# Patient Record
Sex: MALE | Race: BLACK OR AFRICAN AMERICAN | NOT HISPANIC OR LATINO | Employment: OTHER | URBAN - METROPOLITAN AREA
[De-identification: names, ages, dates, MRNs, and addresses within clinical notes are randomized per-mention and may not be internally consistent; named-entity substitution may affect disease eponyms.]

---

## 2018-06-15 PROBLEM — C92.00 AML (ACUTE MYELOBLASTIC LEUKEMIA): Status: ACTIVE | Noted: 2018-06-15

## 2018-06-16 ENCOUNTER — HOSPITAL ENCOUNTER (INPATIENT)
Facility: HOSPITAL | Age: 33
LOS: 37 days | Discharge: HOME | DRG: 840 | End: 2018-07-23
Attending: INTERNAL MEDICINE | Admitting: INTERNAL MEDICINE
Payer: OTHER GOVERNMENT

## 2018-06-16 DIAGNOSIS — C92.00 AML (ACUTE MYELOBLASTIC LEUKEMIA): ICD-10-CM

## 2018-06-16 DIAGNOSIS — R50.81 NEUTROPENIC FEVER: ICD-10-CM

## 2018-06-16 DIAGNOSIS — D61.810 PANCYTOPENIA DUE TO ANTINEOPLASTIC CHEMOTHERAPY: Primary | ICD-10-CM

## 2018-06-16 DIAGNOSIS — C92.10 CHRONIC MYELOID LEUKEMIA, BCR/ABL-POSITIVE, NOT HAVING ACHIEVED REMISSION: ICD-10-CM

## 2018-06-16 DIAGNOSIS — T45.1X5A PANCYTOPENIA DUE TO ANTINEOPLASTIC CHEMOTHERAPY: Primary | ICD-10-CM

## 2018-06-16 DIAGNOSIS — C92.10: ICD-10-CM

## 2018-06-16 DIAGNOSIS — D70.9 NEUTROPENIC FEVER: ICD-10-CM

## 2018-06-16 DIAGNOSIS — Z91.89 AT RISK FOR LONG QT SYNDROME: ICD-10-CM

## 2018-06-16 DIAGNOSIS — C92.10 CML (CHRONIC MYELOCYTIC LEUKEMIA): ICD-10-CM

## 2018-06-16 DIAGNOSIS — R00.0 TACHYCARDIA: ICD-10-CM

## 2018-06-16 PROBLEM — A04.72 C. DIFFICILE COLITIS: Status: ACTIVE | Noted: 2018-06-16

## 2018-06-16 PROBLEM — E87.6 HYPOKALEMIA: Status: ACTIVE | Noted: 2018-06-16

## 2018-06-16 LAB
ABO + RH BLD: NORMAL
ALBUMIN SERPL BCP-MCNC: 2.8 G/DL
ALP SERPL-CCNC: 51 U/L
ALT SERPL W/O P-5'-P-CCNC: 30 U/L
ANION GAP SERPL CALC-SCNC: 7 MMOL/L
ANISOCYTOSIS BLD QL SMEAR: SLIGHT
AST SERPL-CCNC: 14 U/L
BASOPHILS # BLD AUTO: ABNORMAL K/UL
BASOPHILS NFR BLD: 0 %
BILIRUB SERPL-MCNC: 0.5 MG/DL
BLD GP AB SCN CELLS X3 SERPL QL: NORMAL
BUN SERPL-MCNC: 13 MG/DL
CALCIUM SERPL-MCNC: 8.4 MG/DL
CHLORIDE SERPL-SCNC: 109 MMOL/L
CO2 SERPL-SCNC: 27 MMOL/L
CREAT SERPL-MCNC: 0.7 MG/DL
DIASTOLIC DYSFUNCTION: NO
DIFFERENTIAL METHOD: ABNORMAL
EOSINOPHIL # BLD AUTO: ABNORMAL K/UL
EOSINOPHIL NFR BLD: 0 %
ERYTHROCYTE [DISTWIDTH] IN BLOOD BY AUTOMATED COUNT: 14.5 %
EST. GFR  (AFRICAN AMERICAN): >60 ML/MIN/1.73 M^2
EST. GFR  (NON AFRICAN AMERICAN): >60 ML/MIN/1.73 M^2
ESTIMATED PA SYSTOLIC PRESSURE: 24.16
FIBRINOGEN PPP-MCNC: 470 MG/DL
GLUCOSE SERPL-MCNC: 98 MG/DL
HCT VFR BLD AUTO: 22.3 %
HGB BLD-MCNC: 7.8 G/DL
HYPOCHROMIA BLD QL SMEAR: ABNORMAL
IMM GRANULOCYTES # BLD AUTO: ABNORMAL K/UL
IMM GRANULOCYTES NFR BLD AUTO: ABNORMAL %
INR PPP: 1.1
LDH SERPL L TO P-CCNC: 132 U/L
LYMPHOCYTES # BLD AUTO: ABNORMAL K/UL
LYMPHOCYTES NFR BLD: 100 %
MAGNESIUM SERPL-MCNC: 2 MG/DL
MCH RBC QN AUTO: 29.1 PG
MCHC RBC AUTO-ENTMCNC: 35 G/DL
MCV RBC AUTO: 83 FL
MONOCYTES # BLD AUTO: ABNORMAL K/UL
MONOCYTES NFR BLD: 0 %
NEUTROPHILS NFR BLD: 0 %
NRBC BLD-RTO: 0 /100 WBC
PHOSPHATE SERPL-MCNC: 2.8 MG/DL
PLATELET # BLD AUTO: 20 K/UL
PLATELET BLD QL SMEAR: ABNORMAL
PMV BLD AUTO: 10 FL
POTASSIUM SERPL-SCNC: 3 MMOL/L
PROT SERPL-MCNC: 5.3 G/DL
PROTHROMBIN TIME: 11.6 SEC
RBC # BLD AUTO: 2.68 M/UL
RETIRED EF AND QEF - SEE NOTES: 63 (ref 55–65)
SODIUM SERPL-SCNC: 143 MMOL/L
URATE SERPL-MCNC: 1.8 MG/DL
WBC # BLD AUTO: 0.26 K/UL

## 2018-06-16 PROCEDURE — 25000003 PHARM REV CODE 250: Performed by: INTERNAL MEDICINE

## 2018-06-16 PROCEDURE — 99223 1ST HOSP IP/OBS HIGH 75: CPT | Mod: ,,, | Performed by: INTERNAL MEDICINE

## 2018-06-16 PROCEDURE — 83735 ASSAY OF MAGNESIUM: CPT

## 2018-06-16 PROCEDURE — 84550 ASSAY OF BLOOD/URIC ACID: CPT

## 2018-06-16 PROCEDURE — 86850 RBC ANTIBODY SCREEN: CPT

## 2018-06-16 PROCEDURE — 85610 PROTHROMBIN TIME: CPT

## 2018-06-16 PROCEDURE — 80053 COMPREHEN METABOLIC PANEL: CPT

## 2018-06-16 PROCEDURE — 20600001 HC STEP DOWN PRIVATE ROOM

## 2018-06-16 PROCEDURE — 83615 LACTATE (LD) (LDH) ENZYME: CPT

## 2018-06-16 PROCEDURE — 84100 ASSAY OF PHOSPHORUS: CPT

## 2018-06-16 PROCEDURE — 93005 ELECTROCARDIOGRAM TRACING: CPT

## 2018-06-16 PROCEDURE — 85384 FIBRINOGEN ACTIVITY: CPT

## 2018-06-16 PROCEDURE — 93010 ELECTROCARDIOGRAM REPORT: CPT | Mod: ,,, | Performed by: INTERNAL MEDICINE

## 2018-06-16 PROCEDURE — 93306 TTE W/DOPPLER COMPLETE: CPT

## 2018-06-16 PROCEDURE — 93306 TTE W/DOPPLER COMPLETE: CPT | Mod: 26,,, | Performed by: INTERNAL MEDICINE

## 2018-06-16 PROCEDURE — 85027 COMPLETE CBC AUTOMATED: CPT

## 2018-06-16 PROCEDURE — 85007 BL SMEAR W/DIFF WBC COUNT: CPT

## 2018-06-16 RX ORDER — POTASSIUM CHLORIDE 750 MG/1
20 CAPSULE, EXTENDED RELEASE ORAL
Status: DISCONTINUED | OUTPATIENT
Start: 2018-06-16 | End: 2018-07-23 | Stop reason: HOSPADM

## 2018-06-16 RX ORDER — CEFEPIME HYDROCHLORIDE 2 G/1
2 INJECTION, POWDER, FOR SOLUTION INTRAVENOUS
Status: DISCONTINUED | OUTPATIENT
Start: 2018-06-16 | End: 2018-06-16

## 2018-06-16 RX ORDER — SODIUM CHLORIDE 0.9 % (FLUSH) 0.9 %
5 SYRINGE (ML) INJECTION
Status: DISCONTINUED | OUTPATIENT
Start: 2018-06-16 | End: 2018-06-20

## 2018-06-16 RX ORDER — SODIUM,POTASSIUM PHOSPHATES 280-250MG
1 POWDER IN PACKET (EA) ORAL EVERY 4 HOURS PRN
Status: DISCONTINUED | OUTPATIENT
Start: 2018-06-16 | End: 2018-07-23 | Stop reason: HOSPADM

## 2018-06-16 RX ORDER — GLUCAGON 1 MG
1 KIT INJECTION
Status: DISCONTINUED | OUTPATIENT
Start: 2018-06-16 | End: 2018-07-23 | Stop reason: HOSPADM

## 2018-06-16 RX ORDER — IBUPROFEN 200 MG
24 TABLET ORAL
Status: DISCONTINUED | OUTPATIENT
Start: 2018-06-16 | End: 2018-07-23 | Stop reason: HOSPADM

## 2018-06-16 RX ORDER — MAGNESIUM SULFATE HEPTAHYDRATE 40 MG/ML
2 INJECTION, SOLUTION INTRAVENOUS
Status: DISCONTINUED | OUTPATIENT
Start: 2018-06-16 | End: 2018-07-20

## 2018-06-16 RX ORDER — IBUPROFEN 200 MG
16 TABLET ORAL
Status: DISCONTINUED | OUTPATIENT
Start: 2018-06-16 | End: 2018-07-23 | Stop reason: HOSPADM

## 2018-06-16 RX ORDER — CIPROFLOXACIN 250 MG/1
500 TABLET, FILM COATED ORAL EVERY 12 HOURS
Status: DISCONTINUED | OUTPATIENT
Start: 2018-06-16 | End: 2018-06-22

## 2018-06-16 RX ORDER — ACETAMINOPHEN 325 MG/1
650 TABLET ORAL EVERY 4 HOURS PRN
Status: DISCONTINUED | OUTPATIENT
Start: 2018-06-16 | End: 2018-07-13

## 2018-06-16 RX ORDER — ONDANSETRON 8 MG/1
8 TABLET, ORALLY DISINTEGRATING ORAL EVERY 8 HOURS PRN
Status: DISCONTINUED | OUTPATIENT
Start: 2018-06-16 | End: 2018-07-23 | Stop reason: HOSPADM

## 2018-06-16 RX ORDER — PROCHLORPERAZINE EDISYLATE 5 MG/ML
10 INJECTION INTRAMUSCULAR; INTRAVENOUS EVERY 6 HOURS PRN
Status: DISCONTINUED | OUTPATIENT
Start: 2018-06-16 | End: 2018-07-23 | Stop reason: HOSPADM

## 2018-06-16 RX ORDER — POLYETHYLENE GLYCOL 3350 17 G/17G
17 POWDER, FOR SOLUTION ORAL 2 TIMES DAILY PRN
Status: DISCONTINUED | OUTPATIENT
Start: 2018-06-16 | End: 2018-07-23 | Stop reason: HOSPADM

## 2018-06-16 RX ORDER — RAMELTEON 8 MG/1
8 TABLET ORAL NIGHTLY PRN
Status: DISCONTINUED | OUTPATIENT
Start: 2018-06-16 | End: 2018-06-18

## 2018-06-16 RX ORDER — VORICONAZOLE 200 MG/1
200 TABLET, FILM COATED ORAL 2 TIMES DAILY
Status: DISCONTINUED | OUTPATIENT
Start: 2018-06-16 | End: 2018-07-23 | Stop reason: HOSPADM

## 2018-06-16 RX ORDER — SODIUM,POTASSIUM PHOSPHATES 280-250MG
2 POWDER IN PACKET (EA) ORAL EVERY 4 HOURS PRN
Status: DISCONTINUED | OUTPATIENT
Start: 2018-06-16 | End: 2018-07-23 | Stop reason: HOSPADM

## 2018-06-16 RX ORDER — ACYCLOVIR 200 MG/1
400 CAPSULE ORAL 2 TIMES DAILY
Status: DISCONTINUED | OUTPATIENT
Start: 2018-06-16 | End: 2018-07-23 | Stop reason: HOSPADM

## 2018-06-16 RX ORDER — ALLOPURINOL 300 MG/1
300 TABLET ORAL DAILY
Status: DISCONTINUED | OUTPATIENT
Start: 2018-06-16 | End: 2018-06-18

## 2018-06-16 RX ADMIN — Medication 125 MG: at 06:06

## 2018-06-16 RX ADMIN — DASATINIB 100 MG: 100 TABLET ORAL at 09:06

## 2018-06-16 RX ADMIN — Medication 125 MG: at 11:06

## 2018-06-16 RX ADMIN — Medication 125 MG: at 12:06

## 2018-06-16 RX ADMIN — POTASSIUM CHLORIDE 20 MEQ: 750 CAPSULE, EXTENDED RELEASE ORAL at 08:06

## 2018-06-16 RX ADMIN — VORICONAZOLE 200 MG: 200 TABLET ORAL at 08:06

## 2018-06-16 RX ADMIN — ACYCLOVIR 400 MG: 200 CAPSULE ORAL at 09:06

## 2018-06-16 RX ADMIN — CIPROFLOXACIN HYDROCHLORIDE 500 MG: 250 TABLET, FILM COATED ORAL at 09:06

## 2018-06-16 RX ADMIN — POTASSIUM CHLORIDE 20 MEQ: 750 CAPSULE, EXTENDED RELEASE ORAL at 07:06

## 2018-06-16 RX ADMIN — POTASSIUM CHLORIDE 20 MEQ: 750 CAPSULE, EXTENDED RELEASE ORAL at 09:06

## 2018-06-16 RX ADMIN — ALLOPURINOL 300 MG: 300 TABLET ORAL at 09:06

## 2018-06-16 RX ADMIN — VORICONAZOLE 200 MG: 200 TABLET ORAL at 09:06

## 2018-06-16 RX ADMIN — ACYCLOVIR 400 MG: 200 CAPSULE ORAL at 08:06

## 2018-06-16 RX ADMIN — RAMELTEON 8 MG: 8 TABLET, FILM COATED ORAL at 08:06

## 2018-06-16 RX ADMIN — CIPROFLOXACIN HYDROCHLORIDE 500 MG: 250 TABLET, FILM COATED ORAL at 08:06

## 2018-06-16 NOTE — ASSESSMENT & PLAN NOTE
- initial WBC reportedly 150,000  - bcr-abl positive on FISH at [facility] (records attached in media tab)  - continue dasatinib  - voriconazole, acyclovir and cipro for prophylaxis  - likely needs to be re-marrowed status post 7+3 induction- day 16  - pending 2D echo

## 2018-06-16 NOTE — HPI
Mr. Guzmán is a 31 yo gentleman with no significant past medical history who was transferred from [facility] in [place], [place] for CML with blast crisis. The patient reports that he started having some worsening left sided abdominal pain in mid May. Eventually, this pain worsened which prompted him [place]. Routine labs were done which revealed a white count reportedly around 150. He was transferred to the above mentioned hospital where a bone marrow biopsy was done. The report (attached in the media tab) revealed 20% myelomonoblasts concerning for a CML in transformation to AML. The patient was subsequently induced with 7+3 on June 1. The patient's course there was complicated by neutropenic fevers resulting in the patient being started on vanc and cefepime. IV vanc was discontinued early on in the course but he remained on cefepime until his arrival here despite NGTD on all cultures. He was found to be C. Diff positive and was started on PO vancomycin on 6/13 and now reports having significant improvement in his diarrhea.     On day 8 of 7+3, Mr. Guzmán's bcr-abl came back positive and he was subsequently started on dasatinib. He developed a follicular rash on his arms and head likely secondary to the dasatinib which has largely resolved to date. The patient and his family are originally from [place] which is why they requested transfer to [facility] given their previous positive experiences here.

## 2018-06-16 NOTE — H&P
[facility]  Hematology  Bone Marrow Transplant  H&P    Subjective:     Principal Problem: Chronic myelogenous leukemia (CML), BCR-ABL1-positive    HPI: Mr. Guzmán is a 33 yo gentleman with no significant past medical history who was [facility] in [place], [place] for CML with blast crisis. The patient reports that he started having some worsening left sided abdominal pain in mid May. Eventually, this pain worsened which prompted him to [place]. Routine labs were done which revealed a white count reportedly around 150. He was transferred to the above mentioned hospital where a bone marrow biopsy was done. The report (attached in the media tab) revealed 20% myelomonoblasts concerning for a CML in transformation to AML. The patient was subsequently induced with 7+3 on June 1. The patient's course there was complicated by neutropenic fevers resulting in the patient being started on vanc and cefepime. IV vanc was discontinued early on in the course but he remained on cefepime until his arrival here despite NGTD on all cultures. He was found to be C. Diff positive and was started on PO vancomycin on 6/13 and now reports having significant improvement in his diarrhea.     On day 8 of 7+3, Mr. Guzmán's bcr-abl came back positive and he was subsequently started on dasatinib. He developed a follicular rash on his arms and head likely secondary to the dasatinib which has largely resolved to date. The patient and his family are originally from [place] which is why they requested transfer to [facility] given their previous positive experiences here.           No prescriptions prior to admission.       Patient has no known allergies.     Past Medical History:   Diagnosis Date    Chronic myelogenous leukemia (CML), BCR-ABL1-positive      History reviewed. No pertinent surgical history.  Family History     None        Social  History Main Topics    Smoking status: Never Smoker    Smokeless tobacco: Not on file    Alcohol use No    Drug use: No    Sexual activity: Yes     Partners: Female       Review of Systems   Constitutional: Negative for chills and fever.   HENT: Negative for congestion.    Eyes: Negative for visual disturbance.   Respiratory: Negative for shortness of breath.    Cardiovascular: Negative for chest pain.   Gastrointestinal: Positive for diarrhea. Negative for abdominal pain.   Genitourinary: Negative for dysuria.   Musculoskeletal: Negative for back pain.   Neurological: Negative for headaches.   Psychiatric/Behavioral: Negative for confusion.     Objective:     Vital Signs (Most Recent):  Temp: 99.5 °F (37.5 °C) (06/16/18 0850)  Pulse: 83 (06/16/18 0850)  Resp: 18 (06/16/18 0850)  BP: 131/79 (06/16/18 0850)  SpO2: 100 % (06/16/18 0850) Vital Signs (24h Range):  Temp:  [99 °F (37.2 °C)-99.7 °F (37.6 °C)] 99.5 °F (37.5 °C)  Pulse:  [] 83  Resp:  [18-20] 18  SpO2:  [96 %-100 %] 100 %  BP: (131-145)/(79-88) 131/79     Weight: 66.4 kg (146 lb 6.2 oz)  Body mass index is 20.42 kg/m².  Body surface area is 1.82 meters squared.    ECOG SCORE         [unfilled]    Lines/Drains/Airways     Peripherally Inserted Central Catheter Line                 PICC Double Lumen 05/30/18 0600 left brachial 17 days                Physical Exam   Constitutional: He is oriented to person, place, and time. He appears well-developed and well-nourished.   HENT:   Head: Normocephalic and atraumatic.   Eyes: EOM are normal. Pupils are equal, round, and reactive to light.   Neck: Normal range of motion. Neck supple.   Cardiovascular: Normal rate and regular rhythm.    No murmur heard.  Pulmonary/Chest: Effort normal and breath sounds normal. No respiratory distress.   Abdominal: Soft. Bowel sounds are normal. He exhibits no distension.   Musculoskeletal: Normal range of motion. He exhibits no edema.   Neurological: He is alert and oriented  to person, place, and time.   Skin: Skin is warm and dry. Rash noted.   Psychiatric: He has a normal mood and affect. His behavior is normal.   Vitals reviewed.      Significant Labs:   All pertinent labs from the last 24 hours have been reviewed.    Diagnostic Results:  I have reviewed and interpreted all pertinent imaging results/findings within the past 24 hours.    Assessment/Plan:     * Chronic myelogenous leukemia (CML), BCR-ABL1-positive    - initial WBC reportedly 150,000  - bcr-abl positive on FISH at [facility] (records attached in media tab)  - continue dasatinib  - voriconazole, acyclovir and cipro for prophylaxis  - likely needs to be re-marrowed status post 7+3 induction- day 16  - pending 2D echo        Pancytopenia due to antineoplastic chemotherapy    - likely secondary to 7+3 regiment  - daily CBC to assess  - ANC of 0         Hypokalemia    - prns replaced        C. difficile colitis    - on po vancomycin with improvement in symptoms  - end date of treatment 6/23            VTE Risk Mitigation         Ordered     IP VTE LOW RISK PATIENT  Once      06/16/18 0938          Disposition: Pending count recovery    Lizet Gee MD  Bone Marrow Transplant  Hematology  [facility]

## 2018-06-16 NOTE — SUBJECTIVE & OBJECTIVE
No prescriptions prior to admission.       Patient has no known allergies.     Past Medical History:   Diagnosis Date    Chronic myelogenous leukemia (CML), BCR-ABL1-positive      History reviewed. No pertinent surgical history.  Family History     None        Social History Main Topics    Smoking status: Never Smoker    Smokeless tobacco: Not on file    Alcohol use No    Drug use: No    Sexual activity: Yes     Partners: Female       Review of Systems   Constitutional: Negative for chills and fever.   HENT: Negative for congestion.    Eyes: Negative for visual disturbance.   Respiratory: Negative for shortness of breath.    Cardiovascular: Negative for chest pain.   Gastrointestinal: Positive for diarrhea. Negative for abdominal pain.   Genitourinary: Negative for dysuria.   Musculoskeletal: Negative for back pain.   Neurological: Negative for headaches.   Psychiatric/Behavioral: Negative for confusion.     Objective:     Vital Signs (Most Recent):  Temp: 99.5 °F (37.5 °C) (06/16/18 0850)  Pulse: 83 (06/16/18 0850)  Resp: 18 (06/16/18 0850)  BP: 131/79 (06/16/18 0850)  SpO2: 100 % (06/16/18 0850) Vital Signs (24h Range):  Temp:  [99 °F (37.2 °C)-99.7 °F (37.6 °C)] 99.5 °F (37.5 °C)  Pulse:  [] 83  Resp:  [18-20] 18  SpO2:  [96 %-100 %] 100 %  BP: (131-145)/(79-88) 131/79     Weight: 66.4 kg (146 lb 6.2 oz)  Body mass index is 20.42 kg/m².  Body surface area is 1.82 meters squared.    ECOG SCORE         [unfilled]    Lines/Drains/Airways     Peripherally Inserted Central Catheter Line                 PICC Double Lumen 05/30/18 0600 left brachial 17 days                Physical Exam   Constitutional: He is oriented to person, place, and time. He appears well-developed and well-nourished.   HENT:   Head: Normocephalic and atraumatic.   Eyes: EOM are normal. Pupils are equal, round, and reactive to light.   Neck: Normal range of motion. Neck supple.   Cardiovascular: Normal rate and regular rhythm.    No  murmur heard.  Pulmonary/Chest: Effort normal and breath sounds normal. No respiratory distress.   Abdominal: Soft. Bowel sounds are normal. He exhibits no distension.   Musculoskeletal: Normal range of motion. He exhibits no edema.   Neurological: He is alert and oriented to person, place, and time.   Skin: Skin is warm and dry. Rash noted.   Psychiatric: He has a normal mood and affect. His behavior is normal.   Vitals reviewed.      Significant Labs:   All pertinent labs from the last 24 hours have been reviewed.    Diagnostic Results:  I have reviewed and interpreted all pertinent imaging results/findings within the past 24 hours.

## 2018-06-17 LAB
ALBUMIN SERPL BCP-MCNC: 2.6 G/DL
ALP SERPL-CCNC: 51 U/L
ALT SERPL W/O P-5'-P-CCNC: 45 U/L
ANION GAP SERPL CALC-SCNC: 6 MMOL/L
AST SERPL-CCNC: 21 U/L
BASOPHILS # BLD AUTO: 0 K/UL
BASOPHILS NFR BLD: 0 %
BILIRUB SERPL-MCNC: 0.4 MG/DL
BLD PROD TYP BPU: NORMAL
BLOOD UNIT EXPIRATION DATE: NORMAL
BLOOD UNIT TYPE CODE: 6200
BLOOD UNIT TYPE: NORMAL
BUN SERPL-MCNC: 12 MG/DL
CALCIUM SERPL-MCNC: 8.2 MG/DL
CHLORIDE SERPL-SCNC: 108 MMOL/L
CO2 SERPL-SCNC: 25 MMOL/L
CODING SYSTEM: NORMAL
CREAT SERPL-MCNC: 0.6 MG/DL
DIFFERENTIAL METHOD: ABNORMAL
DISPENSE STATUS: NORMAL
EOSINOPHIL # BLD AUTO: 0 K/UL
EOSINOPHIL NFR BLD: 0 %
ERYTHROCYTE [DISTWIDTH] IN BLOOD BY AUTOMATED COUNT: 14.6 %
EST. GFR  (AFRICAN AMERICAN): >60 ML/MIN/1.73 M^2
EST. GFR  (NON AFRICAN AMERICAN): >60 ML/MIN/1.73 M^2
GLUCOSE SERPL-MCNC: 98 MG/DL
HCT VFR BLD AUTO: 21.8 %
HGB BLD-MCNC: 7.7 G/DL
IMM GRANULOCYTES # BLD AUTO: 0 K/UL
IMM GRANULOCYTES NFR BLD AUTO: 0 %
LYMPHOCYTES # BLD AUTO: 0.3 K/UL
LYMPHOCYTES NFR BLD: 100 %
MAGNESIUM SERPL-MCNC: 1.9 MG/DL
MCH RBC QN AUTO: 29.4 PG
MCHC RBC AUTO-ENTMCNC: 35.3 G/DL
MCV RBC AUTO: 83 FL
MONOCYTES # BLD AUTO: 0 K/UL
MONOCYTES NFR BLD: 0 %
NEUTROPHILS # BLD AUTO: 0 K/UL
NEUTROPHILS NFR BLD: 0 %
NRBC BLD-RTO: 0 /100 WBC
NUM UNITS TRANS WBC-POOR PLATPHERESIS: NORMAL
PHOSPHATE SERPL-MCNC: 2.2 MG/DL
PLATELET # BLD AUTO: 9 K/UL
PLATELET BLD QL SMEAR: ABNORMAL
PMV BLD AUTO: 8.8 FL
POTASSIUM SERPL-SCNC: 3.4 MMOL/L
PROT SERPL-MCNC: 5.1 G/DL
RBC # BLD AUTO: 2.62 M/UL
SODIUM SERPL-SCNC: 139 MMOL/L
WBC # BLD AUTO: 0.3 K/UL

## 2018-06-17 PROCEDURE — 25000003 PHARM REV CODE 250: Performed by: INTERNAL MEDICINE

## 2018-06-17 PROCEDURE — P9037 PLATE PHERES LEUKOREDU IRRAD: HCPCS

## 2018-06-17 PROCEDURE — 83735 ASSAY OF MAGNESIUM: CPT

## 2018-06-17 PROCEDURE — 36430 TRANSFUSION BLD/BLD COMPNT: CPT

## 2018-06-17 PROCEDURE — 63700000 PHARM REV CODE 250 ALT 637 W/O HCPCS: Performed by: INTERNAL MEDICINE

## 2018-06-17 PROCEDURE — 20600001 HC STEP DOWN PRIVATE ROOM

## 2018-06-17 PROCEDURE — 80053 COMPREHEN METABOLIC PANEL: CPT

## 2018-06-17 PROCEDURE — 85025 COMPLETE CBC W/AUTO DIFF WBC: CPT

## 2018-06-17 PROCEDURE — 84100 ASSAY OF PHOSPHORUS: CPT

## 2018-06-17 PROCEDURE — 86644 CMV ANTIBODY: CPT

## 2018-06-17 PROCEDURE — 99233 SBSQ HOSP IP/OBS HIGH 50: CPT | Mod: ,,, | Performed by: INTERNAL MEDICINE

## 2018-06-17 RX ORDER — HYDROCODONE BITARTRATE AND ACETAMINOPHEN 500; 5 MG/1; MG/1
TABLET ORAL
Status: DISCONTINUED | OUTPATIENT
Start: 2018-06-17 | End: 2018-06-18

## 2018-06-17 RX ADMIN — Medication 125 MG: at 11:06

## 2018-06-17 RX ADMIN — Medication 125 MG: at 05:06

## 2018-06-17 RX ADMIN — POTASSIUM CHLORIDE 20 MEQ: 750 CAPSULE, EXTENDED RELEASE ORAL at 05:06

## 2018-06-17 RX ADMIN — Medication 125 MG: at 06:06

## 2018-06-17 RX ADMIN — ACYCLOVIR 400 MG: 200 CAPSULE ORAL at 10:06

## 2018-06-17 RX ADMIN — VORICONAZOLE 200 MG: 200 TABLET ORAL at 10:06

## 2018-06-17 RX ADMIN — POTASSIUM & SODIUM PHOSPHATES POWDER PACK 280-160-250 MG 1 PACKET: 280-160-250 PACK at 10:06

## 2018-06-17 RX ADMIN — CIPROFLOXACIN HYDROCHLORIDE 500 MG: 250 TABLET, FILM COATED ORAL at 10:06

## 2018-06-17 RX ADMIN — ALLOPURINOL 300 MG: 300 TABLET ORAL at 10:06

## 2018-06-17 RX ADMIN — CIPROFLOXACIN HYDROCHLORIDE 500 MG: 250 TABLET, FILM COATED ORAL at 08:06

## 2018-06-17 RX ADMIN — POTASSIUM & SODIUM PHOSPHATES POWDER PACK 280-160-250 MG 1 PACKET: 280-160-250 PACK at 05:06

## 2018-06-17 RX ADMIN — VORICONAZOLE 200 MG: 200 TABLET ORAL at 08:06

## 2018-06-17 RX ADMIN — ACYCLOVIR 400 MG: 200 CAPSULE ORAL at 08:06

## 2018-06-17 RX ADMIN — RAMELTEON 8 MG: 8 TABLET, FILM COATED ORAL at 08:06

## 2018-06-17 RX ADMIN — ACETAMINOPHEN 650 MG: 325 TABLET, FILM COATED ORAL at 05:06

## 2018-06-17 RX ADMIN — POTASSIUM & SODIUM PHOSPHATES POWDER PACK 280-160-250 MG 1 PACKET: 280-160-250 PACK at 02:06

## 2018-06-17 RX ADMIN — POTASSIUM CHLORIDE 20 MEQ: 750 CAPSULE, EXTENDED RELEASE ORAL at 10:06

## 2018-06-17 RX ADMIN — DASATINIB 40 MG: 20 TABLET ORAL at 11:06

## 2018-06-17 NOTE — SUBJECTIVE & OBJECTIVE
Subjective:     Interval History: NAEON. Day 17 of 7+3. Diarrhea improving. Rash improving. ANC 0.     He denies any fevers, chills, cough, SOB, CP, N/V, constipation, or dysuria.    Objective:     Vital Signs (Most Recent):  Temp: 98.7 °F (37.1 °C) (06/17/18 0809)  Pulse: 82 (06/17/18 0809)  Resp: 18 (06/17/18 0809)  BP: 125/75 (06/17/18 0809)  SpO2: 100 % (06/17/18 0809) Vital Signs (24h Range):  Temp:  [98.7 °F (37.1 °C)-99.5 °F (37.5 °C)] 98.7 °F (37.1 °C)  Pulse:  [] 82  Resp:  [18-20] 18  SpO2:  [98 %-100 %] 100 %  BP: (118-144)/(69-87) 125/75     Weight: 66.5 kg (146 lb 9.7 oz)  Body mass index is 20.45 kg/m².  Body surface area is 1.83 meters squared.    ECOG SCORE         [unfilled]    Intake/Output - Last 3 Shifts       06/15 0700 - 06/16 0659 06/16 0700 - 06/17 0659 06/17 0700 - 06/18 0659    P.O.  840     Blood   243    Total Intake(mL/kg)  840 (12.6) 243 (3.7)    Urine (mL/kg/hr)  350 (0.2)     Total Output   350      Net   +490 +243           Urine Occurrence  1 x     Stool Occurrence  3 x           Physical Exam   Constitutional: He is oriented to person, place, and time. He appears well-developed and well-nourished.   HENT:   Head: Normocephalic and atraumatic.   Eyes: EOM are normal. Pupils are equal, round, and reactive to light.   Neck: Normal range of motion. Neck supple.   Cardiovascular: Normal rate and regular rhythm.    No murmur heard.  Pulmonary/Chest: Effort normal and breath sounds normal. No respiratory distress.   Abdominal: Soft. Bowel sounds are normal. He exhibits no distension.   Musculoskeletal: Normal range of motion. He exhibits no edema.   Neurological: He is alert and oriented to person, place, and time.   Skin: Skin is warm and dry. Rash noted.   Psychiatric: He has a normal mood and affect. His behavior is normal.   Vitals reviewed.      Significant Labs:   All pertinent labs from the last 24 hours have been reviewed.    Diagnostic Results:  I have reviewed and  interpreted all pertinent imaging results/findings within the past 24 hours.

## 2018-06-17 NOTE — PROGRESS NOTES
[facility]  Hematology  Bone Marrow Transplant  Progress Note    Patient Name: Wilton Guzmán  Admission Date: 6/16/2018  Hospital Length of Stay: 1 days  Code Status: Full Code    Subjective:     Interval History: NAEON. Day 17 of 7+3. Diarrhea improving. Rash improving. ANC 0.     He denies any fevers, chills, cough, SOB, CP, N/V, constipation, or dysuria.    Objective:     Vital Signs (Most Recent):  Temp: 98.7 °F (37.1 °C) (06/17/18 0809)  Pulse: 82 (06/17/18 0809)  Resp: 18 (06/17/18 0809)  BP: 125/75 (06/17/18 0809)  SpO2: 100 % (06/17/18 0809) Vital Signs (24h Range):  Temp:  [98.7 °F (37.1 °C)-99.5 °F (37.5 °C)] 98.7 °F (37.1 °C)  Pulse:  [] 82  Resp:  [18-20] 18  SpO2:  [98 %-100 %] 100 %  BP: (118-144)/(69-87) 125/75     Weight: 66.5 kg (146 lb 9.7 oz)  Body mass index is 20.45 kg/m².  Body surface area is 1.83 meters squared.    ECOG SCORE         [unfilled]    Intake/Output - Last 3 Shifts       06/15 0700 - 06/16 0659 06/16 0700 - 06/17 0659 06/17 0700 - 06/18 0659    P.O.  840     Blood   243    Total Intake(mL/kg)  840 (12.6) 243 (3.7)    Urine (mL/kg/hr)  350 (0.2)     Total Output   350      Net   +490 +243           Urine Occurrence  1 x     Stool Occurrence  3 x           Physical Exam   Constitutional: He is oriented to person, place, and time. He appears well-developed and well-nourished.   HENT:   Head: Normocephalic and atraumatic.   Eyes: EOM are normal. Pupils are equal, round, and reactive to light.   Neck: Normal range of motion. Neck supple.   Cardiovascular: Normal rate and regular rhythm.    No murmur heard.  Pulmonary/Chest: Effort normal and breath sounds normal. No respiratory distress.   Abdominal: Soft. Bowel sounds are normal. He exhibits no distension.   Musculoskeletal: Normal range of motion. He exhibits no edema.   Neurological: He is alert and oriented to person, place, and time.   Skin: Skin is warm and dry. Rash noted.   Psychiatric: He has a normal  mood and affect. His behavior is normal.   Vitals reviewed.      Significant Labs:   All pertinent labs from the last 24 hours have been reviewed.    Diagnostic Results:  I have reviewed and interpreted all pertinent imaging results/findings within the past 24 hours.    Assessment/Plan:     * Chronic myelogenous leukemia (CML), BCR-ABL1-positive    - initial WBC reportedly 150,000  - bcr-abl positive on FISH at [facility] (records attached in media tab)  - continue dasatinib  - voriconazole, acyclovir and cipro for prophylaxis  - likely needs to be re-marrowed status post 7+3 induction- day 17  - 2D echo wnl  - will need repeat bone marrow biopsy         Pancytopenia due to antineoplastic chemotherapy    - likely secondary to 7+3 regiment  - daily CBC to assess  - ANC of 0         Hypokalemia    - prns replaced        C. difficile colitis    - on po vancomycin with improvement in symptoms  - end date of treatment 6/23            VTE Risk Mitigation         Ordered     IP VTE LOW RISK PATIENT  Once      06/16/18 0938          Disposition: Pending count recovery    Lizet Gee MD  Bone Marrow Transplant  [facility]

## 2018-06-17 NOTE — PLAN OF CARE
Problem: Patient Care Overview  Goal: Plan of Care Review  Outcome: Ongoing (interventions implemented as appropriate)  Pt. with nonskid footwear on with bed in lowest position and locked with bed rails up x2.  Pt. ambulates independently and instructed to call if assistance is needed.  Pt. with call light within reach.  Pt. With EGK completed.  Pt. Pending 2D echo.  Pt received electrolyte replacements.   Pt. With c/o improved diarrhea and receiving vanco oral for c. diff.  Pt. With a good appetite eating 100% of meals.  Pt. with mother at bedside.  Will continue to monitor pt.

## 2018-06-17 NOTE — PLAN OF CARE
Problem: Patient Care Overview  Goal: Plan of Care Review  Outcome: Ongoing (interventions implemented as appropriate)  Patient is progressing and involved with plan of care. Frequent rounds made to assess pain and safety. Pt communicating needs throughout shift. Up ad flakita. No c/o diarrhea this shift. Tolerating diet, voiding without difficulty. No c/o pain. All vitals stable; no acute events this shift. Pt. Remaining free from falls or injury throughout shift; bed in lowest position; side rails up X2; call light within reach; pt instructed to call for assistance as needed - verbalized understanding. Q2h rounding on patient. Will continue to monitor.

## 2018-06-17 NOTE — ASSESSMENT & PLAN NOTE
- initial WBC reportedly 150,000  - bcr-abl positive on FISH at [facility] (records attached in media tab)  - continue dasatinib at 40 mg (dose reduction secondary to being on voriconazole)  - voriconazole, acyclovir and cipro for prophylaxis  - likely needs to be re-marrowed status post 7+3 induction- day 17  - 2D echo wnl  - will need repeat bone marrow biopsy

## 2018-06-17 NOTE — HOSPITAL COURSE
06/17/2018: NAEON. Diarrhea improving. Rash improving.  06/22/2018: Neutropenic fever overnight. Blood cultures obtained. Will obtain UA, urine culture, and chest xray this AM. Started on cefepime this AM. Diarrhea resolved. Pancytopenic. ANC 50. Plans to start MEC reinduction on hold due to neutropenic fever. Will hold off on starting until cultures result.   06/24/2018 tolerated first day of MEC chemotherapy started 6/23 for refractory AML. Today MEC day 2.   06/25/2018: MEC D3. No complaints. Discontinue cefepime and transition to cipro PPX. Pancytopenic. 1 U PRBCs today for Hgb 6.8.  06/26/2018: MEC D4. No acute issues.  06/27/2018: MEC D5. No acute issues.  6/28/2018: MEC D6. VSS, afebrile, no complaints today. tolerating chemo well.   06/29/2018: [facility] D7.  07/02/2018: [facility] day 10; no issues, VSS, ANC 0. D/C'd IVFs  07/03/2018: MEC day 11, tmax 100.2, plt 5K will get one unit plt transfusion.   07/06/2018: [facility] day 14, tmax 99.8. Blood cx from 7/4 one bottle showed gram + cocci in chains resembling strep, redrew cx and ngtd. Plt 6K, will get one unit plt transfusion. ANC 0. On cef and vanc. Vanc due to left side facial swelling/periorbital edema. CT orbits done yesterday which showed minimal asymetric soft tissue induration of left infraorbital soft tissue without evidence of focal fluid collection. Otherwise unremarkable. May perform MRI and/or get neuro consult pending staff due to HA and pressure in back of head.  07/09/2018: MEC day 17, swelling much improved. MRI unremarkable. Cef continues, will d/c vanc today.   07/10/2018: [facility] day 18, cef changed to po cipro today. No complaints. 1 unit plt today for plt of 6K, ANC 0  07/11/2018: MEC day 19, afebrile, ANC 0.   07/12/2018: [facility] day 20, plan for BM bx tomorrow. ANC 10. With sacral sore, consulting wound care  07/13/2018: MEC day 21, BM bx today. ANC 10. Wound care started critic aid barrier paste and ordered waffle cushion. Repeat neutropenic fever, started  vanc and cef again  07/16/2018: [facility] day 24, awaiting BM bx results, path and flow pending. Day 4 of vanc and cef, blood cx, UA, Ucx, and chest xray from 7/13 without source. D/C vanc. ANC 8. 24 hr tmax 100.4 (downtrending)  07/17/2018: [facility] day 25, BM bx final path without evidence of CML. Day 5 of cef, afebrile, one unsustained temp of 100.4 yesterday evening.   07/18/2018: [facility] day 26, Day 6 of cef, will d/c today. Afebrile. ANC up to 130 today. BCR ABL p210 0.5% (c/w MRD)  07/19/2018: [facility] D27. Afebrile. VSS. Continue PPX acyc, cipro, voriconazole. Continue dasatinib.Pancytopenic. .   07/20/2018: [facility] D28. Afebrile. VSS. . Continue PPX acyc, cipro, voriconazole. Continue dasatinib. Pancytopenic. Awaiting count recovery for discharge. No acute issues.  7/21/2018: [facility] D29. Afebrile. VSS. . Continue PPX acyc, cipro, voriconazole. Continue dastanib. Pancytopenic. Possible discharge next week.  7/23/2018: [facility] D31. Afebrile, VSS. ANC up to 630. Will discharge home today with ppx acyclovir and ppx Cipro x 1 week. Follow-up appointment with new PCP in [place] on 7/26 for referral to [facility] for Hem/Onc follow-up and treatment. PICC removed prior to discharge.

## 2018-06-18 LAB
ALBUMIN SERPL BCP-MCNC: 2.9 G/DL
ALP SERPL-CCNC: 57 U/L
ALT SERPL W/O P-5'-P-CCNC: 54 U/L
ANION GAP SERPL CALC-SCNC: 7 MMOL/L
ANISOCYTOSIS BLD QL SMEAR: SLIGHT
AST SERPL-CCNC: 22 U/L
BASOPHILS # BLD AUTO: 0 K/UL
BASOPHILS NFR BLD: 0 %
BILIRUB SERPL-MCNC: 0.4 MG/DL
BONE MARROW WRIGHT STAIN COMMENT: NORMAL
BUN SERPL-MCNC: 9 MG/DL
CALCIUM SERPL-MCNC: 8.6 MG/DL
CHLORIDE SERPL-SCNC: 106 MMOL/L
CO2 SERPL-SCNC: 26 MMOL/L
CREAT SERPL-MCNC: 0.7 MG/DL
DIFFERENTIAL METHOD: ABNORMAL
EOSINOPHIL # BLD AUTO: 0 K/UL
EOSINOPHIL NFR BLD: 0 %
ERYTHROCYTE [DISTWIDTH] IN BLOOD BY AUTOMATED COUNT: 14.8 %
EST. GFR  (AFRICAN AMERICAN): >60 ML/MIN/1.73 M^2
EST. GFR  (NON AFRICAN AMERICAN): >60 ML/MIN/1.73 M^2
GLUCOSE SERPL-MCNC: 99 MG/DL
HCT VFR BLD AUTO: 21.8 %
HGB BLD-MCNC: 7.6 G/DL
HYPOCHROMIA BLD QL SMEAR: ABNORMAL
IMM GRANULOCYTES # BLD AUTO: 0 K/UL
IMM GRANULOCYTES NFR BLD AUTO: 0 %
LYMPHOCYTES # BLD AUTO: 0.3 K/UL
LYMPHOCYTES NFR BLD: 96.9 %
MAGNESIUM SERPL-MCNC: 1.9 MG/DL
MCH RBC QN AUTO: 29.5 PG
MCHC RBC AUTO-ENTMCNC: 34.9 G/DL
MCV RBC AUTO: 85 FL
MONOCYTES # BLD AUTO: 0 K/UL
MONOCYTES NFR BLD: 0 %
NEUTROPHILS # BLD AUTO: 0 K/UL
NEUTROPHILS NFR BLD: 3.1 %
NRBC BLD-RTO: 0 /100 WBC
OVALOCYTES BLD QL SMEAR: ABNORMAL
PHOSPHATE SERPL-MCNC: 2.8 MG/DL
PLATELET # BLD AUTO: 25 K/UL
PMV BLD AUTO: 9.1 FL
POIKILOCYTOSIS BLD QL SMEAR: SLIGHT
POLYCHROMASIA BLD QL SMEAR: ABNORMAL
POTASSIUM SERPL-SCNC: 3.6 MMOL/L
PROT SERPL-MCNC: 5.7 G/DL
RBC # BLD AUTO: 2.58 M/UL
SODIUM SERPL-SCNC: 139 MMOL/L
WBC # BLD AUTO: 0.32 K/UL

## 2018-06-18 PROCEDURE — 63600175 PHARM REV CODE 636 W HCPCS: Performed by: NURSE PRACTITIONER

## 2018-06-18 PROCEDURE — 38221 DX BONE MARROW BIOPSIES: CPT

## 2018-06-18 PROCEDURE — 38222 DX BONE MARROW BX & ASPIR: CPT | Mod: LT,,, | Performed by: NURSE PRACTITIONER

## 2018-06-18 PROCEDURE — 88313 SPECIAL STAINS GROUP 2: CPT | Mod: 26,,, | Performed by: PATHOLOGY

## 2018-06-18 PROCEDURE — 88264 CHROMOSOME ANALYSIS 20-25: CPT

## 2018-06-18 PROCEDURE — 99233 SBSQ HOSP IP/OBS HIGH 50: CPT | Mod: ,,, | Performed by: INTERNAL MEDICINE

## 2018-06-18 PROCEDURE — 88305 TISSUE EXAM BY PATHOLOGIST: CPT | Mod: 26,,, | Performed by: PATHOLOGY

## 2018-06-18 PROCEDURE — 20600001 HC STEP DOWN PRIVATE ROOM

## 2018-06-18 PROCEDURE — 88299 UNLISTED CYTOGENETIC STUDY: CPT

## 2018-06-18 PROCEDURE — 88184 FLOWCYTOMETRY/ TC 1 MARKER: CPT | Performed by: PATHOLOGY

## 2018-06-18 PROCEDURE — 63700000 PHARM REV CODE 250 ALT 637 W/O HCPCS: Performed by: INTERNAL MEDICINE

## 2018-06-18 PROCEDURE — 88275 CYTOGENETICS 100-300: CPT

## 2018-06-18 PROCEDURE — 81207 BCR/ABL1 GENE MINOR BP: CPT

## 2018-06-18 PROCEDURE — 88237 TISSUE CULTURE BONE MARROW: CPT

## 2018-06-18 PROCEDURE — 88313 SPECIAL STAINS GROUP 2: CPT

## 2018-06-18 PROCEDURE — 88189 FLOWCYTOMETRY/READ 16 & >: CPT | Mod: ,,, | Performed by: PATHOLOGY

## 2018-06-18 PROCEDURE — 88341 IMHCHEM/IMCYTCHM EA ADD ANTB: CPT | Performed by: PATHOLOGY

## 2018-06-18 PROCEDURE — 81450 HL NEO GSAP 5-50DNA/DNA&RNA: CPT

## 2018-06-18 PROCEDURE — 80053 COMPREHEN METABOLIC PANEL: CPT

## 2018-06-18 PROCEDURE — 88342 IMHCHEM/IMCYTCHM 1ST ANTB: CPT | Performed by: PATHOLOGY

## 2018-06-18 PROCEDURE — 88271 CYTOGENETICS DNA PROBE: CPT

## 2018-06-18 PROCEDURE — 25000003 PHARM REV CODE 250: Performed by: NURSE PRACTITIONER

## 2018-06-18 PROCEDURE — 88305 TISSUE EXAM BY PATHOLOGIST: CPT | Performed by: PATHOLOGY

## 2018-06-18 PROCEDURE — 84100 ASSAY OF PHOSPHORUS: CPT

## 2018-06-18 PROCEDURE — 25000003 PHARM REV CODE 250: Performed by: INTERNAL MEDICINE

## 2018-06-18 PROCEDURE — 88341 IMHCHEM/IMCYTCHM EA ADD ANTB: CPT | Mod: 26,,, | Performed by: PATHOLOGY

## 2018-06-18 PROCEDURE — 88342 IMHCHEM/IMCYTCHM 1ST ANTB: CPT | Mod: 26,59,, | Performed by: PATHOLOGY

## 2018-06-18 PROCEDURE — 83735 ASSAY OF MAGNESIUM: CPT

## 2018-06-18 PROCEDURE — 88311 DECALCIFY TISSUE: CPT | Mod: 26,,, | Performed by: PATHOLOGY

## 2018-06-18 PROCEDURE — 88185 FLOWCYTOMETRY/TC ADD-ON: CPT | Performed by: PATHOLOGY

## 2018-06-18 PROCEDURE — 85097 BONE MARROW INTERPRETATION: CPT | Mod: ,,, | Performed by: PATHOLOGY

## 2018-06-18 PROCEDURE — 07DR3ZX EXTRACTION OF ILIAC BONE MARROW, PERCUTANEOUS APPROACH, DIAGNOSTIC: ICD-10-PCS | Performed by: INTERNAL MEDICINE

## 2018-06-18 PROCEDURE — 25000003 PHARM REV CODE 250: Performed by: STUDENT IN AN ORGANIZED HEALTH CARE EDUCATION/TRAINING PROGRAM

## 2018-06-18 PROCEDURE — 85025 COMPLETE CBC W/AUTO DIFF WBC: CPT

## 2018-06-18 RX ORDER — FENTANYL CITRATE 50 UG/ML
50 INJECTION, SOLUTION INTRAMUSCULAR; INTRAVENOUS ONCE
Status: COMPLETED | OUTPATIENT
Start: 2018-06-18 | End: 2018-06-18

## 2018-06-18 RX ORDER — LIDOCAINE HYDROCHLORIDE 20 MG/ML
10 INJECTION, SOLUTION INFILTRATION; PERINEURAL ONCE
Status: COMPLETED | OUTPATIENT
Start: 2018-06-18 | End: 2018-06-18

## 2018-06-18 RX ORDER — ZOLPIDEM TARTRATE 5 MG/1
5 TABLET ORAL NIGHTLY PRN
Status: DISCONTINUED | OUTPATIENT
Start: 2018-06-18 | End: 2018-06-19

## 2018-06-18 RX ORDER — LORAZEPAM 0.5 MG/1
0.5 TABLET ORAL ONCE AS NEEDED
Status: COMPLETED | OUTPATIENT
Start: 2018-06-18 | End: 2018-06-18

## 2018-06-18 RX ADMIN — Medication 125 MG: at 11:06

## 2018-06-18 RX ADMIN — Medication 125 MG: at 12:06

## 2018-06-18 RX ADMIN — VORICONAZOLE 200 MG: 200 TABLET ORAL at 09:06

## 2018-06-18 RX ADMIN — ACYCLOVIR 400 MG: 200 CAPSULE ORAL at 09:06

## 2018-06-18 RX ADMIN — LIDOCAINE HYDROCHLORIDE 10 ML: 20 INJECTION, SOLUTION INFILTRATION; PERINEURAL at 02:06

## 2018-06-18 RX ADMIN — Medication 125 MG: at 05:06

## 2018-06-18 RX ADMIN — POTASSIUM CHLORIDE 20 MEQ: 750 CAPSULE, EXTENDED RELEASE ORAL at 09:06

## 2018-06-18 RX ADMIN — FENTANYL CITRATE 15 MCG: 50 INJECTION, SOLUTION INTRAMUSCULAR; INTRAVENOUS at 02:06

## 2018-06-18 RX ADMIN — CIPROFLOXACIN HYDROCHLORIDE 500 MG: 250 TABLET, FILM COATED ORAL at 09:06

## 2018-06-18 RX ADMIN — ALLOPURINOL 300 MG: 300 TABLET ORAL at 09:06

## 2018-06-18 RX ADMIN — DASATINIB 40 MG: 20 TABLET ORAL at 09:06

## 2018-06-18 RX ADMIN — LORAZEPAM 0.5 MG: 0.5 TABLET ORAL at 04:06

## 2018-06-18 RX ADMIN — ZOLPIDEM TARTRATE 5 MG: 5 TABLET ORAL at 09:06

## 2018-06-18 RX ADMIN — Medication 125 MG: at 07:06

## 2018-06-18 NOTE — PLAN OF CARE
Problem: Patient Care Overview  Goal: Plan of Care Review  Outcome: Ongoing (interventions implemented as appropriate)  Pt received 1 unit PLT this morning. Pt given 40 mg sprycel today for treatment regimen. PRN electrolyte replacements given. Pt had one BM during shift. No c/o pain or discomfort. VSS. Pt with nonskid footwear on with bed in lowest position and locked with bed rails up x2.  Pt. ambulates independently and instructed to call if assistance is needed.  Pt. with call light within reach.  Will continue to monitor pt.

## 2018-06-18 NOTE — SUBJECTIVE & OBJECTIVE
Subjective:     Interval History: NAEON. Day 18 of 7+3. Diarrhea improving. Rash improving. ANC 10.     He denies any fevers, chills, cough, SOB, CP, N/V, constipation, or dysuria.    Objective:     Vital Signs (Most Recent):  Temp: 99.5 °F (37.5 °C) (06/18/18 0700)  Pulse: 91 (06/18/18 0700)  Resp: 16 (06/18/18 0700)  BP: 124/74 (06/18/18 0700)  SpO2: 100 % (06/18/18 0700) Vital Signs (24h Range):  Temp:  [98.6 °F (37 °C)-99.7 °F (37.6 °C)] 99.5 °F (37.5 °C)  Pulse:  [] 91  Resp:  [16-20] 16  SpO2:  [97 %-100 %] 100 %  BP: (124-134)/(74-86) 124/74     Weight: 65.2 kg (143 lb 13.6 oz)  Body mass index is 20.06 kg/m².  Body surface area is 1.81 meters squared.    ECOG SCORE             Intake/Output - Last 3 Shifts       06/16 0700 - 06/17 0659 06/17 0700 - 06/18 0659 06/18 0700 - 06/19 0659    P.O. 840 840     Blood  243     Total Intake(mL/kg) 840 (12.6) 1083 (16.6)     Urine (mL/kg/hr) 350 (0.2) 1900 (1.2)     Total Output 350 1900      Net +490 -817             Urine Occurrence 1 x      Stool Occurrence 3 x 1 x           Physical Exam   Constitutional: He is oriented to person, place, and time. He appears well-developed and well-nourished.   HENT:   Head: Normocephalic and atraumatic.   Eyes: EOM are normal. Pupils are equal, round, and reactive to light.   Neck: Normal range of motion. Neck supple.   Cardiovascular: Normal rate and regular rhythm.    No murmur heard.  Pulmonary/Chest: Effort normal and breath sounds normal. No respiratory distress.   Abdominal: Soft. Bowel sounds are normal. He exhibits no distension.   Musculoskeletal: Normal range of motion. He exhibits no edema.   Neurological: He is alert and oriented to person, place, and time.   Skin: Skin is warm and dry. Rash noted.   Psychiatric: He has a normal mood and affect. His behavior is normal.   Vitals reviewed.      Significant Labs:   All pertinent labs from the last 24 hours have been reviewed.    Diagnostic Results:  I have reviewed  and interpreted all pertinent imaging results/findings within the past 24 hours.

## 2018-06-18 NOTE — PROGRESS NOTES
Admit Assessment    Patient Identification  Wilton Guzmán   :  1985  Admit Date:  2018  Attending Provider:  Kishro Pantoja MD              Referral:   Pt was admitted to  with a diagnosis of Chronic myelogenous leukemia (CML), BCR-ABL1-positive, and was admitted this hospital stay due to AML (acute myeloblastic leukemia) [C92.00].   is involved was referred to the Social Work Department via (Referal).  Patient presents as a 32 y.o. year old engaged male.    Persons interviewed: pt    Living Situation:  Pt lives ind with his fiance and 11 month old dtr. Pt stated he is hopeful to return home by 18 to celebrate his dtr's bday. 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   Pt has two siblings, a brother and sister that live in [place].     Resides at [address] [place] [address] [address] [place] [address] [place] [address], phone: [phone] (home).      Current or Past Agencies and Description of Services/Supplies    DME  N/A    Home Health  N/A    IV Infusion  N/A    Nutrition: oral    Outpatient Pharmacy:   No Pharmacies Listed    Patient Preference of agencies include none    Patient/Caregiver informed of right to choose providers or agencies.  Patient provides permission to release any necessary information to [facility] and to [facility] agencies as needed to facilitate patient care, treatment planning, and patient discharge planning.  Written and verbal resources provided.      Coping  Pt stated he is feeling overwhelmed at the moment and that his fiance's mother is actually going through cancer treatment as well.     Adjustment to Diagnosis and Treatment  appropriate    Emotional/Behavioral/Cognitive Issues  none    History/Current Symptoms of Anxiety/Depression: No  History/Current Substance Use:   Social History     Social History Main Topics    Smoking status: Never Smoker    Smokeless tobacco:  Not on file    Alcohol use No    Drug use: No    Sexual activity: Yes     Partners: Female       Indications of Abuse/Neglect: No  Abuse Screen  Do You Feel Unsafe at Home, Work or School?: no    Financial:  Payor/Plan Subscr  Sex Relation Sub. Ins. ID Effective Group Num   1. ANA M Magdiel NINO* EMANI WALKER 1985 Male Self [id number]                                    [address]      Other identified concerns/needs: none at this time    Plan: Pt anticipated to DC to previous living situation with family support at DC.     Interventions/Referrals: none    Patient/caregiver engaged in treatment planning process.     providing psychosocial and supportive counseling, resources, education, assistance and discharge planning as appropriate.  Patient/caregiver state understanding of  available resources,  following, remains available.

## 2018-06-18 NOTE — CONSULTS
Education    Diet Education: Nutritional Therapy for cancer/chemo treatment  Time Spent: 15min  Learners: pt      Nutrition Education provided with handouts: Nutritional Therapy for High-Kcal High Pro Foods,  Adjusting for Altered Taste.    Comments: reviewed the need for increased nutrient needs during treatment and for recovery. Brainstormed some ideas on possible meal plans for pt. All questions and concerns answered. Dietitian's contact information provided.       Follow-Up: 1x/wk    Please Re-consult as needed      Thanks!

## 2018-06-18 NOTE — ASSESSMENT & PLAN NOTE
- initial WBC reportedly 150,000  - bcr-abl positive on FISH at [facility] (records attached in media tab)  - continue dasatinib at 40 mg (dose reduction secondary to being on voriconazole)  - voriconazole, acyclovir and cipro for prophylaxis  -  status post 7+3 induction- day 18  - 2D echo wnl  - plan for BM biopsy today. Further treatment plans pending BM biopsy results.

## 2018-06-18 NOTE — PROCEDURES
PROCEDURE NOTE:  Bone Marrow aspiration and biopsy  Indication: restaging CML in blast crisis  Consent: Informed consent was obtained from patient.  Timeout: Done and documented.  Position: prone  Site: Left posterior illiac crest.  Prep: Betadine.  Needle used: 11 gauge Jamshidi needle.  Anesthetic: 2% lidocaine 5 cc.  Biopsy: The biopsy needle was introduced into the marrow cavity and 25 cc of aspirate was obtained without complications and sent for flow, cytogenetics, FISH, DNA hold, BCR/ABL and Oncogene panel. Core biopsy obtained x 2 without difficulty and sent for routine histologic examination.  Complications: None.  EBL: minimal  Patient to lie supine for 20 minutes    Haily Serna NP  Hematology/BMT

## 2018-06-18 NOTE — PROGRESS NOTES
[facility]  Hematology  Bone Marrow Transplant  Progress Note    Patient Name: Wilton Guzmán  Admission Date: 6/16/2018  Hospital Length of Stay: 2 days  Code Status: Full Code    Subjective:     Interval History: NAEON. Day 18 of 7+3. Diarrhea improving. Rash improving. ANC 10.     He denies any fevers, chills, cough, SOB, CP, N/V, constipation, or dysuria.    Objective:     Vital Signs (Most Recent):  Temp: 99.5 °F (37.5 °C) (06/18/18 0700)  Pulse: 91 (06/18/18 0700)  Resp: 16 (06/18/18 0700)  BP: 124/74 (06/18/18 0700)  SpO2: 100 % (06/18/18 0700) Vital Signs (24h Range):  Temp:  [98.6 °F (37 °C)-99.7 °F (37.6 °C)] 99.5 °F (37.5 °C)  Pulse:  [] 91  Resp:  [16-20] 16  SpO2:  [97 %-100 %] 100 %  BP: (124-134)/(74-86) 124/74     Weight: 65.2 kg (143 lb 13.6 oz)  Body mass index is 20.06 kg/m².  Body surface area is 1.81 meters squared.    ECOG SCORE             Intake/Output - Last 3 Shifts       06/16 0700 - 06/17 0659 06/17 0700 - 06/18 0659 06/18 0700 - 06/19 0659    P.O. 840 840     Blood  243     Total Intake(mL/kg) 840 (12.6) 1083 (16.6)     Urine (mL/kg/hr) 350 (0.2) 1900 (1.2)     Total Output 350 1900      Net +490 -817             Urine Occurrence 1 x      Stool Occurrence 3 x 1 x           Physical Exam   Constitutional: He is oriented to person, place, and time. He appears well-developed and well-nourished.   HENT:   Head: Normocephalic and atraumatic.   Eyes: EOM are normal. Pupils are equal, round, and reactive to light.   Neck: Normal range of motion. Neck supple.   Cardiovascular: Normal rate and regular rhythm.    No murmur heard.  Pulmonary/Chest: Effort normal and breath sounds normal. No respiratory distress.   Abdominal: Soft. Bowel sounds are normal. He exhibits no distension.   Musculoskeletal: Normal range of motion. He exhibits no edema.   Neurological: He is alert and oriented to person, place, and time.   Skin: Skin is warm and dry. Rash noted.   Psychiatric: He  has a normal mood and affect. His behavior is normal.   Vitals reviewed.      Significant Labs:   All pertinent labs from the last 24 hours have been reviewed.    Diagnostic Results:  I have reviewed and interpreted all pertinent imaging results/findings within the past 24 hours.    Assessment/Plan:     * Chronic myelogenous leukemia (CML), BCR-ABL1-positive    - initial WBC reportedly 150,000  - bcr-abl positive on FISH at [facility] (records attached in media tab)  - continue dasatinib at 40 mg (dose reduction secondary to being on voriconazole)  - voriconazole, acyclovir and cipro for prophylaxis  -  status post 7+3 induction- day 18  - 2D echo wnl  - plan for BM biopsy today. Further treatment plans pending BM biopsy results.          Pancytopenia due to antineoplastic chemotherapy    - likely secondary to 7+3 regiment  - daily CBC to assess  - ANC of 10 today        Hypokalemia    - prns replaced        C. difficile colitis    - on po vancomycin with improvement in symptoms  - end date of treatment 6/23            VTE Risk Mitigation         Ordered     IP VTE LOW RISK PATIENT  Once      06/16/18 0938            HORACIO Acharya  Bone Marrow Transplant  [facility]

## 2018-06-18 NOTE — PLAN OF CARE
MDR's with Dr Pantoja.  Patient is a transfer from [facility] in [place], [place] where he received 7+3 induction.  Today is day 18 and his ANC is 10.  Patient was transferred to be closer to his family in [place] while counts recover.  A restaging BMB is planned for today.  On contact isolation for cdiff.  The plan of care has been reviewed with the patient.  Will continue to follow.

## 2018-06-18 NOTE — PLAN OF CARE
Problem: Patient Care Overview  Goal: Plan of Care Review  Outcome: Ongoing (interventions implemented as appropriate)  Day 18 post 7+3. Plan to repeat BM Bx soon. Patient is progressing and involved with plan of care. Frequent rounds made to assess pain and safety. Pt communicating needs throughout shift. Up ad flakita. No c/o diarrhea this shift, 1 loose stool.  Tolerating diet, voiding without difficulty. No c/o pain. All vitals stable; no acute events this shift. Pt. Remaining free from falls or injury throughout shift; bed in lowest position; side rails up X2; call light within reach; pt instructed to call for assistance as needed - verbalized understanding. Q2h rounding on patient. Will continue to monitor.

## 2018-06-19 ENCOUNTER — TELEPHONE (OUTPATIENT)
Dept: PHARMACY | Facility: CLINIC | Age: 33
End: 2018-06-19

## 2018-06-19 PROBLEM — E87.6 HYPOKALEMIA: Status: RESOLVED | Noted: 2018-06-16 | Resolved: 2018-06-19

## 2018-06-19 LAB
ALBUMIN SERPL BCP-MCNC: 2.7 G/DL
ALP SERPL-CCNC: 62 U/L
ALT SERPL W/O P-5'-P-CCNC: 49 U/L
ANION GAP SERPL CALC-SCNC: 8 MMOL/L
ANISOCYTOSIS BLD QL SMEAR: SLIGHT
AST SERPL-CCNC: 17 U/L
BASOPHILS NFR BLD: 0 %
BILIRUB SERPL-MCNC: 0.3 MG/DL
BONE MARROW IRON STAIN COMMENT: NORMAL
BUN SERPL-MCNC: 8 MG/DL
CALCIUM SERPL-MCNC: 8.5 MG/DL
CHLORIDE SERPL-SCNC: 105 MMOL/L
CO2 SERPL-SCNC: 25 MMOL/L
CREAT SERPL-MCNC: 0.6 MG/DL
DIFFERENTIAL METHOD: ABNORMAL
EOSINOPHIL NFR BLD: 0 %
ERYTHROCYTE [DISTWIDTH] IN BLOOD BY AUTOMATED COUNT: 14.6 %
EST. GFR  (AFRICAN AMERICAN): >60 ML/MIN/1.73 M^2
EST. GFR  (NON AFRICAN AMERICAN): >60 ML/MIN/1.73 M^2
GLUCOSE SERPL-MCNC: 98 MG/DL
HCT VFR BLD AUTO: 21.5 %
HGB BLD-MCNC: 7.4 G/DL
HYPOCHROMIA BLD QL SMEAR: ABNORMAL
IMM GRANULOCYTES # BLD AUTO: ABNORMAL K/UL
IMM GRANULOCYTES NFR BLD AUTO: ABNORMAL %
LYMPHOCYTES NFR BLD: 100 %
MAGNESIUM SERPL-MCNC: 1.8 MG/DL
MCH RBC QN AUTO: 29 PG
MCHC RBC AUTO-ENTMCNC: 34.4 G/DL
MCV RBC AUTO: 84 FL
MONOCYTES NFR BLD: 0 %
MYELOCYTES NFR BLD MANUAL: 0 %
NEUTROPHILS NFR BLD: 0 %
NEUTS BAND NFR BLD MANUAL: 0 %
NRBC BLD-RTO: 0 /100 WBC
OVALOCYTES BLD QL SMEAR: ABNORMAL
PHOSPHATE SERPL-MCNC: 3 MG/DL
PLATELET # BLD AUTO: 12 K/UL
PLATELET BLD QL SMEAR: ABNORMAL
PMV BLD AUTO: 9.2 FL
POIKILOCYTOSIS BLD QL SMEAR: SLIGHT
POLYCHROMASIA BLD QL SMEAR: ABNORMAL
POTASSIUM SERPL-SCNC: 3.6 MMOL/L
PROT SERPL-MCNC: 5.6 G/DL
RBC # BLD AUTO: 2.55 M/UL
SODIUM SERPL-SCNC: 138 MMOL/L
WBC # BLD AUTO: 0.3 K/UL
WBC OTHER NFR BLD MANUAL: 0 %

## 2018-06-19 PROCEDURE — 80053 COMPREHEN METABOLIC PANEL: CPT

## 2018-06-19 PROCEDURE — 63600175 PHARM REV CODE 636 W HCPCS: Performed by: INTERNAL MEDICINE

## 2018-06-19 PROCEDURE — 84100 ASSAY OF PHOSPHORUS: CPT

## 2018-06-19 PROCEDURE — 63700000 PHARM REV CODE 250 ALT 637 W/O HCPCS: Performed by: INTERNAL MEDICINE

## 2018-06-19 PROCEDURE — 83735 ASSAY OF MAGNESIUM: CPT

## 2018-06-19 PROCEDURE — 25000003 PHARM REV CODE 250: Performed by: INTERNAL MEDICINE

## 2018-06-19 PROCEDURE — 99232 SBSQ HOSP IP/OBS MODERATE 35: CPT | Mod: ,,, | Performed by: INTERNAL MEDICINE

## 2018-06-19 PROCEDURE — 25000003 PHARM REV CODE 250: Performed by: STUDENT IN AN ORGANIZED HEALTH CARE EDUCATION/TRAINING PROGRAM

## 2018-06-19 PROCEDURE — 20600001 HC STEP DOWN PRIVATE ROOM

## 2018-06-19 PROCEDURE — 85025 COMPLETE CBC W/AUTO DIFF WBC: CPT

## 2018-06-19 RX ORDER — HYDROXYZINE HYDROCHLORIDE 25 MG/1
25 TABLET, FILM COATED ORAL 3 TIMES DAILY PRN
Status: DISCONTINUED | OUTPATIENT
Start: 2018-06-19 | End: 2018-06-20

## 2018-06-19 RX ORDER — ALPRAZOLAM 0.5 MG/1
1 TABLET ORAL 2 TIMES DAILY PRN
Status: DISCONTINUED | OUTPATIENT
Start: 2018-06-19 | End: 2018-06-20

## 2018-06-19 RX ADMIN — POTASSIUM CHLORIDE 20 MEQ: 750 CAPSULE, EXTENDED RELEASE ORAL at 06:06

## 2018-06-19 RX ADMIN — VORICONAZOLE 200 MG: 200 TABLET ORAL at 08:06

## 2018-06-19 RX ADMIN — ACYCLOVIR 400 MG: 200 CAPSULE ORAL at 08:06

## 2018-06-19 RX ADMIN — CIPROFLOXACIN HYDROCHLORIDE 500 MG: 250 TABLET, FILM COATED ORAL at 08:06

## 2018-06-19 RX ADMIN — MAGNESIUM SULFATE IN WATER 2 G: 40 INJECTION, SOLUTION INTRAVENOUS at 06:06

## 2018-06-19 RX ADMIN — Medication 125 MG: at 05:06

## 2018-06-19 RX ADMIN — ALPRAZOLAM 1 MG: 0.5 TABLET ORAL at 08:06

## 2018-06-19 RX ADMIN — Medication 125 MG: at 11:06

## 2018-06-19 RX ADMIN — Medication 125 MG: at 06:06

## 2018-06-19 RX ADMIN — HYDROXYZINE HYDROCHLORIDE 25 MG: 25 TABLET, FILM COATED ORAL at 04:06

## 2018-06-19 RX ADMIN — DASATINIB 40 MG: 20 TABLET ORAL at 08:06

## 2018-06-19 NOTE — PROGRESS NOTES
Day 19 post 7+3. Patient is progressing and involved with plan of care. Frequent rounds made to assess pain and safety. Pt communicating needs throughout shift. Pt anxious tonight, unable to sleep, no relief with Ambien.   Up ad flakita. No c/o diarrhea this shift. Tolerating diet, voiding without difficulty. No c/o pain. All vitals stable; no acute events this shift. Pt. Remaining free from falls or injury throughout shift; bed in lowest position; side rails up X2; call light within reach; pt instructed to call for assistance as needed - verbalized understanding. Q2h rounding on patient. Will continue to monitor.

## 2018-06-19 NOTE — SUBJECTIVE & OBJECTIVE
Subjective:     Interval History: NAEON. Day 19 of 7+3. Diarrhea improving. Rash improving. ANC 0 today.     He denies any fevers, chills, cough, SOB, CP, N/V, constipation, or dysuria.     Objective:     Vital Signs (Most Recent):  Temp: 99.4 °F (37.4 °C) (06/19/18 0735)  Pulse: (!) 115 (06/19/18 0735)  Resp: 18 (06/19/18 0735)  BP: 126/80 (06/19/18 0735)  SpO2: 100 % (06/19/18 0735) Vital Signs (24h Range):  Temp:  [99 °F (37.2 °C)-99.8 °F (37.7 °C)] 99.4 °F (37.4 °C)  Pulse:  [] 115  Resp:  [16-20] 18  SpO2:  [98 %-100 %] 100 %  BP: (114-145)/(64-88) 126/80     Weight: 64.1 kg (141 lb 5 oz)  Body mass index is 19.71 kg/m².  Body surface area is 1.79 meters squared.    ECOG SCORE             Intake/Output - Last 3 Shifts       06/17 0700 - 06/18 0659 06/18 0700 - 06/19 0659 06/19 0700 - 06/20 0659    P.O. 840 1150     Blood 243      Total Intake(mL/kg) 1083 (16.6) 1150 (17.9)     Urine (mL/kg/hr) 1900 (1.2) 1250 (0.8)     Total Output 1900 1250      Net -817 -100             Urine Occurrence  1 x     Stool Occurrence 1 x            Physical Exam   Constitutional: He is oriented to person, place, and time. He appears well-developed and well-nourished.   HENT:   Head: Normocephalic and atraumatic.   Eyes: EOM are normal. Pupils are equal, round, and reactive to light.   Neck: Normal range of motion. Neck supple.   Cardiovascular: Normal rate and regular rhythm.    No murmur heard.  Pulmonary/Chest: Effort normal and breath sounds normal. No respiratory distress.   Abdominal: Soft. Bowel sounds are normal. He exhibits no distension.   Musculoskeletal: Normal range of motion. He exhibits no edema.   Neurological: He is alert and oriented to person, place, and time.   Skin: Skin is warm and dry. Rash noted.   Psychiatric: He has a normal mood and affect. His behavior is normal.   Vitals reviewed.      Significant Labs:   All pertinent labs from the last 24 hours have been reviewed.    Diagnostic Results:  I  have reviewed and interpreted all pertinent imaging results/findings within the past 24 hours.

## 2018-06-19 NOTE — ASSESSMENT & PLAN NOTE
- initial WBC reportedly 150,000  - bcr-abl positive on FISH at [facility] (records attached in media tab)  - continue dasatinib at 40 mg (dose reduction secondary to being on voriconazole)  - voriconazole, acyclovir and cipro for prophylaxis  -  status post 7+3 induction- day 19  - 2D echo wnl  - BM biopsy done yesterday (6/18). Further treatment plans pending BM biopsy results.

## 2018-06-19 NOTE — PLAN OF CARE
Problem: Patient Care Overview  Goal: Plan of Care Review  Outcome: Ongoing (interventions implemented as appropriate)  POC reviewed with patient; understanding verbalized. Pt on isolation precautions. Pt remains independent. Regular diet with fair appetite. Voids in the urinal; several small formed BMs this shift. Vanc administered as ordered. Pt. with nonskid footwear on, bed in lowest position, and locked with bed rails up x2. Pt. has call light and personal items within reach. VSS and afebrile this shift. All questions and concerns address at this time. Will continue to monitor.

## 2018-06-19 NOTE — TELEPHONE ENCOUNTER
[facility] received a prescription for Sprycel 140 mg tablet.  Complete benefits investigation to be performed to determine cost of the medication.  [facility] to coordinate consultation and delivery of the medication once approved.

## 2018-06-19 NOTE — ASSESSMENT & PLAN NOTE
- likely secondary to 7+3 regiment  - daily CBC to assess. Transfuse for hemoglobin less than 7 and platelets less that 10.   - ANC of 0 today

## 2018-06-19 NOTE — PROGRESS NOTES
[facility]  Hematology  Bone Marrow Transplant  Progress Note    Patient Name: Wilton Guzmán  Admission Date: 6/16/2018  Hospital Length of Stay: 3 days  Code Status: Full Code    Subjective:     Interval History: NAEON. Day 19 of 7+3. Diarrhea improving. Rash improving. ANC 0 today.     He denies any fevers, chills, cough, SOB, CP, N/V, constipation, or dysuria.     Objective:     Vital Signs (Most Recent):  Temp: 99.4 °F (37.4 °C) (06/19/18 0735)  Pulse: (!) 115 (06/19/18 0735)  Resp: 18 (06/19/18 0735)  BP: 126/80 (06/19/18 0735)  SpO2: 100 % (06/19/18 0735) Vital Signs (24h Range):  Temp:  [99 °F (37.2 °C)-99.8 °F (37.7 °C)] 99.4 °F (37.4 °C)  Pulse:  [] 115  Resp:  [16-20] 18  SpO2:  [98 %-100 %] 100 %  BP: (114-145)/(64-88) 126/80     Weight: 64.1 kg (141 lb 5 oz)  Body mass index is 19.71 kg/m².  Body surface area is 1.79 meters squared.    ECOG SCORE             Intake/Output - Last 3 Shifts       06/17 0700 - 06/18 0659 06/18 0700 - 06/19 0659 06/19 0700 - 06/20 0659    P.O. 840 1150     Blood 243      Total Intake(mL/kg) 1083 (16.6) 1150 (17.9)     Urine (mL/kg/hr) 1900 (1.2) 1250 (0.8)     Total Output 1900 1250      Net -817 -100             Urine Occurrence  1 x     Stool Occurrence 1 x            Physical Exam   Constitutional: He is oriented to person, place, and time. He appears well-developed and well-nourished.   HENT:   Head: Normocephalic and atraumatic.   Eyes: EOM are normal. Pupils are equal, round, and reactive to light.   Neck: Normal range of motion. Neck supple.   Cardiovascular: Normal rate and regular rhythm.    No murmur heard.  Pulmonary/Chest: Effort normal and breath sounds normal. No respiratory distress.   Abdominal: Soft. Bowel sounds are normal. He exhibits no distension.   Musculoskeletal: Normal range of motion. He exhibits no edema.   Neurological: He is alert and oriented to person, place, and time.   Skin: Skin is warm and dry. Rash noted.    Psychiatric: He has a normal mood and affect. His behavior is normal.   Vitals reviewed.      Significant Labs:   All pertinent labs from the last 24 hours have been reviewed.    Diagnostic Results:  I have reviewed and interpreted all pertinent imaging results/findings within the past 24 hours.    Assessment/Plan:     * Chronic myelogenous leukemia (CML), BCR-ABL1-positive    - initial WBC reportedly 150,000  - bcr-abl positive on FISH at [facility] (records attached in media tab)  - continue dasatinib at 40 mg (dose reduction secondary to being on voriconazole)  - voriconazole, acyclovir and cipro for prophylaxis  -  status post 7+3 induction- day 19  - 2D echo wnl  - BM biopsy done yesterday (6/18). Further treatment plans pending BM biopsy results.          Pancytopenia due to antineoplastic chemotherapy    - likely secondary to 7+3 regiment  - daily CBC to assess. Transfuse for hemoglobin less than 7 and platelets less that 10.   - ANC of 0 today        C. difficile colitis    - on po vancomycin with improvement in symptoms  - end date of treatment 6/23            VTE Risk Mitigation         Ordered     IP VTE LOW RISK PATIENT  Once      06/16/18 0938          Disposition: pending BM results and cell count recovery     HORACIO Acharya  Bone Marrow Transplant  [facility]    Attending addendum:    Day 19 of 7+3 for blast phase CML. Mr. Guzmán continues to recover. He is doing well overall. He is very anxious. Awaiting BMBx results.

## 2018-06-20 PROBLEM — F41.9 ANXIETY: Status: ACTIVE | Noted: 2018-06-20

## 2018-06-20 LAB
ABO + RH BLD: NORMAL
ALBUMIN SERPL BCP-MCNC: 2.7 G/DL
ALP SERPL-CCNC: 66 U/L
ALT SERPL W/O P-5'-P-CCNC: 43 U/L
ANION GAP SERPL CALC-SCNC: 6 MMOL/L
ANISOCYTOSIS BLD QL SMEAR: SLIGHT
AST SERPL-CCNC: 15 U/L
BASOPHILS # BLD AUTO: 0 K/UL
BASOPHILS NFR BLD: 0 %
BILIRUB SERPL-MCNC: 0.3 MG/DL
BLD GP AB SCN CELLS X3 SERPL QL: NORMAL
BLD PROD TYP BPU: NORMAL
BLOOD UNIT EXPIRATION DATE: NORMAL
BLOOD UNIT TYPE CODE: 7300
BLOOD UNIT TYPE: NORMAL
BODY SITE - BONE MARROW: NORMAL
BUN SERPL-MCNC: 8 MG/DL
CALCIUM SERPL-MCNC: 8.3 MG/DL
CHLORIDE SERPL-SCNC: 106 MMOL/L
CLINICAL DIAGNOSIS - BONE MARROW: NORMAL
CO2 SERPL-SCNC: 26 MMOL/L
CODING SYSTEM: NORMAL
CREAT SERPL-MCNC: 0.7 MG/DL
DIAGNOSTIC BCR/ABL 1 RESULT: NORMAL
DIFFERENTIAL METHOD: ABNORMAL
DISPENSE STATUS: NORMAL
EOSINOPHIL # BLD AUTO: 0 K/UL
EOSINOPHIL NFR BLD: 0 %
ERYTHROCYTE [DISTWIDTH] IN BLOOD BY AUTOMATED COUNT: 14.8 %
EST. GFR  (AFRICAN AMERICAN): >60 ML/MIN/1.73 M^2
EST. GFR  (NON AFRICAN AMERICAN): >60 ML/MIN/1.73 M^2
FLOW CYTOMETRY ANTIBODIES ANALYZED - BONE MARROW: NORMAL
FLOW CYTOMETRY COMMENT - BONE MARROW: NORMAL
FLOW CYTOMETRY INTERPRETATION - BONE MARROW: NORMAL
GLUCOSE SERPL-MCNC: 78 MG/DL
HCT VFR BLD AUTO: 21.8 %
HGB BLD-MCNC: 7.4 G/DL
HYPOCHROMIA BLD QL SMEAR: ABNORMAL
IMM GRANULOCYTES # BLD AUTO: 0 K/UL
IMM GRANULOCYTES NFR BLD AUTO: 0 %
LYMPHOCYTES # BLD AUTO: 0.4 K/UL
LYMPHOCYTES NFR BLD: 94.9 %
MAGNESIUM SERPL-MCNC: 2 MG/DL
MCH RBC QN AUTO: 29.1 PG
MCHC RBC AUTO-ENTMCNC: 33.9 G/DL
MCV RBC AUTO: 86 FL
MONOCYTES # BLD AUTO: 0 K/UL
MONOCYTES NFR BLD: 2.6 %
NEUTROPHILS # BLD AUTO: 0 K/UL
NEUTROPHILS NFR BLD: 2.5 %
NRBC BLD-RTO: 0 /100 WBC
NUM UNITS TRANS WBC-POOR PLATPHERESIS: NORMAL
OVALOCYTES BLD QL SMEAR: ABNORMAL
PATH REPORT.FINAL DX SPEC: NORMAL
PHOSPHATE SERPL-MCNC: 3.2 MG/DL
PLATELET # BLD AUTO: 8 K/UL
PMV BLD AUTO: ABNORMAL FL
POIKILOCYTOSIS BLD QL SMEAR: SLIGHT
POLYCHROMASIA BLD QL SMEAR: ABNORMAL
POTASSIUM SERPL-SCNC: 3.7 MMOL/L
PROT SERPL-MCNC: 5.8 G/DL
RBC # BLD AUTO: 2.54 M/UL
SODIUM SERPL-SCNC: 138 MMOL/L
SPECIMEN TYPE, BCR/ABL: NORMAL
WBC # BLD AUTO: 0.39 K/UL

## 2018-06-20 PROCEDURE — 93010 ELECTROCARDIOGRAM REPORT: CPT | Mod: ,,, | Performed by: INTERNAL MEDICINE

## 2018-06-20 PROCEDURE — 86850 RBC ANTIBODY SCREEN: CPT

## 2018-06-20 PROCEDURE — 63700000 PHARM REV CODE 250 ALT 637 W/O HCPCS: Performed by: INTERNAL MEDICINE

## 2018-06-20 PROCEDURE — 84100 ASSAY OF PHOSPHORUS: CPT

## 2018-06-20 PROCEDURE — 20600001 HC STEP DOWN PRIVATE ROOM

## 2018-06-20 PROCEDURE — 99233 SBSQ HOSP IP/OBS HIGH 50: CPT | Mod: ,,, | Performed by: INTERNAL MEDICINE

## 2018-06-20 PROCEDURE — 3E04305 INTRODUCTION OF OTHER ANTINEOPLASTIC INTO CENTRAL VEIN, PERCUTANEOUS APPROACH: ICD-10-PCS | Performed by: INTERNAL MEDICINE

## 2018-06-20 PROCEDURE — 86644 CMV ANTIBODY: CPT

## 2018-06-20 PROCEDURE — 25000003 PHARM REV CODE 250: Performed by: INTERNAL MEDICINE

## 2018-06-20 PROCEDURE — 86920 COMPATIBILITY TEST SPIN: CPT

## 2018-06-20 PROCEDURE — 93005 ELECTROCARDIOGRAM TRACING: CPT

## 2018-06-20 PROCEDURE — 80053 COMPREHEN METABOLIC PANEL: CPT

## 2018-06-20 PROCEDURE — 83735 ASSAY OF MAGNESIUM: CPT

## 2018-06-20 PROCEDURE — P9037 PLATE PHERES LEUKOREDU IRRAD: HCPCS

## 2018-06-20 PROCEDURE — 85025 COMPLETE CBC W/AUTO DIFF WBC: CPT

## 2018-06-20 RX ORDER — SODIUM CHLORIDE 0.9 % (FLUSH) 0.9 %
5 SYRINGE (ML) INJECTION
Status: DISCONTINUED | OUTPATIENT
Start: 2018-06-20 | End: 2018-07-05

## 2018-06-20 RX ORDER — ALPRAZOLAM 0.5 MG/1
0.5 TABLET ORAL 2 TIMES DAILY PRN
Status: DISCONTINUED | OUTPATIENT
Start: 2018-06-20 | End: 2018-06-20

## 2018-06-20 RX ORDER — HYDROCODONE BITARTRATE AND ACETAMINOPHEN 500; 5 MG/1; MG/1
TABLET ORAL
Status: DISCONTINUED | OUTPATIENT
Start: 2018-06-20 | End: 2018-06-21

## 2018-06-20 RX ORDER — HYDROXYZINE HYDROCHLORIDE 25 MG/1
50 TABLET, FILM COATED ORAL 3 TIMES DAILY PRN
Status: DISCONTINUED | OUTPATIENT
Start: 2018-06-20 | End: 2018-07-23 | Stop reason: HOSPADM

## 2018-06-20 RX ADMIN — Medication 125 MG: at 06:06

## 2018-06-20 RX ADMIN — VORICONAZOLE 200 MG: 200 TABLET ORAL at 08:06

## 2018-06-20 RX ADMIN — ACYCLOVIR 400 MG: 200 CAPSULE ORAL at 08:06

## 2018-06-20 RX ADMIN — Medication 125 MG: at 11:06

## 2018-06-20 RX ADMIN — POTASSIUM CHLORIDE 20 MEQ: 750 CAPSULE, EXTENDED RELEASE ORAL at 06:06

## 2018-06-20 RX ADMIN — CIPROFLOXACIN HYDROCHLORIDE 500 MG: 250 TABLET, FILM COATED ORAL at 08:06

## 2018-06-20 RX ADMIN — ACETAMINOPHEN 650 MG: 325 TABLET, FILM COATED ORAL at 08:06

## 2018-06-20 RX ADMIN — Medication 125 MG: at 05:06

## 2018-06-20 RX ADMIN — DASATINIB 40 MG: 20 TABLET ORAL at 08:06

## 2018-06-20 NOTE — PLAN OF CARE
Problem: Patient Care Overview  Goal: Plan of Care Review  Outcome: Ongoing (interventions implemented as appropriate)  POC reviewed with patient; understanding verbalized. 1U platelets transfused this shift. EKG complete. Pt on isolation precautions. Pt remains independent. Regular diet with fair appetite. Voids in the urinal; couple of small formed BMs this shift. Vanc administered as ordered. Pt. with nonskid footwear on, bed in lowest position, and locked with bed rails up x2. Pt. has call light and personal items within reach. VSS and afebrile this shift. All questions and concerns address at this time. Will continue to monitor.

## 2018-06-20 NOTE — PROGRESS NOTES
[facility]  Hematology  Bone Marrow Transplant  Progress Note    Patient Name: Wilton Guzmán  Admission Date: 6/16/2018  Hospital Length of Stay: 4 days  Code Status: Full Code    Subjective:     Interval History: NAEON. Day 20 of 7+3. Diarrhea resolved. Rash improving. ANC  10 today. Patient understandibly experiencing anxiety about his new diagnosis and feeling isolated in his room. Was given Xanex PRN last night to help with sleep. Spoke with patietn this morning about talking to the Oncology Psychologist in house. States he will think about it.     He denies any fevers, chills, cough, SOB, CP, N/V, constipation, or dysuria.     Objective:     Vital Signs (Most Recent):  Temp: 98.9 °F (37.2 °C) (06/20/18 1007)  Pulse: 107 (06/20/18 1007)  Resp: 17 (06/20/18 1007)  BP: 132/86 (06/20/18 1007)  SpO2: 100 % (06/20/18 1007) Vital Signs (24h Range):  Temp:  [98.6 °F (37 °C)-99.6 °F (37.6 °C)] 98.9 °F (37.2 °C)  Pulse:  [] 107  Resp:  [16-19] 17  SpO2:  [97 %-100 %] 100 %  BP: (124-162)/(70-92) 132/86     Weight: 63.2 kg (139 lb 5.3 oz)  Body mass index is 19.43 kg/m².  Body surface area is 1.78 meters squared.    ECOG SCORE             Intake/Output - Last 3 Shifts       06/18 0700 - 06/19 0659 06/19 0700 - 06/20 0659 06/20 0700 - 06/21 0659    P.O. 1150 780     Blood   250    Total Intake(mL/kg) 1150 (17.9) 780 (12.3) 250 (4)    Urine (mL/kg/hr) 1250 (0.8) 2800 (1.8) 550 (2.4)    Total Output 1250 2800 550    Net -100 -2020 -300           Urine Occurrence 1 x 2 x 1 x    Stool Occurrence  3 x           Physical Exam   Constitutional: He is oriented to person, place, and time. He appears well-developed and well-nourished.   HENT:   Head: Normocephalic and atraumatic.   Eyes: EOM are normal. Pupils are equal, round, and reactive to light.   Neck: Normal range of motion. Neck supple.   Cardiovascular: Normal rate and regular rhythm.    No murmur heard.  Pulmonary/Chest: Effort normal and breath  sounds normal. No respiratory distress.   Abdominal: Soft. Bowel sounds are normal. He exhibits no distension.   Musculoskeletal: Normal range of motion. He exhibits no edema.   Neurological: He is alert and oriented to person, place, and time.   Skin: Skin is warm and dry. Rash noted.   Psychiatric: He has a normal mood and affect. His behavior is normal.   Vitals reviewed.      Significant Labs:   All pertinent labs from the last 24 hours have been reviewed.    Diagnostic Results:  I have reviewed and interpreted all pertinent imaging results/findings within the past 24 hours.    Assessment/Plan:     * Chronic myelogenous leukemia (CML), BCR-ABL1-positive    - initial WBC reportedly 150,000  - bcr-abl positive on FISH at [facility] (records attached in media tab)  - continue dasatinib at 40 mg (dose reduction secondary to being on voriconazole)  - voriconazole, acyclovir and cipro for prophylaxis  -  status post 7+3 induction- day 20  - 2D echo wnl  - BM biopsy done 6/18. Further treatment plans pending BM biopsy results.          Anxiety    Patient understandibly experiencing anxiety about his new diagnosis and feeling isolated in his room. Was given Xanex PRN last night to help with sleep. Spoke with patient this morning about talking to the Oncology Psychologist in house. States he will think about it.   Also offered to start Remeron to help with sleep at night. He would like to think about that also.         Pancytopenia due to antineoplastic chemotherapy    - likely secondary to 7+3 regiment  - daily CBC to assess. Transfuse for hemoglobin less than 7 and platelets less that 10.   - ANC of 10 today        C. difficile colitis    - on po vancomycin with resolution of diarrhea.   - end date of treatment 6/23            VTE Risk Mitigation         Ordered     IP VTE LOW RISK PATIENT  Once      06/16/18 0930            HORACIO Acharya  Bone Marrow Transplant  [facility]

## 2018-06-20 NOTE — ASSESSMENT & PLAN NOTE
- likely secondary to 7+3 regiment  - daily CBC to assess. Transfuse for hemoglobin less than 7 and platelets less that 10.   - ANC of 10 today

## 2018-06-20 NOTE — ASSESSMENT & PLAN NOTE
- initial WBC reportedly 150,000  - bcr-abl positive on FISH at [facility] (records attached in media tab)  - continue dasatinib at 40 mg (dose reduction secondary to being on voriconazole)  - voriconazole, acyclovir and cipro for prophylaxis  -  status post 7+3 induction- day 20  - 2D echo wnl  - BM biopsy done 6/18. Further treatment plans pending BM biopsy results.

## 2018-06-20 NOTE — PLAN OF CARE
Problem: Patient Care Overview  Goal: Plan of Care Review  Outcome: Ongoing (interventions implemented as appropriate)  Patient remained free from falls throughout shift, call bell within reach. Day 20 S/p 7+3. Awaiting count recovery and BMbx results. Complained of anxiety not relieved by atarax and insomnia overnight, prn xanax given, patient resting comfortably. Oral vanc q6h continued, patient states he had 3 formed BMs yesterday. No nausea. No mouth sores. Vitals stable, will continue to monitor.

## 2018-06-20 NOTE — ASSESSMENT & PLAN NOTE
Patient understandibly experiencing anxiety about his new diagnosis and feeling isolated in his room. Was given Xanex PRN last night to help with sleep. Spoke with patient this morning about talking to the Oncology Psychologist in house. States he will think about it.   Also offered to start Remeron to help with sleep at night. He would like to think about that also.

## 2018-06-20 NOTE — SUBJECTIVE & OBJECTIVE
Subjective:     Interval History: NAEON. Day 20 of 7+3. Diarrhea resolved. Rash improving. ANC  10 today. Patient understandibly experiencing anxiety about his new diagnosis and feeling isolated in his room. Was given Xanex PRN last night to help with sleep. Spoke with patietn this morning about talking to the Oncology Psychologist in house. States he will think about it.     He denies any fevers, chills, cough, SOB, CP, N/V, constipation, or dysuria.     Objective:     Vital Signs (Most Recent):  Temp: 98.9 °F (37.2 °C) (06/20/18 1007)  Pulse: 107 (06/20/18 1007)  Resp: 17 (06/20/18 1007)  BP: 132/86 (06/20/18 1007)  SpO2: 100 % (06/20/18 1007) Vital Signs (24h Range):  Temp:  [98.6 °F (37 °C)-99.6 °F (37.6 °C)] 98.9 °F (37.2 °C)  Pulse:  [] 107  Resp:  [16-19] 17  SpO2:  [97 %-100 %] 100 %  BP: (124-162)/(70-92) 132/86     Weight: 63.2 kg (139 lb 5.3 oz)  Body mass index is 19.43 kg/m².  Body surface area is 1.78 meters squared.    ECOG SCORE             Intake/Output - Last 3 Shifts       06/18 0700 - 06/19 0659 06/19 0700 - 06/20 0659 06/20 0700 - 06/21 0659    P.O. 1150 780     Blood   250    Total Intake(mL/kg) 1150 (17.9) 780 (12.3) 250 (4)    Urine (mL/kg/hr) 1250 (0.8) 2800 (1.8) 550 (2.4)    Total Output 1250 2800 550    Net -100 -2020 -300           Urine Occurrence 1 x 2 x 1 x    Stool Occurrence  3 x           Physical Exam   Constitutional: He is oriented to person, place, and time. He appears well-developed and well-nourished.   HENT:   Head: Normocephalic and atraumatic.   Eyes: EOM are normal. Pupils are equal, round, and reactive to light.   Neck: Normal range of motion. Neck supple.   Cardiovascular: Normal rate and regular rhythm.    No murmur heard.  Pulmonary/Chest: Effort normal and breath sounds normal. No respiratory distress.   Abdominal: Soft. Bowel sounds are normal. He exhibits no distension.   Musculoskeletal: Normal range of motion. He exhibits no edema.   Neurological: He is  alert and oriented to person, place, and time.   Skin: Skin is warm and dry. Rash noted.   Psychiatric: He has a normal mood and affect. His behavior is normal.   Vitals reviewed.      Significant Labs:   All pertinent labs from the last 24 hours have been reviewed.    Diagnostic Results:  I have reviewed and interpreted all pertinent imaging results/findings within the past 24 hours.

## 2018-06-21 LAB
ALBUMIN SERPL BCP-MCNC: 2.9 G/DL
ALP SERPL-CCNC: 73 U/L
ALT SERPL W/O P-5'-P-CCNC: 41 U/L
ANION GAP SERPL CALC-SCNC: 8 MMOL/L
ANISOCYTOSIS BLD QL SMEAR: SLIGHT
AST SERPL-CCNC: 16 U/L
BASOPHILS # BLD AUTO: 0 K/UL
BASOPHILS NFR BLD: 0 %
BILIRUB SERPL-MCNC: 0.2 MG/DL
BLD PROD TYP BPU: NORMAL
BLOOD UNIT EXPIRATION DATE: NORMAL
BLOOD UNIT TYPE CODE: 5100
BLOOD UNIT TYPE: NORMAL
BUN SERPL-MCNC: 8 MG/DL
CALCIUM SERPL-MCNC: 8.5 MG/DL
CHLORIDE SERPL-SCNC: 105 MMOL/L
CO2 SERPL-SCNC: 26 MMOL/L
CODING SYSTEM: NORMAL
CREAT SERPL-MCNC: 0.8 MG/DL
DIFFERENTIAL METHOD: ABNORMAL
DISPENSE STATUS: NORMAL
EOSINOPHIL # BLD AUTO: 0 K/UL
EOSINOPHIL NFR BLD: 0 %
ERYTHROCYTE [DISTWIDTH] IN BLOOD BY AUTOMATED COUNT: 14.7 %
EST. GFR  (AFRICAN AMERICAN): >60 ML/MIN/1.73 M^2
EST. GFR  (NON AFRICAN AMERICAN): >60 ML/MIN/1.73 M^2
GLUCOSE SERPL-MCNC: 84 MG/DL
HCT VFR BLD AUTO: 20.3 %
HGB BLD-MCNC: 6.9 G/DL
HYPOCHROMIA BLD QL SMEAR: ABNORMAL
IMM GRANULOCYTES # BLD AUTO: 0 K/UL
IMM GRANULOCYTES NFR BLD AUTO: 0 %
LYMPHOCYTES # BLD AUTO: 0.4 K/UL
LYMPHOCYTES NFR BLD: 85.7 %
MAGNESIUM SERPL-MCNC: 1.8 MG/DL
MCH RBC QN AUTO: 29.1 PG
MCHC RBC AUTO-ENTMCNC: 34 G/DL
MCV RBC AUTO: 86 FL
MONOCYTES # BLD AUTO: 0 K/UL
MONOCYTES NFR BLD: 7.1 %
NEUTROPHILS # BLD AUTO: 0 K/UL
NEUTROPHILS NFR BLD: 7.2 %
NRBC BLD-RTO: 0 /100 WBC
NUM UNITS TRANS PACKED RBC: NORMAL
OVALOCYTES BLD QL SMEAR: ABNORMAL
PHOSPHATE SERPL-MCNC: 3.1 MG/DL
PLATELET # BLD AUTO: 23 K/UL
PLATELET BLD QL SMEAR: ABNORMAL
PMV BLD AUTO: ABNORMAL FL
POIKILOCYTOSIS BLD QL SMEAR: SLIGHT
POLYCHROMASIA BLD QL SMEAR: ABNORMAL
POTASSIUM SERPL-SCNC: 3.7 MMOL/L
PROT SERPL-MCNC: 6.1 G/DL
RBC # BLD AUTO: 2.37 M/UL
SODIUM SERPL-SCNC: 139 MMOL/L
WBC # BLD AUTO: 0.42 K/UL

## 2018-06-21 PROCEDURE — 84100 ASSAY OF PHOSPHORUS: CPT

## 2018-06-21 PROCEDURE — 36430 TRANSFUSION BLD/BLD COMPNT: CPT

## 2018-06-21 PROCEDURE — 63700000 PHARM REV CODE 250 ALT 637 W/O HCPCS: Performed by: INTERNAL MEDICINE

## 2018-06-21 PROCEDURE — 86644 CMV ANTIBODY: CPT

## 2018-06-21 PROCEDURE — 20600001 HC STEP DOWN PRIVATE ROOM

## 2018-06-21 PROCEDURE — 25000003 PHARM REV CODE 250: Performed by: STUDENT IN AN ORGANIZED HEALTH CARE EDUCATION/TRAINING PROGRAM

## 2018-06-21 PROCEDURE — 25000003 PHARM REV CODE 250: Performed by: INTERNAL MEDICINE

## 2018-06-21 PROCEDURE — 80053 COMPREHEN METABOLIC PANEL: CPT

## 2018-06-21 PROCEDURE — P9040 RBC LEUKOREDUCED IRRADIATED: HCPCS

## 2018-06-21 PROCEDURE — 99233 SBSQ HOSP IP/OBS HIGH 50: CPT | Mod: ,,, | Performed by: INTERNAL MEDICINE

## 2018-06-21 PROCEDURE — 85025 COMPLETE CBC W/AUTO DIFF WBC: CPT

## 2018-06-21 PROCEDURE — 83735 ASSAY OF MAGNESIUM: CPT

## 2018-06-21 PROCEDURE — 63600175 PHARM REV CODE 636 W HCPCS: Performed by: INTERNAL MEDICINE

## 2018-06-21 RX ORDER — DIPHENHYDRAMINE HCL 50 MG
50 CAPSULE ORAL EVERY 6 HOURS PRN
Status: DISCONTINUED | OUTPATIENT
Start: 2018-06-21 | End: 2018-07-23 | Stop reason: HOSPADM

## 2018-06-21 RX ORDER — MIRTAZAPINE 15 MG/1
15 TABLET, ORALLY DISINTEGRATING ORAL NIGHTLY
Status: DISCONTINUED | OUTPATIENT
Start: 2018-06-21 | End: 2018-07-23 | Stop reason: HOSPADM

## 2018-06-21 RX ORDER — HYDROCODONE BITARTRATE AND ACETAMINOPHEN 500; 5 MG/1; MG/1
TABLET ORAL
Status: DISCONTINUED | OUTPATIENT
Start: 2018-06-21 | End: 2018-06-22

## 2018-06-21 RX ORDER — MIRTAZAPINE 15 MG/1
15 TABLET, ORALLY DISINTEGRATING ORAL NIGHTLY PRN
Status: DISCONTINUED | OUTPATIENT
Start: 2018-06-21 | End: 2018-06-21

## 2018-06-21 RX ADMIN — Medication 125 MG: at 06:06

## 2018-06-21 RX ADMIN — Medication 125 MG: at 05:06

## 2018-06-21 RX ADMIN — ACYCLOVIR 400 MG: 200 CAPSULE ORAL at 08:06

## 2018-06-21 RX ADMIN — CIPROFLOXACIN HYDROCHLORIDE 500 MG: 250 TABLET, FILM COATED ORAL at 08:06

## 2018-06-21 RX ADMIN — Medication 125 MG: at 01:06

## 2018-06-21 RX ADMIN — MIRTAZAPINE 15 MG: 15 TABLET, ORALLY DISINTEGRATING ORAL at 04:06

## 2018-06-21 RX ADMIN — CIPROFLOXACIN HYDROCHLORIDE 500 MG: 250 TABLET, FILM COATED ORAL at 09:06

## 2018-06-21 RX ADMIN — POTASSIUM CHLORIDE 20 MEQ: 750 CAPSULE, EXTENDED RELEASE ORAL at 10:06

## 2018-06-21 RX ADMIN — ACYCLOVIR 400 MG: 200 CAPSULE ORAL at 09:06

## 2018-06-21 RX ADMIN — DASATINIB 40 MG: 20 TABLET ORAL at 11:06

## 2018-06-21 RX ADMIN — Medication 125 MG: at 11:06

## 2018-06-21 RX ADMIN — VORICONAZOLE 200 MG: 200 TABLET ORAL at 08:06

## 2018-06-21 RX ADMIN — DIPHENHYDRAMINE HYDROCHLORIDE 50 MG: 50 CAPSULE ORAL at 11:06

## 2018-06-21 RX ADMIN — MIRTAZAPINE 15 MG: 15 TABLET, ORALLY DISINTEGRATING ORAL at 08:06

## 2018-06-21 RX ADMIN — MAGNESIUM SULFATE IN WATER 2 G: 40 INJECTION, SOLUTION INTRAVENOUS at 10:06

## 2018-06-21 RX ADMIN — VORICONAZOLE 200 MG: 200 TABLET ORAL at 09:06

## 2018-06-21 NOTE — ASSESSMENT & PLAN NOTE
- initial WBC reportedly 150,000  - bcr-abl positive on FISH at [facility] (records attached in media tab)  - continue dasatinib at 40 mg (dose reduction secondary to being on voriconazole)  - voriconazole, acyclovir and cipro for prophylaxis  -  status post 7+3 induction- day 21  - BM biopsy done 6/18. Further treatment plans pending BM biopsy results.

## 2018-06-21 NOTE — SUBJECTIVE & OBJECTIVE
Subjective:     Interval History: NAEON. Day 21 of 7+3. Diarrhea resolved.  ANC 32  today. Patient understandibly experiencing anxiety about his new diagnosis and feeling isolated in his room. Offered for him to speak with oncology psychologist however he declines at this point. Was feeling restless overnight so Remeron was starting.      He denies any fevers, chills, cough, SOB, CP, N/V, constipation, or dysuria.     Objective:     Vital Signs (Most Recent):  Temp: 99.6 °F (37.6 °C) (06/21/18 0739)  Pulse: 104 (06/21/18 0739)  Resp: 16 (06/21/18 0739)  BP: 126/77 (06/21/18 0739)  SpO2: 97 % (06/21/18 0739) Vital Signs (24h Range):  Temp:  [98.9 °F (37.2 °C)-99.7 °F (37.6 °C)] 99.6 °F (37.6 °C)  Pulse:  [104-116] 104  Resp:  [14-18] 16  SpO2:  [97 %-100 %] 97 %  BP: (123-138)/(72-86) 126/77     Weight: 63.3 kg (139 lb 10.6 oz)  Body mass index is 19.48 kg/m².  Body surface area is 1.78 meters squared.    ECOG SCORE             Intake/Output - Last 3 Shifts       06/19 0700 - 06/20 0659 06/20 0700 - 06/21 0659 06/21 0700 - 06/22 0659    P.O. 780 300     Blood  600     Total Intake(mL/kg) 780 (12.3) 900 (14.2)     Urine (mL/kg/hr) 2800 (1.8) 1000 (0.7) 800 (4.8)    Total Output 2800 1000 800    Net -2020 -100 -800           Urine Occurrence 2 x 2 x     Stool Occurrence 3 x 3 x           Physical Exam   Constitutional: He is oriented to person, place, and time. He appears well-developed and well-nourished.   HENT:   Head: Normocephalic and atraumatic.   Eyes: EOM are normal. Pupils are equal, round, and reactive to light.   Neck: Normal range of motion. Neck supple.   Cardiovascular: Normal rate and regular rhythm.    No murmur heard.  Pulmonary/Chest: Effort normal and breath sounds normal. No respiratory distress.   Abdominal: Soft. Bowel sounds are normal. He exhibits no distension.   Musculoskeletal: Normal range of motion. He exhibits no edema.   Neurological: He is alert and oriented to person, place, and time.    Skin: Skin is warm and dry. Rash noted.   Psychiatric: He has a normal mood and affect. His behavior is normal.   Vitals reviewed.      Significant Labs:   All pertinent labs from the last 24 hours have been reviewed.    Diagnostic Results:  I have reviewed and interpreted all pertinent imaging results/findings within the past 24 hours.

## 2018-06-21 NOTE — ASSESSMENT & PLAN NOTE
- likely secondary to 7+3 regiment  - daily CBC to assess. Transfuse for hemoglobin less than 7 and platelets less that 10.   - ANC of 32 today

## 2018-06-21 NOTE — ASSESSMENT & PLAN NOTE
Patient understandibly experiencing anxiety about his new diagnosis and feeling isolated in his room.   Offered for patient to be seen by oncology psychologist but he declines for now.   Started on Remeron overnight for anxiety and insomnia.

## 2018-06-21 NOTE — PROGRESS NOTES
[facility]  Hematology  Bone Marrow Transplant  Progress Note    Patient Name: Wilton Guzmán  Admission Date: 6/16/2018  Hospital Length of Stay: 5 days  Code Status: Full Code    Subjective:     Interval History: NAEON. Day 21 of 7+3. Diarrhea resolved.  ANC 32  today. Patient understandibly experiencing anxiety about his new diagnosis and feeling isolated in his room. Offered for him to speak with oncology psychologist however he declines at this point. Was feeling restless overnight so Remeron was starting.      He denies any fevers, chills, cough, SOB, CP, N/V, constipation, or dysuria.     Objective:     Vital Signs (Most Recent):  Temp: 99.6 °F (37.6 °C) (06/21/18 0739)  Pulse: 104 (06/21/18 0739)  Resp: 16 (06/21/18 0739)  BP: 126/77 (06/21/18 0739)  SpO2: 97 % (06/21/18 0739) Vital Signs (24h Range):  Temp:  [98.9 °F (37.2 °C)-99.7 °F (37.6 °C)] 99.6 °F (37.6 °C)  Pulse:  [104-116] 104  Resp:  [14-18] 16  SpO2:  [97 %-100 %] 97 %  BP: (123-138)/(72-86) 126/77     Weight: 63.3 kg (139 lb 10.6 oz)  Body mass index is 19.48 kg/m².  Body surface area is 1.78 meters squared.    ECOG SCORE             Intake/Output - Last 3 Shifts       06/19 0700 - 06/20 0659 06/20 0700 - 06/21 0659 06/21 0700 - 06/22 0659    P.O. 780 300     Blood  600     Total Intake(mL/kg) 780 (12.3) 900 (14.2)     Urine (mL/kg/hr) 2800 (1.8) 1000 (0.7) 800 (4.8)    Total Output 2800 1000 800    Net -2020 -100 -800           Urine Occurrence 2 x 2 x     Stool Occurrence 3 x 3 x           Physical Exam   Constitutional: He is oriented to person, place, and time. He appears well-developed and well-nourished.   HENT:   Head: Normocephalic and atraumatic.   Eyes: EOM are normal. Pupils are equal, round, and reactive to light.   Neck: Normal range of motion. Neck supple.   Cardiovascular: Normal rate and regular rhythm.    No murmur heard.  Pulmonary/Chest: Effort normal and breath sounds normal. No respiratory distress.    Abdominal: Soft. Bowel sounds are normal. He exhibits no distension.   Musculoskeletal: Normal range of motion. He exhibits no edema.   Neurological: He is alert and oriented to person, place, and time.   Skin: Skin is warm and dry. Rash noted.   Psychiatric: He has a normal mood and affect. His behavior is normal.   Vitals reviewed.      Significant Labs:   All pertinent labs from the last 24 hours have been reviewed.    Diagnostic Results:  I have reviewed and interpreted all pertinent imaging results/findings within the past 24 hours.    Assessment/Plan:     * Chronic myelogenous leukemia (CML), BCR-ABL1-positive    - initial WBC reportedly 150,000  - bcr-abl positive on FISH at [facility] (records attached in media tab)  - continue dasatinib at 40 mg (dose reduction secondary to being on voriconazole)  - voriconazole, acyclovir and cipro for prophylaxis  -  status post 7+3 induction- day 21  - BM biopsy done 6/18. Further treatment plans pending BM biopsy results.          Anxiety    Patient understandibly experiencing anxiety about his new diagnosis and feeling isolated in his room.   Offered for patient to be seen by oncology psychologist but he declines for now.   Started on Remeron overnight for anxiety and insomnia.         Pancytopenia due to antineoplastic chemotherapy    - likely secondary to 7+3 regiment  - daily CBC to assess. Transfuse for hemoglobin less than 7 and platelets less that 10.   - ANC of 32 today        C. difficile colitis    - on po vancomycin with resolution of diarrhea.   - end date of treatment 6/23            VTE Risk Mitigation         Ordered     IP VTE LOW RISK PATIENT  Once      06/16/18 0901          HORACIO Acharya  Bone Marrow Transplant  [facility]

## 2018-06-21 NOTE — PLAN OF CARE
Problem: Patient Care Overview  Goal: Plan of Care Review  Outcome: Ongoing (interventions implemented as appropriate)  AAOx4, bed in low and locked position, call bell within reach, voids via urinal, no falls. Patient complained of restlessness and insomnia; Remeron PO ordered and administered. Day 20 of 7+3. Contact isolation continues. Vancomycin PO continued.Patient stable, vitals stable, will continue to monitor.

## 2018-06-22 PROBLEM — R50.81 NEUTROPENIC FEVER: Status: ACTIVE | Noted: 2018-06-22

## 2018-06-22 PROBLEM — L27.0 DRUG RASH: Status: ACTIVE | Noted: 2018-06-22

## 2018-06-22 PROBLEM — D70.9 NEUTROPENIC FEVER: Status: ACTIVE | Noted: 2018-06-22

## 2018-06-22 LAB
ALBUMIN SERPL BCP-MCNC: 3 G/DL
ALP SERPL-CCNC: 80 U/L
ALT SERPL W/O P-5'-P-CCNC: 39 U/L
ANION GAP SERPL CALC-SCNC: 5 MMOL/L
ANISOCYTOSIS BLD QL SMEAR: SLIGHT
AST SERPL-CCNC: 16 U/L
BACTERIA #/AREA URNS AUTO: NORMAL /HPF
BASOPHILS # BLD AUTO: 0 K/UL
BASOPHILS NFR BLD: 0 %
BILIRUB SERPL-MCNC: 0.3 MG/DL
BILIRUB UR QL STRIP: NEGATIVE
BUN SERPL-MCNC: 6 MG/DL
CALCIUM SERPL-MCNC: 8.7 MG/DL
CHLORIDE SERPL-SCNC: 103 MMOL/L
CHROM BANDING METHOD: NORMAL
CHROMOSOME ANALYSIS BM ADDITIONAL INFORMATION: NORMAL
CHROMOSOME ANALYSIS BM RELEASED BY: NORMAL
CHROMOSOME ANALYSIS BM RESULT SUMMARY: NORMAL
CLARITY UR REFRACT.AUTO: CLEAR
CLINICAL CYTOGENETICIST REVIEW: NORMAL
CO2 SERPL-SCNC: 25 MMOL/L
COLOR UR AUTO: ABNORMAL
CREAT SERPL-MCNC: 0.8 MG/DL
DACRYOCYTES BLD QL SMEAR: ABNORMAL
DIFFERENTIAL METHOD: ABNORMAL
DNA/RNA EXTRACT AND HOLD RESULT: NORMAL
DNA/RNA EXTRACTION: NORMAL
EOSINOPHIL # BLD AUTO: 0 K/UL
EOSINOPHIL NFR BLD: 0 %
ERYTHROCYTE [DISTWIDTH] IN BLOOD BY AUTOMATED COUNT: 15.6 %
EST. GFR  (AFRICAN AMERICAN): >60 ML/MIN/1.73 M^2
EST. GFR  (NON AFRICAN AMERICAN): >60 ML/MIN/1.73 M^2
EXHR SPECIMEN TYPE: NORMAL
GLUCOSE SERPL-MCNC: 90 MG/DL
GLUCOSE UR QL STRIP: NEGATIVE
HCT VFR BLD AUTO: 24 %
HGB BLD-MCNC: 8.3 G/DL
HGB UR QL STRIP: ABNORMAL
IMM GRANULOCYTES # BLD AUTO: 0 K/UL
IMM GRANULOCYTES NFR BLD AUTO: 0 %
KARYOTYP MAR: NORMAL
KETONES UR QL STRIP: NEGATIVE
LDH SERPL L TO P-CCNC: 163 U/L
LEUKOCYTE ESTERASE UR QL STRIP: NEGATIVE
LYMPHOCYTES # BLD AUTO: 0.4 K/UL
LYMPHOCYTES NFR BLD: 66 %
MAGNESIUM SERPL-MCNC: 2.2 MG/DL
MCH RBC QN AUTO: 29 PG
MCHC RBC AUTO-ENTMCNC: 34.6 G/DL
MCV RBC AUTO: 84 FL
MICROSCOPIC COMMENT: NORMAL
MONOCYTES # BLD AUTO: 0.1 K/UL
MONOCYTES NFR BLD: 24.5 %
NEUTROPHILS # BLD AUTO: 0.1 K/UL
NEUTROPHILS NFR BLD: 9.5 %
NITRITE UR QL STRIP: NEGATIVE
NRBC BLD-RTO: 0 /100 WBC
OVALOCYTES BLD QL SMEAR: ABNORMAL
PH UR STRIP: 7 [PH] (ref 5–8)
PHOSPHATE SERPL-MCNC: 2.7 MG/DL
PLATELET # BLD AUTO: 22 K/UL
PLATELET BLD QL SMEAR: ABNORMAL
PMV BLD AUTO: ABNORMAL FL
POIKILOCYTOSIS BLD QL SMEAR: SLIGHT
POTASSIUM SERPL-SCNC: 3.9 MMOL/L
PROT SERPL-MCNC: 6.5 G/DL
PROT UR QL STRIP: NEGATIVE
RBC # BLD AUTO: 2.86 M/UL
RBC #/AREA URNS AUTO: 2 /HPF (ref 0–4)
REASON FOR REFERRAL (NARRATIVE): NORMAL
REF LAB TEST METHOD: NORMAL
SODIUM SERPL-SCNC: 133 MMOL/L
SP GR UR STRIP: 1.01 (ref 1–1.03)
SPECIMEN SOURCE: NORMAL
SPECIMEN: NORMAL
URATE SERPL-MCNC: 2.9 MG/DL
URN SPEC COLLECT METH UR: ABNORMAL
UROBILINOGEN UR STRIP-ACNC: NEGATIVE EU/DL
WBC # BLD AUTO: 0.53 K/UL
WBC #/AREA URNS AUTO: 1 /HPF (ref 0–5)

## 2018-06-22 PROCEDURE — 87340 HEPATITIS B SURFACE AG IA: CPT

## 2018-06-22 PROCEDURE — 81001 URINALYSIS AUTO W/SCOPE: CPT

## 2018-06-22 PROCEDURE — 83735 ASSAY OF MAGNESIUM: CPT

## 2018-06-22 PROCEDURE — 86803 HEPATITIS C AB TEST: CPT

## 2018-06-22 PROCEDURE — 63600175 PHARM REV CODE 636 W HCPCS: Performed by: NURSE PRACTITIONER

## 2018-06-22 PROCEDURE — 83615 LACTATE (LD) (LDH) ENZYME: CPT

## 2018-06-22 PROCEDURE — 86703 HIV-1/HIV-2 1 RESULT ANTBDY: CPT

## 2018-06-22 PROCEDURE — 25000003 PHARM REV CODE 250: Performed by: INTERNAL MEDICINE

## 2018-06-22 PROCEDURE — 80053 COMPREHEN METABOLIC PANEL: CPT

## 2018-06-22 PROCEDURE — 86704 HEP B CORE ANTIBODY TOTAL: CPT

## 2018-06-22 PROCEDURE — 85025 COMPLETE CBC W/AUTO DIFF WBC: CPT

## 2018-06-22 PROCEDURE — 87040 BLOOD CULTURE FOR BACTERIA: CPT

## 2018-06-22 PROCEDURE — 20600001 HC STEP DOWN PRIVATE ROOM

## 2018-06-22 PROCEDURE — 36415 COLL VENOUS BLD VENIPUNCTURE: CPT

## 2018-06-22 PROCEDURE — 84550 ASSAY OF BLOOD/URIC ACID: CPT

## 2018-06-22 PROCEDURE — 87086 URINE CULTURE/COLONY COUNT: CPT

## 2018-06-22 PROCEDURE — 84100 ASSAY OF PHOSPHORUS: CPT

## 2018-06-22 PROCEDURE — 82955 ASSAY OF G6PD ENZYME: CPT

## 2018-06-22 PROCEDURE — 99233 SBSQ HOSP IP/OBS HIGH 50: CPT | Mod: ,,, | Performed by: INTERNAL MEDICINE

## 2018-06-22 PROCEDURE — 63700000 PHARM REV CODE 250 ALT 637 W/O HCPCS: Performed by: INTERNAL MEDICINE

## 2018-06-22 RX ORDER — CEFEPIME HYDROCHLORIDE 2 G/1
2 INJECTION, POWDER, FOR SOLUTION INTRAVENOUS
Status: DISCONTINUED | OUTPATIENT
Start: 2018-06-22 | End: 2018-06-25

## 2018-06-22 RX ORDER — CEFEPIME HYDROCHLORIDE 1 G/50ML
2 INJECTION, SOLUTION INTRAVENOUS
Status: DISCONTINUED | OUTPATIENT
Start: 2018-06-22 | End: 2018-06-22

## 2018-06-22 RX ADMIN — Medication 125 MG: at 12:06

## 2018-06-22 RX ADMIN — Medication 125 MG: at 06:06

## 2018-06-22 RX ADMIN — VORICONAZOLE 200 MG: 200 TABLET ORAL at 09:06

## 2018-06-22 RX ADMIN — ACETAMINOPHEN 650 MG: 325 TABLET, FILM COATED ORAL at 12:06

## 2018-06-22 RX ADMIN — ACYCLOVIR 400 MG: 200 CAPSULE ORAL at 08:06

## 2018-06-22 RX ADMIN — DASATINIB 40 MG: 20 TABLET ORAL at 09:06

## 2018-06-22 RX ADMIN — MIRTAZAPINE 15 MG: 15 TABLET, ORALLY DISINTEGRATING ORAL at 08:06

## 2018-06-22 RX ADMIN — CEFEPIME 2 G: 2 INJECTION, POWDER, FOR SOLUTION INTRAVENOUS at 05:06

## 2018-06-22 RX ADMIN — CEFEPIME 2 G: 2 INJECTION, POWDER, FOR SOLUTION INTRAVENOUS at 09:06

## 2018-06-22 RX ADMIN — VORICONAZOLE 200 MG: 200 TABLET ORAL at 08:06

## 2018-06-22 RX ADMIN — Medication 125 MG: at 05:06

## 2018-06-22 RX ADMIN — ACYCLOVIR 400 MG: 200 CAPSULE ORAL at 09:06

## 2018-06-22 NOTE — ASSESSMENT & PLAN NOTE
- transfuse for Hgb<7 and platelets<10k  - WBC 0.53, ANC 50, Hgb 8.3, Plt 22k  - continue PPX acyc and vori (cipro on hold while on cefepime)

## 2018-06-22 NOTE — PLAN OF CARE
Problem: Patient Care Overview  Goal: Plan of Care Review  Outcome: Ongoing (interventions implemented as appropriate)  AAOx4, bed in low and locked position, call bell within reach, voids via urinal, no falls. [id number]; tylenol administered and blood cultures ordered. Benadryl ordered for insomnia and papule rash irritating patient. Vancomycin PO continued. Patient stable, vitals stable, will continue to monitor.

## 2018-06-22 NOTE — ASSESSMENT & PLAN NOTE
Patient understandibly experiencing anxiety about his new diagnosis and feeling isolated in his room.   Offered for patient to be seen by oncology psychologist but he declines for now.   Started on Remeron for anxiety and insomnia.

## 2018-06-22 NOTE — PLAN OF CARE
MDR's with Dr Hernández.  Patient is day 22 of 7+3.  His most recent BMB is consistent with residual AML.  Patient had a NF overnight.  Infectious workup in process and started on cefepime.  Plan to let cultures grow over the next couple of days and reinduce with MEC once cleared.  The patient is in agreement with the plan of care and additional LOS.  Will continue to follow.

## 2018-06-22 NOTE — ASSESSMENT & PLAN NOTE
- initial WBC reportedly 150,000  - bcr-abl positive on FISH at [facility] (records attached in media tab)  - original BMBX 5/26/18 with 20% blasts; CML transformed to AML with blast crisis;   karyotype: 46,XY,t(9;22;22;14)q34;q11.2;q13;q23); BCR/ABL gene fusion 62%  - continue dasatinib at 40 mg (dose reduction secondary to being on voriconazole)  - voriconazole, acyclovir and cipro for prophylaxis (cipro on hold while on Cefepime)  -  status post 7+3 induction- day 22  - repeat BM biopsy done 6/18 here at [facility] (D19). 9% blasts. Plan for MEC reinduction. On hold due to neutropenic fever overnight on 6/21. Awaiting cultures.   - AML FISH/NGS pending from repeat BMBX  - IT chemo needed?  - echo with 63% EF  - HIV, Hepatitis, G6PD sent 6/22 and pending; not completed at OSH after review of records

## 2018-06-22 NOTE — ASSESSMENT & PLAN NOTE
- stable and improved per patient  - noted to scalp and bilateral arms  - pt states it started after beginning his dasatinib  - continue to monitor

## 2018-06-22 NOTE — PLAN OF CARE
Problem: Patient Care Overview  Goal: Plan of Care Review  Outcome: Ongoing (interventions implemented as appropriate)  Pt. day 22 for 7+3 induction.  Pt. with TMAX 100.1 this afternoon.  Pt. with a neutropenic fever workup complete this morning and started on cefepime.  Pt. With no complaints today.  Pt. With wife at bedside.  Pt. with nonskid footwear on with bed in lowest position and locked with bed rails up x2.  Pt. ambulates independently and instructed to call if assistance is needed.  Pt. with call light within reach.  Will continue to monitor pt.

## 2018-06-22 NOTE — PLAN OF CARE
Problem: Patient Care Overview  Goal: Plan of Care Review  Outcome: Ongoing (interventions implemented as appropriate)  Pt. day 21 for 7+3 induction.  Pt. received 1 unit of blood this morning.  Pt. received electrolyte replacements today.  Pt. restarted on sprycel this morning.  Pt. With no complaints today.  Pt. appears to be more withdrawn.  Pt. With mother at bedside.  Pt. with nonskid footwear on with bed in lowest position and locked with bed rails up x2.  Pt. ambulates independently and instructed to call if assistance is needed.  Pt. with call light within reach.  Will continue to monitor pt.

## 2018-06-22 NOTE — SUBJECTIVE & OBJECTIVE
Subjective:     Interval History:   D22 of 7+3. Neutropenic fever overnight. Blood cultures obtained. Will obtain UA, urine culture, and chest xray this AM. Started on cefepime this AM. Diarrhea resolved. Pancytopenic. ANC 50. Plans to start MEC reinduction on hold due to neutropenic fever. Will hold off on starting chemo until cultures result per Dr. Hernández.     Objective:     Vital Signs (Most Recent):  Temp: 99 °F (37.2 °C) (06/22/18 1148)  Pulse: 108 (06/22/18 1148)  Resp: 20 (06/22/18 1148)  BP: 137/84 (06/22/18 1148)  SpO2: 98 % (06/22/18 1148) Vital Signs (24h Range):  Temp:  [99 °F (37.2 °C)-102.3 °F (39.1 °C)] 99 °F (37.2 °C)  Pulse:  [] 108  Resp:  [14-20] 20  SpO2:  [98 %-100 %] 98 %  BP: (125-137)/(75-87) 137/84     Weight: 63.3 kg (139 lb 10.6 oz)  Body mass index is 19.48 kg/m².  Body surface area is 1.78 meters squared.    ECOG SCORE         [unfilled]    Intake/Output - Last 3 Shifts       06/20 0700 - 06/21 0659 06/21 0700 - 06/22 0659 06/22 0700 - 06/23 0659    P.O. 300 1080 240    I.V. (mL/kg)  50 (0.8) 40 (0.6)    Blood 600      Total Intake(mL/kg) 900 (14.2) 1130 (17.9) 280 (4.4)    Urine (mL/kg/hr) 1000 (0.7) 2100 (1.4) 825 (2.7)    Total Output 1000 2100 825    Net -100 -970 -545           Urine Occurrence 2 x      Stool Occurrence 3 x 3 x           Physical Exam   Constitutional: He is oriented to person, place, and time. He appears well-developed and well-nourished.   HENT:   Head: Normocephalic and atraumatic.   Eyes: EOM are normal. Pupils are equal, round, and reactive to light.   Neck: Normal range of motion. Neck supple.   Cardiovascular: Normal rate and regular rhythm.    No murmur heard.  Pulmonary/Chest: Effort normal and breath sounds normal. No respiratory distress.   Abdominal: Soft. Bowel sounds are normal. He exhibits no distension.   Musculoskeletal: Normal range of motion. He exhibits no edema.   Neurological: He is alert and oriented to person, place, and time.   Skin:  Skin is warm and dry. Rash noted.   Rash to head and bilateral arms stable and improved; pt states it started after taking dasatinib   Psychiatric: He has a normal mood and affect. His behavior is normal.   Vitals reviewed.      Significant Labs:   CBC:   Recent Labs  Lab 06/21/18 0318 06/22/18  0259   WBC 0.42* 0.53*   HGB 6.9* 8.3*   HCT 20.3* 24.0*   PLT 23* 22*    and CMP:   Recent Labs  Lab 06/21/18 0318 06/22/18  0259    133*   K 3.7 3.9    103   CO2 26 25   GLU 84 90   BUN 8 6   CREATININE 0.8 0.8   CALCIUM 8.5* 8.7   PROT 6.1 6.5   ALBUMIN 2.9* 3.0*   BILITOT 0.2 0.3   ALKPHOS 73 80   AST 16 16   ALT 41 39   ANIONGAP 8 5*   EGFRNONAA >60.0 >60.0       Diagnostic Results:  I have reviewed all pertinent imaging results/findings within the past 24 hours.

## 2018-06-22 NOTE — PROGRESS NOTES
06/22/18 0006   Vital Signs   Temp (!) 101.5 °F (38.6 °C)   Temp src Oral   Pulse (!) 113   Heart Rate Source Monitor   Resp 20   SpO2 99 %   O2 Device (Oxygen Therapy) room air   /75   MAP (mmHg) 92   BP Location Right arm   Patient Position Lying     MD notified; blood cultures ordered and tylenol administered.

## 2018-06-22 NOTE — ASSESSMENT & PLAN NOTE
- 6/22/18 0025 fever of 102.3  - blood cultures pending  - UA and urine culture pending  - chest xray pending  - started on cefepime (D1 6/22/18)  - ANC 50

## 2018-06-22 NOTE — PROGRESS NOTES
[facility]  Hematology  Bone Marrow Transplant  Progress Note    Patient Name: Wilton Guzmán  Admission Date: 6/16/2018  Hospital Length of Stay: 6 days  Code Status: Full Code    Subjective:     Interval History:   D22 of 7+3. Neutropenic fever overnight. Blood cultures obtained. Will obtain UA, urine culture, and chest xray this AM. Started on cefepime this AM. Diarrhea resolved. Pancytopenic. ANC 50. Plans to start MEC reinduction on hold due to neutropenic fever. Will hold off on starting chemo until cultures result per Dr. Hernández.     Objective:     Vital Signs (Most Recent):  Temp: 99 °F (37.2 °C) (06/22/18 1148)  Pulse: 108 (06/22/18 1148)  Resp: 20 (06/22/18 1148)  BP: 137/84 (06/22/18 1148)  SpO2: 98 % (06/22/18 1148) Vital Signs (24h Range):  Temp:  [99 °F (37.2 °C)-102.3 °F (39.1 °C)] 99 °F (37.2 °C)  Pulse:  [] 108  Resp:  [14-20] 20  SpO2:  [98 %-100 %] 98 %  BP: (125-137)/(75-87) 137/84     Weight: 63.3 kg (139 lb 10.6 oz)  Body mass index is 19.48 kg/m².  Body surface area is 1.78 meters squared.    ECOG SCORE         [unfilled]    Intake/Output - Last 3 Shifts       06/20 0700 - 06/21 0659 06/21 0700 - 06/22 0659 06/22 0700 - 06/23 0659    P.O. 300 1080 240    I.V. (mL/kg)  50 (0.8) 40 (0.6)    Blood 600      Total Intake(mL/kg) 900 (14.2) 1130 (17.9) 280 (4.4)    Urine (mL/kg/hr) 1000 (0.7) 2100 (1.4) 825 (2.7)    Total Output 1000 2100 825    Net -100 -970 -545           Urine Occurrence 2 x      Stool Occurrence 3 x 3 x           Physical Exam   Constitutional: He is oriented to person, place, and time. He appears well-developed and well-nourished.   HENT:   Head: Normocephalic and atraumatic.   Eyes: EOM are normal. Pupils are equal, round, and reactive to light.   Neck: Normal range of motion. Neck supple.   Cardiovascular: Normal rate and regular rhythm.    No murmur heard.  Pulmonary/Chest: Effort normal and breath sounds normal. No respiratory distress.   Abdominal:  Soft. Bowel sounds are normal. He exhibits no distension.   Musculoskeletal: Normal range of motion. He exhibits no edema.   Neurological: He is alert and oriented to person, place, and time.   Skin: Skin is warm and dry. Rash noted.   Rash to head and bilateral arms stable and improved; pt states it started after taking dasatinib   Psychiatric: He has a normal mood and affect. His behavior is normal.   Vitals reviewed.      Significant Labs:   CBC:   Recent Labs  Lab 06/21/18 0318 06/22/18  0259   WBC 0.42* 0.53*   HGB 6.9* 8.3*   HCT 20.3* 24.0*   PLT 23* 22*    and CMP:   Recent Labs  Lab 06/21/18 0318 06/22/18  0259    133*   K 3.7 3.9    103   CO2 26 25   GLU 84 90   BUN 8 6   CREATININE 0.8 0.8   CALCIUM 8.5* 8.7   PROT 6.1 6.5   ALBUMIN 2.9* 3.0*   BILITOT 0.2 0.3   ALKPHOS 73 80   AST 16 16   ALT 41 39   ANIONGAP 8 5*   EGFRNONAA >60.0 >60.0       Diagnostic Results:  I have reviewed all pertinent imaging results/findings within the past 24 hours.    Assessment/Plan:     * Chronic myelogenous leukemia (CML), BCR-ABL1-positive    - initial WBC reportedly 150,000  - bcr-abl positive on FISH at [facility] (records attached in media tab)  - original BMBX 5/26/18 with 20% blasts; CML transformed to AML with blast crisis;   karyotype: 46,XY,t(9;22;22;14)q34;q11.2;q13;q23); BCR/ABL gene fusion 62%  - continue dasatinib at 40 mg (dose reduction secondary to being on voriconazole)  - voriconazole, acyclovir and cipro for prophylaxis (cipro on hold while on Cefepime)  -  status post 7+3 induction- day 22  - repeat BM biopsy done 6/18 here at [facility] (D19). 9% blasts. Plan for MEC reinduction. On hold due to neutropenic fever overnight on 6/21. Awaiting cultures.   - AML FISH/NGS pending from repeat BMBX  - IT chemo needed?  - echo with 63% EF  - HIV, Hepatitis, G6PD sent 6/22 and pending; not completed at OSH after review of records            Drug rash    - stable and improved per patient  - noted to scalp and  bilateral arms  - pt states it started after beginning his dasatinib  - continue to monitor         Neutropenic fever    - 6/22/18 0025 fever of 102.3  - blood cultures pending  - UA and urine culture pending  - chest xray pending  - started on cefepime (D1 6/22/18)  - ANC 50        Anxiety    Patient understandibly experiencing anxiety about his new diagnosis and feeling isolated in his room.   Offered for patient to be seen by oncology psychologist but he declines for now.   Started on Remeron for anxiety and insomnia.         Pancytopenia due to antineoplastic chemotherapy    - transfuse for Hgb<7 and platelets<10k  - WBC 0.53, ANC 50, Hgb 8.3, Plt 22k  - continue PPX acyc and vori (cipro on hold while on cefepime)         C. difficile colitis    - on po vancomycin with resolution of diarrhea.   - end date of treatment 6/23            VTE Risk Mitigation         Ordered     IP VTE LOW RISK PATIENT  Once      06/16/18 0938          Disposition: inpatient     Whitney Curiel NP  Bone Marrow Transplant  [facility]

## 2018-06-23 LAB
ABO + RH BLD: NORMAL
ALBUMIN SERPL BCP-MCNC: 2.8 G/DL
ALP SERPL-CCNC: 79 U/L
ALT SERPL W/O P-5'-P-CCNC: 37 U/L
ANION GAP SERPL CALC-SCNC: 7 MMOL/L
ANISOCYTOSIS BLD QL SMEAR: SLIGHT
AST SERPL-CCNC: 15 U/L
BACTERIA UR CULT: NO GROWTH
BASOPHILS # BLD AUTO: 0 K/UL
BASOPHILS NFR BLD: 0 %
BILIRUB SERPL-MCNC: 0.2 MG/DL
BLD GP AB SCN CELLS X3 SERPL QL: NORMAL
BUN SERPL-MCNC: 7 MG/DL
CALCIUM SERPL-MCNC: 9 MG/DL
CHLORIDE SERPL-SCNC: 105 MMOL/L
CO2 SERPL-SCNC: 27 MMOL/L
CREAT SERPL-MCNC: 0.8 MG/DL
DACRYOCYTES BLD QL SMEAR: ABNORMAL
DIFFERENTIAL METHOD: ABNORMAL
EOSINOPHIL # BLD AUTO: 0 K/UL
EOSINOPHIL NFR BLD: 0 %
ERYTHROCYTE [DISTWIDTH] IN BLOOD BY AUTOMATED COUNT: 15.5 %
EST. GFR  (AFRICAN AMERICAN): >60 ML/MIN/1.73 M^2
EST. GFR  (NON AFRICAN AMERICAN): >60 ML/MIN/1.73 M^2
GLUCOSE SERPL-MCNC: 91 MG/DL
HCT VFR BLD AUTO: 22.9 %
HGB BLD-MCNC: 7.7 G/DL
HYPOCHROMIA BLD QL SMEAR: ABNORMAL
IMM GRANULOCYTES # BLD AUTO: 0 K/UL
IMM GRANULOCYTES NFR BLD AUTO: 0 %
LYMPHOCYTES # BLD AUTO: 0.4 K/UL
LYMPHOCYTES NFR BLD: 46.7 %
MAGNESIUM SERPL-MCNC: 1.9 MG/DL
MCH RBC QN AUTO: 28.3 PG
MCHC RBC AUTO-ENTMCNC: 33.6 G/DL
MCV RBC AUTO: 84 FL
MONOCYTES # BLD AUTO: 0.3 K/UL
MONOCYTES NFR BLD: 34.7 %
NEUTROPHILS # BLD AUTO: 0.1 K/UL
NEUTROPHILS NFR BLD: 18.6 %
NRBC BLD-RTO: 0 /100 WBC
OVALOCYTES BLD QL SMEAR: ABNORMAL
PHOSPHATE SERPL-MCNC: 2.9 MG/DL
PLATELET # BLD AUTO: 30 K/UL
PLATELET BLD QL SMEAR: ABNORMAL
PMV BLD AUTO: ABNORMAL FL
POIKILOCYTOSIS BLD QL SMEAR: SLIGHT
POTASSIUM SERPL-SCNC: 3.9 MMOL/L
PROT SERPL-MCNC: 6.3 G/DL
RBC # BLD AUTO: 2.72 M/UL
SODIUM SERPL-SCNC: 139 MMOL/L
WBC # BLD AUTO: 0.75 K/UL

## 2018-06-23 PROCEDURE — 63600175 PHARM REV CODE 636 W HCPCS: Mod: JG | Performed by: INTERNAL MEDICINE

## 2018-06-23 PROCEDURE — 99233 SBSQ HOSP IP/OBS HIGH 50: CPT | Mod: ,,, | Performed by: INTERNAL MEDICINE

## 2018-06-23 PROCEDURE — 36415 COLL VENOUS BLD VENIPUNCTURE: CPT

## 2018-06-23 PROCEDURE — 83735 ASSAY OF MAGNESIUM: CPT

## 2018-06-23 PROCEDURE — 80053 COMPREHEN METABOLIC PANEL: CPT

## 2018-06-23 PROCEDURE — 25000003 PHARM REV CODE 250: Performed by: INTERNAL MEDICINE

## 2018-06-23 PROCEDURE — 84100 ASSAY OF PHOSPHORUS: CPT

## 2018-06-23 PROCEDURE — 63700000 PHARM REV CODE 250 ALT 637 W/O HCPCS: Performed by: INTERNAL MEDICINE

## 2018-06-23 PROCEDURE — 63600175 PHARM REV CODE 636 W HCPCS: Performed by: NURSE PRACTITIONER

## 2018-06-23 PROCEDURE — 85025 COMPLETE CBC W/AUTO DIFF WBC: CPT

## 2018-06-23 PROCEDURE — 20600001 HC STEP DOWN PRIVATE ROOM

## 2018-06-23 PROCEDURE — 86901 BLOOD TYPING SEROLOGIC RH(D): CPT

## 2018-06-23 RX ORDER — ONDANSETRON 8 MG/1
16 TABLET, ORALLY DISINTEGRATING ORAL
Status: COMPLETED | OUTPATIENT
Start: 2018-06-23 | End: 2018-06-30

## 2018-06-23 RX ORDER — DEXAMETHASONE SODIUM PHOSPHATE 1 MG/ML
1 SOLUTION/ DROPS OPHTHALMIC EVERY 6 HOURS
Status: COMPLETED | OUTPATIENT
Start: 2018-06-23 | End: 2018-07-02

## 2018-06-23 RX ORDER — HEPARIN 100 UNIT/ML
300 SYRINGE INTRAVENOUS
Status: DISCONTINUED | OUTPATIENT
Start: 2018-06-23 | End: 2018-07-23 | Stop reason: HOSPADM

## 2018-06-23 RX ORDER — SODIUM CHLORIDE 9 MG/ML
INJECTION, SOLUTION INTRAVENOUS CONTINUOUS
Status: DISCONTINUED | OUTPATIENT
Start: 2018-06-23 | End: 2018-06-24

## 2018-06-23 RX ORDER — SODIUM CHLORIDE 0.9 % (FLUSH) 0.9 %
10 SYRINGE (ML) INJECTION
Status: DISCONTINUED | OUTPATIENT
Start: 2018-06-23 | End: 2018-07-23 | Stop reason: HOSPADM

## 2018-06-23 RX ORDER — DEXAMETHASONE 4 MG/1
12 TABLET ORAL
Status: COMPLETED | OUTPATIENT
Start: 2018-06-23 | End: 2018-06-28

## 2018-06-23 RX ADMIN — VORICONAZOLE 200 MG: 200 TABLET ORAL at 09:06

## 2018-06-23 RX ADMIN — DEXAMETHASONE 1 DROP: 1 SUSPENSION OPHTHALMIC at 01:06

## 2018-06-23 RX ADMIN — MIRTAZAPINE 15 MG: 15 TABLET, ORALLY DISINTEGRATING ORAL at 08:06

## 2018-06-23 RX ADMIN — VORICONAZOLE 200 MG: 200 TABLET ORAL at 08:06

## 2018-06-23 RX ADMIN — ONDANSETRON 16 MG: 8 TABLET, ORALLY DISINTEGRATING ORAL at 01:06

## 2018-06-23 RX ADMIN — CEFEPIME 2 G: 2 INJECTION, POWDER, FOR SOLUTION INTRAVENOUS at 05:06

## 2018-06-23 RX ADMIN — CEFEPIME 2 G: 2 INJECTION, POWDER, FOR SOLUTION INTRAVENOUS at 12:06

## 2018-06-23 RX ADMIN — ACYCLOVIR 400 MG: 200 CAPSULE ORAL at 08:06

## 2018-06-23 RX ADMIN — DEXAMETHASONE 1 DROP: 1 SUSPENSION OPHTHALMIC at 05:06

## 2018-06-23 RX ADMIN — Medication 125 MG: at 11:06

## 2018-06-23 RX ADMIN — CYTARABINE 1780 MG: 100 INJECTION, SOLUTION INTRATHECAL; INTRAVENOUS; SUBCUTANEOUS at 03:06

## 2018-06-23 RX ADMIN — SODIUM CHLORIDE: 0.9 INJECTION, SOLUTION INTRAVENOUS at 01:06

## 2018-06-23 RX ADMIN — DEXAMETHASONE 12 MG: 4 TABLET ORAL at 01:06

## 2018-06-23 RX ADMIN — MITOXANTRONE HYDROCHLORIDE 11 MG: 2 INJECTION, SOLUTION INTRAVENOUS at 01:06

## 2018-06-23 RX ADMIN — Medication 125 MG: at 05:06

## 2018-06-23 RX ADMIN — DASATINIB 40 MG: 20 TABLET ORAL at 09:06

## 2018-06-23 RX ADMIN — CEFEPIME 2 G: 2 INJECTION, POWDER, FOR SOLUTION INTRAVENOUS at 09:06

## 2018-06-23 RX ADMIN — ETOPOSIDE 142 MG: 20 INJECTION INTRAVENOUS at 02:06

## 2018-06-23 RX ADMIN — Medication 125 MG: at 12:06

## 2018-06-23 RX ADMIN — ACYCLOVIR 400 MG: 200 CAPSULE ORAL at 09:06

## 2018-06-23 RX ADMIN — Medication 125 MG: at 06:06

## 2018-06-23 NOTE — ASSESSMENT & PLAN NOTE
- transfuse for Hgb<7 and platelets<10k  - WBC 0.53, , Hgb 8.3, Plt 22k  - continue PPX acyc and vori (cipro on hold while on cefepime)

## 2018-06-23 NOTE — PROGRESS NOTES
[facility]  Hematology  Bone Marrow Transplant  Progress Note    Patient Name: Wilton Guzmán  Admission Date: 6/16/2018  Hospital Length of Stay: 7 days  Code Status: Full Code    Subjective:     Interval History:   D23 of 7+3. Tmax of 100.2 overnight. Cultures so far NGTD, On cefepime for neutropenic fever from evening of 6.21. ANC is 140. ANC 50. Plans to start MEC reinduction on hold due to neutropenic fever.     Objective:     Vital Signs (Most Recent):  Temp: 99.2 °F (37.3 °C) (06/23/18 0750)  Pulse: 104 (06/23/18 0750)  Resp: 18 (06/23/18 0750)  BP: 124/73 (06/23/18 0750)  SpO2: 99 % (06/23/18 0750) Vital Signs (24h Range):  Temp:  [98.9 °F (37.2 °C)-100.2 °F (37.9 °C)] 99.2 °F (37.3 °C)  Pulse:  [104-115] 104  Resp:  [18-20] 18  SpO2:  [98 %-100 %] 99 %  BP: (114-137)/(64-90) 124/73     Weight: 63.3 kg (139 lb 10.6 oz)  Body mass index is 19.48 kg/m².  Body surface area is 1.78 meters squared.    ECOG SCORE         [unfilled]    Intake/Output - Last 3 Shifts       06/21 0700 - 06/22 0659 06/22 0700 - 06/23 0659 06/23 0700 - 06/24 0659    P.O. 1080 1560     I.V. (mL/kg) 50 (0.8) 110 (1.7)     Total Intake(mL/kg) 1130 (17.9) 1670 (26.4)     Urine (mL/kg/hr) 2100 (1.4) 3500 (2.3)     Total Output 2100 3500      Net -970 -1830             Stool Occurrence 3 x 2 x           Physical Exam   Constitutional: He is oriented to person, place, and time. He appears well-developed and well-nourished.   HENT:   Head: Normocephalic and atraumatic.   Eyes: EOM are normal. Pupils are equal, round, and reactive to light.   Neck: Normal range of motion. Neck supple.   Cardiovascular: Normal rate and regular rhythm.    No murmur heard.  Pulmonary/Chest: Effort normal and breath sounds normal. No respiratory distress.   Abdominal: Soft. Bowel sounds are normal. He exhibits no distension.   Musculoskeletal: Normal range of motion. He exhibits no edema.   Neurological: He is alert and oriented to person, place,  and time.   Skin: Skin is warm and dry. Rash noted.   Rash to head and bilateral arms stable and improved; pt states it started after taking dasatinib   Psychiatric: He has a normal mood and affect. His behavior is normal.   Vitals reviewed.      Significant Labs:   CBC:     Recent Labs  Lab 06/22/18  0259 06/23/18  0400   WBC 0.53* 0.75*   HGB 8.3* 7.7*   HCT 24.0* 22.9*   PLT 22* 30*    and CMP:     Recent Labs  Lab 06/22/18  0259 06/23/18  0400   * 139   K 3.9 3.9    105   CO2 25 27   GLU 90 91   BUN 6 7   CREATININE 0.8 0.8   CALCIUM 8.7 9.0   PROT 6.5 6.3   ALBUMIN 3.0* 2.8*   BILITOT 0.3 0.2   ALKPHOS 80 79   AST 16 15   ALT 39 37   ANIONGAP 5* 7*   EGFRNONAA >60.0 >60.0       Diagnostic Results:  I have reviewed all pertinent imaging results/findings within the past 24 hours.    Assessment/Plan:     * Chronic myelogenous leukemia (CML), BCR-ABL1-positive    - initial WBC reportedly 150,000  - bcr-abl positive on FISH at [facility] (records attached in media tab)  - original BMBX 5/26/18 with 20% blasts; CML transformed to AML with blast crisis;   karyotype: 46,XY,t(9;22;22;14)q34;q11.2;q13;q23); BCR/ABL gene fusion 62%  - continue dasatinib at 40 mg (dose reduction secondary to being on voriconazole)  - voriconazole, acyclovir and cipro for prophylaxis (cipro on hold while on Cefepime)  -  status post 7+3 induction- day 22  - repeat BM biopsy done 6/18 here at [facility] (D19). 9% blasts. Plan for MEC reinduction. On hold due to neutropenic fever overnight on 6/21. Awaiting cultures.   - AML FISH/NGS pending from repeat BMBX  - IT chemo needed?  - echo with 63% EF  - HIV, Hepatitis, G6PD sent 6/22 and pending; not completed at OSH after review of records            Drug rash    - stable and improved per patient  - noted to scalp and bilateral arms  - pt states it started after beginning his dasatinib  - continue to monitor         Neutropenic fever    - 6/22/18 0025 fever of 102.3. Tmax overnight was 100.2/    - blood cultures so far NGTD   - UA negative and urine culture pending  - CXR negative for acute process   - started on cefepime (D1 6/22/18)  -          Anxiety    Patient understandibly experiencing anxiety about his new diagnosis and feeling isolated in his room.   Offered for patient to be seen by oncology psychologist but he declines for now.   Started on Remeron for anxiety and insomnia.         Pancytopenia due to antineoplastic chemotherapy    - transfuse for Hgb<7 and platelets<10k  - WBC 0.53, , Hgb 8.3, Plt 22k  - continue PPX acyc and vori (cipro on hold while on cefepime)         C. difficile colitis    - on po vancomycin with resolution of diarrhea.   - last day of Vanc course today.             VTE Risk Mitigation         Ordered     IP VTE LOW RISK PATIENT  Once      06/16/18 0944            HORACIO Acharya  Bone Marrow Transplant  [facility]

## 2018-06-23 NOTE — ASSESSMENT & PLAN NOTE
- initial WBC reportedly 150,000  - bcr-abl positive on FISH at [facility] (records attached in media tab)  - original BMBX 5/26/18 with 20% blasts; CML transformed to AML with blast crisis;   karyotype: 46,XY,t(9;22;22;14)q34;q11.2;q13;q23); BCR/ABL gene fusion 62%  - continue dasatinib at 40 mg (dose reduction secondary to being on voriconazole)  - voriconazole, acyclovir and cipro for prophylaxis (cipro on hold while on Cefepime)  -  status post 7+3 induction- day 22  - repeat BM biopsy done 6/18 here at [facility] (D19). 9% blasts. Plan for MEC reinduction. On hold due to neutropenic fever overnight on 6/21. Awaiting cultures.   - AML FISH/NGS pending from repeat BMBX  - IT chemo needed?  - echo with 63% EF  - HIV, Hepatitis, G6PD sent 6/22 and pending; not completed at OSH after review of records

## 2018-06-23 NOTE — SUBJECTIVE & OBJECTIVE
Subjective:     Interval History:   D23 of 7+3. Tmax of 100.2 overnight. Cultures so far NGTD, On cefepime for neutropenic fever from evening of 6.21. ANC is 140. ANC 50. Plans to start MEC reinduction on hold due to neutropenic fever.     Objective:     Vital Signs (Most Recent):  Temp: 99.2 °F (37.3 °C) (06/23/18 0750)  Pulse: 104 (06/23/18 0750)  Resp: 18 (06/23/18 0750)  BP: 124/73 (06/23/18 0750)  SpO2: 99 % (06/23/18 0750) Vital Signs (24h Range):  Temp:  [98.9 °F (37.2 °C)-100.2 °F (37.9 °C)] 99.2 °F (37.3 °C)  Pulse:  [104-115] 104  Resp:  [18-20] 18  SpO2:  [98 %-100 %] 99 %  BP: (114-137)/(64-90) 124/73     Weight: 63.3 kg (139 lb 10.6 oz)  Body mass index is 19.48 kg/m².  Body surface area is 1.78 meters squared.    ECOG SCORE         [unfilled]    Intake/Output - Last 3 Shifts       06/21 0700 - 06/22 0659 06/22 0700 - 06/23 0659 06/23 0700 - 06/24 0659    P.O. 1080 1560     I.V. (mL/kg) 50 (0.8) 110 (1.7)     Total Intake(mL/kg) 1130 (17.9) 1670 (26.4)     Urine (mL/kg/hr) 2100 (1.4) 3500 (2.3)     Total Output 2100 3500      Net -970 -1830             Stool Occurrence 3 x 2 x           Physical Exam   Constitutional: He is oriented to person, place, and time. He appears well-developed and well-nourished.   HENT:   Head: Normocephalic and atraumatic.   Eyes: EOM are normal. Pupils are equal, round, and reactive to light.   Neck: Normal range of motion. Neck supple.   Cardiovascular: Normal rate and regular rhythm.    No murmur heard.  Pulmonary/Chest: Effort normal and breath sounds normal. No respiratory distress.   Abdominal: Soft. Bowel sounds are normal. He exhibits no distension.   Musculoskeletal: Normal range of motion. He exhibits no edema.   Neurological: He is alert and oriented to person, place, and time.   Skin: Skin is warm and dry. Rash noted.   Rash to head and bilateral arms stable and improved; pt states it started after taking dasatinib   Psychiatric: He has a normal mood and affect.  His behavior is normal.   Vitals reviewed.      Significant Labs:   CBC:     Recent Labs  Lab 06/22/18  0259 06/23/18  0400   WBC 0.53* 0.75*   HGB 8.3* 7.7*   HCT 24.0* 22.9*   PLT 22* 30*    and CMP:     Recent Labs  Lab 06/22/18  0259 06/23/18  0400   * 139   K 3.9 3.9    105   CO2 25 27   GLU 90 91   BUN 6 7   CREATININE 0.8 0.8   CALCIUM 8.7 9.0   PROT 6.5 6.3   ALBUMIN 3.0* 2.8*   BILITOT 0.3 0.2   ALKPHOS 80 79   AST 16 15   ALT 39 37   ANIONGAP 5* 7*   EGFRNONAA >60.0 >60.0       Diagnostic Results:  I have reviewed all pertinent imaging results/findings within the past 24 hours.

## 2018-06-23 NOTE — PROGRESS NOTES
Etoposide currently infusing to left arm PICC . Chemotherapy consent and Etoposide verified with two RNs. CAR created and verified with  Eusebia GREGORY RN. Labs and drug calculations reviewed. Blood return noted in central line. Patient educated to call for any s/s of complications (ex: rash, dizziness, loss of fine motor function, SOB, any unusual pain, N/V). BP checked prior to infusion and WDL. Patient verbalizes understanding. Call bell within reach. Will continue to monitor.

## 2018-06-23 NOTE — ASSESSMENT & PLAN NOTE
- 6/22/18 0025 fever of 102.3. Tmax overnight was 100.2/   - blood cultures so far NGTD   - UA negative and urine culture pending  - CXR negative for acute process   - started on cefepime (D1 6/22/18)  -

## 2018-06-23 NOTE — PLAN OF CARE
Problem: Patient Care Overview  Goal: Plan of Care Review  Outcome: Ongoing (interventions implemented as appropriate)  Patient remains free from falls and injury this shift. Bed in low, locked position with call bell in reach. Family at bedside. Patient encouraged to call for assistance when getting out of bed. Patient verbalized understanding. All belongings within reach. Ambulating independently within room. Afebrile - maintained on cefepime. Oral Vanc for C. Diff completed this shift. C. Diff precautions removed. Day 1 of MEC chemotherapy regimen started. IVFs started. Neutropenic and thrombocytopenic precautions maintained. will continue to monitor.

## 2018-06-23 NOTE — PROGRESS NOTES
Mitoxantrone currently infusing to left arm PICC. Chemotherapy consent and Mitoxantrone verified with two RNs. CAR created and verified with Eusebia GREGORY RN. Labs and drug calculations reviewed. Blood return noted in central line. Patient educated to call for any s/s of complications (ex: rash, dizziness, loss of fine motor function, SOB, any unusual pain, N/V). Patient verbalizes understanding. Call bell within reach. Will continue to monitor.

## 2018-06-23 NOTE — PROGRESS NOTES
Cytarabine currently infusing to left arm PICC. Chemotherapy consent and Cytarabine verified with two RNs. CAR created and verified with Eusebia GREGORY RN. Labs and drug calculations reviewed. Blood return noted in central line. Patient educated to call for any s/s of complications (ex: rash, dizziness, loss of fine motor function, SOB, any unusual pain, N/V). Patient verbalizes understanding. Call bell within reach. Will continue to monitor.

## 2018-06-24 LAB
ABO + RH BLD: NORMAL
ALBUMIN SERPL BCP-MCNC: 2.6 G/DL
ALBUMIN SERPL BCP-MCNC: 2.6 G/DL
ALP SERPL-CCNC: 77 U/L
ALP SERPL-CCNC: 77 U/L
ALT SERPL W/O P-5'-P-CCNC: 35 U/L
ALT SERPL W/O P-5'-P-CCNC: 35 U/L
ANION GAP SERPL CALC-SCNC: 4 MMOL/L
ANION GAP SERPL CALC-SCNC: 4 MMOL/L
ANISOCYTOSIS BLD QL SMEAR: SLIGHT
ANISOCYTOSIS BLD QL SMEAR: SLIGHT
AST SERPL-CCNC: 17 U/L
AST SERPL-CCNC: 17 U/L
BASOPHILS # BLD AUTO: 0 K/UL
BASOPHILS # BLD AUTO: 0 K/UL
BASOPHILS NFR BLD: 0 %
BASOPHILS NFR BLD: 0 %
BILIRUB SERPL-MCNC: 0.1 MG/DL
BILIRUB SERPL-MCNC: 0.1 MG/DL
BLD GP AB SCN CELLS X3 SERPL QL: NORMAL
BUN SERPL-MCNC: 9 MG/DL
BUN SERPL-MCNC: 9 MG/DL
CALCIUM SERPL-MCNC: 8.7 MG/DL
CALCIUM SERPL-MCNC: 8.7 MG/DL
CHLORIDE SERPL-SCNC: 107 MMOL/L
CHLORIDE SERPL-SCNC: 107 MMOL/L
CO2 SERPL-SCNC: 26 MMOL/L
CO2 SERPL-SCNC: 26 MMOL/L
CREAT SERPL-MCNC: 0.7 MG/DL
CREAT SERPL-MCNC: 0.7 MG/DL
DIFFERENTIAL METHOD: ABNORMAL
DIFFERENTIAL METHOD: ABNORMAL
EOSINOPHIL # BLD AUTO: 0 K/UL
EOSINOPHIL # BLD AUTO: 0 K/UL
EOSINOPHIL NFR BLD: 0 %
EOSINOPHIL NFR BLD: 0 %
ERYTHROCYTE [DISTWIDTH] IN BLOOD BY AUTOMATED COUNT: 15 %
ERYTHROCYTE [DISTWIDTH] IN BLOOD BY AUTOMATED COUNT: 15 %
EST. GFR  (AFRICAN AMERICAN): >60 ML/MIN/1.73 M^2
EST. GFR  (AFRICAN AMERICAN): >60 ML/MIN/1.73 M^2
EST. GFR  (NON AFRICAN AMERICAN): >60 ML/MIN/1.73 M^2
EST. GFR  (NON AFRICAN AMERICAN): >60 ML/MIN/1.73 M^2
GLUCOSE SERPL-MCNC: 148 MG/DL
GLUCOSE SERPL-MCNC: 148 MG/DL
HCT VFR BLD AUTO: 22.8 %
HCT VFR BLD AUTO: 22.8 %
HGB BLD-MCNC: 7.6 G/DL
HGB BLD-MCNC: 7.6 G/DL
HYPOCHROMIA BLD QL SMEAR: ABNORMAL
HYPOCHROMIA BLD QL SMEAR: ABNORMAL
IMM GRANULOCYTES # BLD AUTO: 0 K/UL
IMM GRANULOCYTES # BLD AUTO: 0 K/UL
IMM GRANULOCYTES NFR BLD AUTO: 0 %
IMM GRANULOCYTES NFR BLD AUTO: 0 %
LYMPHOCYTES # BLD AUTO: 0.1 K/UL
LYMPHOCYTES # BLD AUTO: 0.1 K/UL
LYMPHOCYTES NFR BLD: 31.7 %
LYMPHOCYTES NFR BLD: 31.7 %
MAGNESIUM SERPL-MCNC: 1.9 MG/DL
MAGNESIUM SERPL-MCNC: 1.9 MG/DL
MCH RBC QN AUTO: 28.4 PG
MCH RBC QN AUTO: 28.4 PG
MCHC RBC AUTO-ENTMCNC: 33.3 G/DL
MCHC RBC AUTO-ENTMCNC: 33.3 G/DL
MCV RBC AUTO: 85 FL
MCV RBC AUTO: 85 FL
MONOCYTES # BLD AUTO: 0 K/UL
MONOCYTES # BLD AUTO: 0 K/UL
MONOCYTES NFR BLD: 9.8 %
MONOCYTES NFR BLD: 9.8 %
NEUTROPHILS # BLD AUTO: 0.2 K/UL
NEUTROPHILS # BLD AUTO: 0.2 K/UL
NEUTROPHILS NFR BLD: 58.5 %
NEUTROPHILS NFR BLD: 58.5 %
NRBC BLD-RTO: 0 /100 WBC
NRBC BLD-RTO: 0 /100 WBC
PHOSPHATE SERPL-MCNC: 2.8 MG/DL
PHOSPHATE SERPL-MCNC: 2.8 MG/DL
PLATELET # BLD AUTO: 50 K/UL
PLATELET # BLD AUTO: 50 K/UL
PLATELET BLD QL SMEAR: ABNORMAL
PLATELET BLD QL SMEAR: ABNORMAL
PMV BLD AUTO: 11.6 FL
PMV BLD AUTO: 11.6 FL
POTASSIUM SERPL-SCNC: 3.9 MMOL/L
POTASSIUM SERPL-SCNC: 3.9 MMOL/L
PROT SERPL-MCNC: 6.1 G/DL
PROT SERPL-MCNC: 6.1 G/DL
RBC # BLD AUTO: 2.68 M/UL
RBC # BLD AUTO: 2.68 M/UL
SODIUM SERPL-SCNC: 137 MMOL/L
SODIUM SERPL-SCNC: 137 MMOL/L
WBC # BLD AUTO: 0.41 K/UL
WBC # BLD AUTO: 0.41 K/UL

## 2018-06-24 PROCEDURE — 25000003 PHARM REV CODE 250: Performed by: INTERNAL MEDICINE

## 2018-06-24 PROCEDURE — 63600175 PHARM REV CODE 636 W HCPCS: Performed by: NURSE PRACTITIONER

## 2018-06-24 PROCEDURE — 86920 COMPATIBILITY TEST SPIN: CPT

## 2018-06-24 PROCEDURE — 63700000 PHARM REV CODE 250 ALT 637 W/O HCPCS: Performed by: INTERNAL MEDICINE

## 2018-06-24 PROCEDURE — 99233 SBSQ HOSP IP/OBS HIGH 50: CPT | Mod: ,,, | Performed by: INTERNAL MEDICINE

## 2018-06-24 PROCEDURE — 85025 COMPLETE CBC W/AUTO DIFF WBC: CPT

## 2018-06-24 PROCEDURE — 84100 ASSAY OF PHOSPHORUS: CPT

## 2018-06-24 PROCEDURE — 80053 COMPREHEN METABOLIC PANEL: CPT

## 2018-06-24 PROCEDURE — 20600001 HC STEP DOWN PRIVATE ROOM

## 2018-06-24 PROCEDURE — 63600175 PHARM REV CODE 636 W HCPCS: Performed by: INTERNAL MEDICINE

## 2018-06-24 PROCEDURE — 86850 RBC ANTIBODY SCREEN: CPT

## 2018-06-24 PROCEDURE — 83735 ASSAY OF MAGNESIUM: CPT

## 2018-06-24 RX ORDER — SODIUM CHLORIDE 9 MG/ML
INJECTION, SOLUTION INTRAVENOUS CONTINUOUS
Status: DISCONTINUED | OUTPATIENT
Start: 2018-06-24 | End: 2018-07-02

## 2018-06-24 RX ADMIN — SODIUM CHLORIDE: 0.9 INJECTION, SOLUTION INTRAVENOUS at 10:06

## 2018-06-24 RX ADMIN — ACYCLOVIR 400 MG: 200 CAPSULE ORAL at 08:06

## 2018-06-24 RX ADMIN — DEXAMETHASONE 1 DROP: 1 SUSPENSION OPHTHALMIC at 06:06

## 2018-06-24 RX ADMIN — Medication 10 ML: at 08:06

## 2018-06-24 RX ADMIN — CEFEPIME 2 G: 2 INJECTION, POWDER, FOR SOLUTION INTRAVENOUS at 04:06

## 2018-06-24 RX ADMIN — MIRTAZAPINE 15 MG: 15 TABLET, ORALLY DISINTEGRATING ORAL at 08:06

## 2018-06-24 RX ADMIN — SODIUM CHLORIDE: 0.9 INJECTION, SOLUTION INTRAVENOUS at 08:06

## 2018-06-24 RX ADMIN — Medication 10 ML: at 04:06

## 2018-06-24 RX ADMIN — VORICONAZOLE 200 MG: 200 TABLET ORAL at 08:06

## 2018-06-24 RX ADMIN — MITOXANTRONE HYDROCHLORIDE 11 MG: 2 INJECTION, SOLUTION INTRAVENOUS at 02:06

## 2018-06-24 RX ADMIN — CEFEPIME 2 G: 2 INJECTION, POWDER, FOR SOLUTION INTRAVENOUS at 09:06

## 2018-06-24 RX ADMIN — CYTARABINE 1780 MG: 100 INJECTION, SOLUTION INTRATHECAL; INTRAVENOUS; SUBCUTANEOUS at 04:06

## 2018-06-24 RX ADMIN — DEXAMETHASONE 1 DROP: 1 SUSPENSION OPHTHALMIC at 12:06

## 2018-06-24 RX ADMIN — ONDANSETRON 16 MG: 8 TABLET, ORALLY DISINTEGRATING ORAL at 01:06

## 2018-06-24 RX ADMIN — Medication 10 ML: at 01:06

## 2018-06-24 RX ADMIN — ETOPOSIDE 142 MG: 20 INJECTION INTRAVENOUS at 02:06

## 2018-06-24 RX ADMIN — DASATINIB 40 MG: 20 TABLET ORAL at 09:06

## 2018-06-24 RX ADMIN — VORICONAZOLE 200 MG: 200 TABLET ORAL at 09:06

## 2018-06-24 RX ADMIN — ACYCLOVIR 400 MG: 200 CAPSULE ORAL at 09:06

## 2018-06-24 RX ADMIN — DEXAMETHASONE 1 DROP: 1 SUSPENSION OPHTHALMIC at 05:06

## 2018-06-24 RX ADMIN — DEXAMETHASONE 12 MG: 4 TABLET ORAL at 01:06

## 2018-06-24 RX ADMIN — CEFEPIME 2 G: 2 INJECTION, POWDER, FOR SOLUTION INTRAVENOUS at 12:06

## 2018-06-24 NOTE — PROGRESS NOTES
Cytarabine currently infusing to left arm PICC. Chemotherapy consent and Cytarabine verified with two RNs. CAR created and verified with Lizet BROWN RN. Labs and drug calculations reviewed. Blood return noted in central line. Patient educated to call for any s/s of complications (ex: rash, dizziness, loss of fine motor function, SOB, any unusual pain, N/V). Patient verbalizes understanding. Call bell within reach. Will continue to monitor.

## 2018-06-24 NOTE — PROGRESS NOTES
[facility]  Hematology  Bone Marrow Transplant  Progress Note    Patient Name: Wilton Guzmán  Admission Date: 6/16/2018  Hospital Length of Stay: 8 days  Code Status: Full Code    Subjective:     Interval History:   NAEON. Tolerating chemotherapy with MEC well.       Objective:     Vital Signs (Most Recent):  Temp: 98.3 °F (36.8 °C) (06/24/18 0331)  Pulse: 101 (06/24/18 0331)  Resp: 20 (06/24/18 0331)  BP: 112/67 (06/24/18 0331)  SpO2: 99 % (06/24/18 0331) Vital Signs (24h Range):  Temp:  [98.3 °F (36.8 °C)-99.2 °F (37.3 °C)] 98.3 °F (36.8 °C)  Pulse:  [] 101  Resp:  [16-20] 20  SpO2:  [99 %-100 %] 99 %  BP: (112-131)/(67-79) 112/67     Weight: 64.5 kg (142 lb 3.2 oz)  Body mass index is 19.83 kg/m².  Body surface area is 1.8 meters squared.    ECOG SCORE               Intake/Output - Last 3 Shifts       06/22 0700 - 06/23 0659 06/23 0700 - 06/24 0659 06/24 0700 - 06/25 0659    P.O. 1560 2620     I.V. (mL/kg) 110 (1.7) 784.2 (12.2)     IV Piggyback  250     Total Intake(mL/kg) 1670 (26.4) 3654.2 (56.7)     Urine (mL/kg/hr) 3500 (2.3) 2425 (1.6)     Total Output 3500 2425      Net -1830 +1229.2             Stool Occurrence 2 x 2 x           Physical Exam  Constitutional: He is oriented to person, place, and time. He appears well-developed and well-nourished.   HENT:   Head: Normocephalic and atraumatic.   Eyes: EOM are normal. Pupils are equal, round, and reactive to light.   Neck: Normal range of motion. Neck supple.   Cardiovascular: Normal rate and regular rhythm.    No murmur heard.  Pulmonary/Chest: Effort normal and breath sounds normal. No respiratory distress.   Abdominal: Soft. Bowel sounds are normal. He exhibits no distension.   Musculoskeletal: Normal range of motion. He exhibits no edema.   Neurological: He is alert and oriented to person, place, and time.   Skin: Skin is warm and dry. Rash noted.   Rash to head and bilateral arms stable and improved; pt states it started  after taking dasatinib   Psychiatric: He has a normal mood and affect. His behavior is normal.   Vitals reviewed.    Significant Labs:     Recent Results (from the past 24 hour(s))   Comprehensive Metabolic Panel (CMP)    Collection Time: 06/24/18  4:00 AM   Result Value Ref Range    Sodium 137 136 - 145 mmol/L    Potassium 3.9 3.5 - 5.1 mmol/L    Chloride 107 95 - 110 mmol/L    CO2 26 23 - 29 mmol/L    Glucose 148 (H) 70 - 110 mg/dL    BUN, Bld 9 6 - 20 mg/dL    Creatinine 0.7 0.5 - 1.4 mg/dL    Calcium 8.7 8.7 - 10.5 mg/dL    Total Protein 6.1 6.0 - 8.4 g/dL    Albumin 2.6 (L) 3.5 - 5.2 g/dL    Total Bilirubin 0.1 0.1 - 1.0 mg/dL    Alkaline Phosphatase 77 55 - 135 U/L    AST 17 10 - 40 U/L    ALT 35 10 - 44 U/L    Anion Gap 4 (L) 8 - 16 mmol/L    eGFR if African American >60.0 >60 mL/min/1.73 m^2    eGFR if non African American >60.0 >60 mL/min/1.73 m^2   Magnesium    Collection Time: 06/24/18  4:00 AM   Result Value Ref Range    Magnesium 1.9 1.6 - 2.6 mg/dL   Phosphorus    Collection Time: 06/24/18  4:00 AM   Result Value Ref Range    Phosphorus 2.8 2.7 - 4.5 mg/dL   CBC with Automated Differential    Collection Time: 06/24/18  4:00 AM   Result Value Ref Range    WBC 0.41 (LL) 3.90 - 12.70 K/uL    RBC 2.68 (L) 4.60 - 6.20 M/uL    Hemoglobin 7.6 (L) 14.0 - 18.0 g/dL    Hematocrit 22.8 (L) 40.0 - 54.0 %    MCV 85 82 - 98 fL    MCH 28.4 27.0 - 31.0 pg    MCHC 33.3 32.0 - 36.0 g/dL    RDW 15.0 (H) 11.5 - 14.5 %    Platelets 50 (L) 150 - 350 K/uL    MPV 11.6 9.2 - 12.9 fL    Immature Granulocytes 0.0 0.0 - 0.5 %    Gran # (ANC) 0.2 (L) 1.8 - 7.7 K/uL    Immature Grans (Abs) 0.00 0.00 - 0.04 K/uL    Lymph # 0.1 (L) 1.0 - 4.8 K/uL    Mono # 0.0 (L) 0.3 - 1.0 K/uL    Eos # 0.0 0.0 - 0.5 K/uL    Baso # 0.00 0.00 - 0.20 K/uL    nRBC 0 0 /100 WBC    Gran% 58.5 38.0 - 73.0 %    Lymph% 31.7 18.0 - 48.0 %    Mono% 9.8 4.0 - 15.0 %    Eosinophil% 0.0 0.0 - 8.0 %    Basophil% 0.0 0.0 - 1.9 %    Platelet Estimate Decreased (A)      Aniso Slight     Hypo Occasional     Differential Method Automated    Comprehensive Metabolic Panel (CMP)    Collection Time: 06/24/18  4:00 AM   Result Value Ref Range    Sodium 137 136 - 145 mmol/L    Potassium 3.9 3.5 - 5.1 mmol/L    Chloride 107 95 - 110 mmol/L    CO2 26 23 - 29 mmol/L    Glucose 148 (H) 70 - 110 mg/dL    BUN, Bld 9 6 - 20 mg/dL    Creatinine 0.7 0.5 - 1.4 mg/dL    Calcium 8.7 8.7 - 10.5 mg/dL    Total Protein 6.1 6.0 - 8.4 g/dL    Albumin 2.6 (L) 3.5 - 5.2 g/dL    Total Bilirubin 0.1 0.1 - 1.0 mg/dL    Alkaline Phosphatase 77 55 - 135 U/L    AST 17 10 - 40 U/L    ALT 35 10 - 44 U/L    Anion Gap 4 (L) 8 - 16 mmol/L    eGFR if African American >60.0 >60 mL/min/1.73 m^2    eGFR if non African American >60.0 >60 mL/min/1.73 m^2   Magnesium    Collection Time: 06/24/18  4:00 AM   Result Value Ref Range    Magnesium 1.9 1.6 - 2.6 mg/dL   Phosphorus    Collection Time: 06/24/18  4:00 AM   Result Value Ref Range    Phosphorus 2.8 2.7 - 4.5 mg/dL   CBC with Automated Differential    Collection Time: 06/24/18  4:00 AM   Result Value Ref Range    WBC 0.41 (LL) 3.90 - 12.70 K/uL    RBC 2.68 (L) 4.60 - 6.20 M/uL    Hemoglobin 7.6 (L) 14.0 - 18.0 g/dL    Hematocrit 22.8 (L) 40.0 - 54.0 %    MCV 85 82 - 98 fL    MCH 28.4 27.0 - 31.0 pg    MCHC 33.3 32.0 - 36.0 g/dL    RDW 15.0 (H) 11.5 - 14.5 %    Platelets 50 (L) 150 - 350 K/uL    MPV 11.6 9.2 - 12.9 fL    Immature Granulocytes 0.0 0.0 - 0.5 %    Gran # (ANC) 0.2 (L) 1.8 - 7.7 K/uL    Immature Grans (Abs) 0.00 0.00 - 0.04 K/uL    Lymph # 0.1 (L) 1.0 - 4.8 K/uL    Mono # 0.0 (L) 0.3 - 1.0 K/uL    Eos # 0.0 0.0 - 0.5 K/uL    Baso # 0.00 0.00 - 0.20 K/uL    nRBC 0 0 /100 WBC    Gran% 58.5 38.0 - 73.0 %    Lymph% 31.7 18.0 - 48.0 %    Mono% 9.8 4.0 - 15.0 %    Eosinophil% 0.0 0.0 - 8.0 %    Basophil% 0.0 0.0 - 1.9 %    Platelet Estimate Decreased (A)     Aniso Slight     Hypo Occasional     Differential Method Automated    Type & Screen    Collection Time:  06/24/18  4:56 AM   Result Value Ref Range    Group & Rh O POS     Indirect Wes NEG            Assessment/Plan:     * Chronic myelogenous leukemia (CML), BCR-ABL1-positive    - initial WBC reportedly 150,000  - bcr-abl positive on FISH at [facility] (records attached in media tab)  - original BMBX 5/26/18 with 20% blasts; CML transformed to AML with blast crisis;   karyotype: 46,XY,t(9;22;22;14)q34;q11.2;q13;q23); BCR/ABL gene fusion 62%  - continue dasatinib at 40 mg (dose reduction secondary to being on voriconazole)  - voriconazole, acyclovir and cipro for prophylaxis (cipro on hold while on Cefepime)  -  status post failed 7+3 induction. 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 Today D2. Tolerating well.  - repeat BM biopsy done 6/18 here at [facility]. 9% blasts.   - AML FISH/NGS pending from repeat BMBX  - echo with 63% EF  - HIV, Hepatitis, G6PD sent 6/22 and pending; not completed at OSH after review of records.             Pancytopenia due to antineoplastic chemotherapy    - transfuse for Hgb<7 and platelets<10k  - WBC 0.41 with gran % 50. ANC ~210 Hgb 7.6, Plt 50k  - continue PPX acyc and vori (cipro on hold while on cefepime) .         Drug rash    - stable and improved per patient  - noted to scalp and bilateral arms  - continue to monitor         Neutropenic fever    - 6/22/18 0025 fever of 102.3. Afebrile last 36 hours.   - blood cultures so far NGTD.   - UA negative and urine culture negative so far.   - CXR negative for acute process.   - continue cefepime for now.  - ANC ~210.  - plan on stopping cefepime tomorrow if remains afebrile and cultures remain negative.       Anxiety    Patient understandibly experiencing anxiety about his new diagnosis and feeling isolated in his room.   Offered for patient to be seen by oncology psychologist but he declines for now.   Started on Remeron for anxiety and insomnia.         C. difficile colitis    - completed course of PO vancomycin  6/23/2018.                VTE Risk Mitigation         Ordered     heparin, porcine (PF) 100 unit/mL injection flush 300 Units  As needed (PRN)      06/23/18 1052     IP VTE LOW RISK PATIENT  Once      06/16/18 0938          Disposition: continue inpatient care today D2 [facility] chemotherapy.    Conchis Newman MD  Bone Marrow Transplant  [facility]

## 2018-06-24 NOTE — PROGRESS NOTES
Etoposide currently infusing to left arm PICC . Chemotherapy consent and Etoposide verified with two RNs. CAR created and verified with Whitney SCHAEFER RN. Labs and drug calculations reviewed. Blood return noted in central line. Patient educated to call for any s/s of complications (ex: rash, dizziness, loss of fine motor function, SOB, any unusual pain, N/V). BP checked prior to infusion and WDL. Patient verbalizes understanding. Call bell within reach. Will continue to monitor.

## 2018-06-24 NOTE — PLAN OF CARE
Problem: Patient Care Overview  Goal: Plan of Care Review  Outcome: Ongoing (interventions implemented as appropriate)  Patient had no complaints of nausea or pain. Ambulates independently. Using urinal and toilet. VSS. Frequent rounds made to assess pain and safety. Family at bedside. Patient remained free from falls. Bed in lowest position. Side rails up X 2. Call light within reach. Will continue to monitor.

## 2018-06-24 NOTE — ASSESSMENT & PLAN NOTE
- initial WBC reportedly 150,000  - bcr-abl positive on FISH at [facility] (records attached in media tab)  - original BMBX 5/26/18 with 20% blasts; CML transformed to AML with blast crisis;   karyotype: 46,XY,t(9;22;22;14)q34;q11.2;q13;q23); BCR/ABL gene fusion 62%  - continue dasatinib at 40 mg (dose reduction secondary to being on voriconazole)  - voriconazole, acyclovir and cipro for prophylaxis (cipro on hold while on Cefepime)  -  status post failed 7+3 induction. 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 Today D2. Tolerating well.  - repeat BM biopsy done 6/18 here at [facility]. 9% blasts.   - AML FISH/NGS pending from repeat BMBX  - echo with 63% EF  - HIV, Hepatitis, G6PD sent 6/22 and pending; not completed at OSH after review of records.

## 2018-06-24 NOTE — SUBJECTIVE & OBJECTIVE
Subjective:     Interval History:   NAEON. Tolerating chemotherapy with MEC well.       Objective:     Vital Signs (Most Recent):  Temp: 98.3 °F (36.8 °C) (06/24/18 0331)  Pulse: 101 (06/24/18 0331)  Resp: 20 (06/24/18 0331)  BP: 112/67 (06/24/18 0331)  SpO2: 99 % (06/24/18 0331) Vital Signs (24h Range):  Temp:  [98.3 °F (36.8 °C)-99.2 °F (37.3 °C)] 98.3 °F (36.8 °C)  Pulse:  [] 101  Resp:  [16-20] 20  SpO2:  [99 %-100 %] 99 %  BP: (112-131)/(67-79) 112/67     Weight: 64.5 kg (142 lb 3.2 oz)  Body mass index is 19.83 kg/m².  Body surface area is 1.8 meters squared.    ECOG SCORE               Intake/Output - Last 3 Shifts       06/22 0700 - 06/23 0659 06/23 0700 - 06/24 0659 06/24 0700 - 06/25 0659    P.O. 1560 2620     I.V. (mL/kg) 110 (1.7) 784.2 (12.2)     IV Piggyback  250     Total Intake(mL/kg) 1670 (26.4) 3654.2 (56.7)     Urine (mL/kg/hr) 3500 (2.3) 2425 (1.6)     Total Output 3500 2425      Net -1830 +1229.2             Stool Occurrence 2 x 2 x           Physical Exam  Constitutional: He is oriented to person, place, and time. He appears well-developed and well-nourished.   HENT:   Head: Normocephalic and atraumatic.   Eyes: EOM are normal. Pupils are equal, round, and reactive to light.   Neck: Normal range of motion. Neck supple.   Cardiovascular: Normal rate and regular rhythm.    No murmur heard.  Pulmonary/Chest: Effort normal and breath sounds normal. No respiratory distress.   Abdominal: Soft. Bowel sounds are normal. He exhibits no distension.   Musculoskeletal: Normal range of motion. He exhibits no edema.   Neurological: He is alert and oriented to person, place, and time.   Skin: Skin is warm and dry. Rash noted.   Rash to head and bilateral arms stable and improved; pt states it started after taking dasatinib   Psychiatric: He has a normal mood and affect. His behavior is normal.   Vitals reviewed.    Significant Labs:     Recent Results (from the past 24 hour(s))   Comprehensive  Metabolic Panel (CMP)    Collection Time: 06/24/18  4:00 AM   Result Value Ref Range    Sodium 137 136 - 145 mmol/L    Potassium 3.9 3.5 - 5.1 mmol/L    Chloride 107 95 - 110 mmol/L    CO2 26 23 - 29 mmol/L    Glucose 148 (H) 70 - 110 mg/dL    BUN, Bld 9 6 - 20 mg/dL    Creatinine 0.7 0.5 - 1.4 mg/dL    Calcium 8.7 8.7 - 10.5 mg/dL    Total Protein 6.1 6.0 - 8.4 g/dL    Albumin 2.6 (L) 3.5 - 5.2 g/dL    Total Bilirubin 0.1 0.1 - 1.0 mg/dL    Alkaline Phosphatase 77 55 - 135 U/L    AST 17 10 - 40 U/L    ALT 35 10 - 44 U/L    Anion Gap 4 (L) 8 - 16 mmol/L    eGFR if African American >60.0 >60 mL/min/1.73 m^2    eGFR if non African American >60.0 >60 mL/min/1.73 m^2   Magnesium    Collection Time: 06/24/18  4:00 AM   Result Value Ref Range    Magnesium 1.9 1.6 - 2.6 mg/dL   Phosphorus    Collection Time: 06/24/18  4:00 AM   Result Value Ref Range    Phosphorus 2.8 2.7 - 4.5 mg/dL   CBC with Automated Differential    Collection Time: 06/24/18  4:00 AM   Result Value Ref Range    WBC 0.41 (LL) 3.90 - 12.70 K/uL    RBC 2.68 (L) 4.60 - 6.20 M/uL    Hemoglobin 7.6 (L) 14.0 - 18.0 g/dL    Hematocrit 22.8 (L) 40.0 - 54.0 %    MCV 85 82 - 98 fL    MCH 28.4 27.0 - 31.0 pg    MCHC 33.3 32.0 - 36.0 g/dL    RDW 15.0 (H) 11.5 - 14.5 %    Platelets 50 (L) 150 - 350 K/uL    MPV 11.6 9.2 - 12.9 fL    Immature Granulocytes 0.0 0.0 - 0.5 %    Gran # (ANC) 0.2 (L) 1.8 - 7.7 K/uL    Immature Grans (Abs) 0.00 0.00 - 0.04 K/uL    Lymph # 0.1 (L) 1.0 - 4.8 K/uL    Mono # 0.0 (L) 0.3 - 1.0 K/uL    Eos # 0.0 0.0 - 0.5 K/uL    Baso # 0.00 0.00 - 0.20 K/uL    nRBC 0 0 /100 WBC    Gran% 58.5 38.0 - 73.0 %    Lymph% 31.7 18.0 - 48.0 %    Mono% 9.8 4.0 - 15.0 %    Eosinophil% 0.0 0.0 - 8.0 %    Basophil% 0.0 0.0 - 1.9 %    Platelet Estimate Decreased (A)     Aniso Slight     Hypo Occasional     Differential Method Automated    Comprehensive Metabolic Panel (CMP)    Collection Time: 06/24/18  4:00 AM   Result Value Ref Range    Sodium 137 136 - 145  mmol/L    Potassium 3.9 3.5 - 5.1 mmol/L    Chloride 107 95 - 110 mmol/L    CO2 26 23 - 29 mmol/L    Glucose 148 (H) 70 - 110 mg/dL    BUN, Bld 9 6 - 20 mg/dL    Creatinine 0.7 0.5 - 1.4 mg/dL    Calcium 8.7 8.7 - 10.5 mg/dL    Total Protein 6.1 6.0 - 8.4 g/dL    Albumin 2.6 (L) 3.5 - 5.2 g/dL    Total Bilirubin 0.1 0.1 - 1.0 mg/dL    Alkaline Phosphatase 77 55 - 135 U/L    AST 17 10 - 40 U/L    ALT 35 10 - 44 U/L    Anion Gap 4 (L) 8 - 16 mmol/L    eGFR if African American >60.0 >60 mL/min/1.73 m^2    eGFR if non African American >60.0 >60 mL/min/1.73 m^2   Magnesium    Collection Time: 06/24/18  4:00 AM   Result Value Ref Range    Magnesium 1.9 1.6 - 2.6 mg/dL   Phosphorus    Collection Time: 06/24/18  4:00 AM   Result Value Ref Range    Phosphorus 2.8 2.7 - 4.5 mg/dL   CBC with Automated Differential    Collection Time: 06/24/18  4:00 AM   Result Value Ref Range    WBC 0.41 (LL) 3.90 - 12.70 K/uL    RBC 2.68 (L) 4.60 - 6.20 M/uL    Hemoglobin 7.6 (L) 14.0 - 18.0 g/dL    Hematocrit 22.8 (L) 40.0 - 54.0 %    MCV 85 82 - 98 fL    MCH 28.4 27.0 - 31.0 pg    MCHC 33.3 32.0 - 36.0 g/dL    RDW 15.0 (H) 11.5 - 14.5 %    Platelets 50 (L) 150 - 350 K/uL    MPV 11.6 9.2 - 12.9 fL    Immature Granulocytes 0.0 0.0 - 0.5 %    Gran # (ANC) 0.2 (L) 1.8 - 7.7 K/uL    Immature Grans (Abs) 0.00 0.00 - 0.04 K/uL    Lymph # 0.1 (L) 1.0 - 4.8 K/uL    Mono # 0.0 (L) 0.3 - 1.0 K/uL    Eos # 0.0 0.0 - 0.5 K/uL    Baso # 0.00 0.00 - 0.20 K/uL    nRBC 0 0 /100 WBC    Gran% 58.5 38.0 - 73.0 %    Lymph% 31.7 18.0 - 48.0 %    Mono% 9.8 4.0 - 15.0 %    Eosinophil% 0.0 0.0 - 8.0 %    Basophil% 0.0 0.0 - 1.9 %    Platelet Estimate Decreased (A)     Aniso Slight     Hypo Occasional     Differential Method Automated    Type & Screen    Collection Time: 06/24/18  4:56 AM   Result Value Ref Range    Group & Rh O POS     Indirect Wes NEG

## 2018-06-24 NOTE — ASSESSMENT & PLAN NOTE
- transfuse for Hgb<7 and platelets<10k  - WBC 0.41 with gran % 50. ANC ~210 Hgb 7.6, Plt 50k  - continue PPX acyc and vori (cipro on hold while on cefepime) .

## 2018-06-24 NOTE — ASSESSMENT & PLAN NOTE
- 6/22/18 0025 fever of 102.3. Afebrile last 36 hours.   - blood cultures so far NGTD.   - UA negative and urine culture negative so far.   - CXR negative for acute process.   - continue cefepime for now.  - ANC ~210.

## 2018-06-24 NOTE — PLAN OF CARE
Problem: Patient Care Overview  Goal: Plan of Care Review  Outcome: Ongoing (interventions implemented as appropriate)  Patient remains free from falls and injury this shift. Bed in low, locked position with call bell in reach. Family at bedside. Patient encouraged to call for assistance when getting out of bed. Patient verbalized understanding. All belongings within reach. Ambulating independently within room. Afebrile - maintained on cefepime. Day 2 of MEC chemotherapy regimen started. IVFs increased. Neutropenic and thrombocytopenic precautions maintained. Patient continue to report formed stools. Denies any mouth pain or discomfort - Dukes solution ordered. will continue to monitor.

## 2018-06-24 NOTE — PROGRESS NOTES
Mitoxantrone currently infusing to left arm PICC. Chemotherapy consent and Mitoxantrone verified with two RNs. CAR created and verified with Whitney SCHAEFER RN. Labs and drug calculations reviewed. Blood return noted in central line. Patient educated to call for any s/s of complications (ex: rash, dizziness, loss of fine motor function, SOB, any unusual pain, N/V). Patient verbalizes understanding. Call bell within reach. Will continue to monitor.

## 2018-06-25 LAB
ALBUMIN SERPL BCP-MCNC: 2.5 G/DL
ALP SERPL-CCNC: 65 U/L
ALT SERPL W/O P-5'-P-CCNC: 33 U/L
ANION GAP SERPL CALC-SCNC: 5 MMOL/L
ANISOCYTOSIS BLD QL SMEAR: SLIGHT
APTT BLDCRRT: 24.4 SEC
AST SERPL-CCNC: 15 U/L
BASOPHILS # BLD AUTO: 0 K/UL
BASOPHILS NFR BLD: 0 %
BILIRUB SERPL-MCNC: 0.1 MG/DL
BLD PROD TYP BPU: NORMAL
BLOOD UNIT EXPIRATION DATE: NORMAL
BLOOD UNIT TYPE CODE: 5100
BLOOD UNIT TYPE: NORMAL
BUN SERPL-MCNC: 10 MG/DL
CALCIUM SERPL-MCNC: 8.4 MG/DL
CHLORIDE SERPL-SCNC: 111 MMOL/L
CO2 SERPL-SCNC: 25 MMOL/L
CODING SYSTEM: NORMAL
CREAT SERPL-MCNC: 0.7 MG/DL
DIFFERENTIAL METHOD: ABNORMAL
DISPENSE STATUS: NORMAL
EOSINOPHIL # BLD AUTO: 0 K/UL
EOSINOPHIL NFR BLD: 0 %
ERYTHROCYTE [DISTWIDTH] IN BLOOD BY AUTOMATED COUNT: 15.4 %
EST. GFR  (AFRICAN AMERICAN): >60 ML/MIN/1.73 M^2
EST. GFR  (NON AFRICAN AMERICAN): >60 ML/MIN/1.73 M^2
GLUCOSE SERPL-MCNC: 134 MG/DL
HBV CORE AB SERPL QL IA: NEGATIVE
HBV SURFACE AG SERPL QL IA: NEGATIVE
HCT VFR BLD AUTO: 20.5 %
HCV AB SERPL QL IA: NEGATIVE
HGB BLD-MCNC: 6.8 G/DL
HIV 1+2 AB+HIV1 P24 AG SERPL QL IA: NEGATIVE
HYPOCHROMIA BLD QL SMEAR: ABNORMAL
IMM GRANULOCYTES # BLD AUTO: 0 K/UL
IMM GRANULOCYTES NFR BLD AUTO: 0 %
INR PPP: 1.1
LDH SERPL L TO P-CCNC: 219 U/L
LYMPHOCYTES # BLD AUTO: 0.1 K/UL
LYMPHOCYTES NFR BLD: 10.6 %
MAGNESIUM SERPL-MCNC: 2.3 MG/DL
MCH RBC QN AUTO: 28.2 PG
MCHC RBC AUTO-ENTMCNC: 33.2 G/DL
MCV RBC AUTO: 85 FL
MONOCYTES # BLD AUTO: 0 K/UL
MONOCYTES NFR BLD: 6.1 %
NEUTROPHILS # BLD AUTO: 0.6 K/UL
NEUTROPHILS NFR BLD: 83.3 %
NRBC BLD-RTO: 0 /100 WBC
NUM UNITS TRANS PACKED RBC: NORMAL
OVALOCYTES BLD QL SMEAR: ABNORMAL
PHOSPHATE SERPL-MCNC: 2.9 MG/DL
PLATELET # BLD AUTO: 68 K/UL
PMV BLD AUTO: 11.7 FL
POIKILOCYTOSIS BLD QL SMEAR: SLIGHT
POLYCHROMASIA BLD QL SMEAR: ABNORMAL
POTASSIUM SERPL-SCNC: 4.1 MMOL/L
PROT SERPL-MCNC: 5.4 G/DL
PROTHROMBIN TIME: 11.7 SEC
RBC # BLD AUTO: 2.41 M/UL
SODIUM SERPL-SCNC: 141 MMOL/L
URATE SERPL-MCNC: 3 MG/DL
WBC # BLD AUTO: 0.66 K/UL

## 2018-06-25 PROCEDURE — 63700000 PHARM REV CODE 250 ALT 637 W/O HCPCS: Performed by: INTERNAL MEDICINE

## 2018-06-25 PROCEDURE — 80053 COMPREHEN METABOLIC PANEL: CPT

## 2018-06-25 PROCEDURE — 84550 ASSAY OF BLOOD/URIC ACID: CPT

## 2018-06-25 PROCEDURE — 85025 COMPLETE CBC W/AUTO DIFF WBC: CPT

## 2018-06-25 PROCEDURE — 85730 THROMBOPLASTIN TIME PARTIAL: CPT

## 2018-06-25 PROCEDURE — 83735 ASSAY OF MAGNESIUM: CPT

## 2018-06-25 PROCEDURE — 25000003 PHARM REV CODE 250: Performed by: INTERNAL MEDICINE

## 2018-06-25 PROCEDURE — P9040 RBC LEUKOREDUCED IRRADIATED: HCPCS

## 2018-06-25 PROCEDURE — 99233 SBSQ HOSP IP/OBS HIGH 50: CPT | Mod: ,,, | Performed by: INTERNAL MEDICINE

## 2018-06-25 PROCEDURE — 63600175 PHARM REV CODE 636 W HCPCS: Performed by: INTERNAL MEDICINE

## 2018-06-25 PROCEDURE — 25000003 PHARM REV CODE 250: Performed by: NURSE PRACTITIONER

## 2018-06-25 PROCEDURE — 86644 CMV ANTIBODY: CPT

## 2018-06-25 PROCEDURE — 85610 PROTHROMBIN TIME: CPT

## 2018-06-25 PROCEDURE — 84100 ASSAY OF PHOSPHORUS: CPT

## 2018-06-25 PROCEDURE — 63600175 PHARM REV CODE 636 W HCPCS: Performed by: NURSE PRACTITIONER

## 2018-06-25 PROCEDURE — 83615 LACTATE (LD) (LDH) ENZYME: CPT

## 2018-06-25 PROCEDURE — 20600001 HC STEP DOWN PRIVATE ROOM

## 2018-06-25 PROCEDURE — 25000003 PHARM REV CODE 250: Performed by: STUDENT IN AN ORGANIZED HEALTH CARE EDUCATION/TRAINING PROGRAM

## 2018-06-25 RX ORDER — CIPROFLOXACIN 250 MG/1
500 TABLET, FILM COATED ORAL EVERY 12 HOURS
Status: DISCONTINUED | OUTPATIENT
Start: 2018-06-25 | End: 2018-07-05

## 2018-06-25 RX ADMIN — CYTARABINE 1780 MG: 100 INJECTION, SOLUTION INTRATHECAL; INTRAVENOUS; SUBCUTANEOUS at 03:06

## 2018-06-25 RX ADMIN — DEXAMETHASONE 1 DROP: 1 SUSPENSION OPHTHALMIC at 01:06

## 2018-06-25 RX ADMIN — Medication 10 ML: at 08:06

## 2018-06-25 RX ADMIN — VORICONAZOLE 200 MG: 200 TABLET ORAL at 08:06

## 2018-06-25 RX ADMIN — MIRTAZAPINE 15 MG: 15 TABLET, ORALLY DISINTEGRATING ORAL at 08:06

## 2018-06-25 RX ADMIN — ACETAMINOPHEN 650 MG: 325 TABLET, FILM COATED ORAL at 09:06

## 2018-06-25 RX ADMIN — Medication 10 ML: at 01:06

## 2018-06-25 RX ADMIN — DEXAMETHASONE 12 MG: 4 TABLET ORAL at 01:06

## 2018-06-25 RX ADMIN — DEXAMETHASONE 1 DROP: 1 SUSPENSION OPHTHALMIC at 06:06

## 2018-06-25 RX ADMIN — SODIUM CHLORIDE: 0.9 INJECTION, SOLUTION INTRAVENOUS at 06:06

## 2018-06-25 RX ADMIN — DEXAMETHASONE 1 DROP: 1 SUSPENSION OPHTHALMIC at 11:06

## 2018-06-25 RX ADMIN — ACYCLOVIR 400 MG: 200 CAPSULE ORAL at 08:06

## 2018-06-25 RX ADMIN — DEXAMETHASONE 1 DROP: 1 SUSPENSION OPHTHALMIC at 05:06

## 2018-06-25 RX ADMIN — ONDANSETRON 16 MG: 8 TABLET, ORALLY DISINTEGRATING ORAL at 01:06

## 2018-06-25 RX ADMIN — DASATINIB 40 MG: 20 TABLET ORAL at 08:06

## 2018-06-25 RX ADMIN — DIPHENHYDRAMINE HYDROCHLORIDE 50 MG: 50 CAPSULE ORAL at 09:06

## 2018-06-25 RX ADMIN — CEFEPIME 2 G: 2 INJECTION, POWDER, FOR SOLUTION INTRAVENOUS at 08:06

## 2018-06-25 RX ADMIN — CEFEPIME 2 G: 2 INJECTION, POWDER, FOR SOLUTION INTRAVENOUS at 01:06

## 2018-06-25 RX ADMIN — MITOXANTRONE HYDROCHLORIDE 11 MG: 2 INJECTION, SOLUTION INTRAVENOUS at 02:06

## 2018-06-25 RX ADMIN — CIPROFLOXACIN HYDROCHLORIDE 500 MG: 250 TABLET, FILM COATED ORAL at 08:06

## 2018-06-25 RX ADMIN — ETOPOSIDE 142 MG: 20 INJECTION INTRAVENOUS at 02:06

## 2018-06-25 NOTE — ASSESSMENT & PLAN NOTE
- initial WBC reportedly 150,000  - bcr-abl positive on FISH at [facility] (records attached in media tab)  - original BMBX 5/26/18 with 20% blasts; CML transformed to AML with blast crisis;   karyotype: 46,XY,t(9;22;22;14)q34;q11.2;q13;q23); BCR/ABL gene fusion 62%  - continue dasatinib at 40 mg (dose reduction secondary to being on voriconazole)  - voriconazole, acyclovir and cipro for prophylaxis (cipro on hold while on Cefepime)  - repeat BM biopsy done 6/18 here at [facility] after 7+3. 9% blasts.   - status post failed 7+3 induction. 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 Today D3. Tolerating well.  - AML FISH/NGS pending from repeat BMBX  - echo with 63% EF  - HIV, Hepatitis, G6PD sent 6/22 and pending; not completed at OSH after review of records.

## 2018-06-25 NOTE — ASSESSMENT & PLAN NOTE
- transfuse for Hgb<7 and platelets<10k  - WBC 0.66. . Hgb 6.8, Plt 68k  - continue PPX acyc and vori and cipro  - 1 U PRBCs today

## 2018-06-25 NOTE — NURSING
Patient received [id number] in sodium chloride 500ml over 1 hour at 500cc/hr .  No signs/symptoms of reaction noted.

## 2018-06-25 NOTE — PROGRESS NOTES
[facility]  Hematology  Bone Marrow Transplant  Progress Note    Patient Name: Wilton Guzmán  Admission Date: 6/16/2018  Hospital Length of Stay: 9 days  Code Status: Full Code    Subjective:     Interval History:   MEC D3. No complaints. Discontinue cefepime and transition to cipro PPX. Blood cultures from 6/22 NGTD. Pancytopenic. 1 U PRBCs today for Hgb 6.8.    Objective:     Vital Signs (Most Recent):  Temp: 98.1 °F (36.7 °C) (06/25/18 0822)  Pulse: 94 (06/25/18 0822)  Resp: 20 (06/25/18 0822)  BP: 120/65 (06/25/18 0822)  SpO2: 97 % (06/25/18 0822) Vital Signs (24h Range):  Temp:  [97.5 °F (36.4 °C)-98.3 °F (36.8 °C)] 98.1 °F (36.7 °C)  Pulse:  [] 94  Resp:  [16-20] 20  SpO2:  [97 %-100 %] 97 %  BP: (106-126)/(58-82) 120/65     Weight: 65.8 kg (145 lb 1 oz)  Body mass index is 20.23 kg/m².  Body surface area is 1.82 meters squared.    ECOG SCORE         [unfilled]    Intake/Output - Last 3 Shifts       06/23 0700 - 06/24 0659 06/24 0700 - 06/25 0659 06/25 0700 - 06/26 0659    P.O. 2620 1780     I.V. (mL/kg) 784.2 (12.2) 2113.3 (32.1)     IV Piggyback 250 550     Total Intake(mL/kg) 3654.2 (56.7) 4443.3 (67.5)     Urine (mL/kg/hr) 2425 (1.6) 3450 (2.2)     Total Output 2425 3450      Net +1229.2 +993.3             Urine Occurrence  1 x     Stool Occurrence 2 x 2 x           Physical Exam   Constitutional: He is oriented to person, place, and time. He appears well-developed and well-nourished.   HENT:   Head: Normocephalic and atraumatic.   Right Ear: External ear normal.   Left Ear: External ear normal.   Mouth/Throat: Oropharynx is clear and moist. No oropharyngeal exudate.   Eyes: Conjunctivae and EOM are normal. Pupils are equal, round, and reactive to light. Right eye exhibits no discharge. Left eye exhibits no discharge.   Neck: Normal range of motion. Neck supple.   Cardiovascular: Normal rate, regular rhythm, normal heart sounds and intact distal pulses.    No murmur  heard.  Pulmonary/Chest: Effort normal and breath sounds normal. No respiratory distress. He has no wheezes. He has no rales.   Abdominal: Soft. Bowel sounds are normal. He exhibits no distension and no mass. There is no tenderness.   Musculoskeletal: Normal range of motion.   Neurological: He is alert and oriented to person, place, and time.   Skin: Skin is warm and dry. No rash noted.   Psychiatric: He has a normal mood and affect. His behavior is normal. Judgment and thought content normal.   Nursing note and vitals reviewed.      Significant Labs:   CBC:   Recent Labs  Lab 06/24/18  0400 06/25/18  0345   WBC 0.41*  0.41* 0.66*   HGB 7.6*  7.6* 6.8*   HCT 22.8*  22.8* 20.5*   PLT 50*  50* 68*    and CMP:   Recent Labs  Lab 06/24/18  0400 06/25/18  0345     137 141   K 3.9  3.9 4.1     107 111*   CO2 26  26 25   *  148* 134*   BUN 9  9 10   CREATININE 0.7  0.7 0.7   CALCIUM 8.7  8.7 8.4*   PROT 6.1  6.1 5.4*   ALBUMIN 2.6*  2.6* 2.5*   BILITOT 0.1  0.1 0.1   ALKPHOS 77  77 65   AST 17  17 15   ALT 35  35 33   ANIONGAP 4*  4* 5*   EGFRNONAA >60.0  >60.0 >60.0       Diagnostic Results:  I have reviewed all pertinent imaging results/findings within the past 24 hours.    Assessment/Plan:     * Chronic myelogenous leukemia (CML), BCR-ABL1-positive    - initial WBC reportedly 150,000  - bcr-abl positive on FISH at [facility] (records attached in media tab)  - original BMBX 5/26/18 with 20% blasts; CML transformed to AML with blast crisis;   karyotype: 46,XY,t(9;22;22;14)q34;q11.2;q13;q23); BCR/ABL gene fusion 62%  - continue dasatinib at 40 mg (dose reduction secondary to being on voriconazole)  - voriconazole, acyclovir and cipro for prophylaxis (cipro on hold while on Cefepime)  - repeat BM biopsy done 6/18 here at [facility] after 7+3. 9% blasts.   - status post failed 7+3 induction. 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 Today D3. Tolerating well.  - AML FISH/NGS  pending from repeat BMBX  - echo with 63% EF  - HIV, Hepatitis, G6PD sent 6/22 and pending; not completed at OSH after review of records.             Drug rash    - stable and improved per patient  - noted to scalp and bilateral arms  - continue to monitor         Neutropenic fever    - 6/22/18 0025 fever of 102.3. Afebrile now.  - blood cultures NGTD.   - UA negative and urine culture negative so far.   - CXR negative for acute process.   -   - Cefepime 6/22-6/25 and transition back to PPX cipro today        Anxiety    Patient understandibly experiencing anxiety about his new diagnosis and feeling isolated in his room.   Offered for patient to be seen by oncology psychologist but he declines for now.   Started on Remeron for anxiety and insomnia.         Pancytopenia due to antineoplastic chemotherapy    - transfuse for Hgb<7 and platelets<10k  - WBC 0.66. . Hgb 6.8, Plt 68k  - continue PPX acyc and vori and cipro  - 1 U PRBCs today        C. difficile colitis    - completed course of PO vancomycin 6/23/2018.              VTE Risk Mitigation         Ordered     heparin, porcine (PF) 100 unit/mL injection flush 300 Units  As needed (PRN)      06/23/18 1052     IP VTE LOW RISK PATIENT  Once      06/16/18 0938          Disposition: inpatient     Whitney Curiel NP  Bone Marrow Transplant  [facility]

## 2018-06-25 NOTE — PROGRESS NOTES
SW met with pt at bedside to provide support, pt reports he is doing well. SW will continue to follow.    Kimberly Briggs [facility]  Oncology Social Worker  Phone: [phone]

## 2018-06-25 NOTE — SUBJECTIVE & OBJECTIVE
Subjective:     Interval History:   [facility] D3. No complaints. Discontinue cefepime and transition to cipro PPX. Blood cultures from 6/22 NGTD. Pancytopenic. 1 U PRBCs today for Hgb 6.8.    Objective:     Vital Signs (Most Recent):  Temp: 98.1 °F (36.7 °C) (06/25/18 0822)  Pulse: 94 (06/25/18 0822)  Resp: 20 (06/25/18 0822)  BP: 120/65 (06/25/18 0822)  SpO2: 97 % (06/25/18 0822) Vital Signs (24h Range):  Temp:  [97.5 °F (36.4 °C)-98.3 °F (36.8 °C)] 98.1 °F (36.7 °C)  Pulse:  [] 94  Resp:  [16-20] 20  SpO2:  [97 %-100 %] 97 %  BP: (106-126)/(58-82) 120/65     Weight: 65.8 kg (145 lb 1 oz)  Body mass index is 20.23 kg/m².  Body surface area is 1.82 meters squared.    ECOG SCORE         [unfilled]    Intake/Output - Last 3 Shifts       06/23 0700 - 06/24 0659 06/24 0700 - 06/25 0659 06/25 0700 - 06/26 0659    P.O. 2620 1780     I.V. (mL/kg) 784.2 (12.2) 2113.3 (32.1)     IV Piggyback 250 550     Total Intake(mL/kg) 3654.2 (56.7) 4443.3 (67.5)     Urine (mL/kg/hr) 2425 (1.6) 3450 (2.2)     Total Output 2425 3450      Net +1229.2 +993.3             Urine Occurrence  1 x     Stool Occurrence 2 x 2 x           Physical Exam   Constitutional: He is oriented to person, place, and time. He appears well-developed and well-nourished.   HENT:   Head: Normocephalic and atraumatic.   Right Ear: External ear normal.   Left Ear: External ear normal.   Mouth/Throat: Oropharynx is clear and moist. No oropharyngeal exudate.   Eyes: Conjunctivae and EOM are normal. Pupils are equal, round, and reactive to light. Right eye exhibits no discharge. Left eye exhibits no discharge.   Neck: Normal range of motion. Neck supple.   Cardiovascular: Normal rate, regular rhythm, normal heart sounds and intact distal pulses.    No murmur heard.  Pulmonary/Chest: Effort normal and breath sounds normal. No respiratory distress. He has no wheezes. He has no rales.   Abdominal: Soft. Bowel sounds are normal. He exhibits no distension and no mass. There  is no tenderness.   Musculoskeletal: Normal range of motion.   Neurological: He is alert and oriented to person, place, and time.   Skin: Skin is warm and dry. No rash noted.   Psychiatric: He has a normal mood and affect. His behavior is normal. Judgment and thought content normal.   Nursing note and vitals reviewed.      Significant Labs:   CBC:   Recent Labs  Lab 06/24/18  0400 06/25/18  0345   WBC 0.41*  0.41* 0.66*   HGB 7.6*  7.6* 6.8*   HCT 22.8*  22.8* 20.5*   PLT 50*  50* 68*    and CMP:   Recent Labs  Lab 06/24/18  0400 06/25/18  0345     137 141   K 3.9  3.9 4.1     107 111*   CO2 26  26 25   *  148* 134*   BUN 9  9 10   CREATININE 0.7  0.7 0.7   CALCIUM 8.7  8.7 8.4*   PROT 6.1  6.1 5.4*   ALBUMIN 2.6*  2.6* 2.5*   BILITOT 0.1  0.1 0.1   ALKPHOS 77  77 65   AST 17  17 15   ALT 35  35 33   ANIONGAP 4*  4* 5*   EGFRNONAA >60.0  >60.0 >60.0       Diagnostic Results:  I have reviewed all pertinent imaging results/findings within the past 24 hours.

## 2018-06-25 NOTE — ASSESSMENT & PLAN NOTE
- 6/22/18 0025 fever of 102.3. Afebrile now.  - blood cultures NGTD.   - UA negative and urine culture negative so far.   - CXR negative for acute process.   -   - Cefepime 6/22-6/25 and transition back to PPX cipro today

## 2018-06-25 NOTE — PLAN OF CARE
Problem: Patient Care Overview  Goal: Plan of Care Review  Outcome: Ongoing (interventions implemented as appropriate)  Plan of care reviewed with patient and family.  FAll precautions maintained, side rails up x2, nonskid socks on bilaterally, call light in reach, bed in low position and locked.  Has normal saline infusing without difficulty.  Getting chemo today.  Day 3 of [facility].  Ambulating, voiding and tolerating a regular.  No compaints voiced.

## 2018-06-25 NOTE — PLAN OF CARE
Problem: Patient Care Overview  Goal: Plan of Care Review  Outcome: Ongoing (interventions implemented as appropriate)  Pt is day 3 of MEC chemotherapy. C/o IVF. Afebrile. VSS. Denies any pain, nausea, diarrhea, mouth disomfort. Solid BM noted. Up ad flakita. Mother at bedside.

## 2018-06-25 NOTE — NURSING
Patient received mitoxantrone ivpb to infuse over 15 min q 24 hours.  Given zofran 16mg po and [id number] po prior to chemo.  No signs/symptoms of reaction noted.

## 2018-06-25 NOTE — NURSING
Patient received 1 unit of prbc's at this time, premeds of tylenol 650mg and benadryl 50mg po given prior to blood.  No signs/symptoms of reaction noted.

## 2018-06-26 LAB
ALBUMIN SERPL BCP-MCNC: 2.3 G/DL
ALP SERPL-CCNC: 62 U/L
ALT SERPL W/O P-5'-P-CCNC: 31 U/L
ANION GAP SERPL CALC-SCNC: 4 MMOL/L
ANISOCYTOSIS BLD QL SMEAR: SLIGHT
AST SERPL-CCNC: 14 U/L
BASOPHILS # BLD AUTO: 0 K/UL
BASOPHILS NFR BLD: 0 %
BILIRUB SERPL-MCNC: 0.2 MG/DL
BUN SERPL-MCNC: 10 MG/DL
CALCIUM SERPL-MCNC: 8.2 MG/DL
CHLORIDE SERPL-SCNC: 108 MMOL/L
CO2 SERPL-SCNC: 28 MMOL/L
CREAT SERPL-MCNC: 0.6 MG/DL
DIFFERENTIAL METHOD: ABNORMAL
EOSINOPHIL # BLD AUTO: 0 K/UL
EOSINOPHIL NFR BLD: 0 %
ERYTHROCYTE [DISTWIDTH] IN BLOOD BY AUTOMATED COUNT: 14.9 %
EST. GFR  (AFRICAN AMERICAN): >60 ML/MIN/1.73 M^2
EST. GFR  (NON AFRICAN AMERICAN): >60 ML/MIN/1.73 M^2
GLUCOSE SERPL-MCNC: 108 MG/DL
HCT VFR BLD AUTO: 22.9 %
HGB BLD-MCNC: 7.5 G/DL
IMM GRANULOCYTES # BLD AUTO: 0.01 K/UL
IMM GRANULOCYTES NFR BLD AUTO: 0.8 %
LYMPHOCYTES # BLD AUTO: 0.1 K/UL
LYMPHOCYTES NFR BLD: 3.8 %
MAGNESIUM SERPL-MCNC: 1.9 MG/DL
MCH RBC QN AUTO: 28.1 PG
MCHC RBC AUTO-ENTMCNC: 32.8 G/DL
MCV RBC AUTO: 86 FL
MONOCYTES # BLD AUTO: 0 K/UL
MONOCYTES NFR BLD: 0 %
NEUTROPHILS # BLD AUTO: 1.3 K/UL
NEUTROPHILS NFR BLD: 95.4 %
NRBC BLD-RTO: 0 /100 WBC
PHOSPHATE SERPL-MCNC: 2.8 MG/DL
PLATELET # BLD AUTO: 76 K/UL
PLATELET BLD QL SMEAR: ABNORMAL
PMV BLD AUTO: 11.6 FL
POTASSIUM SERPL-SCNC: 3.9 MMOL/L
PROT SERPL-MCNC: 5.1 G/DL
RBC # BLD AUTO: 2.67 M/UL
SODIUM SERPL-SCNC: 140 MMOL/L
WBC # BLD AUTO: 1.32 K/UL

## 2018-06-26 PROCEDURE — 99233 SBSQ HOSP IP/OBS HIGH 50: CPT | Mod: ,,, | Performed by: INTERNAL MEDICINE

## 2018-06-26 PROCEDURE — 25000003 PHARM REV CODE 250: Performed by: INTERNAL MEDICINE

## 2018-06-26 PROCEDURE — 63700000 PHARM REV CODE 250 ALT 637 W/O HCPCS: Performed by: INTERNAL MEDICINE

## 2018-06-26 PROCEDURE — 20600001 HC STEP DOWN PRIVATE ROOM

## 2018-06-26 PROCEDURE — 84100 ASSAY OF PHOSPHORUS: CPT

## 2018-06-26 PROCEDURE — 83735 ASSAY OF MAGNESIUM: CPT

## 2018-06-26 PROCEDURE — 63600175 PHARM REV CODE 636 W HCPCS: Performed by: INTERNAL MEDICINE

## 2018-06-26 PROCEDURE — 85025 COMPLETE CBC W/AUTO DIFF WBC: CPT

## 2018-06-26 PROCEDURE — 25000003 PHARM REV CODE 250: Performed by: NURSE PRACTITIONER

## 2018-06-26 PROCEDURE — 80053 COMPREHEN METABOLIC PANEL: CPT

## 2018-06-26 RX ADMIN — ETOPOSIDE 142 MG: 20 INJECTION INTRAVENOUS at 02:06

## 2018-06-26 RX ADMIN — Medication 10 ML: at 01:06

## 2018-06-26 RX ADMIN — DEXAMETHASONE 1 DROP: 1 SUSPENSION OPHTHALMIC at 11:06

## 2018-06-26 RX ADMIN — DEXAMETHASONE 1 DROP: 1 SUSPENSION OPHTHALMIC at 12:06

## 2018-06-26 RX ADMIN — DASATINIB 40 MG: 20 TABLET ORAL at 08:06

## 2018-06-26 RX ADMIN — CYTARABINE 1780 MG: 100 INJECTION, SOLUTION INTRATHECAL; INTRAVENOUS; SUBCUTANEOUS at 04:06

## 2018-06-26 RX ADMIN — ONDANSETRON 16 MG: 8 TABLET, ORALLY DISINTEGRATING ORAL at 01:06

## 2018-06-26 RX ADMIN — MITOXANTRONE HYDROCHLORIDE 11 MG: 2 INJECTION, SOLUTION INTRAVENOUS at 02:06

## 2018-06-26 RX ADMIN — ACYCLOVIR 400 MG: 200 CAPSULE ORAL at 08:06

## 2018-06-26 RX ADMIN — DEXAMETHASONE 1 DROP: 1 SUSPENSION OPHTHALMIC at 06:06

## 2018-06-26 RX ADMIN — Medication 10 ML: at 08:06

## 2018-06-26 RX ADMIN — VORICONAZOLE 200 MG: 200 TABLET ORAL at 08:06

## 2018-06-26 RX ADMIN — SODIUM CHLORIDE: 0.9 INJECTION, SOLUTION INTRAVENOUS at 02:06

## 2018-06-26 RX ADMIN — DEXAMETHASONE 1 DROP: 1 SUSPENSION OPHTHALMIC at 05:06

## 2018-06-26 RX ADMIN — CIPROFLOXACIN HYDROCHLORIDE 500 MG: 250 TABLET, FILM COATED ORAL at 08:06

## 2018-06-26 RX ADMIN — DEXAMETHASONE 12 MG: 4 TABLET ORAL at 01:06

## 2018-06-26 RX ADMIN — SODIUM CHLORIDE: 0.9 INJECTION, SOLUTION INTRAVENOUS at 04:06

## 2018-06-26 RX ADMIN — MIRTAZAPINE 15 MG: 15 TABLET, ORALLY DISINTEGRATING ORAL at 08:06

## 2018-06-26 NOTE — NURSING
Patient received mitoxantrone 11mg in sodium chloride over 15 minutes.  Chemo checked by 2 certified nurses.  Chemo precautions maintained.  No complaints voiced.

## 2018-06-26 NOTE — ASSESSMENT & PLAN NOTE
- transfuse for Hgb<7 and platelets<10k  - WBC 1.32. ANC 1259. Hgb 7.5, Plt 76k  - continue PPX acyc, vori, and cipro

## 2018-06-26 NOTE — PROGRESS NOTES
[facility]  Hematology  Bone Marrow Transplant  Progress Note    Patient Name: Wilton Guzmán  Admission Date: 6/16/2018  Hospital Length of Stay: 10 days  Code Status: Full Code    Subjective:     Interval History:   MEC D4. Afebrile. VSS. Pancytopenic. ANC 1259. No acute issues.     Objective:     Vital Signs (Most Recent):  Temp: 98.3 °F (36.8 °C) (06/26/18 0418)  Pulse: 84 (06/26/18 0418)  Resp: 18 (06/26/18 0418)  BP: 106/66 (06/26/18 0418)  SpO2: 98 % (06/26/18 0418) Vital Signs (24h Range):  Temp:  [97.3 °F (36.3 °C)-98.7 °F (37.1 °C)] 98.3 °F (36.8 °C)  Pulse:  [] 84  Resp:  [16-20] 18  SpO2:  [97 %-99 %] 98 %  BP: (106-130)/(61-78) 106/66     Weight: 67 kg (147 lb 9.6 oz)  Body mass index is 20.59 kg/m².  Body surface area is 1.83 meters squared.    ECOG SCORE         [unfilled]    Intake/Output - Last 3 Shifts       06/24 0700 - 06/25 0659 06/25 0700 - 06/26 0659 06/26 0700 - 06/27 0659    P.O. 1780 1180     I.V. (mL/kg) 2113.3 (32.1) 1503 (22.5)     Blood  250     IV Piggyback 550 800     Total Intake(mL/kg) 4443.3 (67.5) 3733 (55.8)     Urine (mL/kg/hr) 3450 (2.2) 3100 (1.9)     Total Output 3450 3100      Net +993.3 +633             Urine Occurrence 1 x      Stool Occurrence 2 x            Physical Exam   Constitutional: He is oriented to person, place, and time. He appears well-developed and well-nourished.   HENT:   Head: Normocephalic and atraumatic.   Eyes: EOM are normal. Pupils are equal, round, and reactive to light.   Neck: Normal range of motion. Neck supple.   Cardiovascular: Normal rate and regular rhythm.    No murmur heard.  Pulmonary/Chest: Effort normal and breath sounds normal. No respiratory distress.   Abdominal: Soft. Bowel sounds are normal. He exhibits no distension.   Musculoskeletal: Normal range of motion. He exhibits no edema.   Neurological: He is alert and oriented to person, place, and time.   Skin: Skin is warm and dry. Rash noted.   Rash to head and  bilateral arms stable and improved; pt states it started after taking dasatinib   Psychiatric: He has a normal mood and affect. His behavior is normal.   Vitals reviewed.      Significant Labs:   CBC:   Recent Labs  Lab 06/25/18  0345 06/26/18  0423   WBC 0.66* 1.32*   HGB 6.8* 7.5*   HCT 20.5* 22.9*   PLT 68* 76*    and CMP:   Recent Labs  Lab 06/25/18  0345 06/26/18  0423    140   K 4.1 3.9   * 108   CO2 25 28   * 108   BUN 10 10   CREATININE 0.7 0.6   CALCIUM 8.4* 8.2*   PROT 5.4* 5.1*   ALBUMIN 2.5* 2.3*   BILITOT 0.1 0.2   ALKPHOS 65 62   AST 15 14   ALT 33 31   ANIONGAP 5* 4*   EGFRNONAA >60.0 >60.0       Diagnostic Results:  I have reviewed all pertinent imaging results/findings within the past 24 hours.    Assessment/Plan:     * Chronic myelogenous leukemia (CML), BCR-ABL1-positive    - initial WBC reportedly 150,000  - bcr-abl positive on FISH at [facility] (records attached in media tab)  - original BMBX 5/26/18 with 20% blasts; CML transformed to AML with blast crisis;   karyotype: 46,XY,t(9;22;22;14)q34;q11.2;q13;q23); BCR/ABL gene fusion 62%  - continue dasatinib at 40 mg (dose reduction secondary to being on voriconazole)  - voriconazole, acyclovir and cipro for prophylaxis (cipro on hold while on Cefepime)  - repeat BM biopsy done 6/18 here at [facility] after 7+3. 9% blasts.   - status post failed 7+3 induction. 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 Today D4. Tolerating well.  - AML FISH/NGS pending from repeat BMBX  - echo with 63% EF  - HIV negative, Hepatitis negative, G6PD pending            Drug rash    - stable and improved per patient  - noted to scalp and bilateral arms  - continue to monitor         Neutropenic fever    - 6/22/18 0025 fever of 102.3.   - blood cultures NGTD.   - UA negative and urine culture negative   - CXR negative for acute process.   - Cefepime 6/22-6/25 and transitioned back to PPX cipro  - resolved; now afebrile        Anxiety    Patient  understandibly experiencing anxiety about his new diagnosis and feeling isolated in his room.   Offered for patient to be seen by oncology psychologist but he declines for now.   Started on Remeron for anxiety and insomnia.         Pancytopenia due to antineoplastic chemotherapy    - transfuse for Hgb<7 and platelets<10k  - WBC 1.32. ANC 1259. Hgb 7.5, Plt 76k  - continue PPX acyc, vori, and cipro        C. difficile colitis    - completed course of PO vancomycin 6/23/2018.              VTE Risk Mitigation         Ordered     heparin, porcine (PF) 100 unit/mL injection flush 300 Units  As needed (PRN)      06/23/18 1052     IP VTE LOW RISK PATIENT  Once      06/16/18 0938          Disposition: inpatient     Whitney Curiel NP  Bone Marrow Transplant  [facility]

## 2018-06-26 NOTE — PLAN OF CARE
Problem: Patient Care Overview  Goal: Plan of Care Review  Outcome: Ongoing (interventions implemented as appropriate)  Plan of care reviewed with patient and family.  Wife visited today.  FAll precautions maintained, side rails up x2, call light in reach, bed in low position and locked, nonskid socks on.  Patient has normal saline infusing and at this time etoposide infusing.  Tolerating a regular diet without difficulty.  No complaints voiced.

## 2018-06-26 NOTE — PLAN OF CARE
Problem: Patient Care Overview  Goal: Plan of Care Review  Outcome: Ongoing (interventions implemented as appropriate)  Pt without complaints overnight, afebrile. continued on IVF. Tolerating chemotherapy. Instructed patient to call for assistance, bed low and locked, call bell within reach, nonskid socks on, patient verbalized understanding.

## 2018-06-26 NOTE — PLAN OF CARE
MDR's with Dr Hernández.  Patient is day 4 of MEC reinduction and tolerating without complication.  Cdiff resolved and off isolation.  D/c pending day 21 BMB and count recovery.  Will continue to follow.

## 2018-06-26 NOTE — NURSING
Patient received etoposide 142mg in sodium chloride infused at 500cc/hr over 60min.  Chemo checked by 2 certified nurses.  Chemo precautions maintained.  No complaints voiced.

## 2018-06-26 NOTE — ASSESSMENT & PLAN NOTE
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 9% blasts.   - status post failed 7+3 induction. 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 Today D4. Tolerating well.  - AML FISH/NGS pending from repeat BMBX  - echo with 63% EF  - HIV negative, Hepatitis negative, G6PD pending

## 2018-06-26 NOTE — NURSING
Patient received cytarabine 1780mg in sodium chloride infuse at 41.7cc /hr ove 24 hours.  Chemo checked by 2 certified nurses.  Chemo precautions maintained.  No complaints voiced.

## 2018-06-26 NOTE — ASSESSMENT & PLAN NOTE
- 6/22/18 0025 fever of 102.3.   - blood cultures NGTD.   - UA negative and urine culture negative   - CXR negative for acute process.   - Cefepime 6/22-6/25 and transitioned back to PPX cipro  - resolved; now afebrile

## 2018-06-26 NOTE — SUBJECTIVE & OBJECTIVE
Subjective:     Interval History:   [facility] D4. Afebrile. VSS. Pancytopenic. ANC 1259. No acute issues.     Objective:     Vital Signs (Most Recent):  Temp: 98.3 °F (36.8 °C) (06/26/18 0418)  Pulse: 84 (06/26/18 0418)  Resp: 18 (06/26/18 0418)  BP: 106/66 (06/26/18 0418)  SpO2: 98 % (06/26/18 0418) Vital Signs (24h Range):  Temp:  [97.3 °F (36.3 °C)-98.7 °F (37.1 °C)] 98.3 °F (36.8 °C)  Pulse:  [] 84  Resp:  [16-20] 18  SpO2:  [97 %-99 %] 98 %  BP: (106-130)/(61-78) 106/66     Weight: 67 kg (147 lb 9.6 oz)  Body mass index is 20.59 kg/m².  Body surface area is 1.83 meters squared.    ECOG SCORE         [unfilled]    Intake/Output - Last 3 Shifts       06/24 0700 - 06/25 0659 06/25 0700 - 06/26 0659 06/26 0700 - 06/27 0659    P.O. 1780 1180     I.V. (mL/kg) 2113.3 (32.1) 1503 (22.5)     Blood  250     IV Piggyback 550 800     Total Intake(mL/kg) 4443.3 (67.5) 3733 (55.8)     Urine (mL/kg/hr) 3450 (2.2) 3100 (1.9)     Total Output 3450 3100      Net +993.3 +633             Urine Occurrence 1 x      Stool Occurrence 2 x            Physical Exam   Constitutional: He is oriented to person, place, and time. He appears well-developed and well-nourished.   HENT:   Head: Normocephalic and atraumatic.   Eyes: EOM are normal. Pupils are equal, round, and reactive to light.   Neck: Normal range of motion. Neck supple.   Cardiovascular: Normal rate and regular rhythm.    No murmur heard.  Pulmonary/Chest: Effort normal and breath sounds normal. No respiratory distress.   Abdominal: Soft. Bowel sounds are normal. He exhibits no distension.   Musculoskeletal: Normal range of motion. He exhibits no edema.   Neurological: He is alert and oriented to person, place, and time.   Skin: Skin is warm and dry. Rash noted.   Rash to head and bilateral arms stable and improved; pt states it started after taking dasatinib   Psychiatric: He has a normal mood and affect. His behavior is normal.   Vitals reviewed.      Significant Labs:   CBC:    Recent Labs  Lab 06/25/18  0345 06/26/18  0423   WBC 0.66* 1.32*   HGB 6.8* 7.5*   HCT 20.5* 22.9*   PLT 68* 76*    and CMP:   Recent Labs  Lab 06/25/18  0345 06/26/18  0423    140   K 4.1 3.9   * 108   CO2 25 28   * 108   BUN 10 10   CREATININE 0.7 0.6   CALCIUM 8.4* 8.2*   PROT 5.4* 5.1*   ALBUMIN 2.5* 2.3*   BILITOT 0.1 0.2   ALKPHOS 65 62   AST 15 14   ALT 33 31   ANIONGAP 5* 4*   EGFRNONAA >60.0 >60.0       Diagnostic Results:  I have reviewed all pertinent imaging results/findings within the past 24 hours.

## 2018-06-27 LAB
ALBUMIN SERPL BCP-MCNC: 2.4 G/DL
ALP SERPL-CCNC: 62 U/L
ALT SERPL W/O P-5'-P-CCNC: 32 U/L
AML FISH ADDITIONAL INFORMATION (BM): NORMAL
AML FISH DISCLAIMER (BM): NORMAL
AML FISH REASON FOR REFERRAL (BM): NORMAL
AML FISH RELEASED BY (BM): NORMAL
AML FISH RESULT (BM): NORMAL
AML FISH RESULT SUMMARY (BM): NORMAL
AML FISH RESULT TABLE (BM): NORMAL
ANION GAP SERPL CALC-SCNC: 5 MMOL/L
ANISOCYTOSIS BLD QL SMEAR: SLIGHT
AST SERPL-CCNC: 14 U/L
BACTERIA BLD CULT: NORMAL
BACTERIA BLD CULT: NORMAL
BASOPHILS # BLD AUTO: 0 K/UL
BASOPHILS NFR BLD: 0 %
BILIRUB SERPL-MCNC: 0.2 MG/DL
BUN SERPL-MCNC: 11 MG/DL
CALCIUM SERPL-MCNC: 8.4 MG/DL
CHLORIDE SERPL-SCNC: 110 MMOL/L
CLINICAL CYTOGENETICIST REVIEW: NORMAL
CO2 SERPL-SCNC: 27 MMOL/L
CREAT SERPL-MCNC: 0.6 MG/DL
DIFFERENTIAL METHOD: ABNORMAL
EOSINOPHIL # BLD AUTO: 0 K/UL
EOSINOPHIL NFR BLD: 0 %
ERYTHROCYTE [DISTWIDTH] IN BLOOD BY AUTOMATED COUNT: 15 %
EST. GFR  (AFRICAN AMERICAN): >60 ML/MIN/1.73 M^2
EST. GFR  (NON AFRICAN AMERICAN): >60 ML/MIN/1.73 M^2
FAMLB SPECIMEN: NORMAL
G6PD RBC-CCNT: 12.2 U/G HGB (ref 7–20.5)
GLUCOSE SERPL-MCNC: 138 MG/DL
HCT VFR BLD AUTO: 22.7 %
HGB BLD-MCNC: 7.5 G/DL
IMM GRANULOCYTES # BLD AUTO: 0.04 K/UL
IMM GRANULOCYTES NFR BLD AUTO: 2.5 %
LYMPHOCYTES # BLD AUTO: 0.1 K/UL
LYMPHOCYTES NFR BLD: 5.1 %
MAGNESIUM SERPL-MCNC: 2.1 MG/DL
MCH RBC QN AUTO: 28.5 PG
MCHC RBC AUTO-ENTMCNC: 33 G/DL
MCV RBC AUTO: 86 FL
MONOCYTES # BLD AUTO: 0 K/UL
MONOCYTES NFR BLD: 0 %
NEUTROPHILS # BLD AUTO: 1.5 K/UL
NEUTROPHILS NFR BLD: 92.4 %
NRBC BLD-RTO: 0 /100 WBC
PHOSPHATE SERPL-MCNC: 2.7 MG/DL
PLATELET # BLD AUTO: 65 K/UL
PLATELET BLD QL SMEAR: ABNORMAL
PMV BLD AUTO: 11.4 FL
POTASSIUM SERPL-SCNC: 3.8 MMOL/L
PROT SERPL-MCNC: 5 G/DL
RBC # BLD AUTO: 2.63 M/UL
REF LAB TEST METHOD: NORMAL
SODIUM SERPL-SCNC: 142 MMOL/L
SPECIMEN SOURCE: NORMAL
T4 FREE SERPL-MCNC: 0.92 NG/DL
TSH SERPL DL<=0.005 MIU/L-ACNC: 0.42 UIU/ML
WBC # BLD AUTO: 1.57 K/UL

## 2018-06-27 PROCEDURE — 25000003 PHARM REV CODE 250: Performed by: NURSE PRACTITIONER

## 2018-06-27 PROCEDURE — 25000003 PHARM REV CODE 250: Performed by: INTERNAL MEDICINE

## 2018-06-27 PROCEDURE — 84100 ASSAY OF PHOSPHORUS: CPT

## 2018-06-27 PROCEDURE — 63600175 PHARM REV CODE 636 W HCPCS: Performed by: INTERNAL MEDICINE

## 2018-06-27 PROCEDURE — 84439 ASSAY OF FREE THYROXINE: CPT

## 2018-06-27 PROCEDURE — 83735 ASSAY OF MAGNESIUM: CPT

## 2018-06-27 PROCEDURE — 20600001 HC STEP DOWN PRIVATE ROOM

## 2018-06-27 PROCEDURE — 81373 HLA I TYPING 1 LOCUS LR: CPT | Mod: 91

## 2018-06-27 PROCEDURE — 85025 COMPLETE CBC W/AUTO DIFF WBC: CPT

## 2018-06-27 PROCEDURE — 81376 HLA II TYPING 1 LOCUS LR: CPT | Mod: 91

## 2018-06-27 PROCEDURE — 81373 HLA I TYPING 1 LOCUS LR: CPT

## 2018-06-27 PROCEDURE — 84443 ASSAY THYROID STIM HORMONE: CPT

## 2018-06-27 PROCEDURE — 80053 COMPREHEN METABOLIC PANEL: CPT

## 2018-06-27 PROCEDURE — 99233 SBSQ HOSP IP/OBS HIGH 50: CPT | Mod: ,,, | Performed by: INTERNAL MEDICINE

## 2018-06-27 PROCEDURE — 63700000 PHARM REV CODE 250 ALT 637 W/O HCPCS: Performed by: INTERNAL MEDICINE

## 2018-06-27 PROCEDURE — 81376 HLA II TYPING 1 LOCUS LR: CPT

## 2018-06-27 RX ADMIN — DASATINIB 40 MG: 20 TABLET ORAL at 08:06

## 2018-06-27 RX ADMIN — DEXAMETHASONE 12 MG: 4 TABLET ORAL at 12:06

## 2018-06-27 RX ADMIN — CIPROFLOXACIN HYDROCHLORIDE 500 MG: 250 TABLET, FILM COATED ORAL at 08:06

## 2018-06-27 RX ADMIN — CYTARABINE 1780 MG: 100 INJECTION, SOLUTION INTRATHECAL; INTRAVENOUS; SUBCUTANEOUS at 03:06

## 2018-06-27 RX ADMIN — VORICONAZOLE 200 MG: 200 TABLET ORAL at 08:06

## 2018-06-27 RX ADMIN — MIRTAZAPINE 15 MG: 15 TABLET, ORALLY DISINTEGRATING ORAL at 08:06

## 2018-06-27 RX ADMIN — DEXAMETHASONE 1 DROP: 1 SUSPENSION OPHTHALMIC at 05:06

## 2018-06-27 RX ADMIN — DEXAMETHASONE 1 DROP: 1 SUSPENSION OPHTHALMIC at 11:06

## 2018-06-27 RX ADMIN — ONDANSETRON 16 MG: 8 TABLET, ORALLY DISINTEGRATING ORAL at 12:06

## 2018-06-27 RX ADMIN — MITOXANTRONE HYDROCHLORIDE 11 MG: 2 INJECTION, SOLUTION INTRAVENOUS at 01:06

## 2018-06-27 RX ADMIN — ACYCLOVIR 400 MG: 200 CAPSULE ORAL at 08:06

## 2018-06-27 RX ADMIN — POTASSIUM CHLORIDE 20 MEQ: 750 CAPSULE, EXTENDED RELEASE ORAL at 06:06

## 2018-06-27 RX ADMIN — ETOPOSIDE 142 MG: 20 INJECTION INTRAVENOUS at 02:06

## 2018-06-27 RX ADMIN — Medication 10 ML: at 12:06

## 2018-06-27 RX ADMIN — DEXAMETHASONE 1 DROP: 1 SUSPENSION OPHTHALMIC at 06:06

## 2018-06-27 NOTE — ASSESSMENT & PLAN NOTE
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 9% blasts.   - status post failed 7+3 induction. 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 Today D5. Tolerating well. Plan for D21 BMBX.  - AML FISH/NGS pending from repeat BMBX  - echo with 63% EF  - HIV negative, Hepatitis negative, G6PD pending

## 2018-06-27 NOTE — PLAN OF CARE
Problem: Patient Care Overview  Goal: Plan of Care Review  Outcome: Ongoing (interventions implemented as appropriate)  Patient remains free from falls and injury this shift. Bed in low, locked position with call bell in reach. Family at bedside. Patient encouraged to call for assistance when getting out of bed. Patient verbalized understanding. All belongings within reach. VSS and afebrile. No complaints thus far. Electrolytes replacemented as needed. Will continue to monitor.

## 2018-06-27 NOTE — PLAN OF CARE
Problem: Patient Care Overview  Goal: Plan of Care Review  Outcome: Ongoing (interventions implemented as appropriate)  Patient AAOX4, up independently, and neutropenic/fall precautions maintained. Potassium replaced this am. Day five of six of chemotherapy completed today. Blood return present to left, upper arm PICC line prior to infusion of Mitoxantrone, Etoposide, and Cytarabine (MEC) regimen. HLA stem cell workup labs completed. Patient free of nausea and pain, stable, and will continue to monitor.

## 2018-06-27 NOTE — ASSESSMENT & PLAN NOTE
- transfuse for Hgb<7 and platelets<10k  - WBC 1.57, ANC 1450, Hgb 7.5, Plt 65k  - continue PPX acyc, vori, and cipro

## 2018-06-27 NOTE — PLAN OF CARE
Problem: Patient Care Overview  Goal: Plan of Care Review  Outcome: Ongoing (interventions implemented as appropriate)  Recommendations     Recommendation/Intervention:   1. Continue w/ Regular diet.   2.Encourage PO intake >/= 75% EEN/EPN   3. If PO intake is <50% encourage HVP w/ each meal, small frequent meal, snacks, ONS, cold/non aromatic foods.   4.RD to follow  Goals: pt to consume nutrients >/= 85% EEN/EPN   Nutrition Goal Status: new  Communication of RD Recs: discussed on rounds     Assessment and Plan     Nutrition Problem  Increased nutrient needs     Related to (etiology):   Increased Physiological Needs 2/2 to CML     Signs and Symptoms (as evidenced by):   Electrolyte replacement despite 100% intake, nutrient loss 2/2 C.diff     Interventions/Recommendations (treatment strategy):  1. Continue w/ Regular diet.   2.Encourage PO intake >/= 75% EEN/EPN   3. If PO intake is <50% encourage HVP w/ each meal, small frequent meal, snacks, ONS, cold/non aromatic foods.   4.RD to follow     Nutrition Diagnosis Status:   New

## 2018-06-27 NOTE — PROGRESS NOTES
[facility]  Hematology  Bone Marrow Transplant  Progress Note    Patient Name: Wilton Guzmán  Admission Date: 6/16/2018  Hospital Length of Stay: 11 days  Code Status: Full Code    Subjective:     Interval History:   MEC D5. No acute issues. Pt denies any chest pain, SOB, N/V, diarrhea, mucositis.      Objective:     Vital Signs (Most Recent):  Temp: 97.9 °F (36.6 °C) (06/27/18 0741)  Pulse: 82 (06/27/18 0741)  Resp: 18 (06/27/18 0741)  BP: 119/67 (06/27/18 0741)  SpO2: 100 % (06/27/18 0741) Vital Signs (24h Range):  Temp:  [97.1 °F (36.2 °C)-98.7 °F (37.1 °C)] 97.9 °F (36.6 °C)  Pulse:  [81-93] 82  Resp:  [16-20] 18  SpO2:  [97 %-100 %] 100 %  BP: (108-134)/(57-83) 119/67     Weight: 66.4 kg (146 lb 7.9 oz)  Body mass index is 20.43 kg/m².  Body surface area is 1.82 meters squared.    ECOG SCORE         [unfilled]    Intake/Output - Last 3 Shifts       06/25 0700 - 06/26 0659 06/26 0700 - 06/27 0659 06/27 0700 - 06/28 0659    P.O. 1180 430     I.V. (mL/kg) 1503 (22.5) 906 (13.6)     Blood 250      IV Piggyback 800 750     Total Intake(mL/kg) 3733 (55.8) 2086 (31.4)     Urine (mL/kg/hr) 3100 (1.9) 800 (0.5)     Total Output 3100 800      Net +633 +1286             Stool Occurrence  1 x           Physical Exam   Constitutional: He is oriented to person, place, and time. He appears well-developed and well-nourished.   HENT:   Head: Normocephalic and atraumatic.   Eyes: EOM are normal. Pupils are equal, round, and reactive to light.   Neck: Normal range of motion. Neck supple.   Cardiovascular: Normal rate and regular rhythm.    No murmur heard.  Pulmonary/Chest: Effort normal and breath sounds normal. No respiratory distress.   Abdominal: Soft. Bowel sounds are normal. He exhibits no distension.   Musculoskeletal: Normal range of motion. He exhibits no edema.   Neurological: He is alert and oriented to person, place, and time.   Skin: Skin is warm and dry. Rash noted.   Rash to head and bilateral  arms  improved; pt states it started after taking dasatinib   Psychiatric: He has a normal mood and affect. His behavior is normal.   Vitals reviewed.      Significant Labs:   CBC:   Recent Labs  Lab 06/26/18  0423 06/27/18  0401   WBC 1.32* 1.57*   HGB 7.5* 7.5*   HCT 22.9* 22.7*   PLT 76* 65*    and CMP:   Recent Labs  Lab 06/26/18  0423 06/27/18  0401    142   K 3.9 3.8    110   CO2 28 27    138*   BUN 10 11   CREATININE 0.6 0.6   CALCIUM 8.2* 8.4*   PROT 5.1* 5.0*   ALBUMIN 2.3* 2.4*   BILITOT 0.2 0.2   ALKPHOS 62 62   AST 14 14   ALT 31 32   ANIONGAP 4* 5*   EGFRNONAA >60.0 >60.0       Diagnostic Results:  I have reviewed all pertinent imaging results/findings within the past 24 hours.    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 9% blasts.   - status post failed 7+3 induction. 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 Today D5. Tolerating well.  - AML FISH/NGS pending from repeat BMBX  - echo with 63% EF  - HIV negative, Hepatitis negative, G6PD pending            Drug rash    - stable and improved per patient  - noted to scalp and bilateral arms  - continue to monitor   - pt states rash started after beginning dasatinib        Neutropenic fever    - 6/22/18 0025 fever of 102.3.   - blood cultures NGTD.   - UA negative and urine culture negative   - CXR negative for acute process.   - Cefepime 6/22-6/25 and transitioned back to PPX cipro  - resolved; now  afebrile        Anxiety    Patient understandibly experiencing anxiety about his new diagnosis and feeling isolated in his room.   Offered for patient to be seen by oncology psychologist but he declines for now.   Started on Remeron for anxiety and insomnia.         Pancytopenia due to antineoplastic chemotherapy    - transfuse for Hgb<7 and platelets<10k  - WBC 1.57, ANC 1450, Hgb 7.5, Plt 65k  - continue PPX acyc, vori, and cipro        C. difficile colitis    - completed course of PO vancomycin 6/23/2018.              VTE Risk Mitigation         Ordered     heparin, porcine (PF) 100 unit/mL injection flush 300 Units  As needed (PRN)      06/23/18 1052     IP VTE LOW RISK PATIENT  Once      06/16/18 0938          Disposition: inpatient pending recovery from [facility]    Whitney Curiel NP  Bone Marrow Transplant  [facility]

## 2018-06-27 NOTE — PROGRESS NOTES
" [facility]  Adult Nutrition  Progress Note    SUMMARY       Recommendations    Recommendation/Intervention:   1. Continue w/ Regular diet.   2.Encourage PO intake >/= 75% EEN/EPN   3. If PO intake is <50% encourage HVP w/ each meal, small frequent meal, snacks, ONS, cold/non aromatic foods.   4.RD to follow  Goals: pt to consume nutrients >/= 85% EEN/EPN   Nutrition Goal Status: new  Communication of RD Recs: discussed on rounds    Reason for Assessment    Reason for Assessment: length of stay  Diagnosis:  (Chronic myelogenous leukemia (CML)  Relevant Medical History: no PMHx  Interdisciplinary Rounds: attended  General Information Comments: Pt po intake 100% all meals,  Pt denies any chest pain, SOB, N/V, diarrhea, mucositis. Pt transferred for higher level of care for WBC >150. BMBx showed CML, Started on 7+3 18. Pt also C. diff+, but now mostly resolved.  Previous RD visit for EDU ( Nutritional Therapy for High-Kcal High Pro Foods,  Adjusting for Altered Taste.)  Nutrition Discharge Planning: Regular diet w/ po intake > 75% EEN/EPN    Nutrition Risk Screen    Nutrition Risk Screen: no indicators present    Nutrition/Diet History    Patient Reported Diet/Restrictions/Preferences: general  Food Preferences: none  Do you have any cultural, spiritual, Hoahaoism conflicts, given your current situation?: no  Food Allergies: NKFA  Factors Affecting Nutritional Intake: None identified at this time    Anthropometrics    Temp: 96.7 °F (35.9 °C)  Height Method: Stated  Height: 5' 11" (180.3 cm)  Height (inches): 71 in  Weight Method: Standard Scale  Weight: 66.4 kg (146 lb 7.9 oz)  Weight (lb): 146.5 lb  Ideal Body Weight (IBW), Male: 172 lb  % Ideal Body Weight, Male (lb): 85.11 lb  BMI (Calculated): 20.5  BMI Grade: 18.5-24.9 - normal  Weight Loss:  (stable)  Usual Body Weight (UBW), k.1 kg  % Usual Body Weight: 97.78       Lab/Procedures/Meds    Pertinent Labs Reviewed: reviewed  Pertinent " Labs Comments: WBC-1.57, H/H-7.5/22.7, Plts-65-, Glu-138, Ca-8.4, Alb-2.4  Pertinent Medications Reviewed: reviewed  Pertinent Medications Comments: IVF, Acyclovir, Cipro, Dexamethasone, Duke's Soln, Heparin, Mg, Zofran, KCl, Vori    Physical Findings/Assessment    Overall Physical Appearance: underweight, weak  Tubes:  (PICC line access)  Oral/Mouth Cavity: WDL  Skin: intact    Estimated/Assessed Needs    Weight Used For Calorie Calculations: 66.4 kg (146 lb 6.2 oz)  Energy Calorie Requirements (kcal): 9224-3482 Kcal/d  Energy Need Method: Kcal/kg (25-30 kcal/kg)  Protein Requirements: 100-120g/d (1.5-1.8)  Weight Used For Protein Calculations: 66.4 kg (146 lb 6.2 oz)     Fluid Need Method: RDA Method (1ml/Kcal or Per MD)  RDA Method (mL): 1660         Nutrition Prescription Ordered    Current Diet Order: Regular    Evaluation of Received Nutrient/Fluid Intake    IV Fluid (mL): 2400  I/O: -1.2 L since admit  Energy Calories Required: meeting needs  Protein Required: meeting needs  Fluid Required: meeting needs  Comments: LBM: 6/25/18  % Intake of Estimated Energy Needs: 75 - 100 %  % Meal Intake: 75 - 100 %    Nutrition Risk    Level of Risk/Frequency of Follow-up: low     Assessment and Plan    Nutrition Problem  Increased nutrient needs    Related to (etiology):   Increased Physiological Needs 2/2 to CML    Signs and Symptoms (as evidenced by):   Electrolyte replacement despite 100% intake, nutrient loss 2/2 C.diff    Interventions/Recommendations (treatment strategy):  1. Continue w/ Regular diet.   2.Encourage PO intake >/= 75% EEN/EPN   3. If PO intake is <50% encourage HVP w/ each meal, small frequent meal, snacks, ONS, cold/non aromatic foods.   4.RD to follow    Nutrition Diagnosis Status:   New    Monitor and Evaluation    Food and Nutrient Intake: energy intake, food and beverage intake  Food and Nutrient Administration: diet order  Physical Activity and Function: nutrition-related ADLs and  IADLs  Anthropometric Measurements: weight, weight change  Biochemical Data, Medical Tests and Procedures:  (All labs)  Nutrition-Focused Physical Findings: overall appearance     Nutrition Follow-Up    RD Follow-up?: Yes

## 2018-06-27 NOTE — ASSESSMENT & PLAN NOTE
- stable and improved per patient  - noted to scalp and bilateral arms  - continue to monitor   - pt states rash started after beginning dasatinib

## 2018-06-27 NOTE — SUBJECTIVE & OBJECTIVE
Subjective:     Interval History:   [facility] D5. No acute issues. Pt denies any chest pain, SOB, N/V, diarrhea, mucositis.      Objective:     Vital Signs (Most Recent):  Temp: 97.9 °F (36.6 °C) (06/27/18 0741)  Pulse: 82 (06/27/18 0741)  Resp: 18 (06/27/18 0741)  BP: 119/67 (06/27/18 0741)  SpO2: 100 % (06/27/18 0741) Vital Signs (24h Range):  Temp:  [97.1 °F (36.2 °C)-98.7 °F (37.1 °C)] 97.9 °F (36.6 °C)  Pulse:  [81-93] 82  Resp:  [16-20] 18  SpO2:  [97 %-100 %] 100 %  BP: (108-134)/(57-83) 119/67     Weight: 66.4 kg (146 lb 7.9 oz)  Body mass index is 20.43 kg/m².  Body surface area is 1.82 meters squared.    ECOG SCORE         [unfilled]    Intake/Output - Last 3 Shifts       06/25 0700 - 06/26 0659 06/26 0700 - 06/27 0659 06/27 0700 - 06/28 0659    P.O. 1180 430     I.V. (mL/kg) 1503 (22.5) 906 (13.6)     Blood 250      IV Piggyback 800 750     Total Intake(mL/kg) 3733 (55.8) 2086 (31.4)     Urine (mL/kg/hr) 3100 (1.9) 800 (0.5)     Total Output 3100 800      Net +633 +1286             Stool Occurrence  1 x           Physical Exam   Constitutional: He is oriented to person, place, and time. He appears well-developed and well-nourished.   HENT:   Head: Normocephalic and atraumatic.   Eyes: EOM are normal. Pupils are equal, round, and reactive to light.   Neck: Normal range of motion. Neck supple.   Cardiovascular: Normal rate and regular rhythm.    No murmur heard.  Pulmonary/Chest: Effort normal and breath sounds normal. No respiratory distress.   Abdominal: Soft. Bowel sounds are normal. He exhibits no distension.   Musculoskeletal: Normal range of motion. He exhibits no edema.   Neurological: He is alert and oriented to person, place, and time.   Skin: Skin is warm and dry. Rash noted.   Rash to head and bilateral arms  improved; pt states it started after taking dasatinib   Psychiatric: He has a normal mood and affect. His behavior is normal.   Vitals reviewed.      Significant Labs:   CBC:   Recent Labs  Lab  06/26/18  0423 06/27/18  0401   WBC 1.32* 1.57*   HGB 7.5* 7.5*   HCT 22.9* 22.7*   PLT 76* 65*    and CMP:   Recent Labs  Lab 06/26/18  0423 06/27/18  0401    142   K 3.9 3.8    110   CO2 28 27    138*   BUN 10 11   CREATININE 0.6 0.6   CALCIUM 8.2* 8.4*   PROT 5.1* 5.0*   ALBUMIN 2.3* 2.4*   BILITOT 0.2 0.2   ALKPHOS 62 62   AST 14 14   ALT 31 32   ANIONGAP 4* 5*   EGFRNONAA >60.0 >60.0       Diagnostic Results:  I have reviewed all pertinent imaging results/findings within the past 24 hours.

## 2018-06-28 LAB
ABO + RH BLD: NORMAL
ALBUMIN SERPL BCP-MCNC: 2.3 G/DL
ALP SERPL-CCNC: 60 U/L
ALT SERPL W/O P-5'-P-CCNC: 35 U/L
ANION GAP SERPL CALC-SCNC: 4 MMOL/L
ANISOCYTOSIS BLD QL SMEAR: SLIGHT
APTT BLDCRRT: 24 SEC
AST SERPL-CCNC: 16 U/L
BASOPHILS # BLD AUTO: 0 K/UL
BASOPHILS NFR BLD: 0 %
BILIRUB SERPL-MCNC: 0.2 MG/DL
BLD GP AB SCN CELLS X3 SERPL QL: NORMAL
BUN SERPL-MCNC: 11 MG/DL
CALCIUM SERPL-MCNC: 8.3 MG/DL
CHLORIDE SERPL-SCNC: 110 MMOL/L
CO2 SERPL-SCNC: 28 MMOL/L
CREAT SERPL-MCNC: 0.6 MG/DL
DIFFERENTIAL METHOD: ABNORMAL
EOSINOPHIL # BLD AUTO: 0 K/UL
EOSINOPHIL NFR BLD: 0 %
ERYTHROCYTE [DISTWIDTH] IN BLOOD BY AUTOMATED COUNT: 15.2 %
EST. GFR  (AFRICAN AMERICAN): >60 ML/MIN/1.73 M^2
EST. GFR  (NON AFRICAN AMERICAN): >60 ML/MIN/1.73 M^2
GLUCOSE SERPL-MCNC: 118 MG/DL
HCT VFR BLD AUTO: 23.1 %
HGB BLD-MCNC: 7.7 G/DL
HYPOCHROMIA BLD QL SMEAR: ABNORMAL
IMM GRANULOCYTES # BLD AUTO: 0.03 K/UL
IMM GRANULOCYTES NFR BLD AUTO: 2.5 %
INR PPP: 1.1
LDH SERPL L TO P-CCNC: 206 U/L
LYMPHOCYTES # BLD AUTO: 0.1 K/UL
LYMPHOCYTES NFR BLD: 7.4 %
MAGNESIUM SERPL-MCNC: 1.8 MG/DL
MCH RBC QN AUTO: 28.7 PG
MCHC RBC AUTO-ENTMCNC: 33.3 G/DL
MCV RBC AUTO: 86 FL
MONOCYTES # BLD AUTO: 0 K/UL
MONOCYTES NFR BLD: 0 %
NEUTROPHILS # BLD AUTO: 1.1 K/UL
NEUTROPHILS NFR BLD: 90.1 %
NRBC BLD-RTO: 0 /100 WBC
PHOSPHATE SERPL-MCNC: 2.9 MG/DL
PLATELET # BLD AUTO: 55 K/UL
PLATELET BLD QL SMEAR: ABNORMAL
PMV BLD AUTO: 11.7 FL
POTASSIUM SERPL-SCNC: 3.8 MMOL/L
PROT SERPL-MCNC: 4.9 G/DL
PROTHROMBIN TIME: 11 SEC
RBC # BLD AUTO: 2.68 M/UL
SODIUM SERPL-SCNC: 142 MMOL/L
URATE SERPL-MCNC: 1.8 MG/DL
WBC # BLD AUTO: 1.22 K/UL

## 2018-06-28 PROCEDURE — 85730 THROMBOPLASTIN TIME PARTIAL: CPT

## 2018-06-28 PROCEDURE — 81382 HLA II TYPING 1 LOC HR: CPT | Mod: 91

## 2018-06-28 PROCEDURE — 25000003 PHARM REV CODE 250: Performed by: INTERNAL MEDICINE

## 2018-06-28 PROCEDURE — 81380 HLA I TYPING 1 LOCUS HR: CPT | Mod: 91

## 2018-06-28 PROCEDURE — 85610 PROTHROMBIN TIME: CPT

## 2018-06-28 PROCEDURE — 63600175 PHARM REV CODE 636 W HCPCS: Performed by: INTERNAL MEDICINE

## 2018-06-28 PROCEDURE — 83735 ASSAY OF MAGNESIUM: CPT

## 2018-06-28 PROCEDURE — 99233 SBSQ HOSP IP/OBS HIGH 50: CPT | Mod: ,,, | Performed by: INTERNAL MEDICINE

## 2018-06-28 PROCEDURE — 85025 COMPLETE CBC W/AUTO DIFF WBC: CPT

## 2018-06-28 PROCEDURE — 80053 COMPREHEN METABOLIC PANEL: CPT

## 2018-06-28 PROCEDURE — 25000003 PHARM REV CODE 250: Performed by: NURSE PRACTITIONER

## 2018-06-28 PROCEDURE — 83615 LACTATE (LD) (LDH) ENZYME: CPT

## 2018-06-28 PROCEDURE — 86901 BLOOD TYPING SEROLOGIC RH(D): CPT

## 2018-06-28 PROCEDURE — 63700000 PHARM REV CODE 250 ALT 637 W/O HCPCS: Performed by: INTERNAL MEDICINE

## 2018-06-28 PROCEDURE — 84100 ASSAY OF PHOSPHORUS: CPT

## 2018-06-28 PROCEDURE — 84550 ASSAY OF BLOOD/URIC ACID: CPT

## 2018-06-28 PROCEDURE — 81382 HLA II TYPING 1 LOC HR: CPT

## 2018-06-28 PROCEDURE — 20600001 HC STEP DOWN PRIVATE ROOM

## 2018-06-28 PROCEDURE — 81380 HLA I TYPING 1 LOCUS HR: CPT

## 2018-06-28 RX ADMIN — MITOXANTRONE HYDROCHLORIDE 11 MG: 2 INJECTION, SOLUTION INTRAVENOUS at 01:06

## 2018-06-28 RX ADMIN — CYTARABINE 1780 MG: 100 INJECTION, SOLUTION INTRATHECAL; INTRAVENOUS; SUBCUTANEOUS at 03:06

## 2018-06-28 RX ADMIN — DEXAMETHASONE 1 DROP: 1 SUSPENSION OPHTHALMIC at 11:06

## 2018-06-28 RX ADMIN — DEXAMETHASONE 1 DROP: 1 SUSPENSION OPHTHALMIC at 06:06

## 2018-06-28 RX ADMIN — DEXAMETHASONE 12 MG: 4 TABLET ORAL at 01:06

## 2018-06-28 RX ADMIN — CIPROFLOXACIN HYDROCHLORIDE 500 MG: 250 TABLET, FILM COATED ORAL at 08:06

## 2018-06-28 RX ADMIN — VORICONAZOLE 200 MG: 200 TABLET ORAL at 09:06

## 2018-06-28 RX ADMIN — MIRTAZAPINE 15 MG: 15 TABLET, ORALLY DISINTEGRATING ORAL at 09:06

## 2018-06-28 RX ADMIN — ETOPOSIDE 142 MG: 20 INJECTION INTRAVENOUS at 02:06

## 2018-06-28 RX ADMIN — DEXAMETHASONE 1 DROP: 1 SUSPENSION OPHTHALMIC at 05:06

## 2018-06-28 RX ADMIN — ONDANSETRON 16 MG: 8 TABLET, ORALLY DISINTEGRATING ORAL at 01:06

## 2018-06-28 RX ADMIN — VORICONAZOLE 200 MG: 200 TABLET ORAL at 08:06

## 2018-06-28 RX ADMIN — ACYCLOVIR 400 MG: 200 CAPSULE ORAL at 08:06

## 2018-06-28 RX ADMIN — CIPROFLOXACIN HYDROCHLORIDE 500 MG: 250 TABLET, FILM COATED ORAL at 09:06

## 2018-06-28 RX ADMIN — ACYCLOVIR 400 MG: 200 CAPSULE ORAL at 09:06

## 2018-06-28 RX ADMIN — MAGNESIUM SULFATE IN WATER 2 G: 40 INJECTION, SOLUTION INTRAVENOUS at 05:06

## 2018-06-28 RX ADMIN — DASATINIB 40 MG: 20 TABLET ORAL at 08:06

## 2018-06-28 RX ADMIN — POTASSIUM CHLORIDE 20 MEQ: 750 CAPSULE, EXTENDED RELEASE ORAL at 05:06

## 2018-06-28 NOTE — PLAN OF CARE
Problem: Patient Care Overview  Goal: Plan of Care Review  Outcome: Ongoing (interventions implemented as appropriate)  Patient AAOX4, up independently to bathroom, and up in lounge throughout afternoon. Magnesium and potassium replaced this am. HLA high res sequence based typing labs collected. Shower completed and linens changed. IVF decreased to 50 mL/hour. Chemotherapy consent and CAR in chart. Today is day 6 of [facility]. Blood return present prior to infusion of each component of regimen. Mitoxantrone 11 mg, Etoposide 142 mg, and Cytarabine 1,780 mg infused to left, upper arm PICC. Connections secured with gauze and tape, call light within reach, and patient instructed to contact nurse with any issues. Patient stable, will continue to monitor.

## 2018-06-28 NOTE — ASSESSMENT & PLAN NOTE
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 9% blasts.   - persistent disease post 7+3 induction. 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 Today D6. Tolerating well. Plan for D21 BMBX.  - AML FISH negative/NGS pending from repeat BMBX  - echo with 63% EF  - HIV negative, Hepatitis negative, G6PD wnl  - HLA typing in process  - referral information for transplant benefits given to patient  - patient has 1 full sibling, contact info given to transplant coordinators

## 2018-06-28 NOTE — ASSESSMENT & PLAN NOTE
- transfuse for Hgb<7 and platelets<10k  - WBC 1.22, ANC 1100, Hgb 7.7, Plt 55k  - continue PPX acyc, vori, and cipro

## 2018-06-28 NOTE — PROGRESS NOTES
[facility]  Hematology  Bone Marrow Transplant  Progress Note    Patient Name: Wilton Guzmán  Admission Date: 6/16/2018  Hospital Length of Stay: 12 days  Code Status: Full Code    Subjective:     Interval History:   MEC D6. VSS, afebrile, no complaints today. tolerating chemo well.    Objective:     Vital Signs (Most Recent):  Temp: 98.1 °F (36.7 °C) (06/28/18 1117)  Pulse: 87 (06/28/18 1117)  Resp: 18 (06/28/18 1117)  BP: 132/87 (06/28/18 1117)  SpO2: 100 % (06/28/18 1117) Vital Signs (24h Range):  Temp:  [97.8 °F (36.6 °C)-98.7 °F (37.1 °C)] 98.1 °F (36.7 °C)  Pulse:  [81-87] 87  Resp:  [16-19] 18  SpO2:  [97 %-100 %] 100 %  BP: (111-132)/(68-87) 132/87     Weight: 66.8 kg (147 lb 4.3 oz)  Body mass index is 20.54 kg/m².  Body surface area is 1.83 meters squared.      Intake/Output - Last 3 Shifts       06/26 0700 - 06/27 0659 06/27 0700 - 06/28 0659 06/28 0700 - 06/29 0659    P.O. 430 1725     I.V. (mL/kg) 906 (13.6) 4033.3 (60.4) 549.2 (8.2)    IV Piggyback 750 800     Total Intake(mL/kg) 2086 (31.4) 6558.3 (98.2) 549.2 (8.2)    Urine (mL/kg/hr) 800 (0.5) 3600 (2.2) 1225 (2.9)    Total Output 800 3600 1225    Net +1286 +2958.3 -675.8           Stool Occurrence 1 x 1 x           Physical Exam   Constitutional: He is oriented to person, place, and time. He appears well-developed and well-nourished.   HENT:   Head: Normocephalic and atraumatic.   Eyes: EOM are normal. Pupils are equal, round, and reactive to light.   Neck: Normal range of motion. Neck supple.   Cardiovascular: Normal rate and regular rhythm.    No murmur heard.  Pulmonary/Chest: Effort normal and breath sounds normal. No respiratory distress.   Abdominal: Soft. Bowel sounds are normal. He exhibits no distension.   Musculoskeletal: Normal range of motion. He exhibits no edema.   Neurological: He is alert and oriented to person, place, and time.   Skin: Skin is warm and dry. No rash noted.   Psychiatric: He has a normal mood  and affect. His behavior is normal.   Vitals reviewed.      Significant Labs:   CBC:   Recent Labs  Lab 06/27/18  0401 06/28/18  0422   WBC 1.57* 1.22*   HGB 7.5* 7.7*   HCT 22.7* 23.1*   PLT 65* 55*    and CMP:   Recent Labs  Lab 06/27/18  0401 06/28/18  0422    142   K 3.8 3.8    110   CO2 27 28   * 118*   BUN 11 11   CREATININE 0.6 0.6   CALCIUM 8.4* 8.3*   PROT 5.0* 4.9*   ALBUMIN 2.4* 2.3*   BILITOT 0.2 0.2   ALKPHOS 62 60   AST 14 16   ALT 32 35   ANIONGAP 5* 4*   EGFRNONAA >60.0 >60.0       Diagnostic Results:  None    Assessment/Plan:     * Chronic myelogenous leukemia (CML), BCR-ABL1-positive    - initial WBC reportedly 150,000  - bcr-abl positive on FISH at [facility] (records attached in media tab)  - original BMBX 5/26/18 with 20% blasts; CML transformed to AML with blast crisis;   karyotype: 46,XY,t(9;22;22;14)q34;q11.2;q13;q23); BCR/ABL gene fusion 62%  - continue dasatinib at 40 mg (dose reduction secondary to being on voriconazole)  - voriconazole, acyclovir and cipro for prophylaxis (cipro on hold while on Cefepime)  - repeat BM biopsy done 6/18 here at [facility] after 7+3. 9% blasts.   - persistent disease post 7+3 induction. 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 Today D6. Tolerating well. Plan for D21 BMBX.  - AML FISH negative/NGS pending from repeat BMBX  - echo with 63% EF  - HIV negative, Hepatitis negative, G6PD wnl  - HLA typing in process  - referral information for transplant benefits given to patient  - patient has 1 full sibling, contact info given to transplant coordinators            Pancytopenia due to antineoplastic chemotherapy    - transfuse for Hgb<7 and platelets<10k  - WBC 1.22, ANC 1100, Hgb 7.7, Plt 55k  - continue PPX acyc, vori, and cipro        Neutropenic fever    - 6/22/18 0025 fever of 102.3.   - blood cultures NGTD.   - UA negative and urine culture negative   - CXR negative for acute process.   - Cefepime 6/22-6/25 and transitioned back  to PPX cipro  - resolved; now afebrile        C. difficile colitis    - resolved  - completed course of PO vancomycin 6/23/2018.          Drug rash    - resolved        Anxiety    Patient understandibly experiencing anxiety about his new diagnosis and feeling isolated in his room.   Declines oncology psychologist consult.   Continue Remeron for anxiety and insomnia.             VTE Risk Mitigation         Ordered     heparin, porcine (PF) 100 unit/mL injection flush 300 Units  As needed (PRN)      06/23/18 1052     IP VTE LOW RISK PATIENT  Once      06/16/18 0938          Disposition: continue inpatient supportive care pending restaging biopsy post [facility]    Haily Serna NP  Bone Marrow Transplant  [facility]

## 2018-06-28 NOTE — ASSESSMENT & PLAN NOTE
Patient understandibly experiencing anxiety about his new diagnosis and feeling isolated in his room.   Declines oncology psychologist consult.   Continue Remeron for anxiety and insomnia.

## 2018-06-28 NOTE — SUBJECTIVE & OBJECTIVE
Subjective:     Interval History:   [facility] D6. VSS, afebrile, no complaints today. tolerating chemo well.    Objective:     Vital Signs (Most Recent):  Temp: 98.1 °F (36.7 °C) (06/28/18 1117)  Pulse: 87 (06/28/18 1117)  Resp: 18 (06/28/18 1117)  BP: 132/87 (06/28/18 1117)  SpO2: 100 % (06/28/18 1117) Vital Signs (24h Range):  Temp:  [97.8 °F (36.6 °C)-98.7 °F (37.1 °C)] 98.1 °F (36.7 °C)  Pulse:  [81-87] 87  Resp:  [16-19] 18  SpO2:  [97 %-100 %] 100 %  BP: (111-132)/(68-87) 132/87     Weight: 66.8 kg (147 lb 4.3 oz)  Body mass index is 20.54 kg/m².  Body surface area is 1.83 meters squared.      Intake/Output - Last 3 Shifts       06/26 0700 - 06/27 0659 06/27 0700 - 06/28 0659 06/28 0700 - 06/29 0659    P.O. 430 1725     I.V. (mL/kg) 906 (13.6) 4033.3 (60.4) 549.2 (8.2)    IV Piggyback 750 800     Total Intake(mL/kg) 2086 (31.4) 6558.3 (98.2) 549.2 (8.2)    Urine (mL/kg/hr) 800 (0.5) 3600 (2.2) 1225 (2.9)    Total Output 800 3600 1225    Net +1286 +2958.3 -675.8           Stool Occurrence 1 x 1 x           Physical Exam   Constitutional: He is oriented to person, place, and time. He appears well-developed and well-nourished.   HENT:   Head: Normocephalic and atraumatic.   Eyes: EOM are normal. Pupils are equal, round, and reactive to light.   Neck: Normal range of motion. Neck supple.   Cardiovascular: Normal rate and regular rhythm.    No murmur heard.  Pulmonary/Chest: Effort normal and breath sounds normal. No respiratory distress.   Abdominal: Soft. Bowel sounds are normal. He exhibits no distension.   Musculoskeletal: Normal range of motion. He exhibits no edema.   Neurological: He is alert and oriented to person, place, and time.   Skin: Skin is warm and dry. No rash noted.   Psychiatric: He has a normal mood and affect. His behavior is normal.   Vitals reviewed.      Significant Labs:   CBC:   Recent Labs  Lab 06/27/18  0401 06/28/18  0422   WBC 1.57* 1.22*   HGB 7.5* 7.7*   HCT 22.7* 23.1*   PLT 65* 55*     and CMP:   Recent Labs  Lab 06/27/18  0401 06/28/18  0422    142   K 3.8 3.8    110   CO2 27 28   * 118*   BUN 11 11   CREATININE 0.6 0.6   CALCIUM 8.4* 8.3*   PROT 5.0* 4.9*   ALBUMIN 2.4* 2.3*   BILITOT 0.2 0.2   ALKPHOS 62 60   AST 14 16   ALT 32 35   ANIONGAP 5* 4*   EGFRNONAA >60.0 >60.0       Diagnostic Results:  None

## 2018-06-29 LAB
ALBUMIN SERPL BCP-MCNC: 2.4 G/DL
ALP SERPL-CCNC: 58 U/L
ALT SERPL W/O P-5'-P-CCNC: 46 U/L
ANION GAP SERPL CALC-SCNC: 5 MMOL/L
ANISOCYTOSIS BLD QL SMEAR: SLIGHT
ANNOTATION COMMENT IMP: NORMAL
AST SERPL-CCNC: 21 U/L
BASOPHILS NFR BLD: 0 %
BILIRUB SERPL-MCNC: 0.2 MG/DL
BUN SERPL-MCNC: 11 MG/DL
CALCIUM SERPL-MCNC: 8.4 MG/DL
CHLORIDE SERPL-SCNC: 108 MMOL/L
CO2 SERPL-SCNC: 27 MMOL/L
CREAT SERPL-MCNC: 0.5 MG/DL
DIFFERENTIAL METHOD: ABNORMAL
EOSINOPHIL NFR BLD: 0 %
ERYTHROCYTE [DISTWIDTH] IN BLOOD BY AUTOMATED COUNT: 15 %
EST. GFR  (AFRICAN AMERICAN): >60 ML/MIN/1.73 M^2
EST. GFR  (NON AFRICAN AMERICAN): >60 ML/MIN/1.73 M^2
GLUCOSE SERPL-MCNC: 118 MG/DL
HCT VFR BLD AUTO: 22.8 %
HGB BLD-MCNC: 7.3 G/DL
HYPOCHROMIA BLD QL SMEAR: ABNORMAL
IMM GRANULOCYTES # BLD AUTO: ABNORMAL K/UL
IMM GRANULOCYTES NFR BLD AUTO: ABNORMAL %
LYMPHOCYTES NFR BLD: 6 %
MAGNESIUM SERPL-MCNC: 2 MG/DL
MCH RBC QN AUTO: 28 PG
MCHC RBC AUTO-ENTMCNC: 32 G/DL
MCV RBC AUTO: 87 FL
MONOCYTES NFR BLD: 0 %
NEUTROPHILS NFR BLD: 94 %
NGS CLINCIAL TRIALS: NORMAL
NGS INDICATION OF TEST: NORMAL
NGS INTERPRETATION: NORMAL
NGS ONCOHEME PANEL GENE LIST: NORMAL
NGS PATHOGENIC MUTATIONS DETECTED: NORMAL
NGS REVIEWED BY:: NORMAL
NGS VARIANTS OF UNKNOWN SIGNIFICANCE: NORMAL
NRBC BLD-RTO: 0 /100 WBC
OVALOCYTES BLD QL SMEAR: ABNORMAL
PHOSPHATE SERPL-MCNC: 2.7 MG/DL
PLATELET # BLD AUTO: 36 K/UL
PLATELET BLD QL SMEAR: ABNORMAL
PMV BLD AUTO: 10.2 FL
POIKILOCYTOSIS BLD QL SMEAR: SLIGHT
POLYCHROMASIA BLD QL SMEAR: ABNORMAL
POTASSIUM SERPL-SCNC: 3.8 MMOL/L
PROT SERPL-MCNC: 5 G/DL
RBC # BLD AUTO: 2.61 M/UL
REF LAB TEST METHOD: NORMAL
SODIUM SERPL-SCNC: 140 MMOL/L
SPECIMEN SOURCE: NORMAL
TEST PERFORMANCE INFO SPEC: NORMAL
WBC # BLD AUTO: 0.54 K/UL

## 2018-06-29 PROCEDURE — 25000003 PHARM REV CODE 250: Performed by: NURSE PRACTITIONER

## 2018-06-29 PROCEDURE — 84100 ASSAY OF PHOSPHORUS: CPT

## 2018-06-29 PROCEDURE — 25000003 PHARM REV CODE 250: Performed by: INTERNAL MEDICINE

## 2018-06-29 PROCEDURE — 81207 BCR/ABL1 GENE MINOR BP: CPT

## 2018-06-29 PROCEDURE — 83735 ASSAY OF MAGNESIUM: CPT

## 2018-06-29 PROCEDURE — 63700000 PHARM REV CODE 250 ALT 637 W/O HCPCS: Performed by: INTERNAL MEDICINE

## 2018-06-29 PROCEDURE — 80053 COMPREHEN METABOLIC PANEL: CPT

## 2018-06-29 PROCEDURE — 81206 BCR/ABL1 GENE MAJOR BP: CPT

## 2018-06-29 PROCEDURE — 20600001 HC STEP DOWN PRIVATE ROOM

## 2018-06-29 PROCEDURE — 99233 SBSQ HOSP IP/OBS HIGH 50: CPT | Mod: ,,, | Performed by: INTERNAL MEDICINE

## 2018-06-29 RX ADMIN — VORICONAZOLE 200 MG: 200 TABLET ORAL at 08:06

## 2018-06-29 RX ADMIN — DEXAMETHASONE 1 DROP: 1 SUSPENSION OPHTHALMIC at 04:06

## 2018-06-29 RX ADMIN — ACYCLOVIR 400 MG: 200 CAPSULE ORAL at 08:06

## 2018-06-29 RX ADMIN — POTASSIUM CHLORIDE 20 MEQ: 750 CAPSULE, EXTENDED RELEASE ORAL at 08:06

## 2018-06-29 RX ADMIN — DASATINIB 40 MG: 20 TABLET ORAL at 08:06

## 2018-06-29 RX ADMIN — ONDANSETRON 16 MG: 8 TABLET, ORALLY DISINTEGRATING ORAL at 12:06

## 2018-06-29 RX ADMIN — MIRTAZAPINE 15 MG: 15 TABLET, ORALLY DISINTEGRATING ORAL at 08:06

## 2018-06-29 RX ADMIN — DEXAMETHASONE 1 DROP: 1 SUSPENSION OPHTHALMIC at 05:06

## 2018-06-29 RX ADMIN — DEXAMETHASONE 1 DROP: 1 SUSPENSION OPHTHALMIC at 11:06

## 2018-06-29 RX ADMIN — CIPROFLOXACIN HYDROCHLORIDE 500 MG: 250 TABLET, FILM COATED ORAL at 08:06

## 2018-06-29 RX ADMIN — SODIUM CHLORIDE: 0.9 INJECTION, SOLUTION INTRAVENOUS at 04:06

## 2018-06-29 RX ADMIN — SODIUM CHLORIDE: 0.9 INJECTION, SOLUTION INTRAVENOUS at 11:06

## 2018-06-29 NOTE — ASSESSMENT & PLAN NOTE
- initial WBC reportedly 150,000  - bcr-abl positive on FISH at [facility] (records attached in media tab)  - original BMBX 5/26/18 with 20% blasts; CML transformed to AML with blast crisis;   karyotype: 46,XY,t(9;22;22;14)q34;q11.2;q13;q23); BCR/ABL gene fusion 62%  - continue dasatinib at 40 mg (dose reduction secondary to being on voriconazole)  - voriconazole, acyclovir and cipro for prophylaxis   - repeat BM biopsy done 6/18 here at [facility] after 7+3. 9% blasts.   - persistent disease post 7+3 induction. 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 Today D7. Tolerating well. Plan for D14 BMBX.  - AML FISH negative  - NGS pending from repeat BMBX  - echo with 63% EF  - HIV negative, Hepatitis negative, G6PD wnl  - HLA typing in process  - referral information for transplant benefits given to patient  - patient has 1 full sibling, contact info given to transplant coordinators  - consider sending BCR/ABL mutational analysis testing when WBC increases

## 2018-06-29 NOTE — PROGRESS NOTES
[facility]  Hematology  Bone Marrow Transplant  Progress Note    Patient Name: Wilton Guzmán  Admission Date: 6/16/2018  Hospital Length of Stay: 13 days  Code Status: Full Code    Subjective:     Interval History:   D7 MEC. No acute issues. VSS. Afebrile. Pt reports some taste alterations after starting chemotherapy.    Objective:     Vital Signs (Most Recent):  Temp: 98.1 °F (36.7 °C) (06/29/18 0751)  Pulse: 93 (06/29/18 0751)  Resp: 16 (06/29/18 0751)  BP: 118/76 (06/29/18 0751)  SpO2: 99 % (06/29/18 0751) Vital Signs (24h Range):  Temp:  [97.8 °F (36.6 °C)-98.3 °F (36.8 °C)] 98.1 °F (36.7 °C)  Pulse:  [81-94] 93  Resp:  [16-18] 16  SpO2:  [97 %-100 %] 99 %  BP: (104-132)/(58-87) 118/76     Weight: 67.5 kg (148 lb 14.7 oz)  Body mass index is 20.77 kg/m².  Body surface area is 1.84 meters squared.    ECOG SCORE         [unfilled]    Intake/Output - Last 3 Shifts       06/27 0700 - 06/28 0659 06/28 0700 - 06/29 0659 06/29 0700 - 06/30 0659    P.O. 1725 2045     I.V. (mL/kg) 4033.3 (60.4) 1329.2 (19.7) 152.5 (2.3)    IV Piggyback 800 800     Total Intake(mL/kg) 6558.3 (98.2) 4174.2 (61.8) 152.5 (2.3)    Urine (mL/kg/hr) 3600 (2.2) 2625 (1.6) 600 (2.3)    Total Output 3600 2625 600    Net +2958.3 +1549.2 -447.5           Stool Occurrence 1 x 1 x           Physical Exam   Constitutional: He is oriented to person, place, and time. He appears well-developed and well-nourished.   HENT:   Head: Normocephalic and atraumatic.   Eyes: EOM are normal. Pupils are equal, round, and reactive to light.   Neck: Normal range of motion. Neck supple.   Cardiovascular: Normal rate and regular rhythm.    No murmur heard.  Pulmonary/Chest: Effort normal and breath sounds normal. No respiratory distress.   Abdominal: Soft. Bowel sounds are normal. He exhibits no distension.   Musculoskeletal: Normal range of motion. He exhibits no edema.   Neurological: He is alert and oriented to person, place, and time.   Skin:  Skin is warm and dry. Rash noted.   Rash to head and bilateral arms  improved; pt states it started after taking dasatinib   Psychiatric: He has a normal mood and affect. His behavior is normal.   Vitals reviewed.      Significant Labs:   CBC:   Recent Labs  Lab 06/28/18  0422 06/29/18  0456   WBC 1.22* 0.54*   HGB 7.7* 7.3*   HCT 23.1* 22.8*   PLT 55* 36*    and CMP:   Recent Labs  Lab 06/28/18  0422 06/29/18  0456    140   K 3.8 3.8    108   CO2 28 27   * 118*   BUN 11 11   CREATININE 0.6 0.5   CALCIUM 8.3* 8.4*   PROT 4.9* 5.0*   ALBUMIN 2.3* 2.4*   BILITOT 0.2 0.2   ALKPHOS 60 58   AST 16 21   ALT 35 46*   ANIONGAP 4* 5*   EGFRNONAA >60.0 >60.0       Diagnostic Results:  I have reviewed all pertinent imaging results/findings within the past 24 hours.    Assessment/Plan:     * Chronic myelogenous leukemia (CML), BCR-ABL1-positive    - initial WBC reportedly 150,000  - bcr-abl positive on FISH at [facility] (records attached in media tab)  - original BMBX 5/26/18 with 20% blasts; CML transformed to AML with blast crisis;   karyotype: 46,XY,t(9;22;22;14)q34;q11.2;q13;q23); BCR/ABL gene fusion 62%  - continue dasatinib at 40 mg (dose reduction secondary to being on voriconazole)  - voriconazole, acyclovir and cipro for prophylaxis   - repeat BM biopsy done 6/18 here at [facility] after 7+3. 9% blasts.   - persistent disease post 7+3 induction. 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 Today D7. Tolerating well. Plan for D14 BMBX.  - AML FISH negative  - NGS pending from repeat BMBX  - echo with 63% EF  - HIV negative, Hepatitis negative, G6PD wnl  - HLA typing in process  - referral information for transplant benefits given to patient  - patient has 1 full sibling, contact info given to transplant coordinators  - consider sending BCR/ABL mutational analysis testing when WBC increases            Drug rash    - resolved        Neutropenic fever    - 6/22/18 0025 fever of 102.3.   - blood  cultures NGTD.   - UA negative and urine culture negative   - CXR negative for acute process.   - Cefepime 6/22-6/25 and transitioned back to PPX cipro  - resolved; now afebrile        Anxiety    Patient understandibly experiencing anxiety about his new diagnosis and feeling isolated in his room.   Declines oncology psychologist consult.   Continue Remeron for anxiety and insomnia.         Pancytopenia due to antineoplastic chemotherapy    - transfuse for Hgb<7 and platelets<10k  - WBC 0.54, , Hgb 7.3, Plt 36k  - continue PPX acyc, vori, and cipro        C. difficile colitis    - resolved  - completed course of PO vancomycin 6/23/2018.              VTE Risk Mitigation         Ordered     heparin, porcine (PF) 100 unit/mL injection flush 300 Units  As needed (PRN)      06/23/18 1052     IP VTE LOW RISK PATIENT  Once      06/16/18 0938          Disposition: inpatient    Whitney Curiel NP  Bone Marrow Transplant  [facility]

## 2018-06-29 NOTE — PLAN OF CARE
MDR's with Dr Hernández.  Patient is day 7 of MEC and tolerating without complication.  Next BMB planned for day 21.  CM will continue to follow.

## 2018-06-29 NOTE — ASSESSMENT & PLAN NOTE
- transfuse for Hgb<7 and platelets<10k  - WBC 0.54, , Hgb 7.3, Plt 36k  - continue PPX acyc, vori, and cipro

## 2018-06-29 NOTE — PLAN OF CARE
Problem: Patient Care Overview  Goal: Plan of Care Review  Outcome: Ongoing (interventions implemented as appropriate)  Plan of care reviewed with the patient at the beginning of the shift. 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 He is s/p 7&3 and MEC. Pt is tolerating chemo well. IVF infusing. He has no complaints at this time. Pt denies pain, n/v/d. Pt reports a good appetite. PO intake encouraged. Fall precautions maintained. Pt remained free from falls and injury this shift. Bed locked in lowest position, side rails up x2, call light within reach. Instructed pt to call for assistance as needed. Pt verbalized understanding. No acute issues overnight. Vitals stable. Pt afebrile. Neutropenic precautions maintained. Will continue to monitor.

## 2018-06-29 NOTE — PLAN OF CARE
Problem: Patient Care Overview  Goal: Plan of Care Review  Outcome: Ongoing (interventions implemented as appropriate)  POC reviewed with patient; understanding verbalized. Pt awaiting count recovery. Potassium replacemets administered. Dexamethasone drops administered as ordered. Regular diet with fair appetite. He voids clear, yellow urine in the urinal with 3 BMs this shift. NS at 50mL. Pt. with nonskid footwear on, bed in lowest position, and locked with bed rails up x2. Pt. has call light and personal items within reach. Patient ambulates in room independently. VSS and afebrile this shift. All questions and concerns address at this time. Will continue to monitor.

## 2018-06-29 NOTE — SUBJECTIVE & OBJECTIVE
Subjective:     Interval History:   D7 [facility]. No acute issues. VSS. Afebrile. Pt reports some taste alterations after starting chemotherapy.    Objective:     Vital Signs (Most Recent):  Temp: 98.1 °F (36.7 °C) (06/29/18 0751)  Pulse: 93 (06/29/18 0751)  Resp: 16 (06/29/18 0751)  BP: 118/76 (06/29/18 0751)  SpO2: 99 % (06/29/18 0751) Vital Signs (24h Range):  Temp:  [97.8 °F (36.6 °C)-98.3 °F (36.8 °C)] 98.1 °F (36.7 °C)  Pulse:  [81-94] 93  Resp:  [16-18] 16  SpO2:  [97 %-100 %] 99 %  BP: (104-132)/(58-87) 118/76     Weight: 67.5 kg (148 lb 14.7 oz)  Body mass index is 20.77 kg/m².  Body surface area is 1.84 meters squared.    ECOG SCORE         [unfilled]    Intake/Output - Last 3 Shifts       06/27 0700 - 06/28 0659 06/28 0700 - 06/29 0659 06/29 0700 - 06/30 0659    P.O. 1725 2045     I.V. (mL/kg) 4033.3 (60.4) 1329.2 (19.7) 152.5 (2.3)    IV Piggyback 800 800     Total Intake(mL/kg) 6558.3 (98.2) 4174.2 (61.8) 152.5 (2.3)    Urine (mL/kg/hr) 3600 (2.2) 2625 (1.6) 600 (2.3)    Total Output 3600 2625 600    Net +2958.3 +1549.2 -447.5           Stool Occurrence 1 x 1 x           Physical Exam   Constitutional: He is oriented to person, place, and time. He appears well-developed and well-nourished.   HENT:   Head: Normocephalic and atraumatic.   Eyes: EOM are normal. Pupils are equal, round, and reactive to light.   Neck: Normal range of motion. Neck supple.   Cardiovascular: Normal rate and regular rhythm.    No murmur heard.  Pulmonary/Chest: Effort normal and breath sounds normal. No respiratory distress.   Abdominal: Soft. Bowel sounds are normal. He exhibits no distension.   Musculoskeletal: Normal range of motion. He exhibits no edema.   Neurological: He is alert and oriented to person, place, and time.   Skin: Skin is warm and dry. Rash noted.   Rash to head and bilateral arms  improved; pt states it started after taking dasatinib   Psychiatric: He has a normal mood and affect. His behavior is normal.   Vitals  reviewed.      Significant Labs:   CBC:   Recent Labs  Lab 06/28/18  0422 06/29/18  0456   WBC 1.22* 0.54*   HGB 7.7* 7.3*   HCT 23.1* 22.8*   PLT 55* 36*    and CMP:   Recent Labs  Lab 06/28/18  0422 06/29/18  0456    140   K 3.8 3.8    108   CO2 28 27   * 118*   BUN 11 11   CREATININE 0.6 0.5   CALCIUM 8.3* 8.4*   PROT 4.9* 5.0*   ALBUMIN 2.3* 2.4*   BILITOT 0.2 0.2   ALKPHOS 60 58   AST 16 21   ALT 35 46*   ANIONGAP 4* 5*   EGFRNONAA >60.0 >60.0       Diagnostic Results:  I have reviewed all pertinent imaging results/findings within the past 24 hours.

## 2018-06-30 LAB
ALBUMIN SERPL BCP-MCNC: 2.6 G/DL
ALP SERPL-CCNC: 59 U/L
ALT SERPL W/O P-5'-P-CCNC: 49 U/L
ANION GAP SERPL CALC-SCNC: 5 MMOL/L
ANISOCYTOSIS BLD QL SMEAR: SLIGHT
AST SERPL-CCNC: 23 U/L
BASOPHILS # BLD AUTO: 0 K/UL
BASOPHILS NFR BLD: 0 %
BILIRUB SERPL-MCNC: 0.3 MG/DL
BUN SERPL-MCNC: 12 MG/DL
CALCIUM SERPL-MCNC: 8.5 MG/DL
CHLORIDE SERPL-SCNC: 107 MMOL/L
CO2 SERPL-SCNC: 28 MMOL/L
CREAT SERPL-MCNC: 0.6 MG/DL
DIFFERENTIAL METHOD: ABNORMAL
EOSINOPHIL # BLD AUTO: 0 K/UL
EOSINOPHIL NFR BLD: 0 %
ERYTHROCYTE [DISTWIDTH] IN BLOOD BY AUTOMATED COUNT: 14.9 %
EST. GFR  (AFRICAN AMERICAN): >60 ML/MIN/1.73 M^2
EST. GFR  (NON AFRICAN AMERICAN): >60 ML/MIN/1.73 M^2
GLUCOSE SERPL-MCNC: 81 MG/DL
HCT VFR BLD AUTO: 22.5 %
HGB BLD-MCNC: 7.5 G/DL
HYPOCHROMIA BLD QL SMEAR: ABNORMAL
IMM GRANULOCYTES # BLD AUTO: 0 K/UL
IMM GRANULOCYTES NFR BLD AUTO: 0 %
LYMPHOCYTES # BLD AUTO: 0.2 K/UL
LYMPHOCYTES NFR BLD: 72.7 %
MAGNESIUM SERPL-MCNC: 1.8 MG/DL
MCH RBC QN AUTO: 28.5 PG
MCHC RBC AUTO-ENTMCNC: 33.3 G/DL
MCV RBC AUTO: 86 FL
MONOCYTES # BLD AUTO: 0 K/UL
MONOCYTES NFR BLD: 0 %
NEUTROPHILS # BLD AUTO: 0.1 K/UL
NEUTROPHILS NFR BLD: 27.3 %
NRBC BLD-RTO: 0 /100 WBC
OVALOCYTES BLD QL SMEAR: ABNORMAL
PHOSPHATE SERPL-MCNC: 2.7 MG/DL
PLATELET # BLD AUTO: 24 K/UL
PLATELET BLD QL SMEAR: ABNORMAL
PMV BLD AUTO: ABNORMAL FL
POIKILOCYTOSIS BLD QL SMEAR: SLIGHT
POLYCHROMASIA BLD QL SMEAR: ABNORMAL
POTASSIUM SERPL-SCNC: 3.9 MMOL/L
PROT SERPL-MCNC: 5.1 G/DL
RBC # BLD AUTO: 2.63 M/UL
SODIUM SERPL-SCNC: 140 MMOL/L
TARGETS BLD QL SMEAR: ABNORMAL
WBC # BLD AUTO: 0.22 K/UL

## 2018-06-30 PROCEDURE — 25000003 PHARM REV CODE 250: Performed by: NURSE PRACTITIONER

## 2018-06-30 PROCEDURE — 25000003 PHARM REV CODE 250: Performed by: INTERNAL MEDICINE

## 2018-06-30 PROCEDURE — 63700000 PHARM REV CODE 250 ALT 637 W/O HCPCS: Performed by: INTERNAL MEDICINE

## 2018-06-30 PROCEDURE — 80053 COMPREHEN METABOLIC PANEL: CPT

## 2018-06-30 PROCEDURE — 99233 SBSQ HOSP IP/OBS HIGH 50: CPT | Mod: ,,, | Performed by: INTERNAL MEDICINE

## 2018-06-30 PROCEDURE — 85025 COMPLETE CBC W/AUTO DIFF WBC: CPT

## 2018-06-30 PROCEDURE — 83735 ASSAY OF MAGNESIUM: CPT

## 2018-06-30 PROCEDURE — 20600001 HC STEP DOWN PRIVATE ROOM

## 2018-06-30 PROCEDURE — 84100 ASSAY OF PHOSPHORUS: CPT

## 2018-06-30 PROCEDURE — 63600175 PHARM REV CODE 636 W HCPCS: Performed by: INTERNAL MEDICINE

## 2018-06-30 RX ADMIN — DEXAMETHASONE 1 DROP: 1 SUSPENSION OPHTHALMIC at 11:06

## 2018-06-30 RX ADMIN — CIPROFLOXACIN HYDROCHLORIDE 500 MG: 250 TABLET, FILM COATED ORAL at 08:06

## 2018-06-30 RX ADMIN — VORICONAZOLE 200 MG: 200 TABLET ORAL at 08:06

## 2018-06-30 RX ADMIN — MAGNESIUM SULFATE IN WATER 2 G: 40 INJECTION, SOLUTION INTRAVENOUS at 06:06

## 2018-06-30 RX ADMIN — ONDANSETRON 16 MG: 8 TABLET, ORALLY DISINTEGRATING ORAL at 12:06

## 2018-06-30 RX ADMIN — SODIUM CHLORIDE: 0.9 INJECTION, SOLUTION INTRAVENOUS at 08:06

## 2018-06-30 RX ADMIN — DEXAMETHASONE 1 DROP: 1 SUSPENSION OPHTHALMIC at 05:06

## 2018-06-30 RX ADMIN — DEXAMETHASONE 1 DROP: 1 SUSPENSION OPHTHALMIC at 04:06

## 2018-06-30 RX ADMIN — MIRTAZAPINE 15 MG: 15 TABLET, ORALLY DISINTEGRATING ORAL at 08:06

## 2018-06-30 RX ADMIN — DASATINIB 40 MG: 20 TABLET ORAL at 08:06

## 2018-06-30 RX ADMIN — ACYCLOVIR 400 MG: 200 CAPSULE ORAL at 08:06

## 2018-06-30 NOTE — PROGRESS NOTES
[facility]  Hematology  Bone Marrow Transplant  Progress Note    Patient Name: Wilton Guzmán  Admission Date: 6/16/2018  Hospital Length of Stay: 14 days  Code Status: Full Code    Subjective:     Interval History:   D8 MEC. No acute issues. VSS. Afebrile. Pt continues to report some taste alterations after starting chemotherapy.    Objective:     Vital Signs (Most Recent):  Temp: 98 °F (36.7 °C) (06/30/18 0751)  Pulse: 84 (06/30/18 0751)  Resp: 16 (06/30/18 0751)  BP: 113/72 (06/30/18 0751)  SpO2: 99 % (06/30/18 0751) Vital Signs (24h Range):  Temp:  [97.8 °F (36.6 °C)-99 °F (37.2 °C)] 98 °F (36.7 °C)  Pulse:  [79-95] 84  Resp:  [14-18] 16  SpO2:  [98 %-100 %] 99 %  BP: (112-120)/(59-75) 113/72     Weight: 64.7 kg (142 lb 10.2 oz)  Body mass index is 19.89 kg/m².  Body surface area is 1.8 meters squared.    ECOG SCORE         [unfilled]    Intake/Output - Last 3 Shifts       06/28 0700 - 06/29 0659 06/29 0700 - 06/30 0659 06/30 0700 - 07/01 0659    P.O. 2045 720     I.V. (mL/kg) 1329.2 (19.7) 1204 (18.6)     IV Piggyback 800      Total Intake(mL/kg) 4174.2 (61.8) 1924 (29.7)     Urine (mL/kg/hr) 2625 (1.6) 3200 (2.1)     Total Output 2625 3200      Net +1549.2 -1276             Urine Occurrence  3 x     Stool Occurrence 1 x 4 x           Physical Exam   Constitutional: He is oriented to person, place, and time. He appears well-developed and well-nourished.   HENT:   Head: Normocephalic and atraumatic.   Eyes: EOM are normal. Pupils are equal, round, and reactive to light.   Neck: Normal range of motion. Neck supple.   Cardiovascular: Normal rate and regular rhythm.    No murmur heard.  Pulmonary/Chest: Effort normal and breath sounds normal. No respiratory distress.   Abdominal: Soft. Bowel sounds are normal. He exhibits no distension.   Musculoskeletal: Normal range of motion. He exhibits no edema.   Neurological: He is alert and oriented to person, place, and time.   Skin: Skin is warm and  dry. Rash noted.   Rash to head and bilateral arms  improved; pt states it started after taking dasatinib   Psychiatric: He has a normal mood and affect. His behavior is normal.   Vitals reviewed.      Significant Labs:   CBC:     Recent Labs  Lab 06/29/18  0456 06/30/18  0400   WBC 0.54* 0.22*   HGB 7.3* 7.5*   HCT 22.8* 22.5*   PLT 36* 24*    and CMP:     Recent Labs  Lab 06/29/18  0456 06/30/18  0400    140   K 3.8 3.9    107   CO2 27 28   * 81   BUN 11 12   CREATININE 0.5 0.6   CALCIUM 8.4* 8.5*   PROT 5.0* 5.1*   ALBUMIN 2.4* 2.6*   BILITOT 0.2 0.3   ALKPHOS 58 59   AST 21 23   ALT 46* 49*   ANIONGAP 5* 5*   EGFRNONAA >60.0 >60.0       Diagnostic Results:  I have reviewed all pertinent imaging results/findings within the past 24 hours.    Assessment/Plan:     * Chronic myelogenous leukemia (CML), BCR-ABL1-positive    - initial WBC reportedly 150,000  - bcr-abl positive on FISH at [facility] (records attached in media tab)  - original BMBX 5/26/18 with 20% blasts; CML transformed to AML with blast crisis;   karyotype: 46,XY,t(9;22;22;14)q34;q11.2;q13;q23); BCR/ABL gene fusion 62%  - continue dasatinib at 40 mg (dose reduction secondary to being on voriconazole)  - voriconazole, acyclovir and cipro for prophylaxis   - repeat BM biopsy done 6/18 here at [facility] after 7+3. 9% blasts.   - persistent disease post 7+3 induction. 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 Today D8. Tolerating well. Plan for D14 BMBX.  - AML FISH negative  - NGS pending from repeat BMBX  - echo with 63% EF  - HIV negative, Hepatitis negative, G6PD wnl  - HLA typing in process  - referral information for transplant benefits given to patient  - patient has 1 full sibling, contact info given to transplant coordinators  - consider sending BCR/ABL mutational analysis testing when WBC increases            Drug rash    - resolved        Neutropenic fever    - 6/22/18 0025 fever of 102.3.   - blood cultures NGTD.    - UA negative and urine culture negative   - CXR negative for acute process.   - Cefepime 6/22-6/25 and transitioned back to PPX cipro  - resolved; now afebrile        Anxiety    Patient understandibly experiencing anxiety about his new diagnosis and feeling isolated in his room.   Declines oncology psychologist consult.   Continue Remeron for anxiety and insomnia.         Pancytopenia due to antineoplastic chemotherapy    - transfuse for Hgb<7 and platelets<10k  - WBC 0.54, , Hgb 7.3, Plt 36k  - continue PPX acyc, vori, and cipro        C. difficile colitis    - resolved  - completed course of PO vancomycin 6/23/2018.              VTE Risk Mitigation         Ordered     heparin, porcine (PF) 100 unit/mL injection flush 300 Units  As needed (PRN)      06/23/18 1052     IP VTE LOW RISK PATIENT  Once      06/16/18 0938          Disposition: Continue inpatient care, awaiting BMBX post [facility]     Ricki Gamez MD  Bone Marrow Transplant  [facility]

## 2018-06-30 NOTE — ASSESSMENT & PLAN NOTE
- initial WBC reportedly 150,000  - bcr-abl positive on FISH at [facility] (records attached in media tab)  - original BMBX 5/26/18 with 20% blasts; CML transformed to AML with blast crisis;   karyotype: 46,XY,t(9;22;22;14)q34;q11.2;q13;q23); BCR/ABL gene fusion 62%  - continue dasatinib at 40 mg (dose reduction secondary to being on voriconazole)  - voriconazole, acyclovir and cipro for prophylaxis   - repeat BM biopsy done 6/18 here at [facility] after 7+3. 9% blasts.   - persistent disease post 7+3 induction. 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 Today D8. Tolerating well. Plan for D14 BMBX.  - AML FISH negative  - NGS pending from repeat BMBX  - echo with 63% EF  - HIV negative, Hepatitis negative, G6PD wnl  - HLA typing in process  - referral information for transplant benefits given to patient  - patient has 1 full sibling, contact info given to transplant coordinators  - consider sending BCR/ABL mutational analysis testing when WBC increases

## 2018-06-30 NOTE — PLAN OF CARE
Problem: Patient Care Overview  Goal: Plan of Care Review  Outcome: Ongoing (interventions implemented as appropriate)  POC reviewed with patient; understanding verbalized. Pt awaiting count recovery. Dexamethasone drops administered as ordered. Regular diet with good appetite. He voids clear, yellow urine in the urinal with no BM this shift. NS continued at 50mL. Pt. with nonskid footwear on, bed in lowest position, and locked with bed rails up x2. Pt. has call light and personal items within reach. Patient ambulates in room independently. VSS and afebrile this shift. All questions and concerns address at this time. Will continue to monitor.

## 2018-06-30 NOTE — SUBJECTIVE & OBJECTIVE
Subjective:     Interval History:   D8 MEC. No acute issues. VSS. Afebrile. Pt continues to report some taste alterations after starting chemotherapy.    Objective:     Vital Signs (Most Recent):  Temp: 98 °F (36.7 °C) (06/30/18 0751)  Pulse: 84 (06/30/18 0751)  Resp: 16 (06/30/18 0751)  BP: 113/72 (06/30/18 0751)  SpO2: 99 % (06/30/18 0751) Vital Signs (24h Range):  Temp:  [97.8 °F (36.6 °C)-99 °F (37.2 °C)] 98 °F (36.7 °C)  Pulse:  [79-95] 84  Resp:  [14-18] 16  SpO2:  [98 %-100 %] 99 %  BP: (112-120)/(59-75) 113/72     Weight: 64.7 kg (142 lb 10.2 oz)  Body mass index is 19.89 kg/m².  Body surface area is 1.8 meters squared.    ECOG SCORE         [unfilled]    Intake/Output - Last 3 Shifts       06/28 0700 - 06/29 0659 06/29 0700 - 06/30 0659 06/30 0700 - 07/01 0659    P.O. 2045 720     I.V. (mL/kg) 1329.2 (19.7) 1204 (18.6)     IV Piggyback 800      Total Intake(mL/kg) 4174.2 (61.8) 1924 (29.7)     Urine (mL/kg/hr) 2625 (1.6) 3200 (2.1)     Total Output 2625 3200      Net +1549.2 -1276             Urine Occurrence  3 x     Stool Occurrence 1 x 4 x           Physical Exam   Constitutional: He is oriented to person, place, and time. He appears well-developed and well-nourished.   HENT:   Head: Normocephalic and atraumatic.   Eyes: EOM are normal. Pupils are equal, round, and reactive to light.   Neck: Normal range of motion. Neck supple.   Cardiovascular: Normal rate and regular rhythm.    No murmur heard.  Pulmonary/Chest: Effort normal and breath sounds normal. No respiratory distress.   Abdominal: Soft. Bowel sounds are normal. He exhibits no distension.   Musculoskeletal: Normal range of motion. He exhibits no edema.   Neurological: He is alert and oriented to person, place, and time.   Skin: Skin is warm and dry. Rash noted.   Rash to head and bilateral arms  improved; pt states it started after taking dasatinib   Psychiatric: He has a normal mood and affect. His behavior is normal.   Vitals  reviewed.      Significant Labs:   CBC:     Recent Labs  Lab 06/29/18  0456 06/30/18  0400   WBC 0.54* 0.22*   HGB 7.3* 7.5*   HCT 22.8* 22.5*   PLT 36* 24*    and CMP:     Recent Labs  Lab 06/29/18  0456 06/30/18  0400    140   K 3.8 3.9    107   CO2 27 28   * 81   BUN 11 12   CREATININE 0.5 0.6   CALCIUM 8.4* 8.5*   PROT 5.0* 5.1*   ALBUMIN 2.4* 2.6*   BILITOT 0.2 0.3   ALKPHOS 58 59   AST 21 23   ALT 46* 49*   ANIONGAP 5* 5*   EGFRNONAA >60.0 >60.0       Diagnostic Results:  I have reviewed all pertinent imaging results/findings within the past 24 hours.

## 2018-06-30 NOTE — PLAN OF CARE
Problem: Patient Care Overview  Goal: Plan of Care Review  Outcome: Ongoing (interventions implemented as appropriate)  Plan of care reviewed with the patient at the beginning of the shift. 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 He is s/p 7&3 and MEC. Today is day 8 of MEC. Bone marrow bx planned for 07/13.Pt is tolerating chemo well. IVF infusing. He has no complaints at this time. Pt denies pain, n/v/d. Pt reports a good appetite. PO intake encouraged. Fall precautions maintained. He is currently up independently without difficulty. Pt remained free from falls and injury this shift. Bed locked in lowest position, side rails up x2, call light within reach. Instructed pt to call for assistance as needed. Pt verbalized understanding. No acute issues overnight. Vitals stable. Pt afebrile. Neutropenic precautions maintained. Will continue to monitor.

## 2018-07-01 LAB
ALBUMIN SERPL BCP-MCNC: 2.6 G/DL
ALP SERPL-CCNC: 66 U/L
ALT SERPL W/O P-5'-P-CCNC: 53 U/L
ANION GAP SERPL CALC-SCNC: 8 MMOL/L
ANISOCYTOSIS BLD QL SMEAR: SLIGHT
AST SERPL-CCNC: 23 U/L
BASOPHILS # BLD AUTO: 0 K/UL
BASOPHILS NFR BLD: 0 %
BILIRUB SERPL-MCNC: 0.2 MG/DL
BUN SERPL-MCNC: 13 MG/DL
CALCIUM SERPL-MCNC: 8.3 MG/DL
CHLORIDE SERPL-SCNC: 107 MMOL/L
CO2 SERPL-SCNC: 25 MMOL/L
CREAT SERPL-MCNC: 0.6 MG/DL
DIFFERENTIAL METHOD: ABNORMAL
EOSINOPHIL # BLD AUTO: 0 K/UL
EOSINOPHIL NFR BLD: 0 %
ERYTHROCYTE [DISTWIDTH] IN BLOOD BY AUTOMATED COUNT: 14.9 %
EST. GFR  (AFRICAN AMERICAN): >60 ML/MIN/1.73 M^2
EST. GFR  (NON AFRICAN AMERICAN): >60 ML/MIN/1.73 M^2
GLUCOSE SERPL-MCNC: 77 MG/DL
HCT VFR BLD AUTO: 22.4 %
HGB BLD-MCNC: 7.6 G/DL
HYPOCHROMIA BLD QL SMEAR: ABNORMAL
IMM GRANULOCYTES # BLD AUTO: 0 K/UL
IMM GRANULOCYTES NFR BLD AUTO: 0 %
LYMPHOCYTES # BLD AUTO: 0.2 K/UL
LYMPHOCYTES NFR BLD: 93.8 %
MAGNESIUM SERPL-MCNC: 1.7 MG/DL
MCH RBC QN AUTO: 28.9 PG
MCHC RBC AUTO-ENTMCNC: 33.9 G/DL
MCV RBC AUTO: 85 FL
MONOCYTES # BLD AUTO: 0 K/UL
MONOCYTES NFR BLD: 0 %
NEUTROPHILS # BLD AUTO: 0 K/UL
NEUTROPHILS NFR BLD: 6.2 %
NRBC BLD-RTO: 0 /100 WBC
OVALOCYTES BLD QL SMEAR: ABNORMAL
PHOSPHATE SERPL-MCNC: 3.1 MG/DL
PLATELET # BLD AUTO: 18 K/UL
PLATELET BLD QL SMEAR: ABNORMAL
PMV BLD AUTO: ABNORMAL FL
POIKILOCYTOSIS BLD QL SMEAR: SLIGHT
POLYCHROMASIA BLD QL SMEAR: ABNORMAL
POTASSIUM SERPL-SCNC: 3.6 MMOL/L
PROT SERPL-MCNC: 5.1 G/DL
RBC # BLD AUTO: 2.63 M/UL
SODIUM SERPL-SCNC: 140 MMOL/L
TARGETS BLD QL SMEAR: ABNORMAL
WBC # BLD AUTO: 0.16 K/UL

## 2018-07-01 PROCEDURE — 84100 ASSAY OF PHOSPHORUS: CPT

## 2018-07-01 PROCEDURE — 99233 SBSQ HOSP IP/OBS HIGH 50: CPT | Mod: ,,, | Performed by: INTERNAL MEDICINE

## 2018-07-01 PROCEDURE — 85025 COMPLETE CBC W/AUTO DIFF WBC: CPT

## 2018-07-01 PROCEDURE — 83735 ASSAY OF MAGNESIUM: CPT

## 2018-07-01 PROCEDURE — 20600001 HC STEP DOWN PRIVATE ROOM

## 2018-07-01 PROCEDURE — 63600175 PHARM REV CODE 636 W HCPCS: Performed by: INTERNAL MEDICINE

## 2018-07-01 PROCEDURE — 25000003 PHARM REV CODE 250: Performed by: INTERNAL MEDICINE

## 2018-07-01 PROCEDURE — 63700000 PHARM REV CODE 250 ALT 637 W/O HCPCS: Performed by: INTERNAL MEDICINE

## 2018-07-01 PROCEDURE — 25000003 PHARM REV CODE 250: Performed by: NURSE PRACTITIONER

## 2018-07-01 PROCEDURE — 80053 COMPREHEN METABOLIC PANEL: CPT

## 2018-07-01 RX ADMIN — POTASSIUM CHLORIDE 20 MEQ: 750 CAPSULE, EXTENDED RELEASE ORAL at 06:07

## 2018-07-01 RX ADMIN — CIPROFLOXACIN HYDROCHLORIDE 500 MG: 250 TABLET, FILM COATED ORAL at 09:07

## 2018-07-01 RX ADMIN — MAGNESIUM SULFATE IN WATER 2 G: 40 INJECTION, SOLUTION INTRAVENOUS at 06:07

## 2018-07-01 RX ADMIN — DASATINIB 40 MG: 20 TABLET ORAL at 08:07

## 2018-07-01 RX ADMIN — ACYCLOVIR 400 MG: 200 CAPSULE ORAL at 08:07

## 2018-07-01 RX ADMIN — DEXAMETHASONE 1 DROP: 1 SUSPENSION OPHTHALMIC at 11:07

## 2018-07-01 RX ADMIN — ACYCLOVIR 400 MG: 200 CAPSULE ORAL at 09:07

## 2018-07-01 RX ADMIN — DEXAMETHASONE 1 DROP: 1 SUSPENSION OPHTHALMIC at 05:07

## 2018-07-01 RX ADMIN — MIRTAZAPINE 15 MG: 15 TABLET, ORALLY DISINTEGRATING ORAL at 09:07

## 2018-07-01 RX ADMIN — DEXAMETHASONE 1 DROP: 1 SUSPENSION OPHTHALMIC at 04:07

## 2018-07-01 RX ADMIN — CIPROFLOXACIN HYDROCHLORIDE 500 MG: 250 TABLET, FILM COATED ORAL at 08:07

## 2018-07-01 RX ADMIN — VORICONAZOLE 200 MG: 200 TABLET ORAL at 09:07

## 2018-07-01 RX ADMIN — VORICONAZOLE 200 MG: 200 TABLET ORAL at 08:07

## 2018-07-01 NOTE — SUBJECTIVE & OBJECTIVE
Subjective:     Interval History:   Platelets 18 today. Patient denies any major bleeding events.     Objective:     Vital Signs (Most Recent):  Temp: 99.1 °F (37.3 °C) (07/01/18 1110)  Pulse: 94 (07/01/18 1110)  Resp: 19 (07/01/18 1110)  BP: 113/75 (07/01/18 1110)  SpO2: 99 % (07/01/18 1110) Vital Signs (24h Range):  Temp:  [98.1 °F (36.7 °C)-99.5 °F (37.5 °C)] 99.1 °F (37.3 °C)  Pulse:  [86-94] 94  Resp:  [16-19] 19  SpO2:  [99 %-100 %] 99 %  BP: (103-122)/(56-78) 113/75     Weight: 63.4 kg (139 lb 12.4 oz)  Body mass index is 19.49 kg/m².  Body surface area is 1.78 meters squared.    ECOG SCORE         [unfilled]    Intake/Output - Last 3 Shifts       06/29 0700 - 06/30 0659 06/30 0700 - 07/01 0659 07/01 0700 - 07/02 0659    P.O. 720 840 350    I.V. (mL/kg) 1204 (18.6) 1271.3 (20.1) 190 (3)    Total Intake(mL/kg) 1924 (29.7) 2111.3 (33.3) 540 (8.5)    Urine (mL/kg/hr) 3200 (2.1) 2100 (1.4) 950 (2.3)    Total Output 3200 2100 950    Net -1276 +11.3 -410           Urine Occurrence 3 x 3 x     Stool Occurrence 4 x 3 x           Physical Exam   Constitutional: He is oriented to person, place, and time. He appears well-developed and well-nourished.   HENT:   Head: Normocephalic and atraumatic.   Eyes: EOM are normal. Pupils are equal, round, and reactive to light.   Neck: Normal range of motion. Neck supple.   Cardiovascular: Normal rate and regular rhythm.    No murmur heard.  Pulmonary/Chest: Effort normal and breath sounds normal. No respiratory distress.   Abdominal: Soft. Bowel sounds are normal. He exhibits no distension.   Musculoskeletal: Normal range of motion. He exhibits no edema.   Neurological: He is alert and oriented to person, place, and time.   Skin: Skin is warm and dry. Rash noted.   Rash to head and bilateral arms  improved; pt states it started after taking dasatinib   Psychiatric: He has a normal mood and affect. His behavior is normal.   Vitals reviewed.      Significant Labs:   CBC:     Recent  Labs  Lab 06/30/18  0400 07/01/18  0400   WBC 0.22* 0.16*   HGB 7.5* 7.6*   HCT 22.5* 22.4*   PLT 24* 18*    and CMP:     Recent Labs  Lab 06/30/18  0400 07/01/18  0400    140   K 3.9 3.6    107   CO2 28 25   GLU 81 77   BUN 12 13   CREATININE 0.6 0.6   CALCIUM 8.5* 8.3*   PROT 5.1* 5.1*   ALBUMIN 2.6* 2.6*   BILITOT 0.3 0.2   ALKPHOS 59 66   AST 23 23   ALT 49* 53*   ANIONGAP 5* 8   EGFRNONAA >60.0 >60.0       Diagnostic Results:  I have reviewed all pertinent imaging results/findings within the past 24 hours.

## 2018-07-01 NOTE — PLAN OF CARE
Problem: Patient Care Overview  Goal: Plan of Care Review  Outcome: Ongoing (interventions implemented as appropriate)  Plan of care reviewed with the patient at the beginning of the shift. 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 He is s/p 7&3 and MEC. Today is day 9 of MEC. Bone marrow bx planned for 07/13. Pt is tolerating chemo well. IVF infusing. He has no complaints at this time. Pt denies pain, n/v/d. Pt reports a good appetite. PO intake encouraged. Fall precautions maintained. He is currently up independently without difficulty. Pt remained free from falls and injury this shift. Bed locked in lowest position, side rails up x2, call light within reach. Instructed pt to call for assistance as needed. Pt verbalized understanding. No acute issues overnight. Vitals stable. Pt afebrile. Neutropenic precautions maintained. Will continue to monitor.

## 2018-07-01 NOTE — PROGRESS NOTES
[facility]  Hematology  Bone Marrow Transplant  Progress Note    Patient Name: Wilton Guzmán  Admission Date: 6/16/2018  Hospital Length of Stay: 15 days  Code Status: Full Code    Subjective:     Interval History:   Platelets 18 today. Patient denies any major bleeding events.     Objective:     Vital Signs (Most Recent):  Temp: 99.1 °F (37.3 °C) (07/01/18 1110)  Pulse: 94 (07/01/18 1110)  Resp: 19 (07/01/18 1110)  BP: 113/75 (07/01/18 1110)  SpO2: 99 % (07/01/18 1110) Vital Signs (24h Range):  Temp:  [98.1 °F (36.7 °C)-99.5 °F (37.5 °C)] 99.1 °F (37.3 °C)  Pulse:  [86-94] 94  Resp:  [16-19] 19  SpO2:  [99 %-100 %] 99 %  BP: (103-122)/(56-78) 113/75     Weight: 63.4 kg (139 lb 12.4 oz)  Body mass index is 19.49 kg/m².  Body surface area is 1.78 meters squared.    ECOG SCORE         [unfilled]    Intake/Output - Last 3 Shifts       06/29 0700 - 06/30 0659 06/30 0700 - 07/01 0659 07/01 0700 - 07/02 0659    P.O. 720 840 350    I.V. (mL/kg) 1204 (18.6) 1271.3 (20.1) 190 (3)    Total Intake(mL/kg) 1924 (29.7) 2111.3 (33.3) 540 (8.5)    Urine (mL/kg/hr) 3200 (2.1) 2100 (1.4) 950 (2.3)    Total Output 3200 2100 950    Net -1276 +11.3 -410           Urine Occurrence 3 x 3 x     Stool Occurrence 4 x 3 x           Physical Exam   Constitutional: He is oriented to person, place, and time. He appears well-developed and well-nourished.   HENT:   Head: Normocephalic and atraumatic.   Eyes: EOM are normal. Pupils are equal, round, and reactive to light.   Neck: Normal range of motion. Neck supple.   Cardiovascular: Normal rate and regular rhythm.    No murmur heard.  Pulmonary/Chest: Effort normal and breath sounds normal. No respiratory distress.   Abdominal: Soft. Bowel sounds are normal. He exhibits no distension.   Musculoskeletal: Normal range of motion. He exhibits no edema.   Neurological: He is alert and oriented to person, place, and time.   Skin: Skin is warm and dry. Rash noted.   Rash to head and  bilateral arms  improved; pt states it started after taking dasatinib   Psychiatric: He has a normal mood and affect. His behavior is normal.   Vitals reviewed.      Significant Labs:   CBC:     Recent Labs  Lab 06/30/18  0400 07/01/18  0400   WBC 0.22* 0.16*   HGB 7.5* 7.6*   HCT 22.5* 22.4*   PLT 24* 18*    and CMP:     Recent Labs  Lab 06/30/18  0400 07/01/18  0400    140   K 3.9 3.6    107   CO2 28 25   GLU 81 77   BUN 12 13   CREATININE 0.6 0.6   CALCIUM 8.5* 8.3*   PROT 5.1* 5.1*   ALBUMIN 2.6* 2.6*   BILITOT 0.3 0.2   ALKPHOS 59 66   AST 23 23   ALT 49* 53*   ANIONGAP 5* 8   EGFRNONAA >60.0 >60.0       Diagnostic Results:  I have reviewed all pertinent imaging results/findings within the past 24 hours.    Assessment/Plan:     * Chronic myelogenous leukemia (CML), BCR-ABL1-positive    - initial WBC reportedly 150,000  - bcr-abl positive on FISH at [facility] (records attached in media tab)  - original BMBX 5/26/18 with 20% blasts; CML transformed to AML with blast crisis;   karyotype: 46,XY,t(9;22;22;14)q34;q11.2;q13;q23); BCR/ABL gene fusion 62%  - continue dasatinib at 40 mg (dose reduction secondary to being on voriconazole)  - voriconazole, acyclovir and cipro for prophylaxis   - repeat BM biopsy done 6/18 here at [facility] after 7+3. 9% blasts.   - persistent disease post 7+3 induction. 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 Today D9. Tolerating well. Plan for D14 BMBX.  - AML FISH negative  - NGS pending from repeat BMBX  - echo with 63% EF  - HIV negative, Hepatitis negative, G6PD wnl  - HLA typing in process  - referral information for transplant benefits given to patient  - patient has 1 full sibling, contact info given to transplant coordinators  - consider sending BCR/ABL mutational analysis testing when WBC increases            Drug rash    - resolved        Neutropenic fever    - 6/22/18 0025 fever of 102.3.   - blood cultures NGTD.   - UA negative and urine culture  negative   - CXR negative for acute process.   - Cefepime 6/22-6/25 and transitioned back to PPX cipro  - resolved; now afebrile        Anxiety    Patient understandibly experiencing anxiety about his new diagnosis and feeling isolated in his room.   Declines oncology psychologist consult.   Continue Remeron for anxiety and insomnia.         Pancytopenia due to antineoplastic chemotherapy    - transfuse for Hgb<7 and platelets<10k  - WBC 0.54, , Hgb 7.3, Plt 36k  - continue PPX acyc, vori, and cipro        C. difficile colitis    - resolved  - completed course of PO vancomycin 6/23/2018.              VTE Risk Mitigation         Ordered     heparin, porcine (PF) 100 unit/mL injection flush 300 Units  As needed (PRN)      06/23/18 1052     IP VTE LOW RISK PATIENT  Once      06/16/18 0938          Disposition:     Danika Echevarria MD  Bone Marrow Transplant  [facility]

## 2018-07-01 NOTE — ASSESSMENT & PLAN NOTE
- initial WBC reportedly 150,000  - bcr-abl positive on FISH at [facility] (records attached in media tab)  - original BMBX 5/26/18 with 20% blasts; CML transformed to AML with blast crisis;   karyotype: 46,XY,t(9;22;22;14)q34;q11.2;q13;q23); BCR/ABL gene fusion 62%  - continue dasatinib at 40 mg (dose reduction secondary to being on voriconazole)  - voriconazole, acyclovir and cipro for prophylaxis   - repeat BM biopsy done 6/18 here at [facility] after 7+3. 9% blasts.   - persistent disease post 7+3 induction. 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 Today D9. Tolerating well. Plan for D14 BMBX.  - AML FISH negative  - NGS pending from repeat BMBX  - echo with 63% EF  - HIV negative, Hepatitis negative, G6PD wnl  - HLA typing in process  - referral information for transplant benefits given to patient  - patient has 1 full sibling, contact info given to transplant coordinators  - consider sending BCR/ABL mutational analysis testing when WBC increases

## 2018-07-02 LAB
ALBUMIN SERPL BCP-MCNC: 2.7 G/DL
ALP SERPL-CCNC: 66 U/L
ALT SERPL W/O P-5'-P-CCNC: 44 U/L
ANION GAP SERPL CALC-SCNC: 5 MMOL/L
ANISOCYTOSIS BLD QL SMEAR: SLIGHT
APTT BLDCRRT: 24 SEC
AST SERPL-CCNC: 16 U/L
BASOPHILS # BLD AUTO: 0 K/UL
BASOPHILS NFR BLD: 0 %
BILIRUB SERPL-MCNC: 0.2 MG/DL
BUN SERPL-MCNC: 10 MG/DL
CALCIUM SERPL-MCNC: 8.3 MG/DL
CHLORIDE SERPL-SCNC: 106 MMOL/L
CO2 SERPL-SCNC: 26 MMOL/L
CREAT SERPL-MCNC: 0.6 MG/DL
DIFFERENTIAL METHOD: ABNORMAL
EOSINOPHIL # BLD AUTO: 0 K/UL
EOSINOPHIL NFR BLD: 0 %
ERYTHROCYTE [DISTWIDTH] IN BLOOD BY AUTOMATED COUNT: 15 %
EST. GFR  (AFRICAN AMERICAN): >60 ML/MIN/1.73 M^2
EST. GFR  (NON AFRICAN AMERICAN): >60 ML/MIN/1.73 M^2
GLUCOSE SERPL-MCNC: 84 MG/DL
HCT VFR BLD AUTO: 22.6 %
HGB BLD-MCNC: 7.6 G/DL
IMM GRANULOCYTES # BLD AUTO: 0 K/UL
IMM GRANULOCYTES NFR BLD AUTO: 0 %
INR PPP: 1
LDH SERPL L TO P-CCNC: 202 U/L
LYMPHOCYTES # BLD AUTO: 0.1 K/UL
LYMPHOCYTES NFR BLD: 100 %
MAGNESIUM SERPL-MCNC: 2.1 MG/DL
MCH RBC QN AUTO: 28.8 PG
MCHC RBC AUTO-ENTMCNC: 33.6 G/DL
MCV RBC AUTO: 86 FL
MONOCYTES # BLD AUTO: 0 K/UL
MONOCYTES NFR BLD: 0 %
NEUTROPHILS # BLD AUTO: 0 K/UL
NEUTROPHILS NFR BLD: 0 %
NRBC BLD-RTO: 0 /100 WBC
PHOSPHATE SERPL-MCNC: 3.5 MG/DL
PLATELET # BLD AUTO: 11 K/UL
PLATELET BLD QL SMEAR: ABNORMAL
PMV BLD AUTO: 8.1 FL
POTASSIUM SERPL-SCNC: 3.8 MMOL/L
PROT SERPL-MCNC: 5.3 G/DL
PROTHROMBIN TIME: 10.4 SEC
RBC # BLD AUTO: 2.64 M/UL
SODIUM SERPL-SCNC: 137 MMOL/L
URATE SERPL-MCNC: 1.9 MG/DL
WBC # BLD AUTO: 0.1 K/UL

## 2018-07-02 PROCEDURE — 25000003 PHARM REV CODE 250: Performed by: INTERNAL MEDICINE

## 2018-07-02 PROCEDURE — 25000003 PHARM REV CODE 250: Performed by: NURSE PRACTITIONER

## 2018-07-02 PROCEDURE — 99233 SBSQ HOSP IP/OBS HIGH 50: CPT | Mod: ,,, | Performed by: INTERNAL MEDICINE

## 2018-07-02 PROCEDURE — 83615 LACTATE (LD) (LDH) ENZYME: CPT

## 2018-07-02 PROCEDURE — 80053 COMPREHEN METABOLIC PANEL: CPT

## 2018-07-02 PROCEDURE — 83735 ASSAY OF MAGNESIUM: CPT

## 2018-07-02 PROCEDURE — 84550 ASSAY OF BLOOD/URIC ACID: CPT

## 2018-07-02 PROCEDURE — 63700000 PHARM REV CODE 250 ALT 637 W/O HCPCS: Performed by: INTERNAL MEDICINE

## 2018-07-02 PROCEDURE — 85610 PROTHROMBIN TIME: CPT

## 2018-07-02 PROCEDURE — 84100 ASSAY OF PHOSPHORUS: CPT

## 2018-07-02 PROCEDURE — 85730 THROMBOPLASTIN TIME PARTIAL: CPT

## 2018-07-02 PROCEDURE — 85025 COMPLETE CBC W/AUTO DIFF WBC: CPT

## 2018-07-02 PROCEDURE — 20600001 HC STEP DOWN PRIVATE ROOM

## 2018-07-02 RX ADMIN — DASATINIB 40 MG: 20 TABLET ORAL at 08:07

## 2018-07-02 RX ADMIN — DEXAMETHASONE 1 DROP: 1 SUSPENSION OPHTHALMIC at 04:07

## 2018-07-02 RX ADMIN — MIRTAZAPINE 15 MG: 15 TABLET, ORALLY DISINTEGRATING ORAL at 08:07

## 2018-07-02 RX ADMIN — ACYCLOVIR 400 MG: 200 CAPSULE ORAL at 08:07

## 2018-07-02 RX ADMIN — POTASSIUM CHLORIDE 20 MEQ: 750 CAPSULE, EXTENDED RELEASE ORAL at 08:07

## 2018-07-02 RX ADMIN — VORICONAZOLE 200 MG: 200 TABLET ORAL at 08:07

## 2018-07-02 RX ADMIN — CIPROFLOXACIN HYDROCHLORIDE 500 MG: 250 TABLET, FILM COATED ORAL at 08:07

## 2018-07-02 NOTE — ASSESSMENT & PLAN NOTE
- Patient understandibly experiencing anxiety about his new diagnosis and feeling isolated in his room  - Declined oncology psychologist consult  - Continue Remeron for anxiety and insomnia

## 2018-07-02 NOTE — PROGRESS NOTES
[facility]  Hematology  Bone Marrow Transplant  Progress Note    Patient Name: Wilton Guzmán  Admission Date: 6/16/2018  Hospital Length of Stay: 16 days  Code Status: Full Code    Subjective:     Interval History:   [facility] day 10; no issues, VSS, ANC 0.    Objective:     Vital Signs (Most Recent):  Temp: 99.1 °F (37.3 °C) (07/02/18 0745)  Pulse: 99 (07/02/18 0745)  Resp: 18 (07/02/18 0745)  BP: 121/68 (07/02/18 0745)  SpO2: 98 % (07/02/18 0745) Vital Signs (24h Range):  Temp:  [98.4 °F (36.9 °C)-99.9 °F (37.7 °C)] 99.1 °F (37.3 °C)  Pulse:  [] 99  Resp:  [14-19] 18  SpO2:  [98 %-100 %] 98 %  BP: (111-122)/(68-78) 121/68     Weight: 62.7 kg (138 lb 3.7 oz)  Body mass index is 19.28 kg/m².  Body surface area is 1.77 meters squared.    ECOG SCORE         [unfilled]    Intake/Output - Last 3 Shifts       06/30 0700 - 07/01 0659 07/01 0700 - 07/02 0659 07/02 0700 - 07/03 0659    P.O. 840 710     I.V. (mL/kg) 1271.3 (20.1) 968.5 (15.4)     Total Intake(mL/kg) 2111.3 (33.3) 1678.5 (26.8)     Urine (mL/kg/hr) 2100 (1.4) 1650 (1.1)     Total Output 2100 1650      Net +11.3 +28.5             Urine Occurrence 3 x 2 x     Stool Occurrence 3 x 2 x           Physical Exam   Constitutional: He is oriented to person, place, and time. He appears well-developed and well-nourished.   HENT:   Head: Normocephalic and atraumatic.   Eyes: EOM are normal. Pupils are equal, round, and reactive to light.   Neck: Normal range of motion. Neck supple.   Cardiovascular: Normal rate and regular rhythm.    No murmur heard.  Pulmonary/Chest: Effort normal and breath sounds normal. No respiratory distress.   Abdominal: Soft. Bowel sounds are normal. He exhibits no distension.   Musculoskeletal: Normal range of motion. He exhibits no edema.   Neurological: He is alert and oriented to person, place, and time.   Skin: Skin is warm and dry.   Rash to head and bilateral arms resolved (pt states it started after taking  dasatinib).  Left upper arm line CDI   Psychiatric: He has a normal mood and affect. His behavior is normal.   Vitals reviewed.    Significant Labs:   CBC:     Recent Labs  Lab 07/01/18  0400 07/02/18  0400   WBC 0.16* 0.10*   HGB 7.6* 7.6*   HCT 22.4* 22.6*   PLT 18* 11*    and CMP:     Recent Labs  Lab 07/01/18  0400 07/02/18  0400    137   K 3.6 3.8    106   CO2 25 26   GLU 77 84   BUN 13 10   CREATININE 0.6 0.6   CALCIUM 8.3* 8.3*   PROT 5.1* 5.3*   ALBUMIN 2.6* 2.7*   BILITOT 0.2 0.2   ALKPHOS 66 66   AST 23 16   ALT 53* 44   ANIONGAP 8 5*   EGFRNONAA >60.0 >60.0       Diagnostic Results:  I have reviewed all pertinent imaging results/findings within the past 24 hours.    Assessment/Plan:     * Chronic myelogenous leukemia (CML), BCR-ABL1-positive    - initial WBC reportedly 150,000  - bcr-abl positive on FISH at [facility] (records attached in media tab)  - original BMBX 5/26/18 with 20% blasts; CML transformed to AML with blast crisis;   karyotype: 46,XY,t(9;22;22;14)q34;q11.2;q13;q23); BCR/ABL gene fusion 62%  - continue dasatinib at 40 mg (dose reduction secondary to being on voriconazole)  - voriconazole, acyclovir and cipro for prophylaxis   - repeat BM biopsy done 6/18 here at [facility] after 7+3. 9% blasts.   - persistent disease post 7+3 induction. 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 Today Day 10. Tolerating well. Plan for D14 or D21 BMBX, per staff.  - AML FISH negative  - NGS pending from repeat BMBX  - echo with 63% EF  - HIV negative, Hepatitis negative, G6PD wnl  - HLA typing in process  - referral information for transplant benefits given to patient  - patient has 1 full sibling, contact info given to transplant coordinators  - consider sending BCR/ABL mutational analysis testing when WBC increases          Pancytopenia due to antineoplastic chemotherapy    - transfuse for Hgb<7 and platelets<10k  - WBC 0.10, ANC 0, Hgb 7.6, Plt 11k  - continue PPX acyc, vori, and  cipro        Neutropenic fever    - 6/22/18 0025 fever of 102.3.   - blood cultures NGTD.   - UA negative and urine culture negative   - CXR negative for acute process.   - Cefepime 6/22-6/25 and transitioned back to PPX cipro  - resolved; remains afebrile now        Drug rash    - resolved         Anxiety    - Patient understandibly experiencing anxiety about his new diagnosis and feeling isolated in his room  - Declined oncology psychologist consult  - Continue Remeron for anxiety and insomnia         C. difficile colitis    - resolved  - completed course of PO vancomycin 6/23/2018            VTE Risk Mitigation         Ordered     heparin, porcine (PF) 100 unit/mL injection flush 300 Units  As needed (PRN)      06/23/18 1052     IP VTE LOW RISK PATIENT  Once      06/16/18 0938          Disposition: pending repeat BM bx     Yoanna Andrade NP  Bone Marrow Transplant  [facility]

## 2018-07-02 NOTE — ASSESSMENT & PLAN NOTE
- initial WBC reportedly 150,000  - bcr-abl positive on FISH at [facility] (records attached in media tab)  - original BMBX 5/26/18 with 20% blasts; CML transformed to AML with blast crisis;   karyotype: 46,XY,t(9;22;22;14)q34;q11.2;q13;q23); BCR/ABL gene fusion 62%  - continue dasatinib at 40 mg (dose reduction secondary to being on voriconazole)  - voriconazole, acyclovir and cipro for prophylaxis   - repeat BM biopsy done 6/18 here at [facility] after 7+3. 9% blasts.   - persistent disease post 7+3 induction. 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 Today Day 10. Tolerating well. Plan for D14 or D21 BMBX, per staff.  - AML FISH negative  - NGS pending from repeat BMBX  - echo with 63% EF  - HIV negative, Hepatitis negative, G6PD wnl  - HLA typing in process  - referral information for transplant benefits given to patient  - patient has 1 full sibling, contact info given to transplant coordinators  - consider sending BCR/ABL mutational analysis testing when WBC increases

## 2018-07-02 NOTE — ASSESSMENT & PLAN NOTE
- 6/22/18 0025 fever of 102.3.   - blood cultures NGTD.   - UA negative and urine culture negative   - CXR negative for acute process.   - Cefepime 6/22-6/25 and transitioned back to PPX cipro  - resolved; remains afebrile now

## 2018-07-02 NOTE — PLAN OF CARE
Problem: Patient Care Overview  Goal: Plan of Care Review  Outcome: Ongoing (interventions implemented as appropriate)  Patient remains free from falls and injury this shift. Bed in low, locked position with call bell in reach. Patient encouraged to call for assistance when getting out of bed. Patient verbalized understanding. All belongings within reach. Afebrile. IVFs discontinued. Tolerating 50% or more of meals. Neutropenic and thrombocytopenic precautions maintained. Potassium replacements given as ordered. will continue to monitor.

## 2018-07-02 NOTE — PLAN OF CARE
Problem: Patient Care Overview  Goal: Plan of Care Review  Outcome: Ongoing (interventions implemented as appropriate)  Plan of care reviewed with the patient at the beginning of the shift. 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 He is s/p 7&3 and MEC. Today is day 10 of MEC. Bone marrow bx planned for 07/13. Pt is tolerating chemo well. IVF infusing. He has no complaints at this time. Pt denies pain, n/v/d. Pt reports a good appetite. PO intake encouraged. Fall precautions maintained. He is currently up independently without difficulty. Pt remained free from falls and injury this shift. Bed locked in lowest position, side rails up x2, call light within reach. Instructed pt to call for assistance as needed. Pt verbalized understanding. No acute issues overnight. Vitals stable. Pt afebrile. Neutropenic precautions maintained. Will continue to monitor.

## 2018-07-02 NOTE — ASSESSMENT & PLAN NOTE
- transfuse for Hgb<7 and platelets<10k  - WBC 0.10, ANC 0, Hgb 7.6, Plt 11k  - continue PPX acyc, vori, and cipro

## 2018-07-02 NOTE — SUBJECTIVE & OBJECTIVE
Subjective:     Interval History:   [facility] day 10; no issues, VSS, ANC 0.    Objective:     Vital Signs (Most Recent):  Temp: 99.1 °F (37.3 °C) (07/02/18 0745)  Pulse: 99 (07/02/18 0745)  Resp: 18 (07/02/18 0745)  BP: 121/68 (07/02/18 0745)  SpO2: 98 % (07/02/18 0745) Vital Signs (24h Range):  Temp:  [98.4 °F (36.9 °C)-99.9 °F (37.7 °C)] 99.1 °F (37.3 °C)  Pulse:  [] 99  Resp:  [14-19] 18  SpO2:  [98 %-100 %] 98 %  BP: (111-122)/(68-78) 121/68     Weight: 62.7 kg (138 lb 3.7 oz)  Body mass index is 19.28 kg/m².  Body surface area is 1.77 meters squared.    ECOG SCORE         [unfilled]    Intake/Output - Last 3 Shifts       06/30 0700 - 07/01 0659 07/01 0700 - 07/02 0659 07/02 0700 - 07/03 0659    P.O. 840 710     I.V. (mL/kg) 1271.3 (20.1) 968.5 (15.4)     Total Intake(mL/kg) 2111.3 (33.3) 1678.5 (26.8)     Urine (mL/kg/hr) 2100 (1.4) 1650 (1.1)     Total Output 2100 1650      Net +11.3 +28.5             Urine Occurrence 3 x 2 x     Stool Occurrence 3 x 2 x           Physical Exam   Constitutional: He is oriented to person, place, and time. He appears well-developed and well-nourished.   HENT:   Head: Normocephalic and atraumatic.   Eyes: EOM are normal. Pupils are equal, round, and reactive to light.   Neck: Normal range of motion. Neck supple.   Cardiovascular: Normal rate and regular rhythm.    No murmur heard.  Pulmonary/Chest: Effort normal and breath sounds normal. No respiratory distress.   Abdominal: Soft. Bowel sounds are normal. He exhibits no distension.   Musculoskeletal: Normal range of motion. He exhibits no edema.   Neurological: He is alert and oriented to person, place, and time.   Skin: Skin is warm and dry.   Rash to head and bilateral arms resolved (pt states it started after taking dasatinib).  Left upper arm line CDI   Psychiatric: He has a normal mood and affect. His behavior is normal.   Vitals reviewed.    Significant Labs:   CBC:     Recent Labs  Lab 07/01/18  0400 07/02/18  0400   WBC  0.16* 0.10*   HGB 7.6* 7.6*   HCT 22.4* 22.6*   PLT 18* 11*    and CMP:     Recent Labs  Lab 07/01/18  0400 07/02/18  0400    137   K 3.6 3.8    106   CO2 25 26   GLU 77 84   BUN 13 10   CREATININE 0.6 0.6   CALCIUM 8.3* 8.3*   PROT 5.1* 5.3*   ALBUMIN 2.6* 2.7*   BILITOT 0.2 0.2   ALKPHOS 66 66   AST 23 16   ALT 53* 44   ANIONGAP 8 5*   EGFRNONAA >60.0 >60.0       Diagnostic Results:  I have reviewed all pertinent imaging results/findings within the past 24 hours.

## 2018-07-03 LAB
ALBUMIN SERPL BCP-MCNC: 2.9 G/DL
ALP SERPL-CCNC: 74 U/L
ALT SERPL W/O P-5'-P-CCNC: 38 U/L
ANION GAP SERPL CALC-SCNC: 8 MMOL/L
ANISOCYTOSIS BLD QL SMEAR: SLIGHT
AST SERPL-CCNC: 13 U/L
BASOPHILS # BLD AUTO: ABNORMAL K/UL
BASOPHILS NFR BLD: 0 %
BCR/ABL RESULT, P190, QUANT, BLD: NORMAL
BCR/ABL,P210 RESULT: NORMAL
BILIRUB SERPL-MCNC: 0.3 MG/DL
BLD PROD TYP BPU: NORMAL
BLOOD UNIT EXPIRATION DATE: NORMAL
BLOOD UNIT TYPE CODE: 5100
BLOOD UNIT TYPE: NORMAL
BUN SERPL-MCNC: 10 MG/DL
CALCIUM SERPL-MCNC: 8.8 MG/DL
CHLORIDE SERPL-SCNC: 103 MMOL/L
CO2 SERPL-SCNC: 26 MMOL/L
CODING SYSTEM: NORMAL
CREAT SERPL-MCNC: 0.7 MG/DL
DACRYOCYTES BLD QL SMEAR: ABNORMAL
DIFFERENTIAL METHOD: ABNORMAL
DISPENSE STATUS: NORMAL
EOSINOPHIL # BLD AUTO: ABNORMAL K/UL
EOSINOPHIL NFR BLD: 0 %
ERYTHROCYTE [DISTWIDTH] IN BLOOD BY AUTOMATED COUNT: 14.9 %
EST. GFR  (AFRICAN AMERICAN): >60 ML/MIN/1.73 M^2
EST. GFR  (NON AFRICAN AMERICAN): >60 ML/MIN/1.73 M^2
GLUCOSE SERPL-MCNC: 88 MG/DL
HCT VFR BLD AUTO: 23 %
HGB BLD-MCNC: 8 G/DL
IMM GRANULOCYTES # BLD AUTO: ABNORMAL K/UL
IMM GRANULOCYTES NFR BLD AUTO: ABNORMAL %
LYMPHOCYTES # BLD AUTO: ABNORMAL K/UL
LYMPHOCYTES NFR BLD: 100 %
MAGNESIUM SERPL-MCNC: 2 MG/DL
MCH RBC QN AUTO: 29.1 PG
MCHC RBC AUTO-ENTMCNC: 34.8 G/DL
MCV RBC AUTO: 84 FL
MONOCYTES # BLD AUTO: ABNORMAL K/UL
MONOCYTES NFR BLD: 0 %
NEUTROPHILS NFR BLD: 0 %
NRBC BLD-RTO: 0 /100 WBC
NUM UNITS TRANS WBC-POOR PLATPHERESIS: NORMAL
PATH REPORT.FINAL DX SPEC: NORMAL
PATH REPORT.FINAL DX SPEC: NORMAL
PHOSPHATE SERPL-MCNC: 4.1 MG/DL
PLATELET # BLD AUTO: 5 K/UL
PLATELET BLD QL SMEAR: ABNORMAL
PMV BLD AUTO: ABNORMAL FL
POIKILOCYTOSIS BLD QL SMEAR: SLIGHT
POTASSIUM SERPL-SCNC: 4 MMOL/L
PROT SERPL-MCNC: 6.1 G/DL
RBC # BLD AUTO: 2.75 M/UL
SCHISTOCYTES BLD QL SMEAR: PRESENT
SODIUM SERPL-SCNC: 137 MMOL/L
SPECIMEN TYPE, P190, QUANT, BLD: NORMAL
SPECIMEN TYPE: NORMAL
WBC # BLD AUTO: 0.14 K/UL

## 2018-07-03 PROCEDURE — 80053 COMPREHEN METABOLIC PANEL: CPT

## 2018-07-03 PROCEDURE — 36430 TRANSFUSION BLD/BLD COMPNT: CPT

## 2018-07-03 PROCEDURE — 84100 ASSAY OF PHOSPHORUS: CPT

## 2018-07-03 PROCEDURE — 25000003 PHARM REV CODE 250: Performed by: INTERNAL MEDICINE

## 2018-07-03 PROCEDURE — 99233 SBSQ HOSP IP/OBS HIGH 50: CPT | Mod: ,,, | Performed by: INTERNAL MEDICINE

## 2018-07-03 PROCEDURE — 25000003 PHARM REV CODE 250: Performed by: NURSE PRACTITIONER

## 2018-07-03 PROCEDURE — 83735 ASSAY OF MAGNESIUM: CPT

## 2018-07-03 PROCEDURE — P9037 PLATE PHERES LEUKOREDU IRRAD: HCPCS

## 2018-07-03 PROCEDURE — 85027 COMPLETE CBC AUTOMATED: CPT

## 2018-07-03 PROCEDURE — 63700000 PHARM REV CODE 250 ALT 637 W/O HCPCS: Performed by: INTERNAL MEDICINE

## 2018-07-03 PROCEDURE — 85007 BL SMEAR W/DIFF WBC COUNT: CPT

## 2018-07-03 PROCEDURE — 20600001 HC STEP DOWN PRIVATE ROOM

## 2018-07-03 RX ORDER — DIPHENHYDRAMINE HCL 25 MG
25 CAPSULE ORAL
Status: COMPLETED | OUTPATIENT
Start: 2018-07-03 | End: 2018-07-03

## 2018-07-03 RX ORDER — ACETAMINOPHEN 325 MG/1
650 TABLET ORAL
Status: DISCONTINUED | OUTPATIENT
Start: 2018-07-03 | End: 2018-07-05

## 2018-07-03 RX ORDER — SODIUM CHLORIDE 0.9 % (FLUSH) 0.9 %
5 SYRINGE (ML) INJECTION
Status: DISCONTINUED | OUTPATIENT
Start: 2018-07-03 | End: 2018-07-10

## 2018-07-03 RX ORDER — HYDROCODONE BITARTRATE AND ACETAMINOPHEN 500; 5 MG/1; MG/1
TABLET ORAL
Status: DISCONTINUED | OUTPATIENT
Start: 2018-07-03 | End: 2018-07-05

## 2018-07-03 RX ADMIN — CIPROFLOXACIN HYDROCHLORIDE 500 MG: 250 TABLET, FILM COATED ORAL at 08:07

## 2018-07-03 RX ADMIN — DIPHENHYDRAMINE HYDROCHLORIDE 25 MG: 25 CAPSULE ORAL at 08:07

## 2018-07-03 RX ADMIN — VORICONAZOLE 200 MG: 200 TABLET ORAL at 08:07

## 2018-07-03 RX ADMIN — CIPROFLOXACIN HYDROCHLORIDE 500 MG: 250 TABLET, FILM COATED ORAL at 10:07

## 2018-07-03 RX ADMIN — ACETAMINOPHEN 650 MG: 325 TABLET, FILM COATED ORAL at 08:07

## 2018-07-03 RX ADMIN — ACYCLOVIR 400 MG: 200 CAPSULE ORAL at 10:07

## 2018-07-03 RX ADMIN — MIRTAZAPINE 15 MG: 15 TABLET, ORALLY DISINTEGRATING ORAL at 10:07

## 2018-07-03 RX ADMIN — ACYCLOVIR 400 MG: 200 CAPSULE ORAL at 08:07

## 2018-07-03 RX ADMIN — VORICONAZOLE 200 MG: 200 TABLET ORAL at 10:07

## 2018-07-03 RX ADMIN — DASATINIB 40 MG: 20 TABLET ORAL at 08:07

## 2018-07-03 NOTE — ASSESSMENT & PLAN NOTE
- transfuse for Hgb<7 and platelets<10k  - WBC 0.14, Hgb 8.0, Plt 5k; will give one unit plt transfusion today  - continue PPX acyc, vori, and cipro

## 2018-07-03 NOTE — SUBJECTIVE & OBJECTIVE
Subjective:     Interval History:   [facility] day 11, tmax 100.2, plt 5K will get one unit plt transfusion.   No complaints this AM    Objective:     Vital Signs (Most Recent):  Temp: 99.4 °F (37.4 °C) (07/03/18 0456)  Pulse: 101 (07/03/18 0456)  Resp: 18 (07/03/18 0456)  BP: 112/68 (07/03/18 0456)  SpO2: 98 % (07/03/18 0456) Vital Signs (24h Range):  Temp:  [98.9 °F (37.2 °C)-100.2 °F (37.9 °C)] 99.4 °F (37.4 °C)  Pulse:  [] 101  Resp:  [16-18] 18  SpO2:  [98 %-100 %] 98 %  BP: (112-123)/(57-79) 112/68     Weight: 61.5 kg (135 lb 9.3 oz)  Body mass index is 18.91 kg/m².  Body surface area is 1.76 meters squared.    ECOG SCORE         [unfilled]    Intake/Output - Last 3 Shifts       07/01 0700 - 07/02 0659 07/02 0700 - 07/03 0659 07/03 0700 - 07/04 0659    P.O. 710 1020     I.V. (mL/kg) 968.5 (15.4)      Total Intake(mL/kg) 1678.5 (26.8) 1020 (16.6)     Urine (mL/kg/hr) 1650 (1.1) 800 (0.5)     Total Output 1650 800      Net +28.5 +220             Urine Occurrence 2 x 2 x     Stool Occurrence 2 x 2 x           Physical Exam   Constitutional: He is oriented to person, place, and time. He appears well-developed and well-nourished.   HENT:   Head: Normocephalic and atraumatic.   Eyes: EOM are normal. Pupils are equal, round, and reactive to light.   Neck: Normal range of motion. Neck supple.   Cardiovascular: Normal rate and regular rhythm.    No murmur heard.  Pulmonary/Chest: Effort normal and breath sounds normal. No respiratory distress.   Abdominal: Soft. Bowel sounds are normal. He exhibits no distension.   Musculoskeletal: Normal range of motion. He exhibits no edema.   Neurological: He is alert and oriented to person, place, and time.   Skin: Skin is warm and dry.   Rash to head and bilateral arms resolved (pt states it started after taking dasatinib).  Left upper arm line CDI   Psychiatric: He has a normal mood and affect. His behavior is normal.   Vitals reviewed.    Significant Labs:   CBC:     Recent  Labs  Lab 07/02/18  0400 07/03/18  0345   WBC 0.10* 0.14*   HGB 7.6* 8.0*   HCT 22.6* 23.0*   PLT 11* 5*    and CMP:     Recent Labs  Lab 07/02/18  0400 07/03/18  0345    137   K 3.8 4.0    103   CO2 26 26   GLU 84 88   BUN 10 10   CREATININE 0.6 0.7   CALCIUM 8.3* 8.8   PROT 5.3* 6.1   ALBUMIN 2.7* 2.9*   BILITOT 0.2 0.3   ALKPHOS 66 74   AST 16 13   ALT 44 38   ANIONGAP 5* 8   EGFRNONAA >60.0 >60.0       Diagnostic Results:  I have reviewed all pertinent imaging results/findings within the past 24 hours.

## 2018-07-03 NOTE — PROGRESS NOTES
Called into pt's room for complaints of head swelling. Noted pt with very slight swelling to left side of head, showing indented emanuel from glasses, likely related to fluid/gravity as pt sleeps on that side of his face. PERRLA, equal  strength and 4/4 muscle strength of upper extremities. Denies HA and blurry vision. Rest of neuro exam negative, will monitor.    Yoanna Andrade, ADRIAN, NP  Hematology/Oncology

## 2018-07-03 NOTE — ASSESSMENT & PLAN NOTE
- 6/22/18 0025 fever of 102.3  - blood cultures NGTD.   - UA negative and urine culture negative   - CXR negative for acute process  - Cefepime 6/22-6/25 and transitioned back to PPX cipro  - resolved; remains afebrile now, tmax 100.2, will monitor for recurrent fever

## 2018-07-03 NOTE — PLAN OF CARE
Problem: Patient Care Overview  Goal: Plan of Care Review  Outcome: Ongoing (interventions implemented as appropriate)  Patient AAOx4, VSS, afebrile, and without injury. Fall precautions maintained. Patient instructed on how to contact the nurse. Patient without distress on room air; patient on regular diet with moderate appetite. No complaints of pain or nausea. Patient received one unit of platelets this morning--tolerated well. Neutropenic and thrombocytopenic precautions maintained. Patient's spouse called nurse to bedside to assess swelling on patient;s head. Neuro test shows all intact. CARRINGTON Lenz notified to assess at bedside. Questions and concerns have been addressed; will continue to monitor.

## 2018-07-03 NOTE — ASSESSMENT & PLAN NOTE
- initial WBC reportedly 150,000  - bcr-abl positive on FISH at [facility] (records attached in media tab)  - original BMBX 5/26/18 with 20% blasts; CML transformed to AML with blast crisis;   karyotype: 46,XY,t(9;22;22;14)q34;q11.2;q13;q23); BCR/ABL gene fusion 62%  - continue dasatinib at 40 mg (dose reduction secondary to being on voriconazole)  - voriconazole, acyclovir and cipro for prophylaxis   - repeat BM biopsy done 6/18 here at [facility] after 7+3. 9% blasts.   - persistent disease post 7+3 induction. 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 Today Day 11. Tolerating well. Plan for D21 BMBX, per staff.  - AML FISH negative  - NGS pending from repeat BMBX  - echo with 63% EF  - HIV negative, Hepatitis negative, G6PD wnl  - HLA typing in process  - referral information for transplant benefits given to patient  - patient has 1 full sibling, contact info given to transplant coordinators  - consider sending BCR/ABL mutational analysis testing when WBC increases

## 2018-07-03 NOTE — PROGRESS NOTES
[facility]  Hematology  Bone Marrow Transplant  Progress Note    Patient Name: Wilton Guzmán  Admission Date: 6/16/2018  Hospital Length of Stay: 17 days  Code Status: Full Code    Subjective:     Interval History:   [facility] day 11, tmax 100.2, plt 5K will get one unit plt transfusion.   No complaints this AM    Objective:     Vital Signs (Most Recent):  Temp: 99.4 °F (37.4 °C) (07/03/18 0456)  Pulse: 101 (07/03/18 0456)  Resp: 18 (07/03/18 0456)  BP: 112/68 (07/03/18 0456)  SpO2: 98 % (07/03/18 0456) Vital Signs (24h Range):  Temp:  [98.9 °F (37.2 °C)-100.2 °F (37.9 °C)] 99.4 °F (37.4 °C)  Pulse:  [] 101  Resp:  [16-18] 18  SpO2:  [98 %-100 %] 98 %  BP: (112-123)/(57-79) 112/68     Weight: 61.5 kg (135 lb 9.3 oz)  Body mass index is 18.91 kg/m².  Body surface area is 1.76 meters squared.    ECOG SCORE         [unfilled]    Intake/Output - Last 3 Shifts       07/01 0700 - 07/02 0659 07/02 0700 - 07/03 0659 07/03 0700 - 07/04 0659    P.O. 710 1020     I.V. (mL/kg) 968.5 (15.4)      Total Intake(mL/kg) 1678.5 (26.8) 1020 (16.6)     Urine (mL/kg/hr) 1650 (1.1) 800 (0.5)     Total Output 1650 800      Net +28.5 +220             Urine Occurrence 2 x 2 x     Stool Occurrence 2 x 2 x           Physical Exam   Constitutional: He is oriented to person, place, and time. He appears well-developed and well-nourished.   HENT:   Head: Normocephalic and atraumatic.   Eyes: EOM are normal. Pupils are equal, round, and reactive to light.   Neck: Normal range of motion. Neck supple.   Cardiovascular: Normal rate and regular rhythm.    No murmur heard.  Pulmonary/Chest: Effort normal and breath sounds normal. No respiratory distress.   Abdominal: Soft. Bowel sounds are normal. He exhibits no distension.   Musculoskeletal: Normal range of motion. He exhibits no edema.   Neurological: He is alert and oriented to person, place, and time.   Skin: Skin is warm and dry.   Rash to head and bilateral arms resolved (pt  states it started after taking dasatinib).  Left upper arm line CDI   Psychiatric: He has a normal mood and affect. His behavior is normal.   Vitals reviewed.    Significant Labs:   CBC:     Recent Labs  Lab 07/02/18  0400 07/03/18  0345   WBC 0.10* 0.14*   HGB 7.6* 8.0*   HCT 22.6* 23.0*   PLT 11* 5*    and CMP:     Recent Labs  Lab 07/02/18  0400 07/03/18  0345    137   K 3.8 4.0    103   CO2 26 26   GLU 84 88   BUN 10 10   CREATININE 0.6 0.7   CALCIUM 8.3* 8.8   PROT 5.3* 6.1   ALBUMIN 2.7* 2.9*   BILITOT 0.2 0.3   ALKPHOS 66 74   AST 16 13   ALT 44 38   ANIONGAP 5* 8   EGFRNONAA >60.0 >60.0       Diagnostic Results:  I have reviewed all pertinent imaging results/findings within the past 24 hours.    Assessment/Plan:     * Chronic myelogenous leukemia (CML), BCR-ABL1-positive    - initial WBC reportedly 150,000  - bcr-abl positive on FISH at [facility] (records attached in media tab)  - original BMBX 5/26/18 with 20% blasts; CML transformed to AML with blast crisis;   karyotype: 46,XY,t(9;22;22;14)q34;q11.2;q13;q23); BCR/ABL gene fusion 62%  - continue dasatinib at 40 mg (dose reduction secondary to being on voriconazole)  - voriconazole, acyclovir and cipro for prophylaxis   - repeat BM biopsy done 6/18 here at [facility] after 7+3. 9% blasts.   - persistent disease post 7+3 induction. 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 Today Day 11. Tolerating well. Plan for D21 BMBX, per staff.  - AML FISH negative  - NGS pending from repeat BMBX  - echo with 63% EF  - HIV negative, Hepatitis negative, G6PD wnl  - HLA typing in process  - referral information for transplant benefits given to patient  - patient has 1 full sibling, contact info given to transplant coordinators  - consider sending BCR/ABL mutational analysis testing when WBC increases        Pancytopenia due to antineoplastic chemotherapy    - transfuse for Hgb<7 and platelets<10k  - WBC 0.14, Hgb 8.0, Plt 5k; will give one unit plt  transfusion today  - continue PPX acyc, vori, and cipro        Neutropenic fever    - 6/22/18 0025 fever of 102.3  - blood cultures NGTD.   - UA negative and urine culture negative   - CXR negative for acute process  - Cefepime 6/22-6/25 and transitioned back to PPX cipro  - resolved; remains afebrile now, tmax 100.2, will monitor for recurrent fever        Drug rash    - noted to head after dasatinib  - resolved          Anxiety    - Patient understandibly experiencing anxiety about his new diagnosis and feeling isolated in his room  - Declined oncology psychologist consult  - Continue Remeron for anxiety and insomnia          C. difficile colitis    - resolved  - completed course of PO vancomycin 6/23/2018             VTE Risk Mitigation         Ordered     heparin, porcine (PF) 100 unit/mL injection flush 300 Units  As needed (PRN)      06/23/18 1052     IP VTE LOW RISK PATIENT  Once      06/16/18 0938          Disposition: pending repeat BM bx and count recovery     Yoanna Andrade NP  Bone Marrow Transplant  [facility]

## 2018-07-03 NOTE — PLAN OF CARE
Problem: Patient Care Overview  Goal: Plan of Care Review  Outcome: Ongoing (interventions implemented as appropriate)  Afebrile. Free from falls or injury. No complaints of pain. Bed locked in lowest position, non skid socks on, call light within reach. Pt instructed to call if any assistance is needed. Vitals stable. Neutropenic precautions maintained Wife at bedside. Will cont to alexis pt.

## 2018-07-04 PROBLEM — D70.9 NEUTROPENIC FEVER: Status: RESOLVED | Noted: 2018-07-04 | Resolved: 2018-07-04

## 2018-07-04 PROBLEM — L27.0 DRUG RASH: Status: RESOLVED | Noted: 2018-06-22 | Resolved: 2018-07-04

## 2018-07-04 PROBLEM — D70.9 NEUTROPENIC FEVER: Status: RESOLVED | Noted: 2018-06-22 | Resolved: 2018-07-04

## 2018-07-04 PROBLEM — A04.72 C. DIFFICILE COLITIS: Status: RESOLVED | Noted: 2018-06-16 | Resolved: 2018-07-04

## 2018-07-04 PROBLEM — R50.81 NEUTROPENIC FEVER: Status: RESOLVED | Noted: 2018-07-04 | Resolved: 2018-07-04

## 2018-07-04 PROBLEM — L27.0 DRUG RASH: Status: RESOLVED | Noted: 2018-07-04 | Resolved: 2018-07-04

## 2018-07-04 PROBLEM — R50.81 NEUTROPENIC FEVER: Status: RESOLVED | Noted: 2018-06-22 | Resolved: 2018-07-04

## 2018-07-04 LAB
ALBUMIN SERPL BCP-MCNC: 2.8 G/DL
ALP SERPL-CCNC: 76 U/L
ALT SERPL W/O P-5'-P-CCNC: 32 U/L
ANION GAP SERPL CALC-SCNC: 10 MMOL/L
ANISOCYTOSIS BLD QL SMEAR: SLIGHT
AST SERPL-CCNC: 12 U/L
BASOPHILS # BLD AUTO: ABNORMAL K/UL
BASOPHILS NFR BLD: 0 %
BILIRUB SERPL-MCNC: 0.4 MG/DL
BUN SERPL-MCNC: 11 MG/DL
CALCIUM SERPL-MCNC: 9 MG/DL
CHLORIDE SERPL-SCNC: 104 MMOL/L
CO2 SERPL-SCNC: 24 MMOL/L
CREAT SERPL-MCNC: 0.7 MG/DL
DIFFERENTIAL METHOD: ABNORMAL
EOSINOPHIL # BLD AUTO: ABNORMAL K/UL
EOSINOPHIL NFR BLD: 0 %
ERYTHROCYTE [DISTWIDTH] IN BLOOD BY AUTOMATED COUNT: 14.7 %
EST. GFR  (AFRICAN AMERICAN): >60 ML/MIN/1.73 M^2
EST. GFR  (NON AFRICAN AMERICAN): >60 ML/MIN/1.73 M^2
GLUCOSE SERPL-MCNC: 89 MG/DL
HCT VFR BLD AUTO: 21.7 %
HGB BLD-MCNC: 7.5 G/DL
IMM GRANULOCYTES # BLD AUTO: ABNORMAL K/UL
IMM GRANULOCYTES NFR BLD AUTO: ABNORMAL %
LYMPHOCYTES # BLD AUTO: ABNORMAL K/UL
LYMPHOCYTES NFR BLD: 100 %
MAGNESIUM SERPL-MCNC: 1.8 MG/DL
MCH RBC QN AUTO: 29 PG
MCHC RBC AUTO-ENTMCNC: 34.6 G/DL
MCV RBC AUTO: 84 FL
MONOCYTES # BLD AUTO: ABNORMAL K/UL
MONOCYTES NFR BLD: 0 %
NEUTROPHILS # BLD AUTO: ABNORMAL K/UL
NEUTROPHILS NFR BLD: 0 %
NRBC BLD-RTO: 0 /100 WBC
PHOSPHATE SERPL-MCNC: 3.7 MG/DL
PLATELET # BLD AUTO: 28 K/UL
PLATELET BLD QL SMEAR: ABNORMAL
PMV BLD AUTO: 10.6 FL
POTASSIUM SERPL-SCNC: 3.9 MMOL/L
PROT SERPL-MCNC: 5.8 G/DL
RBC # BLD AUTO: 2.59 M/UL
SODIUM SERPL-SCNC: 138 MMOL/L
WBC # BLD AUTO: 0.14 K/UL

## 2018-07-04 PROCEDURE — 83735 ASSAY OF MAGNESIUM: CPT

## 2018-07-04 PROCEDURE — 25000003 PHARM REV CODE 250: Performed by: NURSE PRACTITIONER

## 2018-07-04 PROCEDURE — 80053 COMPREHEN METABOLIC PANEL: CPT

## 2018-07-04 PROCEDURE — 87040 BLOOD CULTURE FOR BACTERIA: CPT | Mod: 59

## 2018-07-04 PROCEDURE — 85027 COMPLETE CBC AUTOMATED: CPT

## 2018-07-04 PROCEDURE — 25000003 PHARM REV CODE 250: Performed by: INTERNAL MEDICINE

## 2018-07-04 PROCEDURE — 63600175 PHARM REV CODE 636 W HCPCS: Performed by: INTERNAL MEDICINE

## 2018-07-04 PROCEDURE — 99233 SBSQ HOSP IP/OBS HIGH 50: CPT | Mod: ,,, | Performed by: INTERNAL MEDICINE

## 2018-07-04 PROCEDURE — 84100 ASSAY OF PHOSPHORUS: CPT

## 2018-07-04 PROCEDURE — 20600001 HC STEP DOWN PRIVATE ROOM

## 2018-07-04 PROCEDURE — 85007 BL SMEAR W/DIFF WBC COUNT: CPT

## 2018-07-04 PROCEDURE — 63700000 PHARM REV CODE 250 ALT 637 W/O HCPCS: Performed by: INTERNAL MEDICINE

## 2018-07-04 RX ADMIN — CIPROFLOXACIN HYDROCHLORIDE 500 MG: 250 TABLET, FILM COATED ORAL at 09:07

## 2018-07-04 RX ADMIN — DASATINIB 40 MG: 20 TABLET ORAL at 09:07

## 2018-07-04 RX ADMIN — ACYCLOVIR 400 MG: 200 CAPSULE ORAL at 08:07

## 2018-07-04 RX ADMIN — MAGNESIUM SULFATE IN WATER 2 G: 40 INJECTION, SOLUTION INTRAVENOUS at 06:07

## 2018-07-04 RX ADMIN — ACYCLOVIR 400 MG: 200 CAPSULE ORAL at 09:07

## 2018-07-04 RX ADMIN — CIPROFLOXACIN HYDROCHLORIDE 500 MG: 250 TABLET, FILM COATED ORAL at 08:07

## 2018-07-04 RX ADMIN — ACETAMINOPHEN 650 MG: 325 TABLET, FILM COATED ORAL at 08:07

## 2018-07-04 RX ADMIN — MIRTAZAPINE 15 MG: 15 TABLET, ORALLY DISINTEGRATING ORAL at 08:07

## 2018-07-04 RX ADMIN — VORICONAZOLE 200 MG: 200 TABLET ORAL at 09:07

## 2018-07-04 RX ADMIN — VORICONAZOLE 200 MG: 200 TABLET ORAL at 08:07

## 2018-07-04 NOTE — ASSESSMENT & PLAN NOTE
- initial WBC reportedly 150,000  - bcr-abl positive on FISH at [facility] (records attached in media tab)  - original BMBX 5/26/18 with 20% blasts; CML transformed to AML with blast crisis;   karyotype: 46,XY,t(9;22;22;14)q34;q11.2;q13;q23); BCR/ABL gene fusion 62%  - continue dasatinib at 40 mg (dose reduction secondary to being on voriconazole)  - voriconazole, acyclovir and cipro for prophylaxis   - repeat BM biopsy done 6/18 here at [facility] after 7+3. 9% blasts.   - persistent disease post 7+3 induction. 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 Today Day 12. Tolerating well. Plan for D21 BMBX, per staff.  - AML FISH negative  - NGS pending from repeat BMBX  - echo with 63% EF  - HIV negative, Hepatitis negative, G6PD wnl  - HLA typing in process  - referral information for transplant benefits given to patient  - patient has 1 full sibling, contact info given to transplant coordinators  - consider sending BCR/ABL mutational analysis testing when WBC increases

## 2018-07-04 NOTE — PROGRESS NOTES
[facility]  Hematology  Bone Marrow Transplant  Progress Note    Patient Name: Wilton Guzmán  Admission Date: 6/16/2018  Hospital Length of Stay: 18 days  Code Status: Full Code    Subjective:     Interval History:   [facility] day 12, tmax 100.1, plt 28   No complaints this AM    Objective:     Vital Signs (Most Recent):  Temp: 98.7 °F (37.1 °C) (07/04/18 0739)  Pulse: 103 (07/04/18 0739)  Resp: 18 (07/04/18 0739)  BP: 125/74 (07/04/18 0739)  SpO2: 100 % (07/04/18 0739) Vital Signs (24h Range):  Temp:  [98.1 °F (36.7 °C)-100.1 °F (37.8 °C)] 98.7 °F (37.1 °C)  Pulse:  [] 103  Resp:  [18-20] 18  SpO2:  [98 %-100 %] 100 %  BP: (110-125)/(69-77) 125/74     Weight: 61.1 kg (134 lb 13 oz)  Body mass index is 18.8 kg/m².  Body surface area is 1.75 meters squared.    ECOG SCORE         [unfilled]    Intake/Output - Last 3 Shifts       07/02 0700 - 07/03 0659 07/03 0700 - 07/04 0659 07/04 0700 - 07/05 0659    P.O. 1020 900     Total Intake(mL/kg) 1020 (16.6) 900 (14.7)     Urine (mL/kg/hr) 800 (0.5)      Total Output 800        Net +220 +900             Urine Occurrence 2 x 7 x     Stool Occurrence 2 x 2 x           Physical Exam   Constitutional: He is oriented to person, place, and time. He appears well-developed and well-nourished.   HENT:   Head: Normocephalic and atraumatic.   Eyes: EOM are normal. Pupils are equal, round, and reactive to light.   Neck: Normal range of motion. Neck supple.   Cardiovascular: Normal rate and regular rhythm.    No murmur heard.  Pulmonary/Chest: Effort normal and breath sounds normal. No respiratory distress.   Abdominal: Soft. Bowel sounds are normal. He exhibits no distension.   Musculoskeletal: Normal range of motion. He exhibits no edema.   Neurological: He is alert and oriented to person, place, and time.   Skin: Skin is warm and dry.   Psychiatric: He has a normal mood and affect. His behavior is normal.   Vitals reviewed.    Significant Labs:   CBC:     Recent  Labs  Lab 07/03/18  0345 07/04/18  0323   WBC 0.14* 0.14*   HGB 8.0* 7.5*   HCT 23.0* 21.7*   PLT 5* 28*    and CMP:     Recent Labs  Lab 07/03/18  0345 07/04/18  0323    138   K 4.0 3.9    104   CO2 26 24   GLU 88 89   BUN 10 11   CREATININE 0.7 0.7   CALCIUM 8.8 9.0   PROT 6.1 5.8*   ALBUMIN 2.9* 2.8*   BILITOT 0.3 0.4   ALKPHOS 74 76   AST 13 12   ALT 38 32   ANIONGAP 8 10   EGFRNONAA >60.0 >60.0       Diagnostic Results:  I have reviewed all pertinent imaging results/findings within the past 24 hours.    Assessment/Plan:     * Chronic myelogenous leukemia (CML), BCR-ABL1-positive    - initial WBC reportedly 150,000  - bcr-abl positive on FISH at [facility] (records attached in media tab)  - original BMBX 5/26/18 with 20% blasts; CML transformed to AML with blast crisis;   karyotype: 46,XY,t(9;22;22;14)q34;q11.2;q13;q23); BCR/ABL gene fusion 62%  - continue dasatinib at 40 mg (dose reduction secondary to being on voriconazole)  - voriconazole, acyclovir and cipro for prophylaxis   - repeat BM biopsy done 6/18 here at [facility] after 7+3. 9% blasts.   - persistent disease post 7+3 induction. 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 Today Day 12. Tolerating well. Plan for D21 BMBX, per staff.  - AML FISH negative  - NGS pending from repeat BMBX  - echo with 63% EF  - HIV negative, Hepatitis negative, G6PD wnl  - HLA typing in process  - referral information for transplant benefits given to patient  - patient has 1 full sibling, contact info given to transplant coordinators  - consider sending BCR/ABL mutational analysis testing when WBC increases        Anxiety    - Patient understandibly experiencing anxiety about his new diagnosis and feeling isolated in his room  - Declined oncology psychologist consult  - Continue Remeron for anxiety and insomnia          Pancytopenia due to antineoplastic chemotherapy    - transfuse for Hgb<7 and platelets<10k  - WBC 0.14, Hgb 8.0, Plt 5k; will give one  unit plt transfusion today  - continue PPX acyc, vori, and cipro            VTE Risk Mitigation         Ordered     heparin, porcine (PF) 100 unit/mL injection flush 300 Units  As needed (PRN)      06/23/18 1052     IP VTE LOW RISK PATIENT  Once      06/16/18 0938          Disposition: pending repeat BM bx and count recovery     Danika Echevarria MD  Bone Marrow Transplant  [facility]

## 2018-07-04 NOTE — SUBJECTIVE & OBJECTIVE
Subjective:     Interval History:   [facility] day 12, tmax 100.1, plt 28   No complaints this AM    Objective:     Vital Signs (Most Recent):  Temp: 98.7 °F (37.1 °C) (07/04/18 0739)  Pulse: 103 (07/04/18 0739)  Resp: 18 (07/04/18 0739)  BP: 125/74 (07/04/18 0739)  SpO2: 100 % (07/04/18 0739) Vital Signs (24h Range):  Temp:  [98.1 °F (36.7 °C)-100.1 °F (37.8 °C)] 98.7 °F (37.1 °C)  Pulse:  [] 103  Resp:  [18-20] 18  SpO2:  [98 %-100 %] 100 %  BP: (110-125)/(69-77) 125/74     Weight: 61.1 kg (134 lb 13 oz)  Body mass index is 18.8 kg/m².  Body surface area is 1.75 meters squared.    ECOG SCORE         [unfilled]    Intake/Output - Last 3 Shifts       07/02 0700 - 07/03 0659 07/03 0700 - 07/04 0659 07/04 0700 - 07/05 0659    P.O. 1020 900     Total Intake(mL/kg) 1020 (16.6) 900 (14.7)     Urine (mL/kg/hr) 800 (0.5)      Total Output 800        Net +220 +900             Urine Occurrence 2 x 7 x     Stool Occurrence 2 x 2 x           Physical Exam   Constitutional: He is oriented to person, place, and time. He appears well-developed and well-nourished.   HENT:   Head: Normocephalic and atraumatic.   Eyes: EOM are normal. Pupils are equal, round, and reactive to light.   Neck: Normal range of motion. Neck supple.   Cardiovascular: Normal rate and regular rhythm.    No murmur heard.  Pulmonary/Chest: Effort normal and breath sounds normal. No respiratory distress.   Abdominal: Soft. Bowel sounds are normal. He exhibits no distension.   Musculoskeletal: Normal range of motion. He exhibits no edema.   Neurological: He is alert and oriented to person, place, and time.   Skin: Skin is warm and dry.   Psychiatric: He has a normal mood and affect. His behavior is normal.   Vitals reviewed.    Significant Labs:   CBC:     Recent Labs  Lab 07/03/18  0345 07/04/18  0323   WBC 0.14* 0.14*   HGB 8.0* 7.5*   HCT 23.0* 21.7*   PLT 5* 28*    and CMP:     Recent Labs  Lab 07/03/18  0345 07/04/18  0323    138   K 4.0 3.9     104   CO2 26 24   GLU 88 89   BUN 10 11   CREATININE 0.7 0.7   CALCIUM 8.8 9.0   PROT 6.1 5.8*   ALBUMIN 2.9* 2.8*   BILITOT 0.3 0.4   ALKPHOS 74 76   AST 13 12   ALT 38 32   ANIONGAP 8 10   EGFRNONAA >60.0 >60.0       Diagnostic Results:  I have reviewed all pertinent imaging results/findings within the past 24 hours.

## 2018-07-04 NOTE — PLAN OF CARE
Problem: Patient Care Overview  Goal: Plan of Care Review  Outcome: Ongoing (interventions implemented as appropriate)  Pt AAOx4; independent. Pt c/o soft to loose stools x4 this morning since 7am; collection hat placed in toilet for RN to assess and determine need for testing per MD.  2g Mg given this morning.  Pt has no additional complaints and is resting comfortably, walked hallways this afternoon.  Pt remained free from falls during shift.  Bed lowest position and locked.  Call light in reach.  TM

## 2018-07-05 PROBLEM — D70.9 NEUTROPENIC FEVER: Status: ACTIVE | Noted: 2018-07-05

## 2018-07-05 PROBLEM — R50.81 NEUTROPENIC FEVER: Status: ACTIVE | Noted: 2018-07-05

## 2018-07-05 PROBLEM — E43 SEVERE MALNUTRITION: Status: ACTIVE | Noted: 2018-07-05

## 2018-07-05 LAB
ABO + RH BLD: NORMAL
ALBUMIN SERPL BCP-MCNC: 2.9 G/DL
ALP SERPL-CCNC: 75 U/L
ALT SERPL W/O P-5'-P-CCNC: 31 U/L
ANION GAP SERPL CALC-SCNC: 8 MMOL/L
ANISOCYTOSIS BLD QL SMEAR: SLIGHT
APTT BLDCRRT: 25.4 SEC
AST SERPL-CCNC: 12 U/L
BASOPHILS # BLD AUTO: ABNORMAL K/UL
BASOPHILS NFR BLD: 0 %
BILIRUB SERPL-MCNC: 0.4 MG/DL
BILIRUB UR QL STRIP: NEGATIVE
BLD GP AB SCN CELLS X3 SERPL QL: NORMAL
BLD PROD TYP BPU: NORMAL
BLOOD UNIT EXPIRATION DATE: NORMAL
BLOOD UNIT TYPE CODE: 5100
BLOOD UNIT TYPE: NORMAL
BUN SERPL-MCNC: 12 MG/DL
CALCIUM SERPL-MCNC: 9 MG/DL
CHLORIDE SERPL-SCNC: 104 MMOL/L
CLARITY UR REFRACT.AUTO: CLEAR
CO2 SERPL-SCNC: 26 MMOL/L
CODING SYSTEM: NORMAL
COLOR UR AUTO: YELLOW
CREAT SERPL-MCNC: 0.7 MG/DL
DIFFERENTIAL METHOD: ABNORMAL
DISPENSE STATUS: NORMAL
EOSINOPHIL # BLD AUTO: ABNORMAL K/UL
EOSINOPHIL NFR BLD: 0 %
ERYTHROCYTE [DISTWIDTH] IN BLOOD BY AUTOMATED COUNT: 14.8 %
EST. GFR  (AFRICAN AMERICAN): >60 ML/MIN/1.73 M^2
EST. GFR  (NON AFRICAN AMERICAN): >60 ML/MIN/1.73 M^2
GLUCOSE SERPL-MCNC: 88 MG/DL
GLUCOSE UR QL STRIP: NEGATIVE
HCT VFR BLD AUTO: 20.2 %
HGB BLD-MCNC: 6.8 G/DL
HGB UR QL STRIP: NEGATIVE
IMM GRANULOCYTES # BLD AUTO: ABNORMAL K/UL
IMM GRANULOCYTES NFR BLD AUTO: ABNORMAL %
INR PPP: 1.1
KETONES UR QL STRIP: NEGATIVE
LDH SERPL L TO P-CCNC: 161 U/L
LEUKOCYTE ESTERASE UR QL STRIP: NEGATIVE
LYMPHOCYTES # BLD AUTO: ABNORMAL K/UL
LYMPHOCYTES NFR BLD: 100 %
MAGNESIUM SERPL-MCNC: 2 MG/DL
MCH RBC QN AUTO: 28.5 PG
MCHC RBC AUTO-ENTMCNC: 33.7 G/DL
MCV RBC AUTO: 85 FL
MONOCYTES # BLD AUTO: ABNORMAL K/UL
MONOCYTES NFR BLD: 0 %
NEUTROPHILS NFR BLD: 0 %
NITRITE UR QL STRIP: NEGATIVE
NRBC BLD-RTO: 0 /100 WBC
NUM UNITS TRANS PACKED RBC: NORMAL
PH UR STRIP: 8 [PH] (ref 5–8)
PHOSPHATE SERPL-MCNC: 2.9 MG/DL
PLATELET # BLD AUTO: 14 K/UL
PLATELET BLD QL SMEAR: ABNORMAL
PMV BLD AUTO: ABNORMAL FL
POTASSIUM SERPL-SCNC: 3.6 MMOL/L
PROT SERPL-MCNC: 6 G/DL
PROT UR QL STRIP: NEGATIVE
PROTHROMBIN TIME: 11 SEC
RBC # BLD AUTO: 2.39 M/UL
SODIUM SERPL-SCNC: 138 MMOL/L
SP GR UR STRIP: 1.03 (ref 1–1.03)
URATE SERPL-MCNC: 2.4 MG/DL
URN SPEC COLLECT METH UR: NORMAL
UROBILINOGEN UR STRIP-ACNC: NEGATIVE EU/DL
WBC # BLD AUTO: 0.12 K/UL

## 2018-07-05 PROCEDURE — 36415 COLL VENOUS BLD VENIPUNCTURE: CPT

## 2018-07-05 PROCEDURE — 63700000 PHARM REV CODE 250 ALT 637 W/O HCPCS: Performed by: INTERNAL MEDICINE

## 2018-07-05 PROCEDURE — 25000003 PHARM REV CODE 250: Performed by: INTERNAL MEDICINE

## 2018-07-05 PROCEDURE — 63600175 PHARM REV CODE 636 W HCPCS: Performed by: STUDENT IN AN ORGANIZED HEALTH CARE EDUCATION/TRAINING PROGRAM

## 2018-07-05 PROCEDURE — 99233 SBSQ HOSP IP/OBS HIGH 50: CPT | Mod: ,,, | Performed by: INTERNAL MEDICINE

## 2018-07-05 PROCEDURE — 25000003 PHARM REV CODE 250: Performed by: HOSPITALIST

## 2018-07-05 PROCEDURE — 63600175 PHARM REV CODE 636 W HCPCS: Performed by: HOSPITALIST

## 2018-07-05 PROCEDURE — 83615 LACTATE (LD) (LDH) ENZYME: CPT

## 2018-07-05 PROCEDURE — 36430 TRANSFUSION BLD/BLD COMPNT: CPT

## 2018-07-05 PROCEDURE — 85610 PROTHROMBIN TIME: CPT

## 2018-07-05 PROCEDURE — 86920 COMPATIBILITY TEST SPIN: CPT

## 2018-07-05 PROCEDURE — 85007 BL SMEAR W/DIFF WBC COUNT: CPT

## 2018-07-05 PROCEDURE — P9040 RBC LEUKOREDUCED IRRADIATED: HCPCS

## 2018-07-05 PROCEDURE — 83735 ASSAY OF MAGNESIUM: CPT

## 2018-07-05 PROCEDURE — 87040 BLOOD CULTURE FOR BACTERIA: CPT | Mod: 59

## 2018-07-05 PROCEDURE — 86901 BLOOD TYPING SEROLOGIC RH(D): CPT

## 2018-07-05 PROCEDURE — 87086 URINE CULTURE/COLONY COUNT: CPT

## 2018-07-05 PROCEDURE — 84100 ASSAY OF PHOSPHORUS: CPT

## 2018-07-05 PROCEDURE — 85730 THROMBOPLASTIN TIME PARTIAL: CPT

## 2018-07-05 PROCEDURE — 81003 URINALYSIS AUTO W/O SCOPE: CPT

## 2018-07-05 PROCEDURE — 80053 COMPREHEN METABOLIC PANEL: CPT

## 2018-07-05 PROCEDURE — 84550 ASSAY OF BLOOD/URIC ACID: CPT

## 2018-07-05 PROCEDURE — 20600001 HC STEP DOWN PRIVATE ROOM

## 2018-07-05 PROCEDURE — 25500020 PHARM REV CODE 255: Performed by: INTERNAL MEDICINE

## 2018-07-05 PROCEDURE — 85027 COMPLETE CBC AUTOMATED: CPT

## 2018-07-05 RX ORDER — DIPHENHYDRAMINE HCL 25 MG
25 CAPSULE ORAL ONCE
Status: COMPLETED | OUTPATIENT
Start: 2018-07-05 | End: 2018-07-05

## 2018-07-05 RX ORDER — CEFEPIME HYDROCHLORIDE 2 G/1
2 INJECTION, POWDER, FOR SOLUTION INTRAVENOUS
Status: DISCONTINUED | OUTPATIENT
Start: 2018-07-05 | End: 2018-07-10

## 2018-07-05 RX ORDER — ACETAMINOPHEN 325 MG/1
650 TABLET ORAL ONCE
Status: COMPLETED | OUTPATIENT
Start: 2018-07-05 | End: 2018-07-05

## 2018-07-05 RX ADMIN — DIPHENHYDRAMINE HYDROCHLORIDE 25 MG: 25 CAPSULE ORAL at 09:07

## 2018-07-05 RX ADMIN — VORICONAZOLE 200 MG: 200 TABLET ORAL at 08:07

## 2018-07-05 RX ADMIN — ACETAMINOPHEN 650 MG: 325 TABLET, FILM COATED ORAL at 10:07

## 2018-07-05 RX ADMIN — ACYCLOVIR 400 MG: 200 CAPSULE ORAL at 08:07

## 2018-07-05 RX ADMIN — CEFEPIME 2 G: 2 INJECTION, POWDER, FOR SOLUTION INTRAVENOUS at 08:07

## 2018-07-05 RX ADMIN — MIRTAZAPINE 15 MG: 15 TABLET, ORALLY DISINTEGRATING ORAL at 08:07

## 2018-07-05 RX ADMIN — IOHEXOL 75 ML: 350 INJECTION, SOLUTION INTRAVENOUS at 04:07

## 2018-07-05 RX ADMIN — CEFEPIME 2 G: 2 INJECTION, POWDER, FOR SOLUTION INTRAVENOUS at 04:07

## 2018-07-05 RX ADMIN — VANCOMYCIN HYDROCHLORIDE 1250 MG: 1 INJECTION, POWDER, LYOPHILIZED, FOR SOLUTION INTRAVENOUS at 12:07

## 2018-07-05 RX ADMIN — ACETAMINOPHEN 650 MG: 325 TABLET ORAL at 09:07

## 2018-07-05 RX ADMIN — DASATINIB 40 MG: 20 TABLET ORAL at 08:07

## 2018-07-05 NOTE — PROGRESS NOTES
SW met with pt at bedside to provide support, SW will continue to follow.    Kimberly Briggs [facility]  Oncology Social Worker  Phone: [phone]

## 2018-07-05 NOTE — PLAN OF CARE
Problem: Patient Care Overview  Goal: Plan of Care Review  Outcome: Ongoing (interventions implemented as appropriate)  Fall, pressure ulcer, and infection precautions continued. Vital signs stable. One unit PRBC given with premedication of tylenol and benadryl. Urine specimen and chest x-ray completed. Vancomycin and Cefepime initiated. Blood culture from 7/4 resulted aerobic bottle gram + cocci in chains, Dr. Ackerman notified. Blood cultures redrawn. CT of orbits completed. Patient stable, will continue to monitor.

## 2018-07-05 NOTE — PLAN OF CARE
Problem: Patient Care Overview  Goal: Plan of Care Review      Recommendations    Recommendation/Intervention:     1. Continue w/ Regular diet.   2.Encourage PO intake >/= 75% EEN/EPN   3. If PO intake is <50% encourage HVP w/ each meal, small frequent meal, snacks, ONS, cold/non aromatic foods.   4. RD to follow    Goals: pt to consume nutrients >/= 85% EEN/EPN   Nutrition Goal Status: progressing towards goal

## 2018-07-05 NOTE — SUBJECTIVE & OBJECTIVE
Subjective:     Interval History:   Tmax 101.5, plt 14  No complaints this AM    Objective:     Vital Signs (Most Recent):  Temp: 99.9 °F (37.7 °C) (07/05/18 0427)  Pulse: 95 (07/05/18 0427)  Resp: 18 (07/05/18 0427)  BP: 123/79 (07/05/18 0427)  SpO2: 98 % (07/05/18 0427) Vital Signs (24h Range):  Temp:  [98 °F (36.7 °C)-101.5 °F (38.6 °C)] 99.9 °F (37.7 °C)  Pulse:  [] 95  Resp:  [16-20] 18  SpO2:  [98 %-100 %] 98 %  BP: (119-131)/(71-80) 123/79     Weight: 60 kg (132 lb 4.4 oz)  Body mass index is 18.45 kg/m².  Body surface area is 1.73 meters squared.    ECOG SCORE         [unfilled]    Intake/Output - Last 3 Shifts       07/03 0700 - 07/04 0659 07/04 0700 - 07/05 0659 07/05 0700 - 07/06 0659    P.O. 900 1200     I.V. (mL/kg) 50 (0.8)      Total Intake(mL/kg) 950 (15.5) 1200 (20)     Urine (mL/kg/hr)  1050 (0.7)     Total Output   1050      Net +950 +150             Urine Occurrence 7 x      Stool Occurrence 2 x 4 x           Physical Exam   Constitutional: He is oriented to person, place, and time. He appears well-developed and well-nourished.   HENT:   Head: Normocephalic and atraumatic.   Eyes: EOM are normal. Pupils are equal, round, and reactive to light.   Neck: Normal range of motion. Neck supple.   Cardiovascular: Normal rate and regular rhythm.    No murmur heard.  Pulmonary/Chest: Effort normal and breath sounds normal. No respiratory distress.   Abdominal: Soft. Bowel sounds are normal. He exhibits no distension.   Musculoskeletal: Normal range of motion. He exhibits no edema.   Neurological: He is alert and oriented to person, place, and time.   Skin: Skin is warm and dry.   Psychiatric: He has a normal mood and affect. His behavior is normal.   Vitals reviewed.    Significant Labs:   CBC:     Recent Labs  Lab 07/04/18  0323 07/05/18  0359   WBC 0.14* 0.12*   HGB 7.5* 6.8*   HCT 21.7* 20.2*   PLT 28* 14*    and CMP:     Recent Labs  Lab 07/04/18  0323 07/05/18  0359    138   K 3.9 3.6   CL  104 104   CO2 24 26   GLU 89 88   BUN 11 12   CREATININE 0.7 0.7   CALCIUM 9.0 9.0   PROT 5.8* 6.0   ALBUMIN 2.8* 2.9*   BILITOT 0.4 0.4   ALKPHOS 76 75   AST 12 12   ALT 32 31   ANIONGAP 10 8   EGFRNONAA >60.0 >60.0       Diagnostic Results:  I have reviewed all pertinent imaging results/findings within the past 24 hours.

## 2018-07-05 NOTE — ASSESSMENT & PLAN NOTE
Malnutrition in the context of Acute Illness/Injury    Related to (etiology):  CML    Signs and Symptoms (as evidenced by):  Energy Intake: <50% of estimated energy requirement for ~ 1 week  Muscle Mass Depletion: moderate depletion of temples   Weight Loss: 20% x 1 month     Interventions/Recommendations (treatment strategy):  See RD note from 7/5/18    Nutrition Diagnosis Status:  New

## 2018-07-05 NOTE — PROGRESS NOTES
[facility]  Hematology  Bone Marrow Transplant  Progress Note    Patient Name: Wilton Guzmán  Admission Date: 6/16/2018  Hospital Length of Stay: 19 days  Code Status: Full Code    Subjective:     Interval History:   Tmax 101.5, plt 14  No complaints this AM    Objective:     Vital Signs (Most Recent):  Temp: 99.9 °F (37.7 °C) (07/05/18 0427)  Pulse: 95 (07/05/18 0427)  Resp: 18 (07/05/18 0427)  BP: 123/79 (07/05/18 0427)  SpO2: 98 % (07/05/18 0427) Vital Signs (24h Range):  Temp:  [98 °F (36.7 °C)-101.5 °F (38.6 °C)] 99.9 °F (37.7 °C)  Pulse:  [] 95  Resp:  [16-20] 18  SpO2:  [98 %-100 %] 98 %  BP: (119-131)/(71-80) 123/79     Weight: 60 kg (132 lb 4.4 oz)  Body mass index is 18.45 kg/m².  Body surface area is 1.73 meters squared.    ECOG SCORE         [unfilled]    Intake/Output - Last 3 Shifts       07/03 0700 - 07/04 0659 07/04 0700 - 07/05 0659 07/05 0700 - 07/06 0659    P.O. 900 1200     I.V. (mL/kg) 50 (0.8)      Total Intake(mL/kg) 950 (15.5) 1200 (20)     Urine (mL/kg/hr)  1050 (0.7)     Total Output   1050      Net +950 +150             Urine Occurrence 7 x      Stool Occurrence 2 x 4 x           Physical Exam   Constitutional: He is oriented to person, place, and time. He appears well-developed and well-nourished.   HENT:   Head: Normocephalic and atraumatic.   Eyes: EOM are normal. Pupils are equal, round, and reactive to light. L unilateral periorbital edema.  Neck: Normal range of motion. Neck supple.   Cardiovascular: Normal rate and regular rhythm.    No murmur heard.  Pulmonary/Chest: Effort normal and breath sounds normal. No respiratory distress.   Abdominal: Soft. Bowel sounds are normal. He exhibits no distension.   Musculoskeletal: Normal range of motion. He exhibits no edema.   Neurological: He is alert and oriented to person, place, and time.   Skin: Skin is warm and dry.   Psychiatric: He has a normal mood and affect. His behavior is normal.   Vitals  reviewed.    Significant Labs:   CBC:     Recent Labs  Lab 07/04/18  0323 07/05/18  0359   WBC 0.14* 0.12*   HGB 7.5* 6.8*   HCT 21.7* 20.2*   PLT 28* 14*    and CMP:     Recent Labs  Lab 07/04/18  0323 07/05/18  0359    138   K 3.9 3.6    104   CO2 24 26   GLU 89 88   BUN 11 12   CREATININE 0.7 0.7   CALCIUM 9.0 9.0   PROT 5.8* 6.0   ALBUMIN 2.8* 2.9*   BILITOT 0.4 0.4   ALKPHOS 76 75   AST 12 12   ALT 32 31   ANIONGAP 10 8   EGFRNONAA >60.0 >60.0       Diagnostic Results:  I have reviewed all pertinent imaging results/findings within the past 24 hours.    Assessment/Plan:     * Chronic myelogenous leukemia (CML), BCR-ABL1-positive    - initial WBC reportedly 150,000  - bcr-abl positive on FISH at [facility] (records attached in media tab)  - original BMBX 5/26/18 with 20% blasts; CML transformed to AML with blast crisis;   karyotype: 46,XY,t(9;22;22;14)q34;q11.2;q13;q23); BCR/ABL gene fusion 62%  - continue dasatinib at 40 mg (dose reduction secondary to being on voriconazole)  - voriconazole, acyclovir and cipro for prophylaxis   - repeat BM biopsy done 6/18 here at [facility] after 7+3. 9% blasts.   - persistent disease post 7+3 induction. 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 Today would be Day 13.  Plan for D21 BMBX, per staff.  - AML FISH negative  - NGS pending from repeat BMBX  - echo with 63% EF  - HIV negative, Hepatitis negative, G6PD wnl  - HLA typing in process  - referral information for transplant benefits given to patient  - patient has 1 full sibling, contact info given to transplant coordinators  - consider sending BCR/ABL mutational analysis testing when WBC increases        Neutropenic fever    --started spiking temperatures around 7/4  --cefepime started  --CXR, UA, cultures pending  --L unilateral periorbital edema, CT orbits and vancomycin ordered.        Anxiety    - Patient understandibly experiencing anxiety about his new diagnosis and feeling isolated in his  room  - Declined oncology psychologist consult  - Continue Remeron for anxiety and insomnia          Pancytopenia due to antineoplastic chemotherapy    - transfuse for Hgb<7 and platelets<10k  - continue PPX acyc, vori, and cipro            VTE Risk Mitigation         Ordered     heparin, porcine (PF) 100 unit/mL injection flush 300 Units  As needed (PRN)      06/23/18 1052     IP VTE LOW RISK PATIENT  Once      06/16/18 0938            Danika Echevarria MD  Bone Marrow Transplant  [facility]

## 2018-07-05 NOTE — PROGRESS NOTES
Dr Arrington notified of patient's 101.5 temp. BCx2 ordered. Will recheck in an hour. Will cont to alexis delaney

## 2018-07-05 NOTE — ASSESSMENT & PLAN NOTE
- initial WBC reportedly 150,000  - bcr-abl positive on FISH at [facility] (records attached in media tab)  - original BMBX 5/26/18 with 20% blasts; CML transformed to AML with blast crisis;   karyotype: 46,XY,t(9;22;22;14)q34;q11.2;q13;q23); BCR/ABL gene fusion 62%  - continue dasatinib at 40 mg (dose reduction secondary to being on voriconazole)  - voriconazole, acyclovir and cipro for prophylaxis   - repeat BM biopsy done 6/18 here at [facility] after 7+3. 9% blasts.   - persistent disease post 7+3 induction. 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 Today would be Day 13.  Plan for D21 BMBX, per staff.  - AML FISH negative  - NGS pending from repeat BMBX  - echo with 63% EF  - HIV negative, Hepatitis negative, G6PD wnl  - HLA typing in process  - referral information for transplant benefits given to patient  - patient has 1 full sibling, contact info given to transplant coordinators  - consider sending BCR/ABL mutational analysis testing when WBC increases

## 2018-07-05 NOTE — PROGRESS NOTES
" [facility]  Adult Nutrition  Progress Note    SUMMARY       Recommendations    Recommendation/Intervention:     1. Continue w/ Regular diet.   2.Encourage PO intake >/= 75% EEN/EPN   3. If PO intake is <50% encourage HVP w/ each meal, small frequent meal, snacks, ONS, cold/non aromatic foods.   4. RD to follow    Goals: pt to consume nutrients >/= 85% EEN/EPN   Nutrition Goal Status: progressing towards goal  Communication of RD Recs:  (POC)    Reason for Assessment    Reason for Assessment: RD follow-up  Diagnosis:  (Chronic myelogenous leukemia (CML)  Relevant Medical History: CML  Interdisciplinary Rounds: did not attend  General Information Comments: Pt reports that appetite is not the best and eating about 50% of meals. Stated that appetite PTA was about the same and eating ~ 1/2 meal/day for 1 week. Wt reports wt loss. Pt is malnourished 2/2 poor po intake and wt loss per chart review.   Nutrition Discharge Planning: adequate po intake     Nutrition Risk Screen    Nutrition Risk Screen: no indicators present    Nutrition/Diet History    Patient Reported Diet/Restrictions/Preferences: general  Food Preferences: none  Do you have any cultural, spiritual, Oriental orthodox conflicts, given your current situation?: no  Food Allergies: NKFA  Factors Affecting Nutritional Intake: decreased appetite, altered taste    Anthropometrics    Temp: 97.3 °F (36.3 °C)  Height Method: Stated  Height: 5' 11" (180.3 cm) (per RN 18)  Height (inches): 71 in  Weight Method: Standard Scale  Weight: 60 kg (132 lb 4.4 oz)  Weight (lb): 132.28 lb  Ideal Body Weight (IBW), Male: 172 lb  % Ideal Body Weight, Male (lb): 76.91 lb  BMI (Calculated): 18.5  BMI Grade: 18.5-24.9 - normal  Weight Loss:  (stable)  Usual Body Weight (UBW), k.2 kg (per chart review 6/15/18)  % Usual Body Weight: 79.95  % Weight Change From Usual Weight: -20.21 %     Lab/Procedures/Meds    Pertinent Labs Reviewed: reviewed  Pertinent Labs " Comments: noted  Pertinent Medications Reviewed: reviewed  Pertinent Medications Comments: acetaminophen, dasatinb    Physical Findings/Assessment    Overall Physical Appearance: underweight, loss of muscle mass  Tubes: other (see comments)  Oral/Mouth Cavity: WDL  Skin: intact    Estimated/Assessed Needs    Weight Used For Calorie Calculations: 66.4 kg (146 lb 6.2 oz)  Energy Calorie Requirements (kcal): 0200-6583 Kcal/d  Energy Need Method: Kcal/kg (25-30KCal/kg)  Protein Requirements: 100-120g/d (1.5-1.8)  Weight Used For Protein Calculations: 66.4 kg (146 lb 6.2 oz)     Fluid Need Method: RDA Method (1ml/Kcal or Per MD)  RDA Method (mL): 1660     Nutrition Prescription Ordered    Current Diet Order: regular  Oral Nutrition Supplement: boost breeze     Evaluation of Received Nutrient/Fluid Intake    I/O: +5.93L since admit  Energy Calories Required: meeting needs  Protein Required: meeting needs  Fluid Required: meeting needs  Comments: LBM 6/4  Tolerance: tolerating    % Intake of Estimated Energy Needs: 25 - 50 %  % Meal Intake: 25 - 50 %    Nutrition Risk    Level of Risk/Frequency of Follow-up: low (f/u 1 x wk)     Assessment and Plan    Severe malnutrition    Malnutrition in the context of Acute Illness/Injury    Related to (etiology):  CML    Signs and Symptoms (as evidenced by):  Energy Intake: <50% of estimated energy requirement for ~ 1 week  Muscle Mass Depletion: moderate depletion of temples   Weight Loss: 20% x 1 month     Interventions/Recommendations (treatment strategy):  See RD note from 7/5/18    Nutrition Diagnosis Status:  New             Monitor and Evaluation    Food and Nutrient Intake: energy intake, food and beverage intake  Food and Nutrient Adminstration: diet order  Physical Activity and Function: nutrition-related ADLs and IADLs  Anthropometric Measurements: weight, weight change  Biochemical Data, Medical Tests and Procedures:  (All labs)  Nutrition-Focused Physical Findings: overall  appearance     Nutrition Follow-Up    RD Follow-up?: Yes

## 2018-07-05 NOTE — PLAN OF CARE
Problem: Patient Care Overview  Goal: Plan of Care Review  Outcome: Ongoing (interventions implemented as appropriate)  Tmax 101.5 this shift. Tylenol given. BCx2 done. Free from falls or injury. No complaints of pain. Bed locked in lowest position, non skid socks on, call light within reach. Pt instructed to call if any assistance is needed. Vitals stable. Neutropenic precautions maintained. Will cont to alexis pt.

## 2018-07-06 PROBLEM — H57.89 PERIORBITAL SWELLING: Status: ACTIVE | Noted: 2018-07-06

## 2018-07-06 LAB
ALBUMIN SERPL BCP-MCNC: 2.6 G/DL
ALP SERPL-CCNC: 74 U/L
ALT SERPL W/O P-5'-P-CCNC: 27 U/L
ANION GAP SERPL CALC-SCNC: 10 MMOL/L
ANISOCYTOSIS BLD QL SMEAR: SLIGHT
AST SERPL-CCNC: 12 U/L
BACTERIA BLD CULT: NORMAL
BACTERIA UR CULT: NO GROWTH
BASOPHILS # BLD AUTO: 0 K/UL
BASOPHILS NFR BLD: 0 %
BILIRUB SERPL-MCNC: 0.3 MG/DL
BLD PROD TYP BPU: NORMAL
BLOOD UNIT EXPIRATION DATE: NORMAL
BLOOD UNIT TYPE CODE: 5100
BLOOD UNIT TYPE: NORMAL
BUN SERPL-MCNC: 10 MG/DL
CALCIUM SERPL-MCNC: 8.6 MG/DL
CHLORIDE SERPL-SCNC: 105 MMOL/L
CO2 SERPL-SCNC: 23 MMOL/L
CODING SYSTEM: NORMAL
CREAT SERPL-MCNC: 0.7 MG/DL
DIFFERENTIAL METHOD: ABNORMAL
DISPENSE STATUS: NORMAL
EOSINOPHIL # BLD AUTO: 0 K/UL
EOSINOPHIL NFR BLD: 0 %
ERYTHROCYTE [DISTWIDTH] IN BLOOD BY AUTOMATED COUNT: 14.7 %
EST. GFR  (AFRICAN AMERICAN): >60 ML/MIN/1.73 M^2
EST. GFR  (NON AFRICAN AMERICAN): >60 ML/MIN/1.73 M^2
GLUCOSE SERPL-MCNC: 73 MG/DL
HCT VFR BLD AUTO: 21.4 %
HGB BLD-MCNC: 7.2 G/DL
IMM GRANULOCYTES # BLD AUTO: 0 K/UL
IMM GRANULOCYTES NFR BLD AUTO: 0 %
LYMPHOCYTES # BLD AUTO: 0.1 K/UL
LYMPHOCYTES NFR BLD: 100 %
MAGNESIUM SERPL-MCNC: 1.9 MG/DL
MCH RBC QN AUTO: 28.7 PG
MCHC RBC AUTO-ENTMCNC: 33.6 G/DL
MCV RBC AUTO: 85 FL
MONOCYTES # BLD AUTO: 0 K/UL
MONOCYTES NFR BLD: 0 %
NEUTROPHILS # BLD AUTO: 0 K/UL
NEUTROPHILS NFR BLD: 0 %
NRBC BLD-RTO: 0 /100 WBC
NUM UNITS TRANS WBC-POOR PLATPHERESIS: NORMAL
OVALOCYTES BLD QL SMEAR: ABNORMAL
PHOSPHATE SERPL-MCNC: 3.2 MG/DL
PLATELET # BLD AUTO: 6 K/UL
PLATELET BLD QL SMEAR: ABNORMAL
PMV BLD AUTO: ABNORMAL FL
POIKILOCYTOSIS BLD QL SMEAR: SLIGHT
POTASSIUM SERPL-SCNC: 3.7 MMOL/L
PROT SERPL-MCNC: 5.5 G/DL
RBC # BLD AUTO: 2.51 M/UL
SODIUM SERPL-SCNC: 138 MMOL/L
WBC # BLD AUTO: 0.14 K/UL

## 2018-07-06 PROCEDURE — P9037 PLATE PHERES LEUKOREDU IRRAD: HCPCS

## 2018-07-06 PROCEDURE — 25500020 PHARM REV CODE 255: Performed by: INTERNAL MEDICINE

## 2018-07-06 PROCEDURE — 84100 ASSAY OF PHOSPHORUS: CPT

## 2018-07-06 PROCEDURE — 20600001 HC STEP DOWN PRIVATE ROOM

## 2018-07-06 PROCEDURE — 63600175 PHARM REV CODE 636 W HCPCS: Mod: JG | Performed by: INTERNAL MEDICINE

## 2018-07-06 PROCEDURE — 25000003 PHARM REV CODE 250: Performed by: INTERNAL MEDICINE

## 2018-07-06 PROCEDURE — 85025 COMPLETE CBC W/AUTO DIFF WBC: CPT

## 2018-07-06 PROCEDURE — 25000003 PHARM REV CODE 250: Performed by: HOSPITALIST

## 2018-07-06 PROCEDURE — 36593 DECLOT VASCULAR DEVICE: CPT

## 2018-07-06 PROCEDURE — 80202 ASSAY OF VANCOMYCIN: CPT

## 2018-07-06 PROCEDURE — 99233 SBSQ HOSP IP/OBS HIGH 50: CPT | Mod: ,,, | Performed by: INTERNAL MEDICINE

## 2018-07-06 PROCEDURE — 86644 CMV ANTIBODY: CPT

## 2018-07-06 PROCEDURE — A4216 STERILE WATER/SALINE, 10 ML: HCPCS | Performed by: INTERNAL MEDICINE

## 2018-07-06 PROCEDURE — A9585 GADOBUTROL INJECTION: HCPCS | Performed by: INTERNAL MEDICINE

## 2018-07-06 PROCEDURE — 25000003 PHARM REV CODE 250: Performed by: STUDENT IN AN ORGANIZED HEALTH CARE EDUCATION/TRAINING PROGRAM

## 2018-07-06 PROCEDURE — 63600175 PHARM REV CODE 636 W HCPCS: Performed by: STUDENT IN AN ORGANIZED HEALTH CARE EDUCATION/TRAINING PROGRAM

## 2018-07-06 PROCEDURE — 63700000 PHARM REV CODE 250 ALT 637 W/O HCPCS: Performed by: INTERNAL MEDICINE

## 2018-07-06 PROCEDURE — 63600175 PHARM REV CODE 636 W HCPCS: Performed by: HOSPITALIST

## 2018-07-06 PROCEDURE — 80053 COMPREHEN METABOLIC PANEL: CPT

## 2018-07-06 PROCEDURE — 83735 ASSAY OF MAGNESIUM: CPT

## 2018-07-06 RX ORDER — ACETAMINOPHEN 325 MG/1
650 TABLET ORAL
Status: COMPLETED | OUTPATIENT
Start: 2018-07-06 | End: 2018-07-06

## 2018-07-06 RX ORDER — GADOBUTROL 604.72 MG/ML
7 INJECTION INTRAVENOUS
Status: COMPLETED | OUTPATIENT
Start: 2018-07-06 | End: 2018-07-06

## 2018-07-06 RX ORDER — DIPHENHYDRAMINE HCL 25 MG
25 CAPSULE ORAL
Status: COMPLETED | OUTPATIENT
Start: 2018-07-06 | End: 2018-07-06

## 2018-07-06 RX ORDER — HYDROCODONE BITARTRATE AND ACETAMINOPHEN 500; 5 MG/1; MG/1
TABLET ORAL
Status: DISCONTINUED | OUTPATIENT
Start: 2018-07-06 | End: 2018-07-09

## 2018-07-06 RX ADMIN — MIRTAZAPINE 15 MG: 15 TABLET, ORALLY DISINTEGRATING ORAL at 09:07

## 2018-07-06 RX ADMIN — VANCOMYCIN HYDROCHLORIDE 1250 MG: 1 INJECTION, POWDER, LYOPHILIZED, FOR SOLUTION INTRAVENOUS at 11:07

## 2018-07-06 RX ADMIN — GADOBUTROL 7 ML: 604.72 INJECTION INTRAVENOUS at 05:07

## 2018-07-06 RX ADMIN — DIPHENHYDRAMINE HYDROCHLORIDE 25 MG: 25 CAPSULE ORAL at 07:07

## 2018-07-06 RX ADMIN — ACETAMINOPHEN 650 MG: 325 TABLET ORAL at 07:07

## 2018-07-06 RX ADMIN — ALTEPLASE 2 MG: 2.2 INJECTION, POWDER, LYOPHILIZED, FOR SOLUTION INTRAVENOUS at 08:07

## 2018-07-06 RX ADMIN — SODIUM CHLORIDE, PRESERVATIVE FREE 10 ML: 5 INJECTION INTRAVENOUS at 03:07

## 2018-07-06 RX ADMIN — VORICONAZOLE 200 MG: 200 TABLET ORAL at 09:07

## 2018-07-06 RX ADMIN — CEFEPIME 2 G: 2 INJECTION, POWDER, FOR SOLUTION INTRAVENOUS at 03:07

## 2018-07-06 RX ADMIN — POTASSIUM CHLORIDE 20 MEQ: 750 CAPSULE, EXTENDED RELEASE ORAL at 06:07

## 2018-07-06 RX ADMIN — CEFEPIME 2 G: 2 INJECTION, POWDER, FOR SOLUTION INTRAVENOUS at 08:07

## 2018-07-06 RX ADMIN — ACYCLOVIR 400 MG: 200 CAPSULE ORAL at 09:07

## 2018-07-06 RX ADMIN — DASATINIB 40 MG: 20 TABLET ORAL at 08:07

## 2018-07-06 RX ADMIN — CEFEPIME 2 G: 2 INJECTION, POWDER, FOR SOLUTION INTRAVENOUS at 12:07

## 2018-07-06 RX ADMIN — VORICONAZOLE 200 MG: 200 TABLET ORAL at 08:07

## 2018-07-06 RX ADMIN — ACYCLOVIR 400 MG: 200 CAPSULE ORAL at 08:07

## 2018-07-06 RX ADMIN — VANCOMYCIN HYDROCHLORIDE 1250 MG: 1 INJECTION, POWDER, LYOPHILIZED, FOR SOLUTION INTRAVENOUS at 12:07

## 2018-07-06 NOTE — PLAN OF CARE
Problem: Patient Care Overview  Goal: Plan of Care Review  Outcome: Ongoing (interventions implemented as appropriate)  Pt is AAOx4 and ambulates independently. Pt is tachy in low 100's and had a T-max of 99.8.  Pt c/o 7 /10 headache pain and pressure to the back of the head and had 650mg Tylenol. Pt had dose #2 of 1250mg vanc at 0100. 300cc output of clear, yellow urine. Spouse @ bedside. Pt remained safe and free of injury through the night. Bed in low and locked position, bed rails up x2, call bell in reach, non skid socks worn out of bed. Will continue to monitor.

## 2018-07-06 NOTE — ASSESSMENT & PLAN NOTE
- noted very mild swelling to left side of skull on 7/3/18   - periorbital swelling noted 7/5/18, ct orbits done and showed minimal asymetric soft tissue induration of left infraorbital soft tissue without evidence of focal fluid collection. Otherwise unremarkable.  - consider neuro consult and or mri brain pending staff   - on cef and vanc

## 2018-07-06 NOTE — PLAN OF CARE
MDR's with Dr Crabtree.  Patient is day 14 of MEC reinduction.  ANC 0.  Patient remains transfusion dependent.  Now with left periorbital edema and cellulitis to the head.  On IV Vanc and cefepime.  Blood cx + from 7/4.  Plan to repeat today.  MRI head/neck planned for today.  Wife at bedside for support.  Next BMB planned for day 21.  Will continue to follow.

## 2018-07-06 NOTE — NURSING
Pt c/o of pain to the back of the head. I elevated the bed between 30-45 degrees to alleviate any pressure that could be causing the pain r/t the orbital swelling. I also administered 650mg of Tylenol. I contacted Dr. Arrington re: pt pain and he agreed these were proper interventions and would speak to the pt when CT results returned. Will continue to monitor

## 2018-07-06 NOTE — ASSESSMENT & PLAN NOTE
-started spiking temperatures around 7/4  -cefepime day 2 and vanc day 2  -CXR negative, UA negative, cultures from 7/4/18 one bottle gram + cocci in chains resembling strep, redrew cultures and are ngtd

## 2018-07-06 NOTE — PLAN OF CARE
Problem: Patient Care Overview  Goal: Plan of Care Review  Outcome: Ongoing (interventions implemented as appropriate)  Patient received 1 unit of platelets this am. Denies any discomfort. Tolerating diet. Afebrile, no falls this shift. Patient remains on neutropenic and thrombocytopenic precautions. Will cont to monitor.

## 2018-07-06 NOTE — SUBJECTIVE & OBJECTIVE
Subjective:     Interval History:   - [facility] day 14  -Tmax 99.8. Blood cx from 7/4 one bottle showed gram + cocci in chains resembling strep, redrew cx and ngtd.  - Plt 6K, will get one unit plt transfusion.  - ANC 0. On cef and vanc. Vanc due to left side facial swelling/periorbital edema. CT orbits done yesterday which showed minimal asymetric soft tissue induration of left infraorbital soft tissue without evidence of focal fluid collection. Otherwise unremarkable. May perform MRI and/or get neuro consult pending staff due to HA and pressure in back of head.    Objective:     Vital Signs (Most Recent):  Temp: 98.9 °F (37.2 °C) (07/06/18 0830)  Pulse: 90 (07/06/18 0845)  Resp: 16 (07/06/18 0845)  BP: 133/64 (07/06/18 0845)  SpO2: 98 % (07/06/18 0845) Vital Signs (24h Range):  Temp:  [97.3 °F (36.3 °C)-100 °F (37.8 °C)] 98.9 °F (37.2 °C)  Pulse:  [] 90  Resp:  [16-20] 16  SpO2:  [97 %-100 %] 98 %  BP: (114-133)/(64-82) 133/64     Weight: 60.6 kg (133 lb 11.3 oz)  Body mass index is 18.65 kg/m².  Body surface area is 1.74 meters squared.    ECOG SCORE         [unfilled]    Intake/Output - Last 3 Shifts       07/04 0700 - 07/05 0659 07/05 0700 - 07/06 0659 07/06 0700 - 07/07 0659    P.O. 1200 480     Blood  331     IV Piggyback  500     Total Intake(mL/kg) 1200 (20) 1311 (21.6)     Urine (mL/kg/hr) 1050 (0.7) 950 (0.7) 500 (4.2)    Total Output 1050 950 500    Net +150 +361 -500           Stool Occurrence 4 x 2 x           Physical Exam   Constitutional: He is oriented to person, place, and time. He appears well-developed and well-nourished.   HENT:   Head: Normocephalic and atraumatic.   Left side of head slightly swollen   Eyes: EOM are normal. Pupils are equal, round, and reactive to light.   Left periorbital edema   Neck: Normal range of motion. Neck supple.   Cardiovascular: Normal rate and regular rhythm.    No murmur heard.  Pulmonary/Chest: Effort normal and breath sounds normal. No respiratory distress.    Abdominal: Soft. Bowel sounds are normal. He exhibits no distension.   Musculoskeletal: Normal range of motion. He exhibits no edema.   Neurological: He is alert and oriented to person, place, and time.   Skin: Skin is warm and dry.   Central line c/d/i   Psychiatric: He has a normal mood and affect. His behavior is normal.   Vitals reviewed.    Significant Labs:   CBC:     Recent Labs  Lab 07/05/18  0359 07/06/18  0415   WBC 0.12* 0.14*   HGB 6.8* 7.2*   HCT 20.2* 21.4*   PLT 14* 6*    and CMP:     Recent Labs  Lab 07/05/18  0359 07/06/18  0415    138   K 3.6 3.7    105   CO2 26 23   GLU 88 73   BUN 12 10   CREATININE 0.7 0.7   CALCIUM 9.0 8.6*   PROT 6.0 5.5*   ALBUMIN 2.9* 2.6*   BILITOT 0.4 0.3   ALKPHOS 75 74   AST 12 12   ALT 31 27   ANIONGAP 8 10   EGFRNONAA >60.0 >60.0       Diagnostic Results:  I have reviewed all pertinent imaging results/findings within the past 24 hours.   ct orbits done and showed minimal asymetric soft tissue induration of left infraorbital soft tissue without evidence of focal fluid collection. Otherwise unremarkable.

## 2018-07-06 NOTE — ASSESSMENT & PLAN NOTE
- noted very mild swelling to left side of skull on 7/3/18   - periorbital swelling noted 7/5/18, ct orbits done and showed minimal asymetric soft tissue induration of left infraorbital soft tissue without evidence of focal fluid collection. Otherwise unremarkable.  - mri brain and neck official read pending but nonspecific subcutaneous edema. No obvious sign of infection.  - neuro consult not appropriate at this time, will wait for mri to result prior to consulting neuro v. ID v. ent v. optho  - on cef and vanc (7/5/18 = day 1)

## 2018-07-06 NOTE — ASSESSMENT & PLAN NOTE
- transfuse for Hgb<7 and platelets<10k  - continue PPX acyc, vori, and cipro   - one unit plt today for plt of 6K, hgb 7.2, wbc 0.14 with ANC of 0

## 2018-07-06 NOTE — PROGRESS NOTES
[facility]  Hematology  Bone Marrow Transplant  Progress Note    Patient Name: Wilton Guzmán  Admission Date: 6/16/2018  Hospital Length of Stay: 20 days  Code Status: Full Code    Subjective:     Interval History:   - [facility] day 14  -Tmax 99.8. Blood cx from 7/4 one bottle showed gram + cocci in chains resembling strep, redrew cx and ngtd.  - Plt 6K, will get one unit plt transfusion.  - ANC 0. On cef and vanc. Vanc due to left side facial swelling/periorbital edema. CT orbits done yesterday which showed minimal asymetric soft tissue induration of left infraorbital soft tissue without evidence of focal fluid collection. Otherwise unremarkable. May perform MRI and/or get neuro consult pending staff due to HA and pressure in back of head.    Objective:     Vital Signs (Most Recent):  Temp: 98.9 °F (37.2 °C) (07/06/18 0830)  Pulse: 90 (07/06/18 0845)  Resp: 16 (07/06/18 0845)  BP: 133/64 (07/06/18 0845)  SpO2: 98 % (07/06/18 0845) Vital Signs (24h Range):  Temp:  [97.3 °F (36.3 °C)-100 °F (37.8 °C)] 98.9 °F (37.2 °C)  Pulse:  [] 90  Resp:  [16-20] 16  SpO2:  [97 %-100 %] 98 %  BP: (114-133)/(64-82) 133/64     Weight: 60.6 kg (133 lb 11.3 oz)  Body mass index is 18.65 kg/m².  Body surface area is 1.74 meters squared.    ECOG SCORE         [unfilled]    Intake/Output - Last 3 Shifts       07/04 0700 - 07/05 0659 07/05 0700 - 07/06 0659 07/06 0700 - 07/07 0659    P.O. 1200 480     Blood  331     IV Piggyback  500     Total Intake(mL/kg) 1200 (20) 1311 (21.6)     Urine (mL/kg/hr) 1050 (0.7) 950 (0.7) 500 (4.2)    Total Output 1050 950 500    Net +150 +361 -500           Stool Occurrence 4 x 2 x           Physical Exam   Constitutional: He is oriented to person, place, and time. He appears well-developed and well-nourished.   HENT:   Head: Normocephalic and atraumatic.   Left side of head slightly swollen, redness noted throughout  Eyes: EOM are normal. Pupils are equal, round, and reactive to light.    Left periorbital edema slightly worse than day prior  Neck: Normal range of motion. Neck supple. Right side neck fullness, appears fluid filled, nontender  Cardiovascular: Normal rate and regular rhythm.    No murmur heard.  Pulmonary/Chest: Effort normal and breath sounds normal. No respiratory distress.   Abdominal: Soft. Bowel sounds are normal. He exhibits no distension.   Musculoskeletal: Normal range of motion. He exhibits no edema.   Neurological: He is alert and oriented to person, place, and time.   Skin: Skin is warm and dry.   Central line c/d/i   Psychiatric: He has a normal mood and affect. His behavior is normal.   Vitals reviewed.    Significant Labs:   CBC:     Recent Labs  Lab 07/05/18  0359 07/06/18  0415   WBC 0.12* 0.14*   HGB 6.8* 7.2*   HCT 20.2* 21.4*   PLT 14* 6*    and CMP:     Recent Labs  Lab 07/05/18  0359 07/06/18  0415    138   K 3.6 3.7    105   CO2 26 23   GLU 88 73   BUN 12 10   CREATININE 0.7 0.7   CALCIUM 9.0 8.6*   PROT 6.0 5.5*   ALBUMIN 2.9* 2.6*   BILITOT 0.4 0.3   ALKPHOS 75 74   AST 12 12   ALT 31 27   ANIONGAP 8 10   EGFRNONAA >60.0 >60.0       Diagnostic Results:  I have reviewed all pertinent imaging results/findings within the past 24 hours.   ct orbits done and showed minimal asymetric soft tissue induration of left infraorbital soft tissue without evidence of focal fluid collection. Otherwise unremarkable.    Assessment/Plan:     * Chronic myelogenous leukemia (CML), BCR-ABL1-positive    - initial WBC reportedly 150,000  - bcr-abl positive on FISH at [facility] (records attached in media tab)  - original BMBX 5/26/18 with 20% blasts; CML transformed to AML with blast crisis;   karyotype: 46,XY,t(9;22;22;14)q34;q11.2;q13;q23); BCR/ABL gene fusion 62%  - continue dasatinib at 40 mg (dose reduction secondary to being on voriconazole)  - voriconazole, acyclovir and cipro for prophylaxis   - repeat BM biopsy done 6/18 here at [facility] after 7+3. 9% blasts.   -  persistent disease post 7+3 induction. 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 Today is Day 14.  Plan for D21 BMBX, per staff instead of day 14 marrow.  - AML FISH negative  - NGS pending from repeat BMBX  - echo with 63% EF  - HIV negative, Hepatitis negative, G6PD wnl  - HLA typing in process  - referral information for transplant benefits given to patient  - patient has 1 full sibling, contact info given to transplant coordinators  - consider sending BCR/ABL mutational analysis testing when WBC increases        Pancytopenia due to antineoplastic chemotherapy    - transfuse for Hgb<7 and platelets<10k  - continue PPX acyc, vori, and cipro   - one unit plt today for plt of 6K, hgb 7.2, wbc 0.14 with ANC of 0        Periorbital swelling    - noted very mild swelling to left side of skull on 7/3/18   - periorbital swelling noted 7/5/18, ct orbits done and showed minimal asymetric soft tissue induration of left infraorbital soft tissue without evidence of focal fluid collection. Otherwise unremarkable.  - mri brain and neck  - neuro consult not appropriate at this time, will wait for mri to result prior to consulting neuro v. ID v. ent v. optho  - on cef and vanc        Severe malnutrition    - albumin and weight dropping  - dietary following         Neutropenic fever    -started spiking temperatures around 7/4  -cefepime day 2 and vanc day 2  -CXR negative, UA negative, cultures from 7/4/18 one bottle gram + cocci in chains resembling strep, redrew cultures and are ngtd        Anxiety    - Patient understandibly experiencing anxiety about his new diagnosis and feeling isolated in his room  - Declined oncology psychologist consult  - Continue Remeron for anxiety and insomnia               VTE Risk Mitigation         Ordered     heparin, porcine (PF) 100 unit/mL injection flush 300 Units  As needed (PRN)      06/23/18 1052     IP VTE LOW RISK PATIENT  Once      06/16/18 0938          Disposition:  pending repeat marrow on day 21 of mec and count recovery thereafter    Yoanna Andrade, CARRINGTON  Bone Marrow Transplant  [facility]

## 2018-07-06 NOTE — ASSESSMENT & PLAN NOTE
- initial WBC reportedly 150,000  - bcr-abl positive on FISH at [facility] (records attached in media tab)  - original BMBX 5/26/18 with 20% blasts; CML transformed to AML with blast crisis;   karyotype: 46,XY,t(9;22;22;14)q34;q11.2;q13;q23); BCR/ABL gene fusion 62%  - continue dasatinib at 40 mg (dose reduction secondary to being on voriconazole)  - voriconazole, acyclovir and cipro for prophylaxis   - repeat BM biopsy done 6/18 here at [facility] after 7+3. 9% blasts.   - persistent disease post 7+3 induction. 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 Today is Day 14.  Plan for D21 BMBX, per staff instead of day 14 marrow.  - AML FISH negative  - NGS pending from repeat BMBX  - echo with 63% EF  - HIV negative, Hepatitis negative, G6PD wnl  - HLA typing in process  - referral information for transplant benefits given to patient  - patient has 1 full sibling, contact info given to transplant coordinators  - consider sending BCR/ABL mutational analysis testing when WBC increases

## 2018-07-07 LAB
ABO + RH BLD: NORMAL
ALBUMIN SERPL BCP-MCNC: 2.7 G/DL
ALP SERPL-CCNC: 74 U/L
ALT SERPL W/O P-5'-P-CCNC: 27 U/L
ANION GAP SERPL CALC-SCNC: 5 MMOL/L
AST SERPL-CCNC: 13 U/L
BILIRUB SERPL-MCNC: 0.3 MG/DL
BLD GP AB SCN CELLS X3 SERPL QL: NORMAL
BLD PROD TYP BPU: NORMAL
BLOOD UNIT EXPIRATION DATE: NORMAL
BLOOD UNIT TYPE CODE: 5100
BLOOD UNIT TYPE: NORMAL
BUN SERPL-MCNC: 6 MG/DL
CALCIUM SERPL-MCNC: 8.8 MG/DL
CHLORIDE SERPL-SCNC: 107 MMOL/L
CO2 SERPL-SCNC: 27 MMOL/L
CODING SYSTEM: NORMAL
CREAT SERPL-MCNC: 0.7 MG/DL
DISPENSE STATUS: NORMAL
EST. GFR  (AFRICAN AMERICAN): >60 ML/MIN/1.73 M^2
EST. GFR  (NON AFRICAN AMERICAN): >60 ML/MIN/1.73 M^2
GLUCOSE SERPL-MCNC: 79 MG/DL
MAGNESIUM SERPL-MCNC: 1.8 MG/DL
NUM UNITS TRANS PACKED RBC: NORMAL
PHOSPHATE SERPL-MCNC: 2.8 MG/DL
POTASSIUM SERPL-SCNC: 3.5 MMOL/L
PROT SERPL-MCNC: 5.6 G/DL
SODIUM SERPL-SCNC: 139 MMOL/L
VANCOMYCIN TROUGH SERPL-MCNC: 5 UG/ML

## 2018-07-07 PROCEDURE — 80053 COMPREHEN METABOLIC PANEL: CPT

## 2018-07-07 PROCEDURE — 86644 CMV ANTIBODY: CPT

## 2018-07-07 PROCEDURE — P9040 RBC LEUKOREDUCED IRRADIATED: HCPCS

## 2018-07-07 PROCEDURE — 99233 SBSQ HOSP IP/OBS HIGH 50: CPT | Mod: ,,, | Performed by: INTERNAL MEDICINE

## 2018-07-07 PROCEDURE — 63600175 PHARM REV CODE 636 W HCPCS: Performed by: STUDENT IN AN ORGANIZED HEALTH CARE EDUCATION/TRAINING PROGRAM

## 2018-07-07 PROCEDURE — 20600001 HC STEP DOWN PRIVATE ROOM

## 2018-07-07 PROCEDURE — 86850 RBC ANTIBODY SCREEN: CPT

## 2018-07-07 PROCEDURE — 63700000 PHARM REV CODE 250 ALT 637 W/O HCPCS: Performed by: INTERNAL MEDICINE

## 2018-07-07 PROCEDURE — 25000003 PHARM REV CODE 250: Performed by: INTERNAL MEDICINE

## 2018-07-07 PROCEDURE — 25000003 PHARM REV CODE 250: Performed by: HOSPITALIST

## 2018-07-07 PROCEDURE — 36430 TRANSFUSION BLD/BLD COMPNT: CPT

## 2018-07-07 PROCEDURE — 83735 ASSAY OF MAGNESIUM: CPT

## 2018-07-07 PROCEDURE — 63600175 PHARM REV CODE 636 W HCPCS: Performed by: INTERNAL MEDICINE

## 2018-07-07 PROCEDURE — 25000003 PHARM REV CODE 250: Performed by: STUDENT IN AN ORGANIZED HEALTH CARE EDUCATION/TRAINING PROGRAM

## 2018-07-07 PROCEDURE — 84100 ASSAY OF PHOSPHORUS: CPT

## 2018-07-07 PROCEDURE — 63600175 PHARM REV CODE 636 W HCPCS: Performed by: HOSPITALIST

## 2018-07-07 RX ORDER — DIPHENHYDRAMINE HCL 25 MG
25 CAPSULE ORAL ONCE AS NEEDED
Status: COMPLETED | OUTPATIENT
Start: 2018-07-07 | End: 2018-07-07

## 2018-07-07 RX ORDER — ACETAMINOPHEN 325 MG/1
650 TABLET ORAL ONCE AS NEEDED
Status: COMPLETED | OUTPATIENT
Start: 2018-07-07 | End: 2018-07-07

## 2018-07-07 RX ADMIN — CEFEPIME 2 G: 2 INJECTION, POWDER, FOR SOLUTION INTRAVENOUS at 03:07

## 2018-07-07 RX ADMIN — VANCOMYCIN HYDROCHLORIDE 1250 MG: 1 INJECTION, POWDER, LYOPHILIZED, FOR SOLUTION INTRAVENOUS at 12:07

## 2018-07-07 RX ADMIN — VORICONAZOLE 200 MG: 200 TABLET ORAL at 08:07

## 2018-07-07 RX ADMIN — CEFEPIME 2 G: 2 INJECTION, POWDER, FOR SOLUTION INTRAVENOUS at 08:07

## 2018-07-07 RX ADMIN — VANCOMYCIN HYDROCHLORIDE 1500 MG: 10 INJECTION, POWDER, LYOPHILIZED, FOR SOLUTION INTRAVENOUS at 11:07

## 2018-07-07 RX ADMIN — ACYCLOVIR 400 MG: 200 CAPSULE ORAL at 08:07

## 2018-07-07 RX ADMIN — CEFEPIME 2 G: 2 INJECTION, POWDER, FOR SOLUTION INTRAVENOUS at 12:07

## 2018-07-07 RX ADMIN — MAGNESIUM SULFATE IN WATER 2 G: 40 INJECTION, SOLUTION INTRAVENOUS at 06:07

## 2018-07-07 RX ADMIN — POTASSIUM CHLORIDE 20 MEQ: 750 CAPSULE, EXTENDED RELEASE ORAL at 10:07

## 2018-07-07 RX ADMIN — DIPHENHYDRAMINE HYDROCHLORIDE 25 MG: 25 CAPSULE ORAL at 10:07

## 2018-07-07 RX ADMIN — ACETAMINOPHEN 650 MG: 325 TABLET, FILM COATED ORAL at 10:07

## 2018-07-07 RX ADMIN — CEFEPIME 2 G: 2 INJECTION, POWDER, FOR SOLUTION INTRAVENOUS at 11:07

## 2018-07-07 RX ADMIN — MIRTAZAPINE 15 MG: 15 TABLET, ORALLY DISINTEGRATING ORAL at 08:07

## 2018-07-07 RX ADMIN — POTASSIUM CHLORIDE 20 MEQ: 750 CAPSULE, EXTENDED RELEASE ORAL at 06:07

## 2018-07-07 RX ADMIN — DASATINIB 40 MG: 20 TABLET ORAL at 08:07

## 2018-07-07 NOTE — PROGRESS NOTES
[facility]  Hematology  Bone Marrow Transplant  Progress Note    Patient Name: Wilton Guzmán  Admission Date: 6/16/2018  Hospital Length of Stay: 21 days  Code Status: Full Code    Subjective:     Interval History: Day 15 [facility]  Feels like swelling has gone down.  Denies any headaches, blurry vision, or eye pain.  No fevers, chills, cough, or abdominal pain. Mild constipation. Has not yet tried miralax.    Objective:     Vital Signs (Most Recent):  Temp: 98.3 °F (36.8 °C) (07/07/18 1103)  Pulse: 94 (07/07/18 1103)  Resp: 18 (07/07/18 1103)  BP: 119/69 (07/07/18 1103)  SpO2: 100 % (07/07/18 1103) Vital Signs (24h Range):  Temp:  [98.2 °F (36.8 °C)-100.1 °F (37.8 °C)] 98.3 °F (36.8 °C)  Pulse:  [] 94  Resp:  [14-20] 18  SpO2:  [100 %] 100 %  BP: (107-122)/(68-79) 119/69     Weight: 56.8 kg (125 lb 5.3 oz)  Body mass index is 17.48 kg/m².  Body surface area is 1.69 meters squared.    ECOG SCORE         [unfilled]    Intake/Output - Last 3 Shifts       07/05 0700 - 07/06 0659 07/06 0700 - 07/07 0659 07/07 0700 - 07/08 0659    P.O. 480 1440 240    I.V. (mL/kg)  50 (0.9)     Blood 331 250 354    IV Piggyback 500 500     Total Intake(mL/kg) 1311 (21.6) 2240 (39.4) 594 (10.5)    Urine (mL/kg/hr) 950 (0.7) 2700 (2) 400 (1.2)    Total Output 950 2700 400    Net +361 -460 +194           Urine Occurrence  1 x     Stool Occurrence 2 x            Physical Exam   Constitutional: He is oriented to person, place, and time. He appears well-developed and well-nourished.   HENT:   Head: Normocephalic and atraumatic.   Left side of head slightly swollen   Eyes: EOM are normal. Pupils are equal, round, and reactive to light.   Left periorbital edema   Neck: Normal range of motion. Neck supple.   Cardiovascular: Normal rate and regular rhythm.    No murmur heard.  Pulmonary/Chest: Effort normal and breath sounds normal. No respiratory distress.   Abdominal: Soft. Bowel sounds are normal. He exhibits no distension.    Musculoskeletal: Normal range of motion. He exhibits no edema.   Neurological: He is alert and oriented to person, place, and time.   Skin: Skin is warm and dry.   Central line c/d/i   Psychiatric: He has a normal mood and affect. His behavior is normal.   Vitals reviewed.      Significant Labs:   CBC:   Recent Labs  Lab 07/06/18 0415 07/07/18  0340   WBC 0.14* 0.16*   HGB 7.2* 7.0*   HCT 21.4* 21.1*   PLT 6* 27*    and CMP:   Recent Labs  Lab 07/06/18  0415 07/07/18  0340    139   K 3.7 3.5    107   CO2 23 27   GLU 73 79   BUN 10 6   CREATININE 0.7 0.7   CALCIUM 8.6* 8.8   PROT 5.5* 5.6*   ALBUMIN 2.6* 2.7*   BILITOT 0.3 0.3   ALKPHOS 74 74   AST 12 13   ALT 27 27   ANIONGAP 10 5*   EGFRNONAA >60.0 >60.0       Diagnostic Results:  I have reviewed all pertinent imaging results/findings within the past 24 hours.     7/6/18 MRI Soft Tissue Neck and Brain  Prelim findings: non specific subcutaneous edema of left facial tissues, bilateral occipital soft tissues, and bilateral neck.  Underlying infection cannot be excluded. No abscess or infectious etiology is seen.    Assessment/Plan:     * Chronic myelogenous leukemia (CML), BCR-ABL1-positive    - initial WBC reportedly 150,000  - bcr-abl positive on FISH at [facility] (records attached in media tab)  - original BMBX 5/26/18 with 20% blasts; CML transformed to AML with blast crisis;   karyotype: 46,XY,t(9;22;22;14)q34;q11.2;q13;q23); BCR/ABL gene fusion 62%  - continue dasatinib at 40 mg (dose reduction secondary to being on voriconazole)  - voriconazole, acyclovir and cipro for prophylaxis   - repeat BM biopsy done 6/18 here at [facility] after 7+3. 9% blasts.   - persistent disease post 7+3 induction. 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 Today is Day 15.  Plan for D21 BMBX, per staff instead of day 14 marrow.  - AML FISH negative  - NGS pending from repeat BMBX  - echo with 63% EF  - HIV negative, Hepatitis negative, G6PD wnl  - HLA  typing in process  - referral information for transplant benefits given to patient  - patient has 1 full sibling, contact info given to transplant coordinators  - consider sending BCR/ABL mutational analysis testing when WBC increases        Periorbital swelling    - noted very mild swelling to left side of skull on 7/3/18   - periorbital swelling noted 7/5/18, ct orbits done and showed minimal asymetric soft tissue induration of left infraorbital soft tissue without evidence of focal fluid collection. Otherwise unremarkable.  - mri brain and neck official read pending but nonspecific subcutaneous edema. No obvious sign of infection.  - neuro consult not appropriate at this time, will wait for mri to result prior to consulting neuro v. ID v. ent v. optho  - on cef and vanc (7/5/18 = day 1)        Severe malnutrition    - albumin and weight dropping  - dietary following         Neutropenic fever    -started spiking temperatures around 7/4  -cefepime day 3 and vanc day 3  -CXR negative, UA negative, cultures from 7/4/18 one bottle gram + cocci in chains resembling strep viridans probable contaminant, blood cultures 7/6/18 ngtd.  --if repeat cultures negative x48h and without fever, de-escalate vancomycin +/- cefepime.  Consider de-escalation of cefepime if facial swelling continues to improve.        Anxiety    - Patient understandibly experiencing anxiety about his new diagnosis and feeling isolated in his room  - Declined oncology psychologist consult  - Continue Remeron for anxiety and insomnia           Pancytopenia due to antineoplastic chemotherapy    - transfuse for Hgb<7 and platelets<10k  - continue PPX acyc, vori, and cipro   - plt of 27K, hgb 7, wbc 0.16 with ANC of 0            VTE Risk Mitigation         Ordered     heparin, porcine (PF) 100 unit/mL injection flush 300 Units  As needed (PRN)      06/23/18 1052     IP VTE LOW RISK PATIENT  Once      06/16/18 0938          Disposition: inpatient    Roberto SANTAMARIA  MD Eliane  Bone Marrow Transplant  [facility]

## 2018-07-07 NOTE — SUBJECTIVE & OBJECTIVE
Subjective:     Interval History: Day 15 [facility]  Feels like swelling has gone down.  Denies any headaches, blurry vision, or eye pain.  No fevers, chills, cough, or abdominal pain. Mild constipation. Has not yet tried miralax.    Objective:     Vital Signs (Most Recent):  Temp: 98.3 °F (36.8 °C) (07/07/18 1103)  Pulse: 94 (07/07/18 1103)  Resp: 18 (07/07/18 1103)  BP: 119/69 (07/07/18 1103)  SpO2: 100 % (07/07/18 1103) Vital Signs (24h Range):  Temp:  [98.2 °F (36.8 °C)-100.1 °F (37.8 °C)] 98.3 °F (36.8 °C)  Pulse:  [] 94  Resp:  [14-20] 18  SpO2:  [100 %] 100 %  BP: (107-122)/(68-79) 119/69     Weight: 56.8 kg (125 lb 5.3 oz)  Body mass index is 17.48 kg/m².  Body surface area is 1.69 meters squared.    ECOG SCORE         [unfilled]    Intake/Output - Last 3 Shifts       07/05 0700 - 07/06 0659 07/06 0700 - 07/07 0659 07/07 0700 - 07/08 0659    P.O. 480 1440 240    I.V. (mL/kg)  50 (0.9)     Blood 331 250 354    IV Piggyback 500 500     Total Intake(mL/kg) 1311 (21.6) 2240 (39.4) 594 (10.5)    Urine (mL/kg/hr) 950 (0.7) 2700 (2) 400 (1.2)    Total Output 950 2700 400    Net +361 -460 +194           Urine Occurrence  1 x     Stool Occurrence 2 x            Physical Exam   Constitutional: He is oriented to person, place, and time. He appears well-developed and well-nourished.   HENT:   Head: Normocephalic and atraumatic.   Left side of head slightly swollen   Eyes: EOM are normal. Pupils are equal, round, and reactive to light.   Left periorbital edema   Neck: Normal range of motion. Neck supple.   Cardiovascular: Normal rate and regular rhythm.    No murmur heard.  Pulmonary/Chest: Effort normal and breath sounds normal. No respiratory distress.   Abdominal: Soft. Bowel sounds are normal. He exhibits no distension.   Musculoskeletal: Normal range of motion. He exhibits no edema.   Neurological: He is alert and oriented to person, place, and time.   Skin: Skin is warm and dry.   Central line c/d/i   Psychiatric:  He has a normal mood and affect. His behavior is normal.   Vitals reviewed.      Significant Labs:   CBC:   Recent Labs  Lab 07/06/18  0415 07/07/18  0340   WBC 0.14* 0.16*   HGB 7.2* 7.0*   HCT 21.4* 21.1*   PLT 6* 27*    and CMP:   Recent Labs  Lab 07/06/18  0415 07/07/18  0340    139   K 3.7 3.5    107   CO2 23 27   GLU 73 79   BUN 10 6   CREATININE 0.7 0.7   CALCIUM 8.6* 8.8   PROT 5.5* 5.6*   ALBUMIN 2.6* 2.7*   BILITOT 0.3 0.3   ALKPHOS 74 74   AST 12 13   ALT 27 27   ANIONGAP 10 5*   EGFRNONAA >60.0 >60.0       Diagnostic Results:  I have reviewed all pertinent imaging results/findings within the past 24 hours.     7/6/18 MRI Soft Tissue Neck and Brain  Prelim findings: non specific subcutaneous edema of left facial tissues, bilateral occipital soft tissues, and bilateral neck.  Underlying infection cannot be excluded. No abscess or infectious etiology is seen.

## 2018-07-07 NOTE — ASSESSMENT & PLAN NOTE
- initial WBC reportedly 150,000  - bcr-abl positive on FISH at [facility] (records attached in media tab)  - original BMBX 5/26/18 with 20% blasts; CML transformed to AML with blast crisis;   karyotype: 46,XY,t(9;22;22;14)q34;q11.2;q13;q23); BCR/ABL gene fusion 62%  - continue dasatinib at 40 mg (dose reduction secondary to being on voriconazole)  - voriconazole, acyclovir and cipro for prophylaxis   - repeat BM biopsy done 6/18 here at [facility] after 7+3. 9% blasts.   - persistent disease post 7+3 induction. 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 Today is Day 15.  Plan for D21 BMBX, per staff instead of day 14 marrow.  - AML FISH negative  - NGS pending from repeat BMBX  - echo with 63% EF  - HIV negative, Hepatitis negative, G6PD wnl  - HLA typing in process  - referral information for transplant benefits given to patient  - patient has 1 full sibling, contact info given to transplant coordinators  - consider sending BCR/ABL mutational analysis testing when WBC increases

## 2018-07-07 NOTE — PLAN OF CARE
Problem: Patient Care Overview  Goal: Plan of Care Review  Outcome: Ongoing (interventions implemented as appropriate)  Patient AAOX4, up independently, and ambulating in hallway this afternoon. Neutropenic precautions maintained and fiance at bedside. Magnesium and potassium replaced. Cefepime continued and vancomycin increased to 1,500 mg after low vanc trough overnight. Minimal edema noted to left side of face and neck this shift. Pre-medications given and one unit of PRBCs infused. Patient tolerating >50% of meals and oral hydration encouraged. Patient stable, will continue to monitor.

## 2018-07-07 NOTE — ASSESSMENT & PLAN NOTE
-started spiking temperatures around 7/4  -cefepime day 3 and vanc day 3  -CXR negative, UA negative, cultures from 7/4/18 one bottle gram + cocci in chains resembling strep viridans probable contaminant, blood cultures 7/6/18 ngtd.  --if repeat cultures negative x48h and without fever, de-escalate vancomycin +/- cefepime.  Consider de-escalation of cefepime if facial swelling continues to improve.

## 2018-07-07 NOTE — ASSESSMENT & PLAN NOTE
- transfuse for Hgb<7 and platelets<10k  - continue PPX acyc, vori, and cipro   - plt of 27K, hgb 7, wbc 0.16 with ANC of 0

## 2018-07-08 LAB
ALBUMIN SERPL BCP-MCNC: 2.7 G/DL
ALP SERPL-CCNC: 75 U/L
ALT SERPL W/O P-5'-P-CCNC: 25 U/L
ANION GAP SERPL CALC-SCNC: 5 MMOL/L
AST SERPL-CCNC: 13 U/L
BASOPHILS # BLD AUTO: 0 K/UL
BASOPHILS NFR BLD: 0 %
BILIRUB SERPL-MCNC: 0.3 MG/DL
BUN SERPL-MCNC: 7 MG/DL
CALCIUM SERPL-MCNC: 8.6 MG/DL
CHLORIDE SERPL-SCNC: 108 MMOL/L
CO2 SERPL-SCNC: 25 MMOL/L
CREAT SERPL-MCNC: 0.7 MG/DL
DIFFERENTIAL METHOD: ABNORMAL
EOSINOPHIL # BLD AUTO: 0 K/UL
EOSINOPHIL NFR BLD: 0 %
ERYTHROCYTE [DISTWIDTH] IN BLOOD BY AUTOMATED COUNT: 15.5 %
EST. GFR  (AFRICAN AMERICAN): >60 ML/MIN/1.73 M^2
EST. GFR  (NON AFRICAN AMERICAN): >60 ML/MIN/1.73 M^2
GLUCOSE SERPL-MCNC: 85 MG/DL
HCT VFR BLD AUTO: 24.3 %
HGB BLD-MCNC: 8.2 G/DL
IMM GRANULOCYTES # BLD AUTO: 0 K/UL
IMM GRANULOCYTES NFR BLD AUTO: 0 %
LYMPHOCYTES # BLD AUTO: 0.2 K/UL
LYMPHOCYTES NFR BLD: 100 %
MAGNESIUM SERPL-MCNC: 2 MG/DL
MCH RBC QN AUTO: 28.5 PG
MCHC RBC AUTO-ENTMCNC: 33.7 G/DL
MCV RBC AUTO: 84 FL
MONOCYTES # BLD AUTO: 0 K/UL
MONOCYTES NFR BLD: 0 %
NEUTROPHILS # BLD AUTO: 0 K/UL
NEUTROPHILS NFR BLD: 0 %
NRBC BLD-RTO: 0 /100 WBC
PHOSPHATE SERPL-MCNC: 2.6 MG/DL
PLATELET # BLD AUTO: 17 K/UL
PMV BLD AUTO: 8.8 FL
POTASSIUM SERPL-SCNC: 3.9 MMOL/L
PROT SERPL-MCNC: 5.7 G/DL
RBC # BLD AUTO: 2.88 M/UL
SODIUM SERPL-SCNC: 138 MMOL/L
VANCOMYCIN TROUGH SERPL-MCNC: 6.5 UG/ML
WBC # BLD AUTO: 0.18 K/UL

## 2018-07-08 PROCEDURE — 63600175 PHARM REV CODE 636 W HCPCS: Performed by: STUDENT IN AN ORGANIZED HEALTH CARE EDUCATION/TRAINING PROGRAM

## 2018-07-08 PROCEDURE — 25000003 PHARM REV CODE 250: Performed by: INTERNAL MEDICINE

## 2018-07-08 PROCEDURE — 99233 SBSQ HOSP IP/OBS HIGH 50: CPT | Mod: ,,, | Performed by: INTERNAL MEDICINE

## 2018-07-08 PROCEDURE — 84100 ASSAY OF PHOSPHORUS: CPT

## 2018-07-08 PROCEDURE — 83735 ASSAY OF MAGNESIUM: CPT

## 2018-07-08 PROCEDURE — 20600001 HC STEP DOWN PRIVATE ROOM

## 2018-07-08 PROCEDURE — 25000003 PHARM REV CODE 250: Performed by: STUDENT IN AN ORGANIZED HEALTH CARE EDUCATION/TRAINING PROGRAM

## 2018-07-08 PROCEDURE — 80202 ASSAY OF VANCOMYCIN: CPT

## 2018-07-08 PROCEDURE — 80053 COMPREHEN METABOLIC PANEL: CPT

## 2018-07-08 PROCEDURE — 85025 COMPLETE CBC W/AUTO DIFF WBC: CPT

## 2018-07-08 PROCEDURE — 63700000 PHARM REV CODE 250 ALT 637 W/O HCPCS: Performed by: INTERNAL MEDICINE

## 2018-07-08 RX ADMIN — DASATINIB 40 MG: 20 TABLET ORAL at 09:07

## 2018-07-08 RX ADMIN — POTASSIUM & SODIUM PHOSPHATES POWDER PACK 280-160-250 MG 1 PACKET: 280-160-250 PACK at 11:07

## 2018-07-08 RX ADMIN — ACYCLOVIR 400 MG: 200 CAPSULE ORAL at 09:07

## 2018-07-08 RX ADMIN — POTASSIUM & SODIUM PHOSPHATES POWDER PACK 280-160-250 MG 1 PACKET: 280-160-250 PACK at 06:07

## 2018-07-08 RX ADMIN — ACYCLOVIR 400 MG: 200 CAPSULE ORAL at 08:07

## 2018-07-08 RX ADMIN — MIRTAZAPINE 15 MG: 15 TABLET, ORALLY DISINTEGRATING ORAL at 08:07

## 2018-07-08 RX ADMIN — POTASSIUM & SODIUM PHOSPHATES POWDER PACK 280-160-250 MG 1 PACKET: 280-160-250 PACK at 03:07

## 2018-07-08 RX ADMIN — VANCOMYCIN HYDROCHLORIDE 1500 MG: 10 INJECTION, POWDER, LYOPHILIZED, FOR SOLUTION INTRAVENOUS at 11:07

## 2018-07-08 RX ADMIN — CEFEPIME 2 G: 2 INJECTION, POWDER, FOR SOLUTION INTRAVENOUS at 05:07

## 2018-07-08 RX ADMIN — VORICONAZOLE 200 MG: 200 TABLET ORAL at 08:07

## 2018-07-08 RX ADMIN — VORICONAZOLE 200 MG: 200 TABLET ORAL at 09:07

## 2018-07-08 RX ADMIN — CEFEPIME 2 G: 2 INJECTION, POWDER, FOR SOLUTION INTRAVENOUS at 09:07

## 2018-07-08 NOTE — PLAN OF CARE
Problem: Patient Care Overview  Goal: Plan of Care Review  Outcome: Ongoing (interventions implemented as appropriate)  Patient remains free from falls and injury this shift. Bed in low, locked position with call bell in reach. Patient encouraged to call for assistance when getting out of bed. Patient verbalized understanding. All belongings within reach. Afebrile - maintained on IV cefepime and Vanc. NeutraPhos replacements given as ordered (Phos = 2.6). Neutropenic and thrombocytopenic precautions maintained. Discharge is pending count recovery. Patient walking independently in hallways. No c/o pain or discomfort. will continue to monitor.

## 2018-07-08 NOTE — PLAN OF CARE
Problem: Patient Care Overview  Goal: Plan of Care Review  Outcome: Ongoing (interventions implemented as appropriate)  Uneventful shift. Pt had no events overnight. Pt had no c/o pain this shift. VSS and afebrile throughtout shift. Family at bs. Iv abx regimen maintained. Pt has remained free from injury this shift. Bed in low locked position. Call light and personal belongings within reach. Side rails up x2. Nonskid socks in place. Pt instructed to call with any needs. Will continue to monitor.

## 2018-07-08 NOTE — ASSESSMENT & PLAN NOTE
- initial WBC reportedly 150,000  - bcr-abl positive on FISH at [facility] (records attached in media tab)  - original BMBX 5/26/18 with 20% blasts; CML transformed to AML with blast crisis;   karyotype: 46,XY,t(9;22;22;14)q34;q11.2;q13;q23); BCR/ABL gene fusion 62%  - continue dasatinib at 40 mg (dose reduction secondary to being on voriconazole)  - voriconazole, acyclovir.  Cipro held while on cefepime.  - repeat BM biopsy done 6/18 here at [facility] after 7+3. 9% blasts.   - persistent disease post 7+3 induction. 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 Today is Day 15.  Plan for D21 BMBX, per staff instead of day 14 marrow because CML induction takes longer  - AML FISH negative  - NGS pending from repeat BMBX  - echo with 63% EF  - HIV negative, Hepatitis negative, G6PD wnl  - HLA typing in process  - referral information for transplant benefits given to patient  - patient has 1 full sibling, contact info given to transplant coordinators  - consider sending BCR/ABL mutational analysis testing when WBC increases

## 2018-07-08 NOTE — ASSESSMENT & PLAN NOTE
- Patient understandibly experiencing anxiety about his new diagnosis and feeling isolated in his room  - Declined oncology psychologist consult earlier during this admission  - Continue Remeron for anxiety and insomnia

## 2018-07-08 NOTE — ASSESSMENT & PLAN NOTE
- noted very mild swelling to left side of skull on 7/3/18   - periorbital swelling noted 7/5/18, ct orbits done and showed minimal asymetric soft tissue induration of left infraorbital soft tissue without evidence of focal fluid collection. Otherwise unremarkable.  - mri brain and neck official read pending but nonspecific subcutaneous edema. No obvious sign of infection.  --improved

## 2018-07-08 NOTE — ASSESSMENT & PLAN NOTE
-started spiking temperatures around 7/4  -cefepime and vanc   -CXR negative, UA negative, cultures from 7/4/18 one bottle gram + cocci in chains resembling strep viridans probable contaminant, blood cultures 7/6/18 ngtd.  --if repeat cultures negative and without fever, de-escalate vancomycin +/- cefepime.    --head and neck swelling improved.

## 2018-07-08 NOTE — PROGRESS NOTES
[facility]  Hematology  Bone Marrow Transplant  Progress Note    Patient Name: Wilton Guzmán  Admission Date: 6/16/2018  Hospital Length of Stay: 22 days  Code Status: Full Code    Subjective:     Interval History:   Feels like swelling continues to go down.  Denies any headaches, blurry vision, or eye pain.  No fevers, chills, cough, or abdominal pain.     Objective:     Vital Signs (Most Recent):  Temp: 98.8 °F (37.1 °C) (07/08/18 0753)  Pulse: 90 (07/08/18 0753)  Resp: 16 (07/08/18 0753)  BP: 118/71 (07/08/18 0753)  SpO2: 98 % (07/08/18 0753) Vital Signs (24h Range):  Temp:  [98.1 °F (36.7 °C)-99.6 °F (37.6 °C)] 98.8 °F (37.1 °C)  Pulse:  [] 90  Resp:  [16-20] 16  SpO2:  [97 %-100 %] 98 %  BP: (107-127)/(69-79) 118/71     Weight: 56.8 kg (125 lb 5.3 oz)  Body mass index is 17.48 kg/m².  Body surface area is 1.69 meters squared.    ECOG SCORE         [unfilled]    Intake/Output - Last 3 Shifts       07/06 0700 - 07/07 0659 07/07 0700 - 07/08 0659 07/08 0700 - 07/09 0659    P.O. 1440 1080     I.V. (mL/kg) 50 (0.9)      Blood 250 590.3     IV Piggyback 500 500     Total Intake(mL/kg) 2240 (39.4) 2170.3 (38.2)     Urine (mL/kg/hr) 2700 (2) 700 (0.5) 1350 (8.4)    Total Output 2700 700 1350    Net -460 +1470.3 -1350           Urine Occurrence 1 x 2 x     Stool Occurrence  1 x           Physical Exam   Constitutional: He is oriented to person, place, and time. He appears well-developed and well-nourished.   HENT:   Head: Normocephalic and atraumatic.   Eyes: EOM are normal. Pupils are equal, round, and reactive to light.   Left periorbital edema improved   Neck: Normal range of motion. Neck supple.   Cardiovascular: Normal rate and regular rhythm.    No murmur heard.  Pulmonary/Chest: Effort normal and breath sounds normal. No respiratory distress.   Abdominal: Soft. Bowel sounds are normal. He exhibits no distension.   Musculoskeletal: Normal range of motion. He exhibits no edema.    Neurological: He is alert and oriented to person, place, and time.   Skin: Skin is warm and dry.   Central line c/d/i   Psychiatric: He has a normal mood and affect. His behavior is normal.   Vitals reviewed.      Significant Labs:   CBC:     Recent Labs  Lab 07/07/18  0340 07/08/18  0400   WBC 0.16* 0.18*   HGB 7.0* 8.2*   HCT 21.1* 24.3*   PLT 27* 17*    and CMP:     Recent Labs  Lab 07/07/18  0340 07/08/18  0400    138   K 3.5 3.9    108   CO2 27 25   GLU 79 85   BUN 6 7   CREATININE 0.7 0.7   CALCIUM 8.8 8.6*   PROT 5.6* 5.7*   ALBUMIN 2.7* 2.7*   BILITOT 0.3 0.3   ALKPHOS 74 75   AST 13 13   ALT 27 25   ANIONGAP 5* 5*   EGFRNONAA >60.0 >60.0       Diagnostic Results:  I have reviewed all pertinent imaging results/findings within the past 24 hours.     7/6/18 MRI Soft Tissue Neck and Brain  Prelim findings: non specific subcutaneous edema of left facial tissues, bilateral occipital soft tissues, and bilateral neck.  Underlying infection cannot be excluded. No abscess or infectious etiology is seen.    Assessment/Plan:     * Chronic myelogenous leukemia (CML), BCR-ABL1-positive    - initial WBC reportedly 150,000  - bcr-abl positive on FISH at [facility] (records attached in media tab)  - original BMBX 5/26/18 with 20% blasts; CML transformed to AML with blast crisis;   karyotype: 46,XY,t(9;22;22;14)q34;q11.2;q13;q23); BCR/ABL gene fusion 62%  - continue dasatinib at 40 mg (dose reduction secondary to being on voriconazole)  - voriconazole, acyclovir.  Cipro held while on cefepime.  - repeat BM biopsy done 6/18 here at [facility] after 7+3. 9% blasts.   - persistent disease post 7+3 induction. 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 Today is Day 15.  Plan for D21 BMBX, per staff instead of day 14 marrow because CML induction takes longer  - AML FISH negative  - NGS pending from repeat BMBX  - echo with 63% EF  - HIV negative, Hepatitis negative, G6PD wnl  - HLA typing in process  -  referral information for transplant benefits given to patient  - patient has 1 full sibling, contact info given to transplant coordinators  - consider sending BCR/ABL mutational analysis testing when WBC increases        Periorbital swelling    - noted very mild swelling to left side of skull on 7/3/18   - periorbital swelling noted 7/5/18, ct orbits done and showed minimal asymetric soft tissue induration of left infraorbital soft tissue without evidence of focal fluid collection. Otherwise unremarkable.  - mri brain and neck official read pending but nonspecific subcutaneous edema. No obvious sign of infection.  --improved        Severe malnutrition    - albumin and weight dropping  - dietary following         Neutropenic fever    -started spiking temperatures around 7/4  -cefepime and vanc   -CXR negative, UA negative, cultures from 7/4/18 one bottle gram + cocci in chains resembling strep viridans probable contaminant, blood cultures 7/6/18 ngtd.  --if repeat cultures negative and without fever, de-escalate vancomycin +/- cefepime.    --head and neck swelling improved.        Anxiety    - Patient understandibly experiencing anxiety about his new diagnosis and feeling isolated in his room  - Declined oncology psychologist consult earlier during this admission  - Continue Remeron for anxiety and insomnia           Pancytopenia due to antineoplastic chemotherapy    - transfuse for Hgb<7 and platelets<10k  - continue PPX acyc, vori              VTE Risk Mitigation         Ordered     heparin, porcine (PF) 100 unit/mL injection flush 300 Units  As needed (PRN)      06/23/18 1052     IP VTE LOW RISK PATIENT  Once      06/16/18 0938              Danika Echevarria MD  Bone Marrow Transplant  [facility]

## 2018-07-08 NOTE — SUBJECTIVE & OBJECTIVE
Subjective:     Interval History:   Feels like swelling continues to go down.  Denies any headaches, blurry vision, or eye pain.  No fevers, chills, cough, or abdominal pain.     Objective:     Vital Signs (Most Recent):  Temp: 98.8 °F (37.1 °C) (07/08/18 0753)  Pulse: 90 (07/08/18 0753)  Resp: 16 (07/08/18 0753)  BP: 118/71 (07/08/18 0753)  SpO2: 98 % (07/08/18 0753) Vital Signs (24h Range):  Temp:  [98.1 °F (36.7 °C)-99.6 °F (37.6 °C)] 98.8 °F (37.1 °C)  Pulse:  [] 90  Resp:  [16-20] 16  SpO2:  [97 %-100 %] 98 %  BP: (107-127)/(69-79) 118/71     Weight: 56.8 kg (125 lb 5.3 oz)  Body mass index is 17.48 kg/m².  Body surface area is 1.69 meters squared.    ECOG SCORE         [unfilled]    Intake/Output - Last 3 Shifts       07/06 0700 - 07/07 0659 07/07 0700 - 07/08 0659 07/08 0700 - 07/09 0659    P.O. 1440 1080     I.V. (mL/kg) 50 (0.9)      Blood 250 590.3     IV Piggyback 500 500     Total Intake(mL/kg) 2240 (39.4) 2170.3 (38.2)     Urine (mL/kg/hr) 2700 (2) 700 (0.5) 1350 (8.4)    Total Output 2700 700 1350    Net -460 +1470.3 -1350           Urine Occurrence 1 x 2 x     Stool Occurrence  1 x           Physical Exam   Constitutional: He is oriented to person, place, and time. He appears well-developed and well-nourished.   HENT:   Head: Normocephalic and atraumatic.   Eyes: EOM are normal. Pupils are equal, round, and reactive to light.   Left periorbital edema improved   Neck: Normal range of motion. Neck supple.   Cardiovascular: Normal rate and regular rhythm.    No murmur heard.  Pulmonary/Chest: Effort normal and breath sounds normal. No respiratory distress.   Abdominal: Soft. Bowel sounds are normal. He exhibits no distension.   Musculoskeletal: Normal range of motion. He exhibits no edema.   Neurological: He is alert and oriented to person, place, and time.   Skin: Skin is warm and dry.   Central line c/d/i   Psychiatric: He has a normal mood and affect. His behavior is normal.   Vitals  reviewed.      Significant Labs:   CBC:     Recent Labs  Lab 07/07/18  0340 07/08/18  0400   WBC 0.16* 0.18*   HGB 7.0* 8.2*   HCT 21.1* 24.3*   PLT 27* 17*    and CMP:     Recent Labs  Lab 07/07/18  0340 07/08/18  0400    138   K 3.5 3.9    108   CO2 27 25   GLU 79 85   BUN 6 7   CREATININE 0.7 0.7   CALCIUM 8.8 8.6*   PROT 5.6* 5.7*   ALBUMIN 2.7* 2.7*   BILITOT 0.3 0.3   ALKPHOS 74 75   AST 13 13   ALT 27 25   ANIONGAP 5* 5*   EGFRNONAA >60.0 >60.0       Diagnostic Results:  I have reviewed all pertinent imaging results/findings within the past 24 hours.     7/6/18 MRI Soft Tissue Neck and Brain  Prelim findings: non specific subcutaneous edema of left facial tissues, bilateral occipital soft tissues, and bilateral neck.  Underlying infection cannot be excluded. No abscess or infectious etiology is seen.

## 2018-07-09 LAB
ALBUMIN SERPL BCP-MCNC: 2.6 G/DL
ALP SERPL-CCNC: 83 U/L
ALT SERPL W/O P-5'-P-CCNC: 25 U/L
ANION GAP SERPL CALC-SCNC: 8 MMOL/L
ANISOCYTOSIS BLD QL SMEAR: SLIGHT
APTT BLDCRRT: 27.6 SEC
AST SERPL-CCNC: 13 U/L
BASOPHILS # BLD AUTO: 0 K/UL
BASOPHILS NFR BLD: 0 %
BILIRUB SERPL-MCNC: 0.2 MG/DL
BUN SERPL-MCNC: 8 MG/DL
CALCIUM SERPL-MCNC: 8.6 MG/DL
CHLORIDE SERPL-SCNC: 106 MMOL/L
CO2 SERPL-SCNC: 24 MMOL/L
CREAT SERPL-MCNC: 0.7 MG/DL
DIFFERENTIAL METHOD: ABNORMAL
EOSINOPHIL # BLD AUTO: 0 K/UL
EOSINOPHIL NFR BLD: 0 %
ERYTHROCYTE [DISTWIDTH] IN BLOOD BY AUTOMATED COUNT: 15.2 %
EST. GFR  (AFRICAN AMERICAN): >60 ML/MIN/1.73 M^2
EST. GFR  (NON AFRICAN AMERICAN): >60 ML/MIN/1.73 M^2
GLUCOSE SERPL-MCNC: 87 MG/DL
HCT VFR BLD AUTO: 24.1 %
HGB BLD-MCNC: 8.2 G/DL
IMM GRANULOCYTES # BLD AUTO: 0 K/UL
IMM GRANULOCYTES NFR BLD AUTO: 0 %
INR PPP: 1
LDH SERPL L TO P-CCNC: 149 U/L
LYMPHOCYTES # BLD AUTO: 0.2 K/UL
LYMPHOCYTES NFR BLD: 95 %
MAGNESIUM SERPL-MCNC: 1.8 MG/DL
MCH RBC QN AUTO: 29.2 PG
MCHC RBC AUTO-ENTMCNC: 34 G/DL
MCV RBC AUTO: 86 FL
MONOCYTES # BLD AUTO: 0 K/UL
MONOCYTES NFR BLD: 0 %
NEUTROPHILS # BLD AUTO: 0 K/UL
NEUTROPHILS NFR BLD: 5 %
NRBC BLD-RTO: 0 /100 WBC
OVALOCYTES BLD QL SMEAR: ABNORMAL
PHOSPHATE SERPL-MCNC: 3.3 MG/DL
PLATELET # BLD AUTO: 11 K/UL
PLATELET BLD QL SMEAR: ABNORMAL
PMV BLD AUTO: ABNORMAL FL
POIKILOCYTOSIS BLD QL SMEAR: SLIGHT
POLYCHROMASIA BLD QL SMEAR: ABNORMAL
POTASSIUM SERPL-SCNC: 3.7 MMOL/L
PROT SERPL-MCNC: 5.6 G/DL
PROTHROMBIN TIME: 10.7 SEC
RBC # BLD AUTO: 2.81 M/UL
SODIUM SERPL-SCNC: 138 MMOL/L
URATE SERPL-MCNC: 2.1 MG/DL
WBC # BLD AUTO: 0.2 K/UL

## 2018-07-09 PROCEDURE — 80053 COMPREHEN METABOLIC PANEL: CPT

## 2018-07-09 PROCEDURE — 25000003 PHARM REV CODE 250: Performed by: STUDENT IN AN ORGANIZED HEALTH CARE EDUCATION/TRAINING PROGRAM

## 2018-07-09 PROCEDURE — 63600175 PHARM REV CODE 636 W HCPCS: Performed by: STUDENT IN AN ORGANIZED HEALTH CARE EDUCATION/TRAINING PROGRAM

## 2018-07-09 PROCEDURE — 25000003 PHARM REV CODE 250: Performed by: INTERNAL MEDICINE

## 2018-07-09 PROCEDURE — 85025 COMPLETE CBC W/AUTO DIFF WBC: CPT

## 2018-07-09 PROCEDURE — 63700000 PHARM REV CODE 250 ALT 637 W/O HCPCS: Performed by: INTERNAL MEDICINE

## 2018-07-09 PROCEDURE — 85610 PROTHROMBIN TIME: CPT

## 2018-07-09 PROCEDURE — 20600001 HC STEP DOWN PRIVATE ROOM

## 2018-07-09 PROCEDURE — 85730 THROMBOPLASTIN TIME PARTIAL: CPT

## 2018-07-09 PROCEDURE — 99233 SBSQ HOSP IP/OBS HIGH 50: CPT | Mod: ,,, | Performed by: INTERNAL MEDICINE

## 2018-07-09 PROCEDURE — 83615 LACTATE (LD) (LDH) ENZYME: CPT

## 2018-07-09 PROCEDURE — 84550 ASSAY OF BLOOD/URIC ACID: CPT

## 2018-07-09 PROCEDURE — 83735 ASSAY OF MAGNESIUM: CPT

## 2018-07-09 PROCEDURE — 63600175 PHARM REV CODE 636 W HCPCS: Performed by: INTERNAL MEDICINE

## 2018-07-09 PROCEDURE — 84100 ASSAY OF PHOSPHORUS: CPT

## 2018-07-09 RX ORDER — VANCOMYCIN 2 GRAM/500 ML IN 0.9 % SODIUM CHLORIDE INTRAVENOUS
2000
Status: DISCONTINUED | OUTPATIENT
Start: 2018-07-09 | End: 2018-07-09

## 2018-07-09 RX ADMIN — MIRTAZAPINE 15 MG: 15 TABLET, ORALLY DISINTEGRATING ORAL at 08:07

## 2018-07-09 RX ADMIN — CEFEPIME 2 G: 2 INJECTION, POWDER, FOR SOLUTION INTRAVENOUS at 05:07

## 2018-07-09 RX ADMIN — VORICONAZOLE 200 MG: 200 TABLET ORAL at 08:07

## 2018-07-09 RX ADMIN — CEFEPIME 2 G: 2 INJECTION, POWDER, FOR SOLUTION INTRAVENOUS at 09:07

## 2018-07-09 RX ADMIN — ACYCLOVIR 400 MG: 200 CAPSULE ORAL at 09:07

## 2018-07-09 RX ADMIN — DASATINIB 40 MG: 20 TABLET ORAL at 10:07

## 2018-07-09 RX ADMIN — MAGNESIUM SULFATE IN WATER 2 G: 40 INJECTION, SOLUTION INTRAVENOUS at 07:07

## 2018-07-09 RX ADMIN — ACYCLOVIR 400 MG: 200 CAPSULE ORAL at 08:07

## 2018-07-09 RX ADMIN — CEFEPIME 2 G: 2 INJECTION, POWDER, FOR SOLUTION INTRAVENOUS at 01:07

## 2018-07-09 RX ADMIN — MAGNESIUM SULFATE IN WATER 2 G: 40 INJECTION, SOLUTION INTRAVENOUS at 09:07

## 2018-07-09 RX ADMIN — VANCOMYCIN HYDROCHLORIDE 2000 MG: 10 INJECTION, POWDER, LYOPHILIZED, FOR SOLUTION INTRAVENOUS at 01:07

## 2018-07-09 RX ADMIN — VORICONAZOLE 200 MG: 200 TABLET ORAL at 09:07

## 2018-07-09 NOTE — ASSESSMENT & PLAN NOTE
- noted very mild swelling to left side of skull on 7/3/18   - periorbital swelling noted 7/5/18, ct orbits done and showed minimal asymetric soft tissue induration of left infraorbital soft tissue without evidence of focal fluid collection. Otherwise unremarkable.  - mri brain and neck showed nonspecific subcutaneous edema. No obvious sign of infection. Otherwise unremarkable  - much improved

## 2018-07-09 NOTE — PROGRESS NOTES
[facility]  Hematology  Bone Marrow Transplant  Progress Note    Patient Name: Wilton Guzmán  Admission Date: 6/16/2018  Hospital Length of Stay: 23 days  Code Status: Full Code    Subjective:     Interval History:   [facility] day 17, swelling much improved. MRI unremarkable. Cef continues (day 5), will d/c vanc today.     Objective:     Vital Signs (Most Recent):  Temp: 99.2 °F (37.3 °C) (07/09/18 0356)  Pulse: 95 (07/09/18 0356)  Resp: 20 (07/09/18 0356)  BP: 115/75 (07/09/18 0356)  SpO2: 100 % (07/09/18 0356) Vital Signs (24h Range):  Temp:  [98.3 °F (36.8 °C)-99.6 °F (37.6 °C)] 99.2 °F (37.3 °C)  Pulse:  [84-95] 95  Resp:  [14-20] 20  SpO2:  [99 %-100 %] 100 %  BP: (115-124)/(70-84) 115/75     Weight: 62.1 kg (136 lb 12.7 oz)  Body mass index is 19.08 kg/m².  Body surface area is 1.76 meters squared.    Intake/Output - Last 3 Shifts       07/07 0700 - 07/08 0659 07/08 0700 - 07/09 0659 07/09 0700 - 07/10 0659    P.O. 1080 680     Blood 590.3      IV Piggyback 500 500     Total Intake(mL/kg) 2170.3 (38.2) 1180 (19)     Urine (mL/kg/hr) 700 (0.5) 3275 (2.2)     Total Output 700 3275      Net +1470.3 -2095             Urine Occurrence 2 x 1 x     Stool Occurrence 1 x 1 x         Physical Exam   Constitutional: He is oriented to person, place, and time. He appears well-developed and well-nourished.   HENT:   Head: Normocephalic and atraumatic.   Left side temporal swelling resolved   Eyes: EOM are normal. Pupils are equal, round, and reactive to light.   Left periorbital edema improved   Neck: Normal range of motion. Neck supple.   Right side neck swelling resolved   Cardiovascular: Normal rate and regular rhythm.    No murmur heard.  Pulmonary/Chest: Effort normal and breath sounds normal. No respiratory distress.   Abdominal: Soft. Bowel sounds are normal. He exhibits no distension.   Musculoskeletal: Normal range of motion. He exhibits no edema.   Neurological: He is alert and oriented to person,  place, and time.   Skin: Skin is warm and dry.   Central line c/d/i  Redness to scalp improved   Psychiatric: He has a normal mood and affect. His behavior is normal.   Vitals reviewed.    Significant Labs:   CBC:     Recent Labs  Lab 07/08/18  0400 07/09/18  0355   WBC 0.18* 0.20*   HGB 8.2* 8.2*   HCT 24.3* 24.1*   PLT 17* 11*    and CMP:     Recent Labs  Lab 07/08/18  0400 07/09/18  0355    138   K 3.9 3.7    106   CO2 25 24   GLU 85 87   BUN 7 8   CREATININE 0.7 0.7   CALCIUM 8.6* 8.6*   PROT 5.7* 5.6*   ALBUMIN 2.7* 2.6*   BILITOT 0.3 0.2   ALKPHOS 75 83   AST 13 13   ALT 25 25   ANIONGAP 5* 8   EGFRNONAA >60.0 >60.0       Diagnostic Results:  I have reviewed all pertinent imaging results/findings within the past 24 hours.     7/6/18 MRI Soft Tissue Neck and Brain  Non specific subcutaneous edema of left facial tissues, bilateral occipital soft tissues, and bilateral neck. Underlying infection cannot be excluded. No abscess or infectious etiology is seen.    Assessment/Plan:     * Chronic myelogenous leukemia (CML), BCR-ABL1-positive    - initial WBC reportedly 150,000  - bcr-abl positive on FISH at [facility] (records attached in media tab)  - original BMBX 5/26/18 with 20% blasts; CML transformed to AML with blast crisis;   karyotype: 46,XY,t(9;22;22;14)q34;q11.2;q13;q23); BCR/ABL gene fusion 62%  - continue dasatinib at 40 mg (dose reduction secondary to being on voriconazole)  - voriconazole, acyclovir.  Cipro held while on cefepime.  - repeat BM biopsy done 6/18 here at [facility] after 7+3. 9% blasts.   - persistent disease post 7+3 induction. 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 Today is Day 17.  Plan for D21 BMBX, per staff instead of day 14 marrow  - AML FISH negative  - NGS pending from repeat BMBX  - echo with 63% EF  - HIV negative, Hepatitis negative, G6PD wnl  - HLA typing in process  - referral information for transplant benefits given to patient  - patient has 1 full  sibling, contact info given to transplant coordinators  - consider sending BCR/ABL mutational analysis testing when WBC increases        Pancytopenia due to antineoplastic chemotherapy    - transfuse for Hgb <7 and platelets <10K  - continue PPX antimicrobials  - wbc 0.2 with anc of 10, hgb 8.7, plt 11K        Periorbital swelling    - noted very mild swelling to left side of skull on 7/3/18   - periorbital swelling noted 7/5/18, ct orbits done and showed minimal asymetric soft tissue induration of left infraorbital soft tissue without evidence of focal fluid collection. Otherwise unremarkable.  - mri brain and neck showed nonspecific subcutaneous edema. No obvious sign of infection. Otherwise unremarkable  - much improved         Severe malnutrition    - albumin and weight dropping  - dietary following          Neutropenic fever    - started spiking temperatures around 7/4  - day 5 of cefepime and got 5 days of vanc which is now d/c'd 7/9/18  - CXR negative, UA negative, cultures from 7/4/18 one bottle gram + cocci in chains resembling strep viridans probable contaminant, blood cultures 7/6/18 ngtd  - if repeat cultures negative and without fever, de-escalate vancomycin +/- cefepime  - head, eye, and neck swelling much improved        Anxiety    - Patient understandibly experiencing anxiety about his new diagnosis and feeling isolated in his room  - Declined oncology psychologist consult earlier during this admission  - Continue Remeron for anxiety and insomnia                VTE Risk Mitigation         Ordered     heparin, porcine (PF) 100 unit/mL injection flush 300 Units  As needed (PRN)      06/23/18 1052     IP VTE LOW RISK PATIENT  Once      06/16/18 0938          Disposition: pending repeat BM bx at day 21 of MEC and count recovery    Yoanna Andrade NP  Bone Marrow Transplant  [facility]

## 2018-07-09 NOTE — PLAN OF CARE
Problem: Patient Care Overview  Goal: Plan of Care Review  Outcome: Ongoing (interventions implemented as appropriate)  Plan of care reviewed with patient.  FAll precautions maintained, side rails up x2, call light  In reach, bed in low position and locked, nonskid socks on.  Patient took a shower today, bed linens changed.  On iv antibiotics.  Iv vancomycin discontinued.  No complaints voiced.

## 2018-07-09 NOTE — ASSESSMENT & PLAN NOTE
- started spiking temperatures around 7/4  - day 5 of cefepime and got 5 days of vanc which is now d/c'd 7/9/18  - CXR negative, UA negative, cultures from 7/4/18 one bottle gram + cocci in chains resembling strep viridans probable contaminant, blood cultures 7/6/18 ngtd  - if repeat cultures negative and without fever, de-escalate vancomycin +/- cefepime  - head, eye, and neck swelling much improved

## 2018-07-09 NOTE — ASSESSMENT & PLAN NOTE
- initial WBC reportedly 150,000  - bcr-abl positive on FISH at [facility] (records attached in media tab)  - original BMBX 5/26/18 with 20% blasts; CML transformed to AML with blast crisis;   karyotype: 46,XY,t(9;22;22;14)q34;q11.2;q13;q23); BCR/ABL gene fusion 62%  - continue dasatinib at 40 mg (dose reduction secondary to being on voriconazole)  - voriconazole, acyclovir.  Cipro held while on cefepime.  - repeat BM biopsy done 6/18 here at [facility] after 7+3. 9% blasts.   - persistent disease post 7+3 induction. 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 Today is Day 17.  Plan for D21 BMBX, per staff instead of day 14 marrow  - AML FISH negative  - NGS pending from repeat BMBX  - echo with 63% EF  - HIV negative, Hepatitis negative, G6PD wnl  - HLA typing in process  - referral information for transplant benefits given to patient  - patient has 1 full sibling, contact info given to transplant coordinators  - consider sending BCR/ABL mutational analysis testing when WBC increases

## 2018-07-09 NOTE — ASSESSMENT & PLAN NOTE
- transfuse for Hgb <7 and platelets <10K  - continue PPX antimicrobials  - wbc 0.2 with anc of 10, hgb 8.7, plt 11K

## 2018-07-09 NOTE — PLAN OF CARE
Problem: Patient Care Overview  Goal: Plan of Care Review  Outcome: Ongoing (interventions implemented as appropriate)  Remains free from falls and injury this shift. Bed locked in low position with call light in reach. All belongings within reach. Afebrile - maintained on IV cefepime and Vanc. Vanc dose adjusted with subtherapeutic trough. Neutropenic and thrombocytopenic precautions maintained.  Independent of activity. Denies pain or nausea. WBC 0.20, critical but improving, preliminary platelets 11k. Plan to DC home when counts recover, will CTM.

## 2018-07-09 NOTE — SUBJECTIVE & OBJECTIVE
Subjective:     Interval History:   [facility] day 17, swelling much improved. MRI unremarkable. Cef continues (day 5), will d/c vanc today.     Objective:     Vital Signs (Most Recent):  Temp: 99.2 °F (37.3 °C) (07/09/18 0356)  Pulse: 95 (07/09/18 0356)  Resp: 20 (07/09/18 0356)  BP: 115/75 (07/09/18 0356)  SpO2: 100 % (07/09/18 0356) Vital Signs (24h Range):  Temp:  [98.3 °F (36.8 °C)-99.6 °F (37.6 °C)] 99.2 °F (37.3 °C)  Pulse:  [84-95] 95  Resp:  [14-20] 20  SpO2:  [99 %-100 %] 100 %  BP: (115-124)/(70-84) 115/75     Weight: 62.1 kg (136 lb 12.7 oz)  Body mass index is 19.08 kg/m².  Body surface area is 1.76 meters squared.    Intake/Output - Last 3 Shifts       07/07 0700 - 07/08 0659 07/08 0700 - 07/09 0659 07/09 0700 - 07/10 0659    P.O. 1080 680     Blood 590.3      IV Piggyback 500 500     Total Intake(mL/kg) 2170.3 (38.2) 1180 (19)     Urine (mL/kg/hr) 700 (0.5) 3275 (2.2)     Total Output 700 3275      Net +1470.3 -2095             Urine Occurrence 2 x 1 x     Stool Occurrence 1 x 1 x         Physical Exam   Constitutional: He is oriented to person, place, and time. He appears well-developed and well-nourished.   HENT:   Head: Normocephalic and atraumatic.   Left side temporal swelling resolved   Eyes: EOM are normal. Pupils are equal, round, and reactive to light.   Left periorbital edema improved   Neck: Normal range of motion. Neck supple.   Right side neck swelling resolved   Cardiovascular: Normal rate and regular rhythm.    No murmur heard.  Pulmonary/Chest: Effort normal and breath sounds normal. No respiratory distress.   Abdominal: Soft. Bowel sounds are normal. He exhibits no distension.   Musculoskeletal: Normal range of motion. He exhibits no edema.   Neurological: He is alert and oriented to person, place, and time.   Skin: Skin is warm and dry.   Central line c/d/i  Redness to scalp improved   Psychiatric: He has a normal mood and affect. His behavior is normal.   Vitals reviewed.    Significant  Labs:   CBC:     Recent Labs  Lab 07/08/18  0400 07/09/18  0355   WBC 0.18* 0.20*   HGB 8.2* 8.2*   HCT 24.3* 24.1*   PLT 17* 11*    and CMP:     Recent Labs  Lab 07/08/18  0400 07/09/18  0355    138   K 3.9 3.7    106   CO2 25 24   GLU 85 87   BUN 7 8   CREATININE 0.7 0.7   CALCIUM 8.6* 8.6*   PROT 5.7* 5.6*   ALBUMIN 2.7* 2.6*   BILITOT 0.3 0.2   ALKPHOS 75 83   AST 13 13   ALT 25 25   ANIONGAP 5* 8   EGFRNONAA >60.0 >60.0       Diagnostic Results:  I have reviewed all pertinent imaging results/findings within the past 24 hours.     7/6/18 MRI Soft Tissue Neck and Brain  Non specific subcutaneous edema of left facial tissues, bilateral occipital soft tissues, and bilateral neck. Underlying infection cannot be excluded. No abscess or infectious etiology is seen.

## 2018-07-10 ENCOUNTER — DOCUMENTATION ONLY (OUTPATIENT)
Dept: TRANSFUSION MEDICINE | Facility: HOSPITAL | Age: 33
End: 2018-07-10

## 2018-07-10 LAB
ALBUMIN SERPL BCP-MCNC: 2.8 G/DL
ALP SERPL-CCNC: 78 U/L
ALT SERPL W/O P-5'-P-CCNC: 25 U/L
ANION GAP SERPL CALC-SCNC: 8 MMOL/L
ANISOCYTOSIS BLD QL SMEAR: SLIGHT
AST SERPL-CCNC: 14 U/L
BACTERIA BLD CULT: NORMAL
BASOPHILS # BLD AUTO: ABNORMAL K/UL
BASOPHILS NFR BLD: 0 %
BILIRUB SERPL-MCNC: 0.2 MG/DL
BLD PROD TYP BPU: NORMAL
BLOOD UNIT EXPIRATION DATE: NORMAL
BLOOD UNIT TYPE CODE: 9500
BLOOD UNIT TYPE: NORMAL
BUN SERPL-MCNC: 7 MG/DL
CALCIUM SERPL-MCNC: 8.7 MG/DL
CHLORIDE SERPL-SCNC: 107 MMOL/L
CO2 SERPL-SCNC: 25 MMOL/L
CODING SYSTEM: NORMAL
CREAT SERPL-MCNC: 0.6 MG/DL
DIFFERENTIAL METHOD: ABNORMAL
DISPENSE STATUS: NORMAL
EOSINOPHIL # BLD AUTO: ABNORMAL K/UL
EOSINOPHIL NFR BLD: 0 %
ERYTHROCYTE [DISTWIDTH] IN BLOOD BY AUTOMATED COUNT: 14.9 %
EST. GFR  (AFRICAN AMERICAN): >60 ML/MIN/1.73 M^2
EST. GFR  (NON AFRICAN AMERICAN): >60 ML/MIN/1.73 M^2
GLUCOSE SERPL-MCNC: 85 MG/DL
HCT VFR BLD AUTO: 23.2 %
HGB BLD-MCNC: 7.8 G/DL
HLA HI RES SEQUNCE BASED TYPING TEST DATE: NORMAL
HLA-A1 HI RES: NORMAL
HLA-A2 HI RES: NORMAL
HLA-B1 HI RES: NORMAL
HLA-B2 HI RES: NORMAL
HLA-C1 HI RES: NORMAL
HLA-C2 HI RES: NORMAL
HLA-DQB1 1 HI RES: NORMAL
HLA-DQB1 2 HI RES: NORMAL
HLA-DRB1 1 HI RES: NORMAL
HLA-DRB1 2 HI RES: NORMAL
HLA-DRB3 1 HI RES: NORMAL
HLA-DRB3 2 HI RES: NORMAL
HLA-DRB4 1 HI RES: NORMAL
HLA-DRB4 2 HI RES: NORMAL
HLA-DRB5 1 HI RES: NORMAL
HLA-DRB5 2 HI RES: NORMAL
IMM GRANULOCYTES # BLD AUTO: ABNORMAL K/UL
IMM GRANULOCYTES NFR BLD AUTO: ABNORMAL %
LYMPHOCYTES # BLD AUTO: ABNORMAL K/UL
LYMPHOCYTES NFR BLD: 100 %
MAGNESIUM SERPL-MCNC: 2.1 MG/DL
MCH RBC QN AUTO: 29 PG
MCHC RBC AUTO-ENTMCNC: 33.6 G/DL
MCV RBC AUTO: 86 FL
MONOCYTES # BLD AUTO: ABNORMAL K/UL
MONOCYTES NFR BLD: 0 %
NEUTROPHILS NFR BLD: 0 %
NRBC BLD-RTO: 0 /100 WBC
NUM UNITS TRANS WBC-POOR PLATPHERESIS: NORMAL
PHOSPHATE SERPL-MCNC: 3.1 MG/DL
PLATELET # BLD AUTO: 6 K/UL
PLATELET BLD QL SMEAR: ABNORMAL
PMV BLD AUTO: ABNORMAL FL
POTASSIUM SERPL-SCNC: 3.7 MMOL/L
PROT SERPL-MCNC: 5.8 G/DL
RBC # BLD AUTO: 2.69 M/UL
SODIUM SERPL-SCNC: 140 MMOL/L
WBC # BLD AUTO: 0.25 K/UL

## 2018-07-10 PROCEDURE — 84100 ASSAY OF PHOSPHORUS: CPT

## 2018-07-10 PROCEDURE — 25000003 PHARM REV CODE 250: Performed by: INTERNAL MEDICINE

## 2018-07-10 PROCEDURE — 85007 BL SMEAR W/DIFF WBC COUNT: CPT

## 2018-07-10 PROCEDURE — 20600001 HC STEP DOWN PRIVATE ROOM

## 2018-07-10 PROCEDURE — 80053 COMPREHEN METABOLIC PANEL: CPT

## 2018-07-10 PROCEDURE — 63700000 PHARM REV CODE 250 ALT 637 W/O HCPCS: Performed by: INTERNAL MEDICINE

## 2018-07-10 PROCEDURE — 63600175 PHARM REV CODE 636 W HCPCS: Performed by: STUDENT IN AN ORGANIZED HEALTH CARE EDUCATION/TRAINING PROGRAM

## 2018-07-10 PROCEDURE — 36430 TRANSFUSION BLD/BLD COMPNT: CPT

## 2018-07-10 PROCEDURE — 83735 ASSAY OF MAGNESIUM: CPT

## 2018-07-10 PROCEDURE — P9037 PLATE PHERES LEUKOREDU IRRAD: HCPCS

## 2018-07-10 PROCEDURE — 25000003 PHARM REV CODE 250: Performed by: NURSE PRACTITIONER

## 2018-07-10 PROCEDURE — 99233 SBSQ HOSP IP/OBS HIGH 50: CPT | Mod: ,,, | Performed by: INTERNAL MEDICINE

## 2018-07-10 PROCEDURE — 85027 COMPLETE CBC AUTOMATED: CPT

## 2018-07-10 RX ORDER — SODIUM CHLORIDE 0.9 % (FLUSH) 0.9 %
5 SYRINGE (ML) INJECTION
Status: DISCONTINUED | OUTPATIENT
Start: 2018-07-10 | End: 2018-07-23 | Stop reason: HOSPADM

## 2018-07-10 RX ORDER — CIPROFLOXACIN 250 MG/1
500 TABLET, FILM COATED ORAL EVERY 12 HOURS
Status: DISCONTINUED | OUTPATIENT
Start: 2018-07-10 | End: 2018-07-13

## 2018-07-10 RX ORDER — HYDROCODONE BITARTRATE AND ACETAMINOPHEN 500; 5 MG/1; MG/1
TABLET ORAL
Status: DISCONTINUED | OUTPATIENT
Start: 2018-07-10 | End: 2018-07-11

## 2018-07-10 RX ORDER — ACETAMINOPHEN 325 MG/1
650 TABLET ORAL
Status: COMPLETED | OUTPATIENT
Start: 2018-07-10 | End: 2018-07-10

## 2018-07-10 RX ORDER — DIPHENHYDRAMINE HCL 25 MG
25 CAPSULE ORAL
Status: COMPLETED | OUTPATIENT
Start: 2018-07-10 | End: 2018-07-10

## 2018-07-10 RX ADMIN — DIPHENHYDRAMINE HYDROCHLORIDE 25 MG: 25 CAPSULE ORAL at 08:07

## 2018-07-10 RX ADMIN — ACYCLOVIR 400 MG: 200 CAPSULE ORAL at 08:07

## 2018-07-10 RX ADMIN — CIPROFLOXACIN HYDROCHLORIDE 500 MG: 250 TABLET, FILM COATED ORAL at 10:07

## 2018-07-10 RX ADMIN — VORICONAZOLE 200 MG: 200 TABLET ORAL at 08:07

## 2018-07-10 RX ADMIN — CIPROFLOXACIN HYDROCHLORIDE 500 MG: 250 TABLET, FILM COATED ORAL at 08:07

## 2018-07-10 RX ADMIN — ACETAMINOPHEN 650 MG: 325 TABLET, FILM COATED ORAL at 08:07

## 2018-07-10 RX ADMIN — POTASSIUM CHLORIDE 20 MEQ: 750 CAPSULE, EXTENDED RELEASE ORAL at 08:07

## 2018-07-10 RX ADMIN — DASATINIB 40 MG: 20 TABLET ORAL at 08:07

## 2018-07-10 RX ADMIN — CEFEPIME 2 G: 2 INJECTION, POWDER, FOR SOLUTION INTRAVENOUS at 12:07

## 2018-07-10 RX ADMIN — MIRTAZAPINE 15 MG: 15 TABLET, ORALLY DISINTEGRATING ORAL at 08:07

## 2018-07-10 NOTE — PROGRESS NOTES
[facility]  Hematology  Bone Marrow Transplant  Progress Note    Patient Name: Wilton Guzmán  Admission Date: 6/16/2018  Hospital Length of Stay: 24 days  Code Status: Full Code    Subjective:     Interval History:   [facility] day 18, cef changed to po cipro today. No complaints. 1 unit plt today for plt of 6K, ANC 0    Objective:     Vital Signs (Most Recent):  Temp: 98.8 °F (37.1 °C) (07/10/18 0314)  Pulse: 93 (07/10/18 0314)  Resp: 14 (07/10/18 0314)  BP: 112/70 (07/10/18 0314)  SpO2: 100 % (07/10/18 0314) Vital Signs (24h Range):  Temp:  [98.3 °F (36.8 °C)-99.5 °F (37.5 °C)] 98.8 °F (37.1 °C)  Pulse:  [86-98] 93  Resp:  [14-20] 14  SpO2:  [98 %-100 %] 100 %  BP: (112-123)/(69-77) 112/70     Weight: 62.1 kg (136 lb 12.7 oz)  Body mass index is 19.08 kg/m².  Body surface area is 1.76 meters squared.    Intake/Output - Last 3 Shifts       07/08 0700 - 07/09 0659 07/09 0700 - 07/10 0659 07/10 0700 - 07/11 0659    P.O. 680 610     IV Piggyback 500      Total Intake(mL/kg) 1180 (19) 610 (9.8)     Urine (mL/kg/hr) 3275 (2.2) 885 (0.6) 500 (4.4)    Total Output 3275 885 500    Net -4504 -275 -500           Urine Occurrence 1 x 2 x     Stool Occurrence 1 x 1 x         Physical Exam   Constitutional: He is oriented to person, place, and time. He appears well-developed and well-nourished.   HENT:   Head: Normocephalic and atraumatic.   Left side temporal swelling resolved   Eyes: EOM are normal. Pupils are equal, round, and reactive to light.   Left periorbital edema almost resolved   Neck: Normal range of motion. Neck supple.   Right side neck swelling resolved   Cardiovascular: Normal rate and regular rhythm.    No murmur heard.  Pulmonary/Chest: Effort normal and breath sounds normal. No respiratory distress.   Abdominal: Soft. Bowel sounds are normal. He exhibits no distension.   Musculoskeletal: Normal range of motion. He exhibits no edema.   Neurological: He is alert and oriented to person, place, and  time.   Skin: Skin is warm and dry.   Central line c/d/i  Redness to scalp improved   Psychiatric: He has a normal mood and affect. His behavior is normal.   Vitals reviewed.    Significant Labs:   CBC:     Recent Labs  Lab 07/09/18  0355 07/10/18  0308   WBC 0.20* 0.25*   HGB 8.2* 7.8*   HCT 24.1* 23.2*   PLT 11* 6*    and CMP:     Recent Labs  Lab 07/09/18  0355 07/10/18  0308    140   K 3.7 3.7    107   CO2 24 25   GLU 87 85   BUN 8 7   CREATININE 0.7 0.6   CALCIUM 8.6* 8.7   PROT 5.6* 5.8*   ALBUMIN 2.6* 2.8*   BILITOT 0.2 0.2   ALKPHOS 83 78   AST 13 14   ALT 25 25   ANIONGAP 8 8   EGFRNONAA >60.0 >60.0       Diagnostic Results:  I have reviewed all pertinent imaging results/findings within the past 24 hours.     7/6/18 MRI Soft Tissue Neck and Brain  Non specific subcutaneous edema of left facial tissues, bilateral occipital soft tissues, and bilateral neck. Underlying infection cannot be excluded. No abscess or infectious etiology is seen.    Assessment/Plan:     * Chronic myelogenous leukemia (CML), BCR-ABL1-positive    - initial WBC reportedly 150,000  - bcr-abl positive on FISH at [facility] (records attached in media tab)  - original BMBX 5/26/18 with 20% blasts; CML transformed to AML with blast crisis;   karyotype: 46,XY,t(9;22;22;14)q34;q11.2;q13;q23); BCR/ABL gene fusion 62%  - continue dasatinib at 40 mg (dose reduction secondary to being on voriconazole)  - voriconazole, acyclovir.  Cipro held while on cefepime.  - repeat BM biopsy done 6/18 here at [facility] after 7+3. 9% blasts.   - persistent disease post 7+3 induction. 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 Today is Day 18.  Plan for D21 BMBX, per staff instead of day 14 marrow  - AML FISH negative  - NGS pending from repeat BMBX  - echo with 63% EF  - HIV negative, Hepatitis negative, G6PD wnl  - HLA typing done  - referral information for transplant benefits given to patient  - patient has 1 full sibling, contact info  given to transplant coordinators  - consider sending BCR/ABL mutational analysis testing when WBC increases        Pancytopenia due to antineoplastic chemotherapy    - transfuse for Hgb <7 and platelets <10K  - continue PPX antimicrobials  - wbc 0.25 with anc of 0, hgb 7.8, plt 6K; one unit plt today        Periorbital swelling    - noted very mild swelling to left side of skull on 7/3/18   - periorbital swelling noted 7/5/18, ct orbits done and showed minimal asymetric soft tissue induration of left infraorbital soft tissue without evidence of focal fluid collection. Otherwise unremarkable.  - mri brain and neck showed nonspecific subcutaneous edema. No obvious sign of infection. Otherwise unremarkable  - about resolved        Severe malnutrition    - albumin and weight was previously dropping  - dietary following           Neutropenic fever    - started spiking temperatures around 7/4  - got 6 days of cefepime (d/c'd 7/10/18) and 5 days of vanc (d/c'd 7/9/18)  - CXR negative, UA negative, cultures from 7/4/18 one bottle gram + cocci in chains resembling strep viridans probable contaminant, blood cultures 7/6/18 ngtd  - if repeat cultures negative and without fever, de-escalate vancomycin +/- cefepime  - head, eye, and neck swelling resolved  - resolved        Anxiety    - Patient understandibly experiencing anxiety about his new diagnosis and feeling isolated in his room  - Declined oncology psychologist consult earlier during this admission  - Continue Remeron for anxiety and insomnia             VTE Risk Mitigation         Ordered     heparin, porcine (PF) 100 unit/mL injection flush 300 Units  As needed (PRN)      06/23/18 1052     IP VTE LOW RISK PATIENT  Once      06/16/18 0938          Disposition: pending day 21 of [facility] biopsy and count recovery     Yoanna Andrade, CARRINGTON  Bone Marrow Transplant  [facility]

## 2018-07-10 NOTE — PLAN OF CARE
Problem: Patient Care Overview  Goal: Plan of Care Review  Outcome: Ongoing (interventions implemented as appropriate)  AAOx4, bed in low and locked position, call bell within reach, independent, no falls. No complaints of nausea or pain. Cefepime continued. Patient stable, vitals stable, will continue to monitor.

## 2018-07-10 NOTE — ASSESSMENT & PLAN NOTE
- initial WBC reportedly 150,000  - bcr-abl positive on FISH at [facility] (records attached in media tab)  - original BMBX 5/26/18 with 20% blasts; CML transformed to AML with blast crisis;   karyotype: 46,XY,t(9;22;22;14)q34;q11.2;q13;q23); BCR/ABL gene fusion 62%  - continue dasatinib at 40 mg (dose reduction secondary to being on voriconazole)  - voriconazole, acyclovir.  Cipro held while on cefepime.  - repeat BM biopsy done 6/18 here at [facility] after 7+3. 9% blasts.   - persistent disease post 7+3 induction. 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 Today is Day 18.  Plan for D21 BMBX, per staff instead of day 14 marrow  - AML FISH negative  - NGS pending from repeat BMBX  - echo with 63% EF  - HIV negative, Hepatitis negative, G6PD wnl  - HLA typing done  - referral information for transplant benefits given to patient  - patient has 1 full sibling, contact info given to transplant coordinators  - consider sending BCR/ABL mutational analysis testing when WBC increases

## 2018-07-10 NOTE — PROGRESS NOTES
The HLA typing for [unfilled] have been completed.  The low and high resolution results are concordant and the samples were collected at different times as required.    PARVEZ Charles MD, GUSTABO  Histocompatability and Immunogenetics Lab  Section of Transfusion Medicine & Histocompatibility  Department of Pathology and Laboratory Medicine  [facility]  07/10/2018

## 2018-07-10 NOTE — ASSESSMENT & PLAN NOTE
- noted very mild swelling to left side of skull on 7/3/18   - periorbital swelling noted 7/5/18, ct orbits done and showed minimal asymetric soft tissue induration of left infraorbital soft tissue without evidence of focal fluid collection. Otherwise unremarkable.  - mri brain and neck showed nonspecific subcutaneous edema. No obvious sign of infection. Otherwise unremarkable  - about resolved

## 2018-07-10 NOTE — PLAN OF CARE
Problem: Patient Care Overview  Goal: Plan of Care Review  Outcome: Ongoing (interventions implemented as appropriate)  POC reviewed with patient; understanding verbalized. Pt received 1U platelets this shift. He also received Potassium replacements. Cefepime DC'd and PO Cipro administered as ordered. Pt voids in the urinal; no noted BM this shift. Regular diet with good appetite. Pt. with nonskid footwear on, bed in lowest position, and locked with bed rails up x2. Pt. has call light and personal items within reach. Patient ambulates in room independently. VSS and afebrile this shift. All questions and concerns address at this time. Will continue to monitor.

## 2018-07-10 NOTE — ASSESSMENT & PLAN NOTE
- started spiking temperatures around 7/4  - got 6 days of cefepime (d/c'd 7/10/18) and 5 days of vanc (d/c'd 7/9/18)  - CXR negative, UA negative, cultures from 7/4/18 one bottle gram + cocci in chains resembling strep viridans probable contaminant, blood cultures 7/6/18 ngtd  - if repeat cultures negative and without fever, de-escalate vancomycin +/- cefepime  - head, eye, and neck swelling resolved  - resolved

## 2018-07-10 NOTE — ASSESSMENT & PLAN NOTE
- transfuse for Hgb <7 and platelets <10K  - continue PPX antimicrobials  - wbc 0.25 with anc of 0, hgb 7.8, plt 6K; one unit plt today

## 2018-07-10 NOTE — SUBJECTIVE & OBJECTIVE
Subjective:     Interval History:   [facility] day 18, cef changed to po cipro today. No complaints. 1 unit plt today for plt of 6K, ANC 0    Objective:     Vital Signs (Most Recent):  Temp: 98.8 °F (37.1 °C) (07/10/18 0314)  Pulse: 93 (07/10/18 0314)  Resp: 14 (07/10/18 0314)  BP: 112/70 (07/10/18 0314)  SpO2: 100 % (07/10/18 0314) Vital Signs (24h Range):  Temp:  [98.3 °F (36.8 °C)-99.5 °F (37.5 °C)] 98.8 °F (37.1 °C)  Pulse:  [86-98] 93  Resp:  [14-20] 14  SpO2:  [98 %-100 %] 100 %  BP: (112-123)/(69-77) 112/70     Weight: 62.1 kg (136 lb 12.7 oz)  Body mass index is 19.08 kg/m².  Body surface area is 1.76 meters squared.    Intake/Output - Last 3 Shifts       07/08 0700 - 07/09 0659 07/09 0700 - 07/10 0659 07/10 0700 - 07/11 0659    P.O. 680 610     IV Piggyback 500      Total Intake(mL/kg) 1180 (19) 610 (9.8)     Urine (mL/kg/hr) 3275 (2.2) 885 (0.6) 500 (4.4)    Total Output 3275 885 500    Net -2095 -275 -500           Urine Occurrence 1 x 2 x     Stool Occurrence 1 x 1 x         Physical Exam   Constitutional: He is oriented to person, place, and time. He appears well-developed and well-nourished.   HENT:   Head: Normocephalic and atraumatic.   Left side temporal swelling resolved   Eyes: EOM are normal. Pupils are equal, round, and reactive to light.   Left periorbital edema almost resolved   Neck: Normal range of motion. Neck supple.   Right side neck swelling resolved   Cardiovascular: Normal rate and regular rhythm.    No murmur heard.  Pulmonary/Chest: Effort normal and breath sounds normal. No respiratory distress.   Abdominal: Soft. Bowel sounds are normal. He exhibits no distension.   Musculoskeletal: Normal range of motion. He exhibits no edema.   Neurological: He is alert and oriented to person, place, and time.   Skin: Skin is warm and dry.   Central line c/d/i  Redness to scalp improved   Psychiatric: He has a normal mood and affect. His behavior is normal.   Vitals reviewed.    Significant Labs:    CBC:     Recent Labs  Lab 07/09/18  0355 07/10/18  0308   WBC 0.20* 0.25*   HGB 8.2* 7.8*   HCT 24.1* 23.2*   PLT 11* 6*    and CMP:     Recent Labs  Lab 07/09/18  0355 07/10/18  0308    140   K 3.7 3.7    107   CO2 24 25   GLU 87 85   BUN 8 7   CREATININE 0.7 0.6   CALCIUM 8.6* 8.7   PROT 5.6* 5.8*   ALBUMIN 2.6* 2.8*   BILITOT 0.2 0.2   ALKPHOS 83 78   AST 13 14   ALT 25 25   ANIONGAP 8 8   EGFRNONAA >60.0 >60.0       Diagnostic Results:  I have reviewed all pertinent imaging results/findings within the past 24 hours.     7/6/18 MRI Soft Tissue Neck and Brain  Non specific subcutaneous edema of left facial tissues, bilateral occipital soft tissues, and bilateral neck. Underlying infection cannot be excluded. No abscess or infectious etiology is seen.

## 2018-07-11 LAB
ABO + RH BLD: NORMAL
ALBUMIN SERPL BCP-MCNC: 2.7 G/DL
ALP SERPL-CCNC: 78 U/L
ALT SERPL W/O P-5'-P-CCNC: 27 U/L
ANION GAP SERPL CALC-SCNC: 8 MMOL/L
AST SERPL-CCNC: 14 U/L
BASOPHILS # BLD AUTO: 0 K/UL
BASOPHILS NFR BLD: 0 %
BILIRUB SERPL-MCNC: 0.2 MG/DL
BLD GP AB SCN CELLS X3 SERPL QL: NORMAL
BUN SERPL-MCNC: 8 MG/DL
CALCIUM SERPL-MCNC: 8.9 MG/DL
CHLORIDE SERPL-SCNC: 107 MMOL/L
CO2 SERPL-SCNC: 24 MMOL/L
CREAT SERPL-MCNC: 0.6 MG/DL
DIFFERENTIAL METHOD: ABNORMAL
EOSINOPHIL # BLD AUTO: 0 K/UL
EOSINOPHIL NFR BLD: 0 %
ERYTHROCYTE [DISTWIDTH] IN BLOOD BY AUTOMATED COUNT: 14.8 %
EST. GFR  (AFRICAN AMERICAN): >60 ML/MIN/1.73 M^2
EST. GFR  (NON AFRICAN AMERICAN): >60 ML/MIN/1.73 M^2
GLUCOSE SERPL-MCNC: 90 MG/DL
HCT VFR BLD AUTO: 22.1 %
HGB BLD-MCNC: 7.4 G/DL
IMM GRANULOCYTES # BLD AUTO: 0 K/UL
IMM GRANULOCYTES NFR BLD AUTO: 0 %
LYMPHOCYTES # BLD AUTO: 0.2 K/UL
LYMPHOCYTES NFR BLD: 100 %
MAGNESIUM SERPL-MCNC: 1.5 MG/DL
MCH RBC QN AUTO: 28.7 PG
MCHC RBC AUTO-ENTMCNC: 33.5 G/DL
MCV RBC AUTO: 86 FL
MONOCYTES # BLD AUTO: 0 K/UL
MONOCYTES NFR BLD: 0 %
NEUTROPHILS # BLD AUTO: 0 K/UL
NEUTROPHILS NFR BLD: 0 %
NRBC BLD-RTO: 0 /100 WBC
PHOSPHATE SERPL-MCNC: 3 MG/DL
PLATELET # BLD AUTO: 42 K/UL
PLATELET BLD QL SMEAR: ABNORMAL
PMV BLD AUTO: 11.1 FL
POTASSIUM SERPL-SCNC: 3.9 MMOL/L
PROT SERPL-MCNC: 5.8 G/DL
RBC # BLD AUTO: 2.58 M/UL
SODIUM SERPL-SCNC: 139 MMOL/L
WBC # BLD AUTO: 0.19 K/UL

## 2018-07-11 PROCEDURE — 99233 SBSQ HOSP IP/OBS HIGH 50: CPT | Mod: ,,, | Performed by: INTERNAL MEDICINE

## 2018-07-11 PROCEDURE — 63700000 PHARM REV CODE 250 ALT 637 W/O HCPCS: Performed by: INTERNAL MEDICINE

## 2018-07-11 PROCEDURE — 25000003 PHARM REV CODE 250: Performed by: NURSE PRACTITIONER

## 2018-07-11 PROCEDURE — 86920 COMPATIBILITY TEST SPIN: CPT

## 2018-07-11 PROCEDURE — 83735 ASSAY OF MAGNESIUM: CPT

## 2018-07-11 PROCEDURE — 25000003 PHARM REV CODE 250: Performed by: INTERNAL MEDICINE

## 2018-07-11 PROCEDURE — 80053 COMPREHEN METABOLIC PANEL: CPT

## 2018-07-11 PROCEDURE — 84100 ASSAY OF PHOSPHORUS: CPT

## 2018-07-11 PROCEDURE — 85025 COMPLETE CBC W/AUTO DIFF WBC: CPT

## 2018-07-11 PROCEDURE — 86850 RBC ANTIBODY SCREEN: CPT

## 2018-07-11 PROCEDURE — 63600175 PHARM REV CODE 636 W HCPCS: Performed by: INTERNAL MEDICINE

## 2018-07-11 PROCEDURE — 20600001 HC STEP DOWN PRIVATE ROOM

## 2018-07-11 RX ADMIN — MAGNESIUM SULFATE IN WATER 2 G: 40 INJECTION, SOLUTION INTRAVENOUS at 05:07

## 2018-07-11 RX ADMIN — DASATINIB 40 MG: 20 TABLET ORAL at 08:07

## 2018-07-11 RX ADMIN — CIPROFLOXACIN HYDROCHLORIDE 500 MG: 250 TABLET, FILM COATED ORAL at 08:07

## 2018-07-11 RX ADMIN — VORICONAZOLE 200 MG: 200 TABLET ORAL at 08:07

## 2018-07-11 RX ADMIN — MIRTAZAPINE 15 MG: 15 TABLET, ORALLY DISINTEGRATING ORAL at 08:07

## 2018-07-11 RX ADMIN — ACYCLOVIR 400 MG: 200 CAPSULE ORAL at 08:07

## 2018-07-11 NOTE — PROGRESS NOTES
[facility]  Hematology  Bone Marrow Transplant  Progress Note    Patient Name: Wilton Guzmán  Admission Date: 6/16/2018  Hospital Length of Stay: 25 days  Code Status: Full Code    Subjective:     Interval History:   [facility] day 19, afebrile, ANC 0.   No complaints    Objective:     Vital Signs (Most Recent):  Temp: 99.3 °F (37.4 °C) (07/11/18 0742)  Pulse: 89 (07/11/18 0742)  Resp: 16 (07/11/18 0742)  BP: 110/62 (07/11/18 0742)  SpO2: 95 % (07/11/18 0742) Vital Signs (24h Range):  Temp:  [98.1 °F (36.7 °C)-99.3 °F (37.4 °C)] 99.3 °F (37.4 °C)  Pulse:  [] 89  Resp:  [14-20] 16  SpO2:  [95 %-100 %] 95 %  BP: (104-124)/(56-77) 110/62     Weight: 62.1 kg (136 lb 12.7 oz)  Body mass index is 19.08 kg/m².  Body surface area is 1.76 meters squared.    Intake/Output - Last 3 Shifts       07/09 0700 - 07/10 0659 07/10 0700 - 07/11 0659 07/11 0700 - 07/12 0659    P.O. 610 300     Blood  243     Total Intake(mL/kg) 610 (9.8) 543 (8.8)     Urine (mL/kg/hr) 885 (0.6) 1250 (0.8)     Total Output 885 1250      Net -275 -707             Urine Occurrence 2 x      Stool Occurrence 1 x          Physical Exam   Constitutional: He is oriented to person, place, and time. He appears well-developed and well-nourished.   HENT:   Head: Normocephalic and atraumatic.   Left side temporal swelling resolved   Eyes: EOM are normal. Pupils are equal, round, and reactive to light.   Left periorbital edema resolved   Neck: Normal range of motion. Neck supple.   Right side neck swelling resolved   Cardiovascular: Normal rate and regular rhythm.    No murmur heard.  Pulmonary/Chest: Effort normal and breath sounds normal. No respiratory distress.   Abdominal: Soft. Bowel sounds are normal. He exhibits no distension.   Musculoskeletal: Normal range of motion. He exhibits no edema.   Neurological: He is alert and oriented to person, place, and time.   Skin: Skin is warm and dry.   Central line c/d/i  Redness to scalp improved,  with some dry skin to head  Flaky lips   Psychiatric: He has a normal mood and affect. His behavior is normal.   Vitals reviewed.    Significant Labs:   CBC:     Recent Labs  Lab 07/10/18  0308 07/11/18  0326   WBC 0.25* 0.19*   HGB 7.8* 7.4*   HCT 23.2* 22.1*   PLT 6* 42*    and CMP:     Recent Labs  Lab 07/10/18  0308 07/11/18  0326    139   K 3.7 3.9    107   CO2 25 24   GLU 85 90   BUN 7 8   CREATININE 0.6 0.6   CALCIUM 8.7 8.9   PROT 5.8* 5.8*   ALBUMIN 2.8* 2.7*   BILITOT 0.2 0.2   ALKPHOS 78 78   AST 14 14   ALT 25 27   ANIONGAP 8 8   EGFRNONAA >60.0 >60.0       Diagnostic Results:  I have reviewed all pertinent imaging results/findings within the past 24 hours.     7/6/18 MRI Soft Tissue Neck and Brain  Non specific subcutaneous edema of left facial tissues, bilateral occipital soft tissues, and bilateral neck. Underlying infection cannot be excluded. No abscess or infectious etiology is seen.    Assessment/Plan:     * Chronic myelogenous leukemia (CML), BCR-ABL1-positive    - initial WBC reportedly 150,000  - bcr-abl positive on FISH at [facility] (records attached in media tab)  - original BMBX 5/26/18 with 20% blasts; 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 9% blasts.   - persistent disease post 7+3 induction. 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 Today is Day 19.  Plan for D21 BMBX, per staff instead of day 14 marrow  - AML FISH negative  - NGS pending from repeat BMBX  - echo with 63% EF  - HIV negative, Hepatitis negative, G6PD wnl  - HLA typing hi and lo completed  - referral information for transplant benefits given to patient  - patient has 1 full sibling, contact info given to transplant coordinators  -  pt still needs to call base to get formal referral for [facility]   - consider sending BCR/ABL mutational analysis testing when WBC increases        Pancytopenia due to antineoplastic chemotherapy    - transfuse for Hgb <7 and platelets <10K  - continue PPX antimicrobials  - wbc 0.19 with anc of 0, hgb 7.4, plt 42K        Periorbital swelling    - noted very mild swelling to left side of skull on 7/3/18   - periorbital swelling noted 7/5/18, ct orbits done and showed minimal asymetric soft tissue induration of left infraorbital soft tissue without evidence of focal fluid collection. Otherwise unremarkable.  - mri brain and neck showed nonspecific subcutaneous edema. No obvious sign of infection. Otherwise unremarkable  - resolved and not off cef and vanc        Severe malnutrition    - albumin and weight was previously dropping  - daily weights  - dietary following            Neutropenic fever    - started spiking temperatures around 7/4  - got 6 days of cefepime (d/c'd 7/10/18) and 5 days of vanc (d/c'd 7/9/18)  - CXR negative, UA negative, cultures from 7/4/18 one bottle gram + cocci in chains resembling strep viridans probable contaminant, blood cultures 7/6/18 ngtd  - if repeat cultures negative and without fever, de-escalate vancomycin +/- cefepime  - head, eye, and neck swelling resolved  - resolved         Anxiety    - Patient understandibly experiencing anxiety about his new diagnosis and feeling isolated in his room  - Declined oncology psychologist consult earlier during this admission  - Continue Remeron for anxiety and insomnia              VTE Risk Mitigation         Ordered     heparin, porcine (PF) 100 unit/mL injection flush 300 Units  As needed (PRN)      06/23/18 1052     IP VTE LOW RISK PATIENT  Once      06/16/18 0938          Disposition: pending repeat BM Bx on Friday and count recovery    Yoanna Andrade NP  Bone Marrow Transplant  [facility]

## 2018-07-11 NOTE — ASSESSMENT & PLAN NOTE
- transfuse for Hgb <7 and platelets <10K  - continue PPX antimicrobials  - wbc 0.19 with anc of 0, hgb 7.4, plt 42K

## 2018-07-11 NOTE — ASSESSMENT & PLAN NOTE
- noted very mild swelling to left side of skull on 7/3/18   - periorbital swelling noted 7/5/18, ct orbits done and showed minimal asymetric soft tissue induration of left infraorbital soft tissue without evidence of focal fluid collection. Otherwise unremarkable.  - mri brain and neck showed nonspecific subcutaneous edema. No obvious sign of infection. Otherwise unremarkable  - resolved and not off cef and vanc

## 2018-07-11 NOTE — PLAN OF CARE
Problem: Patient Care Overview  Goal: Plan of Care Review  Outcome: Ongoing (interventions implemented as appropriate)  Pt has remained free from injury this shift. Bed in low locked position. Call light and personal belongings within reach. Side rails up x2. Nonskid socks in place. Pt ambulates in room independently. Pt remains afebrile thus far this shift. No c/o pain nor nausea this shift. Magnesium replacement given this AM. Family at bedside and involved in care. All questions and concerns addressed at this time. Will continue to monitor.

## 2018-07-11 NOTE — SUBJECTIVE & OBJECTIVE
Subjective:     Interval History:   [facility] day 19, afebrile, ANC 0.     Objective:     Vital Signs (Most Recent):  Temp: 99.3 °F (37.4 °C) (07/11/18 0742)  Pulse: 89 (07/11/18 0742)  Resp: 16 (07/11/18 0742)  BP: 110/62 (07/11/18 0742)  SpO2: 95 % (07/11/18 0742) Vital Signs (24h Range):  Temp:  [98.1 °F (36.7 °C)-99.3 °F (37.4 °C)] 99.3 °F (37.4 °C)  Pulse:  [] 89  Resp:  [14-20] 16  SpO2:  [95 %-100 %] 95 %  BP: (104-124)/(56-77) 110/62     Weight: 62.1 kg (136 lb 12.7 oz)  Body mass index is 19.08 kg/m².  Body surface area is 1.76 meters squared.    Intake/Output - Last 3 Shifts       07/09 0700 - 07/10 0659 07/10 0700 - 07/11 0659 07/11 0700 - 07/12 0659    P.O. 610 300     Blood  243     Total Intake(mL/kg) 610 (9.8) 543 (8.8)     Urine (mL/kg/hr) 885 (0.6) 1250 (0.8)     Total Output 885 1250      Net -275 -707             Urine Occurrence 2 x      Stool Occurrence 1 x          Physical Exam   Constitutional: He is oriented to person, place, and time. He appears well-developed and well-nourished.   HENT:   Head: Normocephalic and atraumatic.   Left side temporal swelling resolved   Eyes: EOM are normal. Pupils are equal, round, and reactive to light.   Left periorbital edema resolved   Neck: Normal range of motion. Neck supple.   Right side neck swelling resolved   Cardiovascular: Normal rate and regular rhythm.    No murmur heard.  Pulmonary/Chest: Effort normal and breath sounds normal. No respiratory distress.   Abdominal: Soft. Bowel sounds are normal. He exhibits no distension.   Musculoskeletal: Normal range of motion. He exhibits no edema.   Neurological: He is alert and oriented to person, place, and time.   Skin: Skin is warm and dry.   Central line c/d/i  Redness to scalp improved, with some dry skin to head  Flaky lips   Psychiatric: He has a normal mood and affect. His behavior is normal.   Vitals reviewed.    Significant Labs:   CBC:     Recent Labs  Lab 07/10/18  0308 07/11/18  0326   WBC  0.25* 0.19*   HGB 7.8* 7.4*   HCT 23.2* 22.1*   PLT 6* 42*    and CMP:     Recent Labs  Lab 07/10/18  0308 07/11/18  0326    139   K 3.7 3.9    107   CO2 25 24   GLU 85 90   BUN 7 8   CREATININE 0.6 0.6   CALCIUM 8.7 8.9   PROT 5.8* 5.8*   ALBUMIN 2.8* 2.7*   BILITOT 0.2 0.2   ALKPHOS 78 78   AST 14 14   ALT 25 27   ANIONGAP 8 8   EGFRNONAA >60.0 >60.0       Diagnostic Results:  I have reviewed all pertinent imaging results/findings within the past 24 hours.     7/6/18 MRI Soft Tissue Neck and Brain  Non specific subcutaneous edema of left facial tissues, bilateral occipital soft tissues, and bilateral neck. Underlying infection cannot be excluded. No abscess or infectious etiology is seen.

## 2018-07-11 NOTE — ASSESSMENT & PLAN NOTE
- initial WBC reportedly 150,000  - bcr-abl positive on FISH at [facility] (records attached in media tab)  - original BMBX 5/26/18 with 20% blasts; 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 9% blasts.   - persistent disease post 7+3 induction. 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 Today is Day 19.  Plan for D21 BMBX, per staff instead of day 14 marrow  - AML FISH negative  - NGS pending from repeat BMBX  - echo with 63% EF  - HIV negative, Hepatitis negative, G6PD wnl  - HLA typing hi and lo completed  - referral information for transplant benefits given to patient  - patient has 1 full sibling, contact info given to transplant coordinators  - pt still needs to call base to get formal referral for [facility]   - consider sending BCR/ABL mutational analysis testing when WBC increases

## 2018-07-11 NOTE — PLAN OF CARE
Problem: Patient Care Overview  Goal: Plan of Care Review  Outcome: Ongoing (interventions implemented as appropriate)  AAOx4, bed in low and locked position, call bell within reach, independent, no falls. PO cipro continued. No complaints of nausea or pain. Patient stable, vitals stable, will continue to monitor.

## 2018-07-12 PROBLEM — L98.499 PERIANAL ULCER: Status: ACTIVE | Noted: 2018-07-12

## 2018-07-12 PROBLEM — R23.8 SKIN BREAKDOWN: Status: ACTIVE | Noted: 2018-07-12

## 2018-07-12 LAB
ALBUMIN SERPL BCP-MCNC: 2.7 G/DL
ALP SERPL-CCNC: 82 U/L
ALT SERPL W/O P-5'-P-CCNC: 26 U/L
ANION GAP SERPL CALC-SCNC: 7 MMOL/L
APTT BLDCRRT: 26.5 SEC
AST SERPL-CCNC: 14 U/L
BASOPHILS # BLD AUTO: 0 K/UL
BASOPHILS NFR BLD: 0 %
BILIRUB SERPL-MCNC: 0.3 MG/DL
BUN SERPL-MCNC: 7 MG/DL
CALCIUM SERPL-MCNC: 8.8 MG/DL
CHLORIDE SERPL-SCNC: 107 MMOL/L
CO2 SERPL-SCNC: 27 MMOL/L
CREAT SERPL-MCNC: 0.7 MG/DL
DIFFERENTIAL METHOD: ABNORMAL
EOSINOPHIL # BLD AUTO: 0 K/UL
EOSINOPHIL NFR BLD: 0 %
ERYTHROCYTE [DISTWIDTH] IN BLOOD BY AUTOMATED COUNT: 14.7 %
EST. GFR  (AFRICAN AMERICAN): >60 ML/MIN/1.73 M^2
EST. GFR  (NON AFRICAN AMERICAN): >60 ML/MIN/1.73 M^2
GLUCOSE SERPL-MCNC: 87 MG/DL
HCT VFR BLD AUTO: 21.6 %
HGB BLD-MCNC: 7.1 G/DL
IMM GRANULOCYTES # BLD AUTO: 0 K/UL
IMM GRANULOCYTES NFR BLD AUTO: 0 %
INR PPP: 1
LDH SERPL L TO P-CCNC: 131 U/L
LYMPHOCYTES # BLD AUTO: 0.2 K/UL
LYMPHOCYTES NFR BLD: 95.7 %
MAGNESIUM SERPL-MCNC: 1.7 MG/DL
MCH RBC QN AUTO: 28.5 PG
MCHC RBC AUTO-ENTMCNC: 32.9 G/DL
MCV RBC AUTO: 87 FL
MONOCYTES # BLD AUTO: 0 K/UL
MONOCYTES NFR BLD: 0 %
NEUTROPHILS # BLD AUTO: 0 K/UL
NEUTROPHILS NFR BLD: 4.3 %
NRBC BLD-RTO: 0 /100 WBC
PHOSPHATE SERPL-MCNC: 3.1 MG/DL
PLATELET # BLD AUTO: 26 K/UL
PMV BLD AUTO: 10.1 FL
POTASSIUM SERPL-SCNC: 4 MMOL/L
PROT SERPL-MCNC: 5.8 G/DL
PROTHROMBIN TIME: 10.5 SEC
RBC # BLD AUTO: 2.49 M/UL
SODIUM SERPL-SCNC: 141 MMOL/L
URATE SERPL-MCNC: 2.2 MG/DL
WBC # BLD AUTO: 0.23 K/UL

## 2018-07-12 PROCEDURE — 84550 ASSAY OF BLOOD/URIC ACID: CPT

## 2018-07-12 PROCEDURE — 63600175 PHARM REV CODE 636 W HCPCS: Performed by: INTERNAL MEDICINE

## 2018-07-12 PROCEDURE — 25000003 PHARM REV CODE 250: Performed by: INTERNAL MEDICINE

## 2018-07-12 PROCEDURE — 85730 THROMBOPLASTIN TIME PARTIAL: CPT

## 2018-07-12 PROCEDURE — 99233 SBSQ HOSP IP/OBS HIGH 50: CPT | Mod: ,,, | Performed by: INTERNAL MEDICINE

## 2018-07-12 PROCEDURE — 85610 PROTHROMBIN TIME: CPT

## 2018-07-12 PROCEDURE — 84100 ASSAY OF PHOSPHORUS: CPT

## 2018-07-12 PROCEDURE — 83735 ASSAY OF MAGNESIUM: CPT

## 2018-07-12 PROCEDURE — 63700000 PHARM REV CODE 250 ALT 637 W/O HCPCS: Performed by: INTERNAL MEDICINE

## 2018-07-12 PROCEDURE — 20600001 HC STEP DOWN PRIVATE ROOM

## 2018-07-12 PROCEDURE — 83615 LACTATE (LD) (LDH) ENZYME: CPT

## 2018-07-12 PROCEDURE — 97803 MED NUTRITION INDIV SUBSEQ: CPT

## 2018-07-12 PROCEDURE — 25000003 PHARM REV CODE 250: Performed by: NURSE PRACTITIONER

## 2018-07-12 PROCEDURE — 85025 COMPLETE CBC W/AUTO DIFF WBC: CPT

## 2018-07-12 PROCEDURE — 80053 COMPREHEN METABOLIC PANEL: CPT

## 2018-07-12 RX ADMIN — CIPROFLOXACIN HYDROCHLORIDE 500 MG: 250 TABLET, FILM COATED ORAL at 08:07

## 2018-07-12 RX ADMIN — VORICONAZOLE 200 MG: 200 TABLET ORAL at 08:07

## 2018-07-12 RX ADMIN — MIRTAZAPINE 15 MG: 15 TABLET, ORALLY DISINTEGRATING ORAL at 08:07

## 2018-07-12 RX ADMIN — DASATINIB 40 MG: 20 TABLET ORAL at 08:07

## 2018-07-12 RX ADMIN — ACYCLOVIR 400 MG: 200 CAPSULE ORAL at 08:07

## 2018-07-12 RX ADMIN — MAGNESIUM SULFATE IN WATER 2 G: 40 INJECTION, SOLUTION INTRAVENOUS at 07:07

## 2018-07-12 NOTE — PLAN OF CARE
Problem: Patient Care Overview  Goal: Plan of Care Review  Outcome: Ongoing (interventions implemented as appropriate)  Pt has remained free from injury this shift. Bed in low locked position. Call light and personal belongings within reach. Side rails up x2. Nonskid socks in place. Pt ambulates in room independently. Pt remains afebrile thus far this shift. No c/o pain nor nausea this shift. Magnesium replacement given this AM. All questions and concerns addressed at this time. Will continue to monitor.

## 2018-07-12 NOTE — PROGRESS NOTES
Consulted for perianal skin concern  Noted small ulceration to perianal area with surrounding induration. Unable to express any exudate from site. Pt states he noted some yellow drainage earlier in the week ,but area is quite painful.   Will order a waffle cushion for the chair and continue use of critic aid paste barrier cream twice daily.  Discussed plan of care with Dr Peña.      07/12/18 1230       Wound 07/12/18 1130 Ulceration Perirectal   Date First Assessed/Time First Assessed: 07/12/18 1130   Pre-existing: Yes  Primary Wound Type: Ulceration  Location: Perirectal   Wound Image    Wound WDL ex   Dressing Appearance Open to air;No dressing;other (see comments)  (barrier creams)   Drainage Amount None   Drainage Characteristics/Odor No odor   Appearance Pink;Yellow;Moist   Red (%), Wound Tissue Color 50 %   Yellow (%), Wound Tissue Color 50 %   Periwound Area Indurated   Wound Edges Open   Wound Length (cm) 0.4 cm   Wound Width (cm) 0.2 cm   Wound Depth (cm) 0.2 cm   Wound Volume (cm^3) 0.02 cm^3   Care Cleansed with:;Sterile normal saline;Applied:;Skin Barrier     Sachin Flores RN CWON  [phone]

## 2018-07-12 NOTE — PLAN OF CARE
Problem: Patient Care Overview  Goal: Plan of Care Review  Outcome: Ongoing (interventions implemented as appropriate)   07/12/18 7135   Coping/Psychosocial   Plan Of Care Reviewed With patient   Recommendations     Recommendation  1. Continue w/ Regular diet.   2. Encourage PO intake >/= 75% EEN/EPN      Intervention:   1.Provide snacks &  ONS for increased nutrient needs   2. RD to follow  Goals: pt to consume nutrients >/= 85% EEN/EPN   Nutrition Goal Status: progressing towards goal  Communication of RD Recs:  (POC)    Severe malnutrition     Malnutrition in the context of Acute Illness/Injury     Related to (etiology):  CML     Signs and Symptoms (as evidenced by):  Energy Intake: <50% of estimated energy requirement for ~ 1 week  Muscle Mass Depletion: moderate depletion of temples   Weight Loss: 20% x 1 month      Interventions/Recommendations (treatment strategy):  Recommendation  1. Continue w/ Regular diet.   2. Encourage PO intake >/= 75% EEN/EPN      Intervention:   1.Provide snacks &  ONS for increased nutrient needs   2. RD to follow     Nutrition Diagnosis Status:  Improving

## 2018-07-12 NOTE — SUBJECTIVE & OBJECTIVE
Subjective:     Interval History:   [facility] day 20, plan for BM bx tomorrow. ANC 10. With sacral sore, consulting wound care    Objective:     Vital Signs (Most Recent):  Temp: 99.6 °F (37.6 °C) (07/12/18 0758)  Pulse: 94 (07/12/18 0758)  Resp: (!) 22 (07/12/18 0758)  BP: 124/73 (07/12/18 0758)  SpO2: 97 % (07/12/18 0758) Vital Signs (24h Range):  Temp:  [98.2 °F (36.8 °C)-100.2 °F (37.9 °C)] 99.6 °F (37.6 °C)  Pulse:  [] 94  Resp:  [16-22] 22  SpO2:  [97 %-100 %] 97 %  BP: (116-124)/(72-82) 124/73     Weight: 62.4 kg (137 lb 9.1 oz)  Body mass index is 19.19 kg/m².  Body surface area is 1.77 meters squared.    Intake/Output - Last 3 Shifts       07/10 0700 - 07/11 0659 07/11 0700 - 07/12 0659 07/12 0700 - 07/13 0659    P.O. 300 840     I.V. (mL/kg) 50 (0.8)      Blood 243      Total Intake(mL/kg) 593 (9.6) 840 (13.5)     Urine (mL/kg/hr) 1250 (0.8) 400 (0.3)     Total Output 1250 400      Net -657 +440             Urine Occurrence  1 x     Stool Occurrence  1 x         Physical Exam   Constitutional: He is oriented to person, place, and time. He appears well-developed and well-nourished.   HENT:   Head: Normocephalic and atraumatic.   Left side temporal swelling resolved   Eyes: EOM are normal. Pupils are equal, round, and reactive to light.   Left periorbital edema resolved   Neck: Normal range of motion. Neck supple.   Right side neck swelling resolved   Cardiovascular: Normal rate and regular rhythm.    No murmur heard.  Pulmonary/Chest: Effort normal and breath sounds normal. No respiratory distress.   Abdominal: Soft. Bowel sounds are normal. He exhibits no distension.   Musculoskeletal: Normal range of motion. He exhibits no edema.   Neurological: He is alert and oriented to person, place, and time.   Skin: Skin is warm and dry.   Central line c/d/i  Redness to scalp improved, with some dry skin to head  Flaky lips  Left side of inner buttocks with open reddened wound and white appearing ulceration    Psychiatric: He has a normal mood and affect. His behavior is normal.   Vitals reviewed.    Significant Labs:   CBC:     Recent Labs  Lab 07/11/18  0326 07/12/18  0431   WBC 0.19* 0.23*   HGB 7.4* 7.1*   HCT 22.1* 21.6*   PLT 42* 26*    and CMP:     Recent Labs  Lab 07/11/18  0326 07/12/18  0431    141   K 3.9 4.0    107   CO2 24 27   GLU 90 87   BUN 8 7   CREATININE 0.6 0.7   CALCIUM 8.9 8.8   PROT 5.8* 5.8*   ALBUMIN 2.7* 2.7*   BILITOT 0.2 0.3   ALKPHOS 78 82   AST 14 14   ALT 27 26   ANIONGAP 8 7*   EGFRNONAA >60.0 >60.0       Diagnostic Results:  I have reviewed all pertinent imaging results/findings within the past 24 hours.     7/6/18 MRI Soft Tissue Neck and Brain  Non specific subcutaneous edema of left facial tissues, bilateral occipital soft tissues, and bilateral neck. Underlying infection cannot be excluded. No abscess or infectious etiology is seen.

## 2018-07-12 NOTE — ASSESSMENT & PLAN NOTE
- reddened area with white ulceration noted to inner left side of buttocks  - using barrier cream now  - consulted wound care, dalton weaver

## 2018-07-12 NOTE — PROGRESS NOTES
[facility]  Hematology  Bone Marrow Transplant  Progress Note    Patient Name: Wilton Guzmán  Admission Date: 6/16/2018  Hospital Length of Stay: 26 days  Code Status: Full Code    Subjective:     Interval History:   [facility] day 20, plan for BM bx tomorrow. ANC 10. With sacral sore, consulting wound care    Objective:     Vital Signs (Most Recent):  Temp: 99.6 °F (37.6 °C) (07/12/18 0758)  Pulse: 94 (07/12/18 0758)  Resp: (!) 22 (07/12/18 0758)  BP: 124/73 (07/12/18 0758)  SpO2: 97 % (07/12/18 0758) Vital Signs (24h Range):  Temp:  [98.2 °F (36.8 °C)-100.2 °F (37.9 °C)] 99.6 °F (37.6 °C)  Pulse:  [] 94  Resp:  [16-22] 22  SpO2:  [97 %-100 %] 97 %  BP: (116-124)/(72-82) 124/73     Weight: 62.4 kg (137 lb 9.1 oz)  Body mass index is 19.19 kg/m².  Body surface area is 1.77 meters squared.    Intake/Output - Last 3 Shifts       07/10 0700 - 07/11 0659 07/11 0700 - 07/12 0659 07/12 0700 - 07/13 0659    P.O. 300 840     I.V. (mL/kg) 50 (0.8)      Blood 243      Total Intake(mL/kg) 593 (9.6) 840 (13.5)     Urine (mL/kg/hr) 1250 (0.8) 400 (0.3)     Total Output 1250 400      Net -657 +440             Urine Occurrence  1 x     Stool Occurrence  1 x         Physical Exam   Constitutional: He is oriented to person, place, and time. He appears well-developed and well-nourished.   HENT:   Head: Normocephalic and atraumatic.   Left side temporal swelling resolved   Eyes: EOM are normal. Pupils are equal, round, and reactive to light.   Left periorbital edema resolved   Neck: Normal range of motion. Neck supple.   Right side neck swelling resolved   Cardiovascular: Normal rate and regular rhythm.    No murmur heard.  Pulmonary/Chest: Effort normal and breath sounds normal. No respiratory distress.   Abdominal: Soft. Bowel sounds are normal. He exhibits no distension.   Musculoskeletal: Normal range of motion. He exhibits no edema.   Neurological: He is alert and oriented to person, place, and time.   Skin:  Skin is warm and dry.   Central line c/d/i  Redness to scalp improved, with some dry skin to head  Flaky lips  Left side of inner buttocks with open reddened wound and white appearing ulceration   Psychiatric: He has a normal mood and affect. His behavior is normal.   Vitals reviewed.    Significant Labs:   CBC:     Recent Labs  Lab 07/11/18  0326 07/12/18  0431   WBC 0.19* 0.23*   HGB 7.4* 7.1*   HCT 22.1* 21.6*   PLT 42* 26*    and CMP:     Recent Labs  Lab 07/11/18  0326 07/12/18  0431    141   K 3.9 4.0    107   CO2 24 27   GLU 90 87   BUN 8 7   CREATININE 0.6 0.7   CALCIUM 8.9 8.8   PROT 5.8* 5.8*   ALBUMIN 2.7* 2.7*   BILITOT 0.2 0.3   ALKPHOS 78 82   AST 14 14   ALT 27 26   ANIONGAP 8 7*   EGFRNONAA >60.0 >60.0       Diagnostic Results:  I have reviewed all pertinent imaging results/findings within the past 24 hours.     7/6/18 MRI Soft Tissue Neck and Brain  Non specific subcutaneous edema of left facial tissues, bilateral occipital soft tissues, and bilateral neck. Underlying infection cannot be excluded. No abscess or infectious etiology is seen.    Assessment/Plan:     * Chronic myelogenous leukemia (CML), BCR-ABL1-positive    - initial WBC reportedly 150,000  - bcr-abl positive on FISH at [facility] (records attached in media tab)  - original BMBX 5/26/18 with 20% blasts; 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 9% blasts.   - persistent disease post 7+3 induction. 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 Today is Day 20.  Plan for D21 BMBX, per staff instead of day 14 marrow  - AML FISH negative  - NGS negative from repeat BMBX  - ECHO with 63% EF  - HIV negative, Hepatitis negative, G6PD wnl  - HLA typing hi  and lo completed  - referral information for transplant benefits given to patient  - patient has 1 full sibling, contact info given to transplant coordinators  - pt still needs to call base to get formal referral for [facility]   - consider sending BCR/ABL mutational analysis testing when WBC increases        Pancytopenia due to antineoplastic chemotherapy    - transfuse for Hgb <7 and platelets <10K  - continue PPX antimicrobials  - wbc 0.23 with anc of 10, hgb 7.1, plt 26K        Skin breakdown    - reddened area with white ulceration noted to inner left side of buttocks  - using barrier cream now  - consulted wound care, appreciate recs        Periorbital swelling    - noted very mild swelling to left side of skull on 7/3/18   - periorbital swelling noted 7/5/18, ct orbits done and showed minimal asymetric soft tissue induration of left infraorbital soft tissue without evidence of focal fluid collection. Otherwise unremarkable.  - mri brain and neck showed nonspecific subcutaneous edema. No obvious sign of infection. Otherwise unremarkable  - resolved and not off cef and vanc         Severe malnutrition    - albumin and weight was previously dropping  - daily weights  - dietary following             Neutropenic fever    - started spiking temperatures around 7/4  - got 6 days of cefepime (d/c'd 7/10/18) and 5 days of vanc (d/c'd 7/9/18)  - CXR negative, UA negative, cultures from 7/4/18 one bottle gram + cocci in chains resembling strep viridans probable contaminant, blood cultures 7/6/18 ngtd  - if repeat cultures negative and without fever, de-escalate vancomycin +/- cefepime  - head, eye, and neck swelling resolved  - resolved          Anxiety    - Patient understandibly experiencing anxiety about his new diagnosis and feeling isolated in his room  - Declined oncology psychologist consult earlier during this admission  - Continue Remeron for anxiety and insomnia               VTE Risk Mitigation         Ordered      heparin, porcine (PF) 100 unit/mL injection flush 300 Units  As needed (PRN)      06/23/18 1052     IP VTE LOW RISK PATIENT  Once      06/16/18 0938          Disposition: pending repeat BM bx tomorrow and count recovery     Yoanna Andrade, CARRINGTON  Bone Marrow Transplant  [facility]

## 2018-07-12 NOTE — PROGRESS NOTES
" [facility]  Adult Nutrition  Progress Note    SUMMARY       Recommendations    Recommendation  1. Continue w/ Regular diet.   2. Encourage PO intake >/= 75% EEN/EPN     Intervention:   1.Provide snacks &  ONS for increased nutrient needs   2. RD to follow  Goals: pt to consume nutrients >/= 85% EEN/EPN   Nutrition Goal Status: progressing towards goal  Communication of RD Recs:  (POC)    Reason for Assessment    Reason for Assessment: RD follow-up  Diagnosis:  (Chronic myelogenous leukemia (CML)  Relevant Medical History: CML  Interdisciplinary Rounds: attended  General Information Comments: pt transferred from [place] w/ CML with blast crisis. completed 7+3 regimen. current Regimen: Day 20 MEC, plan for BM bx tomorrow. pt po intake is good, eating 75% of meals + Boost Breeze TID and snacks. Denies NVDC. NFPE conducted previously by RD on . S&S noted in PES    Nutrition Discharge Planning: adequate po intake     Nutrition Risk Screen    Nutrition Risk Screen: no indicators present    Nutrition/Diet History    Patient Reported Diet/Restrictions/Preferences: general  Food Preferences: none  Do you have any cultural, spiritual, Hindu conflicts, given your current situation?: no  Food Allergies: NKFA  Factors Affecting Nutritional Intake: decreased appetite, altered taste    Anthropometrics    Temp: 99.4 °F (37.4 °C)  Height Method: Stated  Height: 5' 11" (180.3 cm)  Height (inches): 71 in  Weight Method: Standard Scale  Weight: 62.4 kg (137 lb 9.1 oz)  Weight (lb): 137.57 lb  Ideal Body Weight (IBW), Male: 172 lb  % Ideal Body Weight, Male (lb): 79.98 lb  BMI (Calculated): 19.2  BMI Grade: 18.5-24.9 - normal  Weight Loss:  (stable)  Usual Body Weight (UBW), k.2 kg (per chart review 6/15/18)  % Usual Body Weight: 79.95  % Weight Change From Usual Weight: -20.21 %       Lab/Procedures/Meds    Pertinent Labs Reviewed: reviewed  Pertinent Labs Comments: WBC-0.23, H/H-7.1/21.6, Plts-26, " Alb-2.7  Pertinent Medications Reviewed: reviewed  Pertinent Medications Comments: Cipro, dasatinib, [employer] Sol[employer]. heparin    Physical Findings/Assessment    Overall Physical Appearance: loss of muscle mass, weak  Tubes: other (see comments)  Oral/Mouth Cavity: WDL  Skin: intact    Estimated/Assessed Needs    Weight Used For Calorie Calculations: 66.4 kg (146 lb 6.2 oz)  Energy Calorie Requirements (kcal): 8009-6507 Kcal/d  Energy Need Method: Kcal/kg (25-30KCal/kg)  Protein Requirements: 100-120g/d (1.5-1.8)  Weight Used For Protein Calculations: 66.4 kg (146 lb 6.2 oz)     Fluid Need Method: RDA Method (1ml/Kcal or Per MD)  RDA Method (mL): 1660         Nutrition Prescription Ordered    Current Diet Order: regular  Oral Nutrition Supplement: boost breeze     Evaluation of Received Nutrient/Fluid Intake    IV Fluid (mL): 0  I/O: +417.3ml since admit    Intake/Output Summary (Last 24 hours) at 07/12/18 1354  Last data filed at 07/12/18 1320   Gross per 24 hour   Intake              690 ml   Output             1175 ml   Net             -485 ml       Energy Calories Required: meeting needs  Protein Required: meeting needs  Fluid Required: meeting needs  Comments: LBM:7/7/18  Tolerance: tolerating  % Intake of Estimated Energy Needs: 75 - 100 %  % Meal Intake: 75 - 100 %    Nutrition Risk    Level of Risk/Frequency of Follow-up: low (f/u 1 x wk)     Assessment and Plan    Severe malnutrition    Malnutrition in the context of Acute Illness/Injury    Related to (etiology):  CML    Signs and Symptoms (as evidenced by):  Energy Intake: <50% of estimated energy requirement for ~ 1 week  Muscle Mass Depletion: moderate depletion of temples   Weight Loss: 20% x 1 month     Interventions/Recommendations (treatment strategy):  Recommendation  1. Continue w/ Regular diet.   2. Encourage PO intake >/= 75% EEN/EPN     Intervention:   1.Provide snacks &  ONS for increased nutrient needs   2. RD to follow    Nutrition Diagnosis  Status:  Improving               Monitor and Evaluation    Food and Nutrient Intake: energy intake, food and beverage intake  Food and Nutrient Administration: diet order  Physical Activity and Function: nutrition-related ADLs and IADLs  Anthropometric Measurements: weight, weight change  Biochemical Data, Medical Tests and Procedures:  (All labs)  Nutrition-Focused Physical Findings: overall appearance     Nutrition Follow-Up    RD Follow-up?: Yes

## 2018-07-12 NOTE — PLAN OF CARE
Problem: Patient Care Overview  Goal: Plan of Care Review  Outcome: Ongoing (interventions implemented as appropriate)  Patient remains free from falls and injury this shift. Bed in low, locked position with call light in reach. Patient encouraged to call for assistance when getting out of bed. Patient verbalized understanding. Pt had complaints of skin breakdown soreness, reddened area above sacral region, pt given thick barrier cream for application to affected area. Pt had temp of 100.2. Aseptic technique throughout shift. All belongings within reach will continue to monitor.'

## 2018-07-12 NOTE — ASSESSMENT & PLAN NOTE
Malnutrition in the context of Acute Illness/Injury    Related to (etiology):  CML    Signs and Symptoms (as evidenced by):  Energy Intake: <50% of estimated energy requirement for ~ 1 week  Muscle Mass Depletion: moderate depletion of temples   Weight Loss: 20% x 1 month     Interventions/Recommendations (treatment strategy):      Nutrition Diagnosis Status:  Improving

## 2018-07-12 NOTE — ASSESSMENT & PLAN NOTE
- transfuse for Hgb <7 and platelets <10K  - continue PPX antimicrobials  - wbc 0.23 with anc of 10, hgb 7.1, plt 26K

## 2018-07-12 NOTE — ASSESSMENT & PLAN NOTE
- initial WBC reportedly 150,000  - bcr-abl positive on FISH at [facility] (records attached in media tab)  - original BMBX 5/26/18 with 20% blasts; 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 9% blasts.   - persistent disease post 7+3 induction. 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 Today is Day 20.  Plan for D21 BMBX, per staff instead of day 14 marrow  - AML FISH negative  - NGS negative from repeat BMBX  - ECHO with 63% EF  - HIV negative, Hepatitis negative, G6PD wnl  - HLA typing hi and lo completed  - referral information for transplant benefits given to patient  - patient has 1 full sibling, contact info given to transplant coordinators  - pt still needs to call base to get formal referral for [facility]   - consider sending BCR/ABL mutational analysis testing when WBC increases

## 2018-07-13 PROBLEM — L98.491: Status: ACTIVE | Noted: 2018-07-12

## 2018-07-13 PROBLEM — L98.499 PERIANAL ULCER: Status: RESOLVED | Noted: 2018-07-12 | Resolved: 2018-07-13

## 2018-07-13 LAB
ALBUMIN SERPL BCP-MCNC: 2.8 G/DL
ALP SERPL-CCNC: 88 U/L
ALT SERPL W/O P-5'-P-CCNC: 27 U/L
ANION GAP SERPL CALC-SCNC: 8 MMOL/L
ANISOCYTOSIS BLD QL SMEAR: SLIGHT
AST SERPL-CCNC: 14 U/L
BASOPHILS # BLD AUTO: 0 K/UL
BASOPHILS NFR BLD: 0 %
BILIRUB SERPL-MCNC: 0.3 MG/DL
BUN SERPL-MCNC: 9 MG/DL
CALCIUM SERPL-MCNC: 8.9 MG/DL
CHLORIDE SERPL-SCNC: 106 MMOL/L
CO2 SERPL-SCNC: 25 MMOL/L
CREAT SERPL-MCNC: 0.7 MG/DL
DIFFERENTIAL METHOD: ABNORMAL
EOSINOPHIL # BLD AUTO: 0 K/UL
EOSINOPHIL NFR BLD: 0 %
ERYTHROCYTE [DISTWIDTH] IN BLOOD BY AUTOMATED COUNT: 14.7 %
EST. GFR  (AFRICAN AMERICAN): >60 ML/MIN/1.73 M^2
EST. GFR  (NON AFRICAN AMERICAN): >60 ML/MIN/1.73 M^2
GLUCOSE SERPL-MCNC: 93 MG/DL
HCT VFR BLD AUTO: 21.4 %
HGB BLD-MCNC: 7.1 G/DL
IMM GRANULOCYTES # BLD AUTO: 0 K/UL
IMM GRANULOCYTES NFR BLD AUTO: 0 %
LYMPHOCYTES # BLD AUTO: 0.2 K/UL
LYMPHOCYTES NFR BLD: 95.5 %
MAGNESIUM SERPL-MCNC: 1.9 MG/DL
MCH RBC QN AUTO: 28.6 PG
MCHC RBC AUTO-ENTMCNC: 33.2 G/DL
MCV RBC AUTO: 86 FL
MONOCYTES # BLD AUTO: 0 K/UL
MONOCYTES NFR BLD: 0 %
NEUTROPHILS # BLD AUTO: 0 K/UL
NEUTROPHILS NFR BLD: 4.5 %
NRBC BLD-RTO: 0 /100 WBC
OVALOCYTES BLD QL SMEAR: ABNORMAL
PHOSPHATE SERPL-MCNC: 3.1 MG/DL
PLATELET # BLD AUTO: 19 K/UL
PLATELET BLD QL SMEAR: ABNORMAL
PMV BLD AUTO: ABNORMAL FL
POIKILOCYTOSIS BLD QL SMEAR: SLIGHT
POTASSIUM SERPL-SCNC: 4 MMOL/L
PROT SERPL-MCNC: 6.1 G/DL
RBC # BLD AUTO: 2.48 M/UL
SCHISTOCYTES BLD QL SMEAR: PRESENT
SODIUM SERPL-SCNC: 139 MMOL/L
WBC # BLD AUTO: 0.22 K/UL

## 2018-07-13 PROCEDURE — 36415 COLL VENOUS BLD VENIPUNCTURE: CPT

## 2018-07-13 PROCEDURE — 80053 COMPREHEN METABOLIC PANEL: CPT

## 2018-07-13 PROCEDURE — 62270 DX LMBR SPI PNXR: CPT

## 2018-07-13 PROCEDURE — 85097 BONE MARROW INTERPRETATION: CPT | Mod: ,,, | Performed by: PATHOLOGY

## 2018-07-13 PROCEDURE — 88342 IMHCHEM/IMCYTCHM 1ST ANTB: CPT | Performed by: PATHOLOGY

## 2018-07-13 PROCEDURE — 88305 TISSUE EXAM BY PATHOLOGIST: CPT | Mod: 26,,, | Performed by: PATHOLOGY

## 2018-07-13 PROCEDURE — 87040 BLOOD CULTURE FOR BACTERIA: CPT | Mod: 59

## 2018-07-13 PROCEDURE — 88299 UNLISTED CYTOGENETIC STUDY: CPT

## 2018-07-13 PROCEDURE — 84100 ASSAY OF PHOSPHORUS: CPT

## 2018-07-13 PROCEDURE — 88342 IMHCHEM/IMCYTCHM 1ST ANTB: CPT | Mod: 26,,, | Performed by: PATHOLOGY

## 2018-07-13 PROCEDURE — 88237 TISSUE CULTURE BONE MARROW: CPT

## 2018-07-13 PROCEDURE — 88189 FLOWCYTOMETRY/READ 16 & >: CPT | Mod: ,,, | Performed by: PATHOLOGY

## 2018-07-13 PROCEDURE — 07DR3ZX EXTRACTION OF ILIAC BONE MARROW, PERCUTANEOUS APPROACH, DIAGNOSTIC: ICD-10-PCS | Performed by: INTERNAL MEDICINE

## 2018-07-13 PROCEDURE — 63700000 PHARM REV CODE 250 ALT 637 W/O HCPCS: Performed by: INTERNAL MEDICINE

## 2018-07-13 PROCEDURE — 25000003 PHARM REV CODE 250: Performed by: INTERNAL MEDICINE

## 2018-07-13 PROCEDURE — 88313 SPECIAL STAINS GROUP 2: CPT

## 2018-07-13 PROCEDURE — 38222 DX BONE MARROW BX & ASPIR: CPT | Mod: RT,,, | Performed by: NURSE PRACTITIONER

## 2018-07-13 PROCEDURE — 25000003 PHARM REV CODE 250: Performed by: NURSE PRACTITIONER

## 2018-07-13 PROCEDURE — 88311 DECALCIFY TISSUE: CPT | Mod: 26,,, | Performed by: PATHOLOGY

## 2018-07-13 PROCEDURE — 83735 ASSAY OF MAGNESIUM: CPT

## 2018-07-13 PROCEDURE — 88311 DECALCIFY TISSUE: CPT | Performed by: PATHOLOGY

## 2018-07-13 PROCEDURE — 88264 CHROMOSOME ANALYSIS 20-25: CPT

## 2018-07-13 PROCEDURE — 20600001 HC STEP DOWN PRIVATE ROOM

## 2018-07-13 PROCEDURE — 88313 SPECIAL STAINS GROUP 2: CPT | Mod: 26,,, | Performed by: PATHOLOGY

## 2018-07-13 PROCEDURE — 88184 FLOWCYTOMETRY/ TC 1 MARKER: CPT | Performed by: PATHOLOGY

## 2018-07-13 PROCEDURE — 25000003 PHARM REV CODE 250: Performed by: STUDENT IN AN ORGANIZED HEALTH CARE EDUCATION/TRAINING PROGRAM

## 2018-07-13 PROCEDURE — 88185 FLOWCYTOMETRY/TC ADD-ON: CPT | Performed by: PATHOLOGY

## 2018-07-13 PROCEDURE — 88341 IMHCHEM/IMCYTCHM EA ADD ANTB: CPT | Mod: 26,,, | Performed by: PATHOLOGY

## 2018-07-13 PROCEDURE — 85025 COMPLETE CBC W/AUTO DIFF WBC: CPT

## 2018-07-13 PROCEDURE — 99233 SBSQ HOSP IP/OBS HIGH 50: CPT | Mod: ,,, | Performed by: INTERNAL MEDICINE

## 2018-07-13 PROCEDURE — 81206 BCR/ABL1 GENE MAJOR BP: CPT

## 2018-07-13 PROCEDURE — 63600175 PHARM REV CODE 636 W HCPCS: Performed by: STUDENT IN AN ORGANIZED HEALTH CARE EDUCATION/TRAINING PROGRAM

## 2018-07-13 RX ORDER — CEFEPIME HYDROCHLORIDE 2 G/1
2 INJECTION, POWDER, FOR SOLUTION INTRAVENOUS
Status: DISCONTINUED | OUTPATIENT
Start: 2018-07-13 | End: 2018-07-18

## 2018-07-13 RX ORDER — VANCOMYCIN HCL IN 5 % DEXTROSE 1G/250ML
15 PLASTIC BAG, INJECTION (ML) INTRAVENOUS
Status: DISCONTINUED | OUTPATIENT
Start: 2018-07-13 | End: 2018-07-15

## 2018-07-13 RX ORDER — ACETAMINOPHEN 325 MG/1
650 TABLET ORAL EVERY 4 HOURS PRN
Status: DISCONTINUED | OUTPATIENT
Start: 2018-07-13 | End: 2018-07-23 | Stop reason: HOSPADM

## 2018-07-13 RX ORDER — LIDOCAINE HYDROCHLORIDE 20 MG/ML
20 INJECTION, SOLUTION INFILTRATION; PERINEURAL ONCE
Status: COMPLETED | OUTPATIENT
Start: 2018-07-13 | End: 2018-07-13

## 2018-07-13 RX ORDER — FENTANYL CITRATE 50 UG/ML
25 INJECTION, SOLUTION INTRAMUSCULAR; INTRAVENOUS ONCE
Status: DISCONTINUED | OUTPATIENT
Start: 2018-07-13 | End: 2018-07-13

## 2018-07-13 RX ADMIN — VANCOMYCIN 1000 MG: 1 INJECTION, SOLUTION INTRAVENOUS at 06:07

## 2018-07-13 RX ADMIN — VORICONAZOLE 200 MG: 200 TABLET ORAL at 09:07

## 2018-07-13 RX ADMIN — DASATINIB 40 MG: 20 TABLET ORAL at 08:07

## 2018-07-13 RX ADMIN — CIPROFLOXACIN HYDROCHLORIDE 500 MG: 250 TABLET, FILM COATED ORAL at 08:07

## 2018-07-13 RX ADMIN — CEFEPIME 2 G: 2 INJECTION, POWDER, FOR SOLUTION INTRAVENOUS at 05:07

## 2018-07-13 RX ADMIN — ACETAMINOPHEN 650 MG: 325 TABLET, FILM COATED ORAL at 05:07

## 2018-07-13 RX ADMIN — LIDOCAINE HYDROCHLORIDE 100 MG: 20 INJECTION, SOLUTION INFILTRATION; PERINEURAL at 12:07

## 2018-07-13 RX ADMIN — ACYCLOVIR 400 MG: 200 CAPSULE ORAL at 09:07

## 2018-07-13 RX ADMIN — ACYCLOVIR 400 MG: 200 CAPSULE ORAL at 08:07

## 2018-07-13 RX ADMIN — MIRTAZAPINE 15 MG: 15 TABLET, ORALLY DISINTEGRATING ORAL at 09:07

## 2018-07-13 RX ADMIN — VORICONAZOLE 200 MG: 200 TABLET ORAL at 08:07

## 2018-07-13 NOTE — PLAN OF CARE
Problem: Patient Care Overview  Goal: Plan of Care Review  Outcome: Ongoing (interventions implemented as appropriate)  Pt AAOx4. Pt's TMax this shift was 101.2; PRN tylenol administered as ordered. Bone marrow biopsy performed this shift to Right iliac crest; pt tolerated well with no c/o pain.  IV cefepime and vancomycin initiated this shift. Pt has remained free from injury this shift. Pt had no c/o nausea or pain this shift. Pt's mother at bedside and involved in care. Bed in low locked position. Call light and personal belongings within reach. Side rails up x2. Nonskid socks in place. All questions and comments addressed at this time. Will continue to monitor.  Fall, Pressure ulcer, infection precautions continued.

## 2018-07-13 NOTE — PLAN OF CARE
Problem: Patient Care Overview  Goal: Plan of Care Review  Outcome: Ongoing (interventions implemented as appropriate)  Patient remains free from falls and injury this shift. Bed in low, locked position with call light in reach. Family at bedside. Patient encouraged to call for assistance. Patient verbalized understanding. Pt had no complaints of pain or discomfort. Tmax temp, 100.4. All belongings within reach will continue to monitor.'

## 2018-07-13 NOTE — SUBJECTIVE & OBJECTIVE
Subjective:     Interval History:   [facility] day 21, BM bx today. ANC 10. Wound care started critic aid barrier paste and ordered waffle cushion.    Objective:     Vital Signs (Most Recent):  Temp: 99.6 °F (37.6 °C) (07/13/18 0747)  Pulse: 105 (07/13/18 0747)  Resp: 17 (07/13/18 0747)  BP: 123/82 (07/13/18 0747)  SpO2: 95 % (07/13/18 0747) Vital Signs (24h Range):  Temp:  [98.8 °F (37.1 °C)-100.6 °F (38.1 °C)] 99.6 °F (37.6 °C)  Pulse:  [] 105  Resp:  [16-19] 17  SpO2:  [95 %-100 %] 95 %  BP: (118-126)/(70-82) 123/82     Weight: 63.4 kg (139 lb 12.4 oz)  Body mass index is 19.49 kg/m².  Body surface area is 1.78 meters squared.    Intake/Output - Last 3 Shifts       07/11 0700 - 07/12 0659 07/12 0700 - 07/13 0659 07/13 0700 - 07/14 0659    P.O. 840 400     I.V. (mL/kg)  50 (0.8)     Total Intake(mL/kg) 840 (13.5) 450 (7.1)     Urine (mL/kg/hr) 400 (0.3) 1175 (0.8)     Total Output 400 1175      Net +440 -725             Urine Occurrence 1 x      Stool Occurrence 1 x          Physical Exam   Constitutional: He is oriented to person, place, and time. He appears well-developed and well-nourished.   HENT:   Head: Normocephalic and atraumatic.   Left side temporal swelling resolved  Flaky lips improving   Eyes: EOM are normal. Pupils are equal, round, and reactive to light.   Left periorbital edema resolved   Neck: Normal range of motion. Neck supple.   Right side neck swelling resolved   Cardiovascular: Normal rate and regular rhythm.    No murmur heard.  Pulmonary/Chest: Effort normal and breath sounds normal. No respiratory distress.   Abdominal: Soft. Bowel sounds are normal. He exhibits no distension.   Musculoskeletal: Normal range of motion. He exhibits no edema.   Neurological: He is alert and oriented to person, place, and time.   Skin: Skin is warm and dry.   Central line c/d/i  Redness to scalp improved, with some dry skin to head  Left side of inner buttocks with open reddened wound and white appearing  ulceration   Psychiatric: He has a normal mood and affect. His behavior is normal.   Vitals reviewed.    Significant Labs:   CBC:     Recent Labs  Lab 07/12/18  0431 07/13/18  0328   WBC 0.23* 0.22*   HGB 7.1* 7.1*   HCT 21.6* 21.4*   PLT 26* 19*    and CMP:     Recent Labs  Lab 07/12/18  0431 07/13/18  0328    139   K 4.0 4.0    106   CO2 27 25   GLU 87 93   BUN 7 9   CREATININE 0.7 0.7   CALCIUM 8.8 8.9   PROT 5.8* 6.1   ALBUMIN 2.7* 2.8*   BILITOT 0.3 0.3   ALKPHOS 82 88   AST 14 14   ALT 26 27   ANIONGAP 7* 8   EGFRNONAA >60.0 >60.0       Diagnostic Results:  I have reviewed all pertinent imaging results/findings within the past 24 hours.     7/6/18 MRI Soft Tissue Neck and Brain  Non specific subcutaneous edema of left facial tissues, bilateral occipital soft tissues, and bilateral neck. Underlying infection cannot be excluded. No abscess or infectious etiology is seen.

## 2018-07-13 NOTE — PROGRESS NOTES
[facility]  Hematology  Bone Marrow Transplant  Progress Note    Patient Name: Wilton Guzmán  Admission Date: 6/16/2018  Hospital Length of Stay: 27 days  Code Status: Full Code    Subjective:     Interval History:   [facility] day 21, BM bx today. ANC 10. Wound care started critic aid barrier paste and ordered waffle cushion.    Objective:     Vital Signs (Most Recent):  Temp: 99.6 °F (37.6 °C) (07/13/18 0747)  Pulse: 105 (07/13/18 0747)  Resp: 17 (07/13/18 0747)  BP: 123/82 (07/13/18 0747)  SpO2: 95 % (07/13/18 0747) Vital Signs (24h Range):  Temp:  [98.8 °F (37.1 °C)-100.6 °F (38.1 °C)] 99.6 °F (37.6 °C)  Pulse:  [] 105  Resp:  [16-19] 17  SpO2:  [95 %-100 %] 95 %  BP: (118-126)/(70-82) 123/82     Weight: 63.4 kg (139 lb 12.4 oz)  Body mass index is 19.49 kg/m².  Body surface area is 1.78 meters squared.    Intake/Output - Last 3 Shifts       07/11 0700 - 07/12 0659 07/12 0700 - 07/13 0659 07/13 0700 - 07/14 0659    P.O. 840 400     I.V. (mL/kg)  50 (0.8)     Total Intake(mL/kg) 840 (13.5) 450 (7.1)     Urine (mL/kg/hr) 400 (0.3) 1175 (0.8)     Total Output 400 1175      Net +440 -725             Urine Occurrence 1 x      Stool Occurrence 1 x          Physical Exam   Constitutional: He is oriented to person, place, and time. He appears well-developed and well-nourished.   HENT:   Head: Normocephalic and atraumatic.   Left side temporal swelling resolved  Flaky lips improving   Eyes: EOM are normal. Pupils are equal, round, and reactive to light.   Left periorbital edema resolved   Neck: Normal range of motion. Neck supple.   Right side neck swelling resolved   Cardiovascular: Normal rate and regular rhythm.    No murmur heard.  Pulmonary/Chest: Effort normal and breath sounds normal. No respiratory distress.   Abdominal: Soft. Bowel sounds are normal. He exhibits no distension.   Musculoskeletal: Normal range of motion. He exhibits no edema.   Neurological: He is alert and oriented to person,  place, and time.   Skin: Skin is warm and dry.   Central line c/d/i  Redness to scalp improved, with some dry skin to head  Left side of inner buttocks with open reddened wound and white appearing ulceration   Psychiatric: He has a normal mood and affect. His behavior is normal.   Vitals reviewed.    Significant Labs:   CBC:     Recent Labs  Lab 07/12/18  0431 07/13/18  0328   WBC 0.23* 0.22*   HGB 7.1* 7.1*   HCT 21.6* 21.4*   PLT 26* 19*    and CMP:     Recent Labs  Lab 07/12/18  0431 07/13/18  0328    139   K 4.0 4.0    106   CO2 27 25   GLU 87 93   BUN 7 9   CREATININE 0.7 0.7   CALCIUM 8.8 8.9   PROT 5.8* 6.1   ALBUMIN 2.7* 2.8*   BILITOT 0.3 0.3   ALKPHOS 82 88   AST 14 14   ALT 26 27   ANIONGAP 7* 8   EGFRNONAA >60.0 >60.0       Diagnostic Results:  I have reviewed all pertinent imaging results/findings within the past 24 hours.     7/6/18 MRI Soft Tissue Neck and Brain  Non specific subcutaneous edema of left facial tissues, bilateral occipital soft tissues, and bilateral neck. Underlying infection cannot be excluded. No abscess or infectious etiology is seen.    Assessment/Plan:     * Chronic myelogenous leukemia (CML), BCR-ABL1-positive    - initial WBC reportedly 150,000  - bcr-abl positive on FISH at [facility] (records attached in media tab)  - original BMBX 5/26/18 with 20% blasts; 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 9% blasts.   - persistent disease post 7+3 induction. 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 Today is Day 21.  Plan for D21 BMBX 7/13/18  - AML FISH negative  - NGS negative from repeat BMBX  - ECHO with 63% EF  - HIV negative, Hepatitis negative, G6PD wnl  - HLA typing hi and lo  completed  - referral information for transplant benefits given to patient  - patient has 1 full sibling, contact info given to transplant coordinators  - pt still needs to call base to get formal referral for [facility]   - consider sending BCR/ABL mutational analysis testing when WBC increases        Pancytopenia due to antineoplastic chemotherapy    - transfuse for Hgb <7 and platelets <10K  - continue PPX antimicrobials  - wbc 0.22 with anc of 10, hgb 7.1, plt 19K        Ulcer of perianal area, limited to breakdown of skin    - reddened area with white ulceration noted to inner left side of buttocks  - consulted wound care, appreciate recs   - started critic aid barrier paste and ordered waffle cushion. Pt states this is helping        Periorbital swelling    - noted very mild swelling to left side of skull on 7/3/18   - periorbital swelling noted 7/5/18, ct orbits done and showed minimal asymetric soft tissue induration of left infraorbital soft tissue without evidence of focal fluid collection. Otherwise unremarkable.  - mri brain and neck showed nonspecific subcutaneous edema. No obvious sign of infection. Otherwise unremarkable  - resolved and not off cef and vanc          Severe malnutrition    - albumin and weight was previously dropping  - daily weights  - dietary following          Neutropenic fever    - started spiking temperatures around 7/4  - got 6 days of cefepime (d/c'd 7/10/18) and 5 days of vanc (d/c'd 7/9/18)  - CXR negative, UA negative, cultures from 7/4/18 one bottle gram + cocci in chains resembling strep viridans probable contaminant, blood cultures 7/6/18 ngtd  - if repeat cultures negative and without fever, de-escalate vancomycin +/- cefepime  - head, eye, and neck swelling resolved  - resolved           Anxiety    - Patient understandibly experiencing anxiety about his new diagnosis and feeling isolated in his room  - Declined oncology psychologist consult earlier during this admission  -  Continue Remeron for anxiety and insomnia             VTE Risk Mitigation         Ordered     heparin, porcine (PF) 100 unit/mL injection flush 300 Units  As needed (PRN)      06/23/18 1052     IP VTE LOW RISK PATIENT  Once      06/16/18 0938          Disposition: pending BM bx today and count recovery thereafter     Yoanna Andrade NP  Bone Marrow Transplant  [facility]

## 2018-07-13 NOTE — ASSESSMENT & PLAN NOTE
- transfuse for Hgb <7 and platelets <10K  - continue PPX antimicrobials  - wbc 0.22 with anc of 10, hgb 7.1, plt 19K

## 2018-07-13 NOTE — PLAN OF CARE
MDR's with Dr Crabtree.  Patient is day 21 of MEC.  ANC 10.  BMB planned for today.  Wound care consulted for a perianal ulceration.  Mom at bedside for support.  D/c pending count recovery.  Will continue to follow.

## 2018-07-13 NOTE — ASSESSMENT & PLAN NOTE
- reddened area with white ulceration noted to inner left side of buttocks  - consulted wound care, appreciate recs   - started critic aid barrier paste and ordered waffle cushion. Pt states this is helping

## 2018-07-13 NOTE — ASSESSMENT & PLAN NOTE
- initial WBC reportedly 150,000  - bcr-abl positive on FISH at [facility] (records attached in media tab)  - original BMBX 5/26/18 with 20% blasts; 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 9% blasts.   - persistent disease post 7+3 induction. 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 Today is Day 21.  Plan for D21 BMBX 7/13/18  - AML FISH negative  - NGS negative from repeat BMBX  - ECHO with 63% EF  - HIV negative, Hepatitis negative, G6PD wnl  - HLA typing hi and lo completed  - referral information for transplant benefits given to patient  - patient has 1 full sibling, contact info given to transplant coordinators  - pt still needs to call base to get formal referral for [facility]   - consider sending BCR/ABL mutational analysis testing when WBC increases

## 2018-07-13 NOTE — PROCEDURES
PROCEDURE NOTE:  Date of procedure: 7/12/2018  Bone Marrow aspiration and biopsy  Indication: day 21 restaging CML in blast crisis  Consent: Informed consent was obtained from patient.  Timeout: Done and documented.  Position: prone   Site: right posterior illiac crest.  Prep: Betadine.  Needle used: 11 gauge Jamshidi needle.  Anesthetic: 2% lidocaine 5 cc.  Biopsy: The biopsy needle was introduced into the marrow cavity and 17 cc of aspirate was obtained without complications and sent for flow, cytogenetics, DNA hold and BCR/ABL. Core biopsy obtained without dfficulty and sent for routine histologic examination.   Complications: None.  EBL: minimal  Disposition: The patient was instructed to lie flat for 15 minutes.    Haily Serna NP  Hematology/BMT

## 2018-07-14 PROBLEM — H57.89 PERIORBITAL SWELLING: Status: RESOLVED | Noted: 2018-07-06 | Resolved: 2018-07-14

## 2018-07-14 LAB
ALBUMIN SERPL BCP-MCNC: 2.7 G/DL
ALP SERPL-CCNC: 82 U/L
ALT SERPL W/O P-5'-P-CCNC: 26 U/L
ANION GAP SERPL CALC-SCNC: 7 MMOL/L
ANISOCYTOSIS BLD QL SMEAR: SLIGHT
AST SERPL-CCNC: 14 U/L
BACTERIA #/AREA URNS AUTO: NORMAL /HPF
BASOPHILS # BLD AUTO: 0 K/UL
BASOPHILS NFR BLD: 0 %
BILIRUB SERPL-MCNC: 0.3 MG/DL
BILIRUB UR QL STRIP: NEGATIVE
BLD PROD TYP BPU: NORMAL
BLOOD UNIT EXPIRATION DATE: NORMAL
BLOOD UNIT TYPE CODE: 5100
BLOOD UNIT TYPE: NORMAL
BUN SERPL-MCNC: 10 MG/DL
CALCIUM SERPL-MCNC: 8.7 MG/DL
CHLORIDE SERPL-SCNC: 105 MMOL/L
CLARITY UR REFRACT.AUTO: ABNORMAL
CO2 SERPL-SCNC: 26 MMOL/L
CODING SYSTEM: NORMAL
COLOR UR AUTO: YELLOW
CREAT SERPL-MCNC: 0.7 MG/DL
DIFFERENTIAL METHOD: ABNORMAL
DISPENSE STATUS: NORMAL
EOSINOPHIL # BLD AUTO: 0 K/UL
EOSINOPHIL NFR BLD: 0 %
ERYTHROCYTE [DISTWIDTH] IN BLOOD BY AUTOMATED COUNT: 14.7 %
EST. GFR  (AFRICAN AMERICAN): >60 ML/MIN/1.73 M^2
EST. GFR  (NON AFRICAN AMERICAN): >60 ML/MIN/1.73 M^2
GLUCOSE SERPL-MCNC: 104 MG/DL
GLUCOSE UR QL STRIP: NEGATIVE
HCT VFR BLD AUTO: 20.9 %
HGB BLD-MCNC: 6.7 G/DL
HGB UR QL STRIP: ABNORMAL
IMM GRANULOCYTES # BLD AUTO: 0 K/UL
IMM GRANULOCYTES NFR BLD AUTO: 0 %
KETONES UR QL STRIP: NEGATIVE
LEUKOCYTE ESTERASE UR QL STRIP: NEGATIVE
LYMPHOCYTES # BLD AUTO: 0.2 K/UL
LYMPHOCYTES NFR BLD: 90.5 %
MAGNESIUM SERPL-MCNC: 1.8 MG/DL
MCH RBC QN AUTO: 28 PG
MCHC RBC AUTO-ENTMCNC: 32.1 G/DL
MCV RBC AUTO: 87 FL
MICROSCOPIC COMMENT: NORMAL
MONOCYTES # BLD AUTO: 0 K/UL
MONOCYTES NFR BLD: 0 %
NEUTROPHILS # BLD AUTO: 0 K/UL
NEUTROPHILS NFR BLD: 9.5 %
NITRITE UR QL STRIP: NEGATIVE
NRBC BLD-RTO: 0 /100 WBC
NUM UNITS TRANS PACKED RBC: NORMAL
PH UR STRIP: 6 [PH] (ref 5–8)
PHOSPHATE SERPL-MCNC: 3 MG/DL
PLATELET # BLD AUTO: 14 K/UL
PLATELET BLD QL SMEAR: ABNORMAL
PMV BLD AUTO: 10.1 FL
POIKILOCYTOSIS BLD QL SMEAR: SLIGHT
POTASSIUM SERPL-SCNC: 4 MMOL/L
PROT SERPL-MCNC: 5.9 G/DL
PROT UR QL STRIP: NEGATIVE
RBC # BLD AUTO: 2.39 M/UL
RBC #/AREA URNS AUTO: 1 /HPF (ref 0–4)
SODIUM SERPL-SCNC: 138 MMOL/L
SP GR UR STRIP: 1.01 (ref 1–1.03)
URN SPEC COLLECT METH UR: ABNORMAL
UROBILINOGEN UR STRIP-ACNC: NEGATIVE EU/DL
WBC # BLD AUTO: 0.21 K/UL
WBC #/AREA URNS AUTO: 1 /HPF (ref 0–5)

## 2018-07-14 PROCEDURE — P9040 RBC LEUKOREDUCED IRRADIATED: HCPCS

## 2018-07-14 PROCEDURE — 25000003 PHARM REV CODE 250: Performed by: STUDENT IN AN ORGANIZED HEALTH CARE EDUCATION/TRAINING PROGRAM

## 2018-07-14 PROCEDURE — 81001 URINALYSIS AUTO W/SCOPE: CPT

## 2018-07-14 PROCEDURE — 25000003 PHARM REV CODE 250: Performed by: INTERNAL MEDICINE

## 2018-07-14 PROCEDURE — 99233 SBSQ HOSP IP/OBS HIGH 50: CPT | Mod: ,,, | Performed by: INTERNAL MEDICINE

## 2018-07-14 PROCEDURE — 63600175 PHARM REV CODE 636 W HCPCS: Performed by: INTERNAL MEDICINE

## 2018-07-14 PROCEDURE — 20600001 HC STEP DOWN PRIVATE ROOM

## 2018-07-14 PROCEDURE — 84100 ASSAY OF PHOSPHORUS: CPT

## 2018-07-14 PROCEDURE — 36430 TRANSFUSION BLD/BLD COMPNT: CPT

## 2018-07-14 PROCEDURE — 83735 ASSAY OF MAGNESIUM: CPT

## 2018-07-14 PROCEDURE — 63600175 PHARM REV CODE 636 W HCPCS: Performed by: STUDENT IN AN ORGANIZED HEALTH CARE EDUCATION/TRAINING PROGRAM

## 2018-07-14 PROCEDURE — 87086 URINE CULTURE/COLONY COUNT: CPT

## 2018-07-14 PROCEDURE — 63700000 PHARM REV CODE 250 ALT 637 W/O HCPCS: Performed by: INTERNAL MEDICINE

## 2018-07-14 PROCEDURE — 80053 COMPREHEN METABOLIC PANEL: CPT

## 2018-07-14 PROCEDURE — 85025 COMPLETE CBC W/AUTO DIFF WBC: CPT

## 2018-07-14 RX ORDER — ACETAMINOPHEN 325 MG/1
650 TABLET ORAL ONCE
Status: COMPLETED | OUTPATIENT
Start: 2018-07-14 | End: 2018-07-14

## 2018-07-14 RX ORDER — HYDROCODONE BITARTRATE AND ACETAMINOPHEN 500; 5 MG/1; MG/1
TABLET ORAL
Status: DISCONTINUED | OUTPATIENT
Start: 2018-07-14 | End: 2018-07-16

## 2018-07-14 RX ORDER — DIPHENHYDRAMINE HCL 25 MG
25 CAPSULE ORAL ONCE
Status: COMPLETED | OUTPATIENT
Start: 2018-07-14 | End: 2018-07-14

## 2018-07-14 RX ADMIN — VANCOMYCIN 1000 MG: 1 INJECTION, SOLUTION INTRAVENOUS at 05:07

## 2018-07-14 RX ADMIN — VORICONAZOLE 200 MG: 200 TABLET ORAL at 09:07

## 2018-07-14 RX ADMIN — ACETAMINOPHEN 650 MG: 325 TABLET, FILM COATED ORAL at 01:07

## 2018-07-14 RX ADMIN — DIPHENHYDRAMINE HYDROCHLORIDE 25 MG: 25 CAPSULE ORAL at 09:07

## 2018-07-14 RX ADMIN — ACYCLOVIR 400 MG: 200 CAPSULE ORAL at 09:07

## 2018-07-14 RX ADMIN — ACETAMINOPHEN 650 MG: 325 TABLET, FILM COATED ORAL at 09:07

## 2018-07-14 RX ADMIN — DASATINIB 40 MG: 20 TABLET ORAL at 09:07

## 2018-07-14 RX ADMIN — CEFEPIME 2 G: 2 INJECTION, POWDER, FOR SOLUTION INTRAVENOUS at 09:07

## 2018-07-14 RX ADMIN — MAGNESIUM SULFATE IN WATER 2 G: 40 INJECTION, SOLUTION INTRAVENOUS at 12:07

## 2018-07-14 RX ADMIN — CEFEPIME 2 G: 2 INJECTION, POWDER, FOR SOLUTION INTRAVENOUS at 05:07

## 2018-07-14 RX ADMIN — MIRTAZAPINE 15 MG: 15 TABLET, ORALLY DISINTEGRATING ORAL at 09:07

## 2018-07-14 RX ADMIN — ACETAMINOPHEN 650 MG: 325 TABLET, FILM COATED ORAL at 07:07

## 2018-07-14 RX ADMIN — CEFEPIME 2 G: 2 INJECTION, POWDER, FOR SOLUTION INTRAVENOUS at 01:07

## 2018-07-14 NOTE — PROGRESS NOTES
[facility]  Hematology  Bone Marrow Transplant  Progress Note    Patient Name: Wilton Guzmán  Admission Date: 6/16/2018  Hospital Length of Stay: 28 days  Code Status: Full Code    Subjective:     Interval History:   Patient has been spiking fevers with Tmax of 102.5, on Vancomycin and Cefepime. Patient denied sore throat   MEC day 22, BM bx yesterday.     Objective:     Vital Signs (Most Recent):  Temp: 99.3 °F (37.4 °C) (07/14/18 1546)  Pulse: 97 (07/14/18 1546)  Resp: 19 (07/14/18 1546)  BP: 127/84 (07/14/18 1546)  SpO2: 99 % (07/14/18 1546) Vital Signs (24h Range):  Temp:  [98.7 °F (37.1 °C)-102.5 °F (39.2 °C)] 99.3 °F (37.4 °C)  Pulse:  [] 97  Resp:  [16-20] 19  SpO2:  [98 %-100 %] 99 %  BP: (109-127)/(64-84) 127/84     Weight: 61.7 kg (136 lb 0.4 oz)  Body mass index is 18.97 kg/m².  Body surface area is 1.76 meters squared.    Intake/Output - Last 3 Shifts       07/12 0700 - 07/13 0659 07/13 0700 - 07/14 0659 07/14 0700 - 07/15 0659    P.O. 400 480 240    I.V. (mL/kg) 50 (0.8)  50 (0.8)    Blood   350    IV Piggyback  250     Total Intake(mL/kg) 450 (7.1) 730 (11.8) 640 (10.4)    Urine (mL/kg/hr) 1175 (0.8)  1300 (2.4)    Total Output 1175   1300    Net -725 +730 -660           Urine Occurrence  3 x     Stool Occurrence   0 x        Physical Exam   Constitutional: He is oriented to person, place, and time. He appears well-developed and well-nourished.   HENT:   Head: Normocephalic and atraumatic.   Eyes: EOM are normal. Pupils are equal, round, and reactive to light.   Neck: Normal range of motion. Neck supple.   Cardiovascular: Normal rate and regular rhythm.    No murmur heard.  Pulmonary/Chest: Effort normal and breath sounds normal. No respiratory distress.   Abdominal: Soft. Bowel sounds are normal. He exhibits no distension.   Musculoskeletal: Normal range of motion. He exhibits no edema.   Neurological: He is alert and oriented to person, place, and time.   Skin: Skin is  warm and dry.   Psychiatric: He has a normal mood and affect. His behavior is normal.   Vitals reviewed.    Significant Labs:   CBC:     Recent Labs  Lab 07/13/18  0328 07/14/18  0350   WBC 0.22* 0.21*   HGB 7.1* 6.7*   HCT 21.4* 20.9*   PLT 19* 14*    and CMP:     Recent Labs  Lab 07/13/18  0328 07/14/18  0350    138   K 4.0 4.0    105   CO2 25 26   GLU 93 104   BUN 9 10   CREATININE 0.7 0.7   CALCIUM 8.9 8.7   PROT 6.1 5.9*   ALBUMIN 2.8* 2.7*   BILITOT 0.3 0.3   ALKPHOS 88 82   AST 14 14   ALT 27 26   ANIONGAP 8 7*   EGFRNONAA >60.0 >60.0       Diagnostic Results:  I have reviewed all pertinent imaging results/findings within the past 24 hours.       Review of Systems      Assessment/Plan:     * Chronic myelogenous leukemia (CML), BCR-ABL1-positive    - initial WBC reportedly 150,000  - bcr-abl positive on FISH at [facility] (records attached in media tab)  - original BMBX 5/26/18 with 20% blasts; 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 9% blasts.   - persistent disease post 7+3 induction. 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  Had BMBX on 7/13/18  - AML FISH negative  - NGS negative from repeat BMBX  - ECHO with 63% EF  - HIV negative, Hepatitis negative, G6PD wnl  - HLA typing hi and lo completed  - referral information for transplant benefits given to patient  - patient has 1 full sibling, contact info given to transplant coordinators  - pt still needs to call base to get formal referral for [facility]   - consider sending BCR/ABL mutational analysis testing when WBC increases        Ulcer of perianal area, limited to breakdown of skin    - reddened area with white ulceration noted to inner left side of buttocks  -  consulted wound care, appreciate recs   - started critic aid barrier paste and ordered waffle cushion. Pt states this is helping        Severe malnutrition    - albumin and weight was previously dropping  - daily weights  - dietary following          Neutropenic fever    Currently spiking fevers. Tmax- 102.5  CXR negative  Started on  vancomycin +cefepime on 7/13/2018. Blood and urine cultures sent.           Anxiety    - Patient understandibly experiencing anxiety about his new diagnosis and feeling isolated in his room  - Declined oncology psychologist consult earlier during this admission  - Continue Remeron for anxiety and insomnia         Pancytopenia due to antineoplastic chemotherapy    - transfuse for Hgb <7 and platelets <10K  - continue PPX antimicrobials  S/p 1 PRBC on 7/14/2018            VTE Risk Mitigation         Ordered     heparin, porcine (PF) 100 unit/mL injection flush 300 Units  As needed (PRN)      06/23/18 1052     IP VTE LOW RISK PATIENT  Once      06/16/18 0938          Sergei Riddle MD  Bone Marrow Transplant  [facility]

## 2018-07-14 NOTE — SUBJECTIVE & OBJECTIVE
Subjective:     Interval History:   Patient has been spiking fevers with Tmax of 102.5, on Vancomycin and Cefepime. Patient denied sore throat   MEC day 22, BM bx yesterday.     Objective:     Vital Signs (Most Recent):  Temp: 98.7 °F (37.1 °C) (07/14/18 1420)  Pulse: 93 (07/14/18 1420)  Resp: 18 (07/14/18 1420)  BP: 114/64 (07/14/18 1420)  SpO2: 99 % (07/14/18 1420) Vital Signs (24h Range):  Temp:  [98.7 °F (37.1 °C)-102.5 °F (39.2 °C)] 98.7 °F (37.1 °C)  Pulse:  [] 93  Resp:  [16-20] 18  SpO2:  [98 %-100 %] 99 %  BP: (109-122)/(64-76) 114/64     Weight: 61.7 kg (136 lb 0.4 oz)  Body mass index is 18.97 kg/m².  Body surface area is 1.76 meters squared.    Intake/Output - Last 3 Shifts       07/12 0700 - 07/13 0659 07/13 0700 - 07/14 0659 07/14 0700 - 07/15 0659    P.O. 400 480 240    I.V. (mL/kg) 50 (0.8)  50 (0.8)    Blood   350    IV Piggyback  250     Total Intake(mL/kg) 450 (7.1) 730 (11.8) 640 (10.4)    Urine (mL/kg/hr) 1175 (0.8)  1300 (2.4)    Total Output 1175   1300    Net -725 +730 -660           Urine Occurrence  3 x     Stool Occurrence   0 x        Physical Exam   Constitutional: He is oriented to person, place, and time. He appears well-developed and well-nourished.   HENT:   Head: Normocephalic and atraumatic.   Eyes: EOM are normal. Pupils are equal, round, and reactive to light.   Neck: Normal range of motion. Neck supple.   Cardiovascular: Normal rate and regular rhythm.    No murmur heard.  Pulmonary/Chest: Effort normal and breath sounds normal. No respiratory distress.   Abdominal: Soft. Bowel sounds are normal. He exhibits no distension.   Musculoskeletal: Normal range of motion. He exhibits no edema.   Neurological: He is alert and oriented to person, place, and time.   Skin: Skin is warm and dry.   Psychiatric: He has a normal mood and affect. His behavior is normal.   Vitals reviewed.    Significant Labs:   CBC:     Recent Labs  Lab 07/13/18  0328 07/14/18  0350   WBC 0.22* 0.21*    HGB 7.1* 6.7*   HCT 21.4* 20.9*   PLT 19* 14*    and CMP:     Recent Labs  Lab 07/13/18  0328 07/14/18  0350    138   K 4.0 4.0    105   CO2 25 26   GLU 93 104   BUN 9 10   CREATININE 0.7 0.7   CALCIUM 8.9 8.7   PROT 6.1 5.9*   ALBUMIN 2.8* 2.7*   BILITOT 0.3 0.3   ALKPHOS 88 82   AST 14 14   ALT 27 26   ANIONGAP 8 7*   EGFRNONAA >60.0 >60.0       Diagnostic Results:  I have reviewed all pertinent imaging results/findings within the past 24 hours.       Review of Systems

## 2018-07-14 NOTE — SUBJECTIVE & OBJECTIVE
Subjective:     Interval History:   Patient has been spiking fevers with Tmax of 102.5, on Vancomycin and Cefepime. Patient denied sore throat   MEC day 22, BM bx yesterday.     Objective:     Vital Signs (Most Recent):  Temp: 99.3 °F (37.4 °C) (07/14/18 1546)  Pulse: 97 (07/14/18 1546)  Resp: 19 (07/14/18 1546)  BP: 127/84 (07/14/18 1546)  SpO2: 99 % (07/14/18 1546) Vital Signs (24h Range):  Temp:  [98.7 °F (37.1 °C)-102.5 °F (39.2 °C)] 99.3 °F (37.4 °C)  Pulse:  [] 97  Resp:  [16-20] 19  SpO2:  [98 %-100 %] 99 %  BP: (109-127)/(64-84) 127/84     Weight: 61.7 kg (136 lb 0.4 oz)  Body mass index is 18.97 kg/m².  Body surface area is 1.76 meters squared.    Intake/Output - Last 3 Shifts       07/12 0700 - 07/13 0659 07/13 0700 - 07/14 0659 07/14 0700 - 07/15 0659    P.O. 400 480 240    I.V. (mL/kg) 50 (0.8)  50 (0.8)    Blood   350    IV Piggyback  250     Total Intake(mL/kg) 450 (7.1) 730 (11.8) 640 (10.4)    Urine (mL/kg/hr) 1175 (0.8)  1300 (2.4)    Total Output 1175   1300    Net -725 +730 -660           Urine Occurrence  3 x     Stool Occurrence   0 x        Physical Exam   Constitutional: He is oriented to person, place, and time. He appears well-developed and well-nourished.   HENT:   Head: Normocephalic and atraumatic.   Eyes: EOM are normal. Pupils are equal, round, and reactive to light.   Neck: Normal range of motion. Neck supple.   Cardiovascular: Normal rate and regular rhythm.    No murmur heard.  Pulmonary/Chest: Effort normal and breath sounds normal. No respiratory distress.   Abdominal: Soft. Bowel sounds are normal. He exhibits no distension.   Musculoskeletal: Normal range of motion. He exhibits no edema.   Neurological: He is alert and oriented to person, place, and time.   Skin: Skin is warm and dry.   Psychiatric: He has a normal mood and affect. His behavior is normal.   Vitals reviewed.    Significant Labs:   CBC:     Recent Labs  Lab 07/13/18  0328 07/14/18  0350   WBC 0.22* 0.21*    HGB 7.1* 6.7*   HCT 21.4* 20.9*   PLT 19* 14*    and CMP:     Recent Labs  Lab 07/13/18  0328 07/14/18  0350    138   K 4.0 4.0    105   CO2 25 26   GLU 93 104   BUN 9 10   CREATININE 0.7 0.7   CALCIUM 8.9 8.7   PROT 6.1 5.9*   ALBUMIN 2.8* 2.7*   BILITOT 0.3 0.3   ALKPHOS 88 82   AST 14 14   ALT 27 26   ANIONGAP 8 7*   EGFRNONAA >60.0 >60.0       Diagnostic Results:  I have reviewed all pertinent imaging results/findings within the past 24 hours.       Review of Systems

## 2018-07-14 NOTE — PLAN OF CARE
Problem: Patient Care Overview  Goal: Plan of Care Review  Outcome: Ongoing (interventions implemented as appropriate)  Tmax 102.5. Tylenol given. Free from falls or injury. No complaints of pain. Cefepime and Vancomycin given as scheduled. Bed locked in lowest position, non skid socks on, call light within reach. Pt instructed to call if any assistance is needed. Vitals stable. Will cont to alexis pt.

## 2018-07-14 NOTE — ASSESSMENT & PLAN NOTE
- initial WBC reportedly 150,000  - bcr-abl positive on FISH at [facility] (records attached in media tab)  - original BMBX 5/26/18 with 20% blasts; 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 9% blasts.   - persistent disease post 7+3 induction. 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  Had BMBX on 7/13/18  - AML FISH negative  - NGS negative from repeat BMBX  - ECHO with 63% EF  - HIV negative, Hepatitis negative, G6PD wnl  - HLA typing hi and lo completed  - referral information for transplant benefits given to patient  - patient has 1 full sibling, contact info given to transplant coordinators  - pt still needs to call base to get formal referral for [facility]   - consider sending BCR/ABL mutational analysis testing when WBC increases

## 2018-07-14 NOTE — PLAN OF CARE
Problem: Patient Care Overview  Goal: Plan of Care Review  Outcome: Ongoing (interventions implemented as appropriate)  Pt. day 22 for MEC.  Pt. afebrile throughout shift.  Pt. received 1 unit of blood this morning.  Pt. with a fair appetite.  Pt. with nonskid footwear on with bed in lowest position and locked with bed rails up x2.  Pt. ambulates independently and instructed to call if assistance is needed.  Pt. with call light within reach.  Will continue to monitor pt.

## 2018-07-14 NOTE — ASSESSMENT & PLAN NOTE
Currently spiking fevers. Tmax- 102.5  CXR negative  Started on  vancomycin +cefepime on 7/13/2018. Blood and urine cultures sent.

## 2018-07-15 LAB
ALBUMIN SERPL BCP-MCNC: 2.7 G/DL
ALP SERPL-CCNC: 84 U/L
ALT SERPL W/O P-5'-P-CCNC: 28 U/L
ANION GAP SERPL CALC-SCNC: 9 MMOL/L
ANISOCYTOSIS BLD QL SMEAR: SLIGHT
AST SERPL-CCNC: 15 U/L
BACTERIA UR CULT: NO GROWTH
BASOPHILS # BLD AUTO: 0 K/UL
BASOPHILS NFR BLD: 0 %
BILIRUB SERPL-MCNC: 0.3 MG/DL
BUN SERPL-MCNC: 8 MG/DL
CALCIUM SERPL-MCNC: 8.9 MG/DL
CHLORIDE SERPL-SCNC: 106 MMOL/L
CO2 SERPL-SCNC: 24 MMOL/L
CREAT SERPL-MCNC: 0.7 MG/DL
DIFFERENTIAL METHOD: ABNORMAL
EOSINOPHIL # BLD AUTO: 0 K/UL
EOSINOPHIL NFR BLD: 0 %
ERYTHROCYTE [DISTWIDTH] IN BLOOD BY AUTOMATED COUNT: 14.3 %
EST. GFR  (AFRICAN AMERICAN): >60 ML/MIN/1.73 M^2
EST. GFR  (NON AFRICAN AMERICAN): >60 ML/MIN/1.73 M^2
GLUCOSE SERPL-MCNC: 94 MG/DL
HCT VFR BLD AUTO: 25 %
HGB BLD-MCNC: 8.3 G/DL
HYPOCHROMIA BLD QL SMEAR: ABNORMAL
IMM GRANULOCYTES # BLD AUTO: 0 K/UL
IMM GRANULOCYTES NFR BLD AUTO: 0 %
LYMPHOCYTES # BLD AUTO: 0.2 K/UL
LYMPHOCYTES NFR BLD: 90 %
MAGNESIUM SERPL-MCNC: 2 MG/DL
MCH RBC QN AUTO: 28.2 PG
MCHC RBC AUTO-ENTMCNC: 33.2 G/DL
MCV RBC AUTO: 85 FL
MONOCYTES # BLD AUTO: 0 K/UL
MONOCYTES NFR BLD: 5 %
NEUTROPHILS # BLD AUTO: 0 K/UL
NEUTROPHILS NFR BLD: 5 %
NRBC BLD-RTO: 0 /100 WBC
PHOSPHATE SERPL-MCNC: 3.1 MG/DL
PLATELET # BLD AUTO: 13 K/UL
PLATELET BLD QL SMEAR: ABNORMAL
PMV BLD AUTO: ABNORMAL FL
POIKILOCYTOSIS BLD QL SMEAR: SLIGHT
POLYCHROMASIA BLD QL SMEAR: ABNORMAL
POTASSIUM SERPL-SCNC: 4 MMOL/L
PROT SERPL-MCNC: 6 G/DL
RBC # BLD AUTO: 2.94 M/UL
SODIUM SERPL-SCNC: 139 MMOL/L
VANCOMYCIN TROUGH SERPL-MCNC: 5 UG/ML
WBC # BLD AUTO: 0.2 K/UL

## 2018-07-15 PROCEDURE — 25000003 PHARM REV CODE 250: Performed by: STUDENT IN AN ORGANIZED HEALTH CARE EDUCATION/TRAINING PROGRAM

## 2018-07-15 PROCEDURE — 20600001 HC STEP DOWN PRIVATE ROOM

## 2018-07-15 PROCEDURE — 80053 COMPREHEN METABOLIC PANEL: CPT

## 2018-07-15 PROCEDURE — 25000003 PHARM REV CODE 250: Performed by: INTERNAL MEDICINE

## 2018-07-15 PROCEDURE — 80202 ASSAY OF VANCOMYCIN: CPT

## 2018-07-15 PROCEDURE — 63600175 PHARM REV CODE 636 W HCPCS: Performed by: STUDENT IN AN ORGANIZED HEALTH CARE EDUCATION/TRAINING PROGRAM

## 2018-07-15 PROCEDURE — 63700000 PHARM REV CODE 250 ALT 637 W/O HCPCS: Performed by: INTERNAL MEDICINE

## 2018-07-15 PROCEDURE — 85025 COMPLETE CBC W/AUTO DIFF WBC: CPT

## 2018-07-15 PROCEDURE — 84100 ASSAY OF PHOSPHORUS: CPT

## 2018-07-15 PROCEDURE — A4216 STERILE WATER/SALINE, 10 ML: HCPCS | Performed by: INTERNAL MEDICINE

## 2018-07-15 PROCEDURE — 83735 ASSAY OF MAGNESIUM: CPT

## 2018-07-15 PROCEDURE — 63600175 PHARM REV CODE 636 W HCPCS: Performed by: HOSPITALIST

## 2018-07-15 PROCEDURE — 99233 SBSQ HOSP IP/OBS HIGH 50: CPT | Mod: ,,, | Performed by: INTERNAL MEDICINE

## 2018-07-15 PROCEDURE — 25000003 PHARM REV CODE 250: Performed by: HOSPITALIST

## 2018-07-15 RX ORDER — VANCOMYCIN HCL IN 5 % DEXTROSE 1G/250ML
1000 PLASTIC BAG, INJECTION (ML) INTRAVENOUS EVERY 8 HOURS
Status: DISCONTINUED | OUTPATIENT
Start: 2018-07-15 | End: 2018-07-16

## 2018-07-15 RX ADMIN — VORICONAZOLE 200 MG: 200 TABLET ORAL at 09:07

## 2018-07-15 RX ADMIN — DASATINIB 40 MG: 20 TABLET ORAL at 08:07

## 2018-07-15 RX ADMIN — CEFEPIME 2 G: 2 INJECTION, POWDER, FOR SOLUTION INTRAVENOUS at 01:07

## 2018-07-15 RX ADMIN — VANCOMYCIN 1000 MG: 1 INJECTION, SOLUTION INTRAVENOUS at 02:07

## 2018-07-15 RX ADMIN — CEFEPIME 2 G: 2 INJECTION, POWDER, FOR SOLUTION INTRAVENOUS at 04:07

## 2018-07-15 RX ADMIN — VANCOMYCIN 1000 MG: 1 INJECTION, SOLUTION INTRAVENOUS at 09:07

## 2018-07-15 RX ADMIN — ACYCLOVIR 400 MG: 200 CAPSULE ORAL at 08:07

## 2018-07-15 RX ADMIN — MIRTAZAPINE 15 MG: 15 TABLET, ORALLY DISINTEGRATING ORAL at 09:07

## 2018-07-15 RX ADMIN — CEFEPIME 2 G: 2 INJECTION, POWDER, FOR SOLUTION INTRAVENOUS at 08:07

## 2018-07-15 RX ADMIN — VANCOMYCIN 1000 MG: 1 INJECTION, SOLUTION INTRAVENOUS at 05:07

## 2018-07-15 RX ADMIN — VORICONAZOLE 200 MG: 200 TABLET ORAL at 08:07

## 2018-07-15 RX ADMIN — ACYCLOVIR 400 MG: 200 CAPSULE ORAL at 09:07

## 2018-07-15 RX ADMIN — SODIUM CHLORIDE, PRESERVATIVE FREE 10 ML: 5 INJECTION INTRAVENOUS at 01:07

## 2018-07-15 NOTE — PLAN OF CARE
Problem: Patient Care Overview  Goal: Plan of Care Review  Outcome: Ongoing (interventions implemented as appropriate)  Mr. Guzmán is compliant with current medication regimen and precautions. Febrile during this shift with maximum temperature of 101.2. Tylenol administered as ordered. Beginning day 23 of MEC. Vancomycin trough collected and sent to lab.

## 2018-07-15 NOTE — SUBJECTIVE & OBJECTIVE
Subjective:     Interval History:    Patient has been afebrile today, with a T max of  101.2, on Vancomycin and Cefepime. Patient denied sore throat, abdominal pain or fevers. Appetite to certain foods is low.      [facility] day 23, BM bx 7/13/2018     Objective:     Vital Signs (Most Recent):  Temp: 99.6 °F (37.6 °C) (07/15/18 1106)  Pulse: 102 (07/15/18 1106)  Resp: 18 (07/15/18 1106)  BP: 122/77 (07/15/18 1106)  SpO2: 97 % (07/15/18 1106) Vital Signs (24h Range):  Temp:  [98.9 °F (37.2 °C)-101.2 °F (38.4 °C)] 99.6 °F (37.6 °C)  Pulse:  [] 102  Resp:  [16-19] 18  SpO2:  [97 %-100 %] 97 %  BP: (119-127)/(72-84) 122/77     Weight: 62.2 kg (137 lb 3.8 oz)  Body mass index is 19.14 kg/m².  Body surface area is 1.77 meters squared.    ECOG SCORE         [unfilled]    Intake/Output - Last 3 Shifts       07/13 0700 - 07/14 0659 07/14 0700 - 07/15 0659 07/15 0700 - 07/16 0659    P.O. 480 1040 120    I.V. (mL/kg)  120 (1.9) 30 (0.5)    Blood  350     IV Piggyback 250 250 250    Total Intake(mL/kg) 730 (11.8) 1760 (28.3) 400 (6.4)    Urine (mL/kg/hr)  1900 (1.3) 900 (1.7)    Total Output   1900 900    Net +730 -140 -500           Urine Occurrence 3 x  2 x    Stool Occurrence  1 x 2 x          Physical Exam   Constitutional: He is oriented to person, place, and time. He appears well-developed.   HENT:   Head: Normocephalic and atraumatic.   Eyes: EOM are normal. Pupils are equal, round, and reactive to light.   Neck: Normal range of motion. Neck supple.   Cardiovascular: Normal rate, regular rhythm and normal heart sounds.    Pulmonary/Chest: Effort normal and breath sounds normal.   Abdominal: Soft. Bowel sounds are normal.   Musculoskeletal: Normal range of motion.   Neurological: He is alert and oriented to person, place, and time.   Skin: Skin is warm and dry.   Psychiatric: He has a normal mood and affect.       Significant Labs:   CBC:   Recent Labs  Lab 07/14/18  0350 07/15/18  0353   WBC 0.21* 0.20*   HGB 6.7* 8.3*    HCT 20.9* 25.0*   PLT 14* 13*    and CMP:   Recent Labs  Lab 07/14/18  0350 07/15/18  0353    139   K 4.0 4.0    106   CO2 26 24    94   BUN 10 8   CREATININE 0.7 0.7   CALCIUM 8.7 8.9   PROT 5.9* 6.0   ALBUMIN 2.7* 2.7*   BILITOT 0.3 0.3   ALKPHOS 82 84   AST 14 15   ALT 26 28   ANIONGAP 7* 9   EGFRNONAA >60.0 >60.0       Diagnostic Results:  I have reviewed all pertinent imaging results/findings within the past 24 hours.

## 2018-07-15 NOTE — ASSESSMENT & PLAN NOTE
- initial WBC reportedly 150,000  - bcr-abl positive on FISH at [facility] (records attached in media tab)  - original BMBX 5/26/18 with 20% blasts; 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 9% blasts.   - persistent disease post 7+3 induction. 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  Had BMBX on 7/13/18  - AML FISH negative  - NGS negative from repeat BMBX  - ECHO with 63% EF  - HIV negative, Hepatitis negative, G6PD wnl  - HLA typing hi and lo completed  - referral information for transplant benefits given to patient  - patient has 1 full sibling, contact info given to transplant coordinators  - pt still needs to call base to get formal referral for [facility]   - consider sending BCR/ABL mutational analysis testing when WBC increases

## 2018-07-15 NOTE — PROGRESS NOTES
[facility]  Hematology  Bone Marrow Transplant  Progress Note    Patient Name: Wilton Guzmán  Admission Date: 6/16/2018  Hospital Length of Stay: 29 days  Code Status: Full Code    Subjective:     Interval History:    Patient has been afebrile today, with a T max of  101.2, on Vancomycin and Cefepime. Patient denied sore throat, abdominal pain or fevers. Appetite to certain foods is low.      [facility] day 23, BM bx 7/13/2018     Objective:     Vital Signs (Most Recent):  Temp: 99.6 °F (37.6 °C) (07/15/18 1106)  Pulse: 102 (07/15/18 1106)  Resp: 18 (07/15/18 1106)  BP: 122/77 (07/15/18 1106)  SpO2: 97 % (07/15/18 1106) Vital Signs (24h Range):  Temp:  [98.9 °F (37.2 °C)-101.2 °F (38.4 °C)] 99.6 °F (37.6 °C)  Pulse:  [] 102  Resp:  [16-19] 18  SpO2:  [97 %-100 %] 97 %  BP: (119-127)/(72-84) 122/77     Weight: 62.2 kg (137 lb 3.8 oz)  Body mass index is 19.14 kg/m².  Body surface area is 1.77 meters squared.    ECOG SCORE         [unfilled]    Intake/Output - Last 3 Shifts       07/13 0700 - 07/14 0659 07/14 0700 - 07/15 0659 07/15 0700 - 07/16 0659    P.O. 480 1040 120    I.V. (mL/kg)  120 (1.9) 30 (0.5)    Blood  350     IV Piggyback 250 250 250    Total Intake(mL/kg) 730 (11.8) 1760 (28.3) 400 (6.4)    Urine (mL/kg/hr)  1900 (1.3) 900 (1.7)    Total Output   1900 900    Net +730 -140 -500           Urine Occurrence 3 x  2 x    Stool Occurrence  1 x 2 x          Physical Exam   Constitutional: He is oriented to person, place, and time. He appears well-developed.   HENT:   Head: Normocephalic and atraumatic.   Eyes: EOM are normal. Pupils are equal, round, and reactive to light.   Neck: Normal range of motion. Neck supple.   Cardiovascular: Normal rate, regular rhythm and normal heart sounds.    Pulmonary/Chest: Effort normal and breath sounds normal.   Abdominal: Soft. Bowel sounds are normal.   Musculoskeletal: Normal range of motion.   Neurological: He is alert and oriented to person, place,  and time.   Skin: Skin is warm and dry.   Psychiatric: He has a normal mood and affect.       Significant Labs:   CBC:   Recent Labs  Lab 07/14/18  0350 07/15/18  0353   WBC 0.21* 0.20*   HGB 6.7* 8.3*   HCT 20.9* 25.0*   PLT 14* 13*    and CMP:   Recent Labs  Lab 07/14/18  0350 07/15/18  0353    139   K 4.0 4.0    106   CO2 26 24    94   BUN 10 8   CREATININE 0.7 0.7   CALCIUM 8.7 8.9   PROT 5.9* 6.0   ALBUMIN 2.7* 2.7*   BILITOT 0.3 0.3   ALKPHOS 82 84   AST 14 15   ALT 26 28   ANIONGAP 7* 9   EGFRNONAA >60.0 >60.0       Diagnostic Results:  I have reviewed all pertinent imaging results/findings within the past 24 hours.    Assessment/Plan:     * Chronic myelogenous leukemia (CML), BCR-ABL1-positive    - initial WBC reportedly 150,000  - bcr-abl positive on FISH at [facility] (records attached in media tab)  - original BMBX 5/26/18 with 20% blasts; 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 9% blasts.   - persistent disease post 7+3 induction. 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  Had BMBX on 7/13/18  - AML FISH negative  - NGS negative from repeat BMBX  - ECHO with 63% EF  - HIV negative, Hepatitis negative, G6PD wnl  - HLA typing hi and lo completed  - referral information for transplant benefits given to patient  - patient has 1 full sibling, contact info given to transplant coordinators  - pt still needs to call base to get formal referral for [facility]   - consider sending BCR/ABL mutational analysis testing when WBC increases        Ulcer of perianal area, limited to breakdown of skin    - reddened area with white ulceration noted to inner left side of buttocks  - consulted wound care, appreciate recs   -  started critic aid barrier paste and ordered waffle cushion. Pt states this is helping        Severe malnutrition    - albumin and weight was previously dropping  - daily weights  - dietary following          Neutropenic fever    Currently afebrile. Tmax- 101.2  CXR negative  Started on  vancomycin +cefepime on 7/13/2018. Blood and urine cultures so far negative          Anxiety    - Patient understandibly experiencing anxiety about his new diagnosis and feeling isolated in his room  - Declined oncology psychologist consult earlier during this admission  - Continue Remeron for anxiety and insomnia         Pancytopenia due to antineoplastic chemotherapy    - transfuse for Hgb <7 and platelets <10K  - continue PPX antimicrobials  S/p 1 PRBC on 7/14/2018            VTE Risk Mitigation         Ordered     heparin, porcine (PF) 100 unit/mL injection flush 300 Units  As needed (PRN)      06/23/18 1052     IP VTE LOW RISK PATIENT  Once      06/16/18 0938            Sergei Riddle MD  Bone Marrow Transplant  [facility]

## 2018-07-15 NOTE — PLAN OF CARE
Problem: Patient Care Overview  Goal: Plan of Care Review  Outcome: Ongoing (interventions implemented as appropriate)  Pt. day 23 for MEC.  Pt. TMAX 100 today.  Pt. with vanc frequentcy increased. Pt. with no complaints today.  Pt. with a good appetite.  Pt. with nonskid footwear on with bed in lowest position and locked with bed rails up x2.  Pt. ambulates independently and instructed to call if assistance is needed.  Pt. with call light within reach.  Will continue to monitor pt.

## 2018-07-15 NOTE — SUBJECTIVE & OBJECTIVE
Subjective:     Interval History:   Patient has been afebrile today, with a T max of  101.2, on Vancomycin and Cefepime. Patient denied sore throat, abdominal pain or fevers. Appetite to certain foods is low.     [facility] day 23, BM bx 7/13/2018     Objective:     Vital Signs (Most Recent):  Temp: 99.6 °F (37.6 °C) (07/15/18 1106)  Pulse: 102 (07/15/18 1106)  Resp: 18 (07/15/18 1106)  BP: 122/77 (07/15/18 1106)  SpO2: 97 % (07/15/18 1106) Vital Signs (24h Range):  Temp:  [98.7 °F (37.1 °C)-101.2 °F (38.4 °C)] 99.6 °F (37.6 °C)  Pulse:  [] 102  Resp:  [16-19] 18  SpO2:  [97 %-100 %] 97 %  BP: (114-127)/(64-84) 122/77     Weight: 62.2 kg (137 lb 3.8 oz)  Body mass index is 19.14 kg/m².  Body surface area is 1.77 meters squared.    Intake/Output - Last 3 Shifts       07/13 0700 - 07/14 0659 07/14 0700 - 07/15 0659 07/15 0700 - 07/16 0659    P.O. 480 1040 120    I.V. (mL/kg)  120 (1.9) 30 (0.5)    Blood  350     IV Piggyback 250 250     Total Intake(mL/kg) 730 (11.8) 1760 (28.3) 150 (2.4)    Urine (mL/kg/hr)  1900 (1.3) 900 (2.6)    Total Output   1900 900    Net +730 -140 -750           Urine Occurrence 3 x      Stool Occurrence  1 x         Physical Exam   Constitutional: He is oriented to person, place, and time. He appears well-developed and well-nourished.   HENT:   Head: Normocephalic and atraumatic.   Eyes: EOM are normal. Pupils are equal, round, and reactive to light.   Neck: Normal range of motion. Neck supple.   Cardiovascular: Normal rate and regular rhythm.    No murmur heard.  Pulmonary/Chest: Effort normal and breath sounds normal. No respiratory distress.   Abdominal: Soft. Bowel sounds are normal. He exhibits no distension.   Musculoskeletal: Normal range of motion. He exhibits no edema.   Neurological: He is alert and oriented to person, place, and time.   Skin: Skin is warm and dry.   Psychiatric: He has a normal mood and affect. His behavior is normal.   Vitals reviewed.    Significant Labs:    CBC:     Recent Labs  Lab 07/14/18  0350 07/15/18  0353   WBC 0.21* 0.20*   HGB 6.7* 8.3*   HCT 20.9* 25.0*   PLT 14* 13*    and CMP:     Recent Labs  Lab 07/14/18  0350 07/15/18  0353    139   K 4.0 4.0    106   CO2 26 24    94   BUN 10 8   CREATININE 0.7 0.7   CALCIUM 8.7 8.9   PROT 5.9* 6.0   ALBUMIN 2.7* 2.7*   BILITOT 0.3 0.3   ALKPHOS 82 84   AST 14 15   ALT 26 28   ANIONGAP 7* 9   EGFRNONAA >60.0 >60.0       Diagnostic Results:  I have reviewed all pertinent imaging results/findings within the past 24 hours.       Review of Systems   Constitutional: Positive for appetite change. Negative for activity change, fatigue and fever.   HENT: Negative for mouth sores.    Respiratory: Negative for cough, chest tightness and shortness of breath.    Cardiovascular: Negative for chest pain and palpitations.   Gastrointestinal: Negative for abdominal distention, abdominal pain, diarrhea, nausea and vomiting.   Genitourinary: Negative for dysuria, flank pain and hematuria.   Musculoskeletal: Negative for arthralgias, back pain, myalgias and neck pain.   Skin: Negative.    Neurological: Negative for dizziness, seizures, speech difficulty and numbness.   Hematological: Negative for adenopathy. Does not bruise/bleed easily.   Psychiatric/Behavioral: Negative for agitation and behavioral problems.

## 2018-07-15 NOTE — ASSESSMENT & PLAN NOTE
Currently spiking fevers. Tmax- 101.2  CXR negative  Started on  vancomycin +cefepime on 7/13/2018. Blood and urine cultures so far negative

## 2018-07-16 LAB
ALBUMIN SERPL BCP-MCNC: 2.6 G/DL
ALP SERPL-CCNC: 84 U/L
ALT SERPL W/O P-5'-P-CCNC: 26 U/L
ANION GAP SERPL CALC-SCNC: 7 MMOL/L
ANISOCYTOSIS BLD QL SMEAR: SLIGHT
AST SERPL-CCNC: 13 U/L
BASOPHILS # BLD AUTO: ABNORMAL K/UL
BASOPHILS NFR BLD: 0 %
BILIRUB SERPL-MCNC: 0.3 MG/DL
BONE MARROW IRON STAIN COMMENT: NORMAL
BONE MARROW WRIGHT STAIN COMMENT: NORMAL
BUN SERPL-MCNC: 7 MG/DL
CALCIUM SERPL-MCNC: 8.6 MG/DL
CHLORIDE SERPL-SCNC: 108 MMOL/L
CO2 SERPL-SCNC: 26 MMOL/L
CREAT SERPL-MCNC: 0.8 MG/DL
DIFFERENTIAL METHOD: ABNORMAL
EOSINOPHIL # BLD AUTO: ABNORMAL K/UL
EOSINOPHIL NFR BLD: 0 %
ERYTHROCYTE [DISTWIDTH] IN BLOOD BY AUTOMATED COUNT: 14 %
EST. GFR  (AFRICAN AMERICAN): >60 ML/MIN/1.73 M^2
EST. GFR  (NON AFRICAN AMERICAN): >60 ML/MIN/1.73 M^2
GLUCOSE SERPL-MCNC: 94 MG/DL
HCT VFR BLD AUTO: 24 %
HGB BLD-MCNC: 8 G/DL
IMM GRANULOCYTES # BLD AUTO: ABNORMAL K/UL
IMM GRANULOCYTES NFR BLD AUTO: ABNORMAL %
LYMPHOCYTES # BLD AUTO: ABNORMAL K/UL
LYMPHOCYTES NFR BLD: 82 %
MAGNESIUM SERPL-MCNC: 1.8 MG/DL
MCH RBC QN AUTO: 28.7 PG
MCHC RBC AUTO-ENTMCNC: 33.3 G/DL
MCV RBC AUTO: 86 FL
MONOCYTES # BLD AUTO: ABNORMAL K/UL
MONOCYTES NFR BLD: 15 %
NEUTROPHILS NFR BLD: 3 %
NRBC BLD-RTO: 0 /100 WBC
PHOSPHATE SERPL-MCNC: 2.8 MG/DL
PLATELET # BLD AUTO: 16 K/UL
PLATELET BLD QL SMEAR: ABNORMAL
PMV BLD AUTO: ABNORMAL FL
POTASSIUM SERPL-SCNC: 3.9 MMOL/L
PROT SERPL-MCNC: 5.9 G/DL
RBC # BLD AUTO: 2.79 M/UL
SODIUM SERPL-SCNC: 141 MMOL/L
VANCOMYCIN TROUGH SERPL-MCNC: 10.3 UG/ML
WBC # BLD AUTO: 0.25 K/UL

## 2018-07-16 PROCEDURE — 80053 COMPREHEN METABOLIC PANEL: CPT

## 2018-07-16 PROCEDURE — 85007 BL SMEAR W/DIFF WBC COUNT: CPT

## 2018-07-16 PROCEDURE — 99233 SBSQ HOSP IP/OBS HIGH 50: CPT | Mod: ,,, | Performed by: INTERNAL MEDICINE

## 2018-07-16 PROCEDURE — 63700000 PHARM REV CODE 250 ALT 637 W/O HCPCS: Performed by: INTERNAL MEDICINE

## 2018-07-16 PROCEDURE — 80202 ASSAY OF VANCOMYCIN: CPT

## 2018-07-16 PROCEDURE — 83735 ASSAY OF MAGNESIUM: CPT

## 2018-07-16 PROCEDURE — 85027 COMPLETE CBC AUTOMATED: CPT

## 2018-07-16 PROCEDURE — 63600175 PHARM REV CODE 636 W HCPCS: Performed by: HOSPITALIST

## 2018-07-16 PROCEDURE — 25000003 PHARM REV CODE 250: Performed by: INTERNAL MEDICINE

## 2018-07-16 PROCEDURE — 25000003 PHARM REV CODE 250: Performed by: HOSPITALIST

## 2018-07-16 PROCEDURE — 20600001 HC STEP DOWN PRIVATE ROOM

## 2018-07-16 PROCEDURE — 63600175 PHARM REV CODE 636 W HCPCS: Performed by: STUDENT IN AN ORGANIZED HEALTH CARE EDUCATION/TRAINING PROGRAM

## 2018-07-16 PROCEDURE — 84100 ASSAY OF PHOSPHORUS: CPT

## 2018-07-16 PROCEDURE — 63600175 PHARM REV CODE 636 W HCPCS: Performed by: INTERNAL MEDICINE

## 2018-07-16 RX ADMIN — ACYCLOVIR 400 MG: 200 CAPSULE ORAL at 08:07

## 2018-07-16 RX ADMIN — VANCOMYCIN 1000 MG: 1 INJECTION, SOLUTION INTRAVENOUS at 05:07

## 2018-07-16 RX ADMIN — DASATINIB 40 MG: 20 TABLET ORAL at 10:07

## 2018-07-16 RX ADMIN — ACYCLOVIR 400 MG: 200 CAPSULE ORAL at 10:07

## 2018-07-16 RX ADMIN — MAGNESIUM SULFATE IN WATER 2 G: 40 INJECTION, SOLUTION INTRAVENOUS at 06:07

## 2018-07-16 RX ADMIN — CEFEPIME 2 G: 2 INJECTION, POWDER, FOR SOLUTION INTRAVENOUS at 10:07

## 2018-07-16 RX ADMIN — VORICONAZOLE 200 MG: 200 TABLET ORAL at 10:07

## 2018-07-16 RX ADMIN — CEFEPIME 2 G: 2 INJECTION, POWDER, FOR SOLUTION INTRAVENOUS at 04:07

## 2018-07-16 RX ADMIN — CEFEPIME 2 G: 2 INJECTION, POWDER, FOR SOLUTION INTRAVENOUS at 02:07

## 2018-07-16 RX ADMIN — VORICONAZOLE 200 MG: 200 TABLET ORAL at 08:07

## 2018-07-16 RX ADMIN — MIRTAZAPINE 15 MG: 15 TABLET, ORALLY DISINTEGRATING ORAL at 08:07

## 2018-07-16 NOTE — SUBJECTIVE & OBJECTIVE
Subjective:     Interval History:   [facility] day 24, awaiting BM bx results, path and flow pending. Day 4 of vanc and cef, blood cx, UA, Ucx, and chest xray from 7/13 without source. D/C vanc. ANC 8. 24 hour tmax 100.4, downtrending    Objective:     Vital Signs (Most Recent):  Temp: 99.5 °F (37.5 °C) (07/16/18 0415)  Pulse: 101 (07/16/18 0415)  Resp: 18 (07/16/18 0415)  BP: 113/69 (07/16/18 0415)  SpO2: 98 % (07/16/18 0415) Vital Signs (24h Range):  Temp:  [99.5 °F (37.5 °C)-100.4 °F (38 °C)] 99.5 °F (37.5 °C)  Pulse:  [100-110] 101  Resp:  [16-18] 18  SpO2:  [97 %-99 %] 98 %  BP: (113-126)/(68-80) 113/69     Weight: 61.6 kg (135 lb 12.9 oz)  Body mass index is 18.94 kg/m².  Body surface area is 1.76 meters squared.    Intake/Output - Last 3 Shifts       07/14 0700 - 07/15 0659 07/15 0700 - 07/16 0659 07/16 0700 - 07/17 0659    P.O. 1040 1440     I.V. (mL/kg) 120 (1.9) 80 (1.3)     Blood 350      IV Piggyback 250 750     Total Intake(mL/kg) 1760 (28.3) 2270 (36.9)     Urine (mL/kg/hr) 1900 (1.3) 2000 (1.4)     Total Output 1900 2000      Net -140 +270             Urine Occurrence  2 x     Stool Occurrence 1 x 2 x         Physical Exam   Constitutional: He is oriented to person, place, and time. He appears well-developed and well-nourished.   HENT:   Head: Normocephalic and atraumatic.   Left side temporal swelling resolved  Flaky lips improving   Eyes: EOM are normal. Pupils are equal, round, and reactive to light.   Left periorbital edema resolved   Neck: Normal range of motion. Neck supple.   Right side neck swelling resolved   Cardiovascular: Normal rate and regular rhythm.    No murmur heard.  Pulmonary/Chest: Effort normal and breath sounds normal. No respiratory distress.   Abdominal: Soft. Bowel sounds are normal. He exhibits no distension.   Musculoskeletal: Normal range of motion. He exhibits no edema.   Neurological: He is alert and oriented to person, place, and time.   Skin: Skin is warm and dry.   Central  line c/d/i  Redness to scalp improved, with some dry skin to head  Left side of inner buttocks with open reddened wound and white appearing ulceration, improving   Psychiatric: He has a normal mood and affect. His behavior is normal.   Vitals reviewed.    Significant Labs:   CBC:     Recent Labs  Lab 07/15/18  0353 07/16/18  0400   WBC 0.20* 0.25*   HGB 8.3* 8.0*   HCT 25.0* 24.0*   PLT 13* 16*    and CMP:     Recent Labs  Lab 07/15/18  0353 07/16/18  0400    141   K 4.0 3.9    108   CO2 24 26   GLU 94 94   BUN 8 7   CREATININE 0.7 0.8   CALCIUM 8.9 8.6*   PROT 6.0 5.9*   ALBUMIN 2.7* 2.6*   BILITOT 0.3 0.3   ALKPHOS 84 84   AST 15 13   ALT 28 26   ANIONGAP 9 7*   EGFRNONAA >60.0 >60.0       Diagnostic Results:  I have reviewed all pertinent imaging results/findings within the past 24 hours.     7/13/18  Blood cx x2 ngtd  UA negative  Urine cx no growth  Chest xray negative

## 2018-07-16 NOTE — ASSESSMENT & PLAN NOTE
- Spiking fevers again. 24 hr tmax- 100.4  - 7/13/18 Blood cx x2 ngtd; UA negative; Urine cx no growth; Chest xray negative  - Started on  vancomycin + cefepime on 7/13/2018, d/c vanc 7/16 (got 4 days); today is day 4 of cef

## 2018-07-16 NOTE — PLAN OF CARE
Mr. Guzmán is AAO, cooperative with current plan of care. He denies pain or needs during this shift. Vanc trough monitored. Tmax-100.4, trending down spontaneously. HR-low 100s. Beginning day 24 of MEC.

## 2018-07-16 NOTE — NURSING
Mr. Guzmán is awake, side-lying on right side with HOB elevated. He denies pain or discomfort at this time. He repositioned himself for assessment. Right bone marrow biopsy site covered, dressing C/D/I. He reports mild tenderness at site. Medications and plan for this shift reviewed. Mr. Gzumán verbalized understanding and agreement. No visitors present at this time. I reminded Mr. Guzmán to call if he needed assistance prior to my next round.

## 2018-07-16 NOTE — ASSESSMENT & PLAN NOTE
- transfuse for Hgb <7 and platelets <10K  - continue PPX antimicrobials  - WBC 0.25 with ANC of 8, plt 16K, hgb 8

## 2018-07-16 NOTE — ASSESSMENT & PLAN NOTE
- initial WBC reportedly 150,000  - bcr-abl positive on FISH at [facility] (records attached in media tab)  - original BMBX 5/26/18 with 20% blasts; 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 9% blasts.   - persistent disease post 7+3 induction. 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    - had BMBX on 7/13/18, pending results  - AML FISH negative  - NGS negative from first repeat BMBX  - ECHO with 63% EF  - HIV negative, Hepatitis negative, G6PD wnl  - HLA typing hi and lo completed  - referral information for transplant benefits given to patient  - patient has 1 full sibling, contact info given to transplant coordinators  - pt still needs to call base to get formal referral for [facility], having difficulty with this  - consider sending BCR/ABL mutational analysis testing when WBC increases

## 2018-07-16 NOTE — PLAN OF CARE
Problem: Patient Care Overview  Goal: Plan of Care Review  Outcome: Ongoing (interventions implemented as appropriate)  Fall, pressure ulcer, and neutropenic precautions continued.Vancomycin discontinued. Cefepime continued. Patient stable and will continue to monitor.

## 2018-07-16 NOTE — PROGRESS NOTES
[facility]  Hematology  Bone Marrow Transplant  Progress Note    Patient Name: Wilton Guzmán  Admission Date: 6/16/2018  Hospital Length of Stay: 30 days  Code Status: Full Code    Subjective:     Interval History:   [facility] day 24, awaiting BM bx results, path and flow pending. Day 4 of vanc and cef, blood cx, UA, Ucx, and chest xray from 7/13 without source. D/C vanc. ANC 8. 24 hour tmax 100.4, downtrending    Objective:     Vital Signs (Most Recent):  Temp: 99.5 °F (37.5 °C) (07/16/18 0415)  Pulse: 101 (07/16/18 0415)  Resp: 18 (07/16/18 0415)  BP: 113/69 (07/16/18 0415)  SpO2: 98 % (07/16/18 0415) Vital Signs (24h Range):  Temp:  [99.5 °F (37.5 °C)-100.4 °F (38 °C)] 99.5 °F (37.5 °C)  Pulse:  [100-110] 101  Resp:  [16-18] 18  SpO2:  [97 %-99 %] 98 %  BP: (113-126)/(68-80) 113/69     Weight: 61.6 kg (135 lb 12.9 oz)  Body mass index is 18.94 kg/m².  Body surface area is 1.76 meters squared.    Intake/Output - Last 3 Shifts       07/14 0700 - 07/15 0659 07/15 0700 - 07/16 0659 07/16 0700 - 07/17 0659    P.O. 1040 1440     I.V. (mL/kg) 120 (1.9) 80 (1.3)     Blood 350      IV Piggyback 250 750     Total Intake(mL/kg) 1760 (28.3) 2270 (36.9)     Urine (mL/kg/hr) 1900 (1.3) 2000 (1.4)     Total Output 1900 2000      Net -140 +270             Urine Occurrence  2 x     Stool Occurrence 1 x 2 x         Physical Exam   Constitutional: He is oriented to person, place, and time. He appears well-developed and well-nourished.   HENT:   Head: Normocephalic and atraumatic.   Left side temporal swelling resolved  Flaky lips improving   Eyes: EOM are normal. Pupils are equal, round, and reactive to light.   Left periorbital edema resolved   Neck: Normal range of motion. Neck supple.   Right side neck swelling resolved   Cardiovascular: Normal rate and regular rhythm.    No murmur heard.  Pulmonary/Chest: Effort normal and breath sounds normal. No respiratory distress.   Abdominal: Soft. Bowel sounds are normal.  He exhibits no distension.   Musculoskeletal: Normal range of motion. He exhibits no edema.   Neurological: He is alert and oriented to person, place, and time.   Skin: Skin is warm and dry.   Central line c/d/i  Redness to scalp improved, with some dry skin to head  Left side of inner buttocks with open reddened wound and white appearing ulceration, improving   Psychiatric: He has a normal mood and affect. His behavior is normal.   Vitals reviewed.    Significant Labs:   CBC:     Recent Labs  Lab 07/15/18  0353 07/16/18  0400   WBC 0.20* 0.25*   HGB 8.3* 8.0*   HCT 25.0* 24.0*   PLT 13* 16*    and CMP:     Recent Labs  Lab 07/15/18  0353 07/16/18  0400    141   K 4.0 3.9    108   CO2 24 26   GLU 94 94   BUN 8 7   CREATININE 0.7 0.8   CALCIUM 8.9 8.6*   PROT 6.0 5.9*   ALBUMIN 2.7* 2.6*   BILITOT 0.3 0.3   ALKPHOS 84 84   AST 15 13   ALT 28 26   ANIONGAP 9 7*   EGFRNONAA >60.0 >60.0       Diagnostic Results:  I have reviewed all pertinent imaging results/findings within the past 24 hours.     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 9% blasts.   - persistent disease post 7+3 induction. 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    - had BMBX on 7/13/18, pending results  - AML FISH negative  - NGS negative from first repeat BMBX  - ECHO with 63% EF  - HIV negative, Hepatitis negative, G6PD wnl  - HLA typing hi and  lo completed  - referral information for transplant benefits given to patient  - patient has 1 full sibling, contact info given to transplant coordinators  - pt still needs to call base to get formal referral for [facility], having difficulty with this  - consider sending BCR/ABL mutational analysis testing when WBC increases        Pancytopenia due to antineoplastic chemotherapy    - transfuse for Hgb <7 and platelets <10K  - continue PPX antimicrobials  - WBC 0.25 with ANC of 8, plt 16K, hgb 8        Neutropenic fever    - Spiking fevers again. 24 hr tmax- 100.4  - 7/13/18 Blood cx x2 ngtd; UA negative; Urine cx no growth; Chest xray negative  - Started on  vancomycin + cefepime on 7/13/2018, d/c vanc 7/16 (got 4 days); today is day 4 of cef        Ulcer of perianal area, limited to breakdown of skin    - reddened area with white ulceration noted to inner left side of buttocks  - consulted wound care, appreciate recs   - started critic aid barrier paste and ordered waffle cushion. Pt states this is helping         Severe malnutrition    - albumin and weight was previously dropping  - daily weights  - dietary following           Anxiety    - Patient understandibly experiencing anxiety about his new diagnosis and feeling isolated in his room  - Declined oncology psychologist consult earlier during this admission  - Continue Remeron for anxiety and insomnia              VTE Risk Mitigation         Ordered     heparin, porcine (PF) 100 unit/mL injection flush 300 Units  As needed (PRN)      06/23/18 1052     IP VTE LOW RISK PATIENT  Once      06/16/18 0938          Disposition: pending results from repeat BM bx and count recovery    Yoanna Andrade NP  Bone Marrow Transplant  [facility]

## 2018-07-17 ENCOUNTER — TELEPHONE (OUTPATIENT)
Dept: PHARMACY | Facility: CLINIC | Age: 33
End: 2018-07-17

## 2018-07-17 LAB
ALBUMIN SERPL BCP-MCNC: 2.6 G/DL
ALP SERPL-CCNC: 80 U/L
ALT SERPL W/O P-5'-P-CCNC: 21 U/L
ANION GAP SERPL CALC-SCNC: 8 MMOL/L
ANISOCYTOSIS BLD QL SMEAR: SLIGHT
AST SERPL-CCNC: 13 U/L
BASOPHILS # BLD AUTO: 0 K/UL
BASOPHILS NFR BLD: 0 %
BCR/ABL,P210 RESULT: NORMAL
BILIRUB SERPL-MCNC: 0.3 MG/DL
BODY SITE - BONE MARROW: NORMAL
BUN SERPL-MCNC: 9 MG/DL
CALCIUM SERPL-MCNC: 8.8 MG/DL
CHLORIDE SERPL-SCNC: 105 MMOL/L
CLINICAL DIAGNOSIS - BONE MARROW: NORMAL
CO2 SERPL-SCNC: 25 MMOL/L
CREAT SERPL-MCNC: 0.8 MG/DL
DACRYOCYTES BLD QL SMEAR: ABNORMAL
DIFFERENTIAL METHOD: ABNORMAL
EOSINOPHIL # BLD AUTO: 0 K/UL
EOSINOPHIL NFR BLD: 0 %
ERYTHROCYTE [DISTWIDTH] IN BLOOD BY AUTOMATED COUNT: 14.2 %
EST. GFR  (AFRICAN AMERICAN): >60 ML/MIN/1.73 M^2
EST. GFR  (NON AFRICAN AMERICAN): >60 ML/MIN/1.73 M^2
FLOW CYTOMETRY ANTIBODIES ANALYZED - BONE MARROW: NORMAL
FLOW CYTOMETRY COMMENT - BONE MARROW: NORMAL
FLOW CYTOMETRY INTERPRETATION - BONE MARROW: NORMAL
GLUCOSE SERPL-MCNC: 92 MG/DL
HCT VFR BLD AUTO: 23.2 %
HGB BLD-MCNC: 7.7 G/DL
IMM GRANULOCYTES # BLD AUTO: 0 K/UL
IMM GRANULOCYTES NFR BLD AUTO: 0 %
LYMPHOCYTES # BLD AUTO: 0.3 K/UL
LYMPHOCYTES NFR BLD: 68.9 %
MAGNESIUM SERPL-MCNC: 1.9 MG/DL
MCH RBC QN AUTO: 28.7 PG
MCHC RBC AUTO-ENTMCNC: 33.2 G/DL
MCV RBC AUTO: 87 FL
MONOCYTES # BLD AUTO: 0.1 K/UL
MONOCYTES NFR BLD: 15.6 %
NEUTROPHILS # BLD AUTO: 0.1 K/UL
NEUTROPHILS NFR BLD: 15.5 %
NRBC BLD-RTO: 0 /100 WBC
OVALOCYTES BLD QL SMEAR: ABNORMAL
PATH REPORT.FINAL DX SPEC: NORMAL
PHOSPHATE SERPL-MCNC: 3.1 MG/DL
PLATELET # BLD AUTO: 23 K/UL
PLATELET BLD QL SMEAR: ABNORMAL
PMV BLD AUTO: 10.3 FL
POIKILOCYTOSIS BLD QL SMEAR: SLIGHT
POTASSIUM SERPL-SCNC: 3.9 MMOL/L
PROT SERPL-MCNC: 5.9 G/DL
RBC # BLD AUTO: 2.68 M/UL
SODIUM SERPL-SCNC: 138 MMOL/L
SPECIMEN TYPE: NORMAL
WBC # BLD AUTO: 0.45 K/UL

## 2018-07-17 PROCEDURE — 85025 COMPLETE CBC W/AUTO DIFF WBC: CPT

## 2018-07-17 PROCEDURE — 63600175 PHARM REV CODE 636 W HCPCS: Performed by: STUDENT IN AN ORGANIZED HEALTH CARE EDUCATION/TRAINING PROGRAM

## 2018-07-17 PROCEDURE — 80053 COMPREHEN METABOLIC PANEL: CPT

## 2018-07-17 PROCEDURE — 20600001 HC STEP DOWN PRIVATE ROOM

## 2018-07-17 PROCEDURE — 84100 ASSAY OF PHOSPHORUS: CPT

## 2018-07-17 PROCEDURE — 83735 ASSAY OF MAGNESIUM: CPT

## 2018-07-17 PROCEDURE — 99233 SBSQ HOSP IP/OBS HIGH 50: CPT | Mod: ,,, | Performed by: INTERNAL MEDICINE

## 2018-07-17 PROCEDURE — 63700000 PHARM REV CODE 250 ALT 637 W/O HCPCS: Performed by: INTERNAL MEDICINE

## 2018-07-17 PROCEDURE — 25000003 PHARM REV CODE 250: Performed by: INTERNAL MEDICINE

## 2018-07-17 RX ADMIN — ACYCLOVIR 400 MG: 200 CAPSULE ORAL at 10:07

## 2018-07-17 RX ADMIN — MIRTAZAPINE 15 MG: 15 TABLET, ORALLY DISINTEGRATING ORAL at 09:07

## 2018-07-17 RX ADMIN — DASATINIB 40 MG: 20 TABLET ORAL at 10:07

## 2018-07-17 RX ADMIN — VORICONAZOLE 200 MG: 200 TABLET ORAL at 09:07

## 2018-07-17 RX ADMIN — CEFEPIME 2 G: 2 INJECTION, POWDER, FOR SOLUTION INTRAVENOUS at 02:07

## 2018-07-17 RX ADMIN — ACYCLOVIR 400 MG: 200 CAPSULE ORAL at 09:07

## 2018-07-17 RX ADMIN — VORICONAZOLE 200 MG: 200 TABLET ORAL at 10:07

## 2018-07-17 RX ADMIN — CEFEPIME 2 G: 2 INJECTION, POWDER, FOR SOLUTION INTRAVENOUS at 05:07

## 2018-07-17 RX ADMIN — CEFEPIME 2 G: 2 INJECTION, POWDER, FOR SOLUTION INTRAVENOUS at 10:07

## 2018-07-17 NOTE — PLAN OF CARE
Problem: Patient Care Overview  Goal: Plan of Care Review  Outcome: Ongoing (interventions implemented as appropriate)  Pt AAOx4. VSS. Denies pain. No HEENT complaints. Denies respiratory distress; instructed pt to use IS 10x/hour while awake & encouraged cough and deep breathing. Denies CP. PPP. Steady gait.  Pt tolerating diet, fluids encouraged. Pt voiding without difficulty. Skin intact, except for perianal wound (cleansed with NS and skin barrier applied PRN). No s/s of infection noted; pt educated on s/s on infection and instructed to call for any increase redness, tenderness, warmth, or drainage. Pt calm and cooperative, mood appropriate. All fall precautions in place. Pt educated on fall risks and preventions. Frequent rounding in place. Pt instructed on plan of care, meds, orders, safety, and handcare.

## 2018-07-17 NOTE — PROGRESS NOTES
[facility]  Hematology  Bone Marrow Transplant  Progress Note    Patient Name: Wilton Guzmán  Admission Date: 6/16/2018  Hospital Length of Stay: 31 days  Code Status: Full Code    Subjective:     Interval History:   [facility] day 25, BM bx final path without evidence of CML. Day 5 of cef, afebrile, one unsustained temp of 100.4 yesterday evening.     Objective:     Vital Signs (Most Recent):  Temp: 99 °F (37.2 °C) (07/17/18 0745)  Pulse: 91 (07/17/18 0745)  Resp: 18 (07/17/18 0745)  BP: 121/76 (07/17/18 0745)  SpO2: 98 % (07/17/18 0745) Vital Signs (24h Range):  Temp:  [98.5 °F (36.9 °C)-100.4 °F (38 °C)] 99 °F (37.2 °C)  Pulse:  [] 91  Resp:  [16-18] 18  SpO2:  [96 %-99 %] 98 %  BP: (114-125)/(68-77) 121/76     Weight: 62.1 kg (136 lb 12.7 oz)  Body mass index is 19.08 kg/m².  Body surface area is 1.76 meters squared.    Intake/Output - Last 3 Shifts       07/15 0700 - 07/16 0659 07/16 0700 - 07/17 0659 07/17 0700 - 07/18 0659    P.O. 1440 840     I.V. (mL/kg) 80 (1.3)      IV Piggyback 750      Total Intake(mL/kg) 2270 (36.9) 840 (13.5)     Urine (mL/kg/hr) 2000 (1.4) 1175 (0.8)     Total Output 2000 1175      Net +270 -335             Urine Occurrence 2 x      Stool Occurrence 2 x          Physical Exam   Constitutional: He is oriented to person, place, and time. He appears well-developed and well-nourished.   HENT:   Head: Normocephalic and atraumatic.   Left side temporal swelling resolved  Flaky lips improving   Eyes: EOM are normal. Pupils are equal, round, and reactive to light.   Left periorbital edema resolved   Neck: Normal range of motion. Neck supple.   Right side neck swelling resolved   Cardiovascular: Normal rate and regular rhythm.    No murmur heard.  Pulmonary/Chest: Effort normal and breath sounds normal. No respiratory distress.   Abdominal: Soft. Bowel sounds are normal. He exhibits no distension.   Musculoskeletal: Normal range of motion. He exhibits no edema.    Neurological: He is alert and oriented to person, place, and time.   Skin: Skin is warm and dry.   Central line c/d/i  Redness to scalp improved, with some dry skin to head  Left side of inner buttocks with open reddened wound and white appearing ulceration, improving   Psychiatric: He has a normal mood and affect. His behavior is normal.   Vitals reviewed.    Significant Labs:   CBC:     Recent Labs  Lab 07/16/18  0400 07/17/18  0411   WBC 0.25* 0.45*   HGB 8.0* 7.7*   HCT 24.0* 23.2*   PLT 16* 23*    and CMP:     Recent Labs  Lab 07/16/18  0400 07/17/18  0411    138   K 3.9 3.9    105   CO2 26 25   GLU 94 92   BUN 7 9   CREATININE 0.8 0.8   CALCIUM 8.6* 8.8   PROT 5.9* 5.9*   ALBUMIN 2.6* 2.6*   BILITOT 0.3 0.3   ALKPHOS 84 80   AST 13 13   ALT 26 21   ANIONGAP 7* 8   EGFRNONAA >60.0 >60.0       Diagnostic Results:  I have reviewed all pertinent imaging results/findings within the past 24 hours.     7/13/18  Blood cx x2 ngtd  UA negative  Urine cx no growth  Chest xray negative    Assessment/Plan:     * Chronic myelogenous leukemia (CML), BCR-ABL1-positive    - initial WBC reportedly 150,000  - bcr-abl positive on FISH at [facility] (records attached in media tab)  - original BMBX 5/26/18 with 20% blasts; 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 9% blasts.   - persistent disease post 7+3 induction. 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    - had BMBX on 7/13/18, without evidence of CML  - AML FISH negative  - NGS negative from first repeat BMBX  - ECHO with 63% EF  - HIV negative, Hepatitis negative, G6PD wnl  - HLA typing hi and lo completed  - referral information for transplant benefits given to patient  -  patient has 1 full sibling, contact info given to transplant coordinators  - pt still needs to call base to get formal referral for [facility], having difficulty with this  - consider sending BCR/ABL mutational analysis testing when WBC increases        Pancytopenia due to antineoplastic chemotherapy    - transfuse for Hgb <7 and platelets <10K  - continue PPX antimicrobials  - WBC 0.45 with ANC of 70, plt 23K, hgb 7.7        Neutropenic fever    - Spiking fevers again. 24 hr tmax- 100.4  - 7/13/18 Blood cx x2 ngtd; UA negative; Urine cx no growth; Chest xray negative  - Started on  vancomycin + cefepime on 7/13/2018, d/c vanc 7/16 (got 4 days); today is day 5 of cef        Ulcer of perianal area, limited to breakdown of skin    - reddened area with white ulceration noted to inner left side of buttocks  - consulted wound care, appreciate recs   - started critic aid barrier paste and ordered waffle cushion. Pt states this is helping          Severe malnutrition    - albumin and weight was previously dropping  - daily weights  - dietary following            Anxiety    - Patient understandibly experiencing anxiety about his new diagnosis and feeling isolated in his room  - Declined oncology psychologist consult earlier during this admission  - Continue Remeron for anxiety and insomnia               VTE Risk Mitigation         Ordered     heparin, porcine (PF) 100 unit/mL injection flush 300 Units  As needed (PRN)      06/23/18 1052     IP VTE LOW RISK PATIENT  Once      06/16/18 0938          Disposition: pending count recovery     Yoanna Andrade, CARRINGTON  Bone Marrow Transplant  [facility]

## 2018-07-17 NOTE — ASSESSMENT & PLAN NOTE
- Spiking fevers again. 24 hr tmax- 100.4  - 7/13/18 Blood cx x2 ngtd; UA negative; Urine cx no growth; Chest xray negative  - Started on  vancomycin + cefepime on 7/13/2018, d/c vanc 7/16 (got 4 days); today is day 5 of cef

## 2018-07-17 NOTE — PLAN OF CARE
Problem: Patient Care Overview  Goal: Plan of Care Review  Outcome: Ongoing (interventions implemented as appropriate)  Day 25 of [facility]. Afebrile. Free from falls or injury. No complaints of pain. Cefepime given as scheduled. Bed locked in lowest position, non skid socks on, call light within reach. Pt instructed to call if any assistance is needed. Vitals stable. Neutropenic precautions maintained. Will cont to alexis pt.

## 2018-07-17 NOTE — SUBJECTIVE & OBJECTIVE
Subjective:     Interval History:   [facility] day 25, BM bx final path without evidence of CML. Day 5 of cef, afebrile, one unsustained temp of 100.4 yesterday evening.     Objective:     Vital Signs (Most Recent):  Temp: 99 °F (37.2 °C) (07/17/18 0745)  Pulse: 91 (07/17/18 0745)  Resp: 18 (07/17/18 0745)  BP: 121/76 (07/17/18 0745)  SpO2: 98 % (07/17/18 0745) Vital Signs (24h Range):  Temp:  [98.5 °F (36.9 °C)-100.4 °F (38 °C)] 99 °F (37.2 °C)  Pulse:  [] 91  Resp:  [16-18] 18  SpO2:  [96 %-99 %] 98 %  BP: (114-125)/(68-77) 121/76     Weight: 62.1 kg (136 lb 12.7 oz)  Body mass index is 19.08 kg/m².  Body surface area is 1.76 meters squared.    Intake/Output - Last 3 Shifts       07/15 0700 - 07/16 0659 07/16 0700 - 07/17 0659 07/17 0700 - 07/18 0659    P.O. 1440 840     I.V. (mL/kg) 80 (1.3)      IV Piggyback 750      Total Intake(mL/kg) 2270 (36.9) 840 (13.5)     Urine (mL/kg/hr) 2000 (1.4) 1175 (0.8)     Total Output 2000 1175      Net +270 -335             Urine Occurrence 2 x      Stool Occurrence 2 x          Physical Exam   Constitutional: He is oriented to person, place, and time. He appears well-developed and well-nourished.   HENT:   Head: Normocephalic and atraumatic.   Left side temporal swelling resolved  Flaky lips improving   Eyes: EOM are normal. Pupils are equal, round, and reactive to light.   Left periorbital edema resolved   Neck: Normal range of motion. Neck supple.   Right side neck swelling resolved   Cardiovascular: Normal rate and regular rhythm.    No murmur heard.  Pulmonary/Chest: Effort normal and breath sounds normal. No respiratory distress.   Abdominal: Soft. Bowel sounds are normal. He exhibits no distension.   Musculoskeletal: Normal range of motion. He exhibits no edema.   Neurological: He is alert and oriented to person, place, and time.   Skin: Skin is warm and dry.   Central line c/d/i  Redness to scalp improved, with some dry skin to head  Left side of inner buttocks with  open reddened wound and white appearing ulceration, improving   Psychiatric: He has a normal mood and affect. His behavior is normal.   Vitals reviewed.    Significant Labs:   CBC:     Recent Labs  Lab 07/16/18  0400 07/17/18  0411   WBC 0.25* 0.45*   HGB 8.0* 7.7*   HCT 24.0* 23.2*   PLT 16* 23*    and CMP:     Recent Labs  Lab 07/16/18  0400 07/17/18  0411    138   K 3.9 3.9    105   CO2 26 25   GLU 94 92   BUN 7 9   CREATININE 0.8 0.8   CALCIUM 8.6* 8.8   PROT 5.9* 5.9*   ALBUMIN 2.6* 2.6*   BILITOT 0.3 0.3   ALKPHOS 84 80   AST 13 13   ALT 26 21   ANIONGAP 7* 8   EGFRNONAA >60.0 >60.0       Diagnostic Results:  I have reviewed all pertinent imaging results/findings within the past 24 hours.     7/13/18  Blood cx x2 ngtd  UA negative  Urine cx no growth  Chest xray negative

## 2018-07-17 NOTE — ASSESSMENT & PLAN NOTE
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 9% blasts.   - persistent disease post 7+3 induction. 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    - had BMBX on 7/13/18, without evidence of CML  - AML FISH negative  - NGS negative from first repeat BMBX  - ECHO with 63% EF  - HIV negative, Hepatitis negative, G6PD wnl  - HLA typing hi and lo completed  - referral information for transplant benefits given to patient  - patient has 1 full sibling, contact info given to transplant coordinators  - pt still needs to call base to get formal referral for [facility], having difficulty with this  - consider sending BCR/ABL mutational analysis testing when WBC increases

## 2018-07-17 NOTE — TELEPHONE ENCOUNTER
Spoke with patient regarding the [employer] Card (1 month free trial).  Patient would like to proceed.  Advised that once we get everything processed and have a solid date of discharge, [facility] will reach out to patient, offer consultation and deliver medication to the bedside.

## 2018-07-17 NOTE — ASSESSMENT & PLAN NOTE
- transfuse for Hgb <7 and platelets <10K  - continue PPX antimicrobials  - WBC 0.45 with ANC of 70, plt 23K, hgb 7.7

## 2018-07-18 LAB
ALBUMIN SERPL BCP-MCNC: 2.5 G/DL
ALP SERPL-CCNC: 80 U/L
ALT SERPL W/O P-5'-P-CCNC: 23 U/L
ANION GAP SERPL CALC-SCNC: 10 MMOL/L
AST SERPL-CCNC: 14 U/L
BACTERIA BLD CULT: NORMAL
BACTERIA BLD CULT: NORMAL
BASOPHILS # BLD AUTO: 0 K/UL
BASOPHILS NFR BLD: 0 %
BILIRUB SERPL-MCNC: 0.2 MG/DL
BUN SERPL-MCNC: 9 MG/DL
CALCIUM SERPL-MCNC: 8.4 MG/DL
CHLORIDE SERPL-SCNC: 107 MMOL/L
CO2 SERPL-SCNC: 24 MMOL/L
CREAT SERPL-MCNC: 0.7 MG/DL
DIFFERENTIAL METHOD: ABNORMAL
EOSINOPHIL # BLD AUTO: 0 K/UL
EOSINOPHIL NFR BLD: 0 %
ERYTHROCYTE [DISTWIDTH] IN BLOOD BY AUTOMATED COUNT: 14.3 %
EST. GFR  (AFRICAN AMERICAN): >60 ML/MIN/1.73 M^2
EST. GFR  (NON AFRICAN AMERICAN): >60 ML/MIN/1.73 M^2
GLUCOSE SERPL-MCNC: 82 MG/DL
HCT VFR BLD AUTO: 23.4 %
HGB BLD-MCNC: 7.7 G/DL
IMM GRANULOCYTES # BLD AUTO: 0 K/UL
IMM GRANULOCYTES NFR BLD AUTO: 0 %
LYMPHOCYTES # BLD AUTO: 0.4 K/UL
LYMPHOCYTES NFR BLD: 62.5 %
MAGNESIUM SERPL-MCNC: 1.7 MG/DL
MCH RBC QN AUTO: 28.6 PG
MCHC RBC AUTO-ENTMCNC: 32.9 G/DL
MCV RBC AUTO: 87 FL
MONOCYTES # BLD AUTO: 0.1 K/UL
MONOCYTES NFR BLD: 17.2 %
NEUTROPHILS # BLD AUTO: 0.1 K/UL
NEUTROPHILS NFR BLD: 20.3 %
NRBC BLD-RTO: 0 /100 WBC
PHOSPHATE SERPL-MCNC: 3.4 MG/DL
PLATELET # BLD AUTO: 36 K/UL
PMV BLD AUTO: 11.9 FL
POTASSIUM SERPL-SCNC: 3.8 MMOL/L
PROT SERPL-MCNC: 5.6 G/DL
RBC # BLD AUTO: 2.69 M/UL
SODIUM SERPL-SCNC: 141 MMOL/L
WBC # BLD AUTO: 0.64 K/UL

## 2018-07-18 PROCEDURE — 97803 MED NUTRITION INDIV SUBSEQ: CPT

## 2018-07-18 PROCEDURE — 63600175 PHARM REV CODE 636 W HCPCS: Performed by: STUDENT IN AN ORGANIZED HEALTH CARE EDUCATION/TRAINING PROGRAM

## 2018-07-18 PROCEDURE — 83735 ASSAY OF MAGNESIUM: CPT

## 2018-07-18 PROCEDURE — 63700000 PHARM REV CODE 250 ALT 637 W/O HCPCS: Performed by: INTERNAL MEDICINE

## 2018-07-18 PROCEDURE — 80053 COMPREHEN METABOLIC PANEL: CPT

## 2018-07-18 PROCEDURE — 20600001 HC STEP DOWN PRIVATE ROOM

## 2018-07-18 PROCEDURE — 85025 COMPLETE CBC W/AUTO DIFF WBC: CPT

## 2018-07-18 PROCEDURE — 25000003 PHARM REV CODE 250: Performed by: NURSE PRACTITIONER

## 2018-07-18 PROCEDURE — 99233 SBSQ HOSP IP/OBS HIGH 50: CPT | Mod: ,,, | Performed by: INTERNAL MEDICINE

## 2018-07-18 PROCEDURE — 63600175 PHARM REV CODE 636 W HCPCS: Performed by: INTERNAL MEDICINE

## 2018-07-18 PROCEDURE — 84100 ASSAY OF PHOSPHORUS: CPT

## 2018-07-18 PROCEDURE — 25000003 PHARM REV CODE 250: Performed by: INTERNAL MEDICINE

## 2018-07-18 RX ORDER — CIPROFLOXACIN 250 MG/1
500 TABLET, FILM COATED ORAL EVERY 12 HOURS
Status: DISCONTINUED | OUTPATIENT
Start: 2018-07-18 | End: 2018-07-23 | Stop reason: HOSPADM

## 2018-07-18 RX ADMIN — ACYCLOVIR 400 MG: 200 CAPSULE ORAL at 09:07

## 2018-07-18 RX ADMIN — VORICONAZOLE 200 MG: 200 TABLET ORAL at 09:07

## 2018-07-18 RX ADMIN — MIRTAZAPINE 15 MG: 15 TABLET, ORALLY DISINTEGRATING ORAL at 09:07

## 2018-07-18 RX ADMIN — CIPROFLOXACIN HYDROCHLORIDE 500 MG: 250 TABLET, FILM COATED ORAL at 09:07

## 2018-07-18 RX ADMIN — VORICONAZOLE 200 MG: 200 TABLET ORAL at 10:07

## 2018-07-18 RX ADMIN — CEFEPIME 2 G: 2 INJECTION, POWDER, FOR SOLUTION INTRAVENOUS at 01:07

## 2018-07-18 RX ADMIN — DASATINIB 40 MG: 20 TABLET ORAL at 09:07

## 2018-07-18 RX ADMIN — MAGNESIUM SULFATE IN WATER 2 G: 40 INJECTION, SOLUTION INTRAVENOUS at 09:07

## 2018-07-18 NOTE — PLAN OF CARE
Patient is progressing toward d/c.   today and planning for potential d/c by Friday.  CM arranged a PCP appointment on 7/23 with Dr Parminder Grove in [place], [place].  Records faxed to [phone].  Dr Grove's office has been made aware that we are requesting a referral for BMT @ [facility].  Contact info for Dr Pantoja and the clinic fax number were included in the records.  CM updated the patient with the d/c plan.  Will continue to follow.      Follow-up Information     Parminder Grove MD On 7/23/2018.    Specialty:  Family Medicine  Why:  follow up- 11:15am  Contact information:  [address] [facility]  [phone]

## 2018-07-18 NOTE — PLAN OF CARE
Problem: Patient Care Overview  Goal: Plan of Care Review  Outcome: Ongoing (interventions implemented as appropriate)  AAOx4. POC discussed at onset of shift. Questions answered. VSS. Denies pain. Tolerating diet, fluids encouraged. Frequent voiding encouraged. Voiding without difficulty. Perianal wound (cleansed with NS and skin barrier applied PRN). Educated on s/s of infection and instructed to call for any increase redness, tenderness, warmth, or drainage. All fall precautions in place.  educated on fall risks and preventions.Bed locked in low position call light in reach. CTM

## 2018-07-18 NOTE — ASSESSMENT & PLAN NOTE
- Spiking fevers again. 24 hr tmax- 100.4  - 7/13/18 Blood cx x2 ngtd; UA negative; Urine cx no growth; Chest xray negative  - Started on  vancomycin + cefepime on 7/13/2018, d/c vanc 7/16 (got 4 days); today is day 6 of cef, will d/c today

## 2018-07-18 NOTE — ASSESSMENT & PLAN NOTE
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 9% blasts.   - persistent disease post 7+3 induction. 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    - had BMBX on 7/13/18, without evidence of CML; BCR ABL p210 0.5% (c/w MRD)  - AML FISH negative  - NGS negative from first repeat BMBX  - ECHO with 63% EF  - HIV negative, Hepatitis negative, G6PD wnl  - HLA typing hi and lo completed  - referral information for transplant benefits given to patient  - patient has 1 full sibling, contact info given to transplant coordinators  - pt still needs to call base to get formal referral for [facility], having difficulty with this  - consider sending BCR/ABL mutational analysis testing when WBC increases

## 2018-07-18 NOTE — PROGRESS NOTES
" [facility]  Adult Nutrition  Progress Note    SUMMARY       Recommendations    Recommendation/Intervention:   Recommendation  1. Continue w/ Regular diet.   2. Encourage PO intake >/= 75% EEN/EPN      Intervention:   1.Provide snacks &  ONS for increased nutrient needs   2. RD to follow  Goals: pt to consume nutrients >/= 85% EEN/EPN   Nutrition Goal Status: progressing towards goal  Communication of RD Recs:  (POC)    Reason for Assessment    Reason for Assessment: RD follow-up  Diagnosis:  (Chronic myelogenous leukemia (CML)  Relevant Medical History: CML  Interdisciplinary Rounds: attended  General Information Comments: pt transferred from [place] w/ CML with blast crisis. completed 7+3 regimen. current Regimen: Day 26 MEC,  BM bx results pending . pt po intake is good, eating 75% of meals + Boost Breeze TID and snacks. Denies NVDC.  Pt admit w/ Malnutrition, but has been increasing po intake and wt trending upward x 2wks NFPE conducted previously by RD on . S&S noted in PES  Nutrition Discharge Planning: adequate po intake     Nutrition Risk Screen    Nutrition Risk Screen: no indicators present    Nutrition/Diet History    Patient Reported Diet/Restrictions/Preferences: general  Food Preferences: none  Do you have any cultural, spiritual, Pentec[employer]alism conflicts, given your current situation?: no  Food Allergies: NKFA  Factors Affecting Nutritional Intake: decreased appetite, altered taste    Anthropometrics    Temp: 98.7 °F (37.1 °C)  Height Method: Stated  Height: 5' 11" (180.3 cm)  Height (inches): 71 in  Weight Method: Standard Scale  Weight: 61.5 kg (135 lb 9.3 oz)  Weight (lb): 135.58 lb  Ideal Body Weight (IBW), Male: 172 lb  % Ideal Body Weight, Male (lb): 78.83 lb  BMI (Calculated): 18.9  BMI Grade: 18.5-24.9 - normal  Weight Loss:  (stable)  Usual Body Weight (UBW), k.2 kg  % Usual Body Weight: 81.95  % Weight Change From Usual Weight: -18.22 %   "     Lab/Procedures/Meds    Pertinent Labs Reviewed: reviewed  Pertinent Labs Comments: WBC-0.64, H/H-7.7/23.4, Plts-36, Ca-8.4, Alb-2.5  Pertinent Medications Reviewed: reviewed  Pertinent Medications Comments: acyclovir, dasatinib, duke's soln. Heparin, Mg Sulfate, polyethylene glycol    Physical Findings/Assessment    Overall Physical Appearance: loss of muscle mass, weak  Tubes: other (see comments)  Oral/Mouth Cavity: WDL  Skin: intact    Estimated/Assessed Needs    Weight Used For Calorie Calculations: 61.5 kg (135 lb 9.3 oz)  Energy Calorie Requirements (kcal): 5193-9262 kcal/d  Energy Need Method: Kcal/kg (25-30kcal/kg)  Protein Requirements: 92-111g/d (1.5-1.8g/kg)  Weight Used For Protein Calculations: 61.5 kg (135 lb 9.3 oz)     Fluid Need Method: RDA Method (1ml/Kcal or Per MD)  RDA Method (mL): 1845         Nutrition Prescription Ordered    Current Diet Order: regular  Oral Nutrition Supplement: boost breeze     Evaluation of Received Nutrient/Fluid Intake    Oral Calories (kcal): 750  Oral Protein (gm): 27  Oral Fluid (mL): 711  IV Fluid (mL): 0  I/O: -2.0 L since admit    Intake/Output Summary (Last 24 hours) at 07/18/18 1032  Last data filed at 07/18/18 0700   Gross per 24 hour   Intake                0 ml   Output              800 ml   Net             -800 ml       Energy Calories Required: meeting needs  Protein Required: meeting needs  Fluid Required: meeting needs  Comments: LBM: 7/17/18  Tolerance: tolerating  % Intake of Estimated Energy Needs: 75 - 100 %  % Meal Intake: 75 - 100 %    Nutrition Risk    Level of Risk/Frequency of Follow-up: low (f/u 1 x wk)     Assessment and Plan       Assessment and Plan         Severe malnutrition     Malnutrition in the context of Acute Illness/Injury     Related to (etiology):  CML     Signs and Symptoms (as evidenced by):  Energy Intake: <50% of estimated energy requirement for ~ 1 week  Muscle Mass Depletion: moderate depletion of temples   Weight Loss:  20% x 1 month      Interventions/Recommendations (treatment strategy):  Recommendation  1. Continue w/ Regular diet.   2. Encourage PO intake >/= 75% EEN/EPN      Intervention:   1.Provide snacks &  ONS for increased nutrient needs   2. RD to follow     Nutrition Diagnosis Status:  Improving          Monitor and Evaluation    Food and Nutrient Intake: energy intake, food and beverage intake  Food and Nutrient Administration: diet order  Physical Activity and Function: nutrition-related ADLs and IADLs  Anthropometric Measurements: weight, weight change  Biochemical Data, Medical Tests and Procedures:  (All labs)  Nutrition-Focused Physical Findings: overall appearance     Nutrition Follow-Up    RD Follow-up?: Yes

## 2018-07-18 NOTE — SUBJECTIVE & OBJECTIVE
Subjective:     Interval History:   [facility] day 26, Day 6 of cef, will d/c today. Afebrile. ANC up to 130 today.   BCR ABL p210 0.5% (c/w MRD)    Objective:     Vital Signs (Most Recent):  Temp: 98.7 °F (37.1 °C) (07/18/18 0801)  Pulse: 105 (07/18/18 0801)  Resp: 18 (07/18/18 0801)  BP: 120/72 (07/18/18 0801)  SpO2: 98 % (07/18/18 0801) Vital Signs (24h Range):  Temp:  [98.3 °F (36.8 °C)-99.2 °F (37.3 °C)] 98.7 °F (37.1 °C)  Pulse:  [] 105  Resp:  [18-20] 18  SpO2:  [97 %-99 %] 98 %  BP: (102-123)/(59-76) 120/72     Weight: 61.5 kg (135 lb 9.3 oz)  Body mass index is 18.91 kg/m².  Body surface area is 1.76 meters squared.    Intake/Output - Last 3 Shifts       07/16 0700 - 07/17 0659 07/17 0700 - 07/18 0659 07/18 0700 - 07/19 0659    P.O. 840      Total Intake(mL/kg) 840 (13.5)      Urine (mL/kg/hr) 1175 (0.8) 400 (0.3) 400 (5.5)    Total Output 1175 400 400    Net -335 -400 -400           Stool Occurrence  2 x         Physical Exam   Constitutional: He is oriented to person, place, and time. He appears well-developed and well-nourished.   HENT:   Head: Normocephalic and atraumatic.   Left side temporal swelling resolved  Flaky lips improving   Eyes: EOM are normal. Pupils are equal, round, and reactive to light.   Left periorbital edema resolved   Neck: Normal range of motion. Neck supple.   Right side neck swelling resolved   Cardiovascular: Normal rate and regular rhythm.    No murmur heard.  Pulmonary/Chest: Effort normal and breath sounds normal. No respiratory distress.   Abdominal: Soft. Bowel sounds are normal. He exhibits no distension.   Musculoskeletal: Normal range of motion. He exhibits no edema.   Neurological: He is alert and oriented to person, place, and time.   Skin: Skin is warm and dry.   Central line c/d/i  Redness to scalp improved, with some dry skin to head  Left side of inner buttocks with open reddened wound and white appearing ulceration, improving   Psychiatric: He has a normal mood  and affect. His behavior is normal.   Vitals reviewed.    Significant Labs:   CBC:     Recent Labs  Lab 07/17/18  0411 07/18/18  0405   WBC 0.45* 0.64*   HGB 7.7* 7.7*   HCT 23.2* 23.4*   PLT 23* 36*    and CMP:     Recent Labs  Lab 07/17/18  0411 07/18/18  0405    141   K 3.9 3.8    107   CO2 25 24   GLU 92 82   BUN 9 9   CREATININE 0.8 0.7   CALCIUM 8.8 8.4*   PROT 5.9* 5.6*   ALBUMIN 2.6* 2.5*   BILITOT 0.3 0.2   ALKPHOS 80 80   AST 13 14   ALT 21 23   ANIONGAP 8 10   EGFRNONAA >60.0 >60.0       Diagnostic Results:  I have reviewed all pertinent imaging results/findings within the past 24 hours.     7/13/18  Blood cx x2 ngtd  UA negative  Urine cx no growth  Chest xray negative

## 2018-07-18 NOTE — PLAN OF CARE
Problem: Patient Care Overview  Goal: Plan of Care Review  Outcome: Ongoing (interventions implemented as appropriate)  Pt AAOx4. PO ciprofloxacin initiated this shift; pt tolerated well. IV magnesium replaced this shift. Ulceration to Left inner buttock noted; criticaid applied per pt. Pt has remained free from injury this shift. Pt had no c/o nausea or pain this shift.  Bed in low locked position. Call light and personal belongings within reach. Side rails up x2. Nonskid socks in place. All questions and comments addressed at this time. Will continue to monitor.  Fall, Pressure ulcer, infection precautions continued.

## 2018-07-18 NOTE — ASSESSMENT & PLAN NOTE
- transfuse for Hgb <7 and platelets <10K  - continue PPX antimicrobials  - WBC 0.64 with ANC of 130, plt 36K, hgb 7.7

## 2018-07-18 NOTE — PROGRESS NOTES
[facility]  Hematology  Bone Marrow Transplant  Progress Note    Patient Name: Wilton Guzmán  Admission Date: 6/16/2018  Hospital Length of Stay: 32 days  Code Status: Full Code    Subjective:     Interval History:   [facility] day 26, Day 6 of cef, will d/c today. Afebrile. ANC up to 130 today.   BCR ABL p210 0.5% (c/w MRD)    Objective:     Vital Signs (Most Recent):  Temp: 98.7 °F (37.1 °C) (07/18/18 0801)  Pulse: 105 (07/18/18 0801)  Resp: 18 (07/18/18 0801)  BP: 120/72 (07/18/18 0801)  SpO2: 98 % (07/18/18 0801) Vital Signs (24h Range):  Temp:  [98.3 °F (36.8 °C)-99.2 °F (37.3 °C)] 98.7 °F (37.1 °C)  Pulse:  [] 105  Resp:  [18-20] 18  SpO2:  [97 %-99 %] 98 %  BP: (102-123)/(59-76) 120/72     Weight: 61.5 kg (135 lb 9.3 oz)  Body mass index is 18.91 kg/m².  Body surface area is 1.76 meters squared.    Intake/Output - Last 3 Shifts       07/16 0700 - 07/17 0659 07/17 0700 - 07/18 0659 07/18 0700 - 07/19 0659    P.O. 840      Total Intake(mL/kg) 840 (13.5)      Urine (mL/kg/hr) 1175 (0.8) 400 (0.3) 400 (5.5)    Total Output 1175 400 400    Net -335 -400 -400           Stool Occurrence  2 x         Physical Exam   Constitutional: He is oriented to person, place, and time. He appears well-developed and well-nourished.   HENT:   Head: Normocephalic and atraumatic.   Left side temporal swelling resolved  Flaky lips improving   Eyes: EOM are normal. Pupils are equal, round, and reactive to light.   Left periorbital edema resolved   Neck: Normal range of motion. Neck supple.   Right side neck swelling resolved   Cardiovascular: Normal rate and regular rhythm.    No murmur heard.  Pulmonary/Chest: Effort normal and breath sounds normal. No respiratory distress.   Abdominal: Soft. Bowel sounds are normal. He exhibits no distension.   Musculoskeletal: Normal range of motion. He exhibits no edema.   Neurological: He is alert and oriented to person, place, and time.   Skin: Skin is warm and dry.    Central line c/d/i  Redness to scalp improved, with some dry skin to head  Left side of inner buttocks with open reddened wound and white appearing ulceration, improving   Psychiatric: He has a normal mood and affect. His behavior is normal.   Vitals reviewed.    Significant Labs:   CBC:     Recent Labs  Lab 07/17/18  0411 07/18/18  0405   WBC 0.45* 0.64*   HGB 7.7* 7.7*   HCT 23.2* 23.4*   PLT 23* 36*    and CMP:     Recent Labs  Lab 07/17/18  0411 07/18/18  0405    141   K 3.9 3.8    107   CO2 25 24   GLU 92 82   BUN 9 9   CREATININE 0.8 0.7   CALCIUM 8.8 8.4*   PROT 5.9* 5.6*   ALBUMIN 2.6* 2.5*   BILITOT 0.3 0.2   ALKPHOS 80 80   AST 13 14   ALT 21 23   ANIONGAP 8 10   EGFRNONAA >60.0 >60.0       Diagnostic Results:  I have reviewed all pertinent imaging results/findings within the past 24 hours.     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 9% blasts.   - persistent disease post 7+3 induction. 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    - had BMBX on 7/13/18, without evidence of CML; BCR ABL p210 0.5% (c/w MRD)  - AML FISH negative  - NGS negative from first repeat BMBX  - ECHO with 63% EF  - HIV negative, Hepatitis negative, G6PD wnl  - HLA typing hi and lo completed  - referral information for transplant benefits given to patient  - patient has 1 full sibling, contact info given to transplant  coordinators  - pt still needs to call base to get formal referral for [facility], having difficulty with this  - consider sending BCR/ABL mutational analysis testing when WBC increases        Pancytopenia due to antineoplastic chemotherapy    - transfuse for Hgb <7 and platelets <10K  - continue PPX antimicrobials  - WBC 0.64 with ANC of 130, plt 36K, hgb 7.7        Neutropenic fever    - Spiking fevers again. 24 hr tmax- 100.4  - 7/13/18 Blood cx x2 ngtd; UA negative; Urine cx no growth; Chest xray negative  - Started on  vancomycin + cefepime on 7/13/2018, d/c vanc 7/16 (got 4 days); today is day 6 of cef, will d/c today        Ulcer of perianal area, limited to breakdown of skin    - reddened area with white ulceration noted to inner left side of buttocks  - consulted wound care, appreciate recs   - started critic aid barrier paste and ordered waffle cushion. Pt states this is helping           Severe malnutrition    - albumin and weight was previously dropping  - daily weights  - dietary following             Anxiety    - Patient understandibly experiencing anxiety about his new diagnosis and feeling isolated in his room  - Declined oncology psychologist consult earlier during this admission  - Continue Remeron for anxiety and insomnia                VTE Risk Mitigation         Ordered     heparin, porcine (PF) 100 unit/mL injection flush 300 Units  As needed (PRN)      06/23/18 1052     IP VTE LOW RISK PATIENT  Once      06/16/18 0938          Disposition: pending count recovery; CM working on finding PCP for pt within [facility] system     Yoanna Andrade, CARRINGTON  Bone Marrow Transplant  [facility]

## 2018-07-19 LAB
ALBUMIN SERPL BCP-MCNC: 2.5 G/DL
ALP SERPL-CCNC: 81 U/L
ALT SERPL W/O P-5'-P-CCNC: 25 U/L
ANION GAP SERPL CALC-SCNC: 11 MMOL/L
ANISOCYTOSIS BLD QL SMEAR: SLIGHT
AST SERPL-CCNC: 16 U/L
BASOPHILS # BLD AUTO: 0 K/UL
BASOPHILS NFR BLD: 0 %
BILIRUB SERPL-MCNC: 0.1 MG/DL
BUN SERPL-MCNC: 7 MG/DL
CALCIUM SERPL-MCNC: 8.7 MG/DL
CHLORIDE SERPL-SCNC: 108 MMOL/L
CO2 SERPL-SCNC: 23 MMOL/L
CREAT SERPL-MCNC: 0.7 MG/DL
DIFFERENTIAL METHOD: ABNORMAL
EOSINOPHIL # BLD AUTO: 0 K/UL
EOSINOPHIL NFR BLD: 0 %
ERYTHROCYTE [DISTWIDTH] IN BLOOD BY AUTOMATED COUNT: 14.5 %
EST. GFR  (AFRICAN AMERICAN): >60 ML/MIN/1.73 M^2
EST. GFR  (NON AFRICAN AMERICAN): >60 ML/MIN/1.73 M^2
GLUCOSE SERPL-MCNC: 83 MG/DL
HCT VFR BLD AUTO: 23 %
HGB BLD-MCNC: 7.3 G/DL
HYPOCHROMIA BLD QL SMEAR: ABNORMAL
IMM GRANULOCYTES # BLD AUTO: 0 K/UL
IMM GRANULOCYTES NFR BLD AUTO: 0 %
LYMPHOCYTES # BLD AUTO: 0.5 K/UL
LYMPHOCYTES NFR BLD: 56.3 %
MAGNESIUM SERPL-MCNC: 2 MG/DL
MCH RBC QN AUTO: 28.2 PG
MCHC RBC AUTO-ENTMCNC: 31.7 G/DL
MCV RBC AUTO: 89 FL
MONOCYTES # BLD AUTO: 0.2 K/UL
MONOCYTES NFR BLD: 26.3 %
NEUTROPHILS # BLD AUTO: 0.1 K/UL
NEUTROPHILS NFR BLD: 17.4 %
NRBC BLD-RTO: 0 /100 WBC
OVALOCYTES BLD QL SMEAR: ABNORMAL
PHOSPHATE SERPL-MCNC: 3.1 MG/DL
PLATELET # BLD AUTO: 58 K/UL
PMV BLD AUTO: 12.4 FL
POIKILOCYTOSIS BLD QL SMEAR: SLIGHT
POLYCHROMASIA BLD QL SMEAR: ABNORMAL
POTASSIUM SERPL-SCNC: 4 MMOL/L
PROT SERPL-MCNC: 5.6 G/DL
RBC # BLD AUTO: 2.59 M/UL
SODIUM SERPL-SCNC: 142 MMOL/L
WBC # BLD AUTO: 0.8 K/UL

## 2018-07-19 PROCEDURE — 99233 SBSQ HOSP IP/OBS HIGH 50: CPT | Mod: ,,, | Performed by: INTERNAL MEDICINE

## 2018-07-19 PROCEDURE — 20600001 HC STEP DOWN PRIVATE ROOM

## 2018-07-19 PROCEDURE — 63700000 PHARM REV CODE 250 ALT 637 W/O HCPCS: Performed by: INTERNAL MEDICINE

## 2018-07-19 PROCEDURE — 83735 ASSAY OF MAGNESIUM: CPT

## 2018-07-19 PROCEDURE — 25000003 PHARM REV CODE 250: Performed by: INTERNAL MEDICINE

## 2018-07-19 PROCEDURE — 80053 COMPREHEN METABOLIC PANEL: CPT

## 2018-07-19 PROCEDURE — 85025 COMPLETE CBC W/AUTO DIFF WBC: CPT

## 2018-07-19 PROCEDURE — 25000003 PHARM REV CODE 250: Performed by: NURSE PRACTITIONER

## 2018-07-19 PROCEDURE — 84100 ASSAY OF PHOSPHORUS: CPT

## 2018-07-19 RX ADMIN — MIRTAZAPINE 15 MG: 15 TABLET, ORALLY DISINTEGRATING ORAL at 08:07

## 2018-07-19 RX ADMIN — ACYCLOVIR 400 MG: 200 CAPSULE ORAL at 08:07

## 2018-07-19 RX ADMIN — VORICONAZOLE 200 MG: 200 TABLET ORAL at 09:07

## 2018-07-19 RX ADMIN — ACYCLOVIR 400 MG: 200 CAPSULE ORAL at 09:07

## 2018-07-19 RX ADMIN — CIPROFLOXACIN HYDROCHLORIDE 500 MG: 250 TABLET, FILM COATED ORAL at 09:07

## 2018-07-19 RX ADMIN — VORICONAZOLE 200 MG: 200 TABLET ORAL at 08:07

## 2018-07-19 RX ADMIN — CIPROFLOXACIN HYDROCHLORIDE 500 MG: 250 TABLET, FILM COATED ORAL at 08:07

## 2018-07-19 RX ADMIN — DASATINIB 40 MG: 20 TABLET ORAL at 09:07

## 2018-07-19 NOTE — ASSESSMENT & PLAN NOTE
- Patient understandibly experiencing anxiety about his diagnosis and feeling isolated in his room  - Declined oncology psychologist consult earlier during this admission  - Continue Remeron for anxiety and insomnia

## 2018-07-19 NOTE — ASSESSMENT & PLAN NOTE
- transfuse for Hgb <7 and platelets <10K  - continue PPX antimicrobials: acyc, cipro, vori  - no indications for transfusions;

## 2018-07-19 NOTE — PROGRESS NOTES
"Follow up visit for perianal ulceration  Pt states ulcer feels much better. Reports he had some "bloody drainage" but it has stopped. Area less indurated to periwound. Has been applying critic aid paste to site himself and wound is coated with thick barrier paste.      07/19/18 1140   Oxygen Therapy   SpO2 99 %   ECG   Pulse 82       Wound 07/12/18 1130 Ulceration Perirectal   Date First Assessed/Time First Assessed: 07/12/18 1130   Pre-existing: Yes  Primary Wound Type: Ulceration  Location: Perirectal   Wound Image (media file unavailable)   Wound WDL ex   Dressing Appearance Open to air;No dressing  (critic aid paste in use)   Drainage Amount None   Drainage Characteristics/Odor No odor   Appearance Pink;Moist   Red (%), Wound Tissue Color 100 %  (pink)   Periwound Area [place]  (not as indurated)   Wound Edges Open   Wound Length (cm) 0.4 cm   Wound Width (cm) 0.3 cm   Wound Depth (cm) 0.2 cm   Wound Volume (cm^3) 0.02 cm^3   Care Cleansed with:;Sterile normal saline;Applied:;Skin Barrier   Vitals   /80     Sachin Flores RN CWON  [id number]  "

## 2018-07-19 NOTE — PROGRESS NOTES
SW met with pt at bedside to provide support. Pt anticipated to DC home 7/20/18. SW will continue to follow.    Kimberly Briggs [facility]  Oncology Social Worker  Phone: [phone]

## 2018-07-19 NOTE — TELEPHONE ENCOUNTER
Initial Sprycel consult declined on 18. Sprycel will be delivered bedside to 8th floor oncology Room 823 on 18. $0.00 copay. Patient plans to start Sprycel on 18. Inpatient room number confirmed, 2 patient identifiers - name and . Therapy Appropriate.      Patient declined consultation as he has been on the medication in the past and during this hospital stay.  Patient is due to be discharged home over the weekend.  He notes that he previously tolerated the medication well in the past with the exception of a rash.  Noted that this may occur again as they will be discharging him home on the same dose that he had when the rash presented itself, but he will also receive hydrocortisone 1% cream to have on hand if rash reappears.  Patient did not have any further questions or concerns at this time.

## 2018-07-19 NOTE — PLAN OF CARE
Problem: Patient Care Overview  Goal: Plan of Care Review  Outcome: Ongoing (interventions implemented as appropriate)  Plan of care reviewed with patient . Patient rested thru out shift. Independent in care . VSS stable thru out shift.  Bed in low locked position , call light in reach. No nausea, vomitting , afebrile this shift . Hourly monitoring continues

## 2018-07-19 NOTE — ASSESSMENT & PLAN NOTE
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 9% blasts.   - persistent disease post 7+3 induction. 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    - had BMBX on 7/13/18, without evidence of CML; BCR ABL p210 0.5% (c/w MRD)  - AML FISH negative  - NGS negative from first repeat BMBX  - ECHO with 63% EF  - HIV negative, Hepatitis negative, G6PD wnl  - HLA typing hi and lo completed  - referral information for transplant benefits given to patient  - patient has 1 full sibling, contact info given to transplant coordinators  - pt still needs to see PCP to get formal referral; appt set up with PCP Monday, July 23 in [place]. PCP should be able to refer back to us for services.   - consider sending BCR/ABL mutational analysis testing when WBC increases

## 2018-07-20 LAB
ALBUMIN SERPL BCP-MCNC: 2.6 G/DL
ALP SERPL-CCNC: 83 U/L
ALT SERPL W/O P-5'-P-CCNC: 27 U/L
ANION GAP SERPL CALC-SCNC: 7 MMOL/L
ANISOCYTOSIS BLD QL SMEAR: SLIGHT
AST SERPL-CCNC: 19 U/L
BASOPHILS # BLD AUTO: 0 K/UL
BASOPHILS NFR BLD: 0 %
BILIRUB SERPL-MCNC: 0.1 MG/DL
BUN SERPL-MCNC: 9 MG/DL
CALCIUM SERPL-MCNC: 8.5 MG/DL
CHLORIDE SERPL-SCNC: 107 MMOL/L
CO2 SERPL-SCNC: 25 MMOL/L
CREAT SERPL-MCNC: 0.7 MG/DL
DIFFERENTIAL METHOD: ABNORMAL
DNA/RNA EXTRACT AND HOLD RESULT: NORMAL
DNA/RNA EXTRACTION: NORMAL
EOSINOPHIL # BLD AUTO: 0 K/UL
EOSINOPHIL NFR BLD: 0 %
ERYTHROCYTE [DISTWIDTH] IN BLOOD BY AUTOMATED COUNT: 14.4 %
EST. GFR  (AFRICAN AMERICAN): >60 ML/MIN/1.73 M^2
EST. GFR  (NON AFRICAN AMERICAN): >60 ML/MIN/1.73 M^2
EXHR SPECIMEN TYPE: NORMAL
GLUCOSE SERPL-MCNC: 85 MG/DL
HCT VFR BLD AUTO: 22.4 %
HGB BLD-MCNC: 7.1 G/DL
IMM GRANULOCYTES # BLD AUTO: 0 K/UL
IMM GRANULOCYTES NFR BLD AUTO: 0 %
LYMPHOCYTES # BLD AUTO: 0.4 K/UL
LYMPHOCYTES NFR BLD: 41.4 %
MAGNESIUM SERPL-MCNC: 2.1 MG/DL
MCH RBC QN AUTO: 28.3 PG
MCHC RBC AUTO-ENTMCNC: 31.7 G/DL
MCV RBC AUTO: 89 FL
MONOCYTES # BLD AUTO: 0.3 K/UL
MONOCYTES NFR BLD: 32.3 %
NEUTROPHILS # BLD AUTO: 0.3 K/UL
NEUTROPHILS NFR BLD: 26.3 %
NRBC BLD-RTO: 0 /100 WBC
PHOSPHATE SERPL-MCNC: 2.9 MG/DL
PLATELET # BLD AUTO: 84 K/UL
PLATELET BLD QL SMEAR: ABNORMAL
PMV BLD AUTO: 10.8 FL
POLYCHROMASIA BLD QL SMEAR: ABNORMAL
POTASSIUM SERPL-SCNC: 4 MMOL/L
PROT SERPL-MCNC: 5.7 G/DL
RBC # BLD AUTO: 2.51 M/UL
SODIUM SERPL-SCNC: 139 MMOL/L
WBC # BLD AUTO: 0.99 K/UL

## 2018-07-20 PROCEDURE — 63600175 PHARM REV CODE 636 W HCPCS: Mod: JG | Performed by: INTERNAL MEDICINE

## 2018-07-20 PROCEDURE — 25000003 PHARM REV CODE 250: Performed by: INTERNAL MEDICINE

## 2018-07-20 PROCEDURE — 80053 COMPREHEN METABOLIC PANEL: CPT

## 2018-07-20 PROCEDURE — 83735 ASSAY OF MAGNESIUM: CPT

## 2018-07-20 PROCEDURE — 99233 SBSQ HOSP IP/OBS HIGH 50: CPT | Mod: ,,, | Performed by: INTERNAL MEDICINE

## 2018-07-20 PROCEDURE — 20600001 HC STEP DOWN PRIVATE ROOM

## 2018-07-20 PROCEDURE — 84100 ASSAY OF PHOSPHORUS: CPT

## 2018-07-20 PROCEDURE — 85025 COMPLETE CBC W/AUTO DIFF WBC: CPT

## 2018-07-20 PROCEDURE — 63700000 PHARM REV CODE 250 ALT 637 W/O HCPCS: Performed by: INTERNAL MEDICINE

## 2018-07-20 PROCEDURE — 25000003 PHARM REV CODE 250: Performed by: NURSE PRACTITIONER

## 2018-07-20 RX ORDER — ACYCLOVIR 200 MG/1
400 CAPSULE ORAL 2 TIMES DAILY
Qty: 120 CAPSULE | Refills: 11 | Status: ON HOLD | OUTPATIENT
Start: 2018-07-20 | End: 2018-10-22 | Stop reason: HOSPADM

## 2018-07-20 RX ORDER — MIRTAZAPINE 15 MG/1
15 TABLET, ORALLY DISINTEGRATING ORAL NIGHTLY
Qty: 30 TABLET | Refills: 11 | Status: SHIPPED | OUTPATIENT
Start: 2018-07-20 | End: 2018-08-14

## 2018-07-20 RX ADMIN — ACYCLOVIR 400 MG: 200 CAPSULE ORAL at 08:07

## 2018-07-20 RX ADMIN — ALTEPLASE 2 MG: 2.2 INJECTION, POWDER, LYOPHILIZED, FOR SOLUTION INTRAVENOUS at 04:07

## 2018-07-20 RX ADMIN — DASATINIB 40 MG: 20 TABLET ORAL at 08:07

## 2018-07-20 RX ADMIN — VORICONAZOLE 200 MG: 200 TABLET ORAL at 08:07

## 2018-07-20 RX ADMIN — CIPROFLOXACIN HYDROCHLORIDE 500 MG: 250 TABLET, FILM COATED ORAL at 08:07

## 2018-07-20 RX ADMIN — MIRTAZAPINE 15 MG: 15 TABLET, ORALLY DISINTEGRATING ORAL at 08:07

## 2018-07-20 NOTE — PLAN OF CARE
07/23/2018  0959-  D/c delayed over the weekend due to slow count recovery.   today.  CM pushed PCP f/u back to Thursday.  Patient has been updated with the d/c plan.  CM printed updated progress notes for the patient to bring to his f/u.  Patient plans to call family for transport.  Bedside rx delivery requested.    Follow-up Information     Parminder Grove MD On 7/26/2018.    Specialty:  Family Medicine  Why:  follow up- 11:15am  Contact information:  [address] [facility]  [phone]                     ------------------------------------------------------------------------      MDR's with Dr Levy.  Patient's ANC is 260 today.  Planning for potential d/c on Sunday/Monday if counts are recovered.  Bedside delivery requested for Sunday.  PICC will be pulled prior to d/c.  CM attempted to reschedule the PCP f/u planned for Monday but the clinic office is closed today.  If patient is not d/c by Sunday CM will reschedule f/u on Monday when the office reopens.  CM will provide the patient with records to bring to his f/u.  The patient is in agreement with the plan of care.  Will continue to follow.    Follow-up Information     Parminder Grove MD On 7/23/2018.    Specialty:  Family Medicine  Why:  follow up- 11:15am  Contact information:  [address] [facility]  [phone]                    07/20/18 1026   Final Note   Assessment Type Final Discharge Note   Discharge Disposition Home   What phone number can be called within the next 1-3 days to see how you are doing after discharge? ([phone])   Hospital Follow Up  Appt(s) scheduled? Yes   Discharge plans and expectations educations in teach back method with documentation complete? Yes

## 2018-07-20 NOTE — SUBJECTIVE & OBJECTIVE
Subjective:     Interval History:   [facility] D28. Afebrile. VSS. . Continue PPX acyc, cipro, voriconazole. Continue dasatinib. Pancytopenic. Awaiting count recovery for discharge. No acute issues.    Objective:     Vital Signs (Most Recent):  Temp: 98.5 °F (36.9 °C) (07/20/18 0754)  Pulse: 87 (07/20/18 0754)  Resp: 16 (07/20/18 0754)  BP: 115/74 (07/20/18 0754)  SpO2: 98 % (07/20/18 0754) Vital Signs (24h Range):  Temp:  [97 °F (36.1 °C)-99.1 °F (37.3 °C)] 98.5 °F (36.9 °C)  Pulse:  [82-99] 87  Resp:  [14-19] 16  SpO2:  [97 %-100 %] 98 %  BP: (114-127)/(70-80) 115/74     Weight: 61.5 kg (135 lb 9.3 oz)  Body mass index is 18.91 kg/m².  Body surface area is 1.76 meters squared.    ECOG SCORE         [unfilled]    Intake/Output - Last 3 Shifts       07/18 0700 - 07/19 0659 07/19 0700 - 07/20 0659 07/20 0700 - 07/21 0659    P.O. 410      Total Intake(mL/kg) 410 (6.7)      Urine (mL/kg/hr) 2000 (1.4) 525 (0.4) 600 (6)    Total Output 2000 525 600    Net -1590 -525 -600           Stool Occurrence  0 x           Physical Exam   Constitutional: He is oriented to person, place, and time. He appears well-developed and well-nourished.   HENT:   Head: Normocephalic and atraumatic.   Right Ear: External ear normal.   Left Ear: External ear normal.   Mouth/Throat: Oropharynx is clear and moist. No oropharyngeal exudate.   Eyes: EOM are normal. Pupils are equal, round, and reactive to light.   Neck: Normal range of motion. Neck supple.   Cardiovascular: Normal rate and regular rhythm.    No murmur heard.  Pulmonary/Chest: Effort normal and breath sounds normal. No respiratory distress.   Abdominal: Soft. Bowel sounds are normal. He exhibits no distension.   Musculoskeletal: Normal range of motion. He exhibits no edema.   Neurological: He is alert and oriented to person, place, and time.   Skin: Skin is warm and dry. No rash noted.   Central line c/d/i  Left side of inner buttocks with open reddened wound and white appearing  ulceration, improving   Psychiatric: He has a normal mood and affect. His behavior is normal. Judgment and thought content normal.   Vitals reviewed.      Significant Labs:   CBC:   Recent Labs  Lab 07/19/18 0358 07/20/18  0306   WBC 0.80* 0.99*   HGB 7.3* 7.1*   HCT 23.0* 22.4*   PLT 58* 84*    and CMP:   Recent Labs  Lab 07/19/18 0358 07/20/18  0306    139   K 4.0 4.0    107   CO2 23 25   GLU 83 85   BUN 7 9   CREATININE 0.7 0.7   CALCIUM 8.7 8.5*   PROT 5.6* 5.7*   ALBUMIN 2.5* 2.6*   BILITOT 0.1 0.1   ALKPHOS 81 83   AST 16 19   ALT 25 27   ANIONGAP 11 7*   EGFRNONAA >60.0 >60.0       Diagnostic Results:  I have reviewed all pertinent imaging results/findings within the past 24 hours.

## 2018-07-20 NOTE — ASSESSMENT & PLAN NOTE
- 7/13/18 Blood cx x2 ngtd; UA negative; Urine cx no growth; Chest xray negative  - Started on  vancomycin ( received 4 days) + cefepime (received 6 days) on 7/13/2018.  - resolved; afebrile off of antibiotics

## 2018-07-20 NOTE — PLAN OF CARE
Problem: Patient Care Overview  Goal: Plan of Care Review  Outcome: Ongoing (interventions implemented as appropriate)  AAOx4, bed in low and locked position, call bell within reach, independent, no falls. Remained afebrile throughout shift. No complaints of nausea or pain. Patient stable, vitals stable, will continue to monitor.

## 2018-07-20 NOTE — ASSESSMENT & PLAN NOTE
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 9% blasts.   - persistent disease post 7+3 induction. Started on MEC chemotherapy for refractory AML on 6/23/2018, day 29.    - had BMBX on 7/13/18, without evidence of CML; BCR ABL p210 0.5% (c/w MRD)  - AML FISH negative  - NGS negative from first repeat BMBX  - ECHO with 63% EF  - HIV negative, Hepatitis negative, G6PD wnl  - HLA typing hi and lo completed  - referral information for transplant benefits given to patient  - patient has 1 full sibling, contact info given to transplant coordinators  - pt still needs to see PCP to get formal referral; appt set up with PCP Monday, July 23 in [place]. PCP should be able to refer back to us for services. --> will move appt to Wednesday 7/25/18 due to anticipated later discharge.  - consider sending BCR/ABL mutational analysis testing when WBC increases

## 2018-07-20 NOTE — PROGRESS NOTES
[facility]  Hematology  Bone Marrow Transplant  Progress Note    Patient Name: Wilton Guzmán  Admission Date: 6/16/2018  Hospital Length of Stay: 34 days  Code Status: Full Code    Subjective:     Interval History:   [facility] D28. Afebrile. VSS. . Continue PPX acyc, cipro, voriconazole. Continue dasatinib. Pancytopenic. Awaiting count recovery for discharge. No acute issues.    Objective:     Vital Signs (Most Recent):  Temp: 98.5 °F (36.9 °C) (07/20/18 0754)  Pulse: 87 (07/20/18 0754)  Resp: 16 (07/20/18 0754)  BP: 115/74 (07/20/18 0754)  SpO2: 98 % (07/20/18 0754) Vital Signs (24h Range):  Temp:  [97 °F (36.1 °C)-99.1 °F (37.3 °C)] 98.5 °F (36.9 °C)  Pulse:  [82-99] 87  Resp:  [14-19] 16  SpO2:  [97 %-100 %] 98 %  BP: (114-127)/(70-80) 115/74     Weight: 61.5 kg (135 lb 9.3 oz)  Body mass index is 18.91 kg/m².  Body surface area is 1.76 meters squared.    ECOG SCORE         [unfilled]    Intake/Output - Last 3 Shifts       07/18 0700 - 07/19 0659 07/19 0700 - 07/20 0659 07/20 0700 - 07/21 0659    P.O. 410      Total Intake(mL/kg) 410 (6.7)      Urine (mL/kg/hr) 2000 (1.4) 525 (0.4) 600 (6)    Total Output 2000 525 600    Net -1590 -525 -600           Stool Occurrence  0 x           Physical Exam   Constitutional: He is oriented to person, place, and time. He appears well-developed and well-nourished.   HENT:   Head: Normocephalic and atraumatic.   Right Ear: External ear normal.   Left Ear: External ear normal.   Mouth/Throat: Oropharynx is clear and moist. No oropharyngeal exudate.   Eyes: EOM are normal. Pupils are equal, round, and reactive to light.   Neck: Normal range of motion. Neck supple.   Cardiovascular: Normal rate and regular rhythm.    No murmur heard.  Pulmonary/Chest: Effort normal and breath sounds normal. No respiratory distress.   Abdominal: Soft. Bowel sounds are normal. He exhibits no distension.   Musculoskeletal: Normal range of motion. He exhibits no edema.    Neurological: He is alert and oriented to person, place, and time.   Skin: Skin is warm and dry. No rash noted.   Central line c/d/i  Left side of inner buttocks with open reddened wound and white appearing ulceration, improving   Psychiatric: He has a normal mood and affect. His behavior is normal. Judgment and thought content normal.   Vitals reviewed.      Significant Labs:   CBC:   Recent Labs  Lab 07/19/18  0358 07/20/18  0306   WBC 0.80* 0.99*   HGB 7.3* 7.1*   HCT 23.0* 22.4*   PLT 58* 84*    and CMP:   Recent Labs  Lab 07/19/18  0358 07/20/18  0306    139   K 4.0 4.0    107   CO2 23 25   GLU 83 85   BUN 7 9   CREATININE 0.7 0.7   CALCIUM 8.7 8.5*   PROT 5.6* 5.7*   ALBUMIN 2.5* 2.6*   BILITOT 0.1 0.1   ALKPHOS 81 83   AST 16 19   ALT 25 27   ANIONGAP 11 7*   EGFRNONAA >60.0 >60.0       Diagnostic Results:  I have reviewed all pertinent imaging results/findings within the past 24 hours.    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 9% blasts.   - persistent disease post 7+3 induction. Started on MEC chemotherapy for refractory AML on 6/23/2018, day 28.    - had BMBX on 7/13/18, without evidence of CML; BCR ABL p210 0.5% (c/w MRD)  - AML FISH negative  - NGS negative from first repeat BMBX  - ECHO with 63% EF  - HIV negative, Hepatitis negative, G6PD wnl  - HLA typing hi and lo completed  - referral information for transplant benefits given to patient  - patient has 1 full sibling, contact info given to transplant  coordinators  - pt still needs to see PCP to get formal referral; appt set up with PCP Monday, July 23 in [place]. PCP should be able to refer back to us for services. --> will move appt to Wednesday 7/25/18 if he does not discharge before Monday.   - consider sending BCR/ABL mutational analysis testing when WBC increases        Ulcer of perianal area, limited to breakdown of skin    - reddened area with white ulceration noted to inner left side of buttocks  - consulted wound care, appreciate recs   - started critic aid barrier paste and ordered waffle cushion. Pt states this is helping           Severe malnutrition    - albumin and weight was previously dropping  - daily weights  - dietary following             Neutropenic fever    - 7/13/18 Blood cx x2 ngtd; UA negative; Urine cx no growth; Chest xray negative  - Started on  vancomycin ( received 4 days) + cefepime (received 6 days) on 7/13/2018.  - resolved; afebrile off of antibiotics        Anxiety    - Patient understandibly experiencing anxiety about his diagnosis and feeling isolated in his room  - Declined oncology psychologist consult earlier during this admission  - Continue Remeron for anxiety and insomnia            Pancytopenia due to antineoplastic chemotherapy    - transfuse for Hgb <7 and platelets <10K  - continue PPX antimicrobials: acyc, cipro, vori  - no indications for transfusions;               VTE Risk Mitigation         Ordered     heparin, porcine (PF) 100 unit/mL injection flush 300 Units  As needed (PRN)      06/23/18 1052     IP VTE LOW RISK PATIENT  Once      06/16/18 0938          Disposition: inpatient pending count recovery     Whitney Curiel, NP  Bone Marrow Transplant  [facility]

## 2018-07-20 NOTE — SUBJECTIVE & OBJECTIVE
Subjective:     Interval History:[facility] D29. Afebrile. VSS. . Continue PPX acyc, cipro, voriconazole. Continue dastanib. Pancytopenic. Possible discharge next week.    Objective:     Vital Signs (Most Recent):  Temp: 98.5 °F (36.9 °C) (07/20/18 1530)  Pulse: 98 (07/20/18 1530)  Resp: 18 (07/20/18 1530)  BP: 110/67 (07/20/18 1530)  SpO2: 99 % (07/20/18 1530) Vital Signs (24h Range):  Temp:  [98.4 °F (36.9 °C)-98.9 °F (37.2 °C)] 98.5 °F (36.9 °C)  Pulse:  [87-99] 98  Resp:  [14-18] 18  SpO2:  [97 %-99 %] 99 %  BP: (110-127)/(67-80) 110/67     Weight: 61.5 kg (135 lb 9.3 oz)  Body mass index is 18.91 kg/m².  Body surface area is 1.76 meters squared.    ECOG SCORE         [unfilled]    Intake/Output - Last 3 Shifts       07/18 0700 - 07/19 0659 07/19 0700 - 07/20 0659 07/20 0700 - 07/21 0659    P.O. 410      Total Intake(mL/kg) 410 (6.7)      Urine (mL/kg/hr) 2000 (1.4) 525 (0.4) 1400 (2.2)    Stool   1 (0)    Total Output 2000 525 1401    Net -1590 -525 -1401           Stool Occurrence  0 x           Physical Exam   Constitutional: He is oriented to person, place, and time. He appears well-developed.   Thin male   HENT:   Head: Normocephalic and atraumatic.   Eyes: Conjunctivae and EOM are normal. Pupils are equal, round, and reactive to light.   Neck: Normal range of motion. Neck supple. No thyromegaly present.   Cardiovascular: Normal rate and regular rhythm.  Exam reveals no gallop and no friction rub.    No murmur heard.  Pulmonary/Chest: Effort normal and breath sounds normal. No respiratory distress. He has no wheezes. He has no rales. He exhibits no tenderness.   Abdominal: Soft. Bowel sounds are normal. He exhibits no distension. There is no tenderness.   Musculoskeletal: Normal range of motion. He exhibits no edema.   Lymphadenopathy:     He has no cervical adenopathy.   Neurological: He is alert and oriented to person, place, and time. No cranial nerve deficit.   Skin: Skin is warm and dry.   Psychiatric:  He has a normal mood and affect.       Significant Labs:   BMP:   Recent Labs  Lab 07/20/18  0306 07/21/18  0352   GLU 85 81    139   K 4.0 3.9    107   CO2 25 26   BUN 9 8   CREATININE 0.7 0.7   CALCIUM 8.5* 8.7   MG 2.1 1.8   , CBC:   Recent Labs  Lab 07/20/18  0306 07/21/18  0352   WBC 0.99* 1.22*   HGB 7.1* 7.4*   HCT 22.4* 22.8*   PLT 84* 114*   , CMP:   Recent Labs  Lab 07/20/18  0306 07/21/18  0352    139   K 4.0 3.9    107   CO2 25 26   GLU 85 81   BUN 9 8   CREATININE 0.7 0.7   CALCIUM 8.5* 8.7   PROT 5.7* 5.7*   ALBUMIN 2.6* 2.7*   BILITOT 0.1 0.1   ALKPHOS 83 82   AST 19 21   ALT 27 31   ANIONGAP 7* 6*   EGFRNONAA >60.0 >60.0   ,  Recent Labs  Lab 07/20/18  0306 07/21/18  0352   ALT 27 31   AST 19 21   ALKPHOS 83 82   BILITOT 0.1 0.1   PROT 5.7* 5.7*   ALBUMIN 2.6* 2.7*     Diagnostic Results:  Prior imaging has been reviewed, no new imaging noted recently.

## 2018-07-21 LAB
ALBUMIN SERPL BCP-MCNC: 2.7 G/DL
ALP SERPL-CCNC: 82 U/L
ALT SERPL W/O P-5'-P-CCNC: 31 U/L
ANION GAP SERPL CALC-SCNC: 6 MMOL/L
ANISOCYTOSIS BLD QL SMEAR: SLIGHT
AST SERPL-CCNC: 21 U/L
BASOPHILS # BLD AUTO: 0 K/UL
BASOPHILS NFR BLD: 0 %
BILIRUB SERPL-MCNC: 0.1 MG/DL
BUN SERPL-MCNC: 8 MG/DL
CALCIUM SERPL-MCNC: 8.7 MG/DL
CHLORIDE SERPL-SCNC: 107 MMOL/L
CO2 SERPL-SCNC: 26 MMOL/L
CREAT SERPL-MCNC: 0.7 MG/DL
DIFFERENTIAL METHOD: ABNORMAL
EOSINOPHIL # BLD AUTO: 0 K/UL
EOSINOPHIL NFR BLD: 0 %
ERYTHROCYTE [DISTWIDTH] IN BLOOD BY AUTOMATED COUNT: 14.6 %
EST. GFR  (AFRICAN AMERICAN): >60 ML/MIN/1.73 M^2
EST. GFR  (NON AFRICAN AMERICAN): >60 ML/MIN/1.73 M^2
GLUCOSE SERPL-MCNC: 81 MG/DL
HCT VFR BLD AUTO: 22.8 %
HGB BLD-MCNC: 7.4 G/DL
HYPOCHROMIA BLD QL SMEAR: ABNORMAL
IMM GRANULOCYTES # BLD AUTO: 0 K/UL
IMM GRANULOCYTES NFR BLD AUTO: 0 %
LYMPHOCYTES # BLD AUTO: 0.5 K/UL
LYMPHOCYTES NFR BLD: 37.7 %
MAGNESIUM SERPL-MCNC: 1.8 MG/DL
MCH RBC QN AUTO: 28.7 PG
MCHC RBC AUTO-ENTMCNC: 32.5 G/DL
MCV RBC AUTO: 88 FL
MONOCYTES # BLD AUTO: 0.4 K/UL
MONOCYTES NFR BLD: 35.2 %
NEUTROPHILS # BLD AUTO: 0.3 K/UL
NEUTROPHILS NFR BLD: 27.1 %
NRBC BLD-RTO: 0 /100 WBC
OVALOCYTES BLD QL SMEAR: ABNORMAL
PHOSPHATE SERPL-MCNC: 3.2 MG/DL
PLATELET # BLD AUTO: 114 K/UL
PMV BLD AUTO: 11.2 FL
POIKILOCYTOSIS BLD QL SMEAR: SLIGHT
POLYCHROMASIA BLD QL SMEAR: ABNORMAL
POTASSIUM SERPL-SCNC: 3.9 MMOL/L
PROT SERPL-MCNC: 5.7 G/DL
RBC # BLD AUTO: 2.58 M/UL
SODIUM SERPL-SCNC: 139 MMOL/L
WBC # BLD AUTO: 1.22 K/UL

## 2018-07-21 PROCEDURE — 25000003 PHARM REV CODE 250: Performed by: INTERNAL MEDICINE

## 2018-07-21 PROCEDURE — 84100 ASSAY OF PHOSPHORUS: CPT

## 2018-07-21 PROCEDURE — 25000003 PHARM REV CODE 250: Performed by: NURSE PRACTITIONER

## 2018-07-21 PROCEDURE — 85025 COMPLETE CBC W/AUTO DIFF WBC: CPT

## 2018-07-21 PROCEDURE — 20600001 HC STEP DOWN PRIVATE ROOM

## 2018-07-21 PROCEDURE — 80053 COMPREHEN METABOLIC PANEL: CPT

## 2018-07-21 PROCEDURE — 63700000 PHARM REV CODE 250 ALT 637 W/O HCPCS: Performed by: INTERNAL MEDICINE

## 2018-07-21 PROCEDURE — 99233 SBSQ HOSP IP/OBS HIGH 50: CPT | Mod: ,,, | Performed by: INTERNAL MEDICINE

## 2018-07-21 PROCEDURE — 83735 ASSAY OF MAGNESIUM: CPT

## 2018-07-21 RX ADMIN — VORICONAZOLE 200 MG: 200 TABLET ORAL at 09:07

## 2018-07-21 RX ADMIN — VORICONAZOLE 200 MG: 200 TABLET ORAL at 08:07

## 2018-07-21 RX ADMIN — ACYCLOVIR 400 MG: 200 CAPSULE ORAL at 08:07

## 2018-07-21 RX ADMIN — CIPROFLOXACIN HYDROCHLORIDE 500 MG: 250 TABLET, FILM COATED ORAL at 09:07

## 2018-07-21 RX ADMIN — DASATINIB 40 MG: 20 TABLET ORAL at 09:07

## 2018-07-21 RX ADMIN — ACYCLOVIR 400 MG: 200 CAPSULE ORAL at 09:07

## 2018-07-21 RX ADMIN — MIRTAZAPINE 15 MG: 15 TABLET, ORALLY DISINTEGRATING ORAL at 08:07

## 2018-07-21 RX ADMIN — CIPROFLOXACIN HYDROCHLORIDE 500 MG: 250 TABLET, FILM COATED ORAL at 08:07

## 2018-07-21 NOTE — PLAN OF CARE
Problem: Patient Care Overview  Goal: Plan of Care Review  Outcome: Ongoing (interventions implemented as appropriate)  Plan of care reviewed with the patient at the beginning of the shift. 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 He is s/p 7&3 and MEC. Today is day 29 of MEC. Pt awaiting count recovery. Pt is tolerated chemo well. He has no complaints at this time. Pt denies pain, n/v/d. Pt reports a good appetite. PO intake encouraged. Fall precautions maintained. He is currently up independently without difficulty. Pt remained free from falls and injury this shift. Bed locked in lowest position, side rails up x2, call light within reach. Instructed pt to call for assistance as needed. Pt verbalized understanding. No acute issues overnight. Vitals stable. Pt afebrile. Neutropenic precautions maintained. Will continue to monitor.

## 2018-07-21 NOTE — PLAN OF CARE
Problem: Patient Care Overview  Goal: Plan of Care Review  Outcome: Ongoing (interventions implemented as appropriate)  Side rails up x2; call bell in place; bed in lowest, locked position; skid proof socks on; no evidence of skin breakdown; care plan explained to patient; pt remains free of injury. Pt tolerated po, voids, BM x 2, ambulates. Pt denies pain, n/v or diarrhea. VSS and afebrile. Pt encouraged to call with any needs or concerns. Neutropenic precautions maintained.

## 2018-07-21 NOTE — PROGRESS NOTES
[facility]  Hematology  Bone Marrow Transplant  Progress Note    Patient Name: Wilton Guzmán  Admission Date: 6/16/2018  Hospital Length of Stay: 35 days  Code Status: Full Code    Subjective:     Interval History:[facility] D29. Afebrile. VSS. . Continue PPX acyc, cipro, voriconazole. Continue dastanib. Pancytopenic. Possible discharge next week.    Objective:     Vital Signs (Most Recent):  Temp: 98.5 °F (36.9 °C) (07/20/18 1530)  Pulse: 98 (07/20/18 1530)  Resp: 18 (07/20/18 1530)  BP: 110/67 (07/20/18 1530)  SpO2: 99 % (07/20/18 1530) Vital Signs (24h Range):  Temp:  [98.4 °F (36.9 °C)-98.9 °F (37.2 °C)] 98.5 °F (36.9 °C)  Pulse:  [87-99] 98  Resp:  [14-18] 18  SpO2:  [97 %-99 %] 99 %  BP: (110-127)/(67-80) 110/67     Weight: 61.5 kg (135 lb 9.3 oz)  Body mass index is 18.91 kg/m².  Body surface area is 1.76 meters squared.    ECOG SCORE         [unfilled]    Intake/Output - Last 3 Shifts       07/18 0700 - 07/19 0659 07/19 0700 - 07/20 0659 07/20 0700 - 07/21 0659    P.O. 410      Total Intake(mL/kg) 410 (6.7)      Urine (mL/kg/hr) 2000 (1.4) 525 (0.4) 1400 (2.2)    Stool   1 (0)    Total Output 2000 525 1401    Net -1590 -525 -1401           Stool Occurrence  0 x           Physical Exam   Constitutional: He is oriented to person, place, and time. He appears well-developed.   Thin male   HENT:   Head: Normocephalic and atraumatic.   Eyes: Conjunctivae and EOM are normal. Pupils are equal, round, and reactive to light.   Neck: Normal range of motion. Neck supple. No thyromegaly present.   Cardiovascular: Normal rate and regular rhythm.  Exam reveals no gallop and no friction rub.    No murmur heard.  Pulmonary/Chest: Effort normal and breath sounds normal. No respiratory distress. He has no wheezes. He has no rales. He exhibits no tenderness.   Abdominal: Soft. Bowel sounds are normal. He exhibits no distension. There is no tenderness.   Musculoskeletal: Normal range of motion. He exhibits no  edema.   Lymphadenopathy:     He has no cervical adenopathy.   Neurological: He is alert and oriented to person, place, and time. No cranial nerve deficit.   Skin: Skin is warm and dry.   Psychiatric: He has a normal mood and affect.       Significant Labs:   BMP:   Recent Labs  Lab 07/20/18 0306 07/21/18  0352   GLU 85 81    139   K 4.0 3.9    107   CO2 25 26   BUN 9 8   CREATININE 0.7 0.7   CALCIUM 8.5* 8.7   MG 2.1 1.8   , CBC:   Recent Labs  Lab 07/20/18 0306 07/21/18  0352   WBC 0.99* 1.22*   HGB 7.1* 7.4*   HCT 22.4* 22.8*   PLT 84* 114*   , CMP:   Recent Labs  Lab 07/20/18 0306 07/21/18  0352    139   K 4.0 3.9    107   CO2 25 26   GLU 85 81   BUN 9 8   CREATININE 0.7 0.7   CALCIUM 8.5* 8.7   PROT 5.7* 5.7*   ALBUMIN 2.6* 2.7*   BILITOT 0.1 0.1   ALKPHOS 83 82   AST 19 21   ALT 27 31   ANIONGAP 7* 6*   EGFRNONAA >60.0 >60.0   ,  Recent Labs  Lab 07/20/18 0306 07/21/18  0352   ALT 27 31   AST 19 21   ALKPHOS 83 82   BILITOT 0.1 0.1   PROT 5.7* 5.7*   ALBUMIN 2.6* 2.7*     Diagnostic Results:  Prior imaging has been reviewed, no new imaging noted recently.    Assessment/Plan:     * Chronic myelogenous leukemia (CML), BCR-ABL1-positive    - initial WBC reportedly 150,000  - bcr-abl positive on FISH at [facility] (records attached in media tab)  - original BMBX 5/26/18 with 20% blasts; 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 9% blasts.   - persistent disease post 7+3 induction. Started on MEC chemotherapy for refractory AML on 6/23/2018, day 29.    - had BMBX on 7/13/18, without evidence of CML; BCR ABL p210 0.5% (c/w MRD)  - AML FISH negative  - NGS negative from first repeat BMBX  - ECHO with 63% EF  - HIV negative,  Hepatitis negative, G6PD wnl  - HLA typing hi and lo completed  - referral information for transplant benefits given to patient  - patient has 1 full sibling, contact info given to transplant coordinators  - pt still needs to see PCP to get formal referral; appt set up with PCP Monday, July 23 in [place]. PCP should be able to refer back to us for services. --> will move appt to Wednesday 7/25/18 due to anticipated later discharge.  - consider sending BCR/ABL mutational analysis testing when WBC increases        Ulcer of perianal area, limited to breakdown of skin    - reddened area with white ulceration noted to inner left side of buttocks  - consulted wound care, appreciate recs   - started critic aid barrier paste and ordered waffle cushion. Pt states this is helping           Severe malnutrition    - albumin and weight was previously dropping  - daily weights  - dietary following             Neutropenic fever    - 7/13/18 Blood cx x2 ngtd; UA negative; Urine cx no growth; Chest xray negative  - Started on  vancomycin ( received 4 days) + cefepime (received 6 days) on 7/13/2018.  - resolved; afebrile off of antibiotics        Anxiety    - Patient understandibly experiencing anxiety about his diagnosis and feeling isolated in his room  - Declined oncology psychologist consult earlier during this admission  - Continue Remeron for anxiety and insomnia            Pancytopenia due to antineoplastic chemotherapy    - transfuse for Hgb <7 and platelets <10K  - continue PPX antimicrobials: acyc, cipro, vori  - no indications for transfusions;               VTE Risk Mitigation         Ordered     heparin, porcine (PF) 100 unit/mL injection flush 300 Units  As needed (PRN)      06/23/18 1052     IP VTE LOW RISK PATIENT  Once      06/16/18 0938          Disposition: D/C probably next week     Lyle Messina MD   Hematology/ Oncology Fellow, PGY IV  Bone Marrow Transplant  [facility]    Patient seen and evaluated with Dr. Messina. Agree with assessment and plan.  ANC recovering, at 330 today. Clinically well with no complaints.  Plan for discharge when ANC closer to 500 for safety.  Discussed bone marrow transplant today.

## 2018-07-22 LAB
ALBUMIN SERPL BCP-MCNC: 2.7 G/DL
ALP SERPL-CCNC: 83 U/L
ALT SERPL W/O P-5'-P-CCNC: 38 U/L
ANION GAP SERPL CALC-SCNC: 8 MMOL/L
ANISOCYTOSIS BLD QL SMEAR: SLIGHT
AST SERPL-CCNC: 22 U/L
BASOPHILS # BLD AUTO: 0 K/UL
BASOPHILS NFR BLD: 0 %
BILIRUB SERPL-MCNC: 0.1 MG/DL
BUN SERPL-MCNC: 7 MG/DL
CALCIUM SERPL-MCNC: 8.7 MG/DL
CHLORIDE SERPL-SCNC: 109 MMOL/L
CO2 SERPL-SCNC: 26 MMOL/L
CREAT SERPL-MCNC: 0.8 MG/DL
DIFFERENTIAL METHOD: ABNORMAL
EOSINOPHIL # BLD AUTO: 0 K/UL
EOSINOPHIL NFR BLD: 0 %
ERYTHROCYTE [DISTWIDTH] IN BLOOD BY AUTOMATED COUNT: 14.6 %
EST. GFR  (AFRICAN AMERICAN): >60 ML/MIN/1.73 M^2
EST. GFR  (NON AFRICAN AMERICAN): >60 ML/MIN/1.73 M^2
GLUCOSE SERPL-MCNC: 103 MG/DL
HCT VFR BLD AUTO: 22.8 %
HGB BLD-MCNC: 7.3 G/DL
IMM GRANULOCYTES # BLD AUTO: 0 K/UL
IMM GRANULOCYTES NFR BLD AUTO: 0 %
LYMPHOCYTES # BLD AUTO: 0.5 K/UL
LYMPHOCYTES NFR BLD: 32.9 %
MAGNESIUM SERPL-MCNC: 1.9 MG/DL
MCH RBC QN AUTO: 28.4 PG
MCHC RBC AUTO-ENTMCNC: 32 G/DL
MCV RBC AUTO: 89 FL
MONOCYTES # BLD AUTO: 0.6 K/UL
MONOCYTES NFR BLD: 41.4 %
NEUTROPHILS # BLD AUTO: 0.4 K/UL
NEUTROPHILS NFR BLD: 25.7 %
NRBC BLD-RTO: 1 /100 WBC
PHOSPHATE SERPL-MCNC: 3.7 MG/DL
PLATELET # BLD AUTO: 152 K/UL
PLATELET BLD QL SMEAR: ABNORMAL
PMV BLD AUTO: 11.1 FL
POIKILOCYTOSIS BLD QL SMEAR: SLIGHT
POLYCHROMASIA BLD QL SMEAR: ABNORMAL
POTASSIUM SERPL-SCNC: 3.6 MMOL/L
PROT SERPL-MCNC: 5.6 G/DL
RBC # BLD AUTO: 2.57 M/UL
SODIUM SERPL-SCNC: 143 MMOL/L
WBC # BLD AUTO: 1.52 K/UL

## 2018-07-22 PROCEDURE — 20600001 HC STEP DOWN PRIVATE ROOM

## 2018-07-22 PROCEDURE — 25000003 PHARM REV CODE 250: Performed by: INTERNAL MEDICINE

## 2018-07-22 PROCEDURE — 83735 ASSAY OF MAGNESIUM: CPT

## 2018-07-22 PROCEDURE — 99233 SBSQ HOSP IP/OBS HIGH 50: CPT | Mod: ,,, | Performed by: INTERNAL MEDICINE

## 2018-07-22 PROCEDURE — 80053 COMPREHEN METABOLIC PANEL: CPT

## 2018-07-22 PROCEDURE — 25000003 PHARM REV CODE 250: Performed by: NURSE PRACTITIONER

## 2018-07-22 PROCEDURE — 63700000 PHARM REV CODE 250 ALT 637 W/O HCPCS: Performed by: INTERNAL MEDICINE

## 2018-07-22 PROCEDURE — 84100 ASSAY OF PHOSPHORUS: CPT

## 2018-07-22 PROCEDURE — 85025 COMPLETE CBC W/AUTO DIFF WBC: CPT

## 2018-07-22 RX ADMIN — CIPROFLOXACIN HYDROCHLORIDE 500 MG: 250 TABLET, FILM COATED ORAL at 08:07

## 2018-07-22 RX ADMIN — CIPROFLOXACIN HYDROCHLORIDE 500 MG: 250 TABLET, FILM COATED ORAL at 09:07

## 2018-07-22 RX ADMIN — ACYCLOVIR 400 MG: 200 CAPSULE ORAL at 08:07

## 2018-07-22 RX ADMIN — VORICONAZOLE 200 MG: 200 TABLET ORAL at 08:07

## 2018-07-22 RX ADMIN — POTASSIUM CHLORIDE 20 MEQ: 750 CAPSULE, EXTENDED RELEASE ORAL at 09:07

## 2018-07-22 RX ADMIN — MIRTAZAPINE 15 MG: 15 TABLET, ORALLY DISINTEGRATING ORAL at 08:07

## 2018-07-22 RX ADMIN — DASATINIB 40 MG: 20 TABLET ORAL at 09:07

## 2018-07-22 RX ADMIN — VORICONAZOLE 200 MG: 200 TABLET ORAL at 09:07

## 2018-07-22 RX ADMIN — ACYCLOVIR 400 MG: 200 CAPSULE ORAL at 09:07

## 2018-07-22 NOTE — SUBJECTIVE & OBJECTIVE
Subjective:     Interval History: [facility] + Day 30. Afebrile, VSS.  ANC of 395 this morning, counts stable otherwise. Pt is doing without any complaints upon ROS this morning.  Objective:     Vital Signs (Most Recent):  Temp: 98 °F (36.7 °C) (07/22/18 0408)  Pulse: 97 (07/22/18 0408)  Resp: 16 (07/22/18 0408)  BP: 112/65 (07/22/18 0408)  SpO2: 97 % (07/22/18 0408) Vital Signs (24h Range):  Temp:  [97.8 °F (36.6 °C)-98.8 °F (37.1 °C)] 98 °F (36.7 °C)  Pulse:  [] 97  Resp:  [16-18] 16  SpO2:  [97 %-99 %] 97 %  BP: (107-122)/(60-76) 112/65     Weight: 63.9 kg (140 lb 14 oz)  Body mass index is 19.65 kg/m².  Body surface area is 1.79 meters squared.    ECOG SCORE         [unfilled]    Intake/Output - Last 3 Shifts       07/20 0700 - 07/21 0659 07/21 0700 - 07/22 0659 07/22 0700 - 07/23 0659    P.O. 660 2120     Total Intake(mL/kg) 660 (10.5) 2120 (33.2)     Urine (mL/kg/hr) 2050 (1.4) 700 (0.5)     Total Output 2050 700      Net -1390 +1420             Urine Occurrence  2 x     Stool Occurrence  2 x           Physical Exam   Constitutional: He is oriented to person, place, and time. He appears well-developed.   Thin male   HENT:   Head: Normocephalic and atraumatic.   Eyes: Conjunctivae and EOM are normal. Pupils are equal, round, and reactive to light.   Neck: Normal range of motion. Neck supple.   Cardiovascular: Normal rate, regular rhythm and normal heart sounds.  Exam reveals no gallop and no friction rub.    No murmur heard.  Pulmonary/Chest: Effort normal and breath sounds normal. No respiratory distress. He has no wheezes. He has no rales.   Abdominal: Soft. Bowel sounds are normal. He exhibits no distension. There is no tenderness.   Musculoskeletal: Normal range of motion. He exhibits no edema.   Lymphadenopathy:     He has no cervical adenopathy.   Neurological: He is alert and oriented to person, place, and time. No cranial nerve deficit or sensory deficit.   Skin: Skin is warm and dry.   Psychiatric: He  has a normal mood and affect.       Significant Labs:   BMP:   Recent Labs  Lab 07/21/18  0352 07/22/18  0400   GLU 81 103    143   K 3.9 3.6    109   CO2 26 26   BUN 8 7   CREATININE 0.7 0.8   CALCIUM 8.7 8.7   MG 1.8 1.9   , CBC:   Recent Labs  Lab 07/21/18  0352 07/22/18  0400   WBC 1.22* 1.52*   HGB 7.4* 7.3*   HCT 22.8* 22.8*   * 152   , CMP:   Recent Labs  Lab 07/21/18  0352 07/22/18  0400    143   K 3.9 3.6    109   CO2 26 26   GLU 81 103   BUN 8 7   CREATININE 0.7 0.8   CALCIUM 8.7 8.7   PROT 5.7* 5.6*   ALBUMIN 2.7* 2.7*   BILITOT 0.1 0.1   ALKPHOS 82 83   AST 21 22   ALT 31 38   ANIONGAP 6* 8   EGFRNONAA >60.0 >60.0     Diagnostic Results:  Reviewed prior imaging, no new results

## 2018-07-22 NOTE — PLAN OF CARE
Problem: Patient Care Overview  Goal: Plan of Care Review  Outcome: Ongoing (interventions implemented as appropriate)  Side rails up x2; call bell in place; bed in lowest, locked position; skid proof socks on; no evidence of skin breakdown; care plan explained to patient; pt remains free of injury. Pt tolerated po, voids, ambulates, BM x 1. Denies any pain or discomfort pt provides own wound care to perianal abscess. Pt stated that it is healing, there is no pain or drainage and that he cleans and applies critic aid to abscess. Neutropenic precautions maintained. VSS and afebrile.

## 2018-07-22 NOTE — ASSESSMENT & PLAN NOTE
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 9% blasts.   - persistent disease post 7+3 induction. 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    - had BMBX on 7/13/18, without evidence of CML; BCR ABL p210 0.5% (c/w MRD)  - AML FISH negative  - NGS negative from first repeat BMBX  - ECHO with 63% EF  - HIV negative, Hepatitis negative, G6PD wnl  - HLA typing hi and lo completed  - referral information for transplant benefits given to patient  - patient has 1 full sibling, contact info given to transplant coordinators  - pt still needs to see PCP to get formal referral; appt set up with PCP Monday, July 23 in [place]. PCP should be able to refer back to us for services. --> will move appt to Wednesday 7/25/18 due to anticipated later discharge.  - consider sending BCR/ABL mutational analysis testing when WBC increases

## 2018-07-22 NOTE — PROGRESS NOTES
[facility]  Hematology  Bone Marrow Transplant  Progress Note    Patient Name: Wilton Guzmán  Admission Date: 6/16/2018  Hospital Length of Stay: 36 days  Code Status: Full Code    Subjective:     Interval History: [facility] + Day 30. Afebrile, VSS.  ANC of 395 this morning, counts stable otherwise. Pt is doing without any complaints upon ROS this morning.  Objective:     Vital Signs (Most Recent):  Temp: 98 °F (36.7 °C) (07/22/18 0408)  Pulse: 97 (07/22/18 0408)  Resp: 16 (07/22/18 0408)  BP: 112/65 (07/22/18 0408)  SpO2: 97 % (07/22/18 0408) Vital Signs (24h Range):  Temp:  [97.8 °F (36.6 °C)-98.8 °F (37.1 °C)] 98 °F (36.7 °C)  Pulse:  [] 97  Resp:  [16-18] 16  SpO2:  [97 %-99 %] 97 %  BP: (107-122)/(60-76) 112/65     Weight: 63.9 kg (140 lb 14 oz)  Body mass index is 19.65 kg/m².  Body surface area is 1.79 meters squared.    ECOG SCORE         [unfilled]    Intake/Output - Last 3 Shifts       07/20 0700 - 07/21 0659 07/21 0700 - 07/22 0659 07/22 0700 - 07/23 0659    P.O. 660 2120     Total Intake(mL/kg) 660 (10.5) 2120 (33.2)     Urine (mL/kg/hr) 2050 (1.4) 700 (0.5)     Total Output 2050 700      Net -1390 +1420             Urine Occurrence  2 x     Stool Occurrence  2 x           Physical Exam   Constitutional: He is oriented to person, place, and time. He appears well-developed.   Thin male   HENT:   Head: Normocephalic and atraumatic.   Eyes: Conjunctivae and EOM are normal. Pupils are equal, round, and reactive to light.   Neck: Normal range of motion. Neck supple.   Cardiovascular: Normal rate, regular rhythm and normal heart sounds.  Exam reveals no gallop and no friction rub.    No murmur heard.  Pulmonary/Chest: Effort normal and breath sounds normal. No respiratory distress. He has no wheezes. He has no rales.   Abdominal: Soft. Bowel sounds are normal. He exhibits no distension. There is no tenderness.   Musculoskeletal: Normal range of motion. He exhibits no edema.    Lymphadenopathy:     He has no cervical adenopathy.   Neurological: He is alert and oriented to person, place, and time. No cranial nerve deficit or sensory deficit.   Skin: Skin is warm and dry.   Psychiatric: He has a normal mood and affect.       Significant Labs:   BMP:   Recent Labs  Lab 07/21/18  0352 07/22/18  0400   GLU 81 103    143   K 3.9 3.6    109   CO2 26 26   BUN 8 7   CREATININE 0.7 0.8   CALCIUM 8.7 8.7   MG 1.8 1.9   , CBC:   Recent Labs  Lab 07/21/18  0352 07/22/18  0400   WBC 1.22* 1.52*   HGB 7.4* 7.3*   HCT 22.8* 22.8*   * 152   , CMP:   Recent Labs  Lab 07/21/18  0352 07/22/18  0400    143   K 3.9 3.6    109   CO2 26 26   GLU 81 103   BUN 8 7   CREATININE 0.7 0.8   CALCIUM 8.7 8.7   PROT 5.7* 5.6*   ALBUMIN 2.7* 2.7*   BILITOT 0.1 0.1   ALKPHOS 82 83   AST 21 22   ALT 31 38   ANIONGAP 6* 8   EGFRNONAA >60.0 >60.0     Diagnostic Results:  Reviewed prior imaging, no new results    Assessment/Plan:     * Chronic myelogenous leukemia (CML), BCR-ABL1-positive    - initial WBC reportedly 150,000  - bcr-abl positive on FISH at [facility] (records attached in media tab)  - original BMBX 5/26/18 with 20% blasts; 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 9% blasts.   - persistent disease post 7+3 induction. 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    - had BMBX on 7/13/18, without evidence of CML; BCR ABL p210 0.5% (c/w MRD)  - AML FISH negative  - NGS negative from first repeat BMBX  - ECHO with 63% EF  - HIV negative, Hepatitis negative, G6PD wnl  - HLA typing hi and lo completed  - referral information for transplant benefits given to patient  - patient has 1 full sibling, contact  info given to transplant coordinators  - pt still needs to see PCP to get formal referral; appt set up with PCP Monday, July 23 in [place]. PCP should be able to refer back to us for services. --> will move appt to Wednesday 7/25/18 due to anticipated later discharge.  - consider sending BCR/ABL mutational analysis testing when WBC increases        Ulcer of perianal area, limited to breakdown of skin    - reddened area with white ulceration noted to inner left side of buttocks  - consulted wound care, appreciate recs   - started critic aid barrier paste and ordered waffle cushion. Pt states this is helping           Severe malnutrition    - albumin and weight was previously dropping  - daily weights  - dietary following             Neutropenic fever    - 7/13/18 Blood cx x2 ngtd; UA negative; Urine cx no growth; Chest xray negative  - Started on  vancomycin ( received 4 days) + cefepime (received 6 days) on 7/13/2018.  - resolved; afebrile off of antibiotics        Anxiety    - Patient understandibly experiencing anxiety about his diagnosis and feeling isolated in his room  - Declined oncology psychologist consult earlier during this admission  - Continue Remeron for anxiety and insomnia            Pancytopenia due to antineoplastic chemotherapy    - transfuse for Hgb <7 and platelets <10K  - continue PPX antimicrobials: acyc, cipro, vori  - no indications for transfusions;               VTE Risk Mitigation         Ordered     heparin, porcine (PF) 100 unit/mL injection flush 300 Units  As needed (PRN)      06/23/18 1052     IP VTE LOW RISK PATIENT  Once      06/16/18 0938        Discussed with Dr. Levy, possibly home within the next 1-2 days once ANC > 500    Lyle Messina MD  Bone Marrow Transplant  Hematology/ Oncology Fellow, PGY IV  [facility]

## 2018-07-22 NOTE — PLAN OF CARE
Problem: Patient Care Overview  Goal: Plan of Care Review  Outcome: Ongoing (interventions implemented as appropriate)  Plan of care reviewed with the patient at the beginning of the shift. 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 He is s/p 7&3 and MEC. Today is day 30 of MEC. Pt awaiting count recovery. Pt is tolerated chemo well. He has no complaints at this time. Pt denies pain, n/v/d. Pt reports a good appetite. PO intake encouraged. Pt performs self care of perianal ulcer/wound and applies barrier cream. Fall precautions maintained. He is currently up independently without difficulty. Pt remained free from falls and injury this shift. Bed locked in lowest position, side rails up x2, call light within reach. Instructed pt to call for assistance as needed. Pt verbalized understanding. No acute issues overnight. Vitals stable. Pt afebrile. Neutropenic precautions maintained. Will continue to monitor.

## 2018-07-23 VITALS
OXYGEN SATURATION: 99 % | HEIGHT: 71 IN | SYSTOLIC BLOOD PRESSURE: 117 MMHG | WEIGHT: 140.88 LBS | RESPIRATION RATE: 18 BRPM | BODY MASS INDEX: 19.72 KG/M2 | HEART RATE: 96 BPM | DIASTOLIC BLOOD PRESSURE: 66 MMHG | TEMPERATURE: 98 F

## 2018-07-23 PROBLEM — L73.9 FOLLICULITIS: Status: ACTIVE | Noted: 2018-07-23

## 2018-07-23 PROBLEM — E43 SEVERE MALNUTRITION: Status: RESOLVED | Noted: 2018-07-05 | Resolved: 2018-07-23

## 2018-07-23 LAB
ALBUMIN SERPL BCP-MCNC: 2.6 G/DL
ALP SERPL-CCNC: 81 U/L
ALT SERPL W/O P-5'-P-CCNC: 40 U/L
ANION GAP SERPL CALC-SCNC: 8 MMOL/L
ANISOCYTOSIS BLD QL SMEAR: SLIGHT
AST SERPL-CCNC: 24 U/L
BASOPHILS # BLD AUTO: 0 K/UL
BASOPHILS NFR BLD: 0 %
BILIRUB SERPL-MCNC: 0.1 MG/DL
BUN SERPL-MCNC: 7 MG/DL
CALCIUM SERPL-MCNC: 8.5 MG/DL
CHLORIDE SERPL-SCNC: 108 MMOL/L
CO2 SERPL-SCNC: 25 MMOL/L
CREAT SERPL-MCNC: 0.7 MG/DL
DIFFERENTIAL METHOD: ABNORMAL
EOSINOPHIL # BLD AUTO: 0 K/UL
EOSINOPHIL NFR BLD: 0 %
ERYTHROCYTE [DISTWIDTH] IN BLOOD BY AUTOMATED COUNT: 15.3 %
EST. GFR  (AFRICAN AMERICAN): >60 ML/MIN/1.73 M^2
EST. GFR  (NON AFRICAN AMERICAN): >60 ML/MIN/1.73 M^2
GLUCOSE SERPL-MCNC: 81 MG/DL
HCT VFR BLD AUTO: 22.7 %
HGB BLD-MCNC: 7.3 G/DL
HYPOCHROMIA BLD QL SMEAR: ABNORMAL
IMM GRANULOCYTES # BLD AUTO: 0.01 K/UL
IMM GRANULOCYTES NFR BLD AUTO: 0.5 %
LYMPHOCYTES # BLD AUTO: 0.5 K/UL
LYMPHOCYTES NFR BLD: 25.9 %
MAGNESIUM SERPL-MCNC: 1.7 MG/DL
MCH RBC QN AUTO: 28.7 PG
MCHC RBC AUTO-ENTMCNC: 32.2 G/DL
MCV RBC AUTO: 89 FL
MONOCYTES # BLD AUTO: 0.7 K/UL
MONOCYTES NFR BLD: 38.4 %
NEUTROPHILS # BLD AUTO: 0.7 K/UL
NEUTROPHILS NFR BLD: 35.2 %
NRBC BLD-RTO: 1 /100 WBC
OVALOCYTES BLD QL SMEAR: ABNORMAL
PHOSPHATE SERPL-MCNC: 3.4 MG/DL
PLATELET # BLD AUTO: 192 K/UL
PMV BLD AUTO: 11.4 FL
POIKILOCYTOSIS BLD QL SMEAR: SLIGHT
POLYCHROMASIA BLD QL SMEAR: ABNORMAL
POTASSIUM SERPL-SCNC: 3.9 MMOL/L
PROT SERPL-MCNC: 5.4 G/DL
RBC # BLD AUTO: 2.54 M/UL
SODIUM SERPL-SCNC: 141 MMOL/L
WBC # BLD AUTO: 1.85 K/UL

## 2018-07-23 PROCEDURE — 99233 SBSQ HOSP IP/OBS HIGH 50: CPT | Mod: ,,, | Performed by: INTERNAL MEDICINE

## 2018-07-23 PROCEDURE — 85007 BL SMEAR W/DIFF WBC COUNT: CPT

## 2018-07-23 PROCEDURE — 85027 COMPLETE CBC AUTOMATED: CPT

## 2018-07-23 PROCEDURE — 80053 COMPREHEN METABOLIC PANEL: CPT

## 2018-07-23 PROCEDURE — 84100 ASSAY OF PHOSPHORUS: CPT

## 2018-07-23 PROCEDURE — 25000003 PHARM REV CODE 250: Performed by: INTERNAL MEDICINE

## 2018-07-23 PROCEDURE — 63700000 PHARM REV CODE 250 ALT 637 W/O HCPCS: Performed by: INTERNAL MEDICINE

## 2018-07-23 PROCEDURE — 25000003 PHARM REV CODE 250: Performed by: NURSE PRACTITIONER

## 2018-07-23 PROCEDURE — 83735 ASSAY OF MAGNESIUM: CPT

## 2018-07-23 PROCEDURE — 85025 COMPLETE CBC W/AUTO DIFF WBC: CPT

## 2018-07-23 RX ORDER — CIPROFLOXACIN 500 MG/1
500 TABLET ORAL EVERY 12 HOURS
Qty: 14 TABLET | Refills: 0 | Status: SHIPPED | OUTPATIENT
Start: 2018-07-23 | End: 2018-07-30

## 2018-07-23 RX ADMIN — DASATINIB 40 MG: 20 TABLET ORAL at 08:07

## 2018-07-23 RX ADMIN — CIPROFLOXACIN HYDROCHLORIDE 500 MG: 250 TABLET, FILM COATED ORAL at 08:07

## 2018-07-23 RX ADMIN — VORICONAZOLE 200 MG: 200 TABLET ORAL at 08:07

## 2018-07-23 RX ADMIN — POTASSIUM CHLORIDE 20 MEQ: 750 CAPSULE, EXTENDED RELEASE ORAL at 08:07

## 2018-07-23 RX ADMIN — ACYCLOVIR 400 MG: 200 CAPSULE ORAL at 08:07

## 2018-07-23 NOTE — PROGRESS NOTES
Per MD pt is medically stable for DC. Pt to DC home with family support. No SW needs identified at this time. Based on all available information this is a safe and appropriate DC plan.    Kimberly Briggs [facility]  Oncology Social Worker  Phone: [phone]

## 2018-07-23 NOTE — DISCHARGE SUMMARY
[facility]  Hematology  Bone Marrow Transplant  Discharge Summary      Patient Name: Wilton Guzmán  MRN: [id number]  Admission Date: 6/16/2018  Hospital Length of Stay: 37 days  Discharge Date and Time: 7/23/2018  3:18 PM  Attending Physician: Lore att. providers found   Discharging Provider: Haily Serna NP  Primary Care Provider: Primary Doctor Lore    HPI: Mr. Guzmán is a 31 yo gentleman with no significant past medical history who was transferred from [facility] in [place], [place] for CML with blast crisis. The patient reports that he started having some worsening left sided abdominal pain in mid May. Eventually, this pain worsened which prompted him [place]. Routine labs were done which revealed a white count reportedly around 150. He was transferred to the above mentioned hospital where a bone marrow biopsy was done. The report (attached in the media tab) revealed 20% myelomonoblasts concerning for a CML in transformation to AML. The patient was subsequently induced with 7+3 on June 1. The patient's course there was complicated by neutropenic fevers resulting in the patient being started on vanc and cefepime. IV vanc was discontinued early on in the course but he remained on cefepime until his arrival here despite NGTD on all cultures. He was found to be C. Diff positive and was started on PO vancomycin on 6/13 and now reports having significant improvement in his diarrhea.     On day 8 of 7+3, Mr. Guzmán's bcr-abl came back positive and he was subsequently started on dasatinib. He developed a follicular rash on his arms and head likely secondary to the dasatinib which has largely resolved to date. The patient and his family are originally from [place] which is why they requested transfer to [facility] given their previous positive experiences here.       * No surgery found *     Hospital Course: 06/17/2018: ABDIRAHMAN  Diarrhea improving. Rash improving.  06/22/2018: Neutropenic fever overnight. Blood cultures obtained. Will obtain UA, urine culture, and chest xray this AM. Started on cefepime this AM. Diarrhea resolved. Pancytopenic. ANC 50. Plans to start MEC reinduction on hold due to neutropenic fever. Will hold off on starting until cultures result.   06/24/2018 tolerated first day of MEC chemotherapy started 6/23 for refractory AML. Today MEC day 2.   06/25/2018: MEC D3. No complaints. Discontinue cefepime and transition to cipro PPX. Pancytopenic. 1 U PRBCs today for Hgb 6.8.  06/26/2018: MEC D4. No acute issues.  06/27/2018: MEC D5. No acute issues.  6/28/2018: MEC D6. VSS, afebrile, no complaints today. tolerating chemo well.   06/29/2018: MEC D7.  07/02/2018: MEC day 10; no issues, VSS, ANC 0. D/C'd IVFs  07/03/2018: MEC day 11, tmax 100.2, plt 5K will get one unit plt transfusion.   07/06/2018: [facility] day 14, tmax 99.8. Blood cx from 7/4 one bottle showed gram + cocci in chains resembling strep, redrew cx and ngtd. Plt 6K, will get one unit plt transfusion. ANC 0. On cef and vanc. Vanc due to left side facial swelling/periorbital edema. CT orbits done yesterday which showed minimal asymetric soft tissue induration of left infraorbital soft tissue without evidence of focal fluid collection. Otherwise unremarkable. May perform MRI and/or get neuro consult pending staff due to HA and pressure in back of head.  07/09/2018: [facility] day 17, swelling much improved. MRI unremarkable. Cef continues, will d/c vanc today.   07/10/2018: [facility] day 18, cef changed to po cipro today. No complaints. 1 unit plt today for plt of 6K, ANC 0  07/11/2018: MEC day 19, afebrile, ANC 0.   07/12/2018: MEC day 20, plan for BM bx tomorrow. ANC 10. With sacral sore, consulting wound care  07/13/2018: MEC day 21, BM bx today. ANC 10. Wound care started critic aid barrier paste and ordered waffle cushion. Repeat neutropenic fever, started vanc and cef  again  07/16/2018: [facility] day 24, awaiting BM bx results, path and flow pending. Day 4 of vanc and cef, blood cx, UA, Ucx, and chest xray from 7/13 without source. D/C vanc. ANC 8. 24 hr tmax 100.4 (downtrending)  07/17/2018: [facility] day 25, BM bx final path without evidence of CML. Day 5 of cef, afebrile, one unsustained temp of 100.4 yesterday evening.   07/18/2018: [facility] day 26, Day 6 of cef, will d/c today. Afebrile. ANC up to 130 today. BCR ABL p210 0.5% (c/w MRD)  07/19/2018: [facility] D27. Afebrile. VSS. Continue PPX acyc, cipro, voriconazole. Continue dasatinib.Pancytopenic. .   07/20/2018: MEC D28. Afebrile. VSS. . Continue PPX acyc, cipro, voriconazole. Continue dasatinib. Pancytopenic. Awaiting count recovery for discharge. No acute issues.  7/21/2018: [facility] D29. Afebrile. VSS. . Continue PPX acyc, cipro, voriconazole. Continue dastanib. Pancytopenic. Possible discharge next week.  7/23/2018: [facility] D31. Afebrile, VSS. ANC up to 630. Will discharge home today with ppx acyclovir and ppx Cipro x 1 week. Follow-up appointment with new PCP in [place] on 7/26 for referral to [facility] [place] Hem/Onc follow-up and treatment. PICC removed prior to discharge.      Admission Summary:  Patient was transferred from [facility] after receiving 7+3 induction for CML in blast crisis. Day 19 restaging bone marrow biopsy done here at [facility] after transfer was done showing 9% blasts c/w persistent disease. Patient received re-induction with MEC. Day 21 bone marrow biopsy with no evidence of CML, MRD noted per BCR/ABL. Patient continued dasatinib and was discharged on full dose 140 mg bid. Patient experienced neutropenic fever treated with Cefepime and Vanc, He had some facial and orbital swelling. MRI and CT negative. ANC recovered to 630 at discharge and he was discharged on ppx acyclovir and Cipro. Follow-up to be scheduled after he is referred back by new PCP in [place].    Consults         Status Ordering Provider     Inpatient  consult to Registered Dietitian/Nutritionist  Once     Provider:  (Not yet assigned)    Completed RENE BANKS          Significant Diagnostic Studies: Labs:   CMP   Recent Labs  Lab 07/22/18  0400 07/23/18  0421    141   K 3.6 3.9    108   CO2 26 25    81   BUN 7 7   CREATININE 0.8 0.7   CALCIUM 8.7 8.5*   PROT 5.6* 5.4*   ALBUMIN 2.7* 2.6*   BILITOT 0.1 0.1   ALKPHOS 83 81   AST 22 24   ALT 38 40   ANIONGAP 8 8   ESTGFRAFRICA >60.0 >60.0   EGFRNONAA >60.0 >60.0    and CBC   Recent Labs  Lab 07/22/18  0400 07/23/18  0421   WBC 1.52* 1.85*   HGB 7.3* 7.3*   HCT 22.8* 22.7*    192     Microbiology:   Blood Culture   Lab Results   Component Value Date    LABBLOO No growth after 5 days. 07/13/2018    LABBLOO No growth after 5 days. 07/13/2018       Pending Diagnostic Studies:     None        Final Active Diagnoses:    Diagnosis Date Noted POA    PRINCIPAL PROBLEM:  Chronic myelogenous leukemia (CML), BCR-ABL1-positive [C92.10] 06/15/2018 Yes    Pancytopenia due to antineoplastic chemotherapy [D61.810, T45.1X5A] 06/16/2018 Yes    Folliculitis [L73.9] 07/23/2018 No    Ulcer of perianal area, limited to breakdown of skin [L98.491] 07/12/2018 No    Neutropenic fever [D70.9, R50.81] 07/05/2018 No    Anxiety [F41.9] 06/20/2018 No      Problems Resolved During this Admission:    Diagnosis Date Noted Date Resolved POA    Neutropenic fever [D70.9, R50.81] 07/04/2018 07/04/2018 No    C. difficile colitis [A04.72] 06/16/2018 07/04/2018 Yes    Perianal ulcer [L98.499] 07/12/2018 07/13/2018 Yes    Periorbital swelling [H57.8] 07/06/2018 07/14/2018 Unknown    Severe malnutrition [E43] 07/05/2018 07/23/2018 No    Drug rash [L27.0] 07/04/2018 07/04/2018 Yes    Hypokalemia [E87.6] 06/16/2018 06/19/2018 Yes      Discharged Condition: good    Disposition: Home or Self Care    Follow Up:  Follow-up Information     Parminder Grove MD On 7/26/2018.    Specialty:  Family Medicine  Why:  follow up-  11:15am  Contact information:  [facility] [place]  [phone]                 Patient Instructions:     Notify your health care provider if you experience any of the following:  temperature >100.4     Notify your health care provider if you experience any of the following:  persistent nausea and vomiting or diarrhea     Notify your health care provider if you experience any of the following:  severe uncontrolled pain     Notify your health care provider if you experience any of the following:  redness, tenderness, or signs of infection (pain, swelling, redness, odor or green/yellow discharge around incision site)     Notify your health care provider if you experience any of the following:  difficulty breathing or increased cough     Notify your health care provider if you experience any of the following:  severe persistent headache     Notify your health care provider if you experience any of the following:  persistent dizziness, light-headedness, or visual disturbances     Notify your health care provider if you experience any of the following:  increased confusion or weakness     Activity as tolerated       Medications:  Reconciled Home Medications:      Medication List      START taking these medications    acyclovir 200 MG capsule  Commonly known as:  ZOVIRAX  Take 2 capsules (400 mg total) by mouth 2 (two) times daily.     ciprofloxacin HCl 500 MG tablet  Commonly known as:  CIPRO  Take 1 tablet (500 mg total) by mouth every 12 (twelve) hours. for 7 days     mirtazapine 15 MG disintegrating tablet  Commonly known as:  REMERON SOL-TAB  Dissolve 1 tablet (15 mg total) by mouth nightly.     SPRYCEL 140 mg Tab  Generic drug:  dasatinib  Take 140 mg by mouth once daily.            Haily Serna NP  Bone Marrow Transplant  [facility]

## 2018-07-23 NOTE — SUBJECTIVE & OBJECTIVE
Subjective:     Interval History:   [facility] D31. Afebrile, VSS. ANC up to 630. Will discharge home today with ppx acyclovir and ppx Cipro x 1 week. Follow-up appointment with new PCP in [place] on 7/26 for referral to [facility] for Hem/Onc follow-up and treatment. PICC removed prior to discharge.     Objective:     Vital Signs (Most Recent):  Temp: 98.4 °F (36.9 °C) (07/23/18 0732)  Pulse: 92 (07/23/18 0732)  Resp: 17 (07/23/18 0732)  BP: 117/71 (07/23/18 0732)  SpO2: 99 % (07/23/18 0732) Vital Signs (24h Range):  Temp:  [97.6 °F (36.4 °C)-99 °F (37.2 °C)] 98.4 °F (36.9 °C)  Pulse:  [] 92  Resp:  [17-18] 17  SpO2:  [95 %-99 %] 99 %  BP: (110-119)/(66-83) 117/71     Weight: 63.9 kg (140 lb 14 oz)  Body mass index is 19.65 kg/m².  Body surface area is 1.79 meters squared.      Intake/Output - Last 3 Shifts       07/21 0700 - 07/22 0659 07/22 0700 - 07/23 0659 07/23 0700 - 07/24 0659    P.O. 2120 1320 240    Total Intake(mL/kg) 2120 (33.2) 1320 (20.7) 240 (3.8)    Urine (mL/kg/hr) 700 (0.5) 1400 (0.9) 400 (1.7)    Total Output 700 1400 400    Net +1420 -80 -160           Urine Occurrence 2 x 2 x     Stool Occurrence 2 x 2 x 0 x          Physical Exam   Constitutional: He is oriented to person, place, and time. He appears well-developed.   Thin male   HENT:   Head: Normocephalic and atraumatic.   Eyes: Conjunctivae and EOM are normal. Pupils are equal, round, and reactive to light.   Neck: Normal range of motion. Neck supple.   Cardiovascular: Normal rate, regular rhythm and normal heart sounds.  Exam reveals no gallop and no friction rub.    No murmur heard.  Pulmonary/Chest: Effort normal and breath sounds normal. No respiratory distress. He has no wheezes. He has no rales.   Abdominal: Soft. Bowel sounds are normal. He exhibits no distension. There is no tenderness.   Musculoskeletal: Normal range of motion. He exhibits no edema.   Lymphadenopathy:     He has no cervical adenopathy.   Neurological: He is alert and  oriented to person, place, and time. No cranial nerve deficit or sensory deficit.   Skin: Skin is warm and dry. Rash noted.   Pustular rash to scalp   Psychiatric: He has a normal mood and affect.       Significant Labs:   CBC:   Recent Labs  Lab 07/22/18 0400 07/23/18 0421   WBC 1.52* 1.85*   HGB 7.3* 7.3*   HCT 22.8* 22.7*    192   , CMP:   Recent Labs  Lab 07/22/18 0400 07/23/18 0421    141   K 3.6 3.9    108   CO2 26 25    81   BUN 7 7   CREATININE 0.8 0.7   CALCIUM 8.7 8.5*   PROT 5.6* 5.4*   ALBUMIN 2.7* 2.6*   BILITOT 0.1 0.1   ALKPHOS 83 81   AST 22 24   ALT 38 40   ANIONGAP 8 8   EGFRNONAA >60.0 >60.0   , Coagulation: No results for input(s): PT, INR, APTT in the last 48 hours., LDH: No results for input(s): LDHCSF, BFSOURCE in the last 48 hours. and Uric Acid No results for input(s): URICACID in the last 48 hours.    Diagnostic Results:  None

## 2018-07-23 NOTE — ASSESSMENT & PLAN NOTE
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 9% blasts.   - persistent disease post 7+3 induction. 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    - had BMBX on 7/13/18, without evidence of CML; BCR ABL p210 0.5% (c/w MRD)  - AML FISH negative  - NGS negative from first repeat BMBX  - ECHO with 63% EF  - HIV negative, Hepatitis negative, G6PD wnl  - HLA typing hi and lo completed  - patient has 1 full sibling, contact info given to transplant coordinators  - pt still needs to see PCP to get formal referral; appt set up with PCP Thursday, July 26 in [place]. PCP should be able to refer back to us for services  - ANC up to 630 today, will discharge home today. Remove PICC prior to discharge.

## 2018-07-23 NOTE — ASSESSMENT & PLAN NOTE
- 7/13/18 Blood cx x2 ngtd; UA negative; Urine cx no growth; Chest xray negative  - Started on  vancomycin ( received 4 days) + cefepime (received 6 days) on 7/13/2018.  - resolved; afebrile off of antibiotics  - will discharge on ppx acyclovir and ppx Cipro x 1 week given ANC remains <1000

## 2018-07-23 NOTE — PROGRESS NOTES
[facility]  Hematology  Bone Marrow Transplant  Progress Note    Patient Name: Wilton Guzmán  Admission Date: 6/16/2018  Hospital Length of Stay: 37 days  Code Status: Full Code    Subjective:     Interval History:   [facility] D31. Afebrile, VSS. ANC up to 630. Will discharge home today with ppx acyclovir and ppx Cipro x 1 week. Follow-up appointment with new PCP in [place] on 7/26 for referral to [facility] for Hem/Onc follow-up and treatment. PICC removed prior to discharge.     Objective:     Vital Signs (Most Recent):  Temp: 98.4 °F (36.9 °C) (07/23/18 0732)  Pulse: 92 (07/23/18 0732)  Resp: 17 (07/23/18 0732)  BP: 117/71 (07/23/18 0732)  SpO2: 99 % (07/23/18 0732) Vital Signs (24h Range):  Temp:  [97.6 °F (36.4 °C)-99 °F (37.2 °C)] 98.4 °F (36.9 °C)  Pulse:  [] 92  Resp:  [17-18] 17  SpO2:  [95 %-99 %] 99 %  BP: (110-119)/(66-83) 117/71     Weight: 63.9 kg (140 lb 14 oz)  Body mass index is 19.65 kg/m².  Body surface area is 1.79 meters squared.      Intake/Output - Last 3 Shifts       07/21 0700 - 07/22 0659 07/22 0700 - 07/23 0659 07/23 0700 - 07/24 0659    P.O. 2120 1320 240    Total Intake(mL/kg) 2120 (33.2) 1320 (20.7) 240 (3.8)    Urine (mL/kg/hr) 700 (0.5) 1400 (0.9) 400 (1.7)    Total Output 700 1400 400    Net +1420 -80 -160           Urine Occurrence 2 x 2 x     Stool Occurrence 2 x 2 x 0 x          Physical Exam   Constitutional: He is oriented to person, place, and time. He appears well-developed.   Thin male   HENT:   Head: Normocephalic and atraumatic.   Eyes: Conjunctivae and EOM are normal. Pupils are equal, round, and reactive to light.   Neck: Normal range of motion. Neck supple.   Cardiovascular: Normal rate, regular rhythm and normal heart sounds.  Exam reveals no gallop and no friction rub.    No murmur heard.  Pulmonary/Chest: Effort normal and breath sounds normal. No respiratory distress. He has no wheezes. He has no rales.   Abdominal: Soft. Bowel sounds are normal.  He exhibits no distension. There is no tenderness.   Musculoskeletal: Normal range of motion. He exhibits no edema.   Lymphadenopathy:     He has no cervical adenopathy.   Neurological: He is alert and oriented to person, place, and time. No cranial nerve deficit or sensory deficit.   Skin: Skin is warm and dry. Rash noted.   Pustular rash to scalp   Psychiatric: He has a normal mood and affect.       Significant Labs:   CBC:   Recent Labs  Lab 07/22/18  0400 07/23/18  0421   WBC 1.52* 1.85*   HGB 7.3* 7.3*   HCT 22.8* 22.7*    192   , CMP:   Recent Labs  Lab 07/22/18  0400 07/23/18  0421    141   K 3.6 3.9    108   CO2 26 25    81   BUN 7 7   CREATININE 0.8 0.7   CALCIUM 8.7 8.5*   PROT 5.6* 5.4*   ALBUMIN 2.7* 2.6*   BILITOT 0.1 0.1   ALKPHOS 83 81   AST 22 24   ALT 38 40   ANIONGAP 8 8   EGFRNONAA >60.0 >60.0   , Coagulation: No results for input(s): PT, INR, APTT in the last 48 hours., LDH: No results for input(s): LDHCSF, BFSOURCE in the last 48 hours. and Uric Acid No results for input(s): URICACID in the last 48 hours.    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 9% blasts.   - persistent disease post 7+3 induction. 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    - had BMBX on 7/13/18, without evidence of CML; BCR ABL p210 0.5% (c/w MRD)  - AML FISH negative  - NGS  negative from first repeat BMBX  - ECHO with 63% EF  - HIV negative, Hepatitis negative, G6PD wnl  - HLA typing hi and lo completed  - patient has 1 full sibling, contact info given to transplant coordinators  - pt still needs to see PCP to get formal referral; appt set up with PCP Thursday, July 26 in [place]. PCP should be able to refer back to us for services  - ANC up to 630 today, will discharge home today. Remove PICC prior to discharge.        Pancytopenia due to antineoplastic chemotherapy    - transfuse for Hgb <7 and platelets <10K  - continue PPX antimicrobials: acyc, cipro,   - no indications for transfusions;   - hgb stable at 7.3 gm/dl and platelet count wnl at 192,000          Folliculitis    - to scalp  - possible from dasatinib and hair regrowth  - no current need for treatment        Ulcer of perianal area, limited to breakdown of skin    - reddened area with white ulceration noted to inner left side of buttocks  - consulted wound care, appreciate recs   - started critic aid barrier paste and ordered waffle cushion. Pt states this is helping           Neutropenic fever    - 7/13/18 Blood cx x2 ngtd; UA negative; Urine cx no growth; Chest xray negative  - Started on  vancomycin ( received 4 days) + cefepime (received 6 days) on 7/13/2018.  - resolved; afebrile off of antibiotics  - will discharge on ppx acyclovir and ppx Cipro x 1 week given ANC remains <1000        Anxiety    - Declined oncology psychologist consult earlier during this admission  - Continue Remeron for anxiety and insomnia                VTE Risk Mitigation         Ordered     heparin, porcine (PF) 100 unit/mL injection flush 300 Units  As needed (PRN)      06/23/18 1052     IP VTE LOW RISK PATIENT  Once      06/16/18 0938          Disposition: discharge home today    Haily Serna NP  Bone Marrow Transplant  [facility]

## 2018-07-23 NOTE — ASSESSMENT & PLAN NOTE
- Declined oncology psychologist consult earlier during this admission  - Continue Remeron for anxiety and insomnia

## 2018-07-23 NOTE — NURSING
Pt discharged per BMT team at this time.    O2- Pt on room air with oxygen saturation 99%.  Activity-Pt ambulates independently.  Devices- Pt not being sent home on any devices.   Tolerating-Pt tolerating PO diet and medication.  Elimination-Pt voiding and having bowel movements independently.  Self Care- Pt able to perform personal hygiene independently.  Teaching- Pt instructed on when to take home meds.     Discharge instructions and AVS explained and given to pt.  Medications delivered to pt at bedside by pharmacy, nursing explained why prescribed and when to take.  All questions answered and pt verbalized understanding.  Pt refusing transport and waiting for fiance to arrive to drive him home.

## 2018-07-23 NOTE — PLAN OF CARE
Problem: Patient Care Overview  Goal: Plan of Care Review  Outcome: Ongoing (interventions implemented as appropriate)  Plan of care reviewed with the patient at the beginning of the shift. 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 He is s/p 7&3 and MEC. Today is day 31 of MEC. Pt awaiting count recovery. Pt is tolerated chemo well. He has no complaints at this time. Pt denies pain, n/v/d. Pt reports a good appetite. PO intake encouraged. Pt performs self care of perianal ulcer/wound and applies barrier cream. Fall precautions maintained. He is currently up independently without difficulty. Pt remained free from falls and injury this shift. Bed locked in lowest position, side rails up x2, call light within reach. Instructed pt to call for assistance as needed. Pt verbalized understanding. No acute issues overnight. Vitals stable. Pt afebrile. Neutropenic precautions maintained. Will continue to monitor.

## 2018-07-23 NOTE — ASSESSMENT & PLAN NOTE
- transfuse for Hgb <7 and platelets <10K  - continue PPX antimicrobials: acyc, cipro,   - no indications for transfusions;   - hgb stable at 7.3 gm/dl and platelet count wnl at 192,000

## 2018-07-24 DIAGNOSIS — C92.10 CML (CHRONIC MYELOCYTIC LEUKEMIA): Primary | ICD-10-CM

## 2018-07-24 LAB
CHROM BANDING METHOD: NORMAL
CHROMOSOME ANALYSIS BM ADDITIONAL INFORMATION: NORMAL
CHROMOSOME ANALYSIS BM RELEASED BY: NORMAL
CHROMOSOME ANALYSIS BM RESULT SUMMARY: NORMAL
CLINICAL CYTOGENETICIST REVIEW: NORMAL
KARYOTYP MAR: NORMAL
REASON FOR REFERRAL (NARRATIVE): NORMAL
REF LAB TEST METHOD: NORMAL
SPECIMEN SOURCE: NORMAL
SPECIMEN: NORMAL

## 2018-07-24 NOTE — TELEPHONE ENCOUNTER
"----- Message from Inna Juarez PharmD sent at 7/24/2018  1:10 PM CDT -----  Regarding: Sprycel Refills  Dr. Pantoja and Staff:    I wanted to follow up regarding Mr. Guzmán's prescription for Sprycel.  [facility] was able to take care of the first fill, however, refills must go through [facility] as we are not contracted with his insurance plan.    Would someone be able to send a new prescription to [facility] for his refills so that he is set up with them before his next fill is needed?  The pharmacy is listed as one of his "preferred pharmacies" in his EPIC profile.       Please let me know if you have any additional questions.    Regards,  Inna Juarez, Pharm D  [facility]  [phone]  "

## 2018-08-03 ENCOUNTER — TELEPHONE (OUTPATIENT)
Dept: HEMATOLOGY/ONCOLOGY | Facility: CLINIC | Age: 33
End: 2018-08-03

## 2018-08-03 NOTE — TELEPHONE ENCOUNTER
Tried to contact Wilton but his number says it's no longer in service so reached out to his mom, Ginette, and asked her if Wilton could call me back and give us an update on the PCP referral to [facility]. Verbalized understanding.

## 2018-08-09 ENCOUNTER — TELEPHONE (OUTPATIENT)
Dept: HEMATOLOGY/ONCOLOGY | Facility: CLINIC | Age: 33
End: 2018-08-09

## 2018-08-10 ENCOUNTER — TELEPHONE (OUTPATIENT)
Dept: HEMATOLOGY/ONCOLOGY | Facility: CLINIC | Age: 33
End: 2018-08-10

## 2018-08-10 NOTE — TELEPHONE ENCOUNTER
Notified Wilton he has an appointment to see Dr. Pantoja on 8/14 at 2PM followed by a marrow biopsy with NP.   Nuria told him his PCP referral has been apporved by Karlos and we need to move forward with donors. Verbalized understanding.

## 2018-08-14 ENCOUNTER — OFFICE VISIT (OUTPATIENT)
Dept: HEMATOLOGY/ONCOLOGY | Facility: CLINIC | Age: 33
End: 2018-08-14
Payer: OTHER GOVERNMENT

## 2018-08-14 ENCOUNTER — LAB VISIT (OUTPATIENT)
Dept: LAB | Facility: HOSPITAL | Age: 33
End: 2018-08-14
Attending: INTERNAL MEDICINE
Payer: OTHER GOVERNMENT

## 2018-08-14 ENCOUNTER — EDUCATION (OUTPATIENT)
Dept: HEMATOLOGY/ONCOLOGY | Facility: CLINIC | Age: 33
End: 2018-08-14

## 2018-08-14 VITALS
BODY MASS INDEX: 21.66 KG/M2 | HEART RATE: 81 BPM | WEIGHT: 154.75 LBS | DIASTOLIC BLOOD PRESSURE: 79 MMHG | HEIGHT: 71 IN | OXYGEN SATURATION: 100 % | SYSTOLIC BLOOD PRESSURE: 124 MMHG | TEMPERATURE: 99 F

## 2018-08-14 DIAGNOSIS — Z76.82 STEM CELL TRANSPLANT CANDIDATE: ICD-10-CM

## 2018-08-14 DIAGNOSIS — C92.11 CHRONIC MYELOID LEUKEMIA, BCR/ABL-POSITIVE, IN REMISSION: ICD-10-CM

## 2018-08-14 DIAGNOSIS — C92.11 CHRONIC MYELOID LEUKEMIA, BCR/ABL-POSITIVE, IN REMISSION: Primary | ICD-10-CM

## 2018-08-14 DIAGNOSIS — Z09 CHEMOTHERAPY FOLLOW-UP EXAMINATION: ICD-10-CM

## 2018-08-14 LAB
ABO + RH BLD: NORMAL
ALBUMIN SERPL BCP-MCNC: 3.9 G/DL
ALP SERPL-CCNC: 83 U/L
ALT SERPL W/O P-5'-P-CCNC: 52 U/L
ANION GAP SERPL CALC-SCNC: 8 MMOL/L
AST SERPL-CCNC: 28 U/L
BASOPHILS # BLD AUTO: 0.03 K/UL
BASOPHILS NFR BLD: 0.4 %
BILIRUB SERPL-MCNC: 0.4 MG/DL
BLD GP AB SCN CELLS X3 SERPL QL: NORMAL
BONE MARROW WRIGHT STAIN COMMENT: NORMAL
BUN SERPL-MCNC: 8 MG/DL
CALCIUM SERPL-MCNC: 9.3 MG/DL
CHLORIDE SERPL-SCNC: 106 MMOL/L
CO2 SERPL-SCNC: 29 MMOL/L
CREAT SERPL-MCNC: 0.8 MG/DL
DIFFERENTIAL METHOD: ABNORMAL
EOSINOPHIL # BLD AUTO: 0.1 K/UL
EOSINOPHIL NFR BLD: 1.2 %
ERYTHROCYTE [DISTWIDTH] IN BLOOD BY AUTOMATED COUNT: 18.8 %
EST. GFR  (AFRICAN AMERICAN): >60 ML/MIN/1.73 M^2
EST. GFR  (NON AFRICAN AMERICAN): >60 ML/MIN/1.73 M^2
GLUCOSE SERPL-MCNC: 90 MG/DL
HCT VFR BLD AUTO: 34.6 %
HGB BLD-MCNC: 11.2 G/DL
IMM GRANULOCYTES # BLD AUTO: 0.03 K/UL
IMM GRANULOCYTES NFR BLD AUTO: 0.4 %
LDH SERPL L TO P-CCNC: 278 U/L
LYMPHOCYTES # BLD AUTO: 1.7 K/UL
LYMPHOCYTES NFR BLD: 25.4 %
MCH RBC QN AUTO: 30.5 PG
MCHC RBC AUTO-ENTMCNC: 32.4 G/DL
MCV RBC AUTO: 94 FL
MONOCYTES # BLD AUTO: 0.6 K/UL
MONOCYTES NFR BLD: 9.3 %
NEUTROPHILS # BLD AUTO: 4.3 K/UL
NEUTROPHILS NFR BLD: 63.3 %
NRBC BLD-RTO: 0 /100 WBC
PLATELET # BLD AUTO: 298 K/UL
PMV BLD AUTO: 9.6 FL
POTASSIUM SERPL-SCNC: 4.2 MMOL/L
PROT SERPL-MCNC: 6.6 G/DL
RBC # BLD AUTO: 3.67 M/UL
SODIUM SERPL-SCNC: 143 MMOL/L
URATE SERPL-MCNC: 5.3 MG/DL
WBC # BLD AUTO: 6.77 K/UL

## 2018-08-14 PROCEDURE — 99212 OFFICE O/P EST SF 10 MIN: CPT | Mod: PBBFAC | Performed by: NURSE PRACTITIONER

## 2018-08-14 PROCEDURE — 88311 DECALCIFY TISSUE: CPT | Mod: 26,,, | Performed by: PATHOLOGY

## 2018-08-14 PROCEDURE — 88185 FLOWCYTOMETRY/TC ADD-ON: CPT | Mod: 59 | Performed by: PATHOLOGY

## 2018-08-14 PROCEDURE — 88189 FLOWCYTOMETRY/READ 16 & >: CPT | Mod: ,,, | Performed by: PATHOLOGY

## 2018-08-14 PROCEDURE — 99215 OFFICE O/P EST HI 40 MIN: CPT | Mod: S$PBB,,, | Performed by: INTERNAL MEDICINE

## 2018-08-14 PROCEDURE — 99999 PR PBB SHADOW E&M-EST. PATIENT-LVL III: CPT | Mod: PBBFAC,,, | Performed by: INTERNAL MEDICINE

## 2018-08-14 PROCEDURE — 83615 LACTATE (LD) (LDH) ENZYME: CPT

## 2018-08-14 PROCEDURE — 88342 IMHCHEM/IMCYTCHM 1ST ANTB: CPT | Mod: 26,59,, | Performed by: PATHOLOGY

## 2018-08-14 PROCEDURE — 38222 DX BONE MARROW BX & ASPIR: CPT | Mod: S$PBB,LT,, | Performed by: NURSE PRACTITIONER

## 2018-08-14 PROCEDURE — 88313 SPECIAL STAINS GROUP 2: CPT | Mod: 26,,, | Performed by: PATHOLOGY

## 2018-08-14 PROCEDURE — 81206 BCR/ABL1 GENE MAJOR BP: CPT | Mod: 59

## 2018-08-14 PROCEDURE — 88342 IMHCHEM/IMCYTCHM 1ST ANTB: CPT | Performed by: PATHOLOGY

## 2018-08-14 PROCEDURE — 86901 BLOOD TYPING SEROLOGIC RH(D): CPT

## 2018-08-14 PROCEDURE — 88313 SPECIAL STAINS GROUP 2: CPT

## 2018-08-14 PROCEDURE — 88305 TISSUE EXAM BY PATHOLOGIST: CPT | Mod: 26,,, | Performed by: PATHOLOGY

## 2018-08-14 PROCEDURE — 36415 COLL VENOUS BLD VENIPUNCTURE: CPT

## 2018-08-14 PROCEDURE — 85097 BONE MARROW INTERPRETATION: CPT | Mod: ,,, | Performed by: PATHOLOGY

## 2018-08-14 PROCEDURE — 38222 DX BONE MARROW BX & ASPIR: CPT | Mod: PBBFAC | Performed by: NURSE PRACTITIONER

## 2018-08-14 PROCEDURE — 88341 IMHCHEM/IMCYTCHM EA ADD ANTB: CPT | Performed by: PATHOLOGY

## 2018-08-14 PROCEDURE — 88237 TISSUE CULTURE BONE MARROW: CPT

## 2018-08-14 PROCEDURE — 88264 CHROMOSOME ANALYSIS 20-25: CPT

## 2018-08-14 PROCEDURE — 88184 FLOWCYTOMETRY/ TC 1 MARKER: CPT | Performed by: PATHOLOGY

## 2018-08-14 PROCEDURE — 85025 COMPLETE CBC W/AUTO DIFF WBC: CPT

## 2018-08-14 PROCEDURE — 99499 UNLISTED E&M SERVICE: CPT | Mod: S$PBB,,, | Performed by: NURSE PRACTITIONER

## 2018-08-14 PROCEDURE — 81450 HL NEO GSAP 5-50DNA/DNA&RNA: CPT

## 2018-08-14 PROCEDURE — 99213 OFFICE O/P EST LOW 20 MIN: CPT | Mod: PBBFAC,25,27 | Performed by: INTERNAL MEDICINE

## 2018-08-14 PROCEDURE — 88341 IMHCHEM/IMCYTCHM EA ADD ANTB: CPT | Mod: 26,59,, | Performed by: PATHOLOGY

## 2018-08-14 PROCEDURE — 80053 COMPREHEN METABOLIC PANEL: CPT

## 2018-08-14 PROCEDURE — 99999 PR PBB SHADOW E&M-EST. PATIENT-LVL II: CPT | Mod: PBBFAC,,, | Performed by: NURSE PRACTITIONER

## 2018-08-14 PROCEDURE — 84550 ASSAY OF BLOOD/URIC ACID: CPT

## 2018-08-14 PROCEDURE — 88313 SPECIAL STAINS GROUP 2: CPT | Performed by: PATHOLOGY

## 2018-08-14 NOTE — PROCEDURES
Bone marrow  Date/Time: 8/14/2018 3:57 PM  Performed by: Haily Serna NP  Authorized by: Kishor Pantoja MD       PROCEDURE NOTE:  Bone Marrow aspiration and biopsy  Indication: restaging CML in blast crisis  Consent: Informed consent was obtained from patient.  Timeout: Done and documented.  Position: Prone  Site: Left posterior illiac crest.  Prep: Betadine.  Needle used: 11 gauge Jamshidi needle.  Anesthetic: 2% lidocaine 10 cc.  Biopsy: The biopsy needle was introduced into the marrow cavity and 12 cc of thick clotted aspirate was obtained without complications and sent for flow, cytogenetics, Oncogene panel and BCR/ABL. Core biopsy obtained x 2 without difficulty and sent for routine histologic examination.  Complications: None.  EBL: minimal  Disposition: The patient was discharged home lying supine for 20 minutes.    Haily Serna NP  Hematology/BMT

## 2018-08-14 NOTE — PROGRESS NOTES
Met with Wilton and his sobeida Cuenca to discuss the transplant process including HLA typing of potential related donors; the pre-transplant evaluation testing and consults; consent signing once test results are completed; placement of a central line; admit, conditioning regimen, and infusion of the stem cells. They were given educational materials. I explained the importance of going to the dentist early on in the evaluation process in case dental work is required prior to being cleared for transplant.  Patient was also informed of the critical need for a full time caregiver, as well as, the requirement to stay within 1 hour travel from [facility] for 100 days post transplant.   They were given the opportunity to ask questions and all questions were answered to their satisfaction.

## 2018-08-14 NOTE — PROGRESS NOTES
33 year old male with CML blast crisis presents for restaging bone marrow aspiration and biopsy (see procedure note) after count recovery post induction chemo. For full history please see Dr. Pantoja's clinic note from today 8/14/2018.    Allergies and medications reviewed.    Haily Serna NP  Hematology/BMT

## 2018-08-15 LAB — BONE MARROW IRON STAIN COMMENT: NORMAL

## 2018-08-16 LAB
BCR/ABL,P210 RESULT: NORMAL
PATH REPORT.FINAL DX SPEC: NORMAL
SPECIMEN TYPE: NORMAL

## 2018-08-17 LAB
BODY SITE - BONE MARROW: NORMAL
CLINICAL DIAGNOSIS - BONE MARROW: NORMAL
FLOW CYTOMETRY ANTIBODIES ANALYZED - BONE MARROW: NORMAL
FLOW CYTOMETRY COMMENT - BONE MARROW: NORMAL
FLOW CYTOMETRY INTERPRETATION - BONE MARROW: NORMAL

## 2018-08-20 PROBLEM — L98.491: Status: RESOLVED | Noted: 2018-07-12 | Resolved: 2018-08-20

## 2018-08-20 PROBLEM — D70.9 NEUTROPENIC FEVER: Status: RESOLVED | Noted: 2018-07-05 | Resolved: 2018-08-20

## 2018-08-20 PROBLEM — D61.810 PANCYTOPENIA DUE TO ANTINEOPLASTIC CHEMOTHERAPY: Status: RESOLVED | Noted: 2018-06-16 | Resolved: 2018-08-20

## 2018-08-20 PROBLEM — R50.81 NEUTROPENIC FEVER: Status: RESOLVED | Noted: 2018-07-05 | Resolved: 2018-08-20

## 2018-08-20 PROBLEM — T45.1X5A PANCYTOPENIA DUE TO ANTINEOPLASTIC CHEMOTHERAPY: Status: RESOLVED | Noted: 2018-06-16 | Resolved: 2018-08-20

## 2018-08-20 NOTE — ASSESSMENT & PLAN NOTE
Mr. Guzmán presents for follow-up of his CML. He was initially diagnosed in blast phase; thus making him high risk from the start. Additionally, he did not achieve remission with initial induction chemotherapy, requiring reinduction before he was in morphologic remission.    He is now in major molecular remission with bcr-abl p210 transcript of his bone marrow showing only 0.05% on the international scale (MR 3.3). He is in cytogenetic complete remission as well.     He is tolerating dasatinib well at this time without adverse event.     We discussed that long-term he has high risk of relapse with TKI therapy alone, though the true risk is not known. We have therefore recommended allogeneic stem cell transplant as a potentially curative intervention.     He has a brother, who will complete HLA-typing. There are also several potential matched unrelated donors in the registry, and we will investigate these further.     We discussed the risks of allogeneic stem cell transplant today, including transplant-related mortality of approximately 20% in the first year, and long-term morbidity and mortality from risk of relapse, graft versus host disease, and infectious complications. We reviewed the procedures of allogeneic stem cell transplant, including pre-transplant evaluation, assessment of donors and their potential eligibility, and the need for inpatient hospital stay, chemotherapy, and transplant. We reviewed the need to relocate within 1 hour of [place] and have a continuous caregiver availability 24 hours a day, 7 days a week through the first 100 days. He also met with Noreen Sebastian, one of our transplant coordinators, to discuss this process as well.    He will remain on dasatinib at this time to maintain his remission until we can move forward with allogeneic stem cell transplant.

## 2018-08-20 NOTE — PROGRESS NOTES
HEMATOLOGIC MALIGNANCIES PROGRESS NOTE    IDENTIFYING STATEMENT   Wilton Guzmán (Wilton) is a 33 y.o. 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      ONCOLOGY HISTORY:    1. Chronic myeloid leukemia, initially presenting in blast phase   A. 2018: Began developing left-sided abdominal pain - eventually requiring evaluation - WBC ~150 at  base; transferred to [facility] in [place], [place]   B. BMBx showed 20% myelomonoblasts concerning for CML in blast phase; bcr-abl positive (returned for Day 8 of induction therapy).    C. 2018: Began 7+3 induction chemotherapy. Began dasatinib (dose-adjusted) on day 8.   D. 2018: Transferred to [facility]   E. 2018: BMBx showed variably cellular marrow (10-40%) with 9% CD34+ blasts, concerning for residual disease.   F. 2018: Reinduction with MEC (mitoxantrone, etoposide, cytarabine) chemotherapy.    G. 2018: BMBx shows hypocellular bone marrow with no definite morphologic or immunophenotypic evidence of residual leukemia; bcr-abl p210 transcript was detected at 0.5%   H. Delay in clinic follow-up due to challenges with  insurance   I. 2018: Follow-up in clinic; WBC 6.77 (normal diff); Hgb 11.2 g/dl; Plt 298; BMBx shows 30% cellular marrow with no morphologic evidence of residual leukemia; bcr-abl p210 detected at 0.05% on international scale (MR 3.3).     INTERVAL HISTORY:      Mr. Guzmán returns to clinic for follow-up of his CML, initially diagnosed in blast phase. He presents with his xiomye. This visit is for follow-up and assessment of his current disease status, as well as for discussion of allogeneic stem cell transplant.     He has been recovering well at home. He is still a bit weak and tired, but he is getting better every day. He is happy to be home with his sobeida and his daughter. He is taking dasatinib, and he denies any adverse effects. He is still  "adjusting to his illness, as this has permanently taken him out of the work he had planned to do.     Past Medical History, Past Social History and Past Family History have been reviewed and are unchanged except as noted in the interval history.    MEDICATIONS:     Current Outpatient Medications on File Prior to Visit   Medication Sig Dispense Refill    acyclovir (ZOVIRAX) 200 MG capsule Take 2 capsules (400 mg total) by mouth 2 (two) times daily. 120 capsule 11    dasatinib 140 mg Tab Take 140 mg by mouth once daily. 30 tablet 0     No current facility-administered medications on file prior to visit.        ALLERGIES: Review of patient's allergies indicates:  No Known Allergies     ROS:       Review of Systems   Constitutional: Positive for fatigue. Negative for diaphoresis, fever and unexpected weight change.   HENT:   Negative for lump/mass and sore throat.    Eyes: Negative for icterus.   Respiratory: Negative for cough and shortness of breath.    Cardiovascular: Negative for chest pain and palpitations.   Gastrointestinal: Negative for abdominal distention, constipation, diarrhea, nausea and vomiting.   Genitourinary: Negative for dysuria and frequency.    Musculoskeletal: Negative for arthralgias, gait problem and myalgias.   Skin: Negative for rash.   Neurological: Negative for dizziness, gait problem and headaches.   Hematological: Negative for adenopathy. Does not bruise/bleed easily.   Psychiatric/Behavioral: Positive for depression. The patient is nervous/anxious.        PHYSICAL EXAM:  Vitals:    08/14/18 1420   BP: 124/79   Pulse: 81   Temp: 98.5 °F (36.9 °C)   SpO2: 100%   Weight: 70.2 kg (154 lb 12.2 oz)   Height: 5' 11" (1.803 m)   PainSc:   4   PainLoc: Rectum       KARNOFSKY PERFORMANCE STATUS 80%  ECOG 1    Physical Exam   Constitutional: He is oriented to person, place, and time. He appears well-developed and well-nourished. No distress.   HENT:   Head: Normocephalic and atraumatic. "   Mouth/Throat: Mucous membranes are normal. No oral lesions.   Eyes: Conjunctivae are normal.   Neck: No thyromegaly present.   Cardiovascular: Normal rate, regular rhythm and normal heart sounds.   No murmur heard.  Pulmonary/Chest: Breath sounds normal. He has no wheezes. He has no rales.   Abdominal: Soft. He exhibits no distension and no mass. There is no splenomegaly or hepatomegaly. There is no tenderness.   Lymphadenopathy:     He has no cervical adenopathy.        Right cervical: No deep cervical adenopathy present.       Left cervical: No deep cervical adenopathy present.     He has no axillary adenopathy.        Right: No inguinal adenopathy present.        Left: No inguinal adenopathy present.   Neurological: He is alert and oriented to person, place, and time. He has normal strength and normal reflexes. No cranial nerve deficit. Coordination normal.   Skin: No rash noted.       LAB:   Results for orders placed or performed during the hospital encounter of 06/16/18   Blood culture   Result Value Ref Range    Blood Culture, Routine No growth after 5 days.    Blood culture   Result Value Ref Range    Blood Culture, Routine No growth after 5 days.    Urine culture   Result Value Ref Range    Urine Culture, Routine No growth    Blood culture   Result Value Ref Range    Blood Culture, Routine       Gram stain aer bottle: Gram positive cocci in chains resembling Strep     Blood Culture, Routine       Results called to and read back by:Lizet Lindo RN 07/05/2018  13:42    Blood Culture, Routine       VIRIDANS STREPTOCOCCUS  Organism is a probable contaminant     Blood culture   Result Value Ref Range    Blood Culture, Routine No growth after 5 days.    Urine culture   Result Value Ref Range    Urine Culture, Routine No growth    Blood culture   Result Value Ref Range    Blood Culture, Routine No growth after 5 days.    Blood culture   Result Value Ref Range    Blood Culture, Routine No growth after 5 days.     Blood culture   Result Value Ref Range    Blood Culture, Routine No growth after 5 days.    Blood culture   Result Value Ref Range    Blood Culture, Routine No growth after 5 days.    Urine culture   Result Value Ref Range    Urine Culture, Routine No growth    CBC auto differential   Result Value Ref Range    WBC 0.26 (LL) 3.90 - 12.70 K/uL    RBC 2.68 (L) 4.60 - 6.20 M/uL    Hemoglobin 7.8 (L) 14.0 - 18.0 g/dL    Hematocrit 22.3 (L) 40.0 - 54.0 %    MCV 83 82 - 98 fL    MCH 29.1 27.0 - 31.0 pg    MCHC 35.0 32.0 - 36.0 g/dL    RDW 14.5 11.5 - 14.5 %    Platelets 20 (LL) 150 - 350 K/uL    MPV 10.0 9.2 - 12.9 fL    Immature Granulocytes CANCELED 0.0 - 0.5 %    Immature Grans (Abs) CANCELED 0.00 - 0.04 K/uL    Lymph # Test Not Performed 1.0 - 4.8 K/uL    Mono # Test Not Performed 0.3 - 1.0 K/uL    Eos # Test Not Performed 0.0 - 0.5 K/uL    Baso # Test Not Performed 0.00 - 0.20 K/uL    nRBC 0 0 /100 WBC    Gran% 0.0 (L) 38.0 - 73.0 %    Lymph% 100.0 (H) 18.0 - 48.0 %    Mono% 0.0 (L) 4.0 - 15.0 %    Eosinophil% 0.0 0.0 - 8.0 %    Basophil% 0.0 0.0 - 1.9 %    Platelet Estimate Decreased (A)     Aniso Slight     Hypo Occasional     Differential Method Manual    Comprehensive metabolic panel   Result Value Ref Range    Sodium 143 136 - 145 mmol/L    Potassium 3.0 (L) 3.5 - 5.1 mmol/L    Chloride 109 95 - 110 mmol/L    CO2 27 23 - 29 mmol/L    Glucose 98 70 - 110 mg/dL    BUN, Bld 13 6 - 20 mg/dL    Creatinine 0.7 0.5 - 1.4 mg/dL    Calcium 8.4 (L) 8.7 - 10.5 mg/dL    Total Protein 5.3 (L) 6.0 - 8.4 g/dL    Albumin 2.8 (L) 3.5 - 5.2 g/dL    Total Bilirubin 0.5 0.1 - 1.0 mg/dL    Alkaline Phosphatase 51 (L) 55 - 135 U/L    AST 14 10 - 40 U/L    ALT 30 10 - 44 U/L    Anion Gap 7 (L) 8 - 16 mmol/L    eGFR if African American >60.0 >60 mL/min/1.73 m^2    eGFR if non African American >60.0 >60 mL/min/1.73 m^2   Uric acid   Result Value Ref Range    Uric Acid 1.8 (L) 3.4 - 7.0 mg/dL   Magnesium   Result Value Ref Range     Magnesium 2.0 1.6 - 2.6 mg/dL   Phosphorus   Result Value Ref Range    Phosphorus 2.8 2.7 - 4.5 mg/dL   Lactate dehydrogenase   Result Value Ref Range     110 - 260 U/L   Protime-INR   Result Value Ref Range    Prothrombin Time 11.6 9.0 - 12.5 sec    INR 1.1 0.8 - 1.2   Fibrinogen   Result Value Ref Range    Fibrinogen 470 (H) 182 - 366 mg/dL   Comprehensive Metabolic Panel (CMP)   Result Value Ref Range    Sodium 139 136 - 145 mmol/L    Potassium 3.4 (L) 3.5 - 5.1 mmol/L    Chloride 108 95 - 110 mmol/L    CO2 25 23 - 29 mmol/L    Glucose 98 70 - 110 mg/dL    BUN, Bld 12 6 - 20 mg/dL    Creatinine 0.6 0.5 - 1.4 mg/dL    Calcium 8.2 (L) 8.7 - 10.5 mg/dL    Total Protein 5.1 (L) 6.0 - 8.4 g/dL    Albumin 2.6 (L) 3.5 - 5.2 g/dL    Total Bilirubin 0.4 0.1 - 1.0 mg/dL    Alkaline Phosphatase 51 (L) 55 - 135 U/L    AST 21 10 - 40 U/L    ALT 45 (H) 10 - 44 U/L    Anion Gap 6 (L) 8 - 16 mmol/L    eGFR if African American >60.0 >60 mL/min/1.73 m^2    eGFR if non African American >60.0 >60 mL/min/1.73 m^2   Magnesium   Result Value Ref Range    Magnesium 1.9 1.6 - 2.6 mg/dL   Phosphorus   Result Value Ref Range    Phosphorus 2.2 (L) 2.7 - 4.5 mg/dL   CBC with Automated Differential   Result Value Ref Range    WBC 0.30 (LL) 3.90 - 12.70 K/uL    RBC 2.62 (L) 4.60 - 6.20 M/uL    Hemoglobin 7.7 (L) 14.0 - 18.0 g/dL    Hematocrit 21.8 (L) 40.0 - 54.0 %    MCV 83 82 - 98 fL    MCH 29.4 27.0 - 31.0 pg    MCHC 35.3 32.0 - 36.0 g/dL    RDW 14.6 (H) 11.5 - 14.5 %    Platelets 9 (LL) 150 - 350 K/uL    MPV 8.8 (L) 9.2 - 12.9 fL    Immature Granulocytes 0.0 0.0 - 0.5 %    Gran # (ANC) 0.0 (L) 1.8 - 7.7 K/uL    Immature Grans (Abs) 0.00 0.00 - 0.04 K/uL    Lymph # 0.3 (L) 1.0 - 4.8 K/uL    Mono # 0.0 (L) 0.3 - 1.0 K/uL    Eos # 0.0 0.0 - 0.5 K/uL    Baso # 0.00 0.00 - 0.20 K/uL    nRBC 0 0 /100 WBC    Gran% 0.0 (L) 38.0 - 73.0 %    Lymph% 100.0 (H) 18.0 - 48.0 %    Mono% 0.0 (L) 4.0 - 15.0 %    Eosinophil% 0.0 0.0 - 8.0 %     Basophil% 0.0 0.0 - 1.9 %    Platelet Estimate Decreased (A)     Differential Method Automated    Comprehensive Metabolic Panel (CMP)   Result Value Ref Range    Sodium 139 136 - 145 mmol/L    Potassium 3.6 3.5 - 5.1 mmol/L    Chloride 106 95 - 110 mmol/L    CO2 26 23 - 29 mmol/L    Glucose 99 70 - 110 mg/dL    BUN, Bld 9 6 - 20 mg/dL    Creatinine 0.7 0.5 - 1.4 mg/dL    Calcium 8.6 (L) 8.7 - 10.5 mg/dL    Total Protein 5.7 (L) 6.0 - 8.4 g/dL    Albumin 2.9 (L) 3.5 - 5.2 g/dL    Total Bilirubin 0.4 0.1 - 1.0 mg/dL    Alkaline Phosphatase 57 55 - 135 U/L    AST 22 10 - 40 U/L    ALT 54 (H) 10 - 44 U/L    Anion Gap 7 (L) 8 - 16 mmol/L    eGFR if African American >60.0 >60 mL/min/1.73 m^2    eGFR if non African American >60.0 >60 mL/min/1.73 m^2   Magnesium   Result Value Ref Range    Magnesium 1.9 1.6 - 2.6 mg/dL   Phosphorus   Result Value Ref Range    Phosphorus 2.8 2.7 - 4.5 mg/dL   CBC with Automated Differential   Result Value Ref Range    WBC 0.32 (LL) 3.90 - 12.70 K/uL    RBC 2.58 (L) 4.60 - 6.20 M/uL    Hemoglobin 7.6 (L) 14.0 - 18.0 g/dL    Hematocrit 21.8 (L) 40.0 - 54.0 %    MCV 85 82 - 98 fL    MCH 29.5 27.0 - 31.0 pg    MCHC 34.9 32.0 - 36.0 g/dL    RDW 14.8 (H) 11.5 - 14.5 %    Platelets 25 (LL) 150 - 350 K/uL    MPV 9.1 (L) 9.2 - 12.9 fL    Immature Granulocytes 0.0 0.0 - 0.5 %    Gran # (ANC) 0.0 (L) 1.8 - 7.7 K/uL    Immature Grans (Abs) 0.00 0.00 - 0.04 K/uL    Lymph # 0.3 (L) 1.0 - 4.8 K/uL    Mono # 0.0 (L) 0.3 - 1.0 K/uL    Eos # 0.0 0.0 - 0.5 K/uL    Baso # 0.00 0.00 - 0.20 K/uL    nRBC 0 0 /100 WBC    Gran% 3.1 (L) 38.0 - 73.0 %    Lymph% 96.9 (H) 18.0 - 48.0 %    Mono% 0.0 (L) 4.0 - 15.0 %    Eosinophil% 0.0 0.0 - 8.0 %    Basophil% 0.0 0.0 - 1.9 %    Aniso Slight     Poik Slight     Poly Occasional     Hypo Occasional     Ovalocytes Occasional     Differential Method Automated    Bone Marrow Prep and Stain   Result Value Ref Range    Bone Marrow Da Silva Stain Comment See Anatomic Pathology bone  marrow report.    Iron Stain, Bone Marrow   Result Value Ref Range    Bone Marrow Iron Stain Comment See Anatomic Pathology bone marrow report.    Leukemia/Lymphoma Screen - Bone Marrow Left Posterior Iliac Crest   Result Value Ref Range    Clinical Diagnosis - Bone Marrow CML     Body Site - Bone Marrow LPI     Bone Marrow Interpretation       No diagnostic abnormal hematopoietic population is detected in this sample.    Bone Marrow Antibodies Analyzed       All analyzed: CD2, CD3, CD4, CD5, CD7, CD8, CD10, CD13, CD19, CD20, CD34, , KAPPA, LAMBDA,CD45 and 7AAD.    Bone Marrow Comment       Flow cytometric analysis of bone marrow shows predominantly T lymphocytes that are immunophenotypically unremarkable. B lymphocytes are essentially absent. The blast gate is not increased. Flow differential: Lymphocytes 78.6%, Monocytes 10.6%,   Granulocytes 8.3%, Blast 1.0%, Debris/nRBC 2.6%, Viability 95.6%.     Chromosome Analysis, Bone Marrow   Result Value Ref Range    Chromosome analysis BM Result Summary Normal     Interpretation SEE BELOW     Results 46,XY[20]     Reason for Referral SEE BELOW     Specimen Bone Marrow     Source Test Not Performed     Method Culture without mitogens     Banding Methods, BM Chromosome SEE BELOW     Chromosome analysis BM Additional Information Test Not Performed     Chromosome analysis BM Released By Jayden Zurita, Ph.D.    Acute Myeloid Leukemia, FISH, Bone Marrow   Result Value Ref Range    AML FISH Result Summary (BM) Normal     AML FISH (BM) Interpretation SEE BELOW     AML FISH Result Table (BM) SEE BELOW     AML FISH Result (BM) SEE BELOW     AML FISH Reason for Referral (BM) SEE BELOW     AML FISH (BM) Specimen Bone Marrow     AML FISH Specimen Source (BM) Test Not Performed     AML FISH (BM) Method SEE BELOW     AML FISH Additional Information (BM) Test Not Performed     AML FISH Disclaimer (BM) SEE BELOW     AML FISH  (BM)Released by Jayden Zurita, Ph.D.    Heme  Disorders DNA/RNA Hold, Bone Marrow   Result Value Ref Range    EXHR Specimen Type Bone marrow     EXHR Extract and Hold Result see method     EXHR Extraction Performed    OncoHeme (NGS) Hematologic Neoplasms, BM Diagnosis or Indication for test: CML in blast crisis post induction chemo   Result Value Ref Range    NGS Specimen Type Bone marrow     NGS Indication of Test C92.00     NGS Pathogenic Mutations Detected SEE BELOW     NGS Interpretation SEE BELOW     NGS Clinical Trials SEE BELOW     NGS Variants of Unknown Significance SEE BELOW     NGS Additional Notes SEE BELOW     NGS Method Summary SEE BELOW     NGS Disclaimer SEE BELOW     NGS OncoHeme Panel Gene list SEE BELOW     NGS Reviewed by: SEE BELOW    BCR/ABL, RNA-Qual, Diagnostic, bone marrow   Result Value Ref Range    Specimen Type, BCR/ABL Bone Marrow     Final Diagnosis, BCR/ABL SEE BELOW     Diagnostic BCR/ABL 1 Result see interpretation    Comprehensive Metabolic Panel (CMP)   Result Value Ref Range    Sodium 138 136 - 145 mmol/L    Potassium 3.6 3.5 - 5.1 mmol/L    Chloride 105 95 - 110 mmol/L    CO2 25 23 - 29 mmol/L    Glucose 98 70 - 110 mg/dL    BUN, Bld 8 6 - 20 mg/dL    Creatinine 0.6 0.5 - 1.4 mg/dL    Calcium 8.5 (L) 8.7 - 10.5 mg/dL    Total Protein 5.6 (L) 6.0 - 8.4 g/dL    Albumin 2.7 (L) 3.5 - 5.2 g/dL    Total Bilirubin 0.3 0.1 - 1.0 mg/dL    Alkaline Phosphatase 62 55 - 135 U/L    AST 17 10 - 40 U/L    ALT 49 (H) 10 - 44 U/L    Anion Gap 8 8 - 16 mmol/L    eGFR if African American >60.0 >60 mL/min/1.73 m^2    eGFR if non African American >60.0 >60 mL/min/1.73 m^2   Magnesium   Result Value Ref Range    Magnesium 1.8 1.6 - 2.6 mg/dL   Phosphorus   Result Value Ref Range    Phosphorus 3.0 2.7 - 4.5 mg/dL   CBC with Automated Differential   Result Value Ref Range    WBC 0.30 (LL) 3.90 - 12.70 K/uL    RBC 2.55 (L) 4.60 - 6.20 M/uL    Hemoglobin 7.4 (L) 14.0 - 18.0 g/dL    Hematocrit 21.5 (L) 40.0 - 54.0 %    MCV 84 82 - 98 fL    MCH 29.0  27.0 - 31.0 pg    MCHC 34.4 32.0 - 36.0 g/dL    RDW 14.6 (H) 11.5 - 14.5 %    Platelets 12 (LL) 150 - 350 K/uL    MPV 9.2 9.2 - 12.9 fL    Immature Granulocytes Test Not Performed 0.0 - 0.5 %    Immature Grans (Abs) Test Not Performed 0.00 - 0.04 K/uL    nRBC 0 0 /100 WBC    Gran% 0.0 (L) 38.0 - 73.0 %    Lymph% 100.0 (H) 18.0 - 48.0 %    Mono% 0.0 (L) 4.0 - 15.0 %    Eosinophil% 0.0 0.0 - 8.0 %    Basophil% 0.0 0.0 - 1.9 %    Bands 0.0 %    Myelocytes 0.0 %    Other 0 %    Platelet Estimate Decreased (A)     Aniso Slight     Poik Slight     Poly Occasional     Hypo Occasional     Ovalocytes Occasional     Differential Method Automated    Comprehensive Metabolic Panel (CMP)   Result Value Ref Range    Sodium 138 136 - 145 mmol/L    Potassium 3.7 3.5 - 5.1 mmol/L    Chloride 106 95 - 110 mmol/L    CO2 26 23 - 29 mmol/L    Glucose 78 70 - 110 mg/dL    BUN, Bld 8 6 - 20 mg/dL    Creatinine 0.7 0.5 - 1.4 mg/dL    Calcium 8.3 (L) 8.7 - 10.5 mg/dL    Total Protein 5.8 (L) 6.0 - 8.4 g/dL    Albumin 2.7 (L) 3.5 - 5.2 g/dL    Total Bilirubin 0.3 0.1 - 1.0 mg/dL    Alkaline Phosphatase 66 55 - 135 U/L    AST 15 10 - 40 U/L    ALT 43 10 - 44 U/L    Anion Gap 6 (L) 8 - 16 mmol/L    eGFR if African American >60.0 >60 mL/min/1.73 m^2    eGFR if non African American >60.0 >60 mL/min/1.73 m^2   Magnesium   Result Value Ref Range    Magnesium 2.0 1.6 - 2.6 mg/dL   Phosphorus   Result Value Ref Range    Phosphorus 3.2 2.7 - 4.5 mg/dL   CBC with Automated Differential   Result Value Ref Range    WBC 0.39 (LL) 3.90 - 12.70 K/uL    RBC 2.54 (L) 4.60 - 6.20 M/uL    Hemoglobin 7.4 (L) 14.0 - 18.0 g/dL    Hematocrit 21.8 (L) 40.0 - 54.0 %    MCV 86 82 - 98 fL    MCH 29.1 27.0 - 31.0 pg    MCHC 33.9 32.0 - 36.0 g/dL    RDW 14.8 (H) 11.5 - 14.5 %    Platelets 8 (LL) 150 - 350 K/uL    MPV SEE COMMENT 9.2 - 12.9 fL    Immature Granulocytes 0.0 0.0 - 0.5 %    Gran # (ANC) 0.0 (L) 1.8 - 7.7 K/uL    Immature Grans (Abs) 0.00 0.00 - 0.04 K/uL     Lymph # 0.4 (L) 1.0 - 4.8 K/uL    Mono # 0.0 (L) 0.3 - 1.0 K/uL    Eos # 0.0 0.0 - 0.5 K/uL    Baso # 0.00 0.00 - 0.20 K/uL    nRBC 0 0 /100 WBC    Gran% 2.5 (L) 38.0 - 73.0 %    Lymph% 94.9 (H) 18.0 - 48.0 %    Mono% 2.6 (L) 4.0 - 15.0 %    Eosinophil% 0.0 0.0 - 8.0 %    Basophil% 0.0 0.0 - 1.9 %    Aniso Slight     Poik Slight     Poly Occasional     Hypo Occasional     Ovalocytes Occasional     Differential Method Automated    Comprehensive Metabolic Panel (CMP)   Result Value Ref Range    Sodium 139 136 - 145 mmol/L    Potassium 3.7 3.5 - 5.1 mmol/L    Chloride 105 95 - 110 mmol/L    CO2 26 23 - 29 mmol/L    Glucose 84 70 - 110 mg/dL    BUN, Bld 8 6 - 20 mg/dL    Creatinine 0.8 0.5 - 1.4 mg/dL    Calcium 8.5 (L) 8.7 - 10.5 mg/dL    Total Protein 6.1 6.0 - 8.4 g/dL    Albumin 2.9 (L) 3.5 - 5.2 g/dL    Total Bilirubin 0.2 0.1 - 1.0 mg/dL    Alkaline Phosphatase 73 55 - 135 U/L    AST 16 10 - 40 U/L    ALT 41 10 - 44 U/L    Anion Gap 8 8 - 16 mmol/L    eGFR if African American >60.0 >60 mL/min/1.73 m^2    eGFR if non African American >60.0 >60 mL/min/1.73 m^2   Magnesium   Result Value Ref Range    Magnesium 1.8 1.6 - 2.6 mg/dL   Phosphorus   Result Value Ref Range    Phosphorus 3.1 2.7 - 4.5 mg/dL   CBC with Automated Differential   Result Value Ref Range    WBC 0.42 (LL) 3.90 - 12.70 K/uL    RBC 2.37 (L) 4.60 - 6.20 M/uL    Hemoglobin 6.9 (L) 14.0 - 18.0 g/dL    Hematocrit 20.3 (L) 40.0 - 54.0 %    MCV 86 82 - 98 fL    MCH 29.1 27.0 - 31.0 pg    MCHC 34.0 32.0 - 36.0 g/dL    RDW 14.7 (H) 11.5 - 14.5 %    Platelets 23 (LL) 150 - 350 K/uL    MPV SEE COMMENT 9.2 - 12.9 fL    Immature Granulocytes 0.0 0.0 - 0.5 %    Gran # (ANC) 0.0 (L) 1.8 - 7.7 K/uL    Immature Grans (Abs) 0.00 0.00 - 0.04 K/uL    Lymph # 0.4 (L) 1.0 - 4.8 K/uL    Mono # 0.0 (L) 0.3 - 1.0 K/uL    Eos # 0.0 0.0 - 0.5 K/uL    Baso # 0.00 0.00 - 0.20 K/uL    nRBC 0 0 /100 WBC    Gran% 7.2 (L) 38.0 - 73.0 %    Lymph% 85.7 (H) 18.0 - 48.0 %    Mono% 7.1  4.0 - 15.0 %    Eosinophil% 0.0 0.0 - 8.0 %    Basophil% 0.0 0.0 - 1.9 %    Platelet Estimate Decreased (A)     Aniso Slight     Poik Slight     Poly Occasional     Hypo Occasional     Ovalocytes Occasional     Differential Method Automated    Comprehensive Metabolic Panel (CMP)   Result Value Ref Range    Sodium 133 (L) 136 - 145 mmol/L    Potassium 3.9 3.5 - 5.1 mmol/L    Chloride 103 95 - 110 mmol/L    CO2 25 23 - 29 mmol/L    Glucose 90 70 - 110 mg/dL    BUN, Bld 6 6 - 20 mg/dL    Creatinine 0.8 0.5 - 1.4 mg/dL    Calcium 8.7 8.7 - 10.5 mg/dL    Total Protein 6.5 6.0 - 8.4 g/dL    Albumin 3.0 (L) 3.5 - 5.2 g/dL    Total Bilirubin 0.3 0.1 - 1.0 mg/dL    Alkaline Phosphatase 80 55 - 135 U/L    AST 16 10 - 40 U/L    ALT 39 10 - 44 U/L    Anion Gap 5 (L) 8 - 16 mmol/L    eGFR if African American >60.0 >60 mL/min/1.73 m^2    eGFR if non African American >60.0 >60 mL/min/1.73 m^2   Magnesium   Result Value Ref Range    Magnesium 2.2 1.6 - 2.6 mg/dL   Phosphorus   Result Value Ref Range    Phosphorus 2.7 2.7 - 4.5 mg/dL   CBC with Automated Differential   Result Value Ref Range    WBC 0.53 (LL) 3.90 - 12.70 K/uL    RBC 2.86 (L) 4.60 - 6.20 M/uL    Hemoglobin 8.3 (L) 14.0 - 18.0 g/dL    Hematocrit 24.0 (L) 40.0 - 54.0 %    MCV 84 82 - 98 fL    MCH 29.0 27.0 - 31.0 pg    MCHC 34.6 32.0 - 36.0 g/dL    RDW 15.6 (H) 11.5 - 14.5 %    Platelets 22 (LL) 150 - 350 K/uL    MPV SEE COMMENT 9.2 - 12.9 fL    Immature Granulocytes 0.0 0.0 - 0.5 %    Gran # (ANC) 0.1 (L) 1.8 - 7.7 K/uL    Immature Grans (Abs) 0.00 0.00 - 0.04 K/uL    Lymph # 0.4 (L) 1.0 - 4.8 K/uL    Mono # 0.1 (L) 0.3 - 1.0 K/uL    Eos # 0.0 0.0 - 0.5 K/uL    Baso # 0.00 0.00 - 0.20 K/uL    nRBC 0 0 /100 WBC    Gran% 9.5 (L) 38.0 - 73.0 %    Lymph% 66.0 (H) 18.0 - 48.0 %    Mono% 24.5 (H) 4.0 - 15.0 %    Eosinophil% 0.0 0.0 - 8.0 %    Basophil% 0.0 0.0 - 1.9 %    Platelet Estimate Decreased (A)     Aniso Slight     Poik Slight     Ovalocytes Occasional     Tear Drop  Cells Occasional     Differential Method Automated    Urinalysis   Result Value Ref Range    Specimen UA Urine, Clean Catch     Color, UA Straw Yellow, Straw, Cele    Appearance, UA Clear Clear    pH, UA 7.0 5.0 - 8.0    Specific Gravity, UA 1.010 1.005 - 1.030    Protein, UA Negative Negative    Glucose, UA Negative Negative    Ketones, UA Negative Negative    Bilirubin (UA) Negative Negative    Occult Blood UA 1+ (A) Negative    Nitrite, UA Negative Negative    Urobilinogen, UA Negative <2.0 EU/dL    Leukocytes, UA Negative Negative   Lactate dehydrogenase   Result Value Ref Range     110 - 260 U/L   Uric acid   Result Value Ref Range    Uric Acid 2.9 (L) 3.4 - 7.0 mg/dL   HIV-1 and HIV-2 antibodies   Result Value Ref Range    HIV 1/2 Ag/Ab Negative Negative   Hepatitis B core antibody, total   Result Value Ref Range    Hep B Core Total Ab Negative    Hepatitis B surface antigen   Result Value Ref Range    Hepatitis B Surface Ag Negative    Hepatitis C antibody   Result Value Ref Range    Hepatitis C Ab Negative    G6PD,Quantitative   Result Value Ref Range    G6PD Quant 12.2 7.0 - 20.5 U/g Hgb   Urinalysis Microscopic   Result Value Ref Range    RBC, UA 2 0 - 4 /hpf    WBC, UA 1 0 - 5 /hpf    Bacteria, UA Rare None-Occ /hpf    Microscopic Comment SEE COMMENT    Comprehensive Metabolic Panel (CMP)   Result Value Ref Range    Sodium 137 136 - 145 mmol/L    Potassium 3.9 3.5 - 5.1 mmol/L    Chloride 107 95 - 110 mmol/L    CO2 26 23 - 29 mmol/L    Glucose 148 (H) 70 - 110 mg/dL    BUN, Bld 9 6 - 20 mg/dL    Creatinine 0.7 0.5 - 1.4 mg/dL    Calcium 8.7 8.7 - 10.5 mg/dL    Total Protein 6.1 6.0 - 8.4 g/dL    Albumin 2.6 (L) 3.5 - 5.2 g/dL    Total Bilirubin 0.1 0.1 - 1.0 mg/dL    Alkaline Phosphatase 77 55 - 135 U/L    AST 17 10 - 40 U/L    ALT 35 10 - 44 U/L    Anion Gap 4 (L) 8 - 16 mmol/L    eGFR if African American >60.0 >60 mL/min/1.73 m^2    eGFR if non African American >60.0 >60 mL/min/1.73 m^2    Magnesium   Result Value Ref Range    Magnesium 1.9 1.6 - 2.6 mg/dL   Phosphorus   Result Value Ref Range    Phosphorus 2.8 2.7 - 4.5 mg/dL   CBC with Automated Differential   Result Value Ref Range    WBC 0.41 (LL) 3.90 - 12.70 K/uL    RBC 2.68 (L) 4.60 - 6.20 M/uL    Hemoglobin 7.6 (L) 14.0 - 18.0 g/dL    Hematocrit 22.8 (L) 40.0 - 54.0 %    MCV 85 82 - 98 fL    MCH 28.4 27.0 - 31.0 pg    MCHC 33.3 32.0 - 36.0 g/dL    RDW 15.0 (H) 11.5 - 14.5 %    Platelets 50 (L) 150 - 350 K/uL    MPV 11.6 9.2 - 12.9 fL    Immature Granulocytes 0.0 0.0 - 0.5 %    Gran # (ANC) 0.2 (L) 1.8 - 7.7 K/uL    Immature Grans (Abs) 0.00 0.00 - 0.04 K/uL    Lymph # 0.1 (L) 1.0 - 4.8 K/uL    Mono # 0.0 (L) 0.3 - 1.0 K/uL    Eos # 0.0 0.0 - 0.5 K/uL    Baso # 0.00 0.00 - 0.20 K/uL    nRBC 0 0 /100 WBC    Gran% 58.5 38.0 - 73.0 %    Lymph% 31.7 18.0 - 48.0 %    Mono% 9.8 4.0 - 15.0 %    Eosinophil% 0.0 0.0 - 8.0 %    Basophil% 0.0 0.0 - 1.9 %    Platelet Estimate Decreased (A)     Aniso Slight     Hypo Occasional     Differential Method Automated    Comprehensive metabolic panel   Result Value Ref Range    Sodium 139 136 - 145 mmol/L    Potassium 3.9 3.5 - 5.1 mmol/L    Chloride 105 95 - 110 mmol/L    CO2 27 23 - 29 mmol/L    Glucose 91 70 - 110 mg/dL    BUN, Bld 7 6 - 20 mg/dL    Creatinine 0.8 0.5 - 1.4 mg/dL    Calcium 9.0 8.7 - 10.5 mg/dL    Total Protein 6.3 6.0 - 8.4 g/dL    Albumin 2.8 (L) 3.5 - 5.2 g/dL    Total Bilirubin 0.2 0.1 - 1.0 mg/dL    Alkaline Phosphatase 79 55 - 135 U/L    AST 15 10 - 40 U/L    ALT 37 10 - 44 U/L    Anion Gap 7 (L) 8 - 16 mmol/L    eGFR if African American >60.0 >60 mL/min/1.73 m^2    eGFR if non African American >60.0 >60 mL/min/1.73 m^2   CBC auto differential   Result Value Ref Range    WBC 0.75 (LL) 3.90 - 12.70 K/uL    RBC 2.72 (L) 4.60 - 6.20 M/uL    Hemoglobin 7.7 (L) 14.0 - 18.0 g/dL    Hematocrit 22.9 (L) 40.0 - 54.0 %    MCV 84 82 - 98 fL    MCH 28.3 27.0 - 31.0 pg    MCHC 33.6 32.0 - 36.0 g/dL     RDW 15.5 (H) 11.5 - 14.5 %    Platelets 30 (LL) 150 - 350 K/uL    MPV SEE COMMENT 9.2 - 12.9 fL    Immature Granulocytes 0.0 0.0 - 0.5 %    Gran # (ANC) 0.1 (L) 1.8 - 7.7 K/uL    Immature Grans (Abs) 0.00 0.00 - 0.04 K/uL    Lymph # 0.4 (L) 1.0 - 4.8 K/uL    Mono # 0.3 0.3 - 1.0 K/uL    Eos # 0.0 0.0 - 0.5 K/uL    Baso # 0.00 0.00 - 0.20 K/uL    nRBC 0 0 /100 WBC    Gran% 18.6 (L) 38.0 - 73.0 %    Lymph% 46.7 18.0 - 48.0 %    Mono% 34.7 (H) 4.0 - 15.0 %    Eosinophil% 0.0 0.0 - 8.0 %    Basophil% 0.0 0.0 - 1.9 %    Platelet Estimate Decreased (A)     Aniso Slight     Poik Slight     Hypo Occasional     Ovalocytes Occasional     Tear Drop Cells Occasional     Differential Method Automated    Magnesium   Result Value Ref Range    Magnesium 1.9 1.6 - 2.6 mg/dL   Phosphorus   Result Value Ref Range    Phosphorus 2.9 2.7 - 4.5 mg/dL   Comprehensive Metabolic Panel (CMP)   Result Value Ref Range    Sodium 137 136 - 145 mmol/L    Potassium 3.9 3.5 - 5.1 mmol/L    Chloride 107 95 - 110 mmol/L    CO2 26 23 - 29 mmol/L    Glucose 148 (H) 70 - 110 mg/dL    BUN, Bld 9 6 - 20 mg/dL    Creatinine 0.7 0.5 - 1.4 mg/dL    Calcium 8.7 8.7 - 10.5 mg/dL    Total Protein 6.1 6.0 - 8.4 g/dL    Albumin 2.6 (L) 3.5 - 5.2 g/dL    Total Bilirubin 0.1 0.1 - 1.0 mg/dL    Alkaline Phosphatase 77 55 - 135 U/L    AST 17 10 - 40 U/L    ALT 35 10 - 44 U/L    Anion Gap 4 (L) 8 - 16 mmol/L    eGFR if African American >60.0 >60 mL/min/1.73 m^2    eGFR if non African American >60.0 >60 mL/min/1.73 m^2   Magnesium   Result Value Ref Range    Magnesium 1.9 1.6 - 2.6 mg/dL   Phosphorus   Result Value Ref Range    Phosphorus 2.8 2.7 - 4.5 mg/dL   CBC with Automated Differential   Result Value Ref Range    WBC 0.41 (LL) 3.90 - 12.70 K/uL    RBC 2.68 (L) 4.60 - 6.20 M/uL    Hemoglobin 7.6 (L) 14.0 - 18.0 g/dL    Hematocrit 22.8 (L) 40.0 - 54.0 %    MCV 85 82 - 98 fL    MCH 28.4 27.0 - 31.0 pg    MCHC 33.3 32.0 - 36.0 g/dL    RDW 15.0 (H) 11.5 - 14.5 %     Platelets 50 (L) 150 - 350 K/uL    MPV 11.6 9.2 - 12.9 fL    Immature Granulocytes 0.0 0.0 - 0.5 %    Gran # (ANC) 0.2 (L) 1.8 - 7.7 K/uL    Immature Grans (Abs) 0.00 0.00 - 0.04 K/uL    Lymph # 0.1 (L) 1.0 - 4.8 K/uL    Mono # 0.0 (L) 0.3 - 1.0 K/uL    Eos # 0.0 0.0 - 0.5 K/uL    Baso # 0.00 0.00 - 0.20 K/uL    nRBC 0 0 /100 WBC    Gran% 58.5 38.0 - 73.0 %    Lymph% 31.7 18.0 - 48.0 %    Mono% 9.8 4.0 - 15.0 %    Eosinophil% 0.0 0.0 - 8.0 %    Basophil% 0.0 0.0 - 1.9 %    Platelet Estimate Decreased (A)     Aniso Slight     Hypo Occasional     Differential Method Automated    Comprehensive Metabolic Panel (CMP)   Result Value Ref Range    Sodium 141 136 - 145 mmol/L    Potassium 4.1 3.5 - 5.1 mmol/L    Chloride 111 (H) 95 - 110 mmol/L    CO2 25 23 - 29 mmol/L    Glucose 134 (H) 70 - 110 mg/dL    BUN, Bld 10 6 - 20 mg/dL    Creatinine 0.7 0.5 - 1.4 mg/dL    Calcium 8.4 (L) 8.7 - 10.5 mg/dL    Total Protein 5.4 (L) 6.0 - 8.4 g/dL    Albumin 2.5 (L) 3.5 - 5.2 g/dL    Total Bilirubin 0.1 0.1 - 1.0 mg/dL    Alkaline Phosphatase 65 55 - 135 U/L    AST 15 10 - 40 U/L    ALT 33 10 - 44 U/L    Anion Gap 5 (L) 8 - 16 mmol/L    eGFR if African American >60.0 >60 mL/min/1.73 m^2    eGFR if non African American >60.0 >60 mL/min/1.73 m^2   Magnesium   Result Value Ref Range    Magnesium 2.3 1.6 - 2.6 mg/dL   Phosphorus   Result Value Ref Range    Phosphorus 2.9 2.7 - 4.5 mg/dL   CBC with Automated Differential   Result Value Ref Range    WBC 0.66 (LL) 3.90 - 12.70 K/uL    RBC 2.41 (L) 4.60 - 6.20 M/uL    Hemoglobin 6.8 (L) 14.0 - 18.0 g/dL    Hematocrit 20.5 (L) 40.0 - 54.0 %    MCV 85 82 - 98 fL    MCH 28.2 27.0 - 31.0 pg    MCHC 33.2 32.0 - 36.0 g/dL    RDW 15.4 (H) 11.5 - 14.5 %    Platelets 68 (L) 150 - 350 K/uL    MPV 11.7 9.2 - 12.9 fL    Immature Granulocytes 0.0 0.0 - 0.5 %    Gran # (ANC) 0.6 (L) 1.8 - 7.7 K/uL    Immature Grans (Abs) 0.00 0.00 - 0.04 K/uL    Lymph # 0.1 (L) 1.0 - 4.8 K/uL    Mono # 0.0 (L) 0.3 - 1.0  K/uL    Eos # 0.0 0.0 - 0.5 K/uL    Baso # 0.00 0.00 - 0.20 K/uL    nRBC 0 0 /100 WBC    Gran% 83.3 (H) 38.0 - 73.0 %    Lymph% 10.6 (L) 18.0 - 48.0 %    Mono% 6.1 4.0 - 15.0 %    Eosinophil% 0.0 0.0 - 8.0 %    Basophil% 0.0 0.0 - 1.9 %    Aniso Slight     Poik Slight     Poly Occasional     Hypo Occasional     Ovalocytes Occasional     Differential Method Automated    Uric acid   Result Value Ref Range    Uric Acid 3.0 (L) 3.4 - 7.0 mg/dL   Lactate dehydrogenase   Result Value Ref Range     110 - 260 U/L   APTT   Result Value Ref Range    aPTT 24.4 21.0 - 32.0 sec   Protime-INR   Result Value Ref Range    Prothrombin Time 11.7 9.0 - 12.5 sec    INR 1.1 0.8 - 1.2   Comprehensive Metabolic Panel (CMP)   Result Value Ref Range    Sodium 140 136 - 145 mmol/L    Potassium 3.9 3.5 - 5.1 mmol/L    Chloride 108 95 - 110 mmol/L    CO2 28 23 - 29 mmol/L    Glucose 108 70 - 110 mg/dL    BUN, Bld 10 6 - 20 mg/dL    Creatinine 0.6 0.5 - 1.4 mg/dL    Calcium 8.2 (L) 8.7 - 10.5 mg/dL    Total Protein 5.1 (L) 6.0 - 8.4 g/dL    Albumin 2.3 (L) 3.5 - 5.2 g/dL    Total Bilirubin 0.2 0.1 - 1.0 mg/dL    Alkaline Phosphatase 62 55 - 135 U/L    AST 14 10 - 40 U/L    ALT 31 10 - 44 U/L    Anion Gap 4 (L) 8 - 16 mmol/L    eGFR if African American >60.0 >60 mL/min/1.73 m^2    eGFR if non African American >60.0 >60 mL/min/1.73 m^2   Magnesium   Result Value Ref Range    Magnesium 1.9 1.6 - 2.6 mg/dL   Phosphorus   Result Value Ref Range    Phosphorus 2.8 2.7 - 4.5 mg/dL   CBC with Automated Differential   Result Value Ref Range    WBC 1.32 (LL) 3.90 - 12.70 K/uL    RBC 2.67 (L) 4.60 - 6.20 M/uL    Hemoglobin 7.5 (L) 14.0 - 18.0 g/dL    Hematocrit 22.9 (L) 40.0 - 54.0 %    MCV 86 82 - 98 fL    MCH 28.1 27.0 - 31.0 pg    MCHC 32.8 32.0 - 36.0 g/dL    RDW 14.9 (H) 11.5 - 14.5 %    Platelets 76 (L) 150 - 350 K/uL    MPV 11.6 9.2 - 12.9 fL    Immature Granulocytes 0.8 (H) 0.0 - 0.5 %    Gran # (ANC) 1.3 (L) 1.8 - 7.7 K/uL    Immature Grans  (Abs) 0.01 0.00 - 0.04 K/uL    Lymph # 0.1 (L) 1.0 - 4.8 K/uL    Mono # 0.0 (L) 0.3 - 1.0 K/uL    Eos # 0.0 0.0 - 0.5 K/uL    Baso # 0.00 0.00 - 0.20 K/uL    nRBC 0 0 /100 WBC    Gran% 95.4 (H) 38.0 - 73.0 %    Lymph% 3.8 (L) 18.0 - 48.0 %    Mono% 0.0 (L) 4.0 - 15.0 %    Eosinophil% 0.0 0.0 - 8.0 %    Basophil% 0.0 0.0 - 1.9 %    Platelet Estimate Decreased (A)     Aniso Slight     Differential Method Automated    Comprehensive Metabolic Panel (CMP)   Result Value Ref Range    Sodium 142 136 - 145 mmol/L    Potassium 3.8 3.5 - 5.1 mmol/L    Chloride 110 95 - 110 mmol/L    CO2 27 23 - 29 mmol/L    Glucose 138 (H) 70 - 110 mg/dL    BUN, Bld 11 6 - 20 mg/dL    Creatinine 0.6 0.5 - 1.4 mg/dL    Calcium 8.4 (L) 8.7 - 10.5 mg/dL    Total Protein 5.0 (L) 6.0 - 8.4 g/dL    Albumin 2.4 (L) 3.5 - 5.2 g/dL    Total Bilirubin 0.2 0.1 - 1.0 mg/dL    Alkaline Phosphatase 62 55 - 135 U/L    AST 14 10 - 40 U/L    ALT 32 10 - 44 U/L    Anion Gap 5 (L) 8 - 16 mmol/L    eGFR if African American >60.0 >60 mL/min/1.73 m^2    eGFR if non African American >60.0 >60 mL/min/1.73 m^2   Magnesium   Result Value Ref Range    Magnesium 2.1 1.6 - 2.6 mg/dL   Phosphorus   Result Value Ref Range    Phosphorus 2.7 2.7 - 4.5 mg/dL   CBC with Automated Differential   Result Value Ref Range    WBC 1.57 (LL) 3.90 - 12.70 K/uL    RBC 2.63 (L) 4.60 - 6.20 M/uL    Hemoglobin 7.5 (L) 14.0 - 18.0 g/dL    Hematocrit 22.7 (L) 40.0 - 54.0 %    MCV 86 82 - 98 fL    MCH 28.5 27.0 - 31.0 pg    MCHC 33.0 32.0 - 36.0 g/dL    RDW 15.0 (H) 11.5 - 14.5 %    Platelets 65 (L) 150 - 350 K/uL    MPV 11.4 9.2 - 12.9 fL    Immature Granulocytes 2.5 (H) 0.0 - 0.5 %    Gran # (ANC) 1.5 (L) 1.8 - 7.7 K/uL    Immature Grans (Abs) 0.04 0.00 - 0.04 K/uL    Lymph # 0.1 (L) 1.0 - 4.8 K/uL    Mono # 0.0 (L) 0.3 - 1.0 K/uL    Eos # 0.0 0.0 - 0.5 K/uL    Baso # 0.00 0.00 - 0.20 K/uL    nRBC 0 0 /100 WBC    Gran% 92.4 (H) 38.0 - 73.0 %    Lymph% 5.1 (L) 18.0 - 48.0 %    Mono% 0.0 (L)  4.0 - 15.0 %    Eosinophil% 0.0 0.0 - 8.0 %    Basophil% 0.0 0.0 - 1.9 %    Platelet Estimate Decreased (A)     Aniso Slight     Differential Method Automated    HLA Stem Cell Recipient Workup   Result Value Ref Range    HLATY Interpretation       This HLATY test has been reflexed to HLA Class I and II DNA typing using the SSO method.    HLA SSO DNA Typing Class I & II Interpretation TAQ: [id number] SAPE: [id number]     TYSSO Testing Date 06/30/2018 12:00 AM     SSOA Testing Date 06/30/2018 12:00 AM     HLA-A 1 *02     HLA-A 1 Sero. Equiv 2     HLA-A 2 *30     HLA-A 2 Sero. Equiv 30     SSOB Testing Date 06/30/2018 12:00 AM     HLA-B 1 *42     HLA-B 1 Sero. Equiv 42     HLA-B 2 *44     HLA-B 2 Sero. Equiv 44     HLA-Bw 1 Sero. Equiv 6     HLA-Bw 2 Sero. Equiv 4     SSOC Testing Date 06/30/2018 12:00 AM     HLA-C 1 *05     HLA-Cw 1 Sero. Equiv 5     HLA-C 2 *17     HLA-Cw 2 Sero. Equiv 17     SSODR Testing Date 06/30/2018 12:00 AM     HLA-DRB1 1 *03     HLA-DRB1 1 Sero. Equiv 18     HLA-DRB1 2 *08     HLA-DRB1 2 Sero. Equiv 8     SSDRB Testing Date 06/30/2018 12:00 AM     HLA-DRB3 1 *01-03     HLA-DRB3 2 XX     HLA DRB4 1 XX     HLA-DRB4 2 XX     HLA-DRB5 1 XX     HLA-DRB5 2 XX     HLA-VJF880 1 Sero. Equiv 52     HLA-WVK583 2 Sero. Equiv XX     SSDQB Testing Date 06/30/2018 12:00 AM     HLA-DQB1 1 *04     HLA-DQB1 2 XX     HLA-DQ 1 Sero. Equiv 4     HLA-DQ 2 Sero. Equiv XX    TSH   Result Value Ref Range    TSH 0.419 0.400 - 4.000 uIU/mL   T4, free   Result Value Ref Range    Free T4 0.92 0.71 - 1.51 ng/dL   Comprehensive Metabolic Panel (CMP)   Result Value Ref Range    Sodium 142 136 - 145 mmol/L    Potassium 3.8 3.5 - 5.1 mmol/L    Chloride 110 95 - 110 mmol/L    CO2 28 23 - 29 mmol/L    Glucose 118 (H) 70 - 110 mg/dL    BUN, Bld 11 6 - 20 mg/dL    Creatinine 0.6 0.5 - 1.4 mg/dL    Calcium 8.3 (L) 8.7 - 10.5 mg/dL    Total Protein 4.9 (L) 6.0 - 8.4 g/dL    Albumin 2.3 (L) 3.5 - 5.2 g/dL    Total Bilirubin 0.2 0.1 - 1.0 mg/dL     Alkaline Phosphatase 60 55 - 135 U/L    AST 16 10 - 40 U/L    ALT 35 10 - 44 U/L    Anion Gap 4 (L) 8 - 16 mmol/L    eGFR if African American >60.0 >60 mL/min/1.73 m^2    eGFR if non African American >60.0 >60 mL/min/1.73 m^2   Magnesium   Result Value Ref Range    Magnesium 1.8 1.6 - 2.6 mg/dL   Phosphorus   Result Value Ref Range    Phosphorus 2.9 2.7 - 4.5 mg/dL   CBC with Automated Differential   Result Value Ref Range    WBC 1.22 (LL) 3.90 - 12.70 K/uL    RBC 2.68 (L) 4.60 - 6.20 M/uL    Hemoglobin 7.7 (L) 14.0 - 18.0 g/dL    Hematocrit 23.1 (L) 40.0 - 54.0 %    MCV 86 82 - 98 fL    MCH 28.7 27.0 - 31.0 pg    MCHC 33.3 32.0 - 36.0 g/dL    RDW 15.2 (H) 11.5 - 14.5 %    Platelets 55 (L) 150 - 350 K/uL    MPV 11.7 9.2 - 12.9 fL    Immature Granulocytes 2.5 (H) 0.0 - 0.5 %    Gran # (ANC) 1.1 (L) 1.8 - 7.7 K/uL    Immature Grans (Abs) 0.03 0.00 - 0.04 K/uL    Lymph # 0.1 (L) 1.0 - 4.8 K/uL    Mono # 0.0 (L) 0.3 - 1.0 K/uL    Eos # 0.0 0.0 - 0.5 K/uL    Baso # 0.00 0.00 - 0.20 K/uL    nRBC 0 0 /100 WBC    Gran% 90.1 (H) 38.0 - 73.0 %    Lymph% 7.4 (L) 18.0 - 48.0 %    Mono% 0.0 (L) 4.0 - 15.0 %    Eosinophil% 0.0 0.0 - 8.0 %    Basophil% 0.0 0.0 - 1.9 %    Platelet Estimate Decreased (A)     Aniso Slight     Hypo Occasional     Differential Method Automated    Uric acid   Result Value Ref Range    Uric Acid 1.8 (L) 3.4 - 7.0 mg/dL   Lactate dehydrogenase   Result Value Ref Range     110 - 260 U/L   APTT   Result Value Ref Range    aPTT 24.0 21.0 - 32.0 sec   Protime-INR   Result Value Ref Range    Prothrombin Time 11.0 9.0 - 12.5 sec    INR 1.1 0.8 - 1.2   HLA High Res Sequence Based Typing   Result Value Ref Range    HLSBT Testing Date 07/09/2018 12:00 AM     HLA-A1 Hi Res *02:01:01G     HLA-A2 Hi Res *30:01:01     HLA-B1 Hi Res *42:01:01     HLA-B2 Hi Res *44:02:01G     HLA-C1 Hi Res *05:01:01G     HLA-C2 Hi Res *17:01:01G     HLA-DRB1 1 Hi Res *03:02:01     HLA-DRB1 2 Hi Res *08:01:01G     HLA-DRB3 1 Hi  Res *01:01:02G     HLA-DRB3 2 Hi Res XX     HLA-DRB4 1 Hi Res XX     HLA-DRB4 2 Hi Res XX     HLA-DRB5 1 Hi Res XX     HLA-DRB5 2 Hi Res XX     HLA-DQB1 1 Hi Res *04:02:01G     HLA-DQB1 2 Hi Res *04:02:01G    Comprehensive Metabolic Panel (CMP)   Result Value Ref Range    Sodium 140 136 - 145 mmol/L    Potassium 3.8 3.5 - 5.1 mmol/L    Chloride 108 95 - 110 mmol/L    CO2 27 23 - 29 mmol/L    Glucose 118 (H) 70 - 110 mg/dL    BUN, Bld 11 6 - 20 mg/dL    Creatinine 0.5 0.5 - 1.4 mg/dL    Calcium 8.4 (L) 8.7 - 10.5 mg/dL    Total Protein 5.0 (L) 6.0 - 8.4 g/dL    Albumin 2.4 (L) 3.5 - 5.2 g/dL    Total Bilirubin 0.2 0.1 - 1.0 mg/dL    Alkaline Phosphatase 58 55 - 135 U/L    AST 21 10 - 40 U/L    ALT 46 (H) 10 - 44 U/L    Anion Gap 5 (L) 8 - 16 mmol/L    eGFR if African American >60.0 >60 mL/min/1.73 m^2    eGFR if non African American >60.0 >60 mL/min/1.73 m^2   Magnesium   Result Value Ref Range    Magnesium 2.0 1.6 - 2.6 mg/dL   Phosphorus   Result Value Ref Range    Phosphorus 2.7 2.7 - 4.5 mg/dL   CBC with Automated Differential   Result Value Ref Range    WBC 0.54 (LL) 3.90 - 12.70 K/uL    RBC 2.61 (L) 4.60 - 6.20 M/uL    Hemoglobin 7.3 (L) 14.0 - 18.0 g/dL    Hematocrit 22.8 (L) 40.0 - 54.0 %    MCV 87 82 - 98 fL    MCH 28.0 27.0 - 31.0 pg    MCHC 32.0 32.0 - 36.0 g/dL    RDW 15.0 (H) 11.5 - 14.5 %    Platelets 36 (LL) 150 - 350 K/uL    MPV 10.2 9.2 - 12.9 fL    Immature Granulocytes CANCELED 0.0 - 0.5 %    Immature Grans (Abs) CANCELED 0.00 - 0.04 K/uL    nRBC 0 0 /100 WBC    Gran% 94.0 (H) 38.0 - 73.0 %    Lymph% 6.0 (L) 18.0 - 48.0 %    Mono% 0.0 (L) 4.0 - 15.0 %    Eosinophil% 0.0 0.0 - 8.0 %    Basophil% 0.0 0.0 - 1.9 %    Platelet Estimate Decreased (A)     Aniso Slight     Poik Slight     Poly Occasional     Hypo Occasional     Ovalocytes Occasional     Differential Method Manual    BCR/ABL, p190, Quant, Monitor, blood   Result Value Ref Range    Specimen Type, p190, Quant, bld Peripheral blood     BCR/ABL  Result, P190, Quant, Bld see interpretation     Final Diagnosis, p190, Quant, bld SEE BELOW    BCR/ABL, p210, Quant, Monitor, blood   Result Value Ref Range    Specimen Type, p210, Quant, bld Peripheral blood     Final Diagnosis, p210, Quant, bld SEE BELOW     BCR/ABL,p210 Result see interpretation    Comprehensive Metabolic Panel (CMP)   Result Value Ref Range    Sodium 140 136 - 145 mmol/L    Potassium 3.9 3.5 - 5.1 mmol/L    Chloride 107 95 - 110 mmol/L    CO2 28 23 - 29 mmol/L    Glucose 81 70 - 110 mg/dL    BUN, Bld 12 6 - 20 mg/dL    Creatinine 0.6 0.5 - 1.4 mg/dL    Calcium 8.5 (L) 8.7 - 10.5 mg/dL    Total Protein 5.1 (L) 6.0 - 8.4 g/dL    Albumin 2.6 (L) 3.5 - 5.2 g/dL    Total Bilirubin 0.3 0.1 - 1.0 mg/dL    Alkaline Phosphatase 59 55 - 135 U/L    AST 23 10 - 40 U/L    ALT 49 (H) 10 - 44 U/L    Anion Gap 5 (L) 8 - 16 mmol/L    eGFR if African American >60.0 >60 mL/min/1.73 m^2    eGFR if non African American >60.0 >60 mL/min/1.73 m^2   Magnesium   Result Value Ref Range    Magnesium 1.8 1.6 - 2.6 mg/dL   Phosphorus   Result Value Ref Range    Phosphorus 2.7 2.7 - 4.5 mg/dL   CBC with Automated Differential   Result Value Ref Range    WBC 0.22 (LL) 3.90 - 12.70 K/uL    RBC 2.63 (L) 4.60 - 6.20 M/uL    Hemoglobin 7.5 (L) 14.0 - 18.0 g/dL    Hematocrit 22.5 (L) 40.0 - 54.0 %    MCV 86 82 - 98 fL    MCH 28.5 27.0 - 31.0 pg    MCHC 33.3 32.0 - 36.0 g/dL    RDW 14.9 (H) 11.5 - 14.5 %    Platelets 24 (LL) 150 - 350 K/uL    MPV SEE COMMENT 9.2 - 12.9 fL    Immature Granulocytes 0.0 0.0 - 0.5 %    Gran # (ANC) 0.1 (L) 1.8 - 7.7 K/uL    Immature Grans (Abs) 0.00 0.00 - 0.04 K/uL    Lymph # 0.2 (L) 1.0 - 4.8 K/uL    Mono # 0.0 (L) 0.3 - 1.0 K/uL    Eos # 0.0 0.0 - 0.5 K/uL    Baso # 0.00 0.00 - 0.20 K/uL    nRBC 0 0 /100 WBC    Gran% 27.3 (L) 38.0 - 73.0 %    Lymph% 72.7 (H) 18.0 - 48.0 %    Mono% 0.0 (L) 4.0 - 15.0 %    Eosinophil% 0.0 0.0 - 8.0 %    Basophil% 0.0 0.0 - 1.9 %    Platelet Estimate Decreased (A)      Aniso Slight     Poik Slight     Poly Occasional     Hypo Occasional     Ovalocytes Occasional     Target Cells Occasional     Differential Method Automated    Comprehensive Metabolic Panel (CMP)   Result Value Ref Range    Sodium 140 136 - 145 mmol/L    Potassium 3.6 3.5 - 5.1 mmol/L    Chloride 107 95 - 110 mmol/L    CO2 25 23 - 29 mmol/L    Glucose 77 70 - 110 mg/dL    BUN, Bld 13 6 - 20 mg/dL    Creatinine 0.6 0.5 - 1.4 mg/dL    Calcium 8.3 (L) 8.7 - 10.5 mg/dL    Total Protein 5.1 (L) 6.0 - 8.4 g/dL    Albumin 2.6 (L) 3.5 - 5.2 g/dL    Total Bilirubin 0.2 0.1 - 1.0 mg/dL    Alkaline Phosphatase 66 55 - 135 U/L    AST 23 10 - 40 U/L    ALT 53 (H) 10 - 44 U/L    Anion Gap 8 8 - 16 mmol/L    eGFR if African American >60.0 >60 mL/min/1.73 m^2    eGFR if non African American >60.0 >60 mL/min/1.73 m^2   Magnesium   Result Value Ref Range    Magnesium 1.7 1.6 - 2.6 mg/dL   Phosphorus   Result Value Ref Range    Phosphorus 3.1 2.7 - 4.5 mg/dL   CBC with Automated Differential   Result Value Ref Range    WBC 0.16 (LL) 3.90 - 12.70 K/uL    RBC 2.63 (L) 4.60 - 6.20 M/uL    Hemoglobin 7.6 (L) 14.0 - 18.0 g/dL    Hematocrit 22.4 (L) 40.0 - 54.0 %    MCV 85 82 - 98 fL    MCH 28.9 27.0 - 31.0 pg    MCHC 33.9 32.0 - 36.0 g/dL    RDW 14.9 (H) 11.5 - 14.5 %    Platelets 18 (LL) 150 - 350 K/uL    MPV SEE COMMENT 9.2 - 12.9 fL    Immature Granulocytes 0.0 0.0 - 0.5 %    Gran # (ANC) 0.0 (L) 1.8 - 7.7 K/uL    Immature Grans (Abs) 0.00 0.00 - 0.04 K/uL    Lymph # 0.2 (L) 1.0 - 4.8 K/uL    Mono # 0.0 (L) 0.3 - 1.0 K/uL    Eos # 0.0 0.0 - 0.5 K/uL    Baso # 0.00 0.00 - 0.20 K/uL    nRBC 0 0 /100 WBC    Gran% 6.2 (L) 38.0 - 73.0 %    Lymph% 93.8 (H) 18.0 - 48.0 %    Mono% 0.0 (L) 4.0 - 15.0 %    Eosinophil% 0.0 0.0 - 8.0 %    Basophil% 0.0 0.0 - 1.9 %    Platelet Estimate Decreased (A)     Aniso Slight     Poik Slight     Poly Occasional     Hypo Occasional     Ovalocytes Occasional     Target Cells Occasional     Differential Method  Automated    Comprehensive Metabolic Panel (CMP)   Result Value Ref Range    Sodium 137 136 - 145 mmol/L    Potassium 3.8 3.5 - 5.1 mmol/L    Chloride 106 95 - 110 mmol/L    CO2 26 23 - 29 mmol/L    Glucose 84 70 - 110 mg/dL    BUN, Bld 10 6 - 20 mg/dL    Creatinine 0.6 0.5 - 1.4 mg/dL    Calcium 8.3 (L) 8.7 - 10.5 mg/dL    Total Protein 5.3 (L) 6.0 - 8.4 g/dL    Albumin 2.7 (L) 3.5 - 5.2 g/dL    Total Bilirubin 0.2 0.1 - 1.0 mg/dL    Alkaline Phosphatase 66 55 - 135 U/L    AST 16 10 - 40 U/L    ALT 44 10 - 44 U/L    Anion Gap 5 (L) 8 - 16 mmol/L    eGFR if African American >60.0 >60 mL/min/1.73 m^2    eGFR if non African American >60.0 >60 mL/min/1.73 m^2   Magnesium   Result Value Ref Range    Magnesium 2.1 1.6 - 2.6 mg/dL   Phosphorus   Result Value Ref Range    Phosphorus 3.5 2.7 - 4.5 mg/dL   CBC with Automated Differential   Result Value Ref Range    WBC 0.10 (LL) 3.90 - 12.70 K/uL    RBC 2.64 (L) 4.60 - 6.20 M/uL    Hemoglobin 7.6 (L) 14.0 - 18.0 g/dL    Hematocrit 22.6 (L) 40.0 - 54.0 %    MCV 86 82 - 98 fL    MCH 28.8 27.0 - 31.0 pg    MCHC 33.6 32.0 - 36.0 g/dL    RDW 15.0 (H) 11.5 - 14.5 %    Platelets 11 (LL) 150 - 350 K/uL    MPV 8.1 (L) 9.2 - 12.9 fL    Immature Granulocytes 0.0 0.0 - 0.5 %    Gran # (ANC) 0.0 (L) 1.8 - 7.7 K/uL    Immature Grans (Abs) 0.00 0.00 - 0.04 K/uL    Lymph # 0.1 (L) 1.0 - 4.8 K/uL    Mono # 0.0 (L) 0.3 - 1.0 K/uL    Eos # 0.0 0.0 - 0.5 K/uL    Baso # 0.00 0.00 - 0.20 K/uL    nRBC 0 0 /100 WBC    Gran% 0.0 (L) 38.0 - 73.0 %    Lymph% 100.0 (H) 18.0 - 48.0 %    Mono% 0.0 (L) 4.0 - 15.0 %    Eosinophil% 0.0 0.0 - 8.0 %    Basophil% 0.0 0.0 - 1.9 %    Platelet Estimate Decreased (A)     Aniso Slight     Differential Method Automated      *Note: Due to a large number of results and/or encounters for the requested time period, some results have not been displayed. A complete set of results can be found in Results Review.       PROBLEMS ASSESSED THIS VISIT:    1. Chronic myeloid  leukemia, BCR/ABL-positive, in remission    2. Chemotherapy follow-up examination    3. Stem cell transplant candidate        PLAN:       Chronic myelogenous leukemia (CML), BCR-ABL1-positive  Mr. Guzmán presents for follow-up of his CML. He was initially diagnosed in blast phase; thus making him high risk from the start. Additionally, he did not achieve remission with initial induction chemotherapy, requiring reinduction before he was in morphologic remission.    He is now in major molecular remission with bcr-abl p210 transcript of his bone marrow showing only 0.05% on the international scale (MR 3.3). He is in cytogenetic complete remission as well.     He is tolerating dasatinib well at this time without adverse event.     We discussed that long-term he has high risk of relapse with TKI therapy alone, though the true risk is not known. We have therefore recommended allogeneic stem cell transplant as a potentially curative intervention.     He has a brother, who will complete HLA-typing. There are also several potential matched unrelated donors in the registry, and we will investigate these further.     We discussed the risks of allogeneic stem cell transplant today, including transplant-related mortality of approximately 20% in the first year, and long-term morbidity and mortality from risk of relapse, graft versus host disease, and infectious complications. We reviewed the procedures of allogeneic stem cell transplant, including pre-transplant evaluation, assessment of donors and their potential eligibility, and the need for inpatient hospital stay, chemotherapy, and transplant. We reviewed the need to relocate within 1 hour of [place] and have a continuous caregiver availability 24 hours a day, 7 days a week through the first 100 days. He also met with Noreen Sebastian, one of our transplant coordinators, to discuss this process as well.    He will remain on dasatinib at this time to maintain his remission until  we can move forward with allogeneic stem cell transplant.     Follow-up for transplant evaluation, once donor identified    Kishor Pantoja MD  Hematology and Stem Cell Transplant

## 2018-08-28 ENCOUNTER — DOCUMENTATION ONLY (OUTPATIENT)
Dept: TRANSFUSION MEDICINE | Facility: HOSPITAL | Age: 33
End: 2018-08-28

## 2018-08-28 LAB
BASOPHILS # BLD AUTO: 0 K/UL
BASOPHILS NFR BLD: 0 %
DIFFERENTIAL METHOD: ABNORMAL
EOSINOPHIL # BLD AUTO: 0 K/UL
EOSINOPHIL NFR BLD: 0 %
ERYTHROCYTE [DISTWIDTH] IN BLOOD BY AUTOMATED COUNT: 14.7 %
HCT VFR BLD AUTO: 21.1 %
HGB BLD-MCNC: 7 G/DL
IMM GRANULOCYTES # BLD AUTO: ABNORMAL K/UL
IMM GRANULOCYTES NFR BLD AUTO: 0 %
LYMPHOCYTES # BLD AUTO: 0.2 K/UL
LYMPHOCYTES NFR BLD: 93.8 %
MCH RBC QN AUTO: 28.3 PG
MCHC RBC AUTO-ENTMCNC: 33.2 G/DL
MCV RBC AUTO: 85 FL
MONOCYTES # BLD AUTO: 0 K/UL
MONOCYTES NFR BLD: 0 %
NEUTROPHILS # BLD AUTO: 0 K/UL
NRBC BLD-RTO: 0 /100 WBC
PLATELET # BLD AUTO: 27 K/UL
PMV BLD AUTO: 10 FL
RBC # BLD AUTO: 2.47 M/UL
WBC # BLD AUTO: 0.16 K/UL

## 2018-08-28 NOTE — PROGRESS NOTES
The HLA typing for Wilton Guzmán and his [facility] donor ([id number]) has been almost completed. Samples for the donor and patient were collected at different times, as required.  The low and high resolution results are concordant at all loci for the patient and at the ABDR loci for the donor (C and DQ low resolution typing is not available currently). The high resolution typing confirms an 8/8 match, but DQ typing at high resolution is still pending (expected 8/29/ or 8/30).    PARVEZ Charles MD, GUSTABO  Histocompatability and Immunogenetics Lab  Section of Transfusion Medicine & Histocompatibility  Department of Pathology and Laboratory Medicine  [facility]  08/28/2018

## 2018-08-30 DIAGNOSIS — C92.11 CML IN REMISSION: Primary | ICD-10-CM

## 2018-08-30 LAB
ANNOTATION COMMENT IMP: NORMAL
ANNOTATION COMMENT IMP: NORMAL
NGS CLINCIAL TRIALS: NORMAL
NGS CLINCIAL TRIALS: NORMAL
NGS INDICATION OF TEST: NORMAL
NGS INDICATION OF TEST: NORMAL
NGS INTERPRETATION: NORMAL
NGS INTERPRETATION: NORMAL
NGS ONCOHEME PANEL GENE LIST: NORMAL
NGS ONCOHEME PANEL GENE LIST: NORMAL
NGS PATHOGENIC MUTATIONS DETECTED: NORMAL
NGS PATHOGENIC MUTATIONS DETECTED: NORMAL
NGS REVIEWED BY:: NORMAL
NGS REVIEWED BY:: NORMAL
NGS VARIANTS OF UNKNOWN SIGNIFICANCE: NORMAL
NGS VARIANTS OF UNKNOWN SIGNIFICANCE: NORMAL
REF LAB TEST METHOD: NORMAL
REF LAB TEST METHOD: NORMAL
SPECIMEN SOURCE: NORMAL
SPECIMEN SOURCE: NORMAL
TEST PERFORMANCE INFO SPEC: NORMAL
TEST PERFORMANCE INFO SPEC: NORMAL

## 2018-09-06 ENCOUNTER — HOSPITAL ENCOUNTER (OUTPATIENT)
Dept: CARDIOLOGY | Facility: CLINIC | Age: 33
Discharge: HOME | End: 2018-09-06
Payer: OTHER GOVERNMENT

## 2018-09-06 ENCOUNTER — HOSPITAL ENCOUNTER (OUTPATIENT)
Dept: PULMONOLOGY | Facility: CLINIC | Age: 33
Discharge: HOME | End: 2018-09-06
Payer: OTHER GOVERNMENT

## 2018-09-06 ENCOUNTER — CLINICAL SUPPORT (OUTPATIENT)
Dept: HEMATOLOGY/ONCOLOGY | Facility: CLINIC | Age: 33
End: 2018-09-06
Payer: OTHER GOVERNMENT

## 2018-09-06 ENCOUNTER — HOSPITAL ENCOUNTER (OUTPATIENT)
Dept: CARDIOLOGY | Facility: CLINIC | Age: 33
Discharge: HOME | End: 2018-09-06
Attending: INTERNAL MEDICINE
Payer: OTHER GOVERNMENT

## 2018-09-06 ENCOUNTER — DOCUMENTATION ONLY (OUTPATIENT)
Dept: HEMATOLOGY/ONCOLOGY | Facility: CLINIC | Age: 33
End: 2018-09-06

## 2018-09-06 ENCOUNTER — EDUCATION (OUTPATIENT)
Dept: HEMATOLOGY/ONCOLOGY | Facility: CLINIC | Age: 33
End: 2018-09-06

## 2018-09-06 ENCOUNTER — HOSPITAL ENCOUNTER (OUTPATIENT)
Dept: RADIOLOGY | Facility: HOSPITAL | Age: 33
Discharge: HOME | End: 2018-09-06
Attending: INTERNAL MEDICINE
Payer: OTHER GOVERNMENT

## 2018-09-06 DIAGNOSIS — C92.11 CML IN REMISSION: ICD-10-CM

## 2018-09-06 DIAGNOSIS — C92.10 CHRONIC MYELOID LEUKEMIA, BCR/ABL-POSITIVE, NOT HAVING ACHIEVED REMISSION: ICD-10-CM

## 2018-09-06 DIAGNOSIS — Z76.82 STEM CELL TRANSPLANT CANDIDATE: Primary | ICD-10-CM

## 2018-09-06 DIAGNOSIS — Z71.3 NUTRITIONAL COUNSELING: Primary | ICD-10-CM

## 2018-09-06 DIAGNOSIS — C92.11 CHRONIC MYELOID LEUKEMIA, BCR/ABL-POSITIVE, IN REMISSION: ICD-10-CM

## 2018-09-06 LAB
DIASTOLIC DYSFUNCTION: NO
ESTIMATED PA SYSTOLIC PRESSURE: 28.4
PRE FEV1 FVC: 85
PRE FEV1: 3.32
PRE FVC: 3.9
PREDICTED FEV1 FVC: 83
PREDICTED FEV1: 4.58
PREDICTED FVC: 5.51
RETIRED EF AND QEF - SEE NOTES: 60 (ref 55–65)

## 2018-09-06 PROCEDURE — 93010 ELECTROCARDIOGRAM REPORT: CPT | Mod: S$PBB,,, | Performed by: INTERNAL MEDICINE

## 2018-09-06 PROCEDURE — 94729 DIFFUSING CAPACITY: CPT | Mod: 26,S$PBB,, | Performed by: INTERNAL MEDICINE

## 2018-09-06 PROCEDURE — 71046 X-RAY EXAM CHEST 2 VIEWS: CPT | Mod: TC,FY

## 2018-09-06 PROCEDURE — 99211 OFF/OP EST MAY X REQ PHY/QHP: CPT | Mod: PBBFAC,25 | Performed by: DIETITIAN, REGISTERED

## 2018-09-06 PROCEDURE — 94729 DIFFUSING CAPACITY: CPT | Mod: PBBFAC | Performed by: INTERNAL MEDICINE

## 2018-09-06 PROCEDURE — 94010 BREATHING CAPACITY TEST: CPT | Mod: 26,S$PBB,, | Performed by: INTERNAL MEDICINE

## 2018-09-06 PROCEDURE — 99211 OFF/OP EST MAY X REQ PHY/QHP: CPT | Mod: PBBFAC,25,27

## 2018-09-06 PROCEDURE — 94010 BREATHING CAPACITY TEST: CPT | Mod: PBBFAC | Performed by: INTERNAL MEDICINE

## 2018-09-06 PROCEDURE — 93005 ELECTROCARDIOGRAM TRACING: CPT | Mod: PBBFAC | Performed by: INTERNAL MEDICINE

## 2018-09-06 PROCEDURE — 99999 PR PBB SHADOW E&M-EST. PATIENT-LVL I: CPT | Mod: PBBFAC,,, | Performed by: DIETITIAN, REGISTERED

## 2018-09-06 PROCEDURE — 97802 MEDICAL NUTRITION INDIV IN: CPT | Mod: PBBFAC,59 | Performed by: DIETITIAN, REGISTERED

## 2018-09-06 PROCEDURE — 71046 X-RAY EXAM CHEST 2 VIEWS: CPT | Mod: 26,,, | Performed by: RADIOLOGY

## 2018-09-06 PROCEDURE — 93306 TTE W/DOPPLER COMPLETE: CPT | Mod: PBBFAC | Performed by: INTERNAL MEDICINE

## 2018-09-06 PROCEDURE — 99999 PR PBB SHADOW E&M-EST. PATIENT-LVL I: CPT | Mod: PBBFAC,,,

## 2018-09-06 NOTE — PROGRESS NOTES
BMT Pharmacist Evaluation      Current Outpatient Medications:     dasatinib 140 mg Tab, Take 140 mg by mouth once daily., Disp: 30 tablet, Rfl: 0    acyclovir (ZOVIRAX) 200 MG capsule, Take 2 capsules (400 mg total) by mouth 2 (two) times daily., Disp: 120 capsule, Rfl: 11      Review of patient's allergies indicates:  No Known Allergies      Creatinine Clearance is 118 mL/min        Medication adherence: good  Medication-related problems: did not think he was supposed to continue acyclovir after he completed his bottle of medication     Planned conditioning regimen:  Busulfan on Day -7, -6, -5, and -4  Cyclophosphamide on Day -3, and -2      Planned  GVHD Prophylaxis:  Methotrexate on Day +1, +3, +6, and +11  Tacrolimus starting on Day -1    Antimicrobial Prophylaxis:  Acyclovir starting on Day -7  Ciprofloxacin starting on Day -1  Fluconazole starting on Day -1  Bactrim starting on Day +30    Seizure Prophylaxis:  Levetiracetam on Day -7 through Day -3    SOS/VOD Prophylaxis:  Ursodiol on Day -7 through Day +30    Growth Factor Support:  Neupogen starting on Day +7      Caregiver: sobeida  Post-transplant discharge plans: Charisse Pino     Notes:  Reviewed and reconciled the medication list with the patient and his fiancee. Patient was able to confirm all medications he currently takes including schedule and indication. Patient demonstrates excellent medication adherence and understands the importance of this through the transplant process.     Reviewed the planned high-dose chemotherapy regimen, including schedule and possible side effects. Provided the patient with drug information handouts for chemotherapy and GVHD prophylaxis. Reviewed possible side effects of transplant including: neutropenia, thrombocytopenia, anemia, infection, infusion reactions, rash, nausea/vomiting, mucositis/mouth sores, loss of appetite, taste changes, diarrhea, skin color changes, neuropathy, skin sensitivity to light, hair loss,  liver and/or renal dysfunction, and rare side effects of hemorhagic cystitis, seizures and sinusoidal obstruction syndrome (SOS/VOD). Reviewed prophylactic antimicrobials, as well as prophylactic and as needed antiemetics. Encouraged the patient to report all possible side effects/new symptoms and to ask for supportive care medications if needed. Patient verbalized understanding and all questions were answered.     Proposed recommendations:  Spoke with Dr. Pantoja and he would like Mr. Guzmán to continue acyclovir. Refill requested at [facility] and the patient will pick it up before heading home today. The patient demonstrates good medication adherence and understanding of the chemotherapy and transplant plan. BMT/Hematology Oncology PharmD will continue to follow the patient while admitted to the inpatient unit.     Tanya Lambert, PharmD, BCPS, BCOP  Clinical Pharmacy Specialist  BMT/Hematology Oncology  SpectraLink: [id number]

## 2018-09-06 NOTE — PROGRESS NOTES
Spoke to patient and his wife regarding transplant.  Patient states that he has been educating himself on line and feels secure in his knowledge of the transplant process.  Patient encouraged again to contact his dentist and get an appointment.  Patient's wife states that she will call this week.  All questions were answered and patient and wife expressed understanding.  Rachel MPH, MT(ASCP)

## 2018-09-06 NOTE — PROGRESS NOTES
Referring Physician:Kishor Pantoja MD     Reason for visit:  Chief Complaint   Patient presents with    Nutrition Counseling    Leukemia        :1985     Allergies Reviewed  Meds Reviewed    Anthropometrics  Weight: 70.2kg/155#  Height: 71in   BMI:There is no height or weight on file to calculate BMI.   IBW: 172#    Meds:  Outpatient Medications Prior to Visit   Medication Sig Dispense Refill    acyclovir (ZOVIRAX) 200 MG capsule Take 2 capsules (400 mg total) by mouth 2 (two) times daily. 120 capsule 11    dasatinib 140 mg Tab Take 140 mg by mouth once daily. 30 tablet 0     No facility-administered medications prior to visit.          Estimated Nutrition Needs: 7850-1292 Kcals/day (30-35 kcal/kg),  100-140 g protein (1.5-2.0 g/kg)     Diet Hx: Pt here with fiance for pretransplant nutrition counseling. Pt reports current weight of 155# which has been stable for a month or so. Noted weight of 140# in chart on . Pt attributes weight loss to side effects from chemo. Pt denies side effects at this time, only complaining of occasional mild nausea. Reports good appetite; eats 3 meals/day with snacks.        Assessment: Provided pt with handout on nutrition therapy for bone marrow transplant patients. Reviewed foods recommended and not recommended in each food group. Reviewed food safety and proper cooking temperatures. Discussed importance of adequate intake during admit for transplant. Answered questions.    Nutrition Diagnosis: Increased nutrient needs related to increased demand for nutrients as evidenced by bone marrow transplant    Recommendations: Encouraged pt to eat 6 small meals/day with a variety of fruits, vegetables, whole grains, lean meat, and dairy following BMT nutrition therapy and safety guidelines. Encouraged adequate intake to maintain weight throughout treatment.      Consultation Time:30 minutes.    Follow Up: PRN

## 2018-09-10 ENCOUNTER — TELEPHONE (OUTPATIENT)
Dept: HEMATOLOGY/ONCOLOGY | Facility: CLINIC | Age: 33
End: 2018-09-10

## 2018-09-10 ENCOUNTER — DOCUMENTATION ONLY (OUTPATIENT)
Dept: TRANSFUSION MEDICINE | Facility: HOSPITAL | Age: 33
End: 2018-09-10

## 2018-09-10 DIAGNOSIS — C92.10 CML (CHRONIC MYELOCYTIC LEUKEMIA): Primary | ICD-10-CM

## 2018-09-10 NOTE — PROGRESS NOTES
The HLA typing for Wilton Guzmán and his [facility] donor ([id number]) has been completed. They are 10/10 matches.  The low and high resolution results are concordant, and the samples were collected at different times, as required.    PARVEZ Charles MD, GUSTABO  Histocompatability and Immunogenetics Lab  Section of Transfusion Medicine & Histocompatibility  Department of Pathology and Laboratory Medicine  [facility]  09/10/2018

## 2018-09-10 NOTE — TELEPHONE ENCOUNTER
Spoke with Wilton and told him that I needed to get a dentist for him. He said he owed some money to his dentist.  I told him that I could call [facility] for him if he wanted me to.  He would have to pay [facility] but it wouldn't be as much as a regular dentist.  He said he would let me know tomorrow when he comes for his bone marrow bxp.  I also told him that we ordered some labs on him for 230 tomorrow prior to his bxp at 3PM.  All questions were answered and patient verbalized understanding.  Rachel, MPH, MT (ASCP)

## 2018-09-11 ENCOUNTER — LAB VISIT (OUTPATIENT)
Dept: LAB | Facility: HOSPITAL | Age: 33
End: 2018-09-11
Payer: OTHER GOVERNMENT

## 2018-09-11 ENCOUNTER — OFFICE VISIT (OUTPATIENT)
Dept: HEMATOLOGY/ONCOLOGY | Facility: CLINIC | Age: 33
End: 2018-09-11
Payer: OTHER GOVERNMENT

## 2018-09-11 ENCOUNTER — DOCUMENTATION ONLY (OUTPATIENT)
Dept: HEMATOLOGY/ONCOLOGY | Facility: CLINIC | Age: 33
End: 2018-09-11

## 2018-09-11 VITALS
TEMPERATURE: 98 F | RESPIRATION RATE: 18 BRPM | DIASTOLIC BLOOD PRESSURE: 75 MMHG | OXYGEN SATURATION: 100 % | HEART RATE: 70 BPM | SYSTOLIC BLOOD PRESSURE: 121 MMHG

## 2018-09-11 DIAGNOSIS — C92.10 CML (CHRONIC MYELOCYTIC LEUKEMIA): ICD-10-CM

## 2018-09-11 DIAGNOSIS — C92.11 CHRONIC MYELOID LEUKEMIA, BCR/ABL-POSITIVE, IN REMISSION: Primary | ICD-10-CM

## 2018-09-11 DIAGNOSIS — C92.10 CML (CHRONIC MYELOCYTIC LEUKEMIA): Primary | ICD-10-CM

## 2018-09-11 PROCEDURE — 36415 COLL VENOUS BLD VENIPUNCTURE: CPT

## 2018-09-11 PROCEDURE — 99999 PR PBB SHADOW E&M-EST. PATIENT-LVL III: CPT | Mod: PBBFAC,,, | Performed by: NURSE PRACTITIONER

## 2018-09-11 PROCEDURE — 88185 FLOWCYTOMETRY/TC ADD-ON: CPT | Performed by: PATHOLOGY

## 2018-09-11 PROCEDURE — 88237 TISSUE CULTURE BONE MARROW: CPT

## 2018-09-11 PROCEDURE — 99499 UNLISTED E&M SERVICE: CPT | Mod: S$PBB,,, | Performed by: NURSE PRACTITIONER

## 2018-09-11 PROCEDURE — 88313 SPECIAL STAINS GROUP 2: CPT

## 2018-09-11 PROCEDURE — 88341 IMHCHEM/IMCYTCHM EA ADD ANTB: CPT | Mod: 26,59,, | Performed by: PATHOLOGY

## 2018-09-11 PROCEDURE — 88311 DECALCIFY TISSUE: CPT | Mod: 26,,, | Performed by: PATHOLOGY

## 2018-09-11 PROCEDURE — 88305 TISSUE EXAM BY PATHOLOGIST: CPT | Mod: 26,,, | Performed by: PATHOLOGY

## 2018-09-11 PROCEDURE — 38222 DX BONE MARROW BX & ASPIR: CPT | Mod: PBBFAC | Performed by: NURSE PRACTITIONER

## 2018-09-11 PROCEDURE — 38222 DX BONE MARROW BX & ASPIR: CPT | Mod: S$PBB,LT,, | Performed by: NURSE PRACTITIONER

## 2018-09-11 PROCEDURE — 86753 PROTOZOA ANTIBODY NOS: CPT

## 2018-09-11 PROCEDURE — 85660 RBC SICKLE CELL TEST: CPT

## 2018-09-11 PROCEDURE — 88189 FLOWCYTOMETRY/READ 16 & >: CPT | Mod: ,,, | Performed by: PATHOLOGY

## 2018-09-11 PROCEDURE — 83021 HEMOGLOBIN CHROMOTOGRAPHY: CPT

## 2018-09-11 PROCEDURE — 87516 HEPATITIS B DNA AMP PROBE: CPT

## 2018-09-11 PROCEDURE — 88342 IMHCHEM/IMCYTCHM 1ST ANTB: CPT | Mod: 26,59,, | Performed by: PATHOLOGY

## 2018-09-11 PROCEDURE — 88305 TISSUE EXAM BY PATHOLOGIST: CPT | Mod: 59 | Performed by: PATHOLOGY

## 2018-09-11 PROCEDURE — 88313 SPECIAL STAINS GROUP 2: CPT | Mod: 26,,, | Performed by: PATHOLOGY

## 2018-09-11 PROCEDURE — 85097 BONE MARROW INTERPRETATION: CPT | Mod: ,,, | Performed by: PATHOLOGY

## 2018-09-11 PROCEDURE — 99213 OFFICE O/P EST LOW 20 MIN: CPT | Mod: PBBFAC | Performed by: NURSE PRACTITIONER

## 2018-09-11 PROCEDURE — 88342 IMHCHEM/IMCYTCHM 1ST ANTB: CPT | Performed by: PATHOLOGY

## 2018-09-11 PROCEDURE — 81206 BCR/ABL1 GENE MAJOR BP: CPT

## 2018-09-11 PROCEDURE — 88311 DECALCIFY TISSUE: CPT | Performed by: PATHOLOGY

## 2018-09-11 PROCEDURE — 88184 FLOWCYTOMETRY/ TC 1 MARKER: CPT | Performed by: PATHOLOGY

## 2018-09-11 NOTE — PROGRESS NOTES
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 See procedure note.    Yoanna Andrade DNP, NP  Hematology/Oncology

## 2018-09-11 NOTE — PROCEDURES
Bone marrow  Date/Time: 9/11/2018 3:37 PM  Performed by: Leona Beltran NP  Authorized by: Kishor Pantoja MD         PROCEDURE NOTE:  Date of Procedure: 09/11/2018  Bone Marrow Biopsy and Aspiration  Indication: CML blast phase. BM Transplant workup.  Consent: Informed consent was obtained from patient.  Timeout: Done and documented.  Position: Prone  Site: Left posterior illiac crest.  Prep: Chlorhexadine.  Needle used: 11 gauge Jamshidi needle.  Anesthetic: 2% lidocaine 5 cc.  Biopsy: The biopsy needle was introduced into the marrow cavity and an aspirate was obtained without complications and sent for flow cytometry and cytogenetics, BCR/ABL P210,. Core biopsy obtained without difficulty and sent for routine histologic examination.  Complications: None.  Disposition: The patient was left in room with RN and instructed to lie on back for 20 minutes. Instructed to remove band aid in 24 hours.  Blood loss: Minimal.     Leona Beltran, CORNELL  Hematology/Oncology/Bone Marrow Transplant    Seen and Performed with:  Yoanna Andrade NP, DNP  Hematology/Oncology

## 2018-09-12 DIAGNOSIS — C92.10 CML (CHRONIC MYELOCYTIC LEUKEMIA): Primary | ICD-10-CM

## 2018-09-12 LAB
BODY SITE - BONE MARROW: NORMAL
BONE MARROW IRON STAIN COMMENT: NORMAL
BONE MARROW WRIGHT STAIN COMMENT: NORMAL
CLINICAL DIAGNOSIS - BONE MARROW: NORMAL
FLOW CYTOMETRY ANTIBODIES ANALYZED - BONE MARROW: NORMAL
FLOW CYTOMETRY COMMENT - BONE MARROW: NORMAL
FLOW CYTOMETRY INTERPRETATION - BONE MARROW: NORMAL

## 2018-09-13 ENCOUNTER — TELEPHONE (OUTPATIENT)
Dept: HEMATOLOGY/ONCOLOGY | Facility: CLINIC | Age: 33
End: 2018-09-13

## 2018-09-13 LAB
BCR/ABL,P210 RESULT: NORMAL
HGB S BLD QL: POSITIVE
PATH REPORT.FINAL DX SPEC: NORMAL
SPECIMEN TYPE: NORMAL

## 2018-09-13 NOTE — TELEPHONE ENCOUNTER
Attempted to return call.  Message left to call back regarding patient        ----- Message from Odessa Nascimento sent at 9/13/2018 11:11 AM CDT -----  Regarding: Lab Order  There was an issue with a test ordered on this patient from 9/11/18.  Please call the Sendout lab as soon as possible at [phone] ext. [phone] for complete details.  Anyone in the Sendout lab will be able to assist you with further information.

## 2018-09-13 NOTE — TELEPHONE ENCOUNTER
Attempted to call voicemail left      ----- Message from Odessa Nascimento sent at 9/13/2018 11:11 AM CDT -----  Regarding: Lab Order  There was an issue with a test ordered on this patient from 9/11/18.  Please call the Sendout lab as soon as possible at [phone] ext. [phone] for complete details.  Anyone in the Sendout lab will be able to assist you with further information.

## 2018-09-13 NOTE — TELEPHONE ENCOUNTER
Spoke to patient and he is going to the dentist today for his filling and should have clearance.  Rachel MPH, MT (ASCP)

## 2018-09-14 DIAGNOSIS — C92.10 CML (CHRONIC MYELOCYTIC LEUKEMIA): ICD-10-CM

## 2018-09-14 DIAGNOSIS — C92.10 CML (CHRONIC MYELOCYTIC LEUKEMIA): Primary | ICD-10-CM

## 2018-09-14 LAB
HGB A MFR BLD ELPH: 61 % (ref 95.8–98)
HGB A2 MFR BLD: 2.7 % (ref 2–3.3)
HGB F MFR BLD: 8.6 % (ref 0–0.9)
HGB FRACT BLD ELPH-IMP: ABNORMAL
HGB OTHER MFR BLD ELPH: ABNORMAL %
THEVP VARIANT 2: ABNORMAL
THEVP VARIANT 3: ABNORMAL

## 2018-09-14 RX ORDER — DASATINIB 140 MG/1
TABLET ORAL
Qty: 30 TABLET | Refills: 11 | Status: ON HOLD | OUTPATIENT
Start: 2018-09-14 | End: 2018-10-22 | Stop reason: SDUPTHER

## 2018-09-14 NOTE — PROGRESS NOTES
The HLA typing for Wilton Guzmán and his brother (SILVESTRE Dugan, MRN [id number]) has been completed. They are 10/10 matches and the low and high resolution results are concordant for both the patient and his brother. For both, the samples were collected at different times, as required.    PARVEZ Charles MD, GUSTABO  Histocompatability and Immunogenetics Lab  Section of Transfusion Medicine & Histocompatibility  Department of Pathology and Laboratory Medicine  [facility]  09/14/2018

## 2018-09-14 NOTE — PROGRESS NOTES
[facility]   Bone Marrow Transplant Psychosocial Assessment   Date: 2018       Demographic Information     Name: Wilton Guzmán    : 1985    Age: 33 y.o.    Sex: male    Race: Black or     Marital Status: Single   SS #:     Phone Number(s): [phone] (home) [phone] (mobile)    Home Address: [address] [place] [address] [place] [address]    Mailing Address: [address] [place] [address] [address] [place] [address]    Are you a [place] Citizen? Yes   If no, please explain:        Contact Information     Next of Kin: Sherry Guzmán   Relationship: Mother   Phone Number(s): [phone]   Emergency Contact: Extended Emergency Contact Information  Primary Emergency Contact: ROSALIO Guzmán   [place]  Mobile Phone: [phone]  Relation: Other  Secondary Emergency Contact: Christiano Dugan   [place]  Mobile Phone: [phone]  Relation: Brother  Mother: Sherry Guzmán   [place]  Mobile Phone: [phone]  Father: Christiano Guzmán   [place]  Mobile Phone: [phone]        Living Arrangements   Household Composition:  Patient currently resides with: OtherSignificant Other   If patient resides with spouse, please explain the marital relationship: Good relationship; they have a 14 mo old dtr   Does the patient currently own his/her own home? No   Does the patient currently rent home/apartment: Yes   Are current living arrangements permanent? Yes   If no, please explain:        Children's Names     Name Sex Age   Cindy Guzmán female 14 months   2.      3.     4.     5.       Who will be the primary caregiver for the children when patient is admitted to the hospital? Pt's Mother and S/O will alternate childcare as needed   Name: Sherry Guzmán   Phone Number:  [phone]       Support System   Primary Caregiver:     Name: Joellen Mary   Relationship: Artem   Cell #: [phone]   Address: [address] [place] [address]   Home #: None   City: [place]   [address]: [address]    ZIP: [id number]       Secondary Caregiver:     Name: Sherry Guzmán   Relationship: Mother   Cell #: [phone]   Address: [address] [place], [place] [address]   Home #: None   City: [place]   [address]: [address]   ZIP: [address]     Will patient's caregiver be available full-time? Yes   What is the patient's Faith? Anglican   Is patient currently practicing or non-practicing? Yes Practicing      Does patient have any other sources for support? Yes      If yes, please explain: Close family support       Post BMT Plans      Does patient have full understanding of recovery from BMT? Yes   Does patient understand risk associated with BMT? Yes   What are patient's housing plans post BMT? [place]   Does patient have a Living Will? No   Does patient have a Power of ?  No   If yes, please give name of POA:  Name:     Phone #:        Employment Information     Is patient currently employed? Yes   Employer: [employer]   Phone #: Data Unavailable   Position:    Full Time/Part Time? full time   YRS: 12+ years       Secondary Employment Information     Employer: [employer]   Phone #: [phone]   Position:    Full Time/Part Time? full time   YRS: 6       Significant Other Employment Information     Employer: [employer]   Phone #: [phone]   Position: Supervisor   Full Time/Part Time? full time   YRS: 9         Financial Information     Monthly Income: $3,900   Yearly Income: $46,800   Source of Income:  salary   Do you have any financial concerns? No   If yes, please explain:         Insurance Information      Do you have health insurance?  Yes   Insurance [facility]   Subscriber # [id number]   Group #: none   Policy [place]: Self   Medicare: No   Medicare Part D: No   Medicaid: No   Do you have Disability Insurance? No      Do you have a Cancer Policy? No   Do you have medication/prescription coverage? Yes   Are you a ? Yes       Medical Information     Diagnosis: No diagnosis found.   Date  of Diagnosis: 06/2018   Is this a new diagnosis? Yes         If no, please explain:    Past Medical History: Past Medical History:   Diagnosis Date    Chronic myelogenous leukemia (CML), BCR-ABL1-positive       Infusion Services: No   Home Health: No   Durable Medical Equipment: No   Activities of Daily Living: Independent   Patient's Family Cancer History: Cancer-related family history is not on file.       Cognitive Functioning     Cognitive State: Alert oriented to person, place time   Does patient have any concerns that may affect medical follow up and full understanding of treatment? No   Does patient have any concerns that may impact medication compliance? No   Education Level: college graduate   Does the patient have any learning disabilities? No   If yes, please explain:    Can the patient read English? Yes   Can the patient write in English? Yes      Is the patient Literate? Yes   What is the patient's primary language? English   Does the patient need interpretation services? No        Psychosocial History     Does patient have any emotional issues? No   If, yes please explain:     Does patient have a psychiatric history? No   If, yes please explain:     Is patient currently taking any psychiatric medication? No   If yes, please list medications:    Is patient currently in therapy or attending support groups? No   Where is the patient currently in therapy? n/a   Therapist/Counselor Name: n/a    n/a       Alcohol/Drug Use/Abuse History     Alcohol Use: Social History     Substance and Sexual Activity   Alcohol Use No      Tobacco Use: Social History     Tobacco Use   Smoking Status Never Smoker      Drug Use: Social History     Substance and Sexual Activity   Drug Use No          Coping Skills     How is the patient currently coping with their diagnosis? Coping appropriately,  Utilizes family support   Is the patient open/receptive to psychosocial intervention? Yes   Has the patient experienced any  significant losses in his/her life? Yes      If yes, please explain: Grandma while he was in highschool   What are the patient's identified needs?  Family support; no identified financial needs   Goals: Complete Treatment   Interview Behavior: Appropriate   Suitability for Transplant: Yes   Additional Comments: Mr. Guzmán is motivated to complete the treatment process and adhere to medical guidelines set forth.  He has a good support system having identified a minimum of 3 persons to assist as needed.  His child will be cared for by his Mother with the assistance of his fiancee as determined.  He denies any financial concerns at this time.

## 2018-09-17 ENCOUNTER — OFFICE VISIT (OUTPATIENT)
Dept: PSYCHIATRY | Facility: CLINIC | Age: 33
End: 2018-09-17
Payer: OTHER GOVERNMENT

## 2018-09-17 ENCOUNTER — OFFICE VISIT (OUTPATIENT)
Dept: HEMATOLOGY/ONCOLOGY | Facility: CLINIC | Age: 33
End: 2018-09-17
Payer: OTHER GOVERNMENT

## 2018-09-17 VITALS
TEMPERATURE: 99 F | SYSTOLIC BLOOD PRESSURE: 118 MMHG | RESPIRATION RATE: 18 BRPM | DIASTOLIC BLOOD PRESSURE: 78 MMHG | OXYGEN SATURATION: 100 % | HEART RATE: 84 BPM

## 2018-09-17 DIAGNOSIS — Z09 CHEMOTHERAPY FOLLOW-UP EXAMINATION: ICD-10-CM

## 2018-09-17 DIAGNOSIS — Z76.82 STEM CELL TRANSPLANT CANDIDATE: ICD-10-CM

## 2018-09-17 DIAGNOSIS — D57.3 SICKLE CELL TRAIT: ICD-10-CM

## 2018-09-17 DIAGNOSIS — Z01.818 PRE-TRANSPLANT EVALUATION FOR STEM CELL TRANSPLANT: Primary | ICD-10-CM

## 2018-09-17 DIAGNOSIS — C92.11 CHRONIC MYELOID LEUKEMIA, BCR/ABL-POSITIVE, IN REMISSION: Primary | ICD-10-CM

## 2018-09-17 DIAGNOSIS — C92.11 CHRONIC MYELOID LEUKEMIA, BCR/ABL-POSITIVE, IN REMISSION: ICD-10-CM

## 2018-09-17 LAB
CHAGAS AB, DONOR EVAL (BLOOD CENTER): NORMAL
CHROM BANDING METHOD: NORMAL
CHROMOSOME ANALYSIS BM ADDITIONAL INFORMATION: NORMAL
CHROMOSOME ANALYSIS BM RELEASED BY: NORMAL
CHROMOSOME ANALYSIS BM RESULT SUMMARY: NORMAL
CLINICAL CYTOGENETICIST REVIEW: NORMAL
KARYOTYP MAR: NORMAL
REASON FOR REFERRAL (NARRATIVE): NORMAL
REF LAB TEST METHOD: NORMAL
SPECIMEN SOURCE: NORMAL
SPECIMEN: NORMAL

## 2018-09-17 PROCEDURE — 99215 OFFICE O/P EST HI 40 MIN: CPT | Mod: S$PBB,,, | Performed by: INTERNAL MEDICINE

## 2018-09-17 PROCEDURE — 99999 PR PBB SHADOW E&M-EST. PATIENT-LVL III: CPT | Mod: PBBFAC,,, | Performed by: INTERNAL MEDICINE

## 2018-09-17 PROCEDURE — 90791 PSYCH DIAGNOSTIC EVALUATION: CPT | Mod: S$PBB,,, | Performed by: PSYCHOLOGIST

## 2018-09-17 PROCEDURE — 90791 PSYCH DIAGNOSTIC EVALUATION: CPT | Mod: PBBFAC | Performed by: PSYCHOLOGIST

## 2018-09-17 PROCEDURE — 99213 OFFICE O/P EST LOW 20 MIN: CPT | Mod: PBBFAC,25,27 | Performed by: INTERNAL MEDICINE

## 2018-09-17 PROCEDURE — 99212 OFFICE O/P EST SF 10 MIN: CPT | Mod: PBBFAC,25 | Performed by: PSYCHOLOGIST

## 2018-09-17 PROCEDURE — 99999 PR PBB SHADOW E&M-EST. PATIENT-LVL II: CPT | Mod: PBBFAC,,, | Performed by: PSYCHOLOGIST

## 2018-09-17 NOTE — LETTER
September 18, 2018        Kishor Pantoja MD  [address] [facility] [address]             [place]  [address] [facility] [address]  Phone: [phone]  Fax: [phone]   Patient: Wilton Guzmán   MR Number: [id number]   YOB: 1985   Date of Visit: 9/17/2018       Dear Dr. Pantoja:    Thank you for referring Wilton Guzmán to me for evaluation. Below are the relevant portions of my assessment and plan of care.     Wilton Guzmán is a  33 y.o. male referred by Dr. Pantoja for psychological evaluation prior to stem cell transplantation.   The patient appears absent of disabling psychopathology or disabilities which would prevent understanding and compliance with medical treatment.  There is no evidence of suicidality.    The patient has adequate knowledge about HSCT, appropriate expectations for health and illness following transplantation, adequate  understanding of the possible risks and complications of this treatment option, and a high willingness to sustain effort for lifestyle changes and health adaptations which will be required of him. He is aware of the 100 day 1 hour residence requirement.   He reports adequate compliance with previous medical treatment.   Wilton Guzmán has excellent social support from his wife and family. Caregivers are engaged and aware of post-HSCT demands.   The patient exhibits a high degree of social stability.   The patient acknowledges no stressors expected to limit his ability to cope with the demands of HSCT and recovery.   The patient reports limited caffeine consumption, no tobacco use, no alcohol use and no illicit drug use   He demonstrates adequate health literacy.        Impressions:  Wilton Guzmán is an acceptable HSCT candidate from a psychological perspective.   There are no overt psychological contraindications for proceeding with the procedure.  He has a remote history of depressive  symptoms, currently in full remission.  He reports no current psychiatric problems or major adjustment issues. The patient has reasonable expectations for the procedure, good social support, and has already begun making appropriate life plans in anticipation of the procedure. The patient has verbalized appropriate awareness and commitment to the necessary behavioral changes associated with HSCTand appears willing to adjust to long-term lifestyle challenges and medical follow-up. There are no recommendations for psychological treatment at this time.  The patient and his wife are aware of psychosocial support resources available should they become necessary in the future.      If you have questions, please do not hesitate to call me. I look forward to following Wilton along with you.    Sincerely,      Marlon Norris, PhD           CC  Noreen Sebastian MA
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
## 2018-09-19 PROBLEM — D57.3 SICKLE CELL TRAIT: Status: ACTIVE | Noted: 2018-09-19

## 2018-09-19 PROBLEM — Z76.82 STEM CELL TRANSPLANT CANDIDATE: Status: ACTIVE | Noted: 2018-09-19

## 2018-09-19 NOTE — ASSESSMENT & PLAN NOTE
He has sickle cell trait. We are performing hemoglobin electrophoresis on his brother, who is known to have at least trait as well.

## 2018-09-19 NOTE — ASSESSMENT & PLAN NOTE
Today, we discussed allogeneic peripheral blood stem cell transplant for Mr. Guzmán. His donor is expected to be his 10/10 HLA-matched brother.     We reviewed the risks and benefits of allogeneic stem cell transplant. We discussed that transplant is a potentially curative method of therapy for CML. We reviewed that CML in blast phase has a poor overall prognosis for a young individual without transplant and that transplant is expected to improve upon his likelihood of long-term remission and cure.     We reviewed the risks of allogeneic stem cell transplant, chemotherapy, and procedures leading up to transplant. We discussed that central venous catheter placement can result in infection or pneumothorax, which can require a corrective procedure. We also reviewed the risks of busulfan and cyclophosphamide chemotherapy, including alopecia, nausea/vomiting, diarrhea, mucositis, sinusitis, organ damage, hemorrhagic cystitis, VOD/SOS, severe infection and death. We also discussed that treatment-related mortality for transplant can be as high as 20%.    We reviewed the long-term risk of graft versus host disease and how this can have substantial morbidity and mortality for transplant recipients. We discussed the need for immunosuppression. We reviewed the requirement of close follow-up visits and the need to relocate to this area for 100 days post-transplant.    Mr. Guzmán and his wife were given opportunities to ask questions. They felt all questions were answered to their satisfaction. He signed informed consent    CIBMTR Disease Classification  Chronic myeloid leukemia - initially in blast phase - treated with multiagent chemotherapy + TKI (dasatinib)  Disease status at time of transplant: Major molecular remission (MMR - MR 3.0)    ASBMT risk: Intermediate risk - hematologic CR deriving from BP    Planned Transplant: Matched sibling peripheral blood stem cell transplant with Bu/Cy conditioning (BuCy2 regimen)    Planned  maintenance therapy: TKI maintenance with dasatinib for at least 2 years

## 2018-09-19 NOTE — PROGRESS NOTES
HEMATOLOGIC MALIGNANCIES PROGRESS NOTE    IDENTIFYING STATEMENT   Wilton Guzmán (Wilton) is a 33 y.o. male with a  of 1985 from [place], [place] with the diagnosis of CML in blast phase.      ONCOLOGY HISTORY:    1. Chronic myeloid leukemia, initially presenting in blast phase   A. 2018: Began developing left-sided abdominal pain - eventually requiring evaluation - WBC ~150 at  base; transferred to [facility] in [place], [place]   B. BMBx showed 20% myelomonoblasts concerning for CML in blast phase; bcr-abl positive (returned for Day 8 of induction therapy).    C. 2018: Began 7+3 induction chemotherapy. Began dasatinib (dose-adjusted) on day 8.   D. 2018: Transferred to [facility]   E. 2018: BMBx showed variably cellular marrow (10-40%) with 9% CD34+ blasts, concerning for residual disease.   F. 2018: Reinduction with MEC (mitoxantrone, etoposide, cytarabine) chemotherapy.    G. 2018: BMBx shows hypocellular bone marrow with no definite morphologic or immunophenotypic evidence of residual leukemia; bcr-abl p210 transcript was detected at 0.5%   H. Delay in clinic follow-up due to challenges with  insurance   I. 2018: Follow-up in clinic; WBC 6.77 (normal diff); Hgb 11.2 g/dl; Plt 298; BMBx shows 30% cellular marrow with no morphologic evidence of residual leukemia; bcr-abl p210 detected at 0.05% on international scale (MR 3.3).    J. 2018: BMBx shows 40% cellular marrow with no morphologic evidence of leukemia. Chromosomes 46, XY. Bcr-abl p210 detected at 0.1% on international scale (MR 3.0).     INTERVAL HISTORY:      Mr. Guzmán returns to clinic for follow-up of his CML, initially diagnosed in blast phase. He presents for consent signing with his wife. He has been feeling generally well. He is tolerating the dasatinib well without adverse event at this time. He otherwise has no complaints.     Past Medical History, Past Social History and  Past Family History have been reviewed and are unchanged except as noted in the interval history.    MEDICATIONS:     Current Outpatient Medications on File Prior to Visit   Medication Sig Dispense Refill    acyclovir (ZOVIRAX) 200 MG capsule Take 2 capsules (400 mg total) by mouth 2 (two) times daily. 120 capsule 11    SPRYCEL 140 mg Tab TAKE 1 TABLET ONCE DAILY 30 tablet 11     No current facility-administered medications on file prior to visit.        ALLERGIES: Review of patient's allergies indicates:  No Known Allergies     ROS:       Review of Systems   Constitutional: Positive for fatigue. Negative for diaphoresis, fever and unexpected weight change.   HENT:   Negative for lump/mass and sore throat.    Eyes: Negative for icterus.   Respiratory: Negative for cough and shortness of breath.    Cardiovascular: Negative for chest pain and palpitations.   Gastrointestinal: Negative for abdominal distention, constipation, diarrhea, nausea and vomiting.   Genitourinary: Negative for dysuria and frequency.    Musculoskeletal: Negative for arthralgias, gait problem and myalgias.   Skin: Negative for rash.   Neurological: Negative for dizziness, gait problem and headaches.   Hematological: Negative for adenopathy. Does not bruise/bleed easily.   Psychiatric/Behavioral: Negative for depression. The patient is nervous/anxious.        PHYSICAL EXAM:  Vitals:    09/17/18 1421   BP: 118/78   Pulse: 84   Resp: 18   Temp: 99.2 °F (37.3 °C)   SpO2: 100%       KARNOFSKY PERFORMANCE STATUS 80%  ECOG 1    Physical Exam   Constitutional: He is oriented to person, place, and time. He appears well-developed and well-nourished. No distress.   HENT:   Head: Normocephalic and atraumatic.   Mouth/Throat: Mucous membranes are normal. No oral lesions.   Eyes: Conjunctivae are normal.   Neck: No thyromegaly present.   Cardiovascular: Normal rate, regular rhythm and normal heart sounds.   No murmur heard.  Pulmonary/Chest: Breath sounds  normal. He has no wheezes. He has no rales.   Abdominal: Soft. He exhibits no distension and no mass. There is no splenomegaly or hepatomegaly. There is no tenderness.   Lymphadenopathy:     He has no cervical adenopathy.        Right cervical: No deep cervical adenopathy present.       Left cervical: No deep cervical adenopathy present.     He has no axillary adenopathy.        Right: No inguinal adenopathy present.        Left: No inguinal adenopathy present.   Neurological: He is alert and oriented to person, place, and time. He has normal strength and normal reflexes. No cranial nerve deficit. Coordination normal.   Skin: No rash noted.       LAB:   Results for orders placed or performed in visit on 09/11/18   Chromosome Analysis, Bone Marrow   Result Value Ref Range    Chromosome analysis BM Result Summary Normal     Interpretation No clonal abnormality was apparent.     Results 46,XY[20]     Reason for Referral CML - staging prior to transplant     Specimen Bone Marrow     Source Test Not Performed     Method Culture without mitogens     Banding Methods, BM Chromosome SEE BELOW     Chromosome analysis BM Additional Information SEE BELOW     Chromosome analysis BM Released By Jayden Zurita, Ph.D.    Leukemia/Lymphoma Screen - Bone Marrow Right Posterior Iliac Crest   Result Value Ref Range    Clinical Diagnosis - Bone Marrow CML     Body Site - Bone Marrow RPI     Bone Marrow Interpretation       No abnormal hematopoietic population is detected in this sample.    Bone Marrow Antibodies Analyzed       All analyzed: CD2, CD3, CD4, CD5, CD7, CD8, CD10, CD13, CD19, CD20, CD34, , KAPPA, LAMBDA,CD45 and 7AAD.    Bone Marrow Comment       Flow cytometric analysis of bone marrow shows populations of polyclonal B lymphocytes and T lymphocytes that are immunophenotypically unremarkable. The blast gate is not increased. Flow differential: Lymphocytes 42.5%, Monocytes 5.4%, Granulocytes 48.1%,   Blast 1.3%,  Debris/nRBC 2.7%, Viability 94.1%.     Bone Marrow Prep and Stain   Result Value Ref Range    Bone Marrow Da Silva Stain Comment See Anatomic Pathology bone marrow report.    BCR/ABL, p210, bone marrow   Result Value Ref Range    Specimen Type, p210, Quant, bm BONE MARROW     Final Diagnosis, p210, Quant, bm SEE BELOW     BCR/ABL,p210 Result see interpretation    Iron Stain, Bone Marrow   Result Value Ref Range    Bone Marrow Iron Stain Comment See Anatomic Pathology bone marrow report.        PROBLEMS ASSESSED THIS VISIT:    1. Chronic myeloid leukemia, BCR/ABL-positive, in remission    2. Stem cell transplant candidate    3. Chemotherapy follow-up examination    4. Sickle cell trait        PLAN:       Stem cell transplant candidate  Today, we discussed allogeneic peripheral blood stem cell transplant for Mr. Guzmán. His donor is expected to be his 10/10 HLA-matched brother.     We reviewed the risks and benefits of allogeneic stem cell transplant. We discussed that transplant is a potentially curative method of therapy for CML. We reviewed that CML in blast phase has a poor overall prognosis for a young individual without transplant and that transplant is expected to improve upon his likelihood of long-term remission and cure.     We reviewed the risks of allogeneic stem cell transplant, chemotherapy, and procedures leading up to transplant. We discussed that central venous catheter placement can result in infection or pneumothorax, which can require a corrective procedure. We also reviewed the risks of busulfan and cyclophosphamide chemotherapy, including alopecia, nausea/vomiting, diarrhea, mucositis, sinusitis, organ damage, hemorrhagic cystitis, VOD/SOS, severe infection and death. We also discussed that treatment-related mortality for transplant can be as high as 20%.    We reviewed the long-term risk of graft versus host disease and how this can have substantial morbidity and mortality for transplant recipients.  We discussed the need for immunosuppression. We reviewed the requirement of close follow-up visits and the need to relocate to this area for 100 days post-transplant.    Mr. Guzmán and his wife were given opportunities to ask questions. They felt all questions were answered to their satisfaction. He signed informed consent    CIBMTR Disease Classification  Chronic myeloid leukemia - initially in blast phase - treated with multiagent chemotherapy + TKI (dasatinib)  Disease status at time of transplant: Major molecular remission (MMR - MR 3.0)    ASBMT risk: Intermediate risk - hematologic CR deriving from BP    Planned Transplant: Matched sibling peripheral blood stem cell transplant with Bu/Cy conditioning (BuCy2 regimen)    Planned maintenance therapy: TKI maintenance with dasatinib for at least 2 years    Sickle cell trait  He has sickle cell trait. We are performing hemoglobin electrophoresis on his brother, who is known to have at least trait as well.     Follow-up for transplant-related procedures and admission for transplant.    Kishor Pantoja MD  Hematology and Stem Cell Transplant

## 2018-09-22 ENCOUNTER — PATIENT MESSAGE (OUTPATIENT)
Dept: HEMATOLOGY/ONCOLOGY | Facility: CLINIC | Age: 33
End: 2018-09-22

## 2018-09-25 DIAGNOSIS — C92.10 CML (CHRONIC MYELOCYTIC LEUKEMIA): Primary | ICD-10-CM

## 2018-09-28 DIAGNOSIS — C92.11 CML IN REMISSION: Primary | ICD-10-CM

## 2018-10-01 ENCOUNTER — OFFICE VISIT (OUTPATIENT)
Dept: HEMATOLOGY/ONCOLOGY | Facility: CLINIC | Age: 33
DRG: 014 | End: 2018-10-01
Payer: OTHER GOVERNMENT

## 2018-10-01 ENCOUNTER — TELEPHONE (OUTPATIENT)
Dept: HEMATOLOGY/ONCOLOGY | Facility: CLINIC | Age: 33
End: 2018-10-01

## 2018-10-01 ENCOUNTER — HOSPITAL ENCOUNTER (INPATIENT)
Facility: HOSPITAL | Age: 33
LOS: 21 days | Discharge: HOME | DRG: 014 | End: 2018-10-22
Attending: INTERNAL MEDICINE | Admitting: INTERNAL MEDICINE
Payer: OTHER GOVERNMENT

## 2018-10-01 VITALS
OXYGEN SATURATION: 99 % | WEIGHT: 154.38 LBS | HEIGHT: 72 IN | TEMPERATURE: 98 F | RESPIRATION RATE: 18 BRPM | SYSTOLIC BLOOD PRESSURE: 135 MMHG | DIASTOLIC BLOOD PRESSURE: 81 MMHG | HEART RATE: 64 BPM | BODY MASS INDEX: 20.91 KG/M2

## 2018-10-01 DIAGNOSIS — Z76.82 STEM CELL TRANSPLANT CANDIDATE: ICD-10-CM

## 2018-10-01 DIAGNOSIS — C92.10 CHRONIC MYELOID LEUKEMIA, BCR/ABL-POSITIVE, NOT HAVING ACHIEVED REMISSION: ICD-10-CM

## 2018-10-01 DIAGNOSIS — C92.10 CML (CHRONIC MYELOCYTIC LEUKEMIA): ICD-10-CM

## 2018-10-01 DIAGNOSIS — C92.11 CHRONIC MYELOID LEUKEMIA, BCR/ABL-POSITIVE, IN REMISSION: Primary | ICD-10-CM

## 2018-10-01 DIAGNOSIS — D64.81 ANEMIA ASSOCIATED WITH CHEMOTHERAPY: ICD-10-CM

## 2018-10-01 DIAGNOSIS — D57.3 SICKLE CELL TRAIT: ICD-10-CM

## 2018-10-01 DIAGNOSIS — A04.72 C. DIFFICILE DIARRHEA: ICD-10-CM

## 2018-10-01 DIAGNOSIS — E83.42 HYPOMAGNESEMIA: ICD-10-CM

## 2018-10-01 DIAGNOSIS — Z94.81 S/P ALLOGENEIC BONE MARROW TRANSPLANT: ICD-10-CM

## 2018-10-01 DIAGNOSIS — T45.1X5A CHEMOTHERAPY INDUCED NEUTROPENIA: ICD-10-CM

## 2018-10-01 DIAGNOSIS — T45.1X5A ANEMIA ASSOCIATED WITH CHEMOTHERAPY: ICD-10-CM

## 2018-10-01 DIAGNOSIS — D70.1 CHEMOTHERAPY INDUCED NEUTROPENIA: ICD-10-CM

## 2018-10-01 PROCEDURE — 02HV33Z INSERTION OF INFUSION DEVICE INTO SUPERIOR VENA CAVA, PERCUTANEOUS APPROACH: ICD-10-PCS | Performed by: INTERNAL MEDICINE

## 2018-10-01 PROCEDURE — 25000003 PHARM REV CODE 250: Performed by: INTERNAL MEDICINE

## 2018-10-01 PROCEDURE — A9155 ARTIFICIAL SALIVA: HCPCS | Performed by: INTERNAL MEDICINE

## 2018-10-01 PROCEDURE — 63600175 PHARM REV CODE 636 W HCPCS: Performed by: INTERNAL MEDICINE

## 2018-10-01 PROCEDURE — 99999 PR PBB SHADOW E&M-EST. PATIENT-LVL III: CPT | Mod: PBBFAC,,, | Performed by: NURSE PRACTITIONER

## 2018-10-01 PROCEDURE — 20600001 HC STEP DOWN PRIVATE ROOM

## 2018-10-01 PROCEDURE — 63600175 PHARM REV CODE 636 W HCPCS: Performed by: NURSE PRACTITIONER

## 2018-10-01 PROCEDURE — 99223 1ST HOSP IP/OBS HIGH 75: CPT | Mod: ,,, | Performed by: INTERNAL MEDICINE

## 2018-10-01 PROCEDURE — 99213 OFFICE O/P EST LOW 20 MIN: CPT | Mod: PBBFAC,25 | Performed by: NURSE PRACTITIONER

## 2018-10-01 PROCEDURE — 25000003 PHARM REV CODE 250: Performed by: NURSE PRACTITIONER

## 2018-10-01 PROCEDURE — 99215 OFFICE O/P EST HI 40 MIN: CPT | Mod: S$PBB,,, | Performed by: NURSE PRACTITIONER

## 2018-10-01 RX ORDER — ONDANSETRON 4 MG/1
8 TABLET, ORALLY DISINTEGRATING ORAL EVERY 8 HOURS PRN
Status: CANCELLED | OUTPATIENT
Start: 2018-10-01

## 2018-10-01 RX ORDER — PROCHLORPERAZINE EDISYLATE 5 MG/ML
10 INJECTION INTRAMUSCULAR; INTRAVENOUS EVERY 6 HOURS PRN
Status: DISCONTINUED | OUTPATIENT
Start: 2018-10-01 | End: 2018-10-22 | Stop reason: HOSPADM

## 2018-10-01 RX ORDER — HEPARIN 100 UNIT/ML
300 SYRINGE INTRAVENOUS
Status: CANCELLED | OUTPATIENT
Start: 2018-10-01

## 2018-10-01 RX ORDER — URSODIOL 300 MG/1
300 CAPSULE ORAL 2 TIMES DAILY
Status: CANCELLED | OUTPATIENT
Start: 2018-10-02

## 2018-10-01 RX ORDER — DIPHENHYDRAMINE HYDROCHLORIDE 50 MG/ML
50 INJECTION INTRAMUSCULAR; INTRAVENOUS ONCE AS NEEDED
Status: COMPLETED | OUTPATIENT
Start: 2018-10-09 | End: 2018-10-09

## 2018-10-01 RX ORDER — CLONIDINE HYDROCHLORIDE 0.1 MG/1
0.1 TABLET ORAL ONCE AS NEEDED
Status: COMPLETED | OUTPATIENT
Start: 2018-10-09 | End: 2018-10-09

## 2018-10-01 RX ORDER — EPINEPHRINE 1 MG/ML
0.5 INJECTION, SOLUTION INTRACARDIAC; INTRAMUSCULAR; INTRAVENOUS; SUBCUTANEOUS ONCE AS NEEDED
Status: COMPLETED | OUTPATIENT
Start: 2018-10-09 | End: 2018-10-09

## 2018-10-01 RX ORDER — ACYCLOVIR 200 MG/1
800 CAPSULE ORAL DAILY
Status: CANCELLED | OUTPATIENT
Start: 2018-10-02

## 2018-10-01 RX ORDER — FLUCONAZOLE 200 MG/1
400 TABLET ORAL DAILY
Status: DISCONTINUED | OUTPATIENT
Start: 2018-10-08 | End: 2018-10-22 | Stop reason: HOSPADM

## 2018-10-01 RX ORDER — ATOVAQUONE 750 MG/5ML
1500 SUSPENSION ORAL DAILY
Status: CANCELLED
Start: 2018-11-08

## 2018-10-01 RX ORDER — LEVETIRACETAM 250 MG/1
500 TABLET ORAL 2 TIMES DAILY
Status: CANCELLED | OUTPATIENT
Start: 2018-10-01

## 2018-10-01 RX ORDER — DEXAMETHASONE 4 MG/1
12 TABLET ORAL
Status: COMPLETED | OUTPATIENT
Start: 2018-10-02 | End: 2018-10-05

## 2018-10-01 RX ORDER — PANTOPRAZOLE SODIUM 40 MG/1
40 TABLET, DELAYED RELEASE ORAL DAILY
Status: DISCONTINUED | OUTPATIENT
Start: 2018-10-02 | End: 2018-10-22 | Stop reason: HOSPADM

## 2018-10-01 RX ORDER — ATOVAQUONE 750 MG/5ML
1500 SUSPENSION ORAL DAILY
Status: DISCONTINUED | OUTPATIENT
Start: 2018-11-08 | End: 2018-10-22 | Stop reason: HOSPADM

## 2018-10-01 RX ORDER — SODIUM CHLORIDE AND POTASSIUM CHLORIDE 150; 900 MG/100ML; MG/100ML
INJECTION, SOLUTION INTRAVENOUS CONTINUOUS
Status: DISCONTINUED | OUTPATIENT
Start: 2018-10-01 | End: 2018-10-22 | Stop reason: HOSPADM

## 2018-10-01 RX ORDER — POTASSIUM CHLORIDE 20 MEQ/1
20 TABLET, EXTENDED RELEASE ORAL
Status: DISCONTINUED | OUTPATIENT
Start: 2018-10-01 | End: 2018-10-22 | Stop reason: HOSPADM

## 2018-10-01 RX ORDER — LEVETIRACETAM 500 MG/1
500 TABLET ORAL 2 TIMES DAILY
Status: COMPLETED | OUTPATIENT
Start: 2018-10-01 | End: 2018-10-06

## 2018-10-01 RX ORDER — HEPARIN 100 UNIT/ML
300 SYRINGE INTRAVENOUS
Status: DISCONTINUED | OUTPATIENT
Start: 2018-10-01 | End: 2018-10-22 | Stop reason: HOSPADM

## 2018-10-01 RX ORDER — SODIUM,POTASSIUM PHOSPHATES 280-250MG
2 POWDER IN PACKET (EA) ORAL EVERY 4 HOURS PRN
Status: DISCONTINUED | OUTPATIENT
Start: 2018-10-01 | End: 2018-10-22 | Stop reason: HOSPADM

## 2018-10-01 RX ORDER — SODIUM CHLORIDE AND POTASSIUM CHLORIDE 150; 900 MG/100ML; MG/100ML
INJECTION, SOLUTION INTRAVENOUS CONTINUOUS
Status: CANCELLED | OUTPATIENT
Start: 2018-10-01

## 2018-10-01 RX ORDER — LOPERAMIDE HYDROCHLORIDE 2 MG/1
2 CAPSULE ORAL EVERY 4 HOURS PRN
Status: DISCONTINUED | OUTPATIENT
Start: 2018-10-01 | End: 2018-10-12

## 2018-10-01 RX ORDER — SODIUM CHLORIDE AND POTASSIUM CHLORIDE 150; 900 MG/100ML; MG/100ML
INJECTION, SOLUTION INTRAVENOUS CONTINUOUS
Status: DISCONTINUED | OUTPATIENT
Start: 2018-10-09 | End: 2018-10-09

## 2018-10-01 RX ORDER — NITROGLYCERIN 0.4 MG/1
0.4 TABLET SUBLINGUAL ONCE AS NEEDED
Status: COMPLETED | OUTPATIENT
Start: 2018-10-09 | End: 2018-10-09

## 2018-10-01 RX ORDER — PROCHLORPERAZINE EDISYLATE 5 MG/ML
10 INJECTION INTRAMUSCULAR; INTRAVENOUS EVERY 6 HOURS PRN
Status: CANCELLED | OUTPATIENT
Start: 2018-10-01

## 2018-10-01 RX ORDER — FLUCONAZOLE 50 MG/1
400 TABLET ORAL DAILY
Status: CANCELLED | OUTPATIENT
Start: 2018-10-08

## 2018-10-01 RX ORDER — TACROLIMUS 1 MG/1
2 CAPSULE ORAL 2 TIMES DAILY
Status: CANCELLED | OUTPATIENT
Start: 2018-10-08

## 2018-10-01 RX ORDER — ZOLPIDEM TARTRATE 5 MG/1
5 TABLET ORAL NIGHTLY PRN
Status: DISCONTINUED | OUTPATIENT
Start: 2018-10-01 | End: 2018-10-22 | Stop reason: HOSPADM

## 2018-10-01 RX ORDER — FUROSEMIDE 10 MG/ML
100 INJECTION INTRAMUSCULAR; INTRAVENOUS ONCE AS NEEDED
Status: COMPLETED | OUTPATIENT
Start: 2018-10-09 | End: 2018-10-09

## 2018-10-01 RX ORDER — ONDANSETRON 4 MG/1
16 TABLET, ORALLY DISINTEGRATING ORAL
Status: CANCELLED | OUTPATIENT
Start: 2018-10-06

## 2018-10-01 RX ORDER — LORAZEPAM 2 MG/ML
1 INJECTION INTRAMUSCULAR EVERY 6 HOURS PRN
Status: CANCELLED | OUTPATIENT
Start: 2018-10-01

## 2018-10-01 RX ORDER — LORAZEPAM 2 MG/ML
1 INJECTION INTRAMUSCULAR ONCE
Status: COMPLETED | OUTPATIENT
Start: 2018-10-09 | End: 2018-10-09

## 2018-10-01 RX ORDER — LANOLIN ALCOHOL/MO/W.PET/CERES
800 CREAM (GRAM) TOPICAL EVERY 4 HOURS PRN
Status: DISCONTINUED | OUTPATIENT
Start: 2018-10-01 | End: 2018-10-16

## 2018-10-01 RX ORDER — ONDANSETRON 4 MG/1
16 TABLET, ORALLY DISINTEGRATING ORAL
Status: CANCELLED | OUTPATIENT
Start: 2018-10-02

## 2018-10-01 RX ORDER — URSODIOL 300 MG/1
300 CAPSULE ORAL 2 TIMES DAILY
Status: DISCONTINUED | OUTPATIENT
Start: 2018-10-02 | End: 2018-10-22 | Stop reason: HOSPADM

## 2018-10-01 RX ORDER — DEXAMETHASONE 0.5 MG/1
12 TABLET ORAL
Status: CANCELLED | OUTPATIENT
Start: 2018-10-02

## 2018-10-01 RX ORDER — DEXAMETHASONE 4 MG/1
12 TABLET ORAL
Status: COMPLETED | OUTPATIENT
Start: 2018-10-06 | End: 2018-10-07

## 2018-10-01 RX ORDER — LEVOFLOXACIN 500 MG/1
500 TABLET, FILM COATED ORAL DAILY
Status: DISCONTINUED | OUTPATIENT
Start: 2018-10-08 | End: 2018-10-21

## 2018-10-01 RX ORDER — ACYCLOVIR 200 MG/1
800 CAPSULE ORAL DAILY
Status: DISCONTINUED | OUTPATIENT
Start: 2018-10-02 | End: 2018-10-22 | Stop reason: HOSPADM

## 2018-10-01 RX ORDER — SODIUM CHLORIDE 0.9 % (FLUSH) 0.9 %
10 SYRINGE (ML) INJECTION
Status: DISCONTINUED | OUTPATIENT
Start: 2018-10-01 | End: 2018-10-22 | Stop reason: HOSPADM

## 2018-10-01 RX ORDER — ENOXAPARIN SODIUM 100 MG/ML
40 INJECTION SUBCUTANEOUS EVERY 24 HOURS
Status: DISCONTINUED | OUTPATIENT
Start: 2018-10-01 | End: 2018-10-15

## 2018-10-01 RX ORDER — TACROLIMUS 1 MG/1
2 CAPSULE ORAL 2 TIMES DAILY
Status: DISCONTINUED | OUTPATIENT
Start: 2018-10-08 | End: 2018-10-10

## 2018-10-01 RX ORDER — ONDANSETRON 8 MG/1
16 TABLET, ORALLY DISINTEGRATING ORAL
Status: COMPLETED | OUTPATIENT
Start: 2018-10-06 | End: 2018-10-09

## 2018-10-01 RX ORDER — SODIUM,POTASSIUM PHOSPHATES 280-250MG
1 POWDER IN PACKET (EA) ORAL EVERY 4 HOURS PRN
Status: DISCONTINUED | OUTPATIENT
Start: 2018-10-01 | End: 2018-10-22 | Stop reason: HOSPADM

## 2018-10-01 RX ORDER — DIPHENHYDRAMINE HYDROCHLORIDE 50 MG/ML
50 INJECTION INTRAMUSCULAR; INTRAVENOUS ONCE
Status: COMPLETED | OUTPATIENT
Start: 2018-10-09 | End: 2018-10-09

## 2018-10-01 RX ORDER — ONDANSETRON 8 MG/1
16 TABLET, ORALLY DISINTEGRATING ORAL
Status: COMPLETED | OUTPATIENT
Start: 2018-10-02 | End: 2018-10-05

## 2018-10-01 RX ORDER — SODIUM CHLORIDE 0.9 % (FLUSH) 0.9 %
10 SYRINGE (ML) INJECTION
Status: CANCELLED | OUTPATIENT
Start: 2018-10-01

## 2018-10-01 RX ORDER — MEPERIDINE HYDROCHLORIDE 50 MG/ML
50 INJECTION INTRAMUSCULAR; INTRAVENOUS; SUBCUTANEOUS ONCE AS NEEDED
Status: COMPLETED | OUTPATIENT
Start: 2018-10-09 | End: 2018-10-09

## 2018-10-01 RX ORDER — LORAZEPAM 2 MG/ML
1 INJECTION INTRAMUSCULAR EVERY 6 HOURS PRN
Status: DISCONTINUED | OUTPATIENT
Start: 2018-10-01 | End: 2018-10-22 | Stop reason: HOSPADM

## 2018-10-01 RX ORDER — LEVOFLOXACIN 500 MG/1
500 TABLET, FILM COATED ORAL DAILY
Status: CANCELLED | OUTPATIENT
Start: 2018-10-08

## 2018-10-01 RX ORDER — LANOLIN ALCOHOL/MO/W.PET/CERES
400 CREAM (GRAM) TOPICAL EVERY 4 HOURS PRN
Status: DISCONTINUED | OUTPATIENT
Start: 2018-10-01 | End: 2018-10-16

## 2018-10-01 RX ORDER — DEXAMETHASONE 0.5 MG/1
12 TABLET ORAL
Status: CANCELLED | OUTPATIENT
Start: 2018-10-06

## 2018-10-01 RX ORDER — ACETAMINOPHEN 325 MG/1
650 TABLET ORAL EVERY 4 HOURS PRN
Status: DISCONTINUED | OUTPATIENT
Start: 2018-10-01 | End: 2018-10-03

## 2018-10-01 RX ORDER — DIPHENHYDRAMINE HYDROCHLORIDE 50 MG/ML
50 INJECTION INTRAMUSCULAR; INTRAVENOUS
Status: DISCONTINUED | OUTPATIENT
Start: 2018-10-01 | End: 2018-10-22 | Stop reason: HOSPADM

## 2018-10-01 RX ORDER — OXYCODONE HYDROCHLORIDE 5 MG/1
5 TABLET ORAL EVERY 6 HOURS PRN
Status: DISCONTINUED | OUTPATIENT
Start: 2018-10-01 | End: 2018-10-22 | Stop reason: HOSPADM

## 2018-10-01 RX ORDER — ONDANSETRON 8 MG/1
8 TABLET, ORALLY DISINTEGRATING ORAL EVERY 8 HOURS PRN
Status: DISCONTINUED | OUTPATIENT
Start: 2018-10-01 | End: 2018-10-22 | Stop reason: HOSPADM

## 2018-10-01 RX ADMIN — ENOXAPARIN SODIUM 40 MG: 100 INJECTION SUBCUTANEOUS at 06:10

## 2018-10-01 RX ADMIN — ZOLPIDEM TARTRATE 5 MG: 5 TABLET ORAL at 09:10

## 2018-10-01 RX ADMIN — SODIUM CHLORIDE AND POTASSIUM CHLORIDE: 9; 1.49 INJECTION, SOLUTION INTRAVENOUS at 09:10

## 2018-10-01 RX ADMIN — LEVETIRACETAM 500 MG: 500 TABLET ORAL at 09:10

## 2018-10-01 RX ADMIN — Medication 30 ML: at 06:10

## 2018-10-01 NOTE — H&P
[facility]  Hematology  Bone Marrow Transplant  H&P    Subjective:     Principal Problem: Stem cell transplant candidate    HPI: Mr. 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 He has been on dasatinb since receiving induction with 7+3 and re-induction with MEC in 6/2018. He was noted be in hematologic and molecular remission per last bone marrow biopsy from 9/11/2018. Transplant work-up with no contraindications to proceeding with transplant. Tessie placed in surgery earlier today.     Patient information was obtained from patient.     Oncology History:   1. Chronic myeloid leukemia, initially presenting in blast phase              A. 5/2018: Began developing left-sided abdominal pain - eventually requiring evaluation - WBC ~150 at  base; transferred to [facility] in [place], [place]              B. BMBx showed 20% myelomonoblasts concerning for CML in blast phase; bcr-abl positive (returned for Day 8 of induction therapy).               C. 6/1/2018: Began 7+3 induction chemotherapy. Began dasatinib (dose-adjusted) on day 8.              D. 6/16/2018: Transferred to [facility]              E. 6/18/2018: BMBx showed variably cellular marrow (10-40%) with 9% CD34+ blasts, concerning for residual disease.              F. 6/23/2018: Reinduction with MEC (mitoxantrone, etoposide, cytarabine) chemotherapy.               G. 7/13/2018: BMBx shows hypocellular bone marrow with no definite morphologic or immunophenotypic evidence of residual leukemia; bcr-abl p210 transcript was detected at 0.5%              H. Delay in clinic follow-up due to challenges with  insurance              I. 8/14/2018: Follow-up in clinic; WBC 6.77 (normal diff); Hgb 11.2 g/dl; Plt 298; BMBx shows 30% cellular marrow with no morphologic evidence of residual leukemia;  bcr-abl p210 detected at 0.05% on international scale (MR 3.3).     Medications Prior to Admission   Medication Sig Dispense Refill Last Dose    acyclovir (ZOVIRAX) 200 MG capsule Take 2 capsules (400 mg total) by mouth 2 (two) times daily. 120 capsule 11 Taking    SPRYCEL 140 mg Tab TAKE 1 TABLET ONCE DAILY 30 tablet 11 Taking       Patient has no known allergies.     Past Medical History:   Diagnosis Date    Chronic myelogenous leukemia (CML), BCR-ABL1-positive     Depression     2010     No past surgical history on file.  Family History     None        Tobacco Use    Smoking status: Never Smoker   Substance and Sexual Activity    Alcohol use: No    Drug use: No    Sexual activity: Yes     Partners: Female       Review of Systems   Constitutional: Negative for activity change, appetite change, chills, diaphoresis, fatigue, fever and unexpected weight change.   HENT: Negative for congestion, dental problem, mouth sores, nosebleeds, postnasal drip, rhinorrhea, sinus pressure, sore throat and trouble swallowing.    Eyes: Negative for photophobia and visual disturbance.   Respiratory: Negative for cough and shortness of breath.    Cardiovascular: Negative for chest pain, palpitations and leg swelling.   Gastrointestinal: Negative for abdominal distention, abdominal pain, blood in stool, constipation, diarrhea, nausea and vomiting.   Genitourinary: Negative for dysuria, frequency, hematuria and urgency.   Musculoskeletal: Negative for arthralgias, back pain and myalgias.   Skin: Negative for pallor and rash.   Allergic/Immunologic: Negative for immunocompromised state.   Neurological: Negative for dizziness, syncope, weakness, numbness and headaches.   Hematological: Negative for adenopathy. Does not bruise/bleed easily.   Psychiatric/Behavioral: Negative for confusion. The patient is not nervous/anxious.      Objective:     Vital Signs (Most Recent):    Vital Signs (24h Range):  Temp:  [98 °F (36.7 °C)-98.8 °F  (37.1 °C)] 98 °F (36.7 °C)  Pulse:  [64-97] 64  Resp:  [12-22] 18  SpO2:  [99 %-100 %] 99 %  BP: (111-135)/(65-81) 135/81        There is no height or weight on file to calculate BMI.  There is no height or weight on file to calculate BSA.    ECOG SCORE         KPS-100%    Lines/Drains/Airways     Central Venous Catheter Line                 Tunneled Central Line Insertion/Assessment - Triple Lumen  10/01/18 1056 right internal jugular less than 1 day          Peripheral Intravenous Line                 Peripheral IV - Single Lumen 10/01/18 0909 Left Hand less than 1 day                Physical Exam   Constitutional: He is oriented to person, place, and time. He appears well-developed and well-nourished. No distress.   HENT:   Head: Normocephalic.   Mouth/Throat: Oropharynx is clear and moist. No oropharyngeal exudate.   Eyes: Conjunctivae are normal. Pupils are equal, round, and reactive to light. No scleral icterus.   Neck: Normal range of motion. Neck supple. Normal range of motion present. No thyromegaly present.   Cardiovascular: Normal rate, regular rhythm, normal heart sounds and intact distal pulses.   Pulmonary/Chest: Effort normal and breath sounds normal. No respiratory distress.   Abdominal: Soft. Bowel sounds are normal. He exhibits no distension. There is no tenderness.   Musculoskeletal: Normal range of motion. He exhibits no edema or tenderness.   Lymphadenopathy:        Head (right side): No submandibular, no preauricular, no posterior auricular and no occipital adenopathy present.        Head (left side): No submandibular, no preauricular, no posterior auricular and no occipital adenopathy present.     He has no cervical adenopathy.     He has no axillary adenopathy.   Neurological: He is alert and oriented to person, place, and time. No cranial nerve deficit. Coordination normal.   Skin: Skin is warm and dry. No petechiae and no rash noted. No pallor.   Kurtz intact RCW, no redness or drainage    Psychiatric: He has a normal mood and affect. Thought content normal.   Vitals reviewed.      Significant Labs:   CBC:   Recent Labs   Lab  10/01/18   0912   WBC  5.67   HGB  12.1*   HCT  37.6*   PLT  219    and CMP:   Recent Labs   Lab  10/01/18   0912   NA  143   K  3.8   CL  107   CO2  26   GLU  98   BUN  13   CREATININE  0.8   CALCIUM  9.2   PROT  6.8   ALBUMIN  3.6   BILITOT  0.3   ALKPHOS  84   AST  35   ALT  54*   ANIONGAP  10   EGFRNONAA  >60.0       Diagnostic Results:  None    Assessment/Plan:     * Stem cell transplant candidate    - Day -8 of Busulfan/Cyclophosphamide matched related donor stem cell transplant from 10/10 match brother:  - Donor B+ and CMV+ , recipient O+ and CMV+  - Pre-transplant work-up with no contraindications to proceeding to transplant, sickle cell trait noted. Brother has sickle cell trait as well.   - Kurtz placed 10/1  - Stem cell infusion scheduled for 10/9    Planned conditioning regimen:  Busulfan on Day -7, -6, -5, and -4  Cyclophosphamide on Day -3, and -2   Planned  GVHD Prophylaxis:  Methotrexate on Day +1, +3, +6, and +11  Tacrolimus starting on Day -1  Antimicrobial Prophylaxis:  Acyclovir starting on Day -7  Ciprofloxacin starting on Day -1  Fluconazole starting on Day -1  Bactrim starting on Day +30  Seizure Prophylaxis:  Levetiracetam on Day -7 through Day -3  SOS/VOD Prophylaxis:  Ursodiol on Day -7 through Day +30  Growth Factor Support:  Neupogen starting on Day +7         Chronic myelogenous leukemia (CML), BCR-ABL1-positive    - original BMBX 5/26/18 with 20% blasts; CML transformed to AML with blast crisis;   karyotype: 46,XY,t(9;22;22;14)q34;q11.2;q13;q23); BCR/ABL gene fusion 62%  - received induction with 7+3 on 6/1-6/8/2018  - persistent disease post 7+3 induction, repeat BM biopsy done 6/18 here at [facility] after 7+3. 9% blasts.Started on MEC chemotherapy for refractory AML on 6/23/2018  - had BMBX on 7/13/18, without evidence of CML; BCR ABL p210 0.5%  (c/w MRD)  - dasatinib 140 mg daily at home  -pre-transplant bone marrow biopsy from 9/11/2018 shows a hematologic and molecular remission  - admit for Busulfan/Cytarabine MRD allogeneic stem cell transplant from his brother           Anemia associated with chemotherapy    - due to dasatinib  - hgb stable at 12.1 gm/dl  - transfuse PRBC for hgb <7            VTE Risk Mitigation (From admission, onward)        Ordered     heparin, porcine (PF) 100 unit/mL injection flush 300 Units  As needed (PRN)      10/01/18 0123          Disposition: pending recovery from transplant    Haily Serna NP  Bone Marrow Transplant  Hematology  [facility]

## 2018-10-01 NOTE — TELEPHONE ENCOUNTER
Contacted Wilton on Friday, 9/28 to discuss his schedule for admit. Explained he will first go to have his line place; that he should go to [facility] and check in at the desk; to stay NPO from midnight. Told him to go get lunch after he's discharged form the procedure and come to see our NP at 1 PM. Told him labs would be drawn before his procedure.   He verbalized understanding and all questions were answered to his satisfaction.

## 2018-10-01 NOTE — PROGRESS NOTES
HEMATOLOGIC MALIGNANCIES PROGRESS NOTE    IDENTIFYING STATEMENT   Wilton Guzmán (Wilton) is a 33 y.o. male with a  of 1985 from [place], [place] with the diagnosis of CML in blast phase.      ONCOLOGY HISTORY:    1. Chronic myeloid leukemia, initially presenting in blast phase   A. 2018: Began developing left-sided abdominal pain - eventually requiring evaluation - WBC ~150 at  base; transferred to [facility] in [place], [place]   B. BMBx showed 20% myelomonoblasts concerning for CML in blast phase; bcr-abl positive (returned for Day 8 of induction therapy).    C. 2018: Began 7+3 induction chemotherapy. Began dasatinib (dose-adjusted) on day 8.   D. 2018: Transferred to [facility]   E. 2018: BMBx showed variably cellular marrow (10-40%) with 9% CD34+ blasts, concerning for residual disease.   F. 2018: Reinduction with MEC (mitoxantrone, etoposide, cytarabine) chemotherapy.    G. 2018: BMBx shows hypocellular bone marrow with no definite morphologic or immunophenotypic evidence of residual leukemia; bcr-abl p210 transcript was detected at 0.5%   H. Delay in clinic follow-up due to challenges with  insurance   I. 2018: Follow-up in clinic; WBC 6.77 (normal diff); Hgb 11.2 g/dl; Plt 298; BMBx shows 30% cellular marrow with no morphologic evidence of residual leukemia; bcr-abl p210 detected at 0.05% on international scale (MR 3.3).    J. 2018: BMBx shows 40% cellular marrow with no morphologic evidence of leukemia. Chromosomes 46, XY. Bcr-abl p210 detected at 0.1% on international scale (MR 3.0).     INTERVAL HISTORY:      Mr. Guzmán returns to clinic for admission for Bu/Cy allogeneic SCT. He is followed by Dr. Pantoja for his CML, initially diagnosed in blast phase. He has been feeling generally well. He is tolerating the dasatinib well without adverse event at this time. He did have some gas pains and diarrhea, improved with imodium. Bowel  movement this AM was normal. Denies fevers, chills, N/V, chest pain, SOB, neuropathy, edema.     Past Medical History, Past Social History and Past Family History have been reviewed and are unchanged except as noted in the interval history.    MEDICATIONS:     Current Outpatient Medications on File Prior to Visit   Medication Sig Dispense Refill    acyclovir (ZOVIRAX) 200 MG capsule Take 2 capsules (400 mg total) by mouth 2 (two) times daily. 120 capsule 11    SPRYCEL 140 mg Tab TAKE 1 TABLET ONCE DAILY 30 tablet 11     Current Facility-Administered Medications on File Prior to Visit   Medication Dose Route Frequency Provider Last Rate Last Dose    [COMPLETED] ceFAZolin injection 1 g  1 g Intravenous Once Pre-Op Elena Shirley NP   1 g at 10/01/18 1031    [COMPLETED] fentaNYL injection   Intravenous Code/trauma/sedation Naun Malave II, MD   25 mcg at 10/01/18 1049    [COMPLETED] midazolam (VERSED) 1 mg/mL injection    Code/trauma/sedation Naun Malave II, MD   0.5 mg at 10/01/18 1049    [DISCONTINUED] 0.9%  NaCl infusion  500 mL Intravenous Once Elena Shirley NP        [DISCONTINUED] fentaNYL injection 50 mcg  50 mcg Intravenous Q3 Min PRN Elena Shirley NP        [DISCONTINUED] heparin infusion 1,000 units/500 ml in 0.9% NaCl (pressure line flush)  500 mL Intravenous Continuous Elena Shirley NP        [DISCONTINUED] midazolam (VERSED) 1 mg/mL injection 1 mg  1 mg Intravenous Q3 Min PRN Elena Shirley NP           ALLERGIES: Review of patient's allergies indicates:  No Known Allergies     ROS:       Review of Systems   Constitutional: Positive for fatigue. Negative for diaphoresis, fever and unexpected weight change.   HENT:   Negative for lump/mass and sore throat.    Eyes: Negative for icterus.   Respiratory: Negative for cough and shortness of breath.    Cardiovascular: Negative for chest pain and palpitations.   Gastrointestinal: Negative for abdominal  distention, constipation, diarrhea, nausea and vomiting.   Genitourinary: Negative for dysuria and frequency.    Musculoskeletal: Negative for arthralgias, gait problem and myalgias.   Skin: Negative for rash.   Neurological: Negative for dizziness, gait problem and headaches.   Hematological: Negative for adenopathy. Does not bruise/bleed easily.   Psychiatric/Behavioral: Negative for depression. The patient is nervous/anxious.        PHYSICAL EXAM:  Vitals:    10/01/18 1521   BP: 135/81   Pulse: 64   Resp: 18   Temp: 98 °F (36.7 °C)   TempSrc: Oral   SpO2: 99%   Weight: 70 kg (154 lb 6.4 oz)   Height: 6' (1.829 m)   PainSc: 0-No pain       KARNOFSKY PERFORMANCE STATUS 80%  ECOG 1    Physical Exam   Constitutional: He is oriented to person, place, and time. He appears well-developed and well-nourished. No distress.   HENT:   Head: Normocephalic and atraumatic.   Mouth/Throat: Mucous membranes are normal. No oral lesions.   Eyes: Conjunctivae are normal.   Neck: No thyromegaly present.   Cardiovascular: Normal rate, regular rhythm and normal heart sounds.   No murmur heard.  Pulmonary/Chest: Breath sounds normal. He has no wheezes. He has no rales.   Abdominal: Soft. He exhibits no distension and no mass. There is no splenomegaly or hepatomegaly. There is no tenderness.   Lymphadenopathy:     He has no cervical adenopathy.        Right cervical: No deep cervical adenopathy present.       Left cervical: No deep cervical adenopathy present.     He has no axillary adenopathy.        Right: No inguinal adenopathy present.        Left: No inguinal adenopathy present.   Neurological: He is alert and oriented to person, place, and time. He has normal strength and normal reflexes. No cranial nerve deficit. Coordination normal.   Skin: No rash noted.       LAB:   Results for orders placed or performed during the hospital encounter of 10/01/18   CBC auto differential   Result Value Ref Range    WBC 5.67 3.90 - 12.70 K/uL     RBC 4.03 (L) 4.60 - 6.20 M/uL    Hemoglobin 12.1 (L) 14.0 - 18.0 g/dL    Hematocrit 37.6 (L) 40.0 - 54.0 %    MCV 93 82 - 98 fL    MCH 30.0 27.0 - 31.0 pg    MCHC 32.2 32.0 - 36.0 g/dL    RDW 14.3 11.5 - 14.5 %    Platelets 219 150 - 350 K/uL    MPV 11.3 9.2 - 12.9 fL    Immature Granulocytes 0.0 0.0 - 0.5 %    Gran # (ANC) 2.0 1.8 - 7.7 K/uL    Immature Grans (Abs) 0.00 0.00 - 0.04 K/uL    Lymph # 3.1 1.0 - 4.8 K/uL    Mono # 0.5 0.3 - 1.0 K/uL    Eos # 0.1 0.0 - 0.5 K/uL    Baso # 0.01 0.00 - 0.20 K/uL    nRBC 0 0 /100 WBC    Gran% 35.2 (L) 38.0 - 73.0 %    Lymph% 54.9 (H) 18.0 - 48.0 %    Mono% 8.1 4.0 - 15.0 %    Eosinophil% 1.6 0.0 - 8.0 %    Basophil% 0.2 0.0 - 1.9 %    Differential Method Automated    Comprehensive metabolic panel   Result Value Ref Range    Sodium 143 136 - 145 mmol/L    Potassium 3.8 3.5 - 5.1 mmol/L    Chloride 107 95 - 110 mmol/L    CO2 26 23 - 29 mmol/L    Glucose 98 70 - 110 mg/dL    BUN, Bld 13 6 - 20 mg/dL    Creatinine 0.8 0.5 - 1.4 mg/dL    Calcium 9.2 8.7 - 10.5 mg/dL    Total Protein 6.8 6.0 - 8.4 g/dL    Albumin 3.6 3.5 - 5.2 g/dL    Total Bilirubin 0.3 0.1 - 1.0 mg/dL    Alkaline Phosphatase 84 55 - 135 U/L    AST 35 10 - 40 U/L    ALT 54 (H) 10 - 44 U/L    Anion Gap 10 8 - 16 mmol/L    eGFR if African American >60.0 >60 mL/min/1.73 m^2    eGFR if non African American >60.0 >60 mL/min/1.73 m^2   Type & Screen   Result Value Ref Range    Group & Rh O POS     Indirect Wes NEG        PROBLEMS ASSESSED THIS VISIT:    1. Chronic myeloid leukemia, BCR/ABL-positive, in remission    2. Stem cell transplant candidate    3. Sickle cell trait    4. Anemia associated with chemotherapy        PLAN:        Stem cell transplant candidate/ CML  He will be admitted for Bu/Cy allogeneic peripheral blood stem cell transplant for his CML. His donor is his 10/10 HLA-matched brother.  Admit to inpatient 10/1/18  Line placed today- slight oozing at line site noted.  Currently on Sprycel; tolerating  well  Continue acyclovir PPX  Consents signed with Dr. Pantoja   Brother G6PD deficient- will need pentamidine for PCP PPX  Day -8 on admission     Day -8: IVF and keppra  Day -7 through Day -0: antiemetics  Day -7 through Day -4: Busulfan  Day -3 & Day -2: Cyclophosphamide and Mesna  Day -1: REST and tacro begins  Day 0: transplant   D +1, +3, +6, +11: mini-MTX  Day +7: Growth factor begins     Anemia  - mild; transfuse for Hgb <7     CIBMTR Disease Classification  Chronic myeloid leukemia - initially in blast phase - treated with multiagent chemotherapy + TKI (dasatinib)  Disease status at time of transplant: Major molecular remission (MMR - MR 3.0)     ASBMT risk: Intermediate risk - hematologic CR deriving from BP     Planned Transplant: Matched sibling peripheral blood stem cell transplant with Bu/Cy conditioning (BuCy2 regimen)     Planned maintenance therapy: TKI maintenance with dasatinib for at least 2 years     Sickle cell trait  He has sickle cell trait. We are performing hemoglobin electrophoresis on his brother, who is known to have at least trait as well.          Follow-up: In clinic after discharge from hospital     Whitney Curiel NP  Hematology and Stem Cell Transplant  Distress Screening Results: Psychosocial Distress screening score of Distress Score: 0 noted and reviewed. No intervention indicated.

## 2018-10-01 NOTE — ASSESSMENT & PLAN NOTE
- Day -8 of Busulfan/Cyclophosphamide matched related donor stem cell transplant from 10/10 match brother:  - Donor B+ and CMV+ , recipient O+ and CMV+  - Pre-transplant work-up with no contraindications to proceeding to transplant, sickle cell trait noted. Brother has sickle cell trait as well.   - Kurtz placed 10/1  - Stem cell infusion scheduled for 10/9    Planned conditioning regimen:  Busulfan on Day -7, -6, -5, and -4  Cyclophosphamide on Day -3, and -2   Planned  GVHD Prophylaxis:  Methotrexate on Day +1, +3, +6, and +11  Tacrolimus starting on Day -1  Antimicrobial Prophylaxis:  Acyclovir starting on Day -7  Ciprofloxacin starting on Day -1  Fluconazole starting on Day -1  Bactrim starting on Day +30  Seizure Prophylaxis:  Levetiracetam on Day -7 through Day -3  SOS/VOD Prophylaxis:  Ursodiol on Day -7 through Day +30  Growth Factor Support:  Neupogen starting on Day +7

## 2018-10-01 NOTE — ASSESSMENT & PLAN NOTE
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 9% blasts.Started on MEC chemotherapy for refractory AML on 6/23/2018  - had BMBX on 7/13/18, without evidence of CML; BCR ABL p210 0.5% (c/w MRD)  - dasatinib 140 mg daily at home  -pre-transplant bone marrow biopsy from 9/11/2018 shows a hematologic and molecular remission  - admit for Busulfan/Cytarabine MRD allogeneic stem cell transplant from his brother

## 2018-10-01 NOTE — HOSPITAL COURSE
Patient was admitted 10/1/18 for By/Cy MRD allogenic stem cell transplant. Course complicated by c diff. On 10/22, counts stable, patient tolerating good oral intake, ambulating, mucositis improved. No diarrhea. Discharge planning done with follow-up in BMT clinic 10/24. Wife and mother to be primary caretakers. Kurtz left in place, to assess remove in 1-2 weeks. To complete PO vancomycin for c diff until 10/26/18. Detailed course of hospitalization as listed below.    10/1/2018: Admit from clinic for Bu/Cy MRD allogeneic stem cell transplant  10/2/2018: Day -7, started Busulfan this am. Dasatinib stopped. VSS, NEON and no complaints this am.  10/3/2018: Day -6. Continue Busulfan until Day -4. VSS. No complaints. NEON.  10/04/2018 : Day -5.  Continue busulfan.  No issues overnight.  10/5/2018: Day -4. Continue busulfan through Day -4. Cytoxan and Mesna to begin Day -3. NEON.   10/8/2108: Day -1. No c/o nausea on exam. Appetite still good. Completed Cytoxan and Mesna. Tacro started this am. Start tacro levels 10/10/2018 (Wednesday). 5 lb weight gain since admission. No sign of fluid overload noted. Patient voiding well.   10/9/2018: Day 0 allo SCT. Patient tolerated transplant well. C/o bad taste to mouth. Otherwise no complaints. 1 episode looses stools yesterday. None since per patient. C/o mild nausea prior to transplant. Pt speculated it may have been 2/2 poor intake and anxiety. May also be 2/2 chemo.  10/10/2018: Day +1. Day +1 MTX today. Reported loose stool x 1 yesterday. No other complaints. DBP continues to be intermittently in the 90s. tacro level 2. Additional 0.5 mg administered this am. Dose changed from 2 mg bid to 2.5 mg bid.   10/11/2018: Day +2 MRD allo. Vomited x 1 this am, no diarrhea. VSS. No complaints this am. Tacro level 5.1 today, continue tacro 2.5 mg bid.   10/12/2018: Day +3 MRD allo. No c/o n/v today. Has had 2 loose BMs so far this morning. Order placed for c-diff. Imodium ok if c-diff  neg. Pt to receive Day +3 MTX today. Amlodipine 5 mg daily for elevated BP. Tacro 5.8. No changes.   10/15/2018: Day +6 MRD allo. Stool sample from 10/12/2018 + for c-diff. Pt with history of c-diff. Started on oral vanc. Stools now more formed. Per patient. Feeling more fatigued, but still ambulating in hallway 3x/day. Still eating well. Denied n/v. No mucositis noted. BP improved with amlodipine. tacro level 8.5. No changes to tacro dose today.  10/16/2018: Day +7 MRD allo. Stool soft but not liquid per pt. Having 3-4 BMs/day. Continue oral vanc for total of 14 days. Stop date will be 10/26. Continues to eat and ambulate in hallway despite fatigue. Tacro level 6.7. No dose change today. No mucositis noted. Mag level 1.1. 3 gram IV mag dose given in addition to PRN mag.   10/17/2018: Day +8 MRD allo. Continues to have loose to soft stools. Frequency of BMs decreasing. Continue oral vanc until 10/26. Eating approximately 50% of meals. Continues to ambulate in hallway. Reports increased fatigue. Tacro level 8.6. No dose change. Received 1 unit platelets for platelet count of 7.   10/18: mucositis noted, duke's added  10/19: engraftment, mucositis stable,   10/20: Day+11 MRD.  Stools getting more formed. Low appetite.  10/21: Day +12 MRD allo.  Tacro level 10 with increased diarrhea. Decreased tacro to 2.5/2  10/22: Day +13 MRD allo. Tacro level 13, decreased tacro to 2/2. Patient discharged to [facility].

## 2018-10-01 NOTE — SUBJECTIVE & OBJECTIVE
Patient information was obtained from patient.     Oncology History:   1. Chronic myeloid leukemia, initially presenting in blast phase              A. 5/2018: Began developing left-sided abdominal pain - eventually requiring evaluation - WBC ~150 at  base; transferred to [facility] in [place], [place]              B. BMBx showed 20% myelomonoblasts concerning for CML in blast phase; bcr-abl positive (returned for Day 8 of induction therapy).               C. 6/1/2018: Began 7+3 induction chemotherapy. Began dasatinib (dose-adjusted) on day 8.              D. 6/16/2018: Transferred to [facility]              E. 6/18/2018: BMBx showed variably cellular marrow (10-40%) with 9% CD34+ blasts, concerning for residual disease.              F. 6/23/2018: Reinduction with MEC (mitoxantrone, etoposide, cytarabine) chemotherapy.               G. 7/13/2018: BMBx shows hypocellular bone marrow with no definite morphologic or immunophenotypic evidence of residual leukemia; bcr-abl p210 transcript was detected at 0.5%              H. Delay in clinic follow-up due to challenges with [employer] insurance              I. 8/14/2018: Follow-up in clinic; WBC 6.77 (normal diff); Hgb 11.2 g/dl; Plt 298; BMBx shows 30% cellular marrow with no morphologic evidence of residual leukemia; bcr-abl p210 detected at 0.05% on international scale (MR 3.3).     Medications Prior to Admission   Medication Sig Dispense Refill Last Dose    acyclovir (ZOVIRAX) 200 MG capsule Take 2 capsules (400 mg total) by mouth 2 (two) times daily. 120 capsule 11 Taking    SPRYCEL 140 mg Tab TAKE 1 TABLET ONCE DAILY 30 tablet 11 Taking       Patient has no known allergies.     Past Medical History:   Diagnosis Date    Chronic myelogenous leukemia (CML), BCR-ABL1-positive     Depression     2010     No past surgical history on file.  Family History     None        Tobacco Use    Smoking status: Never Smoker   Substance and Sexual  Activity    Alcohol use: No    Drug use: No    Sexual activity: Yes     Partners: Female       Review of Systems   Constitutional: Negative for activity change, appetite change, chills, diaphoresis, fatigue, fever and unexpected weight change.   HENT: Negative for congestion, dental problem, mouth sores, nosebleeds, postnasal drip, rhinorrhea, sinus pressure, sore throat and trouble swallowing.    Eyes: Negative for photophobia and visual disturbance.   Respiratory: Negative for cough and shortness of breath.    Cardiovascular: Negative for chest pain, palpitations and leg swelling.   Gastrointestinal: Negative for abdominal distention, abdominal pain, blood in stool, constipation, diarrhea, nausea and vomiting.   Genitourinary: Negative for dysuria, frequency, hematuria and urgency.   Musculoskeletal: Negative for arthralgias, back pain and myalgias.   Skin: Negative for pallor and rash.   Allergic/Immunologic: Negative for immunocompromised state.   Neurological: Negative for dizziness, syncope, weakness, numbness and headaches.   Hematological: Negative for adenopathy. Does not bruise/bleed easily.   Psychiatric/Behavioral: Negative for confusion. The patient is not nervous/anxious.      Objective:     Vital Signs (Most Recent):    Vital Signs (24h Range):  Temp:  [98 °F (36.7 °C)-98.8 °F (37.1 °C)] 98 °F (36.7 °C)  Pulse:  [64-97] 64  Resp:  [12-22] 18  SpO2:  [99 %-100 %] 99 %  BP: (111-135)/(65-81) 135/81        There is no height or weight on file to calculate BMI.  There is no height or weight on file to calculate BSA.    ECOG SCORE         KPS-100%    Lines/Drains/Airways     Central Venous Catheter Line                 Tunneled Central Line Insertion/Assessment - Triple Lumen  10/01/18 1056 right internal jugular less than 1 day          Peripheral Intravenous Line                 Peripheral IV - Single Lumen 10/01/18 0909 Left Hand less than 1 day                Physical Exam   Constitutional: He is  oriented to person, place, and time. He appears well-developed and well-nourished. No distress.   HENT:   Head: Normocephalic.   Mouth/Throat: Oropharynx is clear and moist. No oropharyngeal exudate.   Eyes: Conjunctivae are normal. Pupils are equal, round, and reactive to light. No scleral icterus.   Neck: Normal range of motion. Neck supple. Normal range of motion present. No thyromegaly present.   Cardiovascular: Normal rate, regular rhythm, normal heart sounds and intact distal pulses.   Pulmonary/Chest: Effort normal and breath sounds normal. No respiratory distress.   Abdominal: Soft. Bowel sounds are normal. He exhibits no distension. There is no tenderness.   Musculoskeletal: Normal range of motion. He exhibits no edema or tenderness.   Lymphadenopathy:        Head (right side): No submandibular, no preauricular, no posterior auricular and no occipital adenopathy present.        Head (left side): No submandibular, no preauricular, no posterior auricular and no occipital adenopathy present.     He has no cervical adenopathy.     He has no axillary adenopathy.   Neurological: He is alert and oriented to person, place, and time. No cranial nerve deficit. Coordination normal.   Skin: Skin is warm and dry. No petechiae and no rash noted. No pallor.   Kurtz intact RCW, no redness or drainage   Psychiatric: He has a normal mood and affect. Thought content normal.   Vitals reviewed.      Significant Labs:   CBC:   Recent Labs   Lab  10/01/18   0912   WBC  5.67   HGB  12.1*   HCT  37.6*   PLT  219    and CMP:   Recent Labs   Lab  10/01/18   0912   NA  143   K  3.8   CL  107   CO2  26   GLU  98   BUN  13   CREATININE  0.8   CALCIUM  9.2   PROT  6.8   ALBUMIN  3.6   BILITOT  0.3   ALKPHOS  84   AST  35   ALT  54*   ANIONGAP  10   EGFRNONAA  >60.0       Diagnostic Results:  None

## 2018-10-01 NOTE — HPI
Mr. 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 He has been on dasatinb since receiving induction with 7+3 and re-induction with MEC in 6/2018. He was noted be in hematologic and molecular remission per last bone marrow biopsy from 9/11/2018. Transplant work-up with no contraindications to proceeding with transplant.

## 2018-10-02 PROBLEM — E83.39 HYPOPHOSPHATEMIA: Status: ACTIVE | Noted: 2018-10-02

## 2018-10-02 LAB
ABO + RH BLD: NORMAL
ALBUMIN SERPL BCP-MCNC: 3 G/DL
ALBUMIN SERPL BCP-MCNC: 3 G/DL
ALP SERPL-CCNC: 77 U/L
ALP SERPL-CCNC: 77 U/L
ALT SERPL W/O P-5'-P-CCNC: 46 U/L
ALT SERPL W/O P-5'-P-CCNC: 46 U/L
ANION GAP SERPL CALC-SCNC: 8 MMOL/L
ANION GAP SERPL CALC-SCNC: 8 MMOL/L
AST SERPL-CCNC: 27 U/L
AST SERPL-CCNC: 27 U/L
BASOPHILS # BLD AUTO: 0.01 K/UL
BASOPHILS # BLD AUTO: 0.01 K/UL
BASOPHILS NFR BLD: 0.3 %
BASOPHILS NFR BLD: 0.3 %
BILIRUB SERPL-MCNC: 0.2 MG/DL
BILIRUB SERPL-MCNC: 0.2 MG/DL
BLD GP AB SCN CELLS X3 SERPL QL: NORMAL
BUN SERPL-MCNC: 8 MG/DL
BUN SERPL-MCNC: 8 MG/DL
CALCIUM SERPL-MCNC: 8.6 MG/DL
CALCIUM SERPL-MCNC: 8.6 MG/DL
CHLORIDE SERPL-SCNC: 109 MMOL/L
CHLORIDE SERPL-SCNC: 109 MMOL/L
CO2 SERPL-SCNC: 23 MMOL/L
CO2 SERPL-SCNC: 23 MMOL/L
CREAT SERPL-MCNC: 0.7 MG/DL
CREAT SERPL-MCNC: 0.7 MG/DL
DIFFERENTIAL METHOD: ABNORMAL
DIFFERENTIAL METHOD: ABNORMAL
EOSINOPHIL # BLD AUTO: 0.1 K/UL
EOSINOPHIL # BLD AUTO: 0.1 K/UL
EOSINOPHIL NFR BLD: 3.9 %
EOSINOPHIL NFR BLD: 3.9 %
ERYTHROCYTE [DISTWIDTH] IN BLOOD BY AUTOMATED COUNT: 14.2 %
ERYTHROCYTE [DISTWIDTH] IN BLOOD BY AUTOMATED COUNT: 14.2 %
EST. GFR  (AFRICAN AMERICAN): >60 ML/MIN/1.73 M^2
EST. GFR  (AFRICAN AMERICAN): >60 ML/MIN/1.73 M^2
EST. GFR  (NON AFRICAN AMERICAN): >60 ML/MIN/1.73 M^2
EST. GFR  (NON AFRICAN AMERICAN): >60 ML/MIN/1.73 M^2
GLUCOSE SERPL-MCNC: 96 MG/DL
GLUCOSE SERPL-MCNC: 96 MG/DL
HCT VFR BLD AUTO: 34.2 %
HCT VFR BLD AUTO: 34.2 %
HGB BLD-MCNC: 11.3 G/DL
HGB BLD-MCNC: 11.3 G/DL
IMM GRANULOCYTES # BLD AUTO: 0 K/UL
IMM GRANULOCYTES # BLD AUTO: 0 K/UL
IMM GRANULOCYTES NFR BLD AUTO: 0 %
IMM GRANULOCYTES NFR BLD AUTO: 0 %
LYMPHOCYTES # BLD AUTO: 1.3 K/UL
LYMPHOCYTES # BLD AUTO: 1.3 K/UL
LYMPHOCYTES NFR BLD: 38.8 %
LYMPHOCYTES NFR BLD: 38.8 %
MAGNESIUM SERPL-MCNC: 2 MG/DL
MAGNESIUM SERPL-MCNC: 2 MG/DL
MCH RBC QN AUTO: 30.3 PG
MCH RBC QN AUTO: 30.3 PG
MCHC RBC AUTO-ENTMCNC: 33 G/DL
MCHC RBC AUTO-ENTMCNC: 33 G/DL
MCV RBC AUTO: 92 FL
MCV RBC AUTO: 92 FL
MONOCYTES # BLD AUTO: 0.4 K/UL
MONOCYTES # BLD AUTO: 0.4 K/UL
MONOCYTES NFR BLD: 12.2 %
MONOCYTES NFR BLD: 12.2 %
NEUTROPHILS # BLD AUTO: 1.5 K/UL
NEUTROPHILS # BLD AUTO: 1.5 K/UL
NEUTROPHILS NFR BLD: 44.8 %
NEUTROPHILS NFR BLD: 44.8 %
NRBC BLD-RTO: 0 /100 WBC
NRBC BLD-RTO: 0 /100 WBC
PHOSPHATE SERPL-MCNC: 2.3 MG/DL
PHOSPHATE SERPL-MCNC: 2.3 MG/DL
PLATELET # BLD AUTO: 186 K/UL
PLATELET # BLD AUTO: 186 K/UL
PMV BLD AUTO: 11 FL
PMV BLD AUTO: 11 FL
POTASSIUM SERPL-SCNC: 3.9 MMOL/L
POTASSIUM SERPL-SCNC: 3.9 MMOL/L
PROT SERPL-MCNC: 5.8 G/DL
PROT SERPL-MCNC: 5.8 G/DL
RBC # BLD AUTO: 3.73 M/UL
RBC # BLD AUTO: 3.73 M/UL
SODIUM SERPL-SCNC: 140 MMOL/L
SODIUM SERPL-SCNC: 140 MMOL/L
WBC # BLD AUTO: 3.35 K/UL
WBC # BLD AUTO: 3.35 K/UL

## 2018-10-02 PROCEDURE — 20600001 HC STEP DOWN PRIVATE ROOM

## 2018-10-02 PROCEDURE — 86901 BLOOD TYPING SEROLOGIC RH(D): CPT

## 2018-10-02 PROCEDURE — 84100 ASSAY OF PHOSPHORUS: CPT

## 2018-10-02 PROCEDURE — 85025 COMPLETE CBC W/AUTO DIFF WBC: CPT

## 2018-10-02 PROCEDURE — 94761 N-INVAS EAR/PLS OXIMETRY MLT: CPT

## 2018-10-02 PROCEDURE — 80299 QUANTITATIVE ASSAY DRUG: CPT

## 2018-10-02 PROCEDURE — 83735 ASSAY OF MAGNESIUM: CPT

## 2018-10-02 PROCEDURE — 94799 UNLISTED PULMONARY SVC/PX: CPT

## 2018-10-02 PROCEDURE — 25000003 PHARM REV CODE 250: Performed by: INTERNAL MEDICINE

## 2018-10-02 PROCEDURE — 99233 SBSQ HOSP IP/OBS HIGH 50: CPT | Mod: ,,, | Performed by: INTERNAL MEDICINE

## 2018-10-02 PROCEDURE — 80053 COMPREHEN METABOLIC PANEL: CPT

## 2018-10-02 PROCEDURE — 63600175 PHARM REV CODE 636 W HCPCS: Performed by: NURSE PRACTITIONER

## 2018-10-02 PROCEDURE — 63600175 PHARM REV CODE 636 W HCPCS: Performed by: INTERNAL MEDICINE

## 2018-10-02 PROCEDURE — 3E04305 INTRODUCTION OF OTHER ANTINEOPLASTIC INTO CENTRAL VEIN, PERCUTANEOUS APPROACH: ICD-10-PCS | Performed by: INTERNAL MEDICINE

## 2018-10-02 PROCEDURE — A9155 ARTIFICIAL SALIVA: HCPCS | Performed by: INTERNAL MEDICINE

## 2018-10-02 PROCEDURE — 25000003 PHARM REV CODE 250: Performed by: NURSE PRACTITIONER

## 2018-10-02 RX ADMIN — ENOXAPARIN SODIUM 40 MG: 100 INJECTION SUBCUTANEOUS at 05:10

## 2018-10-02 RX ADMIN — PANTOPRAZOLE SODIUM 40 MG: 40 TABLET, DELAYED RELEASE ORAL at 08:10

## 2018-10-02 RX ADMIN — POTASSIUM & SODIUM PHOSPHATES POWDER PACK 280-160-250 MG 1 PACKET: 280-160-250 PACK at 09:10

## 2018-10-02 RX ADMIN — SODIUM CHLORIDE AND POTASSIUM CHLORIDE: 9; 1.49 INJECTION, SOLUTION INTRAVENOUS at 05:10

## 2018-10-02 RX ADMIN — BUSULFAN 60 MG: 6 INJECTION INTRAVENOUS at 06:10

## 2018-10-02 RX ADMIN — POTASSIUM & SODIUM PHOSPHATES POWDER PACK 280-160-250 MG 1 PACKET: 280-160-250 PACK at 02:10

## 2018-10-02 RX ADMIN — ONDANSETRON 16 MG: 8 TABLET, ORALLY DISINTEGRATING ORAL at 05:10

## 2018-10-02 RX ADMIN — ACYCLOVIR 800 MG: 200 CAPSULE ORAL at 08:10

## 2018-10-02 RX ADMIN — BUSULFAN 60 MG: 6 INJECTION INTRAVENOUS at 11:10

## 2018-10-02 RX ADMIN — DEXAMETHASONE 12 MG: 4 TABLET ORAL at 05:10

## 2018-10-02 RX ADMIN — LEVETIRACETAM 500 MG: 500 TABLET ORAL at 09:10

## 2018-10-02 RX ADMIN — POTASSIUM & SODIUM PHOSPHATES POWDER PACK 280-160-250 MG 1 PACKET: 280-160-250 PACK at 05:10

## 2018-10-02 RX ADMIN — BUSULFAN 60 MG: 6 INJECTION INTRAVENOUS at 12:10

## 2018-10-02 RX ADMIN — Medication 30 ML: at 12:10

## 2018-10-02 RX ADMIN — BUSULFAN 60 MG: 6 INJECTION INTRAVENOUS at 05:10

## 2018-10-02 RX ADMIN — LEVETIRACETAM 500 MG: 500 TABLET ORAL at 08:10

## 2018-10-02 RX ADMIN — SODIUM CHLORIDE AND POTASSIUM CHLORIDE: 9; 1.49 INJECTION, SOLUTION INTRAVENOUS at 10:10

## 2018-10-02 RX ADMIN — URSODIOL 300 MG: 300 CAPSULE ORAL at 08:10

## 2018-10-02 RX ADMIN — Medication 30 ML: at 06:10

## 2018-10-02 RX ADMIN — Medication 30 ML: at 11:10

## 2018-10-02 RX ADMIN — URSODIOL 300 MG: 300 CAPSULE ORAL at 09:10

## 2018-10-02 RX ADMIN — Medication 30 ML: at 05:10

## 2018-10-02 RX ADMIN — SODIUM CHLORIDE AND POTASSIUM CHLORIDE: 9; 1.49 INJECTION, SOLUTION INTRAVENOUS at 11:10

## 2018-10-02 NOTE — PROGRESS NOTES
Pt. admitted to room 850 from admit office.  Pt. Ambulated independently to the room and sitting on sofa AAOx4 with no distress observed.  Pt. with wife at bedside.  Pt. oriented to room.  Pt. with nonskid footwear on with bed in lowest position and locked.  Pt. instructed to call if assistance is needed.  Call light within reach.  Pt. verbalized understanding.  Will continue to monitor pt.

## 2018-10-02 NOTE — PLAN OF CARE
Problem: Patient Care Overview  Goal: Plan of Care Review  Outcome: Ongoing (interventions implemented as appropriate)  Pt. Day -7 for AlloSCT.  Pt. Received busulfan with lab draws today.  Pt. with nonskid footwear on with bed in lowest position and locked with bed rails up x2.  Pt. ambulates independently and instructed to call if assistance is needed.  Pt. with call light within reach.  Pt. received electrolyte replacements.  Pt. with no complaints today.  Will continue to monitor pt.

## 2018-10-02 NOTE — SUBJECTIVE & OBJECTIVE
Subjective:     Interval History: Day -7, started Busulfan this am. Dasatinib stopped. VSS, NEON and no complaints this am.    Objective:     Vital Signs (Most Recent):  Temp: 98.7 °F (37.1 °C) (10/02/18 0727)  Pulse: 82 (10/02/18 0727)  Resp: 16 (10/02/18 0727)  BP: 118/70 (10/02/18 0727)  SpO2: 100 % (10/02/18 0727) Vital Signs (24h Range):  Temp:  [98 °F (36.7 °C)-98.9 °F (37.2 °C)] 98.7 °F (37.1 °C)  Pulse:  [64-88] 82  Resp:  [16-20] 16  SpO2:  [98 %-100 %] 100 %  BP: (111-142)/(65-82) 118/70     Weight: 69.5 kg (153 lb 3.5 oz)  Body mass index is 21.37 kg/m².  Body surface area is 1.87 meters squared.      Intake/Output - Last 3 Shifts       09/30 0700 - 10/01 0659 10/01 0700 - 10/02 0659 10/02 0700 - 10/03 0659    P.O.  840 480    I.V. (mL/kg)  1297.5 (18.7) 495 (7.1)    IV Piggyback  28.7     Total Intake(mL/kg)  2166.2 (31.2) 975 (14)    Urine (mL/kg/hr)  1000 450 (1.5)    Total Output  1000 450    Net  +1166.2 +525                 Physical Exam   Constitutional: He is oriented to person, place, and time. He appears well-developed and well-nourished. No distress.   HENT:   Head: Normocephalic.   Mouth/Throat: Oropharynx is clear and moist. No oropharyngeal exudate.   Eyes: Conjunctivae are normal. Pupils are equal, round, and reactive to light. No scleral icterus.   Neck: Normal range of motion. Neck supple. Normal range of motion present. No thyromegaly present.   Cardiovascular: Normal rate, regular rhythm, normal heart sounds and intact distal pulses.   Pulmonary/Chest: Effort normal and breath sounds normal. No respiratory distress.   Abdominal: Soft. Bowel sounds are normal. He exhibits no distension. There is no tenderness.   Musculoskeletal: Normal range of motion. He exhibits no edema or tenderness.   Neurological: He is alert and oriented to person, place, and time. No cranial nerve deficit. Coordination normal.   Skin: Skin is warm and dry. No petechiae and no rash noted. No pallor.   Kurtz  intact RCW, no redness or drainage   Psychiatric: He has a normal mood and affect. Thought content normal.   Vitals reviewed.      Significant Labs:   CBC:   Recent Labs   Lab  10/01/18   0912  10/02/18   0359   WBC  5.67  3.35*  3.35*   HGB  12.1*  11.3*  11.3*   HCT  37.6*  34.2*  34.2*   PLT  219  186  186    and CMP:   Recent Labs   Lab  10/01/18   0912  10/02/18   0359   NA  143  140  140   K  3.8  3.9  3.9   CL  107  109  109   CO2  26  23  23   GLU  98  96  96   BUN  13  8  8   CREATININE  0.8  0.7  0.7   CALCIUM  9.2  8.6*  8.6*   PROT  6.8  5.8*  5.8*   ALBUMIN  3.6  3.0*  3.0*   BILITOT  0.3  0.2  0.2   ALKPHOS  84  77  77   AST  35  27  27   ALT  54*  46*  46*   ANIONGAP  10  8  8   EGFRNONAA  >60.0  >60.0  >60.0       Diagnostic Results:  None

## 2018-10-02 NOTE — CONSULTS
Education    Diet Education: Nutritional Therapy for BMT/SCT Pt  Time Spent: 15min  Learners: pt      Nutrition Education provided with handouts: Nutritional Therapy for BMT Pt. High-Kcal High Pro Foods,  Adjusting for Altered Taste.        Comments:pt states he was already given edu during prior admission for 7+3. Pt was able to teach back information from handout. states he is doing well now and trying to stay on top of his food intake, with the anticipation of po intake declining s/p SCT. All questions and concerns answered. Dietitian's contact information provided.       Follow-Up: Day+ 2 s/p tx    Please Re-consult as needed      Thanks!

## 2018-10-02 NOTE — PROGRESS NOTES
Admit Assessment    Patient Identification  Wilton Guzmán   :  1985  Admit Date:  10/1/2018  Attending Provider:  Kishor Pantoja MD              Referral:   Pt was admitted to  with a diagnosis of Stem cell transplant candidate, and was admitted this hospital stay due to Stem cell transplant candidate [Z76.82].   is involved was referred to the Social Work Department via (Referal).  Patient presents as a 33 y.o. year old  male.    Persons interviewed: pt    Living Situation:  Prior to admission pt was living independently with spouse Joellen and 14 month old dtr Sara.  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 Pt is anticipated to DC to the  post-transplant. Pt's spouse and mother Sherry will be providing  support post-transplant.    Resides at [address] [place] [address] [place] [address] [place] [address], phone: [phone] (home).      Functional Status Prior  Ambulation: 0-->independent  Transferrin-->independent  Toiletin-->independent    Current or Past Agencies and Description of Services/Supplies    DME  None    Home Health  None    IV Infusion  None    Nutrition: oral    Outpatient Pharmacy:     [facility] [address] [address]  Phone: [phone] Fax: [phone]    [facility] - [address] [address]  Phone: [phone] Fax: [phone]    [facility] - [place], [place] - [address] [place] [address]  Phone: [phone] Fax: [phone]      Patient Preference of agencies include none    Patient/Caregiver informed of right to choose providers or agencies.  Patient provides permission to release any necessary information to [facility] and to Non[facility] agencies as needed to facilitate patient care, treatment planning, and patient discharge planning.  Written and verbal resources  provided.      Coping  Pt is in good spirits    Adjustment to Diagnosis and Treatment  appropriate    Emotional/Behavioral/Cognitive Issues  None noted    History/Current Symptoms of Anxiety/Depression: No  History/Current Substance Use:   Social History     Tobacco Use    Smoking status: Never Smoker   Substance and Sexual Activity    Alcohol use: No    Drug use: No    Sexual activity: Yes     Partners: Female       Indications of Abuse/Neglect: No  Abuse Screen  Do You Feel Unsafe at Home, Work or School?: no    Financial:  Payor/Plan Subscr  Sex Relation Sub. Ins. ID Effective Group Num   1. EMANI RINCON 1985 Male Self [id number]                                     [id number], [facility] [id number]      Other identified concerns/needs: none at this time    Plan: Pt will DC to the  post-transplant with  support provided by spouse and mother.    Interventions/Referrals: TBD    Patient/caregiver engaged in treatment planning process.     providing psychosocial and supportive counseling, resources, education, assistance and discharge planning as appropriate.  Patient/caregiver state understanding of  available resources,  following, remains available.

## 2018-10-02 NOTE — ASSESSMENT & PLAN NOTE
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 9% blasts.Started on MEC chemotherapy for refractory AML on 6/23/2018  - had BMBX on 7/13/18, without evidence of CML; BCR ABL p210 0.5% (c/w MRD)  - dasatinib 140 mg daily at home, will stop today when chemo starts  -pre-transplant bone marrow biopsy from 9/11/2018 shows a hematologic and molecular remission  - admitted for Busulfan/Cytarabine MRD allogeneic stem cell transplant from his brother

## 2018-10-02 NOTE — ASSESSMENT & PLAN NOTE
- Day -7 of Busulfan/Cyclophosphamide matched related donor stem cell transplant from 10/10 match brother: Busulfan started this am  - Donor B+ and CMV+ , recipient O+ and CMV+  - Pre-transplant work-up with no contraindications to proceeding to transplant, sickle cell trait noted. Brother has sickle cell trait as well.   - Kurtz placed 10/1  - Stem cell infusion scheduled for 10/9    Planned conditioning regimen:  Busulfan on Day -7, -6, -5, and -4  Cyclophosphamide on Day -3, and -2   Planned  GVHD Prophylaxis:  Methotrexate on Day +1, +3, +6, and +11  Tacrolimus starting on Day -1  Antimicrobial Prophylaxis:  Acyclovir starting on Day -7  Levaquin starting on Day -1  Fluconazole starting on Day -1  Mepron starting on Day +30  Seizure Prophylaxis:  Levetiracetam on Day -7 through Day -3  SOS/VOD Prophylaxis:  Ursodiol on Day -7 through Day +30  Growth Factor Support:  Neupogen starting on Day +7

## 2018-10-02 NOTE — PROGRESS NOTES
Chemo Note: Chemotherapy consent in chart.  Chemo verified with Kelly VALENTINO RN.  Right TLC patent and positive for blood return.  Busulfan (BUSULFEX) 60 mg in sodium chloride 0.9% 111.1 mL started @ 55.6 mL/h to infuse over 2 hours.  Chemo precautions in place.  Will continue to monitor pt.

## 2018-10-02 NOTE — PLAN OF CARE
MDR's with Dr Levy.  Patient is admitted for his Bu/CY MRD alloSCT.  Transplant is planned for 10/9.  Patient has been updated on the plan of care and aware of the ELOS.  D/c pending transplant and count recovery.  CM will continue to follow.    D/c Plan: Charisse Pino

## 2018-10-02 NOTE — PROGRESS NOTES
[facility]  Hematology  Bone Marrow Transplant  Progress Note    Patient Name: Wilton Guzmán  Admission Date: 10/1/2018  Hospital Length of Stay: 1 days  Code Status: Full Code    Subjective:     Interval History: Day -7, started Busulfan this am. Dasatinib stopped. VSS, NEON and no complaints this am.    Objective:     Vital Signs (Most Recent):  Temp: 98.7 °F (37.1 °C) (10/02/18 0727)  Pulse: 82 (10/02/18 0727)  Resp: 16 (10/02/18 0727)  BP: 118/70 (10/02/18 0727)  SpO2: 100 % (10/02/18 0727) Vital Signs (24h Range):  Temp:  [98 °F (36.7 °C)-98.9 °F (37.2 °C)] 98.7 °F (37.1 °C)  Pulse:  [64-88] 82  Resp:  [16-20] 16  SpO2:  [98 %-100 %] 100 %  BP: (111-142)/(65-82) 118/70     Weight: 69.5 kg (153 lb 3.5 oz)  Body mass index is 21.37 kg/m².  Body surface area is 1.87 meters squared.      Intake/Output - Last 3 Shifts       09/30 0700 - 10/01 0659 10/01 0700 - 10/02 0659 10/02 0700 - 10/03 0659    P.O.  840 480    I.V. (mL/kg)  1297.5 (18.7) 495 (7.1)    IV Piggyback  28.7     Total Intake(mL/kg)  2166.2 (31.2) 975 (14)    Urine (mL/kg/hr)  1000 450 (1.5)    Total Output  1000 450    Net  +1166.2 +525                 Physical Exam   Constitutional: He is oriented to person, place, and time. He appears well-developed and well-nourished. No distress.   HENT:   Head: Normocephalic.   Mouth/Throat: Oropharynx is clear and moist. No oropharyngeal exudate.   Eyes: Conjunctivae are normal. Pupils are equal, round, and reactive to light. No scleral icterus.   Neck: Normal range of motion. Neck supple. Normal range of motion present. No thyromegaly present.   Cardiovascular: Normal rate, regular rhythm, normal heart sounds and intact distal pulses.   Pulmonary/Chest: Effort normal and breath sounds normal. No respiratory distress.   Abdominal: Soft. Bowel sounds are normal. He exhibits no distension. There is no tenderness.   Musculoskeletal: Normal range of motion. He exhibits no edema or tenderness.    Neurological: He is alert and oriented to person, place, and time. No cranial nerve deficit. Coordination normal.   Skin: Skin is warm and dry. No petechiae and no rash noted. No pallor.   Kurtz intact RCW, no redness or drainage   Psychiatric: He has a normal mood and affect. Thought content normal.   Vitals reviewed.      Significant Labs:   CBC:   Recent Labs   Lab  10/01/18   0912  10/02/18   0359   WBC  5.67  3.35*  3.35*   HGB  12.1*  11.3*  11.3*   HCT  37.6*  34.2*  34.2*   PLT  219  186  186    and CMP:   Recent Labs   Lab  10/01/18   0912  10/02/18   0359   NA  143  140  140   K  3.8  3.9  3.9   CL  107  109  109   CO2  26  23  23   GLU  98  96  96   BUN  13  8  8   CREATININE  0.8  0.7  0.7   CALCIUM  9.2  8.6*  8.6*   PROT  6.8  5.8*  5.8*   ALBUMIN  3.6  3.0*  3.0*   BILITOT  0.3  0.2  0.2   ALKPHOS  84  77  77   AST  35  27  27   ALT  54*  46*  46*   ANIONGAP  10  8  8   EGFRNONAA  >60.0  >60.0  >60.0       Diagnostic Results:  None    Assessment/Plan:     * Stem cell transplant candidate    - Day -7 of Busulfan/Cyclophosphamide matched related donor stem cell transplant from 10/10 match brother: Busulfan started this am  - Donor B+ and CMV+ , recipient O+ and CMV+  - Pre-transplant work-up with no contraindications to proceeding to transplant, sickle cell trait noted. Brother has sickle cell trait as well.   - Kurtz placed 10/1  - Stem cell infusion scheduled for 10/9    Planned conditioning regimen:  Busulfan on Day -7, -6, -5, and -4  Cyclophosphamide on Day -3, and -2   Planned  GVHD Prophylaxis:  Methotrexate on Day +1, +3, +6, and +11  Tacrolimus starting on Day -1  Antimicrobial Prophylaxis:  Acyclovir starting on Day -7  Levaquin starting on Day -1  Fluconazole starting on Day -1  Mepron starting on Day +30  Seizure Prophylaxis:  Levetiracetam on Day -7 through Day -3  SOS/VOD Prophylaxis:  Ursodiol on Day -7 through Day +30  Growth Factor Support:  Neupogen starting  on Day +7         Chronic myelogenous leukemia (CML), BCR-ABL1-positive    - original BMBX 5/26/18 with 20% blasts; CML transformed to AML with blast crisis;   karyotype: 46,XY,t(9;22;22;14)q34;q11.2;q13;q23); BCR/ABL gene fusion 62%  - received induction with 7+3 on 6/1-6/8/2018  - persistent disease post 7+3 induction, repeat BM biopsy done 6/18 here at [facility] after 7+3. 9% blasts.Started on MEC chemotherapy for refractory AML on 6/23/2018  - had BMBX on 7/13/18, without evidence of CML; BCR ABL p210 0.5% (c/w MRD)  - dasatinib 140 mg daily at home, will stop today when chemo starts  -pre-transplant bone marrow biopsy from 9/11/2018 shows a hematologic and molecular remission  - admitted for Busulfan/Cytarabine MRD allogeneic stem cell transplant from his brother           Anemia associated with chemotherapy    - due to dasatinib  - hgb stable at 11.3 gm/dl  - transfuse PRBC for hgb <7        Hypophosphatemia    - phos 2.3 today, replace per protocol            VTE Risk Mitigation (From admission, onward)        Ordered     enoxaparin injection 40 mg  Daily      10/01/18 1729     heparin, porcine (PF) 100 unit/mL injection flush 300 Units  As needed (PRN)      10/01/18 1657          Disposition: pending recovery from transplant    Haily Serna NP  Bone Marrow Transplant  [facility]

## 2018-10-02 NOTE — PROGRESS NOTES
Chemo Note: Chemotherapy consent in chart.  Chemo verified with Inna JI RN.  Right TLC patent and positive for blood return.  Busulfan (BUSULFEX) 60 mg in sodium chloride 0.9% 111.1 mL started @ 55.6 mL/h to infuse over 2 hours.  Chemo precautions in place.  Will continue to monitor pt.

## 2018-10-02 NOTE — PLAN OF CARE
Problem: Patient Care Overview  Goal: Plan of Care Review  Outcome: Ongoing (interventions implemented as appropriate)  Patient remained free from falls throughout shift, call bell within reach. Day -7 for Allo BMT. IVF initiated last night. Busulfan to start this AM. No complaints of pain or discomfort overnight. Vitals stable, will continue to monitor.

## 2018-10-02 NOTE — PROGRESS NOTES
busulfan (BUSULFEX) 60 mg in sodium chloride 0.9% 111.1 mL chemo infusing to RC saldivar with excellent blood return. CAR and consent in chart. Pt dose and BSA checked off by 2 chemo certified nurses. Pre meds given. Education provided. Chemo precautions initiated. Will continue to monitor.

## 2018-10-03 LAB
ALBUMIN SERPL BCP-MCNC: 2.8 G/DL
ALP SERPL-CCNC: 73 U/L
ALT SERPL W/O P-5'-P-CCNC: 35 U/L
ANION GAP SERPL CALC-SCNC: 5 MMOL/L
AST SERPL-CCNC: 18 U/L
BASOPHILS # BLD AUTO: 0.01 K/UL
BASOPHILS NFR BLD: 0.2 %
BILIRUB SERPL-MCNC: 0.1 MG/DL
BUN SERPL-MCNC: 6 MG/DL
CALCIUM SERPL-MCNC: 8.5 MG/DL
CHLORIDE SERPL-SCNC: 114 MMOL/L
CO2 SERPL-SCNC: 24 MMOL/L
CREAT SERPL-MCNC: 0.7 MG/DL
DIFFERENTIAL METHOD: ABNORMAL
EOSINOPHIL # BLD AUTO: 0 K/UL
EOSINOPHIL NFR BLD: 0 %
ERYTHROCYTE [DISTWIDTH] IN BLOOD BY AUTOMATED COUNT: 14.3 %
EST. GFR  (AFRICAN AMERICAN): >60 ML/MIN/1.73 M^2
EST. GFR  (NON AFRICAN AMERICAN): >60 ML/MIN/1.73 M^2
GLUCOSE SERPL-MCNC: 126 MG/DL
HCT VFR BLD AUTO: 32.1 %
HGB BLD-MCNC: 10.6 G/DL
IMM GRANULOCYTES # BLD AUTO: 0.02 K/UL
IMM GRANULOCYTES NFR BLD AUTO: 0.4 %
LYMPHOCYTES # BLD AUTO: 1.8 K/UL
LYMPHOCYTES NFR BLD: 33 %
MAGNESIUM SERPL-MCNC: 1.9 MG/DL
MCH RBC QN AUTO: 30.5 PG
MCHC RBC AUTO-ENTMCNC: 33 G/DL
MCV RBC AUTO: 92 FL
MONOCYTES # BLD AUTO: 0.7 K/UL
MONOCYTES NFR BLD: 11.9 %
NEUTROPHILS # BLD AUTO: 3 K/UL
NEUTROPHILS NFR BLD: 54.5 %
NRBC BLD-RTO: 0 /100 WBC
PHOSPHATE SERPL-MCNC: 2.7 MG/DL
PLATELET # BLD AUTO: 185 K/UL
PMV BLD AUTO: 11.1 FL
POTASSIUM SERPL-SCNC: 3.9 MMOL/L
PROT SERPL-MCNC: 5.5 G/DL
RBC # BLD AUTO: 3.48 M/UL
SODIUM SERPL-SCNC: 143 MMOL/L
WBC # BLD AUTO: 5.46 K/UL

## 2018-10-03 PROCEDURE — 63600175 PHARM REV CODE 636 W HCPCS: Mod: JG | Performed by: INTERNAL MEDICINE

## 2018-10-03 PROCEDURE — 25000003 PHARM REV CODE 250: Performed by: NURSE PRACTITIONER

## 2018-10-03 PROCEDURE — A9155 ARTIFICIAL SALIVA: HCPCS | Performed by: INTERNAL MEDICINE

## 2018-10-03 PROCEDURE — 63600175 PHARM REV CODE 636 W HCPCS: Performed by: NURSE PRACTITIONER

## 2018-10-03 PROCEDURE — 83735 ASSAY OF MAGNESIUM: CPT

## 2018-10-03 PROCEDURE — 84100 ASSAY OF PHOSPHORUS: CPT

## 2018-10-03 PROCEDURE — 85025 COMPLETE CBC W/AUTO DIFF WBC: CPT

## 2018-10-03 PROCEDURE — 99233 SBSQ HOSP IP/OBS HIGH 50: CPT | Mod: ,,, | Performed by: INTERNAL MEDICINE

## 2018-10-03 PROCEDURE — 80053 COMPREHEN METABOLIC PANEL: CPT

## 2018-10-03 PROCEDURE — 25000003 PHARM REV CODE 250: Performed by: INTERNAL MEDICINE

## 2018-10-03 PROCEDURE — 20600001 HC STEP DOWN PRIVATE ROOM

## 2018-10-03 RX ORDER — HYDROCODONE BITARTRATE AND ACETAMINOPHEN 500; 5 MG/1; MG/1
TABLET ORAL
Status: DISCONTINUED | OUTPATIENT
Start: 2018-10-03 | End: 2018-10-04

## 2018-10-03 RX ADMIN — BUSULFAN 60 MG: 6 INJECTION INTRAVENOUS at 11:10

## 2018-10-03 RX ADMIN — URSODIOL 300 MG: 300 CAPSULE ORAL at 08:10

## 2018-10-03 RX ADMIN — Medication 30 ML: at 11:10

## 2018-10-03 RX ADMIN — URSODIOL 300 MG: 300 CAPSULE ORAL at 09:10

## 2018-10-03 RX ADMIN — Medication 30 ML: at 05:10

## 2018-10-03 RX ADMIN — SODIUM CHLORIDE AND POTASSIUM CHLORIDE: 9; 1.49 INJECTION, SOLUTION INTRAVENOUS at 11:10

## 2018-10-03 RX ADMIN — LEVETIRACETAM 500 MG: 500 TABLET ORAL at 08:10

## 2018-10-03 RX ADMIN — BUSULFAN 60 MG: 6 INJECTION INTRAVENOUS at 05:10

## 2018-10-03 RX ADMIN — BUSULFAN 51 MG: 6 INJECTION INTRAVENOUS at 06:10

## 2018-10-03 RX ADMIN — ACYCLOVIR 800 MG: 200 CAPSULE ORAL at 08:10

## 2018-10-03 RX ADMIN — ENOXAPARIN SODIUM 40 MG: 100 INJECTION SUBCUTANEOUS at 05:10

## 2018-10-03 RX ADMIN — ONDANSETRON 16 MG: 8 TABLET, ORALLY DISINTEGRATING ORAL at 04:10

## 2018-10-03 RX ADMIN — DEXAMETHASONE 12 MG: 4 TABLET ORAL at 04:10

## 2018-10-03 RX ADMIN — SODIUM CHLORIDE AND POTASSIUM CHLORIDE: 9; 1.49 INJECTION, SOLUTION INTRAVENOUS at 05:10

## 2018-10-03 RX ADMIN — PANTOPRAZOLE SODIUM 40 MG: 40 TABLET, DELAYED RELEASE ORAL at 08:10

## 2018-10-03 RX ADMIN — LEVETIRACETAM 500 MG: 500 TABLET ORAL at 09:10

## 2018-10-03 NOTE — NURSING
ChemoNote:  Consents on chart. Patient reports no discomfort. busulfan (BUSULFEX) 60 mg in sodium chloride 0.9% 111.1 mL to be infused over 2 hrs to the RCW Kurtz with blood return. No questions at this time. Will cont to monitor. Chemo precautions maintained.

## 2018-10-03 NOTE — PROGRESS NOTES
[facility]  Hematology  Bone Marrow Transplant  Progress Note    Patient Name: Wilton Guzmán  Admission Date: 10/1/2018  Hospital Length of Stay: 2 days  Code Status: Full Code    Subjective:     Interval History: Continue Busulfan until Day -4. VSS. No complaints. NEON.    Objective:     Vital Signs (Most Recent):  Temp: 97.9 °F (36.6 °C) (10/03/18 1139)  Pulse: 73 (10/03/18 1139)  Resp: 18 (10/03/18 1139)  BP: 121/70 (10/03/18 1139)  SpO2: 97 % (10/03/18 1139) Vital Signs (24h Range):  Temp:  [97.6 °F (36.4 °C)-98.2 °F (36.8 °C)] 97.9 °F (36.6 °C)  Pulse:  [60-86] 73  Resp:  [16-18] 18  SpO2:  [96 %-100 %] 97 %  BP: (107-130)/(57-74) 121/70     Weight: 70.6 kg (155 lb 10.3 oz)  Body mass index is 21.71 kg/m².  Body surface area is 1.88 meters squared.    ECOG SCORE         Karnofsy Score: 90%    Intake/Output - Last 3 Shifts       10/01 0700 - 10/02 0659 10/02 0700 - 10/03 0659 10/03 0700 - 10/04 0659    P.O. 840 2050 690    I.V. (mL/kg) 1297.5 (18.7) 3367 (47.7) 822.5 (11.7)    IV Piggyback 28.7 333.1 100    Total Intake(mL/kg) 2166.2 (31.2) 5750.1 (81.4) 1612.5 (22.8)    Urine (mL/kg/hr) 1000 3775 (2.2) 1000 (2.5)    Total Output 1000 3775 1000    Net +1166.2 +1975.1 +612.5                 Physical Exam   Constitutional: He is oriented to person, place, and time. He appears well-developed and well-nourished.   HENT:   Head: Normocephalic and atraumatic.   Mouth/Throat: No oropharyngeal exudate.   Eyes: Conjunctivae are normal. Pupils are equal, round, and reactive to light. Right eye exhibits no discharge. Left eye exhibits no discharge.   Neck: Normal range of motion. Neck supple.   Cardiovascular: Normal rate, regular rhythm, normal heart sounds and intact distal pulses.   No murmur heard.  Pulmonary/Chest: Effort normal and breath sounds normal. No respiratory distress. He has no wheezes. He has no rales.   Abdominal: Soft. Bowel sounds are normal. He exhibits no distension. There is no  tenderness.   Musculoskeletal: Normal range of motion. He exhibits no edema or deformity.   Neurological: He is alert and oriented to person, place, and time.   Skin: Skin is warm and dry. No rash noted. No erythema.   Psychiatric: He has a normal mood and affect. His behavior is normal. Judgment and thought content normal.       Significant Labs:   BMP:   Recent Labs   Lab  10/02/18   0359  10/03/18   0502   GLU  96  96  126*   NA  140  140  143   K  3.9  3.9  3.9   CL  109  109  114*   CO2  23  23  24   BUN  8  8  6   CREATININE  0.7  0.7  0.7   CALCIUM  8.6*  8.6*  8.5*   MG  2.0  2.0  1.9    and CBC:   Recent Labs   Lab  10/02/18   0359  10/03/18   0502   WBC  3.35*  3.35*  5.46   HGB  11.3*  11.3*  10.6*   HCT  34.2*  34.2*  32.1*   PLT  186  186  185       Diagnostic Results:  None    Assessment/Plan:     * Stem cell transplant candidate    - Day -7 of Busulfan/Cyclophosphamide matched related donor stem cell transplant from 10/10 match brother: Busulfan started this am  - Donor B+ and CMV+ , recipient O+ and CMV+  - Pre-transplant work-up with no contraindications to proceeding to transplant, sickle cell trait noted. Brother has sickle cell trait as well.   - Kurtz placed 10/1  - Stem cell infusion scheduled for 10/9    Planned conditioning regimen:  Busulfan on Day -7, -6, -5, and -4  Cyclophosphamide on Day -3, and -2   Planned  GVHD Prophylaxis:  Methotrexate on Day +1, +3, +6, and +11  Tacrolimus starting on Day -1  Antimicrobial Prophylaxis:  Acyclovir starting on Day -7  Levaquin starting on Day -1  Fluconazole starting on Day -1  Mepron starting on Day +30  Seizure Prophylaxis:  Levetiracetam on Day -7 through Day -3  SOS/VOD Prophylaxis:  Ursodiol on Day -7 through Day +30  Growth Factor Support:  Neupogen starting on Day +7          Hypophosphatemia    - phos 2.3 today, replace per protocol        Anemia associated with chemotherapy    - due to dasatinib  - hgb stable at 10.6 gm/dl  -  transfuse PRBC for hgb <7        Chronic myelogenous leukemia (CML), BCR-ABL1-positive    - original BMBX 5/26/18 with 20% blasts; CML transformed to AML with blast crisis;   karyotype: 46,XY,t(9;22;22;14)q34;q11.2;q13;q23); BCR/ABL gene fusion 62%  - received induction with 7+3 on 6/1-6/8/2018  - persistent disease post 7+3 induction, repeat BM biopsy done 6/18 here at [facility] after 7+3. 9% blasts.Started on MEC chemotherapy for refractory AML on 6/23/2018  - had BMBX on 7/13/18, without evidence of CML; BCR ABL p210 0.5% (c/w MRD)  - dasatinib 140 mg daily at home, will stop today when chemo starts  -pre-transplant bone marrow biopsy from 9/11/2018 shows a hematologic and molecular remission  - admitted for Busulfan/Cytarabine MRD allogeneic stem cell transplant from his brother  -Today is Day -6. Continue Busulfan until Day -4 (16 doses total).               VTE Risk Mitigation (From admission, onward)        Ordered     enoxaparin injection 40 mg  Daily      10/01/18 1729     heparin, porcine (PF) 100 unit/mL injection flush 300 Units  As needed (PRN)      10/01/18 1657          Disposition: Day -6 for Allo SCT. Continue Busulfan.    Leona Beltran, NP  Bone Marrow Transplant  [facility]

## 2018-10-03 NOTE — ASSESSMENT & PLAN NOTE
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 9% blasts.Started on MEC chemotherapy for refractory AML on 6/23/2018  - had BMBX on 7/13/18, without evidence of CML; BCR ABL p210 0.5% (c/w MRD)  - dasatinib 140 mg daily at home, will stop today when chemo starts  -pre-transplant bone marrow biopsy from 9/11/2018 shows a hematologic and molecular remission  - admitted for Busulfan/Cytarabine MRD allogeneic stem cell transplant from his brother  -Today is Day -6. Continue Busulfan until Day -4 (16 doses total).

## 2018-10-03 NOTE — PLAN OF CARE
Problem: Patient Care Overview  Goal: Plan of Care Review  Outcome: Ongoing (interventions implemented as appropriate)  Day -6 of Allo MRD. Patient reports no discomfort. Afebrile, up to chair. Ambulating. Tolerating diet. Busulfan administered today. Levels tomorrow. Will cont to monitor.

## 2018-10-03 NOTE — SUBJECTIVE & OBJECTIVE
Subjective:     Interval History: Continue Busulfan until Day -4. VSS. No complaints. NEON.    Objective:     Vital Signs (Most Recent):  Temp: 97.9 °F (36.6 °C) (10/03/18 1139)  Pulse: 73 (10/03/18 1139)  Resp: 18 (10/03/18 1139)  BP: 121/70 (10/03/18 1139)  SpO2: 97 % (10/03/18 1139) Vital Signs (24h Range):  Temp:  [97.6 °F (36.4 °C)-98.2 °F (36.8 °C)] 97.9 °F (36.6 °C)  Pulse:  [60-86] 73  Resp:  [16-18] 18  SpO2:  [96 %-100 %] 97 %  BP: (107-130)/(57-74) 121/70     Weight: 70.6 kg (155 lb 10.3 oz)  Body mass index is 21.71 kg/m².  Body surface area is 1.88 meters squared.    ECOG SCORE         Karnofsy Score: 90%    Intake/Output - Last 3 Shifts       10/01 0700 - 10/02 0659 10/02 0700 - 10/03 0659 10/03 0700 - 10/04 0659    P.O. 840 2050 690    I.V. (mL/kg) 1297.5 (18.7) 3367 (47.7) 822.5 (11.7)    IV Piggyback 28.7 333.1 100    Total Intake(mL/kg) 2166.2 (31.2) 5750.1 (81.4) 1612.5 (22.8)    Urine (mL/kg/hr) 1000 3775 (2.2) 1000 (2.5)    Total Output 1000 3775 1000    Net +1166.2 +1975.1 +612.5                 Physical Exam   Constitutional: He is oriented to person, place, and time. He appears well-developed and well-nourished.   HENT:   Head: Normocephalic and atraumatic.   Mouth/Throat: No oropharyngeal exudate.   Eyes: Conjunctivae are normal. Pupils are equal, round, and reactive to light. Right eye exhibits no discharge. Left eye exhibits no discharge.   Neck: Normal range of motion. Neck supple.   Cardiovascular: Normal rate, regular rhythm, normal heart sounds and intact distal pulses.   No murmur heard.  Pulmonary/Chest: Effort normal and breath sounds normal. No respiratory distress. He has no wheezes. He has no rales.   Abdominal: Soft. Bowel sounds are normal. He exhibits no distension. There is no tenderness.   Musculoskeletal: Normal range of motion. He exhibits no edema or deformity.   Neurological: He is alert and oriented to person, place, and time.   Skin: Skin is warm and dry. No rash  noted. No erythema.   Psychiatric: He has a normal mood and affect. His behavior is normal. Judgment and thought content normal.       Significant Labs:   BMP:   Recent Labs   Lab  10/02/18   0359  10/03/18   0502   GLU  96  96  126*   NA  140  140  143   K  3.9  3.9  3.9   CL  109  109  114*   CO2  23  23  24   BUN  8  8  6   CREATININE  0.7  0.7  0.7   CALCIUM  8.6*  8.6*  8.5*   MG  2.0  2.0  1.9    and CBC:   Recent Labs   Lab  10/02/18   0359  10/03/18   0502   WBC  3.35*  3.35*  5.46   HGB  11.3*  11.3*  10.6*   HCT  34.2*  34.2*  32.1*   PLT  186  186  185       Diagnostic Results:  None

## 2018-10-03 NOTE — PLAN OF CARE
Problem: Patient Care Overview  Goal: Plan of Care Review  Outcome: Ongoing (interventions implemented as appropriate)  Day -6 BMT. Busulfan given q6h. No complaints. Instructed patient to call for assistance, bed low and locked, call bell within reach, nonskid socks on, patient verbalized understanding.

## 2018-10-03 NOTE — PROGRESS NOTES
Busulfan Dosing Update    Received phone call from [facility] lab regarding busulfan dosing.    Calculation of AUC is equivocal due to lack of appropriate number of specimens submitted and uncertain validity of timing of 4th sample drawn.     Clearance is 2.71 ml/min*kg    AUC 1284 micromolar * minutes    Recommended to decrease by 15% to 51 mg    Predicted ACU of 1092 micromolar * minutes    We will collect samples again for PK monitoring to be submitted again.     Kishor Pantoja MD  Hematology/Oncology and Stem Cell Transplant

## 2018-10-04 LAB
ALBUMIN SERPL BCP-MCNC: 2.8 G/DL
ALP SERPL-CCNC: 72 U/L
ALT SERPL W/O P-5'-P-CCNC: 30 U/L
ANION GAP SERPL CALC-SCNC: 7 MMOL/L
AST SERPL-CCNC: 15 U/L
BASOPHILS # BLD AUTO: 0.01 K/UL
BASOPHILS NFR BLD: 0.1 %
BILIRUB SERPL-MCNC: 0.1 MG/DL
BUN SERPL-MCNC: 6 MG/DL
BUSULFAN SERPL-SCNC: NORMAL UMOL/L
CALCIUM SERPL-MCNC: 8.9 MG/DL
CHLORIDE SERPL-SCNC: 111 MMOL/L
CO2 SERPL-SCNC: 25 MMOL/L
CREAT SERPL-MCNC: 0.7 MG/DL
DIFFERENTIAL METHOD: ABNORMAL
EOSINOPHIL # BLD AUTO: 0 K/UL
EOSINOPHIL NFR BLD: 0 %
ERYTHROCYTE [DISTWIDTH] IN BLOOD BY AUTOMATED COUNT: 14.4 %
EST. GFR  (AFRICAN AMERICAN): >60 ML/MIN/1.73 M^2
EST. GFR  (NON AFRICAN AMERICAN): >60 ML/MIN/1.73 M^2
GLUCOSE SERPL-MCNC: 106 MG/DL
HCT VFR BLD AUTO: 32 %
HGB BLD-MCNC: 10.6 G/DL
IMM GRANULOCYTES # BLD AUTO: 0.03 K/UL
IMM GRANULOCYTES NFR BLD AUTO: 0.4 %
LYMPHOCYTES # BLD AUTO: 2.1 K/UL
LYMPHOCYTES NFR BLD: 30.6 %
MAGNESIUM SERPL-MCNC: 1.8 MG/DL
MCH RBC QN AUTO: 30.7 PG
MCHC RBC AUTO-ENTMCNC: 33.1 G/DL
MCV RBC AUTO: 93 FL
MONOCYTES # BLD AUTO: 0.8 K/UL
MONOCYTES NFR BLD: 11.3 %
NEUTROPHILS # BLD AUTO: 4 K/UL
NEUTROPHILS NFR BLD: 57.6 %
NRBC BLD-RTO: 0 /100 WBC
PHOSPHATE SERPL-MCNC: 2.4 MG/DL
PLATELET # BLD AUTO: 214 K/UL
PMV BLD AUTO: 11.4 FL
POTASSIUM SERPL-SCNC: 3.8 MMOL/L
PROT SERPL-MCNC: 5.5 G/DL
RBC # BLD AUTO: 3.45 M/UL
SODIUM SERPL-SCNC: 143 MMOL/L
WBC # BLD AUTO: 6.9 K/UL

## 2018-10-04 PROCEDURE — 63600175 PHARM REV CODE 636 W HCPCS: Performed by: NURSE PRACTITIONER

## 2018-10-04 PROCEDURE — 20600001 HC STEP DOWN PRIVATE ROOM

## 2018-10-04 PROCEDURE — 85025 COMPLETE CBC W/AUTO DIFF WBC: CPT

## 2018-10-04 PROCEDURE — A9155 ARTIFICIAL SALIVA: HCPCS | Performed by: INTERNAL MEDICINE

## 2018-10-04 PROCEDURE — 80299 QUANTITATIVE ASSAY DRUG: CPT

## 2018-10-04 PROCEDURE — 99233 SBSQ HOSP IP/OBS HIGH 50: CPT | Mod: ,,, | Performed by: INTERNAL MEDICINE

## 2018-10-04 PROCEDURE — 63600175 PHARM REV CODE 636 W HCPCS: Performed by: INTERNAL MEDICINE

## 2018-10-04 PROCEDURE — 83735 ASSAY OF MAGNESIUM: CPT

## 2018-10-04 PROCEDURE — 25000003 PHARM REV CODE 250: Performed by: NURSE PRACTITIONER

## 2018-10-04 PROCEDURE — 84100 ASSAY OF PHOSPHORUS: CPT

## 2018-10-04 PROCEDURE — 25000003 PHARM REV CODE 250: Performed by: INTERNAL MEDICINE

## 2018-10-04 PROCEDURE — 80053 COMPREHEN METABOLIC PANEL: CPT

## 2018-10-04 RX ADMIN — SODIUM CHLORIDE AND POTASSIUM CHLORIDE: 9; 1.49 INJECTION, SOLUTION INTRAVENOUS at 12:10

## 2018-10-04 RX ADMIN — MAGNESIUM OXIDE TAB 400 MG (241.3 MG ELEMENTAL MG) 400 MG: 400 (241.3 MG) TAB at 08:10

## 2018-10-04 RX ADMIN — MAGNESIUM OXIDE TAB 400 MG (241.3 MG ELEMENTAL MG) 400 MG: 400 (241.3 MG) TAB at 06:10

## 2018-10-04 RX ADMIN — BUSULFAN 51 MG: 6 INJECTION INTRAVENOUS at 12:10

## 2018-10-04 RX ADMIN — Medication 30 ML: at 12:10

## 2018-10-04 RX ADMIN — ONDANSETRON 16 MG: 8 TABLET, ORALLY DISINTEGRATING ORAL at 04:10

## 2018-10-04 RX ADMIN — PANTOPRAZOLE SODIUM 40 MG: 40 TABLET, DELAYED RELEASE ORAL at 08:10

## 2018-10-04 RX ADMIN — ENOXAPARIN SODIUM 40 MG: 100 INJECTION SUBCUTANEOUS at 04:10

## 2018-10-04 RX ADMIN — POTASSIUM & SODIUM PHOSPHATES POWDER PACK 280-160-250 MG 1 PACKET: 280-160-250 PACK at 03:10

## 2018-10-04 RX ADMIN — DEXAMETHASONE 12 MG: 4 TABLET ORAL at 04:10

## 2018-10-04 RX ADMIN — ACYCLOVIR 800 MG: 200 CAPSULE ORAL at 08:10

## 2018-10-04 RX ADMIN — BUSULFAN 51 MG: 6 INJECTION INTRAVENOUS at 06:10

## 2018-10-04 RX ADMIN — POTASSIUM & SODIUM PHOSPHATES POWDER PACK 280-160-250 MG 1 PACKET: 280-160-250 PACK at 04:10

## 2018-10-04 RX ADMIN — LEVETIRACETAM 500 MG: 500 TABLET ORAL at 08:10

## 2018-10-04 RX ADMIN — SODIUM CHLORIDE AND POTASSIUM CHLORIDE: 9; 1.49 INJECTION, SOLUTION INTRAVENOUS at 10:10

## 2018-10-04 RX ADMIN — Medication 30 ML: at 11:10

## 2018-10-04 RX ADMIN — SODIUM CHLORIDE AND POTASSIUM CHLORIDE: 9; 1.49 INJECTION, SOLUTION INTRAVENOUS at 11:10

## 2018-10-04 RX ADMIN — POTASSIUM CHLORIDE 20 MEQ: 1500 TABLET, EXTENDED RELEASE ORAL at 06:10

## 2018-10-04 RX ADMIN — URSODIOL 300 MG: 300 CAPSULE ORAL at 08:10

## 2018-10-04 RX ADMIN — BUSULFAN 51 MG: 6 INJECTION INTRAVENOUS at 01:10

## 2018-10-04 RX ADMIN — POTASSIUM & SODIUM PHOSPHATES POWDER PACK 280-160-250 MG 1 PACKET: 280-160-250 PACK at 08:10

## 2018-10-04 RX ADMIN — SODIUM CHLORIDE AND POTASSIUM CHLORIDE: 9; 1.49 INJECTION, SOLUTION INTRAVENOUS at 03:10

## 2018-10-04 RX ADMIN — POTASSIUM & SODIUM PHOSPHATES POWDER PACK 280-160-250 MG 1 PACKET: 280-160-250 PACK at 06:10

## 2018-10-04 NOTE — PLAN OF CARE
Problem: Patient Care Overview  Goal: Plan of Care Review  Outcome: Ongoing (interventions implemented as appropriate)  Plan of care reviewed with pt and family at beginning shift and updated throughout the day. Pt is day -5 bu/cy allo SCT. Busulfan levels drawn today. Busulfan scheduled every 6 hrs, doses 10 and 11 given today and pt tolerated well. Pt is awake, alert, oriented, afebrile and free of injury and falls. Safety, fall, and chemotherapeutic precautions maintained. VSS. IVF maintained at 150 mL/hr. Pt ambulates and performs ADLs independently. He walks the halls frequently during the day with mask. Pt has no complaints of pain or nausea. UOP adequate. Pt had formed bowel movement this morning. He endorses good appetite and eats most of all meals. Oral mucosa negative for lesions upon examination, saliva substitutes given around the clock and salt and soda rinses promoted. Magnesium, potassium and phosphorus replaced today per electrolyte sliding scale. Bed locked and in lowest position, side rails up x 2, non-skid footwear in place and call light and personal belongings within reach. Pt and family instructed to call for assistance when needed and they verbalized understanding. Will monitor.

## 2018-10-04 NOTE — PLAN OF CARE
Problem: Patient Care Overview  Goal: Plan of Care Review  Outcome: Ongoing (interventions implemented as appropriate)  Patient is Day -5 BMT. receiving busulfan q6h. Labs to be drawn this morning for busulfan levels. IVF continued. No pain or nausea. Instructed patient to call for assistance, bed low and locked, call bell within reach, nonskid socks on, patient verbalized understanding.

## 2018-10-04 NOTE — ASSESSMENT & PLAN NOTE
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 9% blasts.Started on MEC chemotherapy for refractory AML on 6/23/2018  - had BMBX on 7/13/18, without evidence of CML; BCR ABL p210 0.5% (c/w MRD)  - dasatinib 140 mg daily at home, will stop today when chemo starts  -pre-transplant bone marrow biopsy from 9/11/2018 shows a hematologic and molecular remission  - admitted for Busulfan/Cyclophosphamide MRD allogeneic stem cell transplant from his brother  -Today is Day -5. Continue Busulfan until Day -4 (16 doses total).

## 2018-10-04 NOTE — PROGRESS NOTES
Administered busulfan (BUSULFEX) 51 mg in sodium chloride 0.9% 94.4 mL chemo infusion over 2 hours at this time through right chest Kurtz catheter noted for having positive blood return. Chemotherapy consent signed and on chart. Chemotherapy dosage and BSA checked by two chemotherapy certified nurses Jessi Philip RN, and Donovan Campbell RN, prior to administration.Chemotherapy education performed with pt and family and they verbalized understanding. Chemotherapeutic precautions in place throughout therapy. Will continue to monitor.

## 2018-10-04 NOTE — PROGRESS NOTES
[facility]  Hematology  Bone Marrow Transplant  Progress Note    Patient Name: Wilton Guzmán  Admission Date: 10/1/2018  Hospital Length of Stay: 3 days  Code Status: Full Code    Subjective:     Interval History: Day -5 of Busulfan/Cyclophosphamide AlloSCT.  No acute events overnight. Denies fevers, chills, chest pain, shortness of breath, abdominal pain, nausea, vomiting, or diarrhea.  No BM yesterday but denies constipation.    Objective:     Vital Signs (Most Recent):  Temp: 97.9 °F (36.6 °C) (10/04/18 1058)  Pulse: 76 (10/04/18 1058)  Resp: 18 (10/04/18 1058)  BP: 114/69 (10/04/18 1058)  SpO2: 100 % (10/04/18 1058) Vital Signs (24h Range):  Temp:  [97.8 °F (36.6 °C)-98.6 °F (37 °C)] 97.9 °F (36.6 °C)  Pulse:  [68-79] 76  Resp:  [16-18] 18  SpO2:  [99 %-100 %] 100 %  BP: (104-144)/(56-88) 114/69     Weight: 71.7 kg (158 lb 1.1 oz)  Body mass index is 22.05 kg/m².  Body surface area is 1.9 meters squared.    ECOG SCORE         [unfilled]    Intake/Output - Last 3 Shifts       10/02 0700 - 10/03 0659 10/03 0700 - 10/04 0659 10/04 0700 - 10/05 0659    P.O. 2050 2180 605    I.V. (mL/kg) 3367 (47.7) 3310 (46.2) 182.5 (2.5)    IV Piggyback 333.1 288.8 94.4    Total Intake(mL/kg) 5750.1 (81.4) 5778.8 (80.6) 881.9 (12.3)    Urine (mL/kg/hr) 3775 (2.2) 4750 (2.8) 1850 (4)    Stool   0    Total Output 3775 4750 1850    Net +1975.1 +1028.8 -968.1           Stool Occurrence   1 x          Physical Exam   Constitutional: He is oriented to person, place, and time. He appears well-developed and well-nourished.   HENT:   Head: Normocephalic and atraumatic.   Mouth/Throat: No oropharyngeal exudate.   Eyes: Pupils are equal, round, and reactive to light. No scleral icterus.   Neck: Normal range of motion.   Cardiovascular: Normal rate, regular rhythm and normal heart sounds. Exam reveals no gallop and no friction rub.   No murmur heard.  Pulmonary/Chest: Effort normal and breath sounds normal. No respiratory  distress. He has no wheezes. He has no rales.   Abdominal: Soft. Bowel sounds are normal. He exhibits no distension. There is no tenderness.   Musculoskeletal: Normal range of motion. He exhibits no edema or deformity.   Neurological: He is alert and oriented to person, place, and time.   Skin: Skin is warm and dry.   Psychiatric: He has a normal mood and affect.       Significant Labs:   CBC:   Recent Labs   Lab  10/03/18   0502  10/04/18   0434   WBC  5.46  6.90   HGB  10.6*  10.6*   HCT  32.1*  32.0*   PLT  185  214   , CMP:   Recent Labs   Lab  10/03/18   0502  10/04/18   0434   NA  143  143   K  3.9  3.8   CL  114*  111*   CO2  24  25   GLU  126*  106   BUN  6  6   CREATININE  0.7  0.7   CALCIUM  8.5*  8.9   PROT  5.5*  5.5*   ALBUMIN  2.8*  2.8*   BILITOT  0.1  0.1   ALKPHOS  73  72   AST  18  15   ALT  35  30   ANIONGAP  5*  7*   EGFRNONAA  >60.0  >60.0    and Coagulation: No results for input(s): PT, INR, APTT in the last 48 hours.    Diagnostic Results:  None    Assessment/Plan:     * Stem cell transplant candidate    - Day -5 of Busulfan/Cyclophosphamide matched related donor stem cell transplant from 10/10 match brother:   - Donor B+ and CMV+ , recipient O+ and CMV+  - Pre-transplant work-up with no contraindications to proceeding to transplant, sickle cell trait noted. Brother has sickle cell trait as well.   - Kurtz placed 10/1  - Stem cell infusion scheduled for 10/9    Planned conditioning regimen:  Busulfan on Day -7, -6, -5, and -4  Cyclophosphamide on Day -3, and -2   Planned  GVHD Prophylaxis:  Methotrexate on Day +1, +3, +6, and +11  Tacrolimus starting on Day -1  Antimicrobial Prophylaxis:  Acyclovir starting on Day -7  Levaquin starting on Day -1  Fluconazole starting on Day -1  Mepron starting on Day +30  Seizure Prophylaxis:  Levetiracetam on Day -7 through Day -3  SOS/VOD Prophylaxis:  Ursodiol on Day -7 through Day +30  Growth Factor Support:  Neupogen starting on Day +7           Hypophosphatemia    - phos 2.4 today, replace per protocol        Anemia associated with chemotherapy    - due to dasatinib  - hgb stable at 10.6 gm/dl  - transfuse PRBC for hgb <7        Chronic myelogenous leukemia (CML), BCR-ABL1-positive    - original BMBX 5/26/18 with 20% blasts; CML transformed to AML with blast crisis;   karyotype: 46,XY,t(9;22;22;14)q34;q11.2;q13;q23); BCR/ABL gene fusion 62%  - received induction with 7+3 on 6/1-6/8/2018  - persistent disease post 7+3 induction, repeat BM biopsy done 6/18 here at [facility] after 7+3. 9% blasts.Started on MEC chemotherapy for refractory AML on 6/23/2018  - had BMBX on 7/13/18, without evidence of CML; BCR ABL p210 0.5% (c/w MRD)  - dasatinib 140 mg daily at home, will stop today when chemo starts  -pre-transplant bone marrow biopsy from 9/11/2018 shows a hematologic and molecular remission  - admitted for Busulfan/Cyclophosphamide MRD allogeneic stem cell transplant from his brother  -Today is Day -5. Continue Busulfan until Day -4 (16 doses total).               VTE Risk Mitigation (From admission, onward)        Ordered     enoxaparin injection 40 mg  Daily      10/01/18 1729     heparin, porcine (PF) 100 unit/mL injection flush 300 Units  As needed (PRN)      10/01/18 1657          Disposition: inpatient    Roberto Del Rio MD  Bone Marrow Transplant  [facility]

## 2018-10-04 NOTE — SUBJECTIVE & OBJECTIVE
Subjective:     Interval History: Day -5 of Busulfan/Cyclophosphamide AlloSCT.  No acute events overnight. Denies fevers, chills, chest pain, shortness of breath, abdominal pain, nausea, vomiting, or diarrhea.  No BM yesterday but denies constipation.    Objective:     Vital Signs (Most Recent):  Temp: 97.9 °F (36.6 °C) (10/04/18 1058)  Pulse: 76 (10/04/18 1058)  Resp: 18 (10/04/18 1058)  BP: 114/69 (10/04/18 1058)  SpO2: 100 % (10/04/18 1058) Vital Signs (24h Range):  Temp:  [97.8 °F (36.6 °C)-98.6 °F (37 °C)] 97.9 °F (36.6 °C)  Pulse:  [68-79] 76  Resp:  [16-18] 18  SpO2:  [99 %-100 %] 100 %  BP: (104-144)/(56-88) 114/69     Weight: 71.7 kg (158 lb 1.1 oz)  Body mass index is 22.05 kg/m².  Body surface area is 1.9 meters squared.    ECOG SCORE         [unfilled]    Intake/Output - Last 3 Shifts       10/02 0700 - 10/03 0659 10/03 0700 - 10/04 0659 10/04 0700 - 10/05 0659    P.O. 2050 2180 605    I.V. (mL/kg) 3367 (47.7) 3310 (46.2) 182.5 (2.5)    IV Piggyback 333.1 288.8 94.4    Total Intake(mL/kg) 5750.1 (81.4) 5778.8 (80.6) 881.9 (12.3)    Urine (mL/kg/hr) 3775 (2.2) 4750 (2.8) 1850 (4)    Stool   0    Total Output 3775 4750 1850    Net +1975.1 +1028.8 -968.1           Stool Occurrence   1 x          Physical Exam   Constitutional: He is oriented to person, place, and time. He appears well-developed and well-nourished.   HENT:   Head: Normocephalic and atraumatic.   Mouth/Throat: No oropharyngeal exudate.   Eyes: Pupils are equal, round, and reactive to light. No scleral icterus.   Neck: Normal range of motion.   Cardiovascular: Normal rate, regular rhythm and normal heart sounds. Exam reveals no gallop and no friction rub.   No murmur heard.  Pulmonary/Chest: Effort normal and breath sounds normal. No respiratory distress. He has no wheezes. He has no rales.   Abdominal: Soft. Bowel sounds are normal. He exhibits no distension. There is no tenderness.   Musculoskeletal: Normal range of motion. He exhibits no  edema or deformity.   Neurological: He is alert and oriented to person, place, and time.   Skin: Skin is warm and dry.   Psychiatric: He has a normal mood and affect.       Significant Labs:   CBC:   Recent Labs   Lab  10/03/18   0502  10/04/18   0434   WBC  5.46  6.90   HGB  10.6*  10.6*   HCT  32.1*  32.0*   PLT  185  214   , CMP:   Recent Labs   Lab  10/03/18   0502  10/04/18   0434   NA  143  143   K  3.9  3.8   CL  114*  111*   CO2  24  25   GLU  126*  106   BUN  6  6   CREATININE  0.7  0.7   CALCIUM  8.5*  8.9   PROT  5.5*  5.5*   ALBUMIN  2.8*  2.8*   BILITOT  0.1  0.1   ALKPHOS  73  72   AST  18  15   ALT  35  30   ANIONGAP  5*  7*   EGFRNONAA  >60.0  >60.0    and Coagulation: No results for input(s): PT, INR, APTT in the last 48 hours.    Diagnostic Results:  None

## 2018-10-05 LAB
ABO + RH BLD: NORMAL
ALBUMIN SERPL BCP-MCNC: 2.9 G/DL
ALP SERPL-CCNC: 71 U/L
ALT SERPL W/O P-5'-P-CCNC: 53 U/L
ANION GAP SERPL CALC-SCNC: 7 MMOL/L
AST SERPL-CCNC: 36 U/L
BASOPHILS # BLD AUTO: 0.01 K/UL
BASOPHILS NFR BLD: 0.2 %
BILIRUB SERPL-MCNC: 0.2 MG/DL
BLD GP AB SCN CELLS X3 SERPL QL: NORMAL
BUN SERPL-MCNC: 7 MG/DL
BUSULFAN SERPL-SCNC: NORMAL UMOL/L
CALCIUM SERPL-MCNC: 8.9 MG/DL
CHLORIDE SERPL-SCNC: 109 MMOL/L
CO2 SERPL-SCNC: 27 MMOL/L
CREAT SERPL-MCNC: 0.6 MG/DL
DIFFERENTIAL METHOD: ABNORMAL
EOSINOPHIL # BLD AUTO: 0 K/UL
EOSINOPHIL NFR BLD: 0 %
ERYTHROCYTE [DISTWIDTH] IN BLOOD BY AUTOMATED COUNT: 14.4 %
EST. GFR  (AFRICAN AMERICAN): >60 ML/MIN/1.73 M^2
EST. GFR  (NON AFRICAN AMERICAN): >60 ML/MIN/1.73 M^2
GLUCOSE SERPL-MCNC: 100 MG/DL
HCT VFR BLD AUTO: 33.7 %
HGB BLD-MCNC: 10.8 G/DL
IMM GRANULOCYTES # BLD AUTO: 0.02 K/UL
IMM GRANULOCYTES NFR BLD AUTO: 0.3 %
LYMPHOCYTES # BLD AUTO: 2.1 K/UL
LYMPHOCYTES NFR BLD: 34.8 %
MAGNESIUM SERPL-MCNC: 1.9 MG/DL
MCH RBC QN AUTO: 29.8 PG
MCHC RBC AUTO-ENTMCNC: 32 G/DL
MCV RBC AUTO: 93 FL
MONOCYTES # BLD AUTO: 1 K/UL
MONOCYTES NFR BLD: 16.9 %
NEUTROPHILS # BLD AUTO: 2.9 K/UL
NEUTROPHILS NFR BLD: 47.8 %
NRBC BLD-RTO: 0 /100 WBC
PHOSPHATE SERPL-MCNC: 2.8 MG/DL
PLATELET # BLD AUTO: 225 K/UL
PMV BLD AUTO: 11 FL
POTASSIUM SERPL-SCNC: 4 MMOL/L
PROT SERPL-MCNC: 5.7 G/DL
RBC # BLD AUTO: 3.63 M/UL
SODIUM SERPL-SCNC: 143 MMOL/L
WBC # BLD AUTO: 6.15 K/UL

## 2018-10-05 PROCEDURE — 83735 ASSAY OF MAGNESIUM: CPT

## 2018-10-05 PROCEDURE — 20600001 HC STEP DOWN PRIVATE ROOM

## 2018-10-05 PROCEDURE — 84100 ASSAY OF PHOSPHORUS: CPT

## 2018-10-05 PROCEDURE — 80053 COMPREHEN METABOLIC PANEL: CPT

## 2018-10-05 PROCEDURE — 99233 SBSQ HOSP IP/OBS HIGH 50: CPT | Mod: ,,, | Performed by: INTERNAL MEDICINE

## 2018-10-05 PROCEDURE — 63600175 PHARM REV CODE 636 W HCPCS: Performed by: NURSE PRACTITIONER

## 2018-10-05 PROCEDURE — 86901 BLOOD TYPING SEROLOGIC RH(D): CPT

## 2018-10-05 PROCEDURE — 25000003 PHARM REV CODE 250: Performed by: NURSE PRACTITIONER

## 2018-10-05 PROCEDURE — 63600175 PHARM REV CODE 636 W HCPCS: Mod: JG | Performed by: INTERNAL MEDICINE

## 2018-10-05 PROCEDURE — 85025 COMPLETE CBC W/AUTO DIFF WBC: CPT

## 2018-10-05 PROCEDURE — A9155 ARTIFICIAL SALIVA: HCPCS | Performed by: INTERNAL MEDICINE

## 2018-10-05 PROCEDURE — 25000003 PHARM REV CODE 250: Performed by: INTERNAL MEDICINE

## 2018-10-05 RX ADMIN — ONDANSETRON 16 MG: 8 TABLET, ORALLY DISINTEGRATING ORAL at 04:10

## 2018-10-05 RX ADMIN — BUSULFAN 51 MG: 6 INJECTION INTRAVENOUS at 11:10

## 2018-10-05 RX ADMIN — PANTOPRAZOLE SODIUM 40 MG: 40 TABLET, DELAYED RELEASE ORAL at 09:10

## 2018-10-05 RX ADMIN — URSODIOL 300 MG: 300 CAPSULE ORAL at 09:10

## 2018-10-05 RX ADMIN — ACYCLOVIR 800 MG: 200 CAPSULE ORAL at 09:10

## 2018-10-05 RX ADMIN — MAGNESIUM OXIDE TAB 400 MG (241.3 MG ELEMENTAL MG) 400 MG: 400 (241.3 MG) TAB at 09:10

## 2018-10-05 RX ADMIN — BUSULFAN 51 MG: 6 INJECTION INTRAVENOUS at 06:10

## 2018-10-05 RX ADMIN — ENOXAPARIN SODIUM 40 MG: 100 INJECTION SUBCUTANEOUS at 04:10

## 2018-10-05 RX ADMIN — MAGNESIUM OXIDE TAB 400 MG (241.3 MG ELEMENTAL MG) 400 MG: 400 (241.3 MG) TAB at 06:10

## 2018-10-05 RX ADMIN — BUSULFAN 51 MG: 6 INJECTION INTRAVENOUS at 12:10

## 2018-10-05 RX ADMIN — SODIUM CHLORIDE AND POTASSIUM CHLORIDE: 9; 1.49 INJECTION, SOLUTION INTRAVENOUS at 11:10

## 2018-10-05 RX ADMIN — LEVETIRACETAM 500 MG: 500 TABLET ORAL at 09:10

## 2018-10-05 RX ADMIN — SODIUM CHLORIDE AND POTASSIUM CHLORIDE: 9; 1.49 INJECTION, SOLUTION INTRAVENOUS at 06:10

## 2018-10-05 RX ADMIN — Medication 30 ML: at 11:10

## 2018-10-05 RX ADMIN — DEXAMETHASONE 12 MG: 4 TABLET ORAL at 04:10

## 2018-10-05 RX ADMIN — SODIUM CHLORIDE AND POTASSIUM CHLORIDE: 9; 1.49 INJECTION, SOLUTION INTRAVENOUS at 04:10

## 2018-10-05 NOTE — ASSESSMENT & PLAN NOTE
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 9% blasts.Started on MEC chemotherapy for refractory AML on 6/23/2018  - had BMBX on 7/13/18, without evidence of CML; BCR ABL p210 0.5% (c/w MRD)  - dasatinib 140 mg daily at home, will stop today when chemo starts  -pre-transplant bone marrow biopsy from 9/11/2018 shows a hematologic and molecular remission  - admitted for Busulfan/Cyclophosphamide MRD allogeneic stem cell transplant from his brother  -Today is Day -4. Continue Busulfan until Day -4 (16 doses total). Cytoxan and mesna begin Day -3.

## 2018-10-05 NOTE — SUBJECTIVE & OBJECTIVE
Subjective:     Interval History: Day -4. Continue busulfan through Day -4. Cytoxan and Mesna to begin Day -3. NEON.     Objective:     Vital Signs (Most Recent):  Temp: 98.5 °F (36.9 °C) (10/05/18 0803)  Pulse: 78 (10/05/18 0803)  Resp: 16 (10/05/18 0803)  BP: 130/82 (10/05/18 0803)  SpO2: 96 % (10/05/18 0803) Vital Signs (24h Range):  Temp:  [98.2 °F (36.8 °C)-98.6 °F (37 °C)] 98.5 °F (36.9 °C)  Pulse:  [65-81] 78  Resp:  [16-18] 16  SpO2:  [96 %-100 %] 96 %  BP: (116-139)/(67-82) 130/82     Weight: 71 kg (156 lb 8.4 oz)  Body mass index is 21.83 kg/m².  Body surface area is 1.89 meters squared.    ECOG SCORE         [unfilled]    Intake/Output - Last 3 Shifts       10/03 0700 - 10/04 0659 10/04 0700 - 10/05 0659 10/05 0700 - 10/06 0659    P.O. 2180 1085     I.V. (mL/kg) 3310 (46.2) 2900 (40.8)     IV Piggyback 288.8 283.2     Total Intake(mL/kg) 5778.8 (80.6) 4268.2 (60.1)     Urine (mL/kg/hr) 4750 (2.8) 4575 (2.7) 900 (2.7)    Stool  0     Total Output 4750 4575 900    Net +1028.8 -306.8 -900           Stool Occurrence  1 x           Physical Exam   Constitutional: He is oriented to person, place, and time. He appears well-developed and well-nourished.   HENT:   Head: Normocephalic and atraumatic.   Mouth/Throat: No oropharyngeal exudate.   Eyes: Conjunctivae are normal. Pupils are equal, round, and reactive to light. Right eye exhibits no discharge. Left eye exhibits no discharge.   Neck: Normal range of motion. Neck supple.   Cardiovascular: Normal rate, regular rhythm, normal heart sounds and intact distal pulses.   No murmur heard.  Pulmonary/Chest: Effort normal and breath sounds normal. No respiratory distress. He has no wheezes. He has no rales.   Abdominal: Soft. Bowel sounds are normal. He exhibits no distension. There is no tenderness.   Musculoskeletal: Normal range of motion. He exhibits no edema or deformity.   Neurological: He is alert and oriented to person, place, and time.   Skin: Skin is warm  and dry. No rash noted. No erythema.   Psychiatric: He has a normal mood and affect. His behavior is normal. Judgment and thought content normal.       Significant Labs:   CBC:   Recent Labs   Lab  10/04/18   0434  10/05/18   0400   WBC  6.90  6.15   HGB  10.6*  10.8*   HCT  32.0*  33.7*   PLT  214  225    and CMP:   Recent Labs   Lab  10/04/18   0434  10/05/18   0400   NA  143  143   K  3.8  4.0   CL  111*  109   CO2  25  27   GLU  106  100   BUN  6  7   CREATININE  0.7  0.6   CALCIUM  8.9  8.9   PROT  5.5*  5.7*   ALBUMIN  2.8*  2.9*   BILITOT  0.1  0.2   ALKPHOS  72  71   AST  15  36   ALT  30  53*   ANIONGAP  7*  7*   EGFRNONAA  >60.0  >60.0       Diagnostic Results:  None

## 2018-10-05 NOTE — PLAN OF CARE
Problem: Patient Care Overview  Goal: Plan of Care Review  Outcome: Ongoing (interventions implemented as appropriate)  Plan of care reviewed with the patient at the beginning of the shift. Pt is day -4 of allo transplant. Pt is tolerating chemo well. He has no complaints at this time. He denies pain. He denies n/v/d. Pt reports normal BMs. PO intake encouraged. IVF infusing. Mucous membranes are intact. Salt and soda and saliva substitute provided. Fall precautions maintained. Pt remained free from falls and injury this shift. Bed locked in lowest position, side rails up x2, call light within reach. Instructed pt to call for assistance as needed. Pt verbalized understanding. Pt afebrile overnight. Vitals stable. No acute issues overnight. Will continue to monitor.

## 2018-10-05 NOTE — PROGRESS NOTES
Busulfan 51 mg initiated over 2 hours to R chest TLC with positive blood return. Chemotherapy consent on chart. Drug, 2 pt identifiers, BSA and chemo calculation checked with second certified RN. Instructed pt to call with any problems/discomfort. Verbalized understanding. Call light within reach.

## 2018-10-05 NOTE — PLAN OF CARE
Problem: Patient Care Overview  Goal: Plan of Care Review  Outcome: Ongoing (interventions implemented as appropriate)  Plan of care reviewed with pt at the beginning of shift. Verbalized understanding. Did not have any questions or concerns at this time. Magnesium replaced. No c/o pain or discomfort. Busulfan completed today - last 2 doses discontinued per [place] recommendation. Pending Cytoxan/Mesna in the am. Ambulated in halls independently. Tolerated activity well. Up to chair most of the day. Good PO intake.

## 2018-10-05 NOTE — PROGRESS NOTES
[facility]  Hematology  Bone Marrow Transplant  Progress Note    Patient Name: Wilton Guzmán  Admission Date: 10/1/2018  Hospital Length of Stay: 4 days  Code Status: Full Code    Subjective:     Interval History: Day -4. Continue busulfan through Day -4. Cytoxan and Mesna to begin Day -3. NEON.     Objective:     Vital Signs (Most Recent):  Temp: 98.5 °F (36.9 °C) (10/05/18 0803)  Pulse: 78 (10/05/18 0803)  Resp: 16 (10/05/18 0803)  BP: 130/82 (10/05/18 0803)  SpO2: 96 % (10/05/18 0803) Vital Signs (24h Range):  Temp:  [98.2 °F (36.8 °C)-98.6 °F (37 °C)] 98.5 °F (36.9 °C)  Pulse:  [65-81] 78  Resp:  [16-18] 16  SpO2:  [96 %-100 %] 96 %  BP: (116-139)/(67-82) 130/82     Weight: 71 kg (156 lb 8.4 oz)  Body mass index is 21.83 kg/m².  Body surface area is 1.89 meters squared.    ECOG SCORE         [unfilled]    Intake/Output - Last 3 Shifts       10/03 0700 - 10/04 0659 10/04 0700 - 10/05 0659 10/05 0700 - 10/06 0659    P.O. 2180 1085     I.V. (mL/kg) 3310 (46.2) 2900 (40.8)     IV Piggyback 288.8 283.2     Total Intake(mL/kg) 5778.8 (80.6) 4268.2 (60.1)     Urine (mL/kg/hr) 4750 (2.8) 4575 (2.7) 900 (2.7)    Stool  0     Total Output 4750 4575 900    Net +1028.8 -306.8 -900           Stool Occurrence  1 x           Physical Exam   Constitutional: He is oriented to person, place, and time. He appears well-developed and well-nourished.   HENT:   Head: Normocephalic and atraumatic.   Mouth/Throat: No oropharyngeal exudate.   Eyes: Conjunctivae are normal. Pupils are equal, round, and reactive to light. Right eye exhibits no discharge. Left eye exhibits no discharge.   Neck: Normal range of motion. Neck supple.   Cardiovascular: Normal rate, regular rhythm, normal heart sounds and intact distal pulses.   No murmur heard.  Pulmonary/Chest: Effort normal and breath sounds normal. No respiratory distress. He has no wheezes. He has no rales.   Abdominal: Soft. Bowel sounds are normal. He exhibits no  distension. There is no tenderness.   Musculoskeletal: Normal range of motion. He exhibits no edema or deformity.   Neurological: He is alert and oriented to person, place, and time.   Skin: Skin is warm and dry. No rash noted. No erythema.   Psychiatric: He has a normal mood and affect. His behavior is normal. Judgment and thought content normal.       Significant Labs:   CBC:   Recent Labs   Lab  10/04/18   0434  10/05/18   0400   WBC  6.90  6.15   HGB  10.6*  10.8*   HCT  32.0*  33.7*   PLT  214  225    and CMP:   Recent Labs   Lab  10/04/18   0434  10/05/18   0400   NA  143  143   K  3.8  4.0   CL  111*  109   CO2  25  27   GLU  106  100   BUN  6  7   CREATININE  0.7  0.6   CALCIUM  8.9  8.9   PROT  5.5*  5.7*   ALBUMIN  2.8*  2.9*   BILITOT  0.1  0.2   ALKPHOS  72  71   AST  15  36   ALT  30  53*   ANIONGAP  7*  7*   EGFRNONAA  >60.0  >60.0       Diagnostic Results:  None    Assessment/Plan:     * Stem cell transplant candidate    - Day -7 of Busulfan/Cyclophosphamide matched related donor stem cell transplant from 10/10 match brother: Busulfan started this am  - Donor B+ and CMV+ , recipient O+ and CMV+  - Pre-transplant work-up with no contraindications to proceeding to transplant, sickle cell trait noted. Brother has sickle cell trait as well.   - Kurtz placed 10/1  - Stem cell infusion scheduled for 10/9    Planned conditioning regimen:  Busulfan on Day -7, -6, -5, and -4  Cyclophosphamide on Day -3, and -2   Planned  GVHD Prophylaxis:  Methotrexate on Day +1, +3, +6, and +11  Tacrolimus starting on Day -1  Antimicrobial Prophylaxis:  Acyclovir starting on Day -7  Levaquin starting on Day -1  Fluconazole starting on Day -1  Mepron starting on Day +30  Seizure Prophylaxis:  Levetiracetam on Day -7 through Day -3  SOS/VOD Prophylaxis:  Ursodiol on Day -7 through Day +30  Growth Factor Support:  Neupogen starting on Day +7          Chronic myelogenous leukemia (CML), BCR-ABL1-positive    - original BMBX  5/26/18 with 20% blasts; CML transformed to AML with blast crisis;   karyotype: 46,XY,t(9;22;22;14)q34;q11.2;q13;q23); BCR/ABL gene fusion 62%  - received induction with 7+3 on 6/1-6/8/2018  - persistent disease post 7+3 induction, repeat BM biopsy done 6/18 here at [facility] after 7+3. 9% blasts.Started on MEC chemotherapy for refractory AML on 6/23/2018  - had BMBX on 7/13/18, without evidence of CML; BCR ABL p210 0.5% (c/w MRD)  - dasatinib 140 mg daily at home, will stop today when chemo starts  -pre-transplant bone marrow biopsy from 9/11/2018 shows a hematologic and molecular remission  - admitted for Busulfan/Cyclophosphamide MRD allogeneic stem cell transplant from his brother  -Today is Day -4. Continue Busulfan until Day -4 (16 doses total). Cytoxan and mesna begin Day -3.           Anemia associated with chemotherapy    - due to dasatinib  - hgb stable at 10.8 gm/dl  - transfuse PRBC for hgb <7        Hypophosphatemia    - phos 2.8 today  -replace per protocol            VTE Risk Mitigation (From admission, onward)        Ordered     enoxaparin injection 40 mg  Daily      10/01/18 1729     heparin, porcine (PF) 100 unit/mL injection flush 300 Units  As needed (PRN)      10/01/18 1657          Disposition: Day -4 for allo SCT. Awaiting blood count drop.     Leona Beltran, NP  Bone Marrow Transplant  [facility]

## 2018-10-05 NOTE — PROGRESS NOTES
Busulfan Dosing Update    Received phone call from [place] [facility] regarding busulfan dosing.    Clearance is currently at 1.86 ml/(min*kg)    Dose 9 AUC = 1591 micromolar * minutes    Cumulative AUC, assuming no further busulfan given = 1277 micromolar * minutes    Therefore, [place] [facility] has recommended that we stop busulfan and do not give doses 15 and 16.    I have discontinued it in the treatment plan.    We will continue with cyclophosphamide tomorrow as written.     Kishor Pantoja MD  Hematology/Oncology and Stem Cell Transplant

## 2018-10-06 LAB
ALBUMIN SERPL BCP-MCNC: 2.8 G/DL
ALP SERPL-CCNC: 69 U/L
ALT SERPL W/O P-5'-P-CCNC: 93 U/L
ANION GAP SERPL CALC-SCNC: 5 MMOL/L
AST SERPL-CCNC: 54 U/L
BASOPHILS # BLD AUTO: 0 K/UL
BASOPHILS NFR BLD: 0 %
BILIRUB SERPL-MCNC: 0.2 MG/DL
BUN SERPL-MCNC: 10 MG/DL
CALCIUM SERPL-MCNC: 8.9 MG/DL
CHLORIDE SERPL-SCNC: 108 MMOL/L
CO2 SERPL-SCNC: 28 MMOL/L
CREAT SERPL-MCNC: 0.7 MG/DL
DIFFERENTIAL METHOD: ABNORMAL
EOSINOPHIL # BLD AUTO: 0 K/UL
EOSINOPHIL NFR BLD: 0 %
ERYTHROCYTE [DISTWIDTH] IN BLOOD BY AUTOMATED COUNT: 14.1 %
EST. GFR  (AFRICAN AMERICAN): >60 ML/MIN/1.73 M^2
EST. GFR  (NON AFRICAN AMERICAN): >60 ML/MIN/1.73 M^2
GLUCOSE SERPL-MCNC: 111 MG/DL
HCT VFR BLD AUTO: 33.2 %
HGB BLD-MCNC: 10.8 G/DL
IMM GRANULOCYTES # BLD AUTO: 0.01 K/UL
IMM GRANULOCYTES NFR BLD AUTO: 0.2 %
LYMPHOCYTES # BLD AUTO: 2 K/UL
LYMPHOCYTES NFR BLD: 38.7 %
MAGNESIUM SERPL-MCNC: 1.9 MG/DL
MCH RBC QN AUTO: 29.8 PG
MCHC RBC AUTO-ENTMCNC: 32.5 G/DL
MCV RBC AUTO: 92 FL
MONOCYTES # BLD AUTO: 0.8 K/UL
MONOCYTES NFR BLD: 15.6 %
NEUTROPHILS # BLD AUTO: 2.3 K/UL
NEUTROPHILS NFR BLD: 45.5 %
NRBC BLD-RTO: 0 /100 WBC
PHOSPHATE SERPL-MCNC: 2.7 MG/DL
PLATELET # BLD AUTO: 236 K/UL
PMV BLD AUTO: 11.1 FL
POTASSIUM SERPL-SCNC: 3.9 MMOL/L
PROT SERPL-MCNC: 5.5 G/DL
RBC # BLD AUTO: 3.63 M/UL
SODIUM SERPL-SCNC: 141 MMOL/L
WBC # BLD AUTO: 5.12 K/UL

## 2018-10-06 PROCEDURE — 25000003 PHARM REV CODE 250: Performed by: NURSE PRACTITIONER

## 2018-10-06 PROCEDURE — 80053 COMPREHEN METABOLIC PANEL: CPT

## 2018-10-06 PROCEDURE — 83735 ASSAY OF MAGNESIUM: CPT

## 2018-10-06 PROCEDURE — 84100 ASSAY OF PHOSPHORUS: CPT

## 2018-10-06 PROCEDURE — 20600001 HC STEP DOWN PRIVATE ROOM

## 2018-10-06 PROCEDURE — A9155 ARTIFICIAL SALIVA: HCPCS | Performed by: INTERNAL MEDICINE

## 2018-10-06 PROCEDURE — 63600175 PHARM REV CODE 636 W HCPCS: Mod: JG | Performed by: INTERNAL MEDICINE

## 2018-10-06 PROCEDURE — C9463 INJECTION, APREPITANT: HCPCS | Mod: TB | Performed by: INTERNAL MEDICINE

## 2018-10-06 PROCEDURE — 25000003 PHARM REV CODE 250: Performed by: INTERNAL MEDICINE

## 2018-10-06 PROCEDURE — 85025 COMPLETE CBC W/AUTO DIFF WBC: CPT

## 2018-10-06 PROCEDURE — 99233 SBSQ HOSP IP/OBS HIGH 50: CPT | Mod: ,,, | Performed by: INTERNAL MEDICINE

## 2018-10-06 PROCEDURE — 63600175 PHARM REV CODE 636 W HCPCS: Performed by: NURSE PRACTITIONER

## 2018-10-06 RX ADMIN — MAGNESIUM OXIDE TAB 400 MG (241.3 MG ELEMENTAL MG) 400 MG: 400 (241.3 MG) TAB at 09:10

## 2018-10-06 RX ADMIN — DEXAMETHASONE 12 MG: 4 TABLET ORAL at 08:10

## 2018-10-06 RX ADMIN — LEVETIRACETAM 500 MG: 500 TABLET ORAL at 08:10

## 2018-10-06 RX ADMIN — MESNA 871 MG: 100 INJECTION, SOLUTION INTRAVENOUS at 02:10

## 2018-10-06 RX ADMIN — LEVETIRACETAM 500 MG: 500 TABLET ORAL at 09:10

## 2018-10-06 RX ADMIN — ENOXAPARIN SODIUM 40 MG: 100 INJECTION SUBCUTANEOUS at 05:10

## 2018-10-06 RX ADMIN — MESNA 871 MG: 100 INJECTION, SOLUTION INTRAVENOUS at 05:10

## 2018-10-06 RX ADMIN — ONDANSETRON 16 MG: 8 TABLET, ORALLY DISINTEGRATING ORAL at 08:10

## 2018-10-06 RX ADMIN — Medication 30 ML: at 06:10

## 2018-10-06 RX ADMIN — APREPITANT 130 MG: 130 INJECTION, EMULSION INTRAVENOUS at 08:10

## 2018-10-06 RX ADMIN — URSODIOL 300 MG: 300 CAPSULE ORAL at 09:10

## 2018-10-06 RX ADMIN — SODIUM CHLORIDE AND POTASSIUM CHLORIDE: 9; 1.49 INJECTION, SOLUTION INTRAVENOUS at 06:10

## 2018-10-06 RX ADMIN — SODIUM CHLORIDE AND POTASSIUM CHLORIDE: 9; 1.49 INJECTION, SOLUTION INTRAVENOUS at 12:10

## 2018-10-06 RX ADMIN — Medication 30 ML: at 12:10

## 2018-10-06 RX ADMIN — MESNA 871 MG: 100 INJECTION, SOLUTION INTRAVENOUS at 12:10

## 2018-10-06 RX ADMIN — SODIUM CHLORIDE AND POTASSIUM CHLORIDE: 9; 1.49 INJECTION, SOLUTION INTRAVENOUS at 07:10

## 2018-10-06 RX ADMIN — URSODIOL 300 MG: 300 CAPSULE ORAL at 08:10

## 2018-10-06 RX ADMIN — MAGNESIUM OXIDE TAB 400 MG (241.3 MG ELEMENTAL MG) 400 MG: 400 (241.3 MG) TAB at 06:10

## 2018-10-06 RX ADMIN — ACYCLOVIR 800 MG: 200 CAPSULE ORAL at 09:10

## 2018-10-06 RX ADMIN — PANTOPRAZOLE SODIUM 40 MG: 40 TABLET, DELAYED RELEASE ORAL at 09:10

## 2018-10-06 RX ADMIN — MESNA 871 MG: 100 INJECTION, SOLUTION INTRAVENOUS at 09:10

## 2018-10-06 RX ADMIN — CYCLOPHOSPHAMIDE 4355 MG: 2 INJECTION, POWDER, FOR SOLUTION INTRAVENOUS; ORAL at 09:10

## 2018-10-06 NOTE — PROGRESS NOTES
Chemotherapy Note:  Consent & CAR in chart. Premedicated with Dexamethasone 12mg PO, Ondansetron 16mg PO and Aprepitant 130mg IVPB. Cyclophosphamide and patient verified at bedside by two chemo certified RNs, Donovan Jauregui. Positive blood noted to right chest Kurtz. Cyclophosphamide 4355mg in sodium chloride 0.9% 500 mL IVPB administered to right chest Kurtz over 1 hour at 500ml/hr. Chemotherapy precautions maintained & patient educated. Mesna administered as ordered. Will continue to monitor.

## 2018-10-06 NOTE — ASSESSMENT & PLAN NOTE
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 9% blasts.Started on MEC chemotherapy for refractory AML on 6/23/2018  - had BMBX on 7/13/18, without evidence of CML; BCR ABL p210 0.5% (c/w MRD)  - dasatinib 140 mg daily at home, will stop today when chemo starts  -pre-transplant bone marrow biopsy from 9/11/2018 shows a hematologic and molecular remission  - admitted for Busulfan/Cyclophosphamide MRD allogeneic stem cell transplant from his brother  -Today is Day -3. Discontinued Busulfan. Cytoxan and mesna begin, today on Day -3.

## 2018-10-06 NOTE — PROGRESS NOTES
[facility]  Hematology  Bone Marrow Transplant  Progress Note    Patient Name: Wilton Guzmán  Admission Date: 10/1/2018  Hospital Length of Stay: 5 days  Code Status: Full Code    Subjective:     Interval History: Day -3.  busulfan discontinued. Cytoxan and Mesna to begin Day -3. NEON.   No new complaints.    Objective:     Vital Signs (Most Recent):  Temp: 98.4 °F (36.9 °C) (10/06/18 0717)  Pulse: 67 (10/06/18 0717)  Resp: 15 (10/06/18 0717)  BP: 129/84 (10/06/18 0717)  SpO2: 98 % (10/06/18 0717) Vital Signs (24h Range):  Temp:  [98.3 °F (36.8 °C)-98.7 °F (37.1 °C)] 98.4 °F (36.9 °C)  Pulse:  [59-97] 67  Resp:  [14-16] 15  SpO2:  [97 %-100 %] 98 %  BP: (114-140)/(68-97) 129/84     Weight: 71.6 kg (157 lb 13.6 oz)  Body mass index is 22.02 kg/m².  Body surface area is 1.89 meters squared.    ECOG SCORE         [unfilled]    Intake/Output - Last 3 Shifts       10/04 0700 - 10/05 0659 10/05 0700 - 10/06 0659 10/06 0700 - 10/07 0659    P.O. 1085 1520 250    I.V. (mL/kg) 2900 (40.8) 3450.5 (48.2) 552.5 (7.7)    IV Piggyback 283.2 188.8 698    Total Intake(mL/kg) 4268.2 (60.1) 5159.3 (72.1) 1500.5 (21)    Urine (mL/kg/hr) 4575 (2.7) 4975 (2.9) 1500 (4.5)    Stool 0 0     Total Output 4575 4975 1500    Net -306.8 +184.3 +0.5           Stool Occurrence 1 x 2 x           Physical Exam   Constitutional: He is oriented to person, place, and time. He appears well-developed and well-nourished.   HENT:   Head: Normocephalic and atraumatic.   Mouth/Throat: No oropharyngeal exudate.   Eyes: Conjunctivae are normal. Pupils are equal, round, and reactive to light. Right eye exhibits no discharge. Left eye exhibits no discharge.   Neck: Normal range of motion. Neck supple.   Cardiovascular: Normal rate, regular rhythm, normal heart sounds and intact distal pulses.   No murmur heard.  Pulmonary/Chest: Effort normal and breath sounds normal. No respiratory distress. He has no wheezes. He has no rales.   Abdominal:  Soft. Bowel sounds are normal. He exhibits no distension. There is no tenderness.   Musculoskeletal: Normal range of motion. He exhibits no edema or deformity.   Neurological: He is alert and oriented to person, place, and time.   Skin: Skin is warm and dry. No rash noted. No erythema.   Psychiatric: He has a normal mood and affect. His behavior is normal. Judgment and thought content normal.       Significant Labs:   CBC:   Recent Labs   Lab  10/05/18   0400  10/06/18   0338   WBC  6.15  5.12   HGB  10.8*  10.8*   HCT  33.7*  33.2*   PLT  225  236    and CMP:   Recent Labs   Lab  10/05/18   0400  10/06/18   0338   NA  143  141   K  4.0  3.9   CL  109  108   CO2  27  28   GLU  100  111*   BUN  7  10   CREATININE  0.6  0.7   CALCIUM  8.9  8.9   PROT  5.7*  5.5*   ALBUMIN  2.9*  2.8*   BILITOT  0.2  0.2   ALKPHOS  71  69   AST  36  54*   ALT  53*  93*   ANIONGAP  7*  5*   EGFRNONAA  >60.0  >60.0       Diagnostic Results:  None    Assessment/Plan:     * Stem cell transplant candidate    - Day -7 of Busulfan/Cyclophosphamide matched related donor stem cell transplant from 10/10 match brother: Busulfan started this am  - Donor B+ and CMV+ , recipient O+ and CMV+  - Pre-transplant work-up with no contraindications to proceeding to transplant, sickle cell trait noted. Brother has sickle cell trait as well.   - Kurtz placed 10/1  - Stem cell infusion scheduled for 10/9    Planned conditioning regimen:  Busulfan on Day -7, -6, -5, and -4  Cyclophosphamide on Day -3, and -2   Planned  GVHD Prophylaxis:  Methotrexate on Day +1, +3, +6, and +11  Tacrolimus starting on Day -1  Antimicrobial Prophylaxis:  Acyclovir starting on Day -7  Levaquin starting on Day -1  Fluconazole starting on Day -1  Mepron starting on Day +30  Seizure Prophylaxis:  Levetiracetam on Day -7 through Day -3  SOS/VOD Prophylaxis:  Ursodiol on Day -7 through Day +30  Growth Factor Support:  Neupogen starting on Day +7          Hypophosphatemia    - phos  2.7 today  -replace per protocol        Anemia associated with chemotherapy    - due to dasatinib  - hgb stable at 10.8 gm/dl  - transfuse PRBC for hgb <7        Chronic myelogenous leukemia (CML), BCR-ABL1-positive    - original BMBX 5/26/18 with 20% blasts; CML transformed to AML with blast crisis;   karyotype: 46,XY,t(9;22;22;14)q34;q11.2;q13;q23); BCR/ABL gene fusion 62%  - received induction with 7+3 on 6/1-6/8/2018  - persistent disease post 7+3 induction, repeat BM biopsy done 6/18 here at [facility] after 7+3. 9% blasts.Started on MEC chemotherapy for refractory AML on 6/23/2018  - had BMBX on 7/13/18, without evidence of CML; BCR ABL p210 0.5% (c/w MRD)  - dasatinib 140 mg daily at home, will stop today when chemo starts  -pre-transplant bone marrow biopsy from 9/11/2018 shows a hematologic and molecular remission  - admitted for Busulfan/Cyclophosphamide MRD allogeneic stem cell transplant from his brother  -Today is Day -3. Discontinued Busulfan. Cytoxan and mesna begin, today on Day -3.               VTE Risk Mitigation (From admission, onward)        Ordered     enoxaparin injection 40 mg  Daily      10/01/18 1729     heparin, porcine (PF) 100 unit/mL injection flush 300 Units  As needed (PRN)      10/01/18 1657          Disposition:     Sergei Riddle MD  Bone Marrow Transplant  [facility]

## 2018-10-06 NOTE — PLAN OF CARE
Problem: Patient Care Overview  Goal: Plan of Care Review  Outcome: Ongoing (interventions implemented as appropriate)  POC reviewed with patient; understanding verbalized. Adonay -3. Cyclophosphamide and Mesna administered this shift. Pt remains independent. Voids clear, yellow urine per the urinal with one BM. Regular diet with good appetite. NS with 20K at 150mL. Pt. with nonskid footwear on, bed in lowest position, and locked with bed rails up x2.  Pt. has call light and personal items within reach.  VSS and afebrile this shift. All questions and concerns addressed at this time. Will continue to monitor.

## 2018-10-06 NOTE — PLAN OF CARE
Problem: Patient Care Overview  Goal: Plan of Care Review  Outcome: Ongoing (interventions implemented as appropriate)  Plan of care reviewed with the patient at the beginning of the shift. Pt is day -3 of allo transplant. Pt is tolerating chemo well. He has no complaints at this time. He denies pain. He denies n/v/d. Pt reports normal BMs. PO intake encouraged. IVF infusing. Mucous membranes are intact. Salt and soda and saliva substitute provided. Fall precautions maintained. Pt remained free from falls and injury this shift. Bed locked in lowest position, side rails up x2, call light within reach. Instructed pt to call for assistance as needed. Pt verbalized understanding. Pt afebrile overnight. Vitals stable. No acute issues overnight. Will continue to monitor.

## 2018-10-07 LAB
ALBUMIN SERPL BCP-MCNC: 2.7 G/DL
ALP SERPL-CCNC: 69 U/L
ALT SERPL W/O P-5'-P-CCNC: 107 U/L
ANION GAP SERPL CALC-SCNC: 7 MMOL/L
AST SERPL-CCNC: 37 U/L
BASOPHILS # BLD AUTO: 0 K/UL
BASOPHILS NFR BLD: 0 %
BILIRUB SERPL-MCNC: 0.2 MG/DL
BUN SERPL-MCNC: 9 MG/DL
CALCIUM SERPL-MCNC: 8.8 MG/DL
CHLORIDE SERPL-SCNC: 108 MMOL/L
CO2 SERPL-SCNC: 26 MMOL/L
CREAT SERPL-MCNC: 0.6 MG/DL
DIFFERENTIAL METHOD: ABNORMAL
EOSINOPHIL # BLD AUTO: 0 K/UL
EOSINOPHIL NFR BLD: 0 %
ERYTHROCYTE [DISTWIDTH] IN BLOOD BY AUTOMATED COUNT: 14 %
EST. GFR  (AFRICAN AMERICAN): >60 ML/MIN/1.73 M^2
EST. GFR  (NON AFRICAN AMERICAN): >60 ML/MIN/1.73 M^2
GLUCOSE SERPL-MCNC: 156 MG/DL
HCT VFR BLD AUTO: 31.6 %
HGB BLD-MCNC: 10.8 G/DL
IMM GRANULOCYTES # BLD AUTO: 0.01 K/UL
IMM GRANULOCYTES NFR BLD AUTO: 0.2 %
LYMPHOCYTES # BLD AUTO: 1.1 K/UL
LYMPHOCYTES NFR BLD: 23.9 %
MAGNESIUM SERPL-MCNC: 1.8 MG/DL
MCH RBC QN AUTO: 30.8 PG
MCHC RBC AUTO-ENTMCNC: 34.2 G/DL
MCV RBC AUTO: 90 FL
MONOCYTES # BLD AUTO: 0.5 K/UL
MONOCYTES NFR BLD: 10.7 %
NEUTROPHILS # BLD AUTO: 2.9 K/UL
NEUTROPHILS NFR BLD: 65.2 %
NRBC BLD-RTO: 0 /100 WBC
PHOSPHATE SERPL-MCNC: 3.3 MG/DL
PLATELET # BLD AUTO: 237 K/UL
PMV BLD AUTO: 11.1 FL
POTASSIUM SERPL-SCNC: 4 MMOL/L
PROT SERPL-MCNC: 5.3 G/DL
RBC # BLD AUTO: 3.51 M/UL
SODIUM SERPL-SCNC: 141 MMOL/L
WBC # BLD AUTO: 4.39 K/UL

## 2018-10-07 PROCEDURE — A9155 ARTIFICIAL SALIVA: HCPCS | Performed by: INTERNAL MEDICINE

## 2018-10-07 PROCEDURE — 84100 ASSAY OF PHOSPHORUS: CPT

## 2018-10-07 PROCEDURE — 80053 COMPREHEN METABOLIC PANEL: CPT

## 2018-10-07 PROCEDURE — 99233 SBSQ HOSP IP/OBS HIGH 50: CPT | Mod: ,,, | Performed by: INTERNAL MEDICINE

## 2018-10-07 PROCEDURE — 20600001 HC STEP DOWN PRIVATE ROOM

## 2018-10-07 PROCEDURE — 63600175 PHARM REV CODE 636 W HCPCS: Performed by: INTERNAL MEDICINE

## 2018-10-07 PROCEDURE — 83735 ASSAY OF MAGNESIUM: CPT

## 2018-10-07 PROCEDURE — 25000003 PHARM REV CODE 250: Performed by: NURSE PRACTITIONER

## 2018-10-07 PROCEDURE — 85025 COMPLETE CBC W/AUTO DIFF WBC: CPT

## 2018-10-07 PROCEDURE — 25000003 PHARM REV CODE 250: Performed by: INTERNAL MEDICINE

## 2018-10-07 PROCEDURE — 63600175 PHARM REV CODE 636 W HCPCS: Performed by: NURSE PRACTITIONER

## 2018-10-07 RX ADMIN — CYCLOPHOSPHAMIDE 4355 MG: 2 INJECTION, POWDER, FOR SOLUTION INTRAVENOUS; ORAL at 09:10

## 2018-10-07 RX ADMIN — ENOXAPARIN SODIUM 40 MG: 100 INJECTION SUBCUTANEOUS at 05:10

## 2018-10-07 RX ADMIN — URSODIOL 300 MG: 300 CAPSULE ORAL at 09:10

## 2018-10-07 RX ADMIN — Medication 30 ML: at 12:10

## 2018-10-07 RX ADMIN — SODIUM CHLORIDE AND POTASSIUM CHLORIDE: 9; 1.49 INJECTION, SOLUTION INTRAVENOUS at 06:10

## 2018-10-07 RX ADMIN — SODIUM CHLORIDE AND POTASSIUM CHLORIDE: 9; 1.49 INJECTION, SOLUTION INTRAVENOUS at 08:10

## 2018-10-07 RX ADMIN — PANTOPRAZOLE SODIUM 40 MG: 40 TABLET, DELAYED RELEASE ORAL at 08:10

## 2018-10-07 RX ADMIN — MESNA 871 MG: 100 INJECTION, SOLUTION INTRAVENOUS at 03:10

## 2018-10-07 RX ADMIN — DEXAMETHASONE 12 MG: 4 TABLET ORAL at 08:10

## 2018-10-07 RX ADMIN — MAGNESIUM OXIDE TAB 400 MG (241.3 MG ELEMENTAL MG) 400 MG: 400 (241.3 MG) TAB at 06:10

## 2018-10-07 RX ADMIN — SODIUM CHLORIDE AND POTASSIUM CHLORIDE: 9; 1.49 INJECTION, SOLUTION INTRAVENOUS at 01:10

## 2018-10-07 RX ADMIN — ONDANSETRON 16 MG: 8 TABLET, ORALLY DISINTEGRATING ORAL at 08:10

## 2018-10-07 RX ADMIN — MESNA 871 MG: 100 INJECTION, SOLUTION INTRAVENOUS at 05:10

## 2018-10-07 RX ADMIN — ACYCLOVIR 800 MG: 200 CAPSULE ORAL at 08:10

## 2018-10-07 RX ADMIN — MESNA 871 MG: 100 INJECTION, SOLUTION INTRAVENOUS at 08:10

## 2018-10-07 RX ADMIN — URSODIOL 300 MG: 300 CAPSULE ORAL at 08:10

## 2018-10-07 RX ADMIN — MESNA 871 MG: 100 INJECTION, SOLUTION INTRAVENOUS at 12:10

## 2018-10-07 NOTE — PLAN OF CARE
Problem: Patient Care Overview  Goal: Plan of Care Review  Outcome: Ongoing (interventions implemented as appropriate)  Plan of care reviewed with the patient at the beginning of the shift. Pt is day -2 of allo transplant. Pt is tolerating chemo well. He has no complaints at this time. He denies pain. He denies n/v/d. Pt reports normal BMs. PO intake encouraged. IVF infusing. Mucous membranes are intact. Salt and soda and saliva substitute provided. Fall precautions maintained. Pt remained free from falls and injury this shift. Bed locked in lowest position, side rails up x2, call light within reach. Instructed pt to call for assistance as needed. Pt verbalized understanding. Pt afebrile overnight. Vitals stable. No acute issues overnight. Will continue to monitor.

## 2018-10-07 NOTE — ASSESSMENT & PLAN NOTE
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 9% blasts.Started on MEC chemotherapy for refractory AML on 6/23/2018  - had BMBX on 7/13/18, without evidence of CML; BCR ABL p210 0.5% (c/w MRD)  - dasatinib 140 mg daily at home, will stop today when chemo starts  -pre-transplant bone marrow biopsy from 9/11/2018 shows a hematologic and molecular remission  - admitted for Busulfan/Cyclophosphamide MRD allogeneic stem cell transplant from his brother  -Today is Day -2. Discontinued Busulfan. Cytoxan and mesna started on Day -3.

## 2018-10-07 NOTE — PT/OT/SLP PROGRESS
Occupational Therapy      Patient Name:  Wilton Guzmán   MRN:  [id number]    Patient not seen today secondary to order written for OT evaluation starting 10/08/18  . Will follow-up at next scheduled visit.      Cara Zambrano, OT  10/7/2018

## 2018-10-07 NOTE — PT/OT/SLP PROGRESS
Physical Therapy   Update    Wilton Guzmán   MRN: [id number]     PT/OT orders entered in system, orders have a start date and time of 10/8 at 0000. Pt is currently day -2 for allo stem cell transplant, ambulating independently per nsg notes. Will plan on seeing for PT/OT evaluation Wednesday 10/10, will be day +1. No billable units at this time.    Cresencio Fowler, PT  10/7/2018

## 2018-10-07 NOTE — PLAN OF CARE
Problem: Patient Care Overview  Goal: Plan of Care Review  Outcome: Ongoing (interventions implemented as appropriate)  Side rails up x2; call bell in place; bed in lowest, locked position; skid proof socks on; no evidence of skin breakdown; care plan explained to patient; pt remains free of injury. Pt on day -2 for an auto stem cell transplant. Mesna and cyclophosphamide given, IVF infusing at 150cc/hr. Pt tolerated po but with poor appetite, voids, BM x 1, ambulates, denies pain, n/v or diarrhea. States he is very tired. VSS and afebrile.

## 2018-10-07 NOTE — PROGRESS NOTES
[facility]  Hematology  Bone Marrow Transplant  Progress Note    Patient Name: Wilton Guzmán  Admission Date: 10/1/2018  Hospital Length of Stay: 6 days  Code Status: Full Code    Subjective:     Interval History: Day -2.  No new complaints.    Objective:     Vital Signs (Most Recent):  Temp: 98.4 °F (36.9 °C) (10/07/18 1222)  Pulse: 74 (10/07/18 1222)  Resp: 18 (10/07/18 1222)  BP: 126/82 (10/07/18 1222)  SpO2: 100 % (10/07/18 1222) Vital Signs (24h Range):  Temp:  [97.9 °F (36.6 °C)-98.4 °F (36.9 °C)] 98.4 °F (36.9 °C)  Pulse:  [73-94] 74  Resp:  [16-18] 18  SpO2:  [97 %-100 %] 100 %  BP: (114-126)/(65-82) 126/82     Weight: 72 kg (158 lb 13.5 oz)  Body mass index is 22.15 kg/m².  Body surface area is 1.9 meters squared.    ECOG SCORE         [unfilled]    Intake/Output - Last 3 Shifts       10/05 0700 - 10/06 0659 10/06 0700 - 10/07 0659 10/07 0700 - 10/08 0659    P.O. 1520 680 360    I.V. (mL/kg) 3450.5 (48.2) 3617 (50.2)     IV Piggyback 188.8 848     Total Intake(mL/kg) 5159.3 (72.1) 5145 (71.5) 360 (5)    Urine (mL/kg/hr) 4975 (2.9) 4200 (2.4) 825 (1.6)    Stool 0 0     Total Output 4975 4200 825    Net +184.3 +945 -465           Urine Occurrence  1 x     Stool Occurrence 2 x 1 x           Physical Exam   Constitutional: He is oriented to person, place, and time. He appears well-developed and well-nourished.   HENT:   Head: Normocephalic and atraumatic.   Mouth/Throat: No oropharyngeal exudate.   Eyes: Conjunctivae are normal. Pupils are equal, round, and reactive to light. Right eye exhibits no discharge. Left eye exhibits no discharge.   Neck: Normal range of motion. Neck supple.   Cardiovascular: Normal rate, regular rhythm, normal heart sounds and intact distal pulses.   No murmur heard.  Pulmonary/Chest: Effort normal and breath sounds normal. No respiratory distress. He has no wheezes. He has no rales.   Abdominal: Soft. Bowel sounds are normal. He exhibits no distension. There is no  tenderness.   Musculoskeletal: Normal range of motion. He exhibits no edema or deformity.   Neurological: He is alert and oriented to person, place, and time.   Skin: Skin is warm and dry. No rash noted. No erythema.   Psychiatric: He has a normal mood and affect. His behavior is normal. Judgment and thought content normal.       Significant Labs:   CBC:   Recent Labs   Lab  10/06/18   0338  10/07/18   0357   WBC  5.12  4.39   HGB  10.8*  10.8*   HCT  33.2*  31.6*   PLT  236  237    and CMP:   Recent Labs   Lab  10/06/18   0338  10/07/18   0357   NA  141  141   K  3.9  4.0   CL  108  108   CO2  28  26   GLU  111*  156*   BUN  10  9   CREATININE  0.7  0.6   CALCIUM  8.9  8.8   PROT  5.5*  5.3*   ALBUMIN  2.8*  2.7*   BILITOT  0.2  0.2   ALKPHOS  69  69   AST  54*  37   ALT  93*  107*   ANIONGAP  5*  7*   EGFRNONAA  >60.0  >60.0       Diagnostic Results:  None    Assessment/Plan:     * Stem cell transplant candidate    - Day -7 of Busulfan/Cyclophosphamide matched related donor stem cell transplant from 10/10 match brother: Busulfan started this am  - Donor B+ and CMV+ , recipient O+ and CMV+  - Pre-transplant work-up with no contraindications to proceeding to transplant, sickle cell trait noted. Brother has sickle cell trait as well.   - Kurtz placed 10/1  - Stem cell infusion scheduled for 10/9    Planned conditioning regimen:  Busulfan on Day -7, -6, -5, and -4  Cyclophosphamide on Day -3, and -2   Planned  GVHD Prophylaxis:  Methotrexate on Day +1, +3, +6, and +11  Tacrolimus starting on Day -1  Antimicrobial Prophylaxis:  Acyclovir starting on Day -7  Levaquin starting on Day -1  Fluconazole starting on Day -1  Mepron starting on Day +30  Seizure Prophylaxis:  Levetiracetam on Day -7 through Day -3  SOS/VOD Prophylaxis:  Ursodiol on Day -7 through Day +30  Growth Factor Support:  Neupogen starting on Day +7          Hypophosphatemia    - phos 2.7 today  -replace per protocol        Anemia associated with  chemotherapy    - due to dasatinib  - hgb stable at 10.8 gm/dl  - transfuse PRBC for hgb <7        Chronic myelogenous leukemia (CML), BCR-ABL1-positive    - original BMBX 5/26/18 with 20% blasts; CML transformed to AML with blast crisis;   karyotype: 46,XY,t(9;22;22;14)q34;q11.2;q13;q23); BCR/ABL gene fusion 62%  - received induction with 7+3 on 6/1-6/8/2018  - persistent disease post 7+3 induction, repeat BM biopsy done 6/18 here at [facility] after 7+3. 9% blasts.Started on MEC chemotherapy for refractory AML on 6/23/2018  - had BMBX on 7/13/18, without evidence of CML; BCR ABL p210 0.5% (c/w MRD)  - dasatinib 140 mg daily at home, will stop today when chemo starts  -pre-transplant bone marrow biopsy from 9/11/2018 shows a hematologic and molecular remission  - admitted for Busulfan/Cyclophosphamide MRD allogeneic stem cell transplant from his brother  -Today is Day -2. Discontinued Busulfan. Cytoxan and mesna started on Day -3.               VTE Risk Mitigation (From admission, onward)        Ordered     enoxaparin injection 40 mg  Daily      10/01/18 1729     heparin, porcine (PF) 100 unit/mL injection flush 300 Units  As needed (PRN)      10/01/18 1657          Disposition:     Sergei Riddle MD  Bone Marrow Transplant  [facility]

## 2018-10-07 NOTE — SUBJECTIVE & OBJECTIVE
Subjective:     Interval History: Day -2.  busulfan discontinued. Cytoxan and Mesna to begin Day -3. NEON.   No new complaints.    Objective:     Vital Signs (Most Recent):  Temp: 98.4 °F (36.9 °C) (10/07/18 1222)  Pulse: 74 (10/07/18 1222)  Resp: 18 (10/07/18 1222)  BP: 126/82 (10/07/18 1222)  SpO2: 100 % (10/07/18 1222) Vital Signs (24h Range):  Temp:  [97.9 °F (36.6 °C)-98.4 °F (36.9 °C)] 98.4 °F (36.9 °C)  Pulse:  [73-94] 74  Resp:  [16-18] 18  SpO2:  [97 %-100 %] 100 %  BP: (114-126)/(65-82) 126/82     Weight: 72 kg (158 lb 13.5 oz)  Body mass index is 22.15 kg/m².  Body surface area is 1.9 meters squared.    ECOG SCORE         [unfilled]    Intake/Output - Last 3 Shifts       10/05 0700 - 10/06 0659 10/06 0700 - 10/07 0659 10/07 0700 - 10/08 0659    P.O. 1520 680 360    I.V. (mL/kg) 3450.5 (48.2) 3617 (50.2)     IV Piggyback 188.8 848     Total Intake(mL/kg) 5159.3 (72.1) 5145 (71.5) 360 (5)    Urine (mL/kg/hr) 4975 (2.9) 4200 (2.4) 825 (1.6)    Stool 0 0     Total Output 4975 4200 825    Net +184.3 +945 -465           Urine Occurrence  1 x     Stool Occurrence 2 x 1 x           Physical Exam   Constitutional: He is oriented to person, place, and time. He appears well-developed and well-nourished.   HENT:   Head: Normocephalic and atraumatic.   Mouth/Throat: No oropharyngeal exudate.   Eyes: Conjunctivae are normal. Pupils are equal, round, and reactive to light. Right eye exhibits no discharge. Left eye exhibits no discharge.   Neck: Normal range of motion. Neck supple.   Cardiovascular: Normal rate, regular rhythm, normal heart sounds and intact distal pulses.   No murmur heard.  Pulmonary/Chest: Effort normal and breath sounds normal. No respiratory distress. He has no wheezes. He has no rales.   Abdominal: Soft. Bowel sounds are normal. He exhibits no distension. There is no tenderness.   Musculoskeletal: Normal range of motion. He exhibits no edema or deformity.   Neurological: He is alert and oriented to  person, place, and time.   Skin: Skin is warm and dry. No rash noted. No erythema.   Psychiatric: He has a normal mood and affect. His behavior is normal. Judgment and thought content normal.       Significant Labs:   CBC:   Recent Labs   Lab  10/06/18   0338  10/07/18   0357   WBC  5.12  4.39   HGB  10.8*  10.8*   HCT  33.2*  31.6*   PLT  236  237    and CMP:   Recent Labs   Lab  10/06/18   0338  10/07/18   0357   NA  141  141   K  3.9  4.0   CL  108  108   CO2  28  26   GLU  111*  156*   BUN  10  9   CREATININE  0.7  0.6   CALCIUM  8.9  8.8   PROT  5.5*  5.3*   ALBUMIN  2.8*  2.7*   BILITOT  0.2  0.2   ALKPHOS  69  69   AST  54*  37   ALT  93*  107*   ANIONGAP  5*  7*   EGFRNONAA  >60.0  >60.0       Diagnostic Results:  None

## 2018-10-08 PROBLEM — R19.7 DIARRHEA: Status: ACTIVE | Noted: 2018-10-08

## 2018-10-08 LAB
ABO + RH BLD: NORMAL
ALBUMIN SERPL BCP-MCNC: 2.8 G/DL
ALP SERPL-CCNC: 61 U/L
ALT SERPL W/O P-5'-P-CCNC: 75 U/L
ANION GAP SERPL CALC-SCNC: 7 MMOL/L
AST SERPL-CCNC: 19 U/L
BASOPHILS # BLD AUTO: 0 K/UL
BASOPHILS NFR BLD: 0 %
BILIRUB SERPL-MCNC: 0.3 MG/DL
BLD GP AB SCN CELLS X3 SERPL QL: NORMAL
BUN SERPL-MCNC: 11 MG/DL
CALCIUM SERPL-MCNC: 8.8 MG/DL
CHLORIDE SERPL-SCNC: 107 MMOL/L
CO2 SERPL-SCNC: 27 MMOL/L
CREAT SERPL-MCNC: 0.6 MG/DL
DIFFERENTIAL METHOD: ABNORMAL
EOSINOPHIL # BLD AUTO: 0 K/UL
EOSINOPHIL NFR BLD: 0 %
ERYTHROCYTE [DISTWIDTH] IN BLOOD BY AUTOMATED COUNT: 14 %
EST. GFR  (AFRICAN AMERICAN): >60 ML/MIN/1.73 M^2
EST. GFR  (NON AFRICAN AMERICAN): >60 ML/MIN/1.73 M^2
GLUCOSE SERPL-MCNC: 91 MG/DL
HCT VFR BLD AUTO: 32 %
HGB BLD-MCNC: 10.7 G/DL
IMM GRANULOCYTES # BLD AUTO: 0.03 K/UL
IMM GRANULOCYTES NFR BLD AUTO: 0.6 %
LYMPHOCYTES # BLD AUTO: 0.7 K/UL
LYMPHOCYTES NFR BLD: 14.9 %
MAGNESIUM SERPL-MCNC: 1.7 MG/DL
MCH RBC QN AUTO: 30.2 PG
MCHC RBC AUTO-ENTMCNC: 33.4 G/DL
MCV RBC AUTO: 90 FL
MONOCYTES # BLD AUTO: 0.4 K/UL
MONOCYTES NFR BLD: 8.2 %
NEUTROPHILS # BLD AUTO: 3.7 K/UL
NEUTROPHILS NFR BLD: 76.3 %
NRBC BLD-RTO: 0 /100 WBC
PHOSPHATE SERPL-MCNC: 3.1 MG/DL
PLATELET # BLD AUTO: 243 K/UL
PMV BLD AUTO: 11.6 FL
POTASSIUM SERPL-SCNC: 3.9 MMOL/L
PROT SERPL-MCNC: 5.2 G/DL
RBC # BLD AUTO: 3.54 M/UL
SODIUM SERPL-SCNC: 141 MMOL/L
WBC # BLD AUTO: 4.89 K/UL

## 2018-10-08 PROCEDURE — 84100 ASSAY OF PHOSPHORUS: CPT

## 2018-10-08 PROCEDURE — 25000003 PHARM REV CODE 250: Performed by: INTERNAL MEDICINE

## 2018-10-08 PROCEDURE — 86850 RBC ANTIBODY SCREEN: CPT

## 2018-10-08 PROCEDURE — 20600001 HC STEP DOWN PRIVATE ROOM

## 2018-10-08 PROCEDURE — 85025 COMPLETE CBC W/AUTO DIFF WBC: CPT

## 2018-10-08 PROCEDURE — 63600175 PHARM REV CODE 636 W HCPCS: Performed by: NURSE PRACTITIONER

## 2018-10-08 PROCEDURE — 63600175 PHARM REV CODE 636 W HCPCS: Performed by: INTERNAL MEDICINE

## 2018-10-08 PROCEDURE — 25000003 PHARM REV CODE 250: Performed by: NURSE PRACTITIONER

## 2018-10-08 PROCEDURE — 99233 SBSQ HOSP IP/OBS HIGH 50: CPT | Mod: ,,, | Performed by: INTERNAL MEDICINE

## 2018-10-08 PROCEDURE — 83735 ASSAY OF MAGNESIUM: CPT

## 2018-10-08 PROCEDURE — 80053 COMPREHEN METABOLIC PANEL: CPT

## 2018-10-08 RX ADMIN — ONDANSETRON 16 MG: 8 TABLET, ORALLY DISINTEGRATING ORAL at 08:10

## 2018-10-08 RX ADMIN — FLUCONAZOLE 400 MG: 200 TABLET ORAL at 08:10

## 2018-10-08 RX ADMIN — LEVOFLOXACIN 500 MG: 500 TABLET, FILM COATED ORAL at 08:10

## 2018-10-08 RX ADMIN — TACROLIMUS 2 MG: 1 CAPSULE ORAL at 05:10

## 2018-10-08 RX ADMIN — SODIUM CHLORIDE AND POTASSIUM CHLORIDE: 9; 1.49 INJECTION, SOLUTION INTRAVENOUS at 11:10

## 2018-10-08 RX ADMIN — MAGNESIUM OXIDE TAB 400 MG (241.3 MG ELEMENTAL MG) 400 MG: 400 (241.3 MG) TAB at 08:10

## 2018-10-08 RX ADMIN — MAGNESIUM OXIDE TAB 400 MG (241.3 MG ELEMENTAL MG) 400 MG: 400 (241.3 MG) TAB at 05:10

## 2018-10-08 RX ADMIN — ENOXAPARIN SODIUM 40 MG: 100 INJECTION SUBCUTANEOUS at 05:10

## 2018-10-08 RX ADMIN — TACROLIMUS 2 MG: 1 CAPSULE ORAL at 07:10

## 2018-10-08 RX ADMIN — URSODIOL 300 MG: 300 CAPSULE ORAL at 08:10

## 2018-10-08 RX ADMIN — ACYCLOVIR 800 MG: 200 CAPSULE ORAL at 08:10

## 2018-10-08 RX ADMIN — LOPERAMIDE HYDROCHLORIDE 2 MG: 2 CAPSULE ORAL at 01:10

## 2018-10-08 RX ADMIN — PANTOPRAZOLE SODIUM 40 MG: 40 TABLET, DELAYED RELEASE ORAL at 08:10

## 2018-10-08 RX ADMIN — SODIUM CHLORIDE AND POTASSIUM CHLORIDE: 9; 1.49 INJECTION, SOLUTION INTRAVENOUS at 03:10

## 2018-10-08 RX ADMIN — OXYCODONE HYDROCHLORIDE 5 MG: 5 TABLET ORAL at 01:10

## 2018-10-08 NOTE — PROGRESS NOTES
Dr. Del Rio notified of patient's loose stools and abdominal cramping. PRN Imodium and Oxycodone given. Will continue to monitor.

## 2018-10-08 NOTE — PROGRESS NOTES
[facility]  Hematology  Bone Marrow Transplant  Progress Note    Patient Name: Wilton Guzmán  Admission Date: 10/1/2018  Hospital Length of Stay: 7 days  Code Status: Full Code    Subjective:     Interval History: Day -1. No c/o nausea on exam. Appetite still good. Completed Cytoxan and Mesna. Tacro started this am. Start tacro levels 10/10/2018 (Wednesday). 5 lb weight gain since admission. No sign of fluid overload noted. Patient voiding well.       Objective:     Vital Signs (Most Recent):  Temp: 98.6 °F (37 °C) (10/08/18 1113)  Pulse: 73 (10/08/18 1113)  Resp: 16 (10/08/18 1113)  BP: 134/85 (10/08/18 1113)  SpO2: 99 % (10/08/18 1113) Vital Signs (24h Range):  Temp:  [97.9 °F (36.6 °C)-98.6 °F (37 °C)] 98.6 °F (37 °C)  Pulse:  [68-90] 73  Resp:  [16] 16  SpO2:  [98 %-100 %] 99 %  BP: (127-138)/(79-90) 134/85     Weight: 71.7 kg (158 lb 1.1 oz)  Body mass index is 22.05 kg/m².  Body surface area is 1.9 meters squared.    ECOG SCORE         [unfilled]    Intake/Output - Last 3 Shifts       10/06 0700 - 10/07 0659 10/07 0700 - 10/08 0659 10/08 0700 - 10/09 0659    P.O. 680 840 720    I.V. (mL/kg) 3617 (50.2) 3515 (49) 1275 (17.8)    IV Piggyback 848 50     Total Intake(mL/kg) 5145 (71.5) 4405 (61.4) 1995 (27.8)    Urine (mL/kg/hr) 4200 (2.4) 3625 (2.1) 600 (0.9)    Stool 0 0 0    Total Output 4200 3625 600    Net +945 +780 +1395           Urine Occurrence 1 x 1 x     Stool Occurrence 1 x 2 x 3 x          Physical Exam   Constitutional: He is oriented to person, place, and time. He appears well-developed and well-nourished.   HENT:   Head: Normocephalic and atraumatic.   Mouth/Throat: No oropharyngeal exudate.   Eyes: Conjunctivae are normal. Pupils are equal, round, and reactive to light. Right eye exhibits no discharge. Left eye exhibits no discharge.   Neck: Normal range of motion. Neck supple.   Cardiovascular: Normal rate, regular rhythm, normal heart sounds and intact distal pulses.   No  murmur heard.  Pulmonary/Chest: Effort normal and breath sounds normal. No respiratory distress. He has no wheezes. He has no rales.   Abdominal: Soft. Bowel sounds are normal. He exhibits no distension. There is no tenderness.   Patient started having diarrhea today.   Musculoskeletal: Normal range of motion. He exhibits no edema or deformity.   Neurological: He is alert and oriented to person, place, and time.   Skin: Skin is warm and dry. No rash noted. No erythema.   Psychiatric: He has a normal mood and affect. His behavior is normal. Judgment and thought content normal.       Significant Labs:   CBC:   Recent Labs   Lab  10/07/18   0357  10/08/18   0324   WBC  4.39  4.89   HGB  10.8*  10.7*   HCT  31.6*  32.0*   PLT  237  243    and CMP:   Recent Labs   Lab  10/07/18   0357  10/08/18   0324   NA  141  141   K  4.0  3.9   CL  108  107   CO2  26  27   GLU  156*  91   BUN  9  11   CREATININE  0.6  0.6   CALCIUM  8.8  8.8   PROT  5.3*  5.2*   ALBUMIN  2.7*  2.8*   BILITOT  0.2  0.3   ALKPHOS  69  61   AST  37  19   ALT  107*  75*   ANIONGAP  7*  7*   EGFRNONAA  >60.0  >60.0       Diagnostic Results:  None  Assessment/Plan:     * Stem cell transplant candidate    - Day -1 of Busulfan/Cyclophosphamide matched related donor stem cell transplant from 10/10 match brother: Busulfan started this am  - Donor B+ and CMV+ , recipient O+ and CMV+  - Pre-transplant work-up with no contraindications to proceeding to transplant, sickle cell trait noted. Brother has sickle cell trait as well.   - Kurtz placed 10/1  - Stem cell infusion scheduled for 10/9    Planned conditioning regimen:  Busulfan on Day -7, -6, -5, and -4  Cyclophosphamide on Day -3, and -2   Planned  GVHD Prophylaxis:  Methotrexate on Day +1, +3, +6, and +11  Tacrolimus starting on Day -1  Antimicrobial Prophylaxis:  Acyclovir starting on Day -7  Levaquin starting on Day -1  Fluconazole starting on Day -1  Mepron starting on Day +30  Seizure  Prophylaxis:  Levetiracetam on Day -7 through Day -3  SOS/VOD Prophylaxis:  Ursodiol on Day -7 through Day +30  Growth Factor Support:  Neupogen starting on Day +7          Chronic myelogenous leukemia (CML), BCR-ABL1-positive    - original BMBX 5/26/18 with 20% blasts; CML transformed to AML with blast crisis;   karyotype: 46,XY,t(9;22;22;14)q34;q11.2;q13;q23); BCR/ABL gene fusion 62%  - received induction with 7+3 on 6/1-6/8/2018  - persistent disease post 7+3 induction, repeat BM biopsy done 6/18 here at [facility] after 7+3. 9% blasts.Started on MEC chemotherapy for refractory AML on 6/23/2018  - had BMBX on 7/13/18, without evidence of CML; BCR ABL p210 0.5% (c/w MRD)  - dasatinib 140 mg daily at home, will stop today when chemo starts  -pre-transplant bone marrow biopsy from 9/11/2018 shows a hematologic and molecular remission  - admitted for Busulfan/Cyclophosphamide MRD allogeneic stem cell transplant from his brother  -Today is Day -1. Completed chemo. Tacro started today. tacro levels to start 10/10/2018.           Anemia associated with chemotherapy    - due to dasatinib  - hgb stable at 10.7 gm/dl  - transfuse PRBC for hgb <7        Diarrhea    -patient began having diarrhea today (10/10/2018).  -check stool for c-diff  -prn imodium if c-diff neg.        Hypophosphatemia    - phos 3.1 today  -replace per protocol            VTE Risk Mitigation (From admission, onward)        Ordered     enoxaparin injection 40 mg  Daily      10/01/18 1729     heparin, porcine (PF) 100 unit/mL injection flush 300 Units  As needed (PRN)      10/01/18 1657          Disposition: SCT planned for tomorrow (10/9). Awaiting count drop and subsequent recovery.    Leona Beltran, NP  Bone Marrow Transplant  [facility]

## 2018-10-08 NOTE — ASSESSMENT & PLAN NOTE
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 9% blasts.Started on MEC chemotherapy for refractory AML on 6/23/2018  - had BMBX on 7/13/18, without evidence of CML; BCR ABL p210 0.5% (c/w MRD)  - dasatinib 140 mg daily at home, will stop today when chemo starts  -pre-transplant bone marrow biopsy from 9/11/2018 shows a hematologic and molecular remission  - admitted for Busulfan/Cyclophosphamide MRD allogeneic stem cell transplant from his brother  -Today is Day -1. Completed chemo. Tacro started today. tacro levels to start 10/10/2018.

## 2018-10-08 NOTE — SUBJECTIVE & OBJECTIVE
Subjective:     Interval History: Day -1. No c/o nausea on exam. Appetite still good. Completed Cytoxan and Mesna. Tacro started this am. Start tacro levels 10/10/2018 (Wednesday). 5 lb weight gain since admission. No sign of fluid overload noted. Patient voiding well.     Objective:     Vital Signs (Most Recent):  Temp: 98.6 °F (37 °C) (10/08/18 1113)  Pulse: 73 (10/08/18 1113)  Resp: 16 (10/08/18 1113)  BP: 134/85 (10/08/18 1113)  SpO2: 99 % (10/08/18 1113) Vital Signs (24h Range):  Temp:  [97.9 °F (36.6 °C)-98.6 °F (37 °C)] 98.6 °F (37 °C)  Pulse:  [68-90] 73  Resp:  [16-18] 16  SpO2:  [98 %-100 %] 99 %  BP: (126-142)/(79-90) 134/85     Weight: 71.7 kg (158 lb 1.1 oz)  Body mass index is 22.05 kg/m².  Body surface area is 1.9 meters squared.    ECOG SCORE         [unfilled]    Intake/Output - Last 3 Shifts       10/06 0700 - 10/07 0659 10/07 0700 - 10/08 0659 10/08 0700 - 10/09 0659    P.O. 680 840 480    I.V. (mL/kg) 3617 (50.2) 3515 (49)     IV Piggyback 848 50     Total Intake(mL/kg) 5145 (71.5) 4405 (61.4) 480 (6.7)    Urine (mL/kg/hr) 4200 (2.4) 3625 (2.1) 375 (1.2)    Stool 0 0     Total Output 4200 3625 375    Net +945 +780 +105           Urine Occurrence 1 x 1 x     Stool Occurrence 1 x 2 x           Physical Exam   Constitutional: He is oriented to person, place, and time. He appears well-developed and well-nourished.   HENT:   Head: Normocephalic and atraumatic.   Mouth/Throat: No oropharyngeal exudate.   Eyes: Conjunctivae are normal. Pupils are equal, round, and reactive to light. Right eye exhibits no discharge. Left eye exhibits no discharge.   Neck: Normal range of motion. Neck supple.   Cardiovascular: Normal rate, regular rhythm, normal heart sounds and intact distal pulses.   No murmur heard.  Pulmonary/Chest: Effort normal and breath sounds normal. No respiratory distress. He has no wheezes. He has no rales.   Abdominal: Soft. Bowel sounds are normal. He exhibits no distension. There is no  tenderness.   Musculoskeletal: Normal range of motion. He exhibits no edema or deformity.   Neurological: He is alert and oriented to person, place, and time.   Skin: Skin is warm and dry. No rash noted. No erythema.   Psychiatric: He has a normal mood and affect. His behavior is normal. Judgment and thought content normal.       Significant Labs:   CBC:   Recent Labs   Lab  10/07/18   0357  10/08/18   0324   WBC  4.39  4.89   HGB  10.8*  10.7*   HCT  31.6*  32.0*   PLT  237  243    and CMP:   Recent Labs   Lab  10/07/18   0357  10/08/18   0324   NA  141  141   K  4.0  3.9   CL  108  107   CO2  26  27   GLU  156*  91   BUN  9  11   CREATININE  0.6  0.6   CALCIUM  8.8  8.8   PROT  5.3*  5.2*   ALBUMIN  2.7*  2.8*   BILITOT  0.2  0.3   ALKPHOS  69  61   AST  37  19   ALT  107*  75*   ANIONGAP  7*  7*   EGFRNONAA  >60.0  >60.0       Diagnostic Results:  None

## 2018-10-08 NOTE — PROGRESS NOTES
SW visit bedside, pt resting in hospital bed, family at bedside, pt on phone with wife at time of visit.  Pt remains positive and in good spirits as he plans for transplant tomorrow.  No other needs identified at this time.  Business card including contact information for the SWer provided.  Pt agreed to contact SW if any needs/concerns.   SWer remains available.

## 2018-10-08 NOTE — PLAN OF CARE
Problem: Patient Care Overview  Goal: Plan of Care Review  Outcome: Ongoing (interventions implemented as appropriate)  Patient AAOX4, up independently, and ambulating in hallway today. Family at bedside throughout shift. Patient is Day -1 for Allo. Magnesium replaced. IVF decreased to 75 mL/hour, as ordered. Patient complains of loose stools and abdominal cramping; PRN Imodium and Oxycodone given; MD notified of loose stools. CDIFF specimen outstanding. Patient stable, will continue to monitor.

## 2018-10-08 NOTE — PLAN OF CARE
Problem: Patient Care Overview  Goal: Plan of Care Review  Outcome: Ongoing (interventions implemented as appropriate)  Plan of care reviewed with the patient at the beginning of the shift. Pt is day -1 of allo transplant. Pt is tolerating chemo well. He has no complaints at this time. Mild fatigue noted. He denies pain. He denies n/v/d. Pt reports normal BMs. PO intake encouraged. IVF infusing. Mucous membranes are intact. Salt and soda and saliva substitute provided. Fall precautions maintained. Pt remained free from falls and injury this shift. Bed locked in lowest position, side rails up x2, call light within reach. Instructed pt to call for assistance as needed. Pt verbalized understanding. Pt afebrile overnight. Vitals stable. No acute issues overnight. Will continue to monitor.

## 2018-10-08 NOTE — ASSESSMENT & PLAN NOTE
-patient had episode of diarrhea 10/8/2018. None since per patient.  -c-diff ordered and collection pending.  -prn imodium if c-diff neg.

## 2018-10-08 NOTE — ASSESSMENT & PLAN NOTE
- Day -1 of Busulfan/Cyclophosphamide matched related donor stem cell transplant from 10/10 match brother: Busulfan started this am  - Donor B+ and CMV+ , recipient O+ and CMV+  - Pre-transplant work-up with no contraindications to proceeding to transplant, sickle cell trait noted. Brother has sickle cell trait as well.   - Kurtz placed 10/1  - Stem cell infusion scheduled for 10/9    Planned conditioning regimen:  Busulfan on Day -7, -6, -5, and -4  Cyclophosphamide on Day -3, and -2   Planned  GVHD Prophylaxis:  Methotrexate on Day +1, +3, +6, and +11  Tacrolimus starting on Day -1  Antimicrobial Prophylaxis:  Acyclovir starting on Day -7  Levaquin starting on Day -1  Fluconazole starting on Day -1  Mepron starting on Day +30  Seizure Prophylaxis:  Levetiracetam on Day -7 through Day -3  SOS/VOD Prophylaxis:  Ursodiol on Day -7 through Day +30  Growth Factor Support:  Neupogen starting on Day +7

## 2018-10-09 PROBLEM — D61.810 PANCYTOPENIA DUE TO CHEMOTHERAPY: Status: ACTIVE | Noted: 2018-10-09

## 2018-10-09 PROBLEM — T45.1X5A ANEMIA ASSOCIATED WITH CHEMOTHERAPY: Status: RESOLVED | Noted: 2018-10-01 | Resolved: 2018-10-09

## 2018-10-09 PROBLEM — D64.81 ANEMIA ASSOCIATED WITH CHEMOTHERAPY: Status: RESOLVED | Noted: 2018-10-01 | Resolved: 2018-10-09

## 2018-10-09 PROBLEM — D75.9 CYTOPENIA: Status: ACTIVE | Noted: 2018-10-09

## 2018-10-09 PROBLEM — D61.810 PANCYTOPENIA DUE TO CHEMOTHERAPY: Status: RESOLVED | Noted: 2018-10-09 | Resolved: 2018-10-09

## 2018-10-09 PROBLEM — R11.0 CHEMOTHERAPY-INDUCED NAUSEA: Status: ACTIVE | Noted: 2018-10-09

## 2018-10-09 PROBLEM — T45.1X5A CHEMOTHERAPY-INDUCED NAUSEA: Status: ACTIVE | Noted: 2018-10-09

## 2018-10-09 LAB
ALBUMIN SERPL BCP-MCNC: 2.7 G/DL
ALP SERPL-CCNC: 61 U/L
ALT SERPL W/O P-5'-P-CCNC: 89 U/L
ANION GAP SERPL CALC-SCNC: 6 MMOL/L
ANISOCYTOSIS BLD QL SMEAR: SLIGHT
AST SERPL-CCNC: 33 U/L
BASOPHILS # BLD AUTO: ABNORMAL K/UL
BASOPHILS NFR BLD: 0 %
BILIRUB SERPL-MCNC: 0.4 MG/DL
BLD PROD TYP BPU: NORMAL
BLOOD UNIT EXPIRATION DATE: NORMAL
BLOOD UNIT TYPE CODE: 7300
BLOOD UNIT TYPE: NORMAL
BUN SERPL-MCNC: 10 MG/DL
CALCIUM SERPL-MCNC: 8.9 MG/DL
CHLORIDE SERPL-SCNC: 106 MMOL/L
CO2 SERPL-SCNC: 29 MMOL/L
CODING SYSTEM: NORMAL
CREAT SERPL-MCNC: 0.7 MG/DL
DIFFERENTIAL METHOD: ABNORMAL
DISPENSE STATUS: NORMAL
EOSINOPHIL # BLD AUTO: ABNORMAL K/UL
EOSINOPHIL NFR BLD: 0 %
ERYTHROCYTE [DISTWIDTH] IN BLOOD BY AUTOMATED COUNT: 13.9 %
EST. GFR  (AFRICAN AMERICAN): >60 ML/MIN/1.73 M^2
EST. GFR  (NON AFRICAN AMERICAN): >60 ML/MIN/1.73 M^2
GLUCOSE SERPL-MCNC: 77 MG/DL
HCT VFR BLD AUTO: 33.1 %
HGB BLD-MCNC: 11.1 G/DL
HYPOCHROMIA BLD QL SMEAR: ABNORMAL
IMM GRANULOCYTES # BLD AUTO: ABNORMAL K/UL
IMM GRANULOCYTES NFR BLD AUTO: ABNORMAL %
LYMPHOCYTES # BLD AUTO: ABNORMAL K/UL
LYMPHOCYTES NFR BLD: 9 %
MAGNESIUM SERPL-MCNC: 1.7 MG/DL
MCH RBC QN AUTO: 30.1 PG
MCHC RBC AUTO-ENTMCNC: 33.5 G/DL
MCV RBC AUTO: 90 FL
MONOCYTES # BLD AUTO: ABNORMAL K/UL
MONOCYTES NFR BLD: 0 %
NEUTROPHILS # BLD AUTO: ABNORMAL K/UL
NEUTROPHILS NFR BLD: 91 %
NRBC BLD-RTO: 0 /100 WBC
OVALOCYTES BLD QL SMEAR: ABNORMAL
PHOSPHATE SERPL-MCNC: 3.3 MG/DL
PLATELET # BLD AUTO: 223 K/UL
PMV BLD AUTO: 11.1 FL
POIKILOCYTOSIS BLD QL SMEAR: SLIGHT
POLYCHROMASIA BLD QL SMEAR: ABNORMAL
POTASSIUM SERPL-SCNC: 3.7 MMOL/L
PROT SERPL-MCNC: 5.1 G/DL
RBC # BLD AUTO: 3.69 M/UL
SCHISTOCYTES BLD QL SMEAR: ABNORMAL
SODIUM SERPL-SCNC: 141 MMOL/L
UNIT NUMBER: NORMAL
WBC # BLD AUTO: 1.19 K/UL

## 2018-10-09 PROCEDURE — 84100 ASSAY OF PHOSPHORUS: CPT

## 2018-10-09 PROCEDURE — 38208 THAW PRESERVED STEM CELLS: CPT

## 2018-10-09 PROCEDURE — 38240 TRANSPLT ALLO HCT/DONOR: CPT

## 2018-10-09 PROCEDURE — 25000003 PHARM REV CODE 250: Performed by: NURSE PRACTITIONER

## 2018-10-09 PROCEDURE — 20600001 HC STEP DOWN PRIVATE ROOM

## 2018-10-09 PROCEDURE — 25000003 PHARM REV CODE 250: Performed by: INTERNAL MEDICINE

## 2018-10-09 PROCEDURE — 63600175 PHARM REV CODE 636 W HCPCS: Performed by: NURSE PRACTITIONER

## 2018-10-09 PROCEDURE — 80053 COMPREHEN METABOLIC PANEL: CPT

## 2018-10-09 PROCEDURE — 85027 COMPLETE CBC AUTOMATED: CPT

## 2018-10-09 PROCEDURE — 99233 SBSQ HOSP IP/OBS HIGH 50: CPT | Mod: ,,, | Performed by: INTERNAL MEDICINE

## 2018-10-09 PROCEDURE — 83735 ASSAY OF MAGNESIUM: CPT

## 2018-10-09 PROCEDURE — 30243Y2 TRANSFUSION OF ALLOGENEIC RELATED HEMATOPOIETIC STEM CELLS INTO CENTRAL VEIN, PERCUTANEOUS APPROACH: ICD-10-PCS | Performed by: INTERNAL MEDICINE

## 2018-10-09 PROCEDURE — 85007 BL SMEAR W/DIFF WBC COUNT: CPT

## 2018-10-09 PROCEDURE — 63600175 PHARM REV CODE 636 W HCPCS: Performed by: INTERNAL MEDICINE

## 2018-10-09 RX ADMIN — LORAZEPAM 1 MG: 2 INJECTION, SOLUTION INTRAMUSCULAR; INTRAVENOUS at 09:10

## 2018-10-09 RX ADMIN — FLUCONAZOLE 400 MG: 200 TABLET ORAL at 08:10

## 2018-10-09 RX ADMIN — ONDANSETRON 8 MG: 8 TABLET, ORALLY DISINTEGRATING ORAL at 04:10

## 2018-10-09 RX ADMIN — LORAZEPAM 1 MG: 2 INJECTION INTRAMUSCULAR; INTRAVENOUS at 07:10

## 2018-10-09 RX ADMIN — SODIUM CHLORIDE AND POTASSIUM CHLORIDE: 9; 1.49 INJECTION, SOLUTION INTRAVENOUS at 12:10

## 2018-10-09 RX ADMIN — PANTOPRAZOLE SODIUM 40 MG: 40 TABLET, DELAYED RELEASE ORAL at 08:10

## 2018-10-09 RX ADMIN — ONDANSETRON 16 MG: 8 TABLET, ORALLY DISINTEGRATING ORAL at 08:10

## 2018-10-09 RX ADMIN — ENOXAPARIN SODIUM 40 MG: 100 INJECTION SUBCUTANEOUS at 04:10

## 2018-10-09 RX ADMIN — SODIUM CHLORIDE AND POTASSIUM CHLORIDE: 9; 1.49 INJECTION, SOLUTION INTRAVENOUS at 08:10

## 2018-10-09 RX ADMIN — URSODIOL 300 MG: 300 CAPSULE ORAL at 08:10

## 2018-10-09 RX ADMIN — ACYCLOVIR 800 MG: 200 CAPSULE ORAL at 08:10

## 2018-10-09 RX ADMIN — POTASSIUM CHLORIDE 20 MEQ: 1500 TABLET, EXTENDED RELEASE ORAL at 06:10

## 2018-10-09 RX ADMIN — MAGNESIUM OXIDE TAB 400 MG (241.3 MG ELEMENTAL MG) 400 MG: 400 (241.3 MG) TAB at 09:10

## 2018-10-09 RX ADMIN — TACROLIMUS 2 MG: 1 CAPSULE ORAL at 05:10

## 2018-10-09 RX ADMIN — DIPHENHYDRAMINE HYDROCHLORIDE 50 MG: 50 INJECTION, SOLUTION INTRAMUSCULAR; INTRAVENOUS at 09:10

## 2018-10-09 RX ADMIN — LEVOFLOXACIN 500 MG: 500 TABLET, FILM COATED ORAL at 08:10

## 2018-10-09 RX ADMIN — MAGNESIUM OXIDE TAB 400 MG (241.3 MG ELEMENTAL MG) 400 MG: 400 (241.3 MG) TAB at 06:10

## 2018-10-09 RX ADMIN — HYDROCORTISONE SODIUM SUCCINATE 250 MG: 250 INJECTION, POWDER, FOR SOLUTION INTRAMUSCULAR; INTRAVENOUS at 09:10

## 2018-10-09 NOTE — ASSESSMENT & PLAN NOTE
- Day -1 of Busulfan/Cyclophosphamide matched related donor stem cell transplant from 10/10 match brother: Busulfan started this am  - Donor B+ and CMV+ , recipient O+ and CMV+  - Pre-transplant work-up with no contraindications to proceeding to transplant, sickle cell trait noted. Brother has sickle cell trait as well.   - Kurtz placed 10/1  - Stem cell infusion today (10/9). Tolerated well. Only complaint was poor taste to mouth.     Planned conditioning regimen:  Busulfan on Day -7, -6, -5, and -4  Cyclophosphamide on Day -3, and -2   Planned  GVHD Prophylaxis:  Methotrexate on Day +1, +3, +6, and +11  Tacrolimus starting on Day -1  Antimicrobial Prophylaxis:  Acyclovir starting on Day -7  Levaquin starting on Day -1  Fluconazole starting on Day -1  Mepron starting on Day +30  Seizure Prophylaxis:  Levetiracetam on Day -7 through Day -3  SOS/VOD Prophylaxis:  Ursodiol on Day -7 through Day +30  Growth Factor Support:  Neupogen starting on Day +7

## 2018-10-09 NOTE — ASSESSMENT & PLAN NOTE
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 9% blasts.Started on MEC chemotherapy for refractory AML on 6/23/2018  - had BMBX on 7/13/18, without evidence of CML; BCR ABL p210 0.5% (c/w MRD)  - dasatinib 140 mg daily at home, will stop today when chemo starts  -pre-transplant bone marrow biopsy from 9/11/2018 shows a hematologic and molecular remission  - admitted for Busulfan/Cyclophosphamide MRD allogeneic stem cell transplant from his brother  -Today is Day 0. Completed chemo. Tacro started 10/8. tacro levels to start 10/10/2018.

## 2018-10-09 NOTE — PLAN OF CARE
Problem: Patient Care Overview  Goal: Plan of Care Review  Outcome: Ongoing (interventions implemented as appropriate)  POC reviewed with patient; understanding verbalized. Day 0. Transplant this shift. Magnesium and Potassium replacements administered. Pt remains independent. Voids clear, yellow urine per the urinal. One formed BM this shift. Regular diet with good appetite. NS20K at 75. Mother to remain at bedside. Pt. with nonskid footwear on, bed in lowest position, and locked with bed rails up x2.  Pt. has call light and personal items within reach. VSS and afebrile this shift. All questions and concerns addressed at this time. Will continue to monitor.

## 2018-10-09 NOTE — PLAN OF CARE
Problem: Patient Care Overview  Goal: Plan of Care Review  Outcome: Ongoing (interventions implemented as appropriate)  Plan of care reviewed with the patient at the beginning of the shift. Pt is day 0 of allo transplant. He is very nervous and anxious about the transplant today. Questions encouraged and answers provided. Pt tolerated chemo well. Mild fatigue noted. He denies pain. Pt reports nausea, oral Zofran given. Pt states that his anxiety makes him nauseated. He has not had a BM overnight. He did have one episode of diarrhea yesterday. PO intake encouraged. IVF infusing. Mucous membranes are intact. Salt and soda and saliva substitute provided. Fall precautions maintained. Pt remained free from falls and injury this shift. Bed locked in lowest position, side rails up x2, call light within reach. Instructed pt to call for assistance as needed. Pt verbalized understanding. Pt afebrile overnight. Vitals stable. Neutropenic precautions maintained. No acute issues overnight. Will continue to monitor.

## 2018-10-09 NOTE — SUBJECTIVE & OBJECTIVE
Subjective:     Interval History: Day 0 allo SCT. Patient tolerated transplant well. C/o bad taste to mouth. Otherwise no complaints. 1 episode looses stools yesterday. None since per patient. C/o mild nausea prior to transplant. Pt speculated it may have been 2/2 poor intake and anxiety. May also be 2/2 chemo.      Objective:     Vital Signs (Most Recent):  Temp: 98.9 °F (37.2 °C) (10/09/18 1233)  Pulse: 74 (10/09/18 1233)  Resp: 17 (10/09/18 1233)  BP: (!) 154/94 (10/09/18 1233)  SpO2: 99 % (10/09/18 1200) Vital Signs (24h Range):  Temp:  [97.8 °F (36.6 °C)-99.1 °F (37.3 °C)] 98.9 °F (37.2 °C)  Pulse:  [] 74  Resp:  [14-19] 17  SpO2:  [97 %-100 %] 99 %  BP: (117-171)/(68-98) 154/94     Weight: 70.2 kg (154 lb 14 oz)  Body mass index is 21.6 kg/m².  Body surface area is 1.88 meters squared.    ECOG SCORE         [unfilled]    Intake/Output - Last 3 Shifts       10/07 0700 - 10/08 0659 10/08 0700 - 10/09 0659 10/09 0700 - 10/10 0659    P.O. 840 1140 200    I.V. (mL/kg) 3515 (49) 2260 (32.2) 931.5 (13.3)    Blood   180    IV Piggyback 50      Total Intake(mL/kg) 4405 (61.4) 3400 (48.4) 1311.5 (18.7)    Urine (mL/kg/hr) 3625 (2.1) 3600 (2.1) 1200 (2.7)    Stool 0 0 0    Total Output 3625 3600 1200    Net +780 -200 +111.5           Urine Occurrence 1 x      Stool Occurrence 2 x 3 x 1 x          Physical Exam   Constitutional: He is oriented to person, place, and time. He appears well-developed and well-nourished.   HENT:   Head: Normocephalic and atraumatic.   Mouth/Throat: No oropharyngeal exudate.   Reports bad taste in mouth post transplant.   Eyes: Conjunctivae are normal. Pupils are equal, round, and reactive to light. Right eye exhibits no discharge. Left eye exhibits no discharge.   Neck: Normal range of motion. Neck supple.   Cardiovascular: Normal rate, regular rhythm, normal heart sounds and intact distal pulses.   No murmur heard.  Pulmonary/Chest: Effort normal and breath sounds normal. No respiratory  distress. He has no wheezes. He has no rales.   Abdominal: Soft. Bowel sounds are normal. He exhibits no distension. There is no tenderness.   Musculoskeletal: Normal range of motion. He exhibits no edema or deformity.   Neurological: He is alert and oriented to person, place, and time.   Skin: Skin is warm and dry. No rash noted. No erythema.   Psychiatric: He has a normal mood and affect. His behavior is normal. Judgment and thought content normal.       Significant Labs:   CBC:   Recent Labs   Lab  10/08/18   0324  10/09/18   0426   WBC  4.89  1.19*   HGB  10.7*  11.1*   HCT  32.0*  33.1*   PLT  243  223    and CMP:   Recent Labs   Lab  10/08/18   0324  10/09/18   0426   NA  141  141   K  3.9  3.7   CL  107  106   CO2  27  29   GLU  91  77   BUN  11  10   CREATININE  0.6  0.7   CALCIUM  8.8  8.9   PROT  5.2*  5.1*   ALBUMIN  2.8*  2.7*   BILITOT  0.3  0.4   ALKPHOS  61  61   AST  19  33   ALT  75*  89*   ANIONGAP  7*  6*   EGFRNONAA  >60.0  >60.0       Diagnostic Results:  None

## 2018-10-09 NOTE — PROGRESS NOTES
MRD allo transplant completed with no complications. Pre-medications administered. IVF infusing. VS remained stable throughout transplant. 3 bags administered. Will continue to monitor.

## 2018-10-09 NOTE — ASSESSMENT & PLAN NOTE
-transfuse prbc for hgb <7; transfuse platelets for plt count <10K  -hgb 11.1; wbc 1.19; plt 223K.

## 2018-10-09 NOTE — PLAN OF CARE
MDR's with Dr Levy.  Patient tolerated his MRD alloSCT today without complication.  Family at bedside for support.  CM will continue to follow for d/c.  D/c pending engraftment.  D/c plan:  Charisse Pino

## 2018-10-09 NOTE — PROGRESS NOTES
[facility]  Hematology  Bone Marrow Transplant  Progress Note    Patient Name: Wilton Guzmán  Admission Date: 10/1/2018  Hospital Length of Stay: 8 days  Code Status: Full Code    Subjective:     Interval History: Day 0 allo SCT. Patient tolerated transplant well. C/o bad taste to mouth. Otherwise no complaints. 1 episode looses stools yesterday. None since per patient. C/o mild nausea prior to transplant. Pt speculated it may have been 2/2 poor intake and anxiety. May also be 2/2 chemo.      Objective:     Vital Signs (Most Recent):  Temp: 98.9 °F (37.2 °C) (10/09/18 1233)  Pulse: 74 (10/09/18 1233)  Resp: 17 (10/09/18 1233)  BP: (!) 154/94 (10/09/18 1233)  SpO2: 99 % (10/09/18 1200) Vital Signs (24h Range):  Temp:  [97.8 °F (36.6 °C)-99.1 °F (37.3 °C)] 98.9 °F (37.2 °C)  Pulse:  [] 74  Resp:  [14-19] 17  SpO2:  [97 %-100 %] 99 %  BP: (117-171)/(68-98) 154/94     Weight: 70.2 kg (154 lb 14 oz)  Body mass index is 21.6 kg/m².  Body surface area is 1.88 meters squared.    ECOG SCORE         [unfilled]    Intake/Output - Last 3 Shifts       10/07 0700 - 10/08 0659 10/08 0700 - 10/09 0659 10/09 0700 - 10/10 0659    P.O. 840 1140 200    I.V. (mL/kg) 3515 (49) 2260 (32.2) 931.5 (13.3)    Blood   180    IV Piggyback 50      Total Intake(mL/kg) 4405 (61.4) 3400 (48.4) 1311.5 (18.7)    Urine (mL/kg/hr) 3625 (2.1) 3600 (2.1) 1200 (2.7)    Stool 0 0 0    Total Output 3625 3600 1200    Net +780 -200 +111.5           Urine Occurrence 1 x      Stool Occurrence 2 x 3 x 1 x          Physical Exam   Constitutional: He is oriented to person, place, and time. He appears well-developed and well-nourished.   HENT:   Head: Normocephalic and atraumatic.   Mouth/Throat: No oropharyngeal exudate.   Reports bad taste in mouth post transplant.   Eyes: Conjunctivae are normal. Pupils are equal, round, and reactive to light. Right eye exhibits no discharge. Left eye exhibits no discharge.   Neck: Normal range of  motion. Neck supple.   Cardiovascular: Normal rate, regular rhythm, normal heart sounds and intact distal pulses.   No murmur heard.  Pulmonary/Chest: Effort normal and breath sounds normal. No respiratory distress. He has no wheezes. He has no rales.   Abdominal: Soft. Bowel sounds are normal. He exhibits no distension. There is no tenderness.   Musculoskeletal: Normal range of motion. He exhibits no edema or deformity.   Neurological: He is alert and oriented to person, place, and time.   Skin: Skin is warm and dry. No rash noted. No erythema.   Psychiatric: He has a normal mood and affect. His behavior is normal. Judgment and thought content normal.       Significant Labs:   CBC:   Recent Labs   Lab  10/08/18   0324  10/09/18   0426   WBC  4.89  1.19*   HGB  10.7*  11.1*   HCT  32.0*  33.1*   PLT  243  223    and CMP:   Recent Labs   Lab  10/08/18   0324  10/09/18   0426   NA  141  141   K  3.9  3.7   CL  107  106   CO2  27  29   GLU  91  77   BUN  11  10   CREATININE  0.6  0.7   CALCIUM  8.8  8.9   PROT  5.2*  5.1*   ALBUMIN  2.8*  2.7*   BILITOT  0.3  0.4   ALKPHOS  61  61   AST  19  33   ALT  75*  89*   ANIONGAP  7*  6*   EGFRNONAA  >60.0  >60.0       Diagnostic Results:  None    Assessment/Plan:     * Stem cell transplant candidate    - Day -1 of Busulfan/Cyclophosphamide matched related donor stem cell transplant from 10/10 match brother: Busulfan started this am  - Donor B+ and CMV+ , recipient O+ and CMV+  - Pre-transplant work-up with no contraindications to proceeding to transplant, sickle cell trait noted. Brother has sickle cell trait as well.   - Kurtz placed 10/1  - Stem cell infusion today (10/9). Tolerated well. Only complaint was poor taste to mouth.     Planned conditioning regimen:  Busulfan on Day -7, -6, -5, and -4  Cyclophosphamide on Day -3, and -2   Planned  GVHD Prophylaxis:  Methotrexate on Day +1, +3, +6, and +11  Tacrolimus starting on Day -1  Antimicrobial Prophylaxis:  Acyclovir  starting on Day -7  Levaquin starting on Day -1  Fluconazole starting on Day -1  Mepron starting on Day +30  Seizure Prophylaxis:  Levetiracetam on Day -7 through Day -3  SOS/VOD Prophylaxis:  Ursodiol on Day -7 through Day +30  Growth Factor Support:  Neupogen starting on Day +7          Chronic myelogenous leukemia (CML), BCR-ABL1-positive    - original BMBX 5/26/18 with 20% blasts; CML transformed to AML with blast crisis;   karyotype: 46,XY,t(9;22;22;14)q34;q11.2;q13;q23); BCR/ABL gene fusion 62%  - received induction with 7+3 on 6/1-6/8/2018  - persistent disease post 7+3 induction, repeat BM biopsy done 6/18 here at [facility] after 7+3. 9% blasts.Started on MEC chemotherapy for refractory AML on 6/23/2018  - had BMBX on 7/13/18, without evidence of CML; BCR ABL p210 0.5% (c/w MRD)  - dasatinib 140 mg daily at home, will stop today when chemo starts  -pre-transplant bone marrow biopsy from 9/11/2018 shows a hematologic and molecular remission  - admitted for Busulfan/Cyclophosphamide MRD allogeneic stem cell transplant from his brother  -Today is Day 0. Completed chemo. Tacro started 10/8. tacro levels to start 10/10/2018.           Cytopenia    -transfuse prbc for hgb <7; transfuse platelets for plt count <10K  -hgb 11.1; wbc 1.19; plt 223K.        Chemotherapy-induced nausea    -nausea started 10/9  -denies vomiting  -prn anti-emetics. Can schedule if necessary.        Diarrhea    -patient had episode of diarrhea 10/8/2018. None since per patient.  -c-diff ordered and collection pending.  -prn imodium if c-diff neg.            VTE Risk Mitigation (From admission, onward)        Ordered     enoxaparin injection 40 mg  Daily      10/01/18 1729     heparin, porcine (PF) 100 unit/mL injection flush 300 Units  As needed (PRN)      10/01/18 1657          Disposition: Allo SCT today. Awaiting count drop and subsequent recovery.    Leona Beltran, NP  Bone Marrow Transplant  [facility]

## 2018-10-10 LAB
ALBUMIN SERPL BCP-MCNC: 2.7 G/DL
ALP SERPL-CCNC: 69 U/L
ALT SERPL W/O P-5'-P-CCNC: 70 U/L
ANION GAP SERPL CALC-SCNC: 7 MMOL/L
ANISOCYTOSIS BLD QL SMEAR: SLIGHT
AST SERPL-CCNC: 27 U/L
BASOPHILS # BLD AUTO: 0 K/UL
BASOPHILS NFR BLD: 0 %
BILIRUB SERPL-MCNC: 0.2 MG/DL
BUN SERPL-MCNC: 11 MG/DL
CALCIUM SERPL-MCNC: 8.6 MG/DL
CHLORIDE SERPL-SCNC: 106 MMOL/L
CO2 SERPL-SCNC: 28 MMOL/L
CREAT SERPL-MCNC: 0.6 MG/DL
DIFFERENTIAL METHOD: ABNORMAL
EOSINOPHIL # BLD AUTO: 0 K/UL
EOSINOPHIL NFR BLD: 2.2 %
ERYTHROCYTE [DISTWIDTH] IN BLOOD BY AUTOMATED COUNT: 14 %
EST. GFR  (AFRICAN AMERICAN): >60 ML/MIN/1.73 M^2
EST. GFR  (NON AFRICAN AMERICAN): >60 ML/MIN/1.73 M^2
GLUCOSE SERPL-MCNC: 101 MG/DL
HCT VFR BLD AUTO: 31.1 %
HGB BLD-MCNC: 10.1 G/DL
HYPOCHROMIA BLD QL SMEAR: ABNORMAL
IMM GRANULOCYTES # BLD AUTO: 0.03 K/UL
IMM GRANULOCYTES NFR BLD AUTO: 1.7 %
LYMPHOCYTES # BLD AUTO: 0.2 K/UL
LYMPHOCYTES NFR BLD: 12.9 %
MAGNESIUM SERPL-MCNC: 1.8 MG/DL
MCH RBC QN AUTO: 29.8 PG
MCHC RBC AUTO-ENTMCNC: 32.5 G/DL
MCV RBC AUTO: 92 FL
MONOCYTES # BLD AUTO: 0 K/UL
MONOCYTES NFR BLD: 2.2 %
NEUTROPHILS # BLD AUTO: 1.4 K/UL
NEUTROPHILS NFR BLD: 81 %
NRBC BLD-RTO: 0 /100 WBC
OVALOCYTES BLD QL SMEAR: ABNORMAL
PHOSPHATE SERPL-MCNC: 3 MG/DL
PLATELET # BLD AUTO: 192 K/UL
PMV BLD AUTO: 12.6 FL
POIKILOCYTOSIS BLD QL SMEAR: SLIGHT
POLYCHROMASIA BLD QL SMEAR: ABNORMAL
POTASSIUM SERPL-SCNC: 3.4 MMOL/L
PROT SERPL-MCNC: 5.2 G/DL
RBC # BLD AUTO: 3.39 M/UL
SODIUM SERPL-SCNC: 141 MMOL/L
TACROLIMUS BLD-MCNC: 2 NG/ML
WBC # BLD AUTO: 1.78 K/UL

## 2018-10-10 PROCEDURE — 63600175 PHARM REV CODE 636 W HCPCS: Performed by: NURSE PRACTITIONER

## 2018-10-10 PROCEDURE — 84100 ASSAY OF PHOSPHORUS: CPT

## 2018-10-10 PROCEDURE — 85025 COMPLETE CBC W/AUTO DIFF WBC: CPT

## 2018-10-10 PROCEDURE — 97161 PT EVAL LOW COMPLEX 20 MIN: CPT

## 2018-10-10 PROCEDURE — 99233 SBSQ HOSP IP/OBS HIGH 50: CPT | Mod: ,,, | Performed by: INTERNAL MEDICINE

## 2018-10-10 PROCEDURE — 25000003 PHARM REV CODE 250: Performed by: NURSE PRACTITIONER

## 2018-10-10 PROCEDURE — 80053 COMPREHEN METABOLIC PANEL: CPT

## 2018-10-10 PROCEDURE — 83735 ASSAY OF MAGNESIUM: CPT

## 2018-10-10 PROCEDURE — 97165 OT EVAL LOW COMPLEX 30 MIN: CPT

## 2018-10-10 PROCEDURE — 80197 ASSAY OF TACROLIMUS: CPT

## 2018-10-10 PROCEDURE — A9155 ARTIFICIAL SALIVA: HCPCS | Performed by: INTERNAL MEDICINE

## 2018-10-10 PROCEDURE — 20600001 HC STEP DOWN PRIVATE ROOM

## 2018-10-10 PROCEDURE — 97802 MEDICAL NUTRITION INDIV IN: CPT

## 2018-10-10 PROCEDURE — 63600175 PHARM REV CODE 636 W HCPCS: Performed by: INTERNAL MEDICINE

## 2018-10-10 PROCEDURE — 25000003 PHARM REV CODE 250: Performed by: INTERNAL MEDICINE

## 2018-10-10 RX ORDER — TACROLIMUS 0.5 MG/1
0.5 CAPSULE ORAL ONCE
Status: COMPLETED | OUTPATIENT
Start: 2018-10-10 | End: 2018-10-10

## 2018-10-10 RX ADMIN — ENOXAPARIN SODIUM 40 MG: 100 INJECTION SUBCUTANEOUS at 06:10

## 2018-10-10 RX ADMIN — TACROLIMUS 2 MG: 1 CAPSULE ORAL at 08:10

## 2018-10-10 RX ADMIN — TACROLIMUS 2.5 MG: 1 CAPSULE ORAL at 06:10

## 2018-10-10 RX ADMIN — SODIUM CHLORIDE AND POTASSIUM CHLORIDE: 9; 1.49 INJECTION, SOLUTION INTRAVENOUS at 11:10

## 2018-10-10 RX ADMIN — SODIUM CHLORIDE AND POTASSIUM CHLORIDE: 9; 1.49 INJECTION, SOLUTION INTRAVENOUS at 06:10

## 2018-10-10 RX ADMIN — TACROLIMUS 0.5 MG: 0.5 CAPSULE ORAL at 11:10

## 2018-10-10 RX ADMIN — POTASSIUM CHLORIDE 20 MEQ: 1500 TABLET, EXTENDED RELEASE ORAL at 06:10

## 2018-10-10 RX ADMIN — MAGNESIUM OXIDE TAB 400 MG (241.3 MG ELEMENTAL MG) 400 MG: 400 (241.3 MG) TAB at 06:10

## 2018-10-10 RX ADMIN — ACYCLOVIR 800 MG: 200 CAPSULE ORAL at 08:10

## 2018-10-10 RX ADMIN — POTASSIUM CHLORIDE 20 MEQ: 1500 TABLET, EXTENDED RELEASE ORAL at 09:10

## 2018-10-10 RX ADMIN — URSODIOL 300 MG: 300 CAPSULE ORAL at 08:10

## 2018-10-10 RX ADMIN — METHOTREXATE 10 MG: 25 INJECTION, SOLUTION INTRA-ARTERIAL; INTRAMUSCULAR; INTRATHECAL; INTRAVENOUS at 11:10

## 2018-10-10 RX ADMIN — Medication 30 ML: at 11:10

## 2018-10-10 RX ADMIN — PANTOPRAZOLE SODIUM 40 MG: 40 TABLET, DELAYED RELEASE ORAL at 08:10

## 2018-10-10 RX ADMIN — Medication 30 ML: at 06:10

## 2018-10-10 RX ADMIN — FLUCONAZOLE 400 MG: 200 TABLET ORAL at 08:10

## 2018-10-10 RX ADMIN — MAGNESIUM OXIDE TAB 400 MG (241.3 MG ELEMENTAL MG) 400 MG: 400 (241.3 MG) TAB at 09:10

## 2018-10-10 RX ADMIN — LEVOFLOXACIN 500 MG: 500 TABLET, FILM COATED ORAL at 08:10

## 2018-10-10 NOTE — PROGRESS NOTES
" [facility]  Adult Nutrition  Progress Note    SUMMARY       Recommendations    Recommendation/Intervention:   1.Continue w/ Regular diet.   2.Encourage PO intake >/= 75% EEN/EPN   3.If PO intake is <50% encourage HVP w/ each meal, small frequent meal, snacks, ONS, cold/non aromatic foods.   4.RD to follow  Goals: nutrient intake >/= 85% EEN/EPN   Nutrition Goal Status: new  Communication of RD Recs: discussed on rounds    Reason for Assessment    Reason for Assessment: RD follow-up  Diagnosis: (CML)  Relevant Medical History: Relapsed CML  Interdisciplinary Rounds: attended  General Information Comments: Day +1 of Busulfan/Cyclophosphamide Allo. pt states 2 episodes of loose stool yesterday, but none so far today. PO intake 100% + snacks in room  Nutrition Discharge Planning: Regular diet w/ adequate po intake + ONS as needed to meet increased needs    Nutrition Risk Screen    Nutrition Risk Screen: no indicators present    Nutrition/Diet History    Patient Reported Diet/Restrictions/Preferences: general  Typical Food/Fluid Intake: adequate maintaining nutritional status  Food Preferences: None  Do you have any cultural, spiritual, Roman Catholic conflicts, given your current situation?: none  Food Allergies: NKFA  Factors Affecting Nutritional Intake: None identified at this time    Anthropometrics    Temp: 98.2 °F (36.8 °C)  Height Method: Stated  Height: 5' 11" (180.3 cm)  Height (inches): 71 in  Weight Method: Standard Scale  Weight: 70 kg (154 lb 3.4 oz)  Weight (lb): 154.21 lb  Ideal Body Weight (IBW), Male: 172 lb  % Ideal Body Weight, Male (lb): 89.66 lb  BMI (Calculated): 21.6  BMI Grade: 18.5-24.9 - normal  Usual Body Weight (UBW), k.2 kg  % Usual Body Weight: 93.21  % Weight Change From Usual Weight: -6.98 %  Weight Loss Since Admission: 5 lb 11.7 oz(gained since admit)  % Weight Change Since Admission: 3.72       Lab/Procedures/Meds    Pertinent Labs Reviewed: reviewed  Pertinent " Labs Comments: WBC-1.78, H/H-10.1/31.1, Plts-192, K-3.4, Ca-8.6  Pertinent Medications Reviewed: reviewed  Pertinent Medications Comments: acyclovir, Neupogen, heparin, Methotrexate, pantoprazole    Physical Findings/Assessment    Overall Physical Appearance: nourished  Oral/Mouth Cavity: all teeth present (age appropriate)  Skin: intact    Estimated/Assessed Needs    Weight Used For Calorie Calculations: 70 kg (154 lb 5.2 oz)  Energy Calorie Requirements (kcal): 9443-7731 kcal/day  Energy Need Method: Kcal/kg(30-35 kcal/kg)  Protein Requirements: 105-126g/day    (1.5-1.8g/kg)  Weight Used For Protein Calculations: 70 kg (154 lb 5.2 oz)     Fluid Need Method: RDA Method(1 ml/kcal or Per MD)  RDA Method (mL): 2100         Nutrition Prescription Ordered    Current Diet Order: Regular    Evaluation of Received Nutrient/Fluid Intake    IV Fluid (mL): 1800  I/O: +5.6 L since admit  Energy Calories Required: meeting needs  Protein Required: meeting needs  Fluid Required: meeting needs  Comments: LBM: 10/09/18  % Intake of Estimated Energy Needs: 75 - 100 %  % Meal Intake: 75 - 100 %    Nutrition Risk    Level of Risk/Frequency of Follow-up: high     Assessment and Plan  Nutrition Problem  increased energy needs    Related to (etiology):   Physiological needs 2/2 CML w/SCT     Signs and Symptoms (as evidenced by):   Pt received SCT(day+1, increased Caloric/ Protein needs)    Interventions/Recommendations (treatment strategy):  1.Continue w/ Regular diet.   2.Encourage PO intake >/= 75% EEN/EPN   3.If PO intake is <50% encourage HVP w/ each meal, small frequent meal, snacks, ONS, cold/non aromatic foods.   4.RD to follow    Nutrition Diagnosis Status:   Continues         Monitor and Evaluation    Food and Nutrient Intake: energy intake, food and beverage intake  Food and Nutrient Administration: diet order  Anthropometric Measurements: weight, weight change  Biochemical Data, Medical Tests and Procedures: (All  labs)  Nutrition-Focused Physical Findings: overall appearance     Nutrition Follow-Up    RD Follow-up?: Yes

## 2018-10-10 NOTE — ASSESSMENT & PLAN NOTE
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 9% blasts.Started on MEC chemotherapy for refractory AML on 6/23/2018  - had BMBX on 7/13/18, without evidence of CML; BCR ABL p210 0.5% (c/w MRD)  - dasatinib 140 mg daily at home, will stop today when chemo starts  -pre-transplant bone marrow biopsy from 9/11/2018 shows a hematologic and molecular remission  - admitted for Busulfan/Cyclophosphamide MRD allogeneic stem cell transplant from his brother  -Today is Day +1 allo SCT. Day +1 MTX today (10/10/2018). Tacro started 10/8. tacro levels started 10/10/2018.

## 2018-10-10 NOTE — PLAN OF CARE
Problem: Patient Care Overview  Goal: Plan of Care Review  Outcome: Ongoing (interventions implemented as appropriate)  Plan of care reviewed with the patient at the beginning of the shift. Pt is day +1 of allo transplant. Pt tolerated chemo well. Mild fatigue noted. He denies pain. He denies n/v/d this shift. He has not had a BM overnight. PO intake encouraged. IVF infusing. Mucous membranes are intact. Salt and soda and saliva substitute provided. Fall precautions maintained. Pt remained free from falls and injury this shift. Bed locked in lowest position, side rails up x2, call light within reach. Instructed pt to call for assistance as needed. Pt verbalized understanding. Pt afebrile overnight. Vitals stable. Neutropenic precautions maintained. No acute issues overnight. Will continue to monitor.

## 2018-10-10 NOTE — PLAN OF CARE
Problem: Physical Therapy Goal  Goal: Physical Therapy Goal  Pt currently independent c functional mobility.  Pt will continue to follow pt 1x/wk while admitted to ensure pt's functional status does not change while receiving additional treatments.    POC will be reassessed and adjusted if changes to pt's functional status occurs.     Outcome: Ongoing (interventions implemented as appropriate)  Eval completed and POC established    Jayden Nguyen DPT  10/10/2018

## 2018-10-10 NOTE — PLAN OF CARE
Problem: Patient Care Overview  Goal: Plan of Care Review    Recommendations     Recommendation/Intervention:   1.Continue w/ Regular diet.   2.Encourage PO intake >/= 75% EEN/EPN   3.If PO intake is <50% encourage HVP w/ each meal, small frequent meal, snacks, ONS, cold/non aromatic foods.   4.RD to follow  Goals: nutrient intake >/= 85% EEN/EPN   Nutrition Goal Status: new  Communication of RD Recs: discussed on rounds     Reason for Assessment     Reason for Assessment: RD follow-up  Diagnosis: (CML)  Relevant Medical History: Relapsed CML  Interdisciplinary Rounds: attended  General Information Comments: Day +1 of Busulfan/Cyclophosphamide Allo. pt states 2 episodes of loose stool yesterday, but none so far today. PO intake 100% + snacks in room  Nutrition Discharge Planning: Regular diet w/ adequate po intake + ONS as needed to meet increased needs

## 2018-10-10 NOTE — ASSESSMENT & PLAN NOTE
- Day 0 of Busulfan/Cyclophosphamide matched related donor stem cell transplant from 10/10 match brother:   - Donor B+ and CMV+ , recipient O+ and CMV+  - Pre-transplant work-up with no contraindications to proceeding to transplant, sickle cell trait noted. Brother has sickle cell trait as well.   - Kurtz placed 10/1  -Allo transplant on 10/09/2018 without complication.  - Day +1 post-transfusion MTX today (10/10/2018).     Planned conditioning regimen:  Busulfan on Day -7, -6, -5, and -4  Cyclophosphamide on Day -3, and -2   Planned  GVHD Prophylaxis:  Methotrexate on Day +1, +3, +6, and +11  Tacrolimus starting on Day -1  Antimicrobial Prophylaxis:  Acyclovir starting on Day -7  Levaquin starting on Day -1  Fluconazole starting on Day -1  Mepron starting on Day +30  Seizure Prophylaxis:  Levetiracetam on Day -7 through Day -3  SOS/VOD Prophylaxis:  Ursodiol on Day -7 through Day +30  Growth Factor Support:  Neupogen starting on Day +7

## 2018-10-10 NOTE — PT/OT/SLP EVAL
Occupational Therapy   Evaluation    Name: Wilton Guzmán  MRN: [id number]  Admitting Diagnosis:  Stem cell transplant candidate      Recommendations:     Discharge Recommendations: home  Discharge Equipment Recommendations:  none  Barriers to discharge:  None    History:     Occupational Profile:  Living Environment: lives in  home with wife and dtr  Previous level of function: independent  Roles and Routines: none stated, enjoys yoga  Equipment Used at Home:  none  Assistance upon Discharge: yes    Past Medical History:   Diagnosis Date    Chronic myelogenous leukemia (CML), BCR-ABL1-positive     Depression     2010    Encounter for blood transfusion        Past Surgical History:   Procedure Laterality Date    INSERTION OF CATHETER N/A 10/1/2018    Procedure: INSERTION-CATHETER;  Surgeon: [facility] Diagnostic Provider;  Location: [facility] OR [address] [place] [address];  Service: Anesthesiology;  Laterality: N/A;    INSERTION-CATHETER N/A 10/1/2018    Performed by [facility] Diagnostic Provider at [facility] OR [address] [place] [address]       Subjective     Chief Complaint: boredom  Patient/Family Comments/goals: remain independent    Pain/Comfort:  · Pain Rating 1: 0/10    Patients cultural, spiritual, Islam conflicts given the current situation: none    Objective:     Communicated with: nsg prior to session.  Patient found call button in reach and central line upon OT entry to room.    General Precautions: Standard,     Orthopedic Precautions:    Braces:       Occupational Performance:    Bed Mobility:    · Pt standing up on arrival    Functional Mobility/Transfers:  · Independent transfers  · Functional Mobility: independent    Activities of Daily Living:  · Independent with all basic ADL's     Cognitive/Visual Perceptual:  intact    Physical Exam:  wfl BUE's    AMPAC 6 Click ADL:  AMPAC Total Score: 24    Treatment & Education:  -Pt edu on OT role/POC  -Importance of OOB activity, ambulation, exercises, stretches  -Safety during functional t/f and mobility  "  - All questions/concerns answered within OT scope of practice  -issued theraband and educated on exercises as tolerated           Education:    Patient left up in chair with call button in reach    Assessment:     Wilton Guzmán is a 33 y.o. male with a medical diagnosis of Stem cell transplant candidate.  He presents with the following performance deficits affecting function: (at risk for decreased functional independence).      Rehab Prognosis: Good; patient would benefit from acute skilled OT services to address these deficits and reach maximum level of function.         Clinical Decision Makin.  OT Low:  "Pt evaluation falls under low complexity for evaluation coding due to performance deficits noted in 1-3 areas as stated above and 0 co-morbities affecting current functional status. Data obtained from problem focused assessments. No modifications or assistance was required for completion of evaluation. Only brief occupational profile and history review completed."     Plan:     Patient to be seen 1 x/week to address the above listed problems via self-care/home management, therapeutic activities, therapeutic exercises  · Plan of Care Expires: 18  · Plan of Care Reviewed with: patient    This Plan of care has been discussed with the patient who was involved in its development and understands and is in agreement with the identified goals and treatment plan    GOALS:   Multidisciplinary Problems     Occupational Therapy Goals        Problem: Occupational Therapy Goal    Goal Priority Disciplines Outcome Interventions   Occupational Therapy Goal     OT, PT/OT Ongoing (interventions implemented as appropriate)    Description:  Goals to be met by: 10/31/18     Pt will demonstrate no loss of functional independence with ADL's and mobility by goal date.                    Time Tracking:     OT Date of Treatment: 10/10/18  OT Start Time: 1040  OT Stop Time: 1050  OT Total Time (min): 10 min    Billable " Minutes:Evaluation 10 min    Argelia Cartwright OT  10/10/2018

## 2018-10-10 NOTE — ASSESSMENT & PLAN NOTE
-nausea started 10/9  -denies vomiting  -prn anti-emetics. Can schedule if necessary.  -tolerating meals at this time..

## 2018-10-10 NOTE — PROGRESS NOTES
[facility]  Hematology  Bone Marrow Transplant  Progress Note    Patient Name: Wilton Guzmán  Admission Date: 10/1/2018  Hospital Length of Stay: 9 days  Code Status: Full Code    Subjective:     Interval History: Day +1. Day +1 MTX today. Reported loose stool x 1 yesterday. No other complaints. DBP continues to be intermittently in the 90s. tacro level 2. Additional 0.5 mg administered this am. Dose changed from 2 mg bid to 2.5 mg bid.       Objective:     Vital Signs (Most Recent):  Temp: 98.2 °F (36.8 °C) (10/10/18 1102)  Pulse: 90 (10/10/18 1102)  Resp: 20 (10/10/18 1102)  BP: 126/84 (10/10/18 1102)  SpO2: 100 % (10/10/18 1102) Vital Signs (24h Range):  Temp:  [98.2 °F (36.8 °C)-99.2 °F (37.3 °C)] 98.2 °F (36.8 °C)  Pulse:  [77-98] 90  Resp:  [16-20] 20  SpO2:  [99 %-100 %] 100 %  BP: (115-139)/(74-93) 126/84     Weight: 70 kg (154 lb 3.4 oz)  Body mass index is 21.51 kg/m².  Body surface area is 1.87 meters squared.    ECOG SCORE         [unfilled]    Intake/Output - Last 3 Shifts       10/08 0700 - 10/09 0659 10/09 0700 - 10/10 0659 10/10 0700 - 10/11 0659    P.O. 1140 720     I.V. (mL/kg) 2260 (32.2) 2375.8 (34)     Blood  180     IV Piggyback       Total Intake(mL/kg) 3400 (48.4) 3275.8 (46.9)     Urine (mL/kg/hr) 3600 (2.1) 3275 (2) 500 (1.1)    Stool 0 0     Total Output 3600 3275 500    Net -200 +0.8 -500           Stool Occurrence 3 x 1 x           Physical Exam   Constitutional: He is oriented to person, place, and time. He appears well-developed and well-nourished.   HENT:   Head: Normocephalic and atraumatic.   Mouth/Throat: No oropharyngeal exudate.   Reports bad taste in mouth post transplant.   Eyes: Conjunctivae are normal. Pupils are equal, round, and reactive to light. Right eye exhibits no discharge. Left eye exhibits no discharge.   Neck: Normal range of motion. Neck supple.   Cardiovascular: Normal rate, regular rhythm, normal heart sounds and intact distal pulses.   No  murmur heard.  Pulmonary/Chest: Effort normal and breath sounds normal. No respiratory distress. He has no wheezes. He has no rales.   Abdominal: Soft. Bowel sounds are normal. He exhibits no distension. There is no tenderness.   Loose stool x 1.    Musculoskeletal: Normal range of motion. He exhibits no edema or deformity.   Neurological: He is alert and oriented to person, place, and time.   Skin: Skin is warm and dry. No rash noted. No erythema.   Psychiatric: He has a normal mood and affect. His behavior is normal. Judgment and thought content normal.       Significant Labs:   CBC:   Recent Labs   Lab  10/09/18   0426  10/10/18   0355   WBC  1.19*  1.78*   HGB  11.1*  10.1*   HCT  33.1*  31.1*   PLT  223  192    and CMP:   Recent Labs   Lab  10/09/18   0426  10/10/18   0355   NA  141  141   K  3.7  3.4*   CL  106  106   CO2  29  28   GLU  77  101   BUN  10  11   CREATININE  0.7  0.6   CALCIUM  8.9  8.6*   PROT  5.1*  5.2*   ALBUMIN  2.7*  2.7*   BILITOT  0.4  0.2   ALKPHOS  61  69   AST  33  27   ALT  89*  70*   ANIONGAP  6*  7*   EGFRNONAA  >60.0  >60.0       Diagnostic Results:  None    Assessment/Plan:     * Stem cell transplant candidate    - Day 0 of Busulfan/Cyclophosphamide matched related donor stem cell transplant from 10/10 match brother:   - Donor B+ and CMV+ , recipient O+ and CMV+  - Pre-transplant work-up with no contraindications to proceeding to transplant, sickle cell trait noted. Brother has sickle cell trait as well.   - Kurtz placed 10/1  -Allo transplant on 10/09/2018 without complication.  - Day +1 post-transfusion MTX today (10/10/2018).     Planned conditioning regimen:  Busulfan on Day -7, -6, -5, and -4  Cyclophosphamide on Day -3, and -2   Planned  GVHD Prophylaxis:  Methotrexate on Day +1, +3, +6, and +11  Tacrolimus starting on Day -1  Antimicrobial Prophylaxis:  Acyclovir starting on Day -7  Levaquin starting on Day -1  Fluconazole starting on Day -1  Mepron starting on Day  +30  Seizure Prophylaxis:  Levetiracetam on Day -7 through Day -3  SOS/VOD Prophylaxis:  Ursodiol on Day -7 through Day +30  Growth Factor Support:  Neupogen starting on Day +7          Chronic myelogenous leukemia (CML), BCR-ABL1-positive    - original BMBX 5/26/18 with 20% blasts; CML transformed to AML with blast crisis;   karyotype: 46,XY,t(9;22;22;14)q34;q11.2;q13;q23); BCR/ABL gene fusion 62%  - received induction with 7+3 on 6/1-6/8/2018  - persistent disease post 7+3 induction, repeat BM biopsy done 6/18 here at [facility] after 7+3. 9% blasts.Started on MEC chemotherapy for refractory AML on 6/23/2018  - had BMBX on 7/13/18, without evidence of CML; BCR ABL p210 0.5% (c/w MRD)  - dasatinib 140 mg daily at home, will stop today when chemo starts  -pre-transplant bone marrow biopsy from 9/11/2018 shows a hematologic and molecular remission  - admitted for Busulfan/Cyclophosphamide MRD allogeneic stem cell transplant from his brother  -Today is Day +1 allo SCT. Day +1 MTX today (10/10/2018). Tacro started 10/8. tacro levels started 10/10/2018.           Cytopenia    -transfuse prbc for hgb <7; transfuse platelets for plt count <10K  -hgb 10.1; wbc 1.78; plt 192K.        Chemotherapy-induced nausea    -nausea started 10/9  -denies vomiting  -prn anti-emetics. Can schedule if necessary.  -tolerating meals at this time..        Diarrhea    -patient had episode of diarrhea 10/8/2018. None since per patient.  -c-diff ordered and collection pending.  1 loose stool yesterday, likely not c-diff  -prn imodium.            VTE Risk Mitigation (From admission, onward)        Ordered     enoxaparin injection 40 mg  Daily      10/01/18 4605     heparin, porcine (PF) 100 unit/mL injection flush 300 Units  As needed (PRN)      10/01/18 1657          Disposition: Day +1 allo SCT. Awaiting count drop and subsequent recovery.    Leona Beltran, NP  Bone Marrow Transplant  [facility]

## 2018-10-10 NOTE — PLAN OF CARE
Problem: Occupational Therapy Goal  Goal: Occupational Therapy Goal  Goals to be met by: 10/31/18     Pt will demonstrate no loss of functional independence with ADL's and mobility by goal date.  Outcome: Ongoing (interventions implemented as appropriate)  Goal established  Argelia Cartwright OTR

## 2018-10-10 NOTE — ASSESSMENT & PLAN NOTE
-transfuse prbc for hgb <7; transfuse platelets for plt count <10K  -hgb 10.1; wbc 1.78; plt 192K.

## 2018-10-10 NOTE — SUBJECTIVE & OBJECTIVE
Subjective:     Interval History: Day +1. Day +1 MTX today. Reported loose stool x 1 yesterday. No other complaints. DBP continues to be intermittently in the 90s. tacro level 2. Additional 0.5 mg administered this am. Dose changed from 2 mg bid to 2.5 mg bid.       Objective:     Vital Signs (Most Recent):  Temp: 98.2 °F (36.8 °C) (10/10/18 1102)  Pulse: 90 (10/10/18 1102)  Resp: 20 (10/10/18 1102)  BP: 126/84 (10/10/18 1102)  SpO2: 100 % (10/10/18 1102) Vital Signs (24h Range):  Temp:  [98.2 °F (36.8 °C)-99.2 °F (37.3 °C)] 98.2 °F (36.8 °C)  Pulse:  [77-98] 90  Resp:  [16-20] 20  SpO2:  [99 %-100 %] 100 %  BP: (115-139)/(74-93) 126/84     Weight: 70 kg (154 lb 3.4 oz)  Body mass index is 21.51 kg/m².  Body surface area is 1.87 meters squared.    ECOG SCORE         [unfilled]    Intake/Output - Last 3 Shifts       10/08 0700 - 10/09 0659 10/09 0700 - 10/10 0659 10/10 0700 - 10/11 0659    P.O. 1140 720     I.V. (mL/kg) 2260 (32.2) 2375.8 (34)     Blood  180     IV Piggyback       Total Intake(mL/kg) 3400 (48.4) 3275.8 (46.9)     Urine (mL/kg/hr) 3600 (2.1) 3275 (2) 500 (1.1)    Stool 0 0     Total Output 3600 3275 500    Net -200 +0.8 -500           Stool Occurrence 3 x 1 x           Physical Exam   Constitutional: He is oriented to person, place, and time. He appears well-developed and well-nourished.   HENT:   Head: Normocephalic and atraumatic.   Mouth/Throat: No oropharyngeal exudate.   Reports bad taste in mouth post transplant.   Eyes: Conjunctivae are normal. Pupils are equal, round, and reactive to light. Right eye exhibits no discharge. Left eye exhibits no discharge.   Neck: Normal range of motion. Neck supple.   Cardiovascular: Normal rate, regular rhythm, normal heart sounds and intact distal pulses.   No murmur heard.  Pulmonary/Chest: Effort normal and breath sounds normal. No respiratory distress. He has no wheezes. He has no rales.   Abdominal: Soft. Bowel sounds are normal. He exhibits no  distension. There is no tenderness.   Loose stool x 1.    Musculoskeletal: Normal range of motion. He exhibits no edema or deformity.   Neurological: He is alert and oriented to person, place, and time.   Skin: Skin is warm and dry. No rash noted. No erythema.   Psychiatric: He has a normal mood and affect. His behavior is normal. Judgment and thought content normal.       Significant Labs:   CBC:   Recent Labs   Lab  10/09/18   0426  10/10/18   0355   WBC  1.19*  1.78*   HGB  11.1*  10.1*   HCT  33.1*  31.1*   PLT  223  192    and CMP:   Recent Labs   Lab  10/09/18   0426  10/10/18   0355   NA  141  141   K  3.7  3.4*   CL  106  106   CO2  29  28   GLU  77  101   BUN  10  11   CREATININE  0.7  0.6   CALCIUM  8.9  8.6*   PROT  5.1*  5.2*   ALBUMIN  2.7*  2.7*   BILITOT  0.4  0.2   ALKPHOS  61  69   AST  33  27   ALT  89*  70*   ANIONGAP  6*  7*   EGFRNONAA  >60.0  >60.0       Diagnostic Results:  None

## 2018-10-10 NOTE — ASSESSMENT & PLAN NOTE
-patient had episode of diarrhea 10/8/2018. None since per patient.  -c-diff ordered and collection pending.  1 loose stool yesterday, likely not c-diff  -prn imodium.

## 2018-10-10 NOTE — PT/OT/SLP EVAL
Physical Therapy Evaluation    Patient Name:  Wilton Guzmán   MRN:  [id number]    Recommendations:     Discharge Recommendations:  home   Discharge Equipment Recommendations: none   Barriers to discharge: None    Assessment:     Wilton Guzmán is a 33 y.o. male admitted with a medical diagnosis of Stem cell transplant candidate.  He presents with the following impairments/functional limitations:  (at risk for functional decline while receiving additional treatment).  Pt independent c functional mobility on initial eval.  Ambulating community distances without gait deficits.  Pt encourage to ambulate at least 3x/day c mask and gloves donned.  Pt would benefit from continued skilled therapy 1x/wk as pt at risk for functional decline while receiving additional treatments.  Pt will continue to be assessed and POC will be updated as needed.  Anticipating pt will be able to return home c no additional PT services needed once medically cleared for d/c.    Rehab Prognosis:  good; patient would benefit from acute skilled PT services to address these deficits and reach maximum level of function.      Recent Surgery: * No surgery found *      Plan:     During this hospitalization, patient to be seen 1 x/week to address the above listed problems via gait training, therapeutic activities, therapeutic exercises, neuromuscular re-education  · Plan of Care Expires:  11/04/18   Plan of Care Reviewed with: patient    Subjective     Communicated with RN and OT prior to session.  Patient found ambulating in room upon PT entry to room, agreeable to evaluation.      Chief Complaint: none  Patient comments/goals: to return home  Pain/Comfort:  · Pain Rating 1: 0/10    Patients cultural, spiritual, Sabianist conflicts given the current situation: none    Living Environment:  Pt lives c wife and daughter in [facility] c [place].  PTA pt driving, not working and no falls.  Prior to admission, patients level of function was independent.  Patient has  the following equipment: none.  DME owned (not currently used): none.  Upon discharge, patient will have assistance from family.    Objective:     Patient found with: central line     General Precautions: Standard,     Orthopedic Precautions:N/A   Braces: N/A     Exams:  · Cognitive Exam:  Patient is oriented to Person, Place, Time and Situation  · Gross Motor Coordination:  WFL  · Sensation:    · -       Intact  · RLE ROM: WFL  · RLE Strength: WFL  · LLE ROM: WFL  · LLE Strength: WFL    Functional Mobility:  · Transfers:     · Sit to Stand:  independence with no AD  · Gait: community distance c IV pole - mod I  · Balance: standing (I)    AM-PAC 6 CLICK MOBILITY  Total Score:24       Therapeutic Activities and Exercises:  Pt educated on: PT role/POC; safety c mobility; benefits of OOB activities; performing therex; d/c recs - v/u      Patient left up in chair with all lines intact, call button in reach and RN notified.    GOALS:   Multidisciplinary Problems     Physical Therapy Goals        Problem: Physical Therapy Goal    Goal Priority Disciplines Outcome Goal Variances Interventions   Physical Therapy Goal     PT, PT/OT Ongoing (interventions implemented as appropriate)     Description:  Pt currently independent c functional mobility.  Pt will continue to follow pt 1x/wk while admitted to ensure pt's functional status does not change while receiving additional treatments.    POC will be reassessed and adjusted if changes to pt's functional status occurs.                       History:     Past Medical History:   Diagnosis Date    Chronic myelogenous leukemia (CML), BCR-ABL1-positive     Depression     2010    Encounter for blood transfusion        Past Surgical History:   Procedure Laterality Date    INSERTION OF CATHETER N/A 10/1/2018    Procedure: INSERTION-CATHETER;  Surgeon: [facility];  Location: [facility] OR [address] [place] [address];  Service: Anesthesiology;  Laterality: N/A;    INSERTION-CATHETER N/A  10/1/2018    Performed by [facility] Diagnostic Provider at [facility] OR [address] [place] [address]       Clinical Decision Making:     History  Co-morbidities and personal factors that may impact the plan of care Examination  Body Structures and Functions, activity limitations and participation restrictions that may impact the plan of care Clinical Presentation   Decision Making/ Complexity Score   Co-morbidities:   [] Time since onset of injury / illness / exacerbation  [] Status of current condition  []Patient's cognitive status and safety concerns    [] Multiple Medical Problems (see med hx)  Personal Factors:   [] Patient's age  [] Prior Level of function   [] Patient's home situation (environment and family support)  [] Patient's level of motivation  [] Expected progression of patient      HISTORY:(criteria)    [x] 60729 - no personal factors/history    [] 96801 - has 1-2 personal factor/comorbidity     [] 01382 - has >3 personal factor/comorbidity     Body Regions:  [] Objective examination findings  [] Head     []  Neck  [] Trunk   [] Upper Extremity  [] Lower Extremity    Body Systems:  [] For communication ability, affect, cognition, language, and learning style: the assessment of the ability to make needs known, consciousness, orientation (person, place, and time), expected emotional /behavioral responses, and learning preferences (eg, learning barriers, education  needs)  [] For the neuromuscular system: a general assessment of gross coordinated movement (eg, balance, gait, locomotion, transfers, and transitions) and motor function  (motor control and motor learning)  [] For the musculoskeletal system: the assessment of gross symmetry, gross range of motion, gross strength, height, and weight  [] For the integumentary system: the assessment of pliability(texture), presence of scar formation, skin color, and skin integrity  [] For cardiovascular/pulmonary system: the assessment of heart rate, respiratory rate, blood pressure, and  edema     Activity limitations:    [] Patient's cognitive status and saf ety concerns          [] Status of current condition      [] Weight bearing restriction  [] Cardiopulmunary Restriction    Participation Restrictions:   [] Goals and goal agreement with the patient     [] Rehab potential (prognosis) and probable outcome      Examination of Body System: (criteria)    [x] 98013 - addressing 1-2 elements    [] 25867 - addressing a total of 3 or more elements     [] 43040 -  Addressing a total of 4 or more elements         Clinical Presentation: (criteria)  Stable - 65629     On examination of body system using standardized tests and measures patient presents with 1-2 elements from any of the following: body structures and functions, activity limitations, and/or participation restrictions.  Leading to a clinical presentation that is considered stable and/or uncomplicated                              Clinical Decision Making  (Eval Complexity):  Low- 18025     Time Tracking:     PT Received On: 10/10/18  PT Start Time: 1036     PT Stop Time: 1050  PT Total Time (min): 14 min     Billable Minutes: Evaluation 14 min      Jayden Nguyen, PT  10/10/2018

## 2018-10-11 PROBLEM — Z94.81 S/P ALLOGENEIC BONE MARROW TRANSPLANT: Status: ACTIVE | Noted: 2018-09-19

## 2018-10-11 LAB
ABO + RH BLD: NORMAL
ALBUMIN SERPL BCP-MCNC: 3 G/DL
ALP SERPL-CCNC: 70 U/L
ALT SERPL W/O P-5'-P-CCNC: 68 U/L
ANION GAP SERPL CALC-SCNC: 7 MMOL/L
AST SERPL-CCNC: 22 U/L
BASOPHILS # BLD AUTO: 0.01 K/UL
BASOPHILS NFR BLD: 0.7 %
BILIRUB SERPL-MCNC: 0.4 MG/DL
BLD GP AB SCN CELLS X3 SERPL QL: NORMAL
BUN SERPL-MCNC: 11 MG/DL
CALCIUM SERPL-MCNC: 9.2 MG/DL
CHLORIDE SERPL-SCNC: 105 MMOL/L
CO2 SERPL-SCNC: 28 MMOL/L
CREAT SERPL-MCNC: 0.7 MG/DL
DIFFERENTIAL METHOD: ABNORMAL
EOSINOPHIL # BLD AUTO: 0 K/UL
EOSINOPHIL NFR BLD: 0 %
ERYTHROCYTE [DISTWIDTH] IN BLOOD BY AUTOMATED COUNT: 13.7 %
EST. GFR  (AFRICAN AMERICAN): >60 ML/MIN/1.73 M^2
EST. GFR  (NON AFRICAN AMERICAN): >60 ML/MIN/1.73 M^2
GLUCOSE SERPL-MCNC: 89 MG/DL
HCT VFR BLD AUTO: 32.3 %
HGB BLD-MCNC: 10.9 G/DL
IMM GRANULOCYTES # BLD AUTO: 0.02 K/UL
IMM GRANULOCYTES NFR BLD AUTO: 1.4 %
LYMPHOCYTES # BLD AUTO: 0.2 K/UL
LYMPHOCYTES NFR BLD: 11.9 %
MAGNESIUM SERPL-MCNC: 1.7 MG/DL
MCH RBC QN AUTO: 30.2 PG
MCHC RBC AUTO-ENTMCNC: 33.7 G/DL
MCV RBC AUTO: 90 FL
MONOCYTES # BLD AUTO: 0 K/UL
MONOCYTES NFR BLD: 1.4 %
NEUTROPHILS # BLD AUTO: 1.2 K/UL
NEUTROPHILS NFR BLD: 84.6 %
NRBC BLD-RTO: 0 /100 WBC
PHOSPHATE SERPL-MCNC: 3.6 MG/DL
PLATELET # BLD AUTO: 159 K/UL
PMV BLD AUTO: 13 FL
POTASSIUM SERPL-SCNC: 4 MMOL/L
PROT SERPL-MCNC: 5.6 G/DL
RBC # BLD AUTO: 3.61 M/UL
SODIUM SERPL-SCNC: 140 MMOL/L
TACROLIMUS BLD-MCNC: 5.1 NG/ML
WBC # BLD AUTO: 1.43 K/UL

## 2018-10-11 PROCEDURE — 25000003 PHARM REV CODE 250: Performed by: INTERNAL MEDICINE

## 2018-10-11 PROCEDURE — 85025 COMPLETE CBC W/AUTO DIFF WBC: CPT

## 2018-10-11 PROCEDURE — 84100 ASSAY OF PHOSPHORUS: CPT

## 2018-10-11 PROCEDURE — 25000003 PHARM REV CODE 250: Performed by: NURSE PRACTITIONER

## 2018-10-11 PROCEDURE — A9155 ARTIFICIAL SALIVA: HCPCS | Performed by: INTERNAL MEDICINE

## 2018-10-11 PROCEDURE — 63600175 PHARM REV CODE 636 W HCPCS: Performed by: INTERNAL MEDICINE

## 2018-10-11 PROCEDURE — 99233 SBSQ HOSP IP/OBS HIGH 50: CPT | Mod: ,,, | Performed by: INTERNAL MEDICINE

## 2018-10-11 PROCEDURE — 86901 BLOOD TYPING SEROLOGIC RH(D): CPT

## 2018-10-11 PROCEDURE — 63600175 PHARM REV CODE 636 W HCPCS: Performed by: NURSE PRACTITIONER

## 2018-10-11 PROCEDURE — 80053 COMPREHEN METABOLIC PANEL: CPT

## 2018-10-11 PROCEDURE — 83735 ASSAY OF MAGNESIUM: CPT

## 2018-10-11 PROCEDURE — 20600001 HC STEP DOWN PRIVATE ROOM

## 2018-10-11 PROCEDURE — 80197 ASSAY OF TACROLIMUS: CPT

## 2018-10-11 RX ADMIN — PROCHLORPERAZINE EDISYLATE 10 MG: 5 INJECTION INTRAMUSCULAR; INTRAVENOUS at 06:10

## 2018-10-11 RX ADMIN — FLUCONAZOLE 400 MG: 200 TABLET ORAL at 10:10

## 2018-10-11 RX ADMIN — TACROLIMUS 2.5 MG: 1 CAPSULE ORAL at 08:10

## 2018-10-11 RX ADMIN — SODIUM CHLORIDE AND POTASSIUM CHLORIDE: 9; 1.49 INJECTION, SOLUTION INTRAVENOUS at 12:10

## 2018-10-11 RX ADMIN — TACROLIMUS 2.5 MG: 1 CAPSULE ORAL at 05:10

## 2018-10-11 RX ADMIN — URSODIOL 300 MG: 300 CAPSULE ORAL at 08:10

## 2018-10-11 RX ADMIN — ACYCLOVIR 800 MG: 200 CAPSULE ORAL at 10:10

## 2018-10-11 RX ADMIN — ENOXAPARIN SODIUM 40 MG: 100 INJECTION SUBCUTANEOUS at 05:10

## 2018-10-11 RX ADMIN — PANTOPRAZOLE SODIUM 40 MG: 40 TABLET, DELAYED RELEASE ORAL at 10:10

## 2018-10-11 RX ADMIN — URSODIOL 300 MG: 300 CAPSULE ORAL at 10:10

## 2018-10-11 RX ADMIN — MAGNESIUM OXIDE TAB 400 MG (241.3 MG ELEMENTAL MG) 400 MG: 400 (241.3 MG) TAB at 10:10

## 2018-10-11 RX ADMIN — SODIUM CHLORIDE AND POTASSIUM CHLORIDE: 9; 1.49 INJECTION, SOLUTION INTRAVENOUS at 08:10

## 2018-10-11 RX ADMIN — MAGNESIUM OXIDE TAB 400 MG (241.3 MG ELEMENTAL MG) 400 MG: 400 (241.3 MG) TAB at 06:10

## 2018-10-11 RX ADMIN — LEVOFLOXACIN 500 MG: 500 TABLET, FILM COATED ORAL at 10:10

## 2018-10-11 NOTE — ASSESSMENT & PLAN NOTE
-transfuse prbc for hgb <7; transfuse platelets for plt count <10K  -hgb 10.9; wbc 1.43 (ANC 1200); plt 159K.

## 2018-10-11 NOTE — ASSESSMENT & PLAN NOTE
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 9% blasts.Started on MEC chemotherapy for refractory AML on 6/23/2018  - had BMBX on 7/13/18, without evidence of CML; BCR ABL p210 0.5% (c/w MRD)  - dasatinib 140 mg daily at home, will stop today when chemo starts  -pre-transplant bone marrow biopsy from 9/11/2018 shows a hematologic and molecular remission  - admitted for Busulfan/Cyclophosphamide MRD allogeneic stem cell transplant from his brother  -Today is Day +2 allo SCT.

## 2018-10-11 NOTE — PLAN OF CARE
Problem: Patient Care Overview  Goal: Plan of Care Review  Outcome: Ongoing (interventions implemented as appropriate)  Pt involved in plan of care and communicating needs throughout shift.  Pt up in room independently; no c/o pain or discomfort today. Oral fluids being promoted.  Neutropenic precautions maintained. Electrolytes given per protocol.  Pt day +1 s/p Allo transplant.  All VSS.  Pt remaining free from falls or injury throughout shift; bed in lowest position; call light within reach.  Pt instructed to call for assistance as needed.  Q1H rounding done on pt.

## 2018-10-11 NOTE — ASSESSMENT & PLAN NOTE
- Day +2 of Busulfan/Cyclophosphamide matched related donor stem cell transplant from 10/10 match brother:   - Donor B+ and CMV+, recipient O+ and CMV+  - Pre-transplant work-up with no contraindications to proceeding to transplant, sickle cell trait noted. Brother has sickle cell trait as well.   - Kurtz placed 10/1  - Allo transplant on 10/09/2018 without complication.  - Day +1 post-transfusion MTX done (10/10/2018).   - Tacro level 5.1 today, will continue tacro 2.5 mg bid    Planned conditioning regimen:  Busulfan on Day -7, -6, -5, and -4  Cyclophosphamide on Day -3, and -2   Planned  GVHD Prophylaxis:  Methotrexate on Day +1, +3, +6, and +11  Tacrolimus starting on Day -1  Antimicrobial Prophylaxis:  Acyclovir starting on Day -7  Levaquin starting on Day -1  Fluconazole starting on Day -1  Mepron starting on Day +30  Seizure Prophylaxis:  Levetiracetam on Day -7 through Day -3  SOS/VOD Prophylaxis:  Ursodiol on Day -7 through Day +30  Growth Factor Support:  Neupogen starting on Day +7

## 2018-10-11 NOTE — PROGRESS NOTES
[facility]  Hematology  Bone Marrow Transplant  Progress Note    Patient Name: Wilton Guzmán  Admission Date: 10/1/2018  Hospital Length of Stay: 10 days  Code Status: Full Code    Subjective:     Interval History: Day +2 MRD allo. Vomited x 1 this am, no diarrhea. VSS. No complaints this am. Tacro level 5.1 today, continue tacro 2.5 mg bid    Objective:     Vital Signs (Most Recent):  Temp: 98.1 °F (36.7 °C) (10/11/18 1154)  Pulse: 88 (10/11/18 1154)  Resp: 18 (10/11/18 1154)  BP: (!) 141/92 (10/11/18 1154)  SpO2: 99 % (10/11/18 1154) Vital Signs (24h Range):  Temp:  [98.1 °F (36.7 °C)-98.7 °F (37.1 °C)] 98.1 °F (36.7 °C)  Pulse:  [67-94] 88  Resp:  [16-20] 18  SpO2:  [99 %-100 %] 99 %  BP: (121-141)/(81-93) 141/92     Weight: 68.6 kg (151 lb 2 oz)  Body mass index is 21.08 kg/m².  Body surface area is 1.85 meters squared.      Intake/Output - Last 3 Shifts       10/09 0700 - 10/10 0659 10/10 0700 - 10/11 0659 10/11 0700 - 10/12 0659    P.O. 720 776 170    I.V. (mL/kg) 2375.8 (34) 1845 (26.9) 825.3 (12)    Blood 180      Total Intake(mL/kg) 3275.8 (46.9) 2621 (38.3) 995.3 (14.5)    Urine (mL/kg/hr) 3275 (2) 3175 (1.9) 500 (0.8)    Emesis/NG output  0     Stool 0 0 0    Total Output 3275 3175 500    Net +0.8 -554 +495.3           Urine Occurrence  1 x     Stool Occurrence 1 x 1 x 3 x    Emesis Occurrence  1 x           Physical Exam   Constitutional: He is oriented to person, place, and time. He appears well-developed and well-nourished.   HENT:   Head: Normocephalic and atraumatic.   Mouth/Throat: No oropharyngeal exudate.   Eyes: Conjunctivae are normal. Pupils are equal, round, and reactive to light. Right eye exhibits no discharge. Left eye exhibits no discharge.   Neck: Normal range of motion. Neck supple.   Cardiovascular: Normal rate, regular rhythm, normal heart sounds and intact distal pulses.   No murmur heard.  Pulmonary/Chest: Effort normal and breath sounds normal. No respiratory  distress. He has no wheezes. He has no rales.   Abdominal: Soft. Bowel sounds are normal. He exhibits no distension. There is no tenderness.   Musculoskeletal: Normal range of motion. He exhibits no edema or deformity.   Neurological: He is alert and oriented to person, place, and time.   Skin: Skin is warm and dry. No rash noted. No erythema.   Psychiatric: He has a normal mood and affect. His behavior is normal. Judgment and thought content normal.       Significant Labs:   CBC:   Recent Labs   Lab  10/10/18   0355  10/11/18   0400   WBC  1.78*  1.43*   HGB  10.1*  10.9*   HCT  31.1*  32.3*   PLT  192  159    and CMP:   Recent Labs   Lab  10/10/18   0355  10/11/18   0400   NA  141  140   K  3.4*  4.0   CL  106  105   CO2  28  28   GLU  101  89   BUN  11  11   CREATININE  0.6  0.7   CALCIUM  8.6*  9.2   PROT  5.2*  5.6*   ALBUMIN  2.7*  3.0*   BILITOT  0.2  0.4   ALKPHOS  69  70   AST  27  22   ALT  70*  68*   ANIONGAP  7*  7*   EGFRNONAA  >60.0  >60.0       Diagnostic Results:  None    Assessment/Plan:     * S/P allogeneic bone marrow transplant    - Day +2 of Busulfan/Cyclophosphamide matched related donor stem cell transplant from 10/10 match brother:   - Donor B+ and CMV+, recipient O+ and CMV+  - Pre-transplant work-up with no contraindications to proceeding to transplant, sickle cell trait noted. Brother has sickle cell trait as well.   - Kurtz placed 10/1  - Allo transplant on 10/09/2018 without complication.  - Day +1 post-transfusion MTX done (10/10/2018).   - Tacro level 5.1 today, will continue tacro 2.5 mg bid    Planned conditioning regimen:  Busulfan on Day -7, -6, -5, and -4  Cyclophosphamide on Day -3, and -2   Planned  GVHD Prophylaxis:  Methotrexate on Day +1, +3, +6, and +11  Tacrolimus starting on Day -1  Antimicrobial Prophylaxis:  Acyclovir starting on Day -7  Levaquin starting on Day -1  Fluconazole starting on Day -1  Mepron starting on Day +30  Seizure Prophylaxis:  Levetiracetam on Day  -7 through Day -3  SOS/VOD Prophylaxis:  Ursodiol on Day -7 through Day +30  Growth Factor Support:  Neupogen starting on Day +7          Chronic myelogenous leukemia (CML), BCR-ABL1-positive    - original BMBX 5/26/18 with 20% blasts; CML transformed to AML with blast crisis;   karyotype: 46,XY,t(9;22;22;14)q34;q11.2;q13;q23); BCR/ABL gene fusion 62%  - received induction with 7+3 on 6/1-6/8/2018  - persistent disease post 7+3 induction, repeat BM biopsy done 6/18 here at [facility] after 7+3. 9% blasts.Started on MEC chemotherapy for refractory AML on 6/23/2018  - had BMBX on 7/13/18, without evidence of CML; BCR ABL p210 0.5% (c/w MRD)  - dasatinib 140 mg daily at home, will stop today when chemo starts  -pre-transplant bone marrow biopsy from 9/11/2018 shows a hematologic and molecular remission  - admitted for Busulfan/Cyclophosphamide MRD allogeneic stem cell transplant from his brother  -Today is Day +2 allo SCT.          Chemotherapy-induced nausea    -nausea started 10/9  -vomited x 1 this am  -prn anti-emetics.   -tolerating meals and po meds at this time.        Cytopenia    -transfuse prbc for hgb <7; transfuse platelets for plt count <10K  -hgb 10.9; wbc 1.43 (ANC 1200); plt 159K.        Diarrhea    -patient had episode of diarrhea 10/8/2018, none today  -c-diff ordered and collection pending.  -prn imodium.            VTE Risk Mitigation (From admission, onward)        Ordered     enoxaparin injection 40 mg  Daily      10/01/18 1729     heparin, porcine (PF) 100 unit/mL injection flush 300 Units  As needed (PRN)      10/01/18 1657          Disposition: pending recovery from transplant    Haily Serna NP  Bone Marrow Transplant  [facility]

## 2018-10-11 NOTE — SUBJECTIVE & OBJECTIVE
Subjective:     Interval History: Day +2 MRD allo. Vomited x 1 this am, no diarrhea. VSS. No complaints this am. Tacro level 5.1 today, continue tacro 2.5 mg bid    Objective:     Vital Signs (Most Recent):  Temp: 98.1 °F (36.7 °C) (10/11/18 1154)  Pulse: 88 (10/11/18 1154)  Resp: 18 (10/11/18 1154)  BP: (!) 141/92 (10/11/18 1154)  SpO2: 99 % (10/11/18 1154) Vital Signs (24h Range):  Temp:  [98.1 °F (36.7 °C)-98.7 °F (37.1 °C)] 98.1 °F (36.7 °C)  Pulse:  [67-94] 88  Resp:  [16-20] 18  SpO2:  [99 %-100 %] 99 %  BP: (121-141)/(81-93) 141/92     Weight: 68.6 kg (151 lb 2 oz)  Body mass index is 21.08 kg/m².  Body surface area is 1.85 meters squared.      Intake/Output - Last 3 Shifts       10/09 0700 - 10/10 0659 10/10 0700 - 10/11 0659 10/11 0700 - 10/12 0659    P.O. 720 776 170    I.V. (mL/kg) 2375.8 (34) 1845 (26.9) 825.3 (12)    Blood 180      Total Intake(mL/kg) 3275.8 (46.9) 2621 (38.3) 995.3 (14.5)    Urine (mL/kg/hr) 3275 (2) 3175 (1.9) 500 (0.8)    Emesis/NG output  0     Stool 0 0 0    Total Output 3275 3175 500    Net +0.8 -554 +495.3           Urine Occurrence  1 x     Stool Occurrence 1 x 1 x 3 x    Emesis Occurrence  1 x           Physical Exam   Constitutional: He is oriented to person, place, and time. He appears well-developed and well-nourished.   HENT:   Head: Normocephalic and atraumatic.   Mouth/Throat: No oropharyngeal exudate.   Eyes: Conjunctivae are normal. Pupils are equal, round, and reactive to light. Right eye exhibits no discharge. Left eye exhibits no discharge.   Neck: Normal range of motion. Neck supple.   Cardiovascular: Normal rate, regular rhythm, normal heart sounds and intact distal pulses.   No murmur heard.  Pulmonary/Chest: Effort normal and breath sounds normal. No respiratory distress. He has no wheezes. He has no rales.   Abdominal: Soft. Bowel sounds are normal. He exhibits no distension. There is no tenderness.   Musculoskeletal: Normal range of motion. He exhibits no  edema or deformity.   Neurological: He is alert and oriented to person, place, and time.   Skin: Skin is warm and dry. No rash noted. No erythema.   Psychiatric: He has a normal mood and affect. His behavior is normal. Judgment and thought content normal.       Significant Labs:   CBC:   Recent Labs   Lab  10/10/18   0355  10/11/18   0400   WBC  1.78*  1.43*   HGB  10.1*  10.9*   HCT  31.1*  32.3*   PLT  192  159    and CMP:   Recent Labs   Lab  10/10/18   0355  10/11/18   0400   NA  141  140   K  3.4*  4.0   CL  106  105   CO2  28  28   GLU  101  89   BUN  11  11   CREATININE  0.6  0.7   CALCIUM  8.6*  9.2   PROT  5.2*  5.6*   ALBUMIN  2.7*  3.0*   BILITOT  0.2  0.4   ALKPHOS  69  70   AST  27  22   ALT  70*  68*   ANIONGAP  7*  7*   EGFRNONAA  >60.0  >60.0       Diagnostic Results:  None

## 2018-10-11 NOTE — ASSESSMENT & PLAN NOTE
-nausea started 10/9  -vomited x 1 this am  -prn anti-emetics.   -tolerating meals and po meds at this time.

## 2018-10-11 NOTE — PLAN OF CARE
"Problem: Patient Care Overview  Goal: Plan of Care Review  Outcome: Ongoing (interventions implemented as appropriate)  Plan of care reviewed with the patient at the beginning of the shift. Pt is day +2 of allo transplant. Pt tolerated transplant well. Mild fatigue noted. He denies pain. Pt had one episode of nausea with emesis this morning- per pt it was a "large amount of yellow liquid". Compazine given. IVF infusing. Mucous membranes are intact. Salt and soda and saliva substitute provided. Fall precautions maintained. Pt remained free from falls and injury this shift. Bed locked in lowest position, side rails up x2, call light within reach. Instructed pt to call for assistance as needed. Pt verbalized understanding. Pt afebrile overnight. Vitals stable. Neutropenic precautions maintained. No acute issues overnight. Will continue to monitor.       "

## 2018-10-11 NOTE — ASSESSMENT & PLAN NOTE
-patient had episode of diarrhea 10/8/2018, none today  -c-diff ordered and collection pending.  -prn imodium.

## 2018-10-12 LAB
ALBUMIN SERPL BCP-MCNC: 3 G/DL
ALP SERPL-CCNC: 73 U/L
ALT SERPL W/O P-5'-P-CCNC: 66 U/L
ANION GAP SERPL CALC-SCNC: 7 MMOL/L
ANISOCYTOSIS BLD QL SMEAR: SLIGHT
AST SERPL-CCNC: 21 U/L
BASOPHILS # BLD AUTO: 0 K/UL
BASOPHILS NFR BLD: 0 %
BILIRUB SERPL-MCNC: 0.4 MG/DL
BUN SERPL-MCNC: 10 MG/DL
C DIFF GDH STL QL: POSITIVE
C DIFF TOX A+B STL QL IA: NEGATIVE
C DIFF TOX GENS STL QL NAA+PROBE: POSITIVE
CALCIUM SERPL-MCNC: 8.8 MG/DL
CHLORIDE SERPL-SCNC: 106 MMOL/L
CO2 SERPL-SCNC: 27 MMOL/L
CREAT SERPL-MCNC: 0.7 MG/DL
DIFFERENTIAL METHOD: ABNORMAL
EOSINOPHIL # BLD AUTO: 0 K/UL
EOSINOPHIL NFR BLD: 0 %
ERYTHROCYTE [DISTWIDTH] IN BLOOD BY AUTOMATED COUNT: 13.5 %
EST. GFR  (AFRICAN AMERICAN): >60 ML/MIN/1.73 M^2
EST. GFR  (NON AFRICAN AMERICAN): >60 ML/MIN/1.73 M^2
GLUCOSE SERPL-MCNC: 77 MG/DL
HCT VFR BLD AUTO: 31.9 %
HGB BLD-MCNC: 10.8 G/DL
HYPOCHROMIA BLD QL SMEAR: ABNORMAL
IMM GRANULOCYTES # BLD AUTO: 0.01 K/UL
IMM GRANULOCYTES NFR BLD AUTO: 2 %
LYMPHOCYTES # BLD AUTO: 0.1 K/UL
LYMPHOCYTES NFR BLD: 17.6 %
MAGNESIUM SERPL-MCNC: 1.5 MG/DL
MCH RBC QN AUTO: 30.1 PG
MCHC RBC AUTO-ENTMCNC: 33.9 G/DL
MCV RBC AUTO: 89 FL
MONOCYTES # BLD AUTO: 0 K/UL
MONOCYTES NFR BLD: 2 %
NEUTROPHILS # BLD AUTO: 0.4 K/UL
NEUTROPHILS NFR BLD: 78.4 %
NRBC BLD-RTO: 0 /100 WBC
OVALOCYTES BLD QL SMEAR: ABNORMAL
PHOSPHATE SERPL-MCNC: 3.9 MG/DL
PLATELET # BLD AUTO: 117 K/UL
PLATELET BLD QL SMEAR: ABNORMAL
PMV BLD AUTO: 11.3 FL
POIKILOCYTOSIS BLD QL SMEAR: SLIGHT
POTASSIUM SERPL-SCNC: 3.9 MMOL/L
PROT SERPL-MCNC: 5.5 G/DL
RBC # BLD AUTO: 3.59 M/UL
SODIUM SERPL-SCNC: 140 MMOL/L
TACROLIMUS BLD-MCNC: 5.8 NG/ML
WBC # BLD AUTO: 0.51 K/UL

## 2018-10-12 PROCEDURE — 63600175 PHARM REV CODE 636 W HCPCS: Performed by: NURSE PRACTITIONER

## 2018-10-12 PROCEDURE — 63600175 PHARM REV CODE 636 W HCPCS: Performed by: INTERNAL MEDICINE

## 2018-10-12 PROCEDURE — 85025 COMPLETE CBC W/AUTO DIFF WBC: CPT

## 2018-10-12 PROCEDURE — 84100 ASSAY OF PHOSPHORUS: CPT

## 2018-10-12 PROCEDURE — 20600001 HC STEP DOWN PRIVATE ROOM

## 2018-10-12 PROCEDURE — 80053 COMPREHEN METABOLIC PANEL: CPT

## 2018-10-12 PROCEDURE — 25000003 PHARM REV CODE 250: Performed by: NURSE PRACTITIONER

## 2018-10-12 PROCEDURE — 87324 CLOSTRIDIUM AG IA: CPT

## 2018-10-12 PROCEDURE — 83735 ASSAY OF MAGNESIUM: CPT

## 2018-10-12 PROCEDURE — 25000003 PHARM REV CODE 250: Performed by: STUDENT IN AN ORGANIZED HEALTH CARE EDUCATION/TRAINING PROGRAM

## 2018-10-12 PROCEDURE — 25000003 PHARM REV CODE 250: Performed by: INTERNAL MEDICINE

## 2018-10-12 PROCEDURE — 80197 ASSAY OF TACROLIMUS: CPT

## 2018-10-12 PROCEDURE — 87493 C DIFF AMPLIFIED PROBE: CPT

## 2018-10-12 RX ORDER — AMLODIPINE BESYLATE 5 MG/1
5 TABLET ORAL DAILY
Status: DISCONTINUED | OUTPATIENT
Start: 2018-10-12 | End: 2018-10-22 | Stop reason: HOSPADM

## 2018-10-12 RX ADMIN — LEVOFLOXACIN 500 MG: 500 TABLET, FILM COATED ORAL at 10:10

## 2018-10-12 RX ADMIN — SODIUM CHLORIDE AND POTASSIUM CHLORIDE: 9; 1.49 INJECTION, SOLUTION INTRAVENOUS at 05:10

## 2018-10-12 RX ADMIN — TACROLIMUS 2.5 MG: 1 CAPSULE ORAL at 08:10

## 2018-10-12 RX ADMIN — ACYCLOVIR 800 MG: 200 CAPSULE ORAL at 08:10

## 2018-10-12 RX ADMIN — Medication 125 MG: at 05:10

## 2018-10-12 RX ADMIN — TACROLIMUS 2.5 MG: 1 CAPSULE ORAL at 05:10

## 2018-10-12 RX ADMIN — Medication 400 MG: at 06:10

## 2018-10-12 RX ADMIN — ENOXAPARIN SODIUM 40 MG: 100 INJECTION SUBCUTANEOUS at 05:10

## 2018-10-12 RX ADMIN — METHOTREXATE 10 MG: 25 INJECTION, SOLUTION INTRA-ARTERIAL; INTRAMUSCULAR; INTRATHECAL; INTRAVENOUS at 11:10

## 2018-10-12 RX ADMIN — FLUCONAZOLE 400 MG: 200 TABLET ORAL at 08:10

## 2018-10-12 RX ADMIN — Medication 125 MG: at 11:10

## 2018-10-12 RX ADMIN — PANTOPRAZOLE SODIUM 40 MG: 40 TABLET, DELAYED RELEASE ORAL at 08:10

## 2018-10-12 RX ADMIN — URSODIOL 300 MG: 300 CAPSULE ORAL at 09:10

## 2018-10-12 RX ADMIN — Medication 400 MG: at 10:10

## 2018-10-12 RX ADMIN — AMLODIPINE BESYLATE 5 MG: 5 TABLET ORAL at 10:10

## 2018-10-12 RX ADMIN — URSODIOL 300 MG: 300 CAPSULE ORAL at 08:10

## 2018-10-12 RX ADMIN — Medication 400 MG: at 02:10

## 2018-10-12 NOTE — PROGRESS NOTES
[facility]  Hematology  Bone Marrow Transplant  Progress Note    Patient Name: Wilton Guzmán  Admission Date: 10/1/2018  Hospital Length of Stay: 11 days  Code Status: Full Code    Subjective:     Interval History: Day +3 MRD allo. No c/o n/v today. Has had 2 loose BMs so far this morning. Order placed for c-diff. Imodium ok if c-diff neg. Pt to receive Day +3 MTX today. Amlodipine 5 mg daily for elevated BP. Tacro 5.8. No changes.       Objective:     Vital Signs (Most Recent):  Temp: 97.4 °F (36.3 °C) (10/12/18 1132)  Pulse: 95 (10/12/18 1132)  Resp: 20 (10/12/18 1132)  BP: 127/84 (10/12/18 1132)  SpO2: 100 % (10/12/18 1132) Vital Signs (24h Range):  Temp:  [97.4 °F (36.3 °C)-98.7 °F (37.1 °C)] 97.4 °F (36.3 °C)  Pulse:  [82-96] 95  Resp:  [16-20] 20  SpO2:  [96 %-100 %] 100 %  BP: (125-151)/(77-99) 127/84     Weight: 67 kg (147 lb 13.1 oz)  Body mass index is 20.62 kg/m².  Body surface area is 1.83 meters squared.      Intake/Output - Last 3 Shifts       10/10 0700 - 10/11 0659 10/11 0700 - 10/12 0659 10/12 0700 - 10/13 0659    P.O. 776 600 360    I.V. (mL/kg) 1845 (26.9) 1765.3 (26.3) 467 (7)    Blood       IV Piggyback   50    Total Intake(mL/kg) 2621 (38.3) 2365.3 (35.3) 877 (13.1)    Urine (mL/kg/hr) 3175 (1.9) 1725 (1.1) 600 (1.7)    Emesis/NG output 0      Stool 0 0 0    Total Output 3175 1725 600    Net -554 +640.3 +277           Urine Occurrence 1 x  1 x    Stool Occurrence 1 x 3 x 3 x    Emesis Occurrence 1 x            Physical Exam   Constitutional: He is oriented to person, place, and time. He appears well-developed and well-nourished.   HENT:   Head: Normocephalic and atraumatic.   Mouth/Throat: No oropharyngeal exudate.   Eyes: Conjunctivae are normal. Pupils are equal, round, and reactive to light. Right eye exhibits no discharge. Left eye exhibits no discharge.   Neck: Normal range of motion. Neck supple.   Cardiovascular: Normal rate, regular rhythm, normal heart sounds and  intact distal pulses.   No murmur heard.  Pulmonary/Chest: Effort normal and breath sounds normal. No respiratory distress. He has no wheezes. He has no rales.   Abdominal: Soft. Bowel sounds are normal. He exhibits no distension. There is no tenderness.   Pt having diarrhea.   Musculoskeletal: Normal range of motion. He exhibits no edema or deformity.   Neurological: He is alert and oriented to person, place, and time.   Skin: Skin is warm and dry. No rash noted. No erythema.   Psychiatric: He has a normal mood and affect. His behavior is normal. Judgment and thought content normal.       Significant Labs:   CBC:   Recent Labs   Lab  10/11/18   0400  10/12/18   0407   WBC  1.43*  0.51*   HGB  10.9*  10.8*   HCT  32.3*  31.9*   PLT  159  117*    and CMP:   Recent Labs   Lab  10/11/18   0400  10/12/18   0407   NA  140  140   K  4.0  3.9   CL  105  106   CO2  28  27   GLU  89  77   BUN  11  10   CREATININE  0.7  0.7   CALCIUM  9.2  8.8   PROT  5.6*  5.5*   ALBUMIN  3.0*  3.0*   BILITOT  0.4  0.4   ALKPHOS  70  73   AST  22  21   ALT  68*  66*   ANIONGAP  7*  7*   EGFRNONAA  >60.0  >60.0       Diagnostic Results:  None    Assessment/Plan:     * S/P allogeneic bone marrow transplant    - Day +3 of Busulfan/Cyclophosphamide matched related donor stem cell transplant from 10/10 match brother:   - Donor B+ and CMV+, recipient O+ and CMV+  - Pre-transplant work-up with no contraindications to proceeding to transplant, sickle cell trait noted. Brother has sickle cell trait as well.   - Kurtz placed 10/1  - Allo transplant on 10/09/2018 without complication.  - Day +1 post-transfusion MTX done (10/10/2018).   - Tacro level 5.8 today, will continue tacro 2.5 mg bid  -Day +3 MTX today (10/12/2018)    Planned conditioning regimen:  Busulfan on Day -7, -6, -5, and -4  Cyclophosphamide on Day -3, and -2   Planned  GVHD Prophylaxis:  Methotrexate on Day +1, +3, +6, and +11  Tacrolimus starting on Day -1  Antimicrobial  Prophylaxis:  Acyclovir starting on Day -7  Levaquin starting on Day -1  Fluconazole starting on Day -1  Mepron starting on Day +30  Seizure Prophylaxis:  Levetiracetam on Day -7 through Day -3  SOS/VOD Prophylaxis:  Ursodiol on Day -7 through Day +30  Growth Factor Support:  Neupogen starting on Day +7          Chronic myelogenous leukemia (CML), BCR-ABL1-positive    - original BMBX 5/26/18 with 20% blasts; CML transformed to AML with blast crisis;   karyotype: 46,XY,t(9;22;22;14)q34;q11.2;q13;q23); BCR/ABL gene fusion 62%  - received induction with 7+3 on 6/1-6/8/2018  - persistent disease post 7+3 induction, repeat BM biopsy done 6/18 here at [facility] after 7+3. 9% blasts.Started on MEC chemotherapy for refractory AML on 6/23/2018  - had BMBX on 7/13/18, without evidence of CML; BCR ABL p210 0.5% (c/w MRD)  - dasatinib 140 mg daily at home, will stop today when chemo starts  -pre-transplant bone marrow biopsy from 9/11/2018 shows a hematologic and molecular remission  - admitted for Busulfan/Cyclophosphamide MRD allogeneic stem cell transplant from his brother  -Today is Day +3 allo SCT.          Cytopenia    -transfuse prbc for hgb <7; transfuse platelets for plt count <10K  -hgb 10.8 ; wbc 0.51 (); plt 117K.        Chemotherapy-induced nausea    -nausea started 10/9  -vomited x 1 10/11/2018  -prn anti-emetics.   -tolerating meals and po meds at this time.        Diarrhea    -patient had episode of diarrhea 10/8/2018, none today  -c-diff ordered and collection pending.  -prn imodium if c-diff neg            VTE Risk Mitigation (From admission, onward)        Ordered     enoxaparin injection 40 mg  Daily      10/01/18 1729     heparin, porcine (PF) 100 unit/mL injection flush 300 Units  As needed (PRN)      10/01/18 5187          Disposition: Awaiting count drop and subsequent recovery.    Leona Beltran, NP  Bone Marrow Transplant  [facility]

## 2018-10-12 NOTE — PLAN OF CARE
Problem: Patient Care Overview  Goal: Plan of Care Review  Outcome: Ongoing (interventions implemented as appropriate)  Pt. Day +3 for AlloSCT.  Pt. with nonskid footwear on with bed in lowest position and locked with bed rails up x2.  Pt. ambulates independently and instructed to call if assistance is needed.  Pt. with call light within reach.  Pt. received electrolyte replacements today.   Pt. started on norvasc today.  Pt. started on vanc today for c. diff.  Pt. with wife at bedside.  Will continue to monitor pt.

## 2018-10-12 NOTE — ASSESSMENT & PLAN NOTE
-patient had episode of diarrhea 10/8/2018, none today  -c-diff ordered and collection pending.  -prn imodium if c-diff neg

## 2018-10-12 NOTE — PLAN OF CARE
Problem: Patient Care Overview  Goal: Plan of Care Review  Outcome: Ongoing (interventions implemented as appropriate)  Pt. Day +2 for AlloSCT.  Pt. with nonskid footwear on with bed in lowest position and locked with bed rails up x2.  Pt. ambulates independently and instructed to call if assistance is needed.  Pt. with call light within reach.  Pt. received electrolyte replacements today.   Pt. with c/o fatigue today.  Pt. With wife at bedside.  Will continue to monitor pt.

## 2018-10-12 NOTE — ASSESSMENT & PLAN NOTE
- Day +3 of Busulfan/Cyclophosphamide matched related donor stem cell transplant from 10/10 match brother:   - Donor B+ and CMV+, recipient O+ and CMV+  - Pre-transplant work-up with no contraindications to proceeding to transplant, sickle cell trait noted. Brother has sickle cell trait as well.   - Kurtz placed 10/1  - Allo transplant on 10/09/2018 without complication.  - Day +1 post-transfusion MTX done (10/10/2018).   - Tacro level 5.8 today, will continue tacro 2.5 mg bid  -Day +3 MTX today (10/12/2018)    Planned conditioning regimen:  Busulfan on Day -7, -6, -5, and -4  Cyclophosphamide on Day -3, and -2   Planned  GVHD Prophylaxis:  Methotrexate on Day +1, +3, +6, and +11  Tacrolimus starting on Day -1  Antimicrobial Prophylaxis:  Acyclovir starting on Day -7  Levaquin starting on Day -1  Fluconazole starting on Day -1  Mepron starting on Day +30  Seizure Prophylaxis:  Levetiracetam on Day -7 through Day -3  SOS/VOD Prophylaxis:  Ursodiol on Day -7 through Day +30  Growth Factor Support:  Neupogen starting on Day +7

## 2018-10-12 NOTE — PROGRESS NOTES
" [facility]  Adult Nutrition  Progress Note    SUMMARY       Recommendations    Recommendation/Intervention: 1.Continue w/ Regular diet. 2.Encourage PO intake >/= 75% EEN/EPN 3.If PO intake is <50% encourage HVP w/ each meal, small frequent meal, snacks, ONS, cold/non aromatic foods. 4.RD to follow  Goals: nutrient intake >/= 85% EEN/EPN   Nutrition Goal Status: new  Communication of RD Recs: discussed on rounds    Reason for Assessment    Reason for Assessment: RD follow-up  Diagnosis: (CML)  Relevant Medical History: Relapsed CML  Interdisciplinary Rounds: attended  General Information Comments: Day +3 of Busulfan/Cyclophosphamide Allo. c-diff sample pending. PO intake decreased to 50-75%. pt still appears nourished and maintaining wt. ambulating around the room w/ wife at bed side. wife asked questions regarding diet, reviewed food safety guidelines  w/ wife.   Nutrition Discharge Planning: Regular diet w/ adequate po intake + ONS as needed to meet increased needs    Nutrition Risk Screen    Nutrition Risk Screen: no indicators present    Nutrition/Diet History    Patient Reported Diet/Restrictions/Preferences: general  Typical Food/Fluid Intake: adequate maintaining nutritional status  Food Preferences: None  Do you have any cultural, spiritual, Latter day conflicts, given your current situation?: none  Food Allergies: NKFA  Factors Affecting Nutritional Intake: None identified at this time    Anthropometrics    Temp: 97.4 °F (36.3 °C)  Height Method: Stated  Height: 5' 11" (180.3 cm)  Height (inches): 71 in  Weight Method: Standard Scale  Weight: 67 kg (147 lb 13.1 oz)  Weight (lb): 147.82 lb  Ideal Body Weight (IBW), Male: 172 lb  % Ideal Body Weight, Male (lb): 89.66 lb  BMI (Calculated): 21.6  BMI Grade: 18.5-24.9 - normal  Usual Body Weight (UBW), k.2 kg  % Usual Body Weight: 93.21  % Weight Change From Usual Weight: -6.98 %  Weight Loss Since Admission: 5 lb 11.7 oz(gained since " admit)  % Weight Change Since Admission: 3.72       Lab/Procedures/Meds    Pertinent Labs Reviewed: reviewed  Pertinent Labs Comments: WBC-0.51, H/H-10.8/31.9, Plts-117, ALT-66  Pertinent Medications Reviewed: reviewed  Pertinent Medications Comments: acyclovir, Neupogen, heparin, Methotrexate, pantoprazole    Physical Findings/Assessment    Overall Physical Appearance: nourished  Oral/Mouth Cavity: all teeth present (age appropriate)  Skin: intact    Estimated/Assessed Needs    Weight Used For Calorie Calculations: 70 kg (154 lb 5.2 oz)  Energy Calorie Requirements (kcal): 6180-5219 kcal/day  Energy Need Method: Kcal/kg(30-35 kcal/kg)  Protein Requirements: 105-126g/day    (1.5-1.8g/kg)  Weight Used For Protein Calculations: 70 kg (154 lb 5.2 oz)     Fluid Need Method: RDA Method(1 ml/kcal or Per MD)  RDA Method (mL): 2100         Nutrition Prescription Ordered    Current Diet Order: Regular    Evaluation of Received Nutrient/Fluid Intake    IV Fluid (mL): 1800  I/O: +5.6 L since admit  Energy Calories Required: not meeting needs  Protein Required: not meeting needs  Fluid Required: not meeting needs  Comments: LBM: 10/11/18  % Intake of Estimated Energy Needs: 50 - 75 %  % Meal Intake: 50 - 75 %    Nutrition Risk    Level of Risk/Frequency of Follow-up: high     Assessment and Plan    Nutrition Problem  increased energy needs     Related to (etiology):   Physiological needs 2/2 CML w/SCT      Signs and Symptoms (as evidenced by):   Pt received SCT(day+1, increased Caloric/ Protein needs)     Interventions/Recommendations (treatment strategy):  1.Continue w/ Regular diet.   2.Encourage PO intake >/= 75% EEN/EPN   3.If PO intake is <50% encourage HVP w/ each meal, small frequent meal, snacks, ONS, cold/non aromatic foods.   4.RD to follow     Nutrition Diagnosis Status:   Continues       Monitor and Evaluation    Food and Nutrient Intake: energy intake, food and beverage intake  Food and Nutrient Administration:  diet order  Anthropometric Measurements: weight, weight change  Biochemical Data, Medical Tests and Procedures: (All labs)  Nutrition-Focused Physical Findings: overall appearance     Nutrition Follow-Up    RD Follow-up?: Yes

## 2018-10-12 NOTE — ASSESSMENT & PLAN NOTE
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 9% blasts.Started on MEC chemotherapy for refractory AML on 6/23/2018  - had BMBX on 7/13/18, without evidence of CML; BCR ABL p210 0.5% (c/w MRD)  - dasatinib 140 mg daily at home, will stop today when chemo starts  -pre-transplant bone marrow biopsy from 9/11/2018 shows a hematologic and molecular remission  - admitted for Busulfan/Cyclophosphamide MRD allogeneic stem cell transplant from his brother  -Today is Day +3 allo SCT.

## 2018-10-12 NOTE — PROGRESS NOTES
Chemo Note: Chemotherapy consent in chart. Chemo verified with Samanta STRANGE RN.  Right chest tricath patent and positive for blood return.  Methotrexate (PF) 10 mg in sodium chloride 0.9% 50 mL started @ 200 mL/h to infuse over 15 minutes. Chemo precautions in place. Will continue to monitor pt.

## 2018-10-12 NOTE — PLAN OF CARE
Problem: Patient Care Overview  Goal: Plan of Care Review  Outcome: Ongoing (interventions implemented as appropriate)  Plan of care reviewed with the patient at the beginning of the shift. Pt is day +3 of allo transplant. Pt tolerated transplant well. Mild fatigue noted. He denies pain. Pt has denied n/v/d this shift. IVF infusing. Mucous membranes are intact. Salt and soda and saliva substitute provided. Fall precautions maintained. Pt remained free from falls and injury this shift. Bed locked in lowest position, side rails up x2, call light within reach. Instructed pt to call for assistance as needed. Pt verbalized understanding. Pt afebrile overnight. Vitals stable. Neutropenic precautions maintained. No acute issues overnight. Will continue to monitor.

## 2018-10-12 NOTE — ASSESSMENT & PLAN NOTE
-transfuse prbc for hgb <7; transfuse platelets for plt count <10K  -hgb 10.8 ; wbc 0.51 (); plt 117K.

## 2018-10-12 NOTE — ASSESSMENT & PLAN NOTE
-nausea started 10/9  -vomited x 1 10/11/2018  -prn anti-emetics.   -tolerating meals and po meds at this time.

## 2018-10-12 NOTE — SUBJECTIVE & OBJECTIVE
Subjective:     Interval History: Day +3 MRD allo. No c/o n/v today. Has had 2 loose BMs so far this morning. Order placed for c-diff. Imodium ok if c-diff neg. Pt to receive Day +3 MTX today. Amlodipine 5 mg daily for elevated BP. Tacro 5.8. No changes.       Objective:     Vital Signs (Most Recent):  Temp: 97.4 °F (36.3 °C) (10/12/18 1132)  Pulse: 95 (10/12/18 1132)  Resp: 20 (10/12/18 1132)  BP: 127/84 (10/12/18 1132)  SpO2: 100 % (10/12/18 1132) Vital Signs (24h Range):  Temp:  [97.4 °F (36.3 °C)-98.7 °F (37.1 °C)] 97.4 °F (36.3 °C)  Pulse:  [82-96] 95  Resp:  [16-20] 20  SpO2:  [96 %-100 %] 100 %  BP: (125-151)/(77-99) 127/84     Weight: 67 kg (147 lb 13.1 oz)  Body mass index is 20.62 kg/m².  Body surface area is 1.83 meters squared.      Intake/Output - Last 3 Shifts       10/10 0700 - 10/11 0659 10/11 0700 - 10/12 0659 10/12 0700 - 10/13 0659    P.O. 776 600 360    I.V. (mL/kg) 1845 (26.9) 1765.3 (26.3) 467 (7)    Blood       IV Piggyback   50    Total Intake(mL/kg) 2621 (38.3) 2365.3 (35.3) 877 (13.1)    Urine (mL/kg/hr) 3175 (1.9) 1725 (1.1) 600 (1.7)    Emesis/NG output 0      Stool 0 0 0    Total Output 3175 1725 600    Net -554 +640.3 +277           Urine Occurrence 1 x  1 x    Stool Occurrence 1 x 3 x 3 x    Emesis Occurrence 1 x            Physical Exam   Constitutional: He is oriented to person, place, and time. He appears well-developed and well-nourished.   HENT:   Head: Normocephalic and atraumatic.   Mouth/Throat: No oropharyngeal exudate.   Eyes: Conjunctivae are normal. Pupils are equal, round, and reactive to light. Right eye exhibits no discharge. Left eye exhibits no discharge.   Neck: Normal range of motion. Neck supple.   Cardiovascular: Normal rate, regular rhythm, normal heart sounds and intact distal pulses.   No murmur heard.  Pulmonary/Chest: Effort normal and breath sounds normal. No respiratory distress. He has no wheezes. He has no rales.   Abdominal: Soft. Bowel sounds are  normal. He exhibits no distension. There is no tenderness.   Pt having diarrhea.   Musculoskeletal: Normal range of motion. He exhibits no edema or deformity.   Neurological: He is alert and oriented to person, place, and time.   Skin: Skin is warm and dry. No rash noted. No erythema.   Psychiatric: He has a normal mood and affect. His behavior is normal. Judgment and thought content normal.       Significant Labs:   CBC:   Recent Labs   Lab  10/11/18   0400  10/12/18   0407   WBC  1.43*  0.51*   HGB  10.9*  10.8*   HCT  32.3*  31.9*   PLT  159  117*    and CMP:   Recent Labs   Lab  10/11/18   0400  10/12/18   0407   NA  140  140   K  4.0  3.9   CL  105  106   CO2  28  27   GLU  89  77   BUN  11  10   CREATININE  0.7  0.7   CALCIUM  9.2  8.8   PROT  5.6*  5.5*   ALBUMIN  3.0*  3.0*   BILITOT  0.4  0.4   ALKPHOS  70  73   AST  22  21   ALT  68*  66*   ANIONGAP  7*  7*   EGFRNONAA  >60.0  >60.0       Diagnostic Results:  None

## 2018-10-13 PROBLEM — A04.72 C. DIFFICILE DIARRHEA: Status: ACTIVE | Noted: 2018-10-08

## 2018-10-13 PROBLEM — D70.1 CHEMOTHERAPY INDUCED NEUTROPENIA: Status: ACTIVE | Noted: 2018-10-13

## 2018-10-13 PROBLEM — T45.1X5A CHEMOTHERAPY INDUCED NEUTROPENIA: Status: ACTIVE | Noted: 2018-10-13

## 2018-10-13 LAB
ALBUMIN SERPL BCP-MCNC: 3.2 G/DL
ALP SERPL-CCNC: 77 U/L
ALT SERPL W/O P-5'-P-CCNC: 63 U/L
ANION GAP SERPL CALC-SCNC: 10 MMOL/L
ANISOCYTOSIS BLD QL SMEAR: SLIGHT
AST SERPL-CCNC: 20 U/L
BASOPHILS # BLD AUTO: 0 K/UL
BASOPHILS NFR BLD: 0 %
BILIRUB SERPL-MCNC: 0.3 MG/DL
BUN SERPL-MCNC: 10 MG/DL
CALCIUM SERPL-MCNC: 9.2 MG/DL
CHLORIDE SERPL-SCNC: 106 MMOL/L
CO2 SERPL-SCNC: 25 MMOL/L
CREAT SERPL-MCNC: 0.6 MG/DL
DIFFERENTIAL METHOD: ABNORMAL
EOSINOPHIL # BLD AUTO: 0 K/UL
EOSINOPHIL NFR BLD: 0 %
ERYTHROCYTE [DISTWIDTH] IN BLOOD BY AUTOMATED COUNT: 13.4 %
EST. GFR  (AFRICAN AMERICAN): >60 ML/MIN/1.73 M^2
EST. GFR  (NON AFRICAN AMERICAN): >60 ML/MIN/1.73 M^2
GLUCOSE SERPL-MCNC: 81 MG/DL
HCT VFR BLD AUTO: 32.8 %
HGB BLD-MCNC: 10.8 G/DL
IMM GRANULOCYTES # BLD AUTO: 0 K/UL
IMM GRANULOCYTES NFR BLD AUTO: 0 %
LYMPHOCYTES # BLD AUTO: 0.1 K/UL
LYMPHOCYTES NFR BLD: 63.6 %
MAGNESIUM SERPL-MCNC: 1.6 MG/DL
MCH RBC QN AUTO: 29.4 PG
MCHC RBC AUTO-ENTMCNC: 32.9 G/DL
MCV RBC AUTO: 89 FL
MONOCYTES # BLD AUTO: 0 K/UL
MONOCYTES NFR BLD: 9.1 %
NEUTROPHILS # BLD AUTO: 0 K/UL
NEUTROPHILS NFR BLD: 27.3 %
NRBC BLD-RTO: 0 /100 WBC
OVALOCYTES BLD QL SMEAR: ABNORMAL
PHOSPHATE SERPL-MCNC: 4 MG/DL
PLATELET # BLD AUTO: 102 K/UL
PLATELET BLD QL SMEAR: ABNORMAL
PMV BLD AUTO: ABNORMAL FL
POIKILOCYTOSIS BLD QL SMEAR: SLIGHT
POTASSIUM SERPL-SCNC: 3.9 MMOL/L
PROT SERPL-MCNC: 5.9 G/DL
RBC # BLD AUTO: 3.67 M/UL
SODIUM SERPL-SCNC: 141 MMOL/L
TACROLIMUS BLD-MCNC: 8.1 NG/ML
WBC # BLD AUTO: 0.11 K/UL

## 2018-10-13 PROCEDURE — 25000003 PHARM REV CODE 250: Performed by: NURSE PRACTITIONER

## 2018-10-13 PROCEDURE — 63600175 PHARM REV CODE 636 W HCPCS: Performed by: NURSE PRACTITIONER

## 2018-10-13 PROCEDURE — 83735 ASSAY OF MAGNESIUM: CPT

## 2018-10-13 PROCEDURE — A9155 ARTIFICIAL SALIVA: HCPCS | Performed by: INTERNAL MEDICINE

## 2018-10-13 PROCEDURE — 84100 ASSAY OF PHOSPHORUS: CPT

## 2018-10-13 PROCEDURE — 80053 COMPREHEN METABOLIC PANEL: CPT

## 2018-10-13 PROCEDURE — 99233 SBSQ HOSP IP/OBS HIGH 50: CPT | Mod: ,,, | Performed by: INTERNAL MEDICINE

## 2018-10-13 PROCEDURE — 63600175 PHARM REV CODE 636 W HCPCS: Performed by: INTERNAL MEDICINE

## 2018-10-13 PROCEDURE — 25000003 PHARM REV CODE 250: Performed by: INTERNAL MEDICINE

## 2018-10-13 PROCEDURE — 25000003 PHARM REV CODE 250: Performed by: STUDENT IN AN ORGANIZED HEALTH CARE EDUCATION/TRAINING PROGRAM

## 2018-10-13 PROCEDURE — 20600001 HC STEP DOWN PRIVATE ROOM

## 2018-10-13 PROCEDURE — 80197 ASSAY OF TACROLIMUS: CPT

## 2018-10-13 PROCEDURE — 85025 COMPLETE CBC W/AUTO DIFF WBC: CPT

## 2018-10-13 RX ADMIN — URSODIOL 300 MG: 300 CAPSULE ORAL at 08:10

## 2018-10-13 RX ADMIN — Medication 125 MG: at 11:10

## 2018-10-13 RX ADMIN — LEVOFLOXACIN 500 MG: 500 TABLET, FILM COATED ORAL at 08:10

## 2018-10-13 RX ADMIN — Medication 30 ML: at 11:10

## 2018-10-13 RX ADMIN — Medication 30 ML: at 05:10

## 2018-10-13 RX ADMIN — PANTOPRAZOLE SODIUM 40 MG: 40 TABLET, DELAYED RELEASE ORAL at 08:10

## 2018-10-13 RX ADMIN — ACYCLOVIR 800 MG: 200 CAPSULE ORAL at 08:10

## 2018-10-13 RX ADMIN — FLUCONAZOLE 400 MG: 200 TABLET ORAL at 08:10

## 2018-10-13 RX ADMIN — TACROLIMUS 2.5 MG: 1 CAPSULE ORAL at 08:10

## 2018-10-13 RX ADMIN — AMLODIPINE BESYLATE 5 MG: 5 TABLET ORAL at 08:10

## 2018-10-13 RX ADMIN — SODIUM CHLORIDE AND POTASSIUM CHLORIDE: 9; 1.49 INJECTION, SOLUTION INTRAVENOUS at 07:10

## 2018-10-13 RX ADMIN — Medication 400 MG: at 01:10

## 2018-10-13 RX ADMIN — Medication 125 MG: at 05:10

## 2018-10-13 RX ADMIN — TACROLIMUS 2.5 MG: 1 CAPSULE ORAL at 05:10

## 2018-10-13 RX ADMIN — Medication 400 MG: at 05:10

## 2018-10-13 RX ADMIN — Medication 400 MG: at 09:10

## 2018-10-13 RX ADMIN — SODIUM CHLORIDE AND POTASSIUM CHLORIDE: 9; 1.49 INJECTION, SOLUTION INTRAVENOUS at 06:10

## 2018-10-13 RX ADMIN — ENOXAPARIN SODIUM 40 MG: 100 INJECTION SUBCUTANEOUS at 05:10

## 2018-10-13 NOTE — PROGRESS NOTES
[facility]  Hematology  Bone Marrow Transplant  Progress Note    Patient Name: Wilton Guzmán  Admission Date: 10/1/2018  Hospital Length of Stay: 12 days  Code Status: Full Code    Subjective:     Interval History: Day +4 MRD allo. No c/o n/v today. Has had 1 loose BM so far this morning. C diff positive yesterday. No fevers/chills, no abdominal pain. Tolerating good oral intake.       Objective:     Vital Signs (Most Recent):  Temp: 98.5 °F (36.9 °C) (10/13/18 0758)  Pulse: 92 (10/13/18 0758)  Resp: 18 (10/13/18 0758)  BP: 128/89 (10/13/18 0758)  SpO2: 100 % (10/13/18 0758) Vital Signs (24h Range):  Temp:  [97.4 °F (36.3 °C)-98.6 °F (37 °C)] 98.5 °F (36.9 °C)  Pulse:  [82-95] 92  Resp:  [18-20] 18  SpO2:  [99 %-100 %] 100 %  BP: (127-138)/(84-98) 128/89     Weight: 66 kg (145 lb 9.8 oz)  Body mass index is 20.31 kg/m².  Body surface area is 1.82 meters squared.      Intake/Output - Last 3 Shifts       10/11 0700 - 10/12 0659 10/12 0700 - 10/13 0659 10/13 0700 - 10/14 0659    P.O. 600 1570     I.V. (mL/kg) 1765.3 (26.3) 1605.4 (24.3) 288 (4.4)    IV Piggyback  50     Total Intake(mL/kg) 2365.3 (35.3) 3225.4 (48.9) 288 (4.4)    Urine (mL/kg/hr) 1725 (1.1) 3000 (1.9) 350 (2)    Emesis/NG output       Stool 0 0     Total Output 1725 3000 350    Net +640.3 +225.4 -62           Urine Occurrence  1 x     Stool Occurrence 3 x 6 x           Physical Exam   Constitutional: He is oriented to person, place, and time. He appears well-developed and well-nourished.   HENT:   Head: Normocephalic and atraumatic.   Mouth/Throat: No oropharyngeal exudate.   Eyes: Conjunctivae are normal. Pupils are equal, round, and reactive to light. Right eye exhibits no discharge. Left eye exhibits no discharge.   Neck: Normal range of motion. Neck supple.   Cardiovascular: Normal rate, regular rhythm, normal heart sounds and intact distal pulses.   No murmur heard.  Pulmonary/Chest: Effort normal and breath sounds normal. No  respiratory distress. He has no wheezes. He has no rales.   Abdominal: Soft. Bowel sounds are normal. He exhibits no distension. There is no tenderness.   Pt having diarrhea.   Musculoskeletal: Normal range of motion. He exhibits no edema or deformity.   Neurological: He is alert and oriented to person, place, and time.   Skin: Skin is warm and dry. No rash noted. No erythema.   Psychiatric: He has a normal mood and affect. His behavior is normal. Judgment and thought content normal.       Significant Labs:   CBC:   Recent Labs   Lab  10/12/18   0407  10/13/18   0400   WBC  0.51*  0.11*   HGB  10.8*  10.8*   HCT  31.9*  32.8*   PLT  117*  102*    and CMP:   Recent Labs   Lab  10/12/18   0407  10/13/18   0400   NA  140  141   K  3.9  3.9   CL  106  106   CO2  27  25   GLU  77  81   BUN  10  10   CREATININE  0.7  0.6   CALCIUM  8.8  9.2   PROT  5.5*  5.9*   ALBUMIN  3.0*  3.2*   BILITOT  0.4  0.3   ALKPHOS  73  77   AST  21  20   ALT  66*  63*   ANIONGAP  7*  10   EGFRNONAA  >60.0  >60.0       Diagnostic Results:  None    Assessment/Plan:     * S/P allogeneic bone marrow transplant    - Day +4 of Busulfan/Cyclophosphamide matched related donor stem cell transplant from 10/10 match brother:   - Donor B+ and CMV+, recipient O+ and CMV+  - Pre-transplant work-up with no contraindications to proceeding to transplant, sickle cell trait noted. Brother has sickle cell trait as well.   - Tessie placed 10/1  - Allo transplant on 10/09/2018 without complication.  - Tacro level 8.1 today, will continue tacro 2.5 mg bid    Planned conditioning regimen:  Busulfan on Day -7, -6, -5, and -4  Cyclophosphamide on Day -3, and -2   Planned  GVHD Prophylaxis:  Methotrexate on Day +1, +3, +6, and +11  Tacrolimus starting on Day -1  Antimicrobial Prophylaxis:  Acyclovir starting on Day -7  Levaquin starting on Day -1  Fluconazole starting on Day -1  Mepron starting on Day +30  Seizure Prophylaxis:  Levetiracetam on Day -7 through Day  -3  SOS/VOD Prophylaxis:  Ursodiol on Day -7 through Day +30  Growth Factor Support:  Neupogen starting on Day +7          Chemotherapy induced neutropenia    - ANC<100  - On levoquin, fluconazole, acyclovir for prophylaxis        Chemotherapy-induced nausea    -Resolved  -prn anti-emetics.   -tolerating meals and po meds at this time.        Cytopenia    -transfuse prbc for hgb <7; transfuse platelets for plt count <10K  -hgb 10.8 ; wbc 0.11 (ANC <100); plt 102K.        C. difficile diarrhea    -c-diff positive 10/12  -PO vancomycin started 10/12  -NS at 75cc/hr        Chronic myelogenous leukemia (CML), BCR-ABL1-positive    - original BMBX 5/26/18 with 20% blasts; CML transformed to AML with blast crisis;   karyotype: 46,XY,t(9;22;22;14)q34;q11.2;q13;q23); BCR/ABL gene fusion 62%  - received induction with 7+3 on 6/1-6/8/2018  - persistent disease post 7+3 induction, repeat BM biopsy done 6/18 here at [facility] after 7+3. 9% blasts.Started on MEC chemotherapy for refractory AML on 6/23/2018  - had BMBX on 7/13/18, without evidence of CML; BCR ABL p210 0.5% (c/w MRD)  - dasatinib 140 mg daily at home, will stop today when chemo starts  -pre-transplant bone marrow biopsy from 9/11/2018 shows a hematologic and molecular remission  - admitted for Busulfan/Cyclophosphamide MRD allogeneic stem cell transplant from his brother  -Today is Day +4 allo SCT.              VTE Risk Mitigation (From admission, onward)        Ordered     enoxaparin injection 40 mg  Daily      10/01/18 1723     heparin, porcine (PF) 100 unit/mL injection flush 300 Units  As needed (PRN)      10/01/18 1657          Disposition: pending transplant regimen completion    Gini Saleem MD  Bone Marrow Transplant  [facility]

## 2018-10-13 NOTE — ASSESSMENT & PLAN NOTE
- Day +4 of Busulfan/Cyclophosphamide matched related donor stem cell transplant from 10/10 match brother:   - Donor B+ and CMV+, recipient O+ and CMV+  - Pre-transplant work-up with no contraindications to proceeding to transplant, sickle cell trait noted. Brother has sickle cell trait as well.   - Kurtz placed 10/1  - Allo transplant on 10/09/2018 without complication.  - Tacro level 8.1 today, will continue tacro 2.5 mg bid    Planned conditioning regimen:  Busulfan on Day -7, -6, -5, and -4  Cyclophosphamide on Day -3, and -2   Planned  GVHD Prophylaxis:  Methotrexate on Day +1, +3, +6, and +11  Tacrolimus starting on Day -1  Antimicrobial Prophylaxis:  Acyclovir starting on Day -7  Levaquin starting on Day -1  Fluconazole starting on Day -1  Mepron starting on Day +30  Seizure Prophylaxis:  Levetiracetam on Day -7 through Day -3  SOS/VOD Prophylaxis:  Ursodiol on Day -7 through Day +30  Growth Factor Support:  Neupogen starting on Day +7

## 2018-10-13 NOTE — PLAN OF CARE
Problem: Patient Care Overview  Goal: Plan of Care Review  Outcome: Ongoing (interventions implemented as appropriate)  Plan of care reviewed with patient at start of shift. Allo BMT day + 4. Denies pain and nausea. Free from falls and injuries this shift. Vital signs remain stable. Wife remains at bedside. General weakness noted. Bed in lowest position, wheels locked, side rails up x's 2, call light in reach.Contact precautions maintained.  Instructed to call for assistance as needed. Will continue to monitor.

## 2018-10-13 NOTE — ASSESSMENT & PLAN NOTE
-transfuse prbc for hgb <7; transfuse platelets for plt count <10K  -hgb 10.8 ; wbc 0.11 (ANC <100); plt 102K.

## 2018-10-13 NOTE — PLAN OF CARE
"Problem: Patient Care Overview  Goal: Plan of Care Review  Outcome: Ongoing (interventions implemented as appropriate)  POC reviewed with patient; understanding verbalized. Magnesium replacements administered this shift. CDiff contact precautions in place. Salt and soda supplied. Pt remains independent. Voids clear, yellow urine; multiple BMs this shift. Pt states BMs "are more formed than they were yesterday." Regular diet with good appetite. NS20K at 75mL. Pt. with nonskid footwear on, bed in lowest position, and locked with bed rails up x2.  Pt. has call light and personal items within reach. VSS and afebrile this shift. All questions and concerns addressed at this time. Will continue to monitor.            "

## 2018-10-13 NOTE — SUBJECTIVE & OBJECTIVE
Subjective:     Interval History: Day +4 MRD allo. No c/o n/v today. Has had 1 loose BM so far this morning. C diff positive yesterday. No fevers/chills, no abdominal pain. Tolerating good oral intake.       Objective:     Vital Signs (Most Recent):  Temp: 98.5 °F (36.9 °C) (10/13/18 0758)  Pulse: 92 (10/13/18 0758)  Resp: 18 (10/13/18 0758)  BP: 128/89 (10/13/18 0758)  SpO2: 100 % (10/13/18 0758) Vital Signs (24h Range):  Temp:  [97.4 °F (36.3 °C)-98.6 °F (37 °C)] 98.5 °F (36.9 °C)  Pulse:  [82-95] 92  Resp:  [18-20] 18  SpO2:  [99 %-100 %] 100 %  BP: (127-138)/(84-98) 128/89     Weight: 66 kg (145 lb 9.8 oz)  Body mass index is 20.31 kg/m².  Body surface area is 1.82 meters squared.      Intake/Output - Last 3 Shifts       10/11 0700 - 10/12 0659 10/12 0700 - 10/13 0659 10/13 0700 - 10/14 0659    P.O. 600 1570     I.V. (mL/kg) 1765.3 (26.3) 1605.4 (24.3) 288 (4.4)    IV Piggyback  50     Total Intake(mL/kg) 2365.3 (35.3) 3225.4 (48.9) 288 (4.4)    Urine (mL/kg/hr) 1725 (1.1) 3000 (1.9) 350 (2)    Emesis/NG output       Stool 0 0     Total Output 1725 3000 350    Net +640.3 +225.4 -62           Urine Occurrence  1 x     Stool Occurrence 3 x 6 x           Physical Exam   Constitutional: He is oriented to person, place, and time. He appears well-developed and well-nourished.   HENT:   Head: Normocephalic and atraumatic.   Mouth/Throat: No oropharyngeal exudate.   Eyes: Conjunctivae are normal. Pupils are equal, round, and reactive to light. Right eye exhibits no discharge. Left eye exhibits no discharge.   Neck: Normal range of motion. Neck supple.   Cardiovascular: Normal rate, regular rhythm, normal heart sounds and intact distal pulses.   No murmur heard.  Pulmonary/Chest: Effort normal and breath sounds normal. No respiratory distress. He has no wheezes. He has no rales.   Abdominal: Soft. Bowel sounds are normal. He exhibits no distension. There is no tenderness.   Pt having diarrhea.   Musculoskeletal: Normal  range of motion. He exhibits no edema or deformity.   Neurological: He is alert and oriented to person, place, and time.   Skin: Skin is warm and dry. No rash noted. No erythema.   Psychiatric: He has a normal mood and affect. His behavior is normal. Judgment and thought content normal.       Significant Labs:   CBC:   Recent Labs   Lab  10/12/18   0407  10/13/18   0400   WBC  0.51*  0.11*   HGB  10.8*  10.8*   HCT  31.9*  32.8*   PLT  117*  102*    and CMP:   Recent Labs   Lab  10/12/18   0407  10/13/18   0400   NA  140  141   K  3.9  3.9   CL  106  106   CO2  27  25   GLU  77  81   BUN  10  10   CREATININE  0.7  0.6   CALCIUM  8.8  9.2   PROT  5.5*  5.9*   ALBUMIN  3.0*  3.2*   BILITOT  0.4  0.3   ALKPHOS  73  77   AST  21  20   ALT  66*  63*   ANIONGAP  7*  10   EGFRNONAA  >60.0  >60.0       Diagnostic Results:  None

## 2018-10-13 NOTE — ASSESSMENT & PLAN NOTE
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 9% blasts.Started on MEC chemotherapy for refractory AML on 6/23/2018  - had BMBX on 7/13/18, without evidence of CML; BCR ABL p210 0.5% (c/w MRD)  - dasatinib 140 mg daily at home, will stop today when chemo starts  -pre-transplant bone marrow biopsy from 9/11/2018 shows a hematologic and molecular remission  - admitted for Busulfan/Cyclophosphamide MRD allogeneic stem cell transplant from his brother  -Today is Day +4 allo SCT.

## 2018-10-14 LAB
ABO + RH BLD: NORMAL
ALBUMIN SERPL BCP-MCNC: 3.1 G/DL
ALP SERPL-CCNC: 76 U/L
ALT SERPL W/O P-5'-P-CCNC: 67 U/L
ANION GAP SERPL CALC-SCNC: 8 MMOL/L
AST SERPL-CCNC: 21 U/L
BASOPHILS # BLD AUTO: 0 K/UL
BASOPHILS NFR BLD: 0 %
BILIRUB SERPL-MCNC: 0.3 MG/DL
BLD GP AB SCN CELLS X3 SERPL QL: NORMAL
BUN SERPL-MCNC: 11 MG/DL
CALCIUM SERPL-MCNC: 9.2 MG/DL
CHLORIDE SERPL-SCNC: 105 MMOL/L
CO2 SERPL-SCNC: 26 MMOL/L
CREAT SERPL-MCNC: 0.7 MG/DL
DIFFERENTIAL METHOD: ABNORMAL
EOSINOPHIL # BLD AUTO: 0 K/UL
EOSINOPHIL NFR BLD: 20 %
ERYTHROCYTE [DISTWIDTH] IN BLOOD BY AUTOMATED COUNT: 13.2 %
EST. GFR  (AFRICAN AMERICAN): >60 ML/MIN/1.73 M^2
EST. GFR  (NON AFRICAN AMERICAN): >60 ML/MIN/1.73 M^2
GLUCOSE SERPL-MCNC: 82 MG/DL
HCT VFR BLD AUTO: 32.6 %
HGB BLD-MCNC: 11 G/DL
IMM GRANULOCYTES # BLD AUTO: 0 K/UL
IMM GRANULOCYTES NFR BLD AUTO: 0 %
LYMPHOCYTES # BLD AUTO: 0.1 K/UL
LYMPHOCYTES NFR BLD: 70 %
MAGNESIUM SERPL-MCNC: 1.5 MG/DL
MCH RBC QN AUTO: 30.1 PG
MCHC RBC AUTO-ENTMCNC: 33.7 G/DL
MCV RBC AUTO: 89 FL
MONOCYTES # BLD AUTO: 0 K/UL
MONOCYTES NFR BLD: 0 %
NEUTROPHILS # BLD AUTO: 0 K/UL
NEUTROPHILS NFR BLD: 10 %
NRBC BLD-RTO: 0 /100 WBC
PHOSPHATE SERPL-MCNC: 3.9 MG/DL
PLATELET # BLD AUTO: 56 K/UL
PMV BLD AUTO: ABNORMAL FL
POTASSIUM SERPL-SCNC: 4.2 MMOL/L
PROT SERPL-MCNC: 5.7 G/DL
RBC # BLD AUTO: 3.65 M/UL
SODIUM SERPL-SCNC: 139 MMOL/L
TACROLIMUS BLD-MCNC: 7.6 NG/ML
WBC # BLD AUTO: 0.1 K/UL

## 2018-10-14 PROCEDURE — 83735 ASSAY OF MAGNESIUM: CPT

## 2018-10-14 PROCEDURE — 63600175 PHARM REV CODE 636 W HCPCS: Performed by: NURSE PRACTITIONER

## 2018-10-14 PROCEDURE — 63600175 PHARM REV CODE 636 W HCPCS: Performed by: INTERNAL MEDICINE

## 2018-10-14 PROCEDURE — 25000003 PHARM REV CODE 250: Performed by: NURSE PRACTITIONER

## 2018-10-14 PROCEDURE — 85025 COMPLETE CBC W/AUTO DIFF WBC: CPT

## 2018-10-14 PROCEDURE — 25000003 PHARM REV CODE 250: Performed by: INTERNAL MEDICINE

## 2018-10-14 PROCEDURE — 84100 ASSAY OF PHOSPHORUS: CPT

## 2018-10-14 PROCEDURE — 80197 ASSAY OF TACROLIMUS: CPT

## 2018-10-14 PROCEDURE — 25000003 PHARM REV CODE 250: Performed by: STUDENT IN AN ORGANIZED HEALTH CARE EDUCATION/TRAINING PROGRAM

## 2018-10-14 PROCEDURE — 80053 COMPREHEN METABOLIC PANEL: CPT

## 2018-10-14 PROCEDURE — 86850 RBC ANTIBODY SCREEN: CPT

## 2018-10-14 PROCEDURE — 20600001 HC STEP DOWN PRIVATE ROOM

## 2018-10-14 PROCEDURE — A9155 ARTIFICIAL SALIVA: HCPCS | Performed by: INTERNAL MEDICINE

## 2018-10-14 RX ADMIN — SODIUM CHLORIDE AND POTASSIUM CHLORIDE: 9; 1.49 INJECTION, SOLUTION INTRAVENOUS at 08:10

## 2018-10-14 RX ADMIN — AMLODIPINE BESYLATE 5 MG: 5 TABLET ORAL at 08:10

## 2018-10-14 RX ADMIN — TACROLIMUS 2.5 MG: 1 CAPSULE ORAL at 05:10

## 2018-10-14 RX ADMIN — LEVOFLOXACIN 500 MG: 500 TABLET, FILM COATED ORAL at 08:10

## 2018-10-14 RX ADMIN — ACYCLOVIR 800 MG: 200 CAPSULE ORAL at 08:10

## 2018-10-14 RX ADMIN — FLUCONAZOLE 400 MG: 200 TABLET ORAL at 08:10

## 2018-10-14 RX ADMIN — ENOXAPARIN SODIUM 40 MG: 100 INJECTION SUBCUTANEOUS at 04:10

## 2018-10-14 RX ADMIN — Medication 400 MG: at 05:10

## 2018-10-14 RX ADMIN — PANTOPRAZOLE SODIUM 40 MG: 40 TABLET, DELAYED RELEASE ORAL at 08:10

## 2018-10-14 RX ADMIN — Medication 125 MG: at 12:10

## 2018-10-14 RX ADMIN — URSODIOL 300 MG: 300 CAPSULE ORAL at 08:10

## 2018-10-14 RX ADMIN — Medication 30 ML: at 12:10

## 2018-10-14 RX ADMIN — Medication 400 MG: at 12:10

## 2018-10-14 RX ADMIN — TACROLIMUS 2.5 MG: 1 CAPSULE ORAL at 08:10

## 2018-10-14 RX ADMIN — Medication 30 ML: at 05:10

## 2018-10-14 RX ADMIN — Medication 125 MG: at 05:10

## 2018-10-14 RX ADMIN — Medication 400 MG: at 08:10

## 2018-10-14 NOTE — PLAN OF CARE
Problem: Patient Care Overview  Goal: Plan of Care Review  Outcome: Ongoing (interventions implemented as appropriate)  Plan of care reviewed with patient and family.  Fall precautions maintained, side rails up x2, call light in reach, bed in low position, and locked, nonskid socks on.  Iv fluids infusing without difficulty.  Patient getting magnesium 1.5 at this time.  Ambulating, voiding and tolerating a regular diet without difficulty.  No complaints voiced.

## 2018-10-14 NOTE — ASSESSMENT & PLAN NOTE
-transfuse prbc for hgb <7; transfuse platelets for plt count <10K  -hgb 11.0 ; wbc 0.10 (ANC <100); plt 56K.-> d/c lovenox once <50

## 2018-10-14 NOTE — ASSESSMENT & PLAN NOTE
- Day +5 of Busulfan/Cyclophosphamide matched related donor stem cell transplant from 10/10 match brother:   - Donor B+ and CMV+, recipient O+ and CMV+  - Pre-transplant work-up with no contraindications to proceeding to transplant, sickle cell trait noted. Brother has sickle cell trait as well.   - Kurtz placed 10/1  - Allo transplant on 10/09/2018 without complication.  -Tacro level 7.6 today, will continue tacro 2.5 mg bid  - Daily GVHD charting    Planned conditioning regimen:  Busulfan on Day -7, -6, -5, and -4  Cyclophosphamide on Day -3, and -2   Planned  GVHD Prophylaxis:  Methotrexate on Day +1, +3, +6, and +11  Tacrolimus starting on Day -1  Antimicrobial Prophylaxis:  Acyclovir starting on Day -7  Levaquin starting on Day -1  Fluconazole starting on Day -1  Mepron starting on Day +30  Seizure Prophylaxis:  Levetiracetam on Day -7 through Day -3  SOS/VOD Prophylaxis:  Ursodiol on Day -7 through Day +30  Growth Factor Support:  Neupogen starting on Day +7

## 2018-10-14 NOTE — PLAN OF CARE
Problem: Patient Care Overview  Goal: Plan of Care Review  Outcome: Ongoing (interventions implemented as appropriate)  Plan of care reviewed with patient at start of shift. Allo BMT day + 5. Denies pain and nausea. Free from falls and injuries this shift. Vital signs remain stable. General weakness noted. Bed in lowest position, wheels locked, side rails up x's 2, call light in reach.Contact precautions maintained.  Instructed to call for assistance as needed. Will continue to monitor.

## 2018-10-14 NOTE — SUBJECTIVE & OBJECTIVE
Subjective:     Interval History: Day +5 MRD allo. No c/o n/v today. Has had 1 loose BM so far this morning, and 2 all day yesterday. No fevers/chills, no abdominal pain. Tolerating good oral intake. Ambulating without difficulty.      Objective:     Vital Signs (Most Recent):  Temp: 98.5 °F (36.9 °C) (10/14/18 0826)  Pulse: 92 (10/14/18 0826)  Resp: 18 (10/14/18 0826)  BP: 124/87 (10/14/18 0826)  SpO2: 100 % (10/14/18 0402) Vital Signs (24h Range):  Temp:  [98.4 °F (36.9 °C)-98.7 °F (37.1 °C)] 98.5 °F (36.9 °C)  Pulse:  [] 92  Resp:  [17-18] 18  SpO2:  [100 %] 100 %  BP: (113-142)/(73-94) 124/87     Weight: 65.9 kg (145 lb 2.8 oz)  Body mass index is 20.25 kg/m².  Body surface area is 1.82 meters squared.      Intake/Output - Last 3 Shifts       10/12 0700 - 10/13 0659 10/13 0700 - 10/14 0659 10/14 0700 - 10/15 0659    P.O. 1570 540     I.V. (mL/kg) 1605.4 (24.3) 1782 (27.1)     IV Piggyback 50      Total Intake(mL/kg) 3225.4 (48.9) 2322 (35.3)     Urine (mL/kg/hr) 3000 (1.9) 1600 (1) 800 (5.1)    Stool 0 0     Total Output 3000 1600 800    Net +225.4 +722 -800           Urine Occurrence 1 x 2 x     Stool Occurrence 6 x 3 x           Physical Exam   Constitutional: He is oriented to person, place, and time. He appears well-developed and well-nourished.   HENT:   Head: Normocephalic and atraumatic.   Mouth/Throat: No oropharyngeal exudate.   Eyes: Conjunctivae are normal. Pupils are equal, round, and reactive to light. Right eye exhibits no discharge. Left eye exhibits no discharge.   Neck: Normal range of motion. Neck supple.   Cardiovascular: Normal rate, regular rhythm, normal heart sounds and intact distal pulses.   No murmur heard.  Pulmonary/Chest: Effort normal and breath sounds normal. No respiratory distress. He has no wheezes. He has no rales.   Abdominal: Soft. Bowel sounds are normal. He exhibits no distension. There is no tenderness.   Pt having diarrhea.   Musculoskeletal: Normal range of  motion. He exhibits no edema or deformity.   Neurological: He is alert and oriented to person, place, and time.   Skin: Skin is warm and dry. No rash noted. No erythema.   Some dry skin changes of lower extremities, chronic per patient   Psychiatric: He has a normal mood and affect. His behavior is normal. Judgment and thought content normal.       Significant Labs:   CBC:   Recent Labs   Lab  10/13/18   0400  10/14/18   0400   WBC  0.11*  0.10*   HGB  10.8*  11.0*   HCT  32.8*  32.6*   PLT  102*  56*    and CMP:   Recent Labs   Lab  10/13/18   0400  10/14/18   0400   NA  141  139   K  3.9  4.2   CL  106  105   CO2  25  26   GLU  81  82   BUN  10  11   CREATININE  0.6  0.7   CALCIUM  9.2  9.2   PROT  5.9*  5.7*   ALBUMIN  3.2*  3.1*   BILITOT  0.3  0.3   ALKPHOS  77  76   AST  20  21   ALT  63*  67*   ANIONGAP  10  8   EGFRNONAA  >60.0  >60.0       Diagnostic Results:  None

## 2018-10-14 NOTE — PROGRESS NOTES
[facility]  Hematology  Bone Marrow Transplant  Progress Note    Patient Name: Wilton Guzmán  Admission Date: 10/1/2018  Hospital Length of Stay: 13 days  Code Status: Full Code    Subjective:     Interval History: Day +5 MRD allo. No c/o n/v today. Has had 1 loose BM so far this morning, and 2 all day yesterday. No fevers/chills, no abdominal pain. Tolerating good oral intake. Ambulating without difficulty.      Objective:     Vital Signs (Most Recent):  Temp: 98.5 °F (36.9 °C) (10/14/18 0826)  Pulse: 92 (10/14/18 0826)  Resp: 18 (10/14/18 0826)  BP: 124/87 (10/14/18 0826)  SpO2: 100 % (10/14/18 0402) Vital Signs (24h Range):  Temp:  [98.4 °F (36.9 °C)-98.7 °F (37.1 °C)] 98.5 °F (36.9 °C)  Pulse:  [] 92  Resp:  [17-18] 18  SpO2:  [100 %] 100 %  BP: (113-142)/(73-94) 124/87     Weight: 65.9 kg (145 lb 2.8 oz)  Body mass index is 20.25 kg/m².  Body surface area is 1.82 meters squared.      Intake/Output - Last 3 Shifts       10/12 0700 - 10/13 0659 10/13 0700 - 10/14 0659 10/14 0700 - 10/15 0659    P.O. 1570 540     I.V. (mL/kg) 1605.4 (24.3) 1782 (27.1)     IV Piggyback 50      Total Intake(mL/kg) 3225.4 (48.9) 2322 (35.3)     Urine (mL/kg/hr) 3000 (1.9) 1600 (1) 800 (5.1)    Stool 0 0     Total Output 3000 1600 800    Net +225.4 +722 -800           Urine Occurrence 1 x 2 x     Stool Occurrence 6 x 3 x           Physical Exam   Constitutional: He is oriented to person, place, and time. He appears well-developed and well-nourished.   HENT:   Head: Normocephalic and atraumatic.   Mouth/Throat: No oropharyngeal exudate.   Eyes: Conjunctivae are normal. Pupils are equal, round, and reactive to light. Right eye exhibits no discharge. Left eye exhibits no discharge.   Neck: Normal range of motion. Neck supple.   Cardiovascular: Normal rate, regular rhythm, normal heart sounds and intact distal pulses.   No murmur heard.  Pulmonary/Chest: Effort normal and breath sounds normal. No respiratory  distress. He has no wheezes. He has no rales.   Abdominal: Soft. Bowel sounds are normal. He exhibits no distension. There is no tenderness.  Musculoskeletal: Normal range of motion. He exhibits no edema or deformity.   Neurological: He is alert and oriented to person, place, and time.   Skin: Skin is warm and dry. No rash noted. No erythema.   Some dry skin changes of lower extremities, chronic per patient   Psychiatric: He has a normal mood and affect. His behavior is normal. Judgment and thought content normal.       Significant Labs:   CBC:   Recent Labs   Lab  10/13/18   0400  10/14/18   0400   WBC  0.11*  0.10*   HGB  10.8*  11.0*   HCT  32.8*  32.6*   PLT  102*  56*    and CMP:   Recent Labs   Lab  10/13/18   0400  10/14/18   0400   NA  141  139   K  3.9  4.2   CL  106  105   CO2  25  26   GLU  81  82   BUN  10  11   CREATININE  0.6  0.7   CALCIUM  9.2  9.2   PROT  5.9*  5.7*   ALBUMIN  3.2*  3.1*   BILITOT  0.3  0.3   ALKPHOS  77  76   AST  20  21   ALT  63*  67*   ANIONGAP  10  8   EGFRNONAA  >60.0  >60.0       Diagnostic Results:  None    Assessment/Plan:     * S/P allogeneic bone marrow transplant    - Day +5 of Busulfan/Cyclophosphamide matched related donor stem cell transplant from 10/10 match brother:   - Donor B+ and CMV+, recipient O+ and CMV+  - Pre-transplant work-up with no contraindications to proceeding to transplant, sickle cell trait noted. Brother has sickle cell trait as well.   - Kurtz placed 10/1  - Allo transplant on 10/09/2018 without complication.  -Tacro level 7.6 today, will continue tacro 2.5 mg bid  - Daily GVHD charting    Planned conditioning regimen:  Busulfan on Day -7, -6, -5, and -4  Cyclophosphamide on Day -3, and -2   Planned  GVHD Prophylaxis:  Methotrexate on Day +1, +3, +6, and +11  Tacrolimus starting on Day -1  Antimicrobial Prophylaxis:  Acyclovir starting on Day -7  Levaquin starting on Day -1  Fluconazole starting on Day -1  Mepron starting on Day +30  Seizure  Prophylaxis:  Levetiracetam on Day -7 through Day -3  SOS/VOD Prophylaxis:  Ursodiol on Day -7 through Day +30  Growth Factor Support:  Neupogen starting on Day +7          Chemotherapy induced neutropenia    - ANC<100  - On levoquin, fluconazole, acyclovir for prophylaxis        Chemotherapy-induced nausea    -Resolved  -prn anti-emetics.   -tolerating meals and po meds at this time.        Cytopenia    -transfuse prbc for hgb <7; transfuse platelets for plt count <10K  -hgb 11.0 ; wbc 0.10 (ANC <100); plt 56K.-> d/c lovenox once <50        C. difficile diarrhea    -c-diff positive 10/12  -PO vancomycin started 10/12  -NS at 75cc/hr        Chronic myelogenous leukemia (CML), BCR-ABL1-positive    - original BMBX 5/26/18 with 20% blasts; CML transformed to AML with blast crisis;   karyotype: 46,XY,t(9;22;22;14)q34;q11.2;q13;q23); BCR/ABL gene fusion 62%  - received induction with 7+3 on 6/1-6/8/2018  - persistent disease post 7+3 induction, repeat BM biopsy done 6/18 here at [facility] after 7+3. 9% blasts.Started on MEC chemotherapy for refractory AML on 6/23/2018  - had BMBX on 7/13/18, without evidence of CML; BCR ABL p210 0.5% (c/w MRD)  - dasatinib 140 mg daily at home, will stop today when chemo starts  -pre-transplant bone marrow biopsy from 9/11/2018 shows a hematologic and molecular remission  - admitted for Busulfan/Cyclophosphamide MRD allogeneic stem cell transplant from his brother  -Today is Day +5 allo SCT.              VTE Risk Mitigation (From admission, onward)        Ordered     enoxaparin injection 40 mg  Daily      10/01/18 1729     heparin, porcine (PF) 100 unit/mL injection flush 300 Units  As needed (PRN)      10/01/18 1657          Disposition: pending count recovery    Gini Saleem MD  Bone Marrow Transplant  [facility]

## 2018-10-14 NOTE — ASSESSMENT & PLAN NOTE
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 9% blasts.Started on MEC chemotherapy for refractory AML on 6/23/2018  - had BMBX on 7/13/18, without evidence of CML; BCR ABL p210 0.5% (c/w MRD)  - dasatinib 140 mg daily at home, will stop today when chemo starts  -pre-transplant bone marrow biopsy from 9/11/2018 shows a hematologic and molecular remission  - admitted for Busulfan/Cyclophosphamide MRD allogeneic stem cell transplant from his brother  -Today is Day +5 allo SCT.

## 2018-10-15 LAB
ALBUMIN SERPL BCP-MCNC: 2.9 G/DL
ALP SERPL-CCNC: 80 U/L
ALT SERPL W/O P-5'-P-CCNC: 50 U/L
ANION GAP SERPL CALC-SCNC: 5 MMOL/L
ANISOCYTOSIS BLD QL SMEAR: SLIGHT
AST SERPL-CCNC: 12 U/L
BASOPHILS # BLD AUTO: 0 K/UL
BASOPHILS NFR BLD: 0 %
BILIRUB SERPL-MCNC: 0.2 MG/DL
BUN SERPL-MCNC: 11 MG/DL
CALCIUM SERPL-MCNC: 9.1 MG/DL
CHLORIDE SERPL-SCNC: 106 MMOL/L
CO2 SERPL-SCNC: 27 MMOL/L
CREAT SERPL-MCNC: 0.8 MG/DL
DIFFERENTIAL METHOD: ABNORMAL
EOSINOPHIL # BLD AUTO: 0 K/UL
EOSINOPHIL NFR BLD: 0 %
ERYTHROCYTE [DISTWIDTH] IN BLOOD BY AUTOMATED COUNT: 12.9 %
EST. GFR  (AFRICAN AMERICAN): >60 ML/MIN/1.73 M^2
EST. GFR  (NON AFRICAN AMERICAN): >60 ML/MIN/1.73 M^2
GLUCOSE SERPL-MCNC: 96 MG/DL
HCT VFR BLD AUTO: 31.8 %
HGB BLD-MCNC: 11 G/DL
HYPOCHROMIA BLD QL SMEAR: ABNORMAL
IMM GRANULOCYTES # BLD AUTO: 0 K/UL
IMM GRANULOCYTES NFR BLD AUTO: 0 %
LYMPHOCYTES # BLD AUTO: 0 K/UL
LYMPHOCYTES NFR BLD: 80 %
MAGNESIUM SERPL-MCNC: 1.4 MG/DL
MCH RBC QN AUTO: 30.4 PG
MCHC RBC AUTO-ENTMCNC: 34.6 G/DL
MCV RBC AUTO: 88 FL
MONOCYTES # BLD AUTO: 0 K/UL
MONOCYTES NFR BLD: 0 %
NEUTROPHILS # BLD AUTO: 0 K/UL
NEUTROPHILS NFR BLD: 20 %
NRBC BLD-RTO: 0 /100 WBC
OVALOCYTES BLD QL SMEAR: ABNORMAL
PHOSPHATE SERPL-MCNC: 3.4 MG/DL
PLATELET # BLD AUTO: 35 K/UL
PLATELET BLD QL SMEAR: ABNORMAL
PMV BLD AUTO: ABNORMAL FL
POIKILOCYTOSIS BLD QL SMEAR: SLIGHT
POLYCHROMASIA BLD QL SMEAR: ABNORMAL
POTASSIUM SERPL-SCNC: 4.3 MMOL/L
PROT SERPL-MCNC: 5.7 G/DL
RBC # BLD AUTO: 3.62 M/UL
SODIUM SERPL-SCNC: 138 MMOL/L
TACROLIMUS BLD-MCNC: 8.5 NG/ML
WBC # BLD AUTO: 0.05 K/UL

## 2018-10-15 PROCEDURE — 80053 COMPREHEN METABOLIC PANEL: CPT

## 2018-10-15 PROCEDURE — 25000003 PHARM REV CODE 250: Performed by: STUDENT IN AN ORGANIZED HEALTH CARE EDUCATION/TRAINING PROGRAM

## 2018-10-15 PROCEDURE — 25000003 PHARM REV CODE 250: Performed by: INTERNAL MEDICINE

## 2018-10-15 PROCEDURE — 63600175 PHARM REV CODE 636 W HCPCS: Performed by: NURSE PRACTITIONER

## 2018-10-15 PROCEDURE — 20600001 HC STEP DOWN PRIVATE ROOM

## 2018-10-15 PROCEDURE — 25000003 PHARM REV CODE 250: Performed by: NURSE PRACTITIONER

## 2018-10-15 PROCEDURE — 63600175 PHARM REV CODE 636 W HCPCS: Performed by: INTERNAL MEDICINE

## 2018-10-15 PROCEDURE — 84100 ASSAY OF PHOSPHORUS: CPT

## 2018-10-15 PROCEDURE — 99233 SBSQ HOSP IP/OBS HIGH 50: CPT | Mod: ,,, | Performed by: INTERNAL MEDICINE

## 2018-10-15 PROCEDURE — 83735 ASSAY OF MAGNESIUM: CPT

## 2018-10-15 PROCEDURE — A9155 ARTIFICIAL SALIVA: HCPCS | Performed by: INTERNAL MEDICINE

## 2018-10-15 PROCEDURE — 80197 ASSAY OF TACROLIMUS: CPT

## 2018-10-15 PROCEDURE — 85025 COMPLETE CBC W/AUTO DIFF WBC: CPT

## 2018-10-15 RX ADMIN — Medication 400 MG: at 12:10

## 2018-10-15 RX ADMIN — Medication 30 ML: at 06:10

## 2018-10-15 RX ADMIN — FLUCONAZOLE 400 MG: 200 TABLET ORAL at 08:10

## 2018-10-15 RX ADMIN — Medication 30 ML: at 11:10

## 2018-10-15 RX ADMIN — URSODIOL 300 MG: 300 CAPSULE ORAL at 08:10

## 2018-10-15 RX ADMIN — Medication 30 ML: at 12:10

## 2018-10-15 RX ADMIN — SODIUM CHLORIDE AND POTASSIUM CHLORIDE: 9; 1.49 INJECTION, SOLUTION INTRAVENOUS at 11:10

## 2018-10-15 RX ADMIN — PANTOPRAZOLE SODIUM 40 MG: 40 TABLET, DELAYED RELEASE ORAL at 08:10

## 2018-10-15 RX ADMIN — Medication 400 MG: at 05:10

## 2018-10-15 RX ADMIN — Medication 125 MG: at 12:10

## 2018-10-15 RX ADMIN — Medication 30 ML: at 05:10

## 2018-10-15 RX ADMIN — TACROLIMUS 2.5 MG: 1 CAPSULE ORAL at 08:10

## 2018-10-15 RX ADMIN — METHOTREXATE 10 MG: 25 INJECTION, SOLUTION INTRA-ARTERIAL; INTRAMUSCULAR; INTRATHECAL; INTRAVENOUS at 11:10

## 2018-10-15 RX ADMIN — Medication 400 MG: at 09:10

## 2018-10-15 RX ADMIN — Medication 125 MG: at 11:10

## 2018-10-15 RX ADMIN — LEVOFLOXACIN 500 MG: 500 TABLET, FILM COATED ORAL at 08:10

## 2018-10-15 RX ADMIN — SODIUM CHLORIDE AND POTASSIUM CHLORIDE: 9; 1.49 INJECTION, SOLUTION INTRAVENOUS at 09:10

## 2018-10-15 RX ADMIN — TACROLIMUS 2.5 MG: 1 CAPSULE ORAL at 05:10

## 2018-10-15 RX ADMIN — Medication 125 MG: at 05:10

## 2018-10-15 RX ADMIN — ACYCLOVIR 800 MG: 200 CAPSULE ORAL at 08:10

## 2018-10-15 RX ADMIN — MAGNESIUM OXIDE TAB 400 MG (241.3 MG ELEMENTAL MG) 400 MG: 400 (241.3 MG) TAB at 08:10

## 2018-10-15 RX ADMIN — AMLODIPINE BESYLATE 5 MG: 5 TABLET ORAL at 08:10

## 2018-10-15 NOTE — PROGRESS NOTES
Chemotherapy Note:  Consent & CAR in chart. Methotrexate and patient verified at bedside by two chemo certified RNs, Donovan & Skylar. Positive blood noted to right chest Kurtz. Methotrexate 10 mg in sodium chloride 0.9% 50 mL IVPB administered to right chest Kurtz over 15 minutes at 200ml/hr. Chemotherapy precautions maintained & patient educated. Will continue to monitor.

## 2018-10-15 NOTE — ASSESSMENT & PLAN NOTE
- ANC10  - On levoquin, fluconazole, acyclovir for prophylaxis  -patient to start neupogen tomorrow (10/15/2018)

## 2018-10-15 NOTE — PLAN OF CARE
Problem: Patient Care Overview  Goal: Plan of Care Review  Plan of care reviewed with patient at start of shift. Allo BMT day + 6. Denies pain and nausea. Free from falls and injuries this shift. Vital signs remain stable. Frequent checks for pain and safety. General weakness noted. Bed in lowest position, wheels locked, side rails up x's 2, call light in reach.Contact precautions maintained.  Instructed to call for assistance as needed. Will continue to monitor

## 2018-10-15 NOTE — ASSESSMENT & PLAN NOTE
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 9% blasts.Started on MEC chemotherapy for refractory AML on 6/23/2018  - had BMBX on 7/13/18, without evidence of CML; BCR ABL p210 0.5% (c/w MRD)  - dasatinib 140 mg daily at home, will stop today when chemo starts  -pre-transplant bone marrow biopsy from 9/11/2018 shows a hematologic and molecular remission  - admitted for Busulfan/Cyclophosphamide MRD allogeneic stem cell transplant from his brother  -Today is Day +6 allo SCT.

## 2018-10-15 NOTE — PLAN OF CARE
Notified by the [facility] the 10/12/2018 stool specimen is PCR positive for C difficile, follow SPECIAL CONTACT ISOLATION, utilize gown and gloves for patient encounters and wash hands with soap & water after PPE removed.  Call the Infection Prevention dept for isolation discontinuation criteria.

## 2018-10-15 NOTE — ASSESSMENT & PLAN NOTE
- Day +6 of Busulfan/Cyclophosphamide matched related donor stem cell transplant from 10/10 match brother:   - Donor B+ and CMV+, recipient O+ and CMV+  - Pre-transplant work-up with no contraindications to proceeding to transplant, sickle cell trait noted. Brother has sickle cell trait as well.   - Kurtz placed 10/1  - Allo transplant on 10/09/2018 without complication.  -Tacro level 8.5 today, will continue tacro 2.5 mg bid  - Daily GVHD charting    Planned conditioning regimen:  Busulfan on Day -7, -6, -5, and -4  Cyclophosphamide on Day -3, and -2   Planned  GVHD Prophylaxis:  Methotrexate on Day +1, +3, +6, and +11  Tacrolimus starting on Day -1  Antimicrobial Prophylaxis:  Acyclovir starting on Day -7  Levaquin starting on Day -1  Fluconazole starting on Day -1  Mepron starting on Day +30  Seizure Prophylaxis:  Levetiracetam on Day -7 through Day -3  SOS/VOD Prophylaxis:  Ursodiol on Day -7 through Day +30  Growth Factor Support:  Neupogen starting on Day +7

## 2018-10-15 NOTE — PROGRESS NOTES
[facility]  Hematology  Bone Marrow Transplant  Progress Note    Patient Name: Wilton Guzmán  Admission Date: 10/1/2018  Hospital Length of Stay: 14 days  Code Status: Full Code    Subjective:     Interval History: Day +6 MRD allo. Stool sample from 10/12/2018 + for c-diff. Pt with history of c-diff. Started on oral vanc. Stools now more formed. Per patient. Feeling more fatigued, but still ambulating in hallway 3x/day. Still eating well. Denied n/v. No mucositis noted. BP improved with amlodipine. tacro level 8.5. No changes to tacro dose today.    Objective:     Vital Signs (Most Recent):  Temp: 99.4 °F (37.4 °C) (10/15/18 1209)  Pulse: 110 (10/15/18 1209)  Resp: 18 (10/15/18 1209)  BP: 123/84 (10/15/18 1209)  SpO2: 100 % (10/15/18 1209) Vital Signs (24h Range):  Temp:  [98.8 °F (37.1 °C)-99.7 °F (37.6 °C)] 99.4 °F (37.4 °C)  Pulse:  [] 110  Resp:  [18] 18  SpO2:  [98 %-100 %] 100 %  BP: (100-123)/(69-84) 123/84     Weight: 65.7 kg (144 lb 13.5 oz)  Body mass index is 20.2 kg/m².  Body surface area is 1.81 meters squared.      Intake/Output - Last 3 Shifts       10/13 0700 - 10/14 0659 10/14 0700 - 10/15 0659 10/15 0700 - 10/16 0659    P.O. 540 1270 250    I.V. (mL/kg) 1782 (27.1) 1711.5 (26.1) 378.8 (5.8)    IV Piggyback   50    Total Intake(mL/kg) 2322 (35.3) 2981.5 (45.4) 678.8 (10.3)    Urine (mL/kg/hr) 1600 (1) 1350 (0.9)     Stool 0 0     Total Output 1600 1350     Net +722 +1631.5 +678.8           Urine Occurrence 2 x 2 x 3 x    Stool Occurrence 3 x 4 x 3 x          Physical Exam   Constitutional: He is oriented to person, place, and time. He appears well-developed and well-nourished.   HENT:   Head: Normocephalic and atraumatic.   Mouth/Throat: No oropharyngeal exudate.   Eyes: Conjunctivae are normal. Pupils are equal, round, and reactive to light. Right eye exhibits no discharge. Left eye exhibits no discharge.   Neck: Normal range of motion. Neck supple.   Cardiovascular:  Normal rate, regular rhythm, normal heart sounds and intact distal pulses.   No murmur heard.  Pulmonary/Chest: Effort normal and breath sounds normal. No respiratory distress. He has no wheezes. He has no rales.   Abdominal: Soft. Bowel sounds are normal. He exhibits no distension. There is no tenderness.   Stool now more well-formed.   Musculoskeletal: Normal range of motion. He exhibits no edema or deformity.   Neurological: He is alert and oriented to person, place, and time.   Skin: Skin is warm and dry. No rash noted. No erythema.   Psychiatric: He has a normal mood and affect. His behavior is normal. Judgment and thought content normal.       Significant Labs:   CBC:   Recent Labs   Lab  10/14/18   0400  10/15/18   0335   WBC  0.10*  0.05*   HGB  11.0*  11.0*   HCT  32.6*  31.8*   PLT  56*  35*    and CMP:   Recent Labs   Lab  10/14/18   0400  10/15/18   0335   NA  139  138   K  4.2  4.3   CL  105  106   CO2  26  27   GLU  82  96   BUN  11  11   CREATININE  0.7  0.8   CALCIUM  9.2  9.1   PROT  5.7*  5.7*   ALBUMIN  3.1*  2.9*   BILITOT  0.3  0.2   ALKPHOS  76  80   AST  21  12   ALT  67*  50*   ANIONGAP  8  5*   EGFRNONAA  >60.0  >60.0       Diagnostic Results:  None    Assessment/Plan:     * S/P allogeneic bone marrow transplant    - Day +6 of Busulfan/Cyclophosphamide matched related donor stem cell transplant from 10/10 match brother:   - Donor B+ and CMV+, recipient O+ and CMV+  - Pre-transplant work-up with no contraindications to proceeding to transplant, sickle cell trait noted. Brother has sickle cell trait as well.   - Kurtz placed 10/1  - Allo transplant on 10/09/2018 without complication.  -Tacro level 8.5 today, will continue tacro 2.5 mg bid  - Daily GVHD charting    Planned conditioning regimen:  Busulfan on Day -7, -6, -5, and -4  Cyclophosphamide on Day -3, and -2   Planned  GVHD Prophylaxis:  Methotrexate on Day +1, +3, +6, and +11  Tacrolimus starting on Day -1  Antimicrobial  Prophylaxis:  Acyclovir starting on Day -7  Levaquin starting on Day -1  Fluconazole starting on Day -1  Mepron starting on Day +30  Seizure Prophylaxis:  Levetiracetam on Day -7 through Day -3  SOS/VOD Prophylaxis:  Ursodiol on Day -7 through Day +30  Growth Factor Support:  Neupogen starting on Day +7          Chronic myelogenous leukemia (CML), BCR-ABL1-positive    - original BMBX 5/26/18 with 20% blasts; CML transformed to AML with blast crisis;   karyotype: 46,XY,t(9;22;22;14)q34;q11.2;q13;q23); BCR/ABL gene fusion 62%  - received induction with 7+3 on 6/1-6/8/2018  - persistent disease post 7+3 induction, repeat BM biopsy done 6/18 here at [facility] after 7+3. 9% blasts.Started on MEC chemotherapy for refractory AML on 6/23/2018  - had BMBX on 7/13/18, without evidence of CML; BCR ABL p210 0.5% (c/w MRD)  - dasatinib 140 mg daily at home, will stop today when chemo starts  -pre-transplant bone marrow biopsy from 9/11/2018 shows a hematologic and molecular remission  - admitted for Busulfan/Cyclophosphamide MRD allogeneic stem cell transplant from his brother  -Today is Day +6 allo SCT.          Cytopenia    -transfuse prbc for hgb <7; transfuse platelets for plt count <10K  -hgb 11.0 ; wbc 0.05 (ANC 10); plt 35. Stopped lovenox 10/15        Chemotherapy-induced nausea    -Resolved  -prn anti-emetics.   -tolerating meals and po meds at this time.         C. difficile diarrhea    -c-diff positive 10/12  -PO vancomycin started 10/12  -NS at 75cc/hr  -stool now more well-formed.        Chemotherapy induced neutropenia    - ANC10  - On levoquin, fluconazole, acyclovir for prophylaxis            VTE Risk Mitigation (From admission, onward)        Ordered     heparin, porcine (PF) 100 unit/mL injection flush 300 Units  As needed (PRN)      10/01/18 7840          Disposition: Pending count recovery. Neupogen to start tomorrow.    Leona Beltran, NP  Bone Marrow Transplant  [facility]

## 2018-10-15 NOTE — SUBJECTIVE & OBJECTIVE
Subjective:     Interval History: Day +3 MRD allo. No c/o n/v today. Has had 2 loose BMs so far this morning. Order placed for c-diff. Imodium ok if c-diff neg. Pt to receive Day +3 MTX today. Amlodipine 5 mg daily for elevated BP. Tacro 5.8. No changes.       Objective:     Vital Signs (Most Recent):  Temp: 99.4 °F (37.4 °C) (10/15/18 1209)  Pulse: 110 (10/15/18 1209)  Resp: 18 (10/15/18 1209)  BP: 123/84 (10/15/18 1209)  SpO2: 100 % (10/15/18 1209) Vital Signs (24h Range):  Temp:  [98.8 °F (37.1 °C)-99.7 °F (37.6 °C)] 99.4 °F (37.4 °C)  Pulse:  [] 110  Resp:  [18] 18  SpO2:  [98 %-100 %] 100 %  BP: (100-123)/(69-84) 123/84     Weight: 65.7 kg (144 lb 13.5 oz)  Body mass index is 20.2 kg/m².  Body surface area is 1.81 meters squared.      Intake/Output - Last 3 Shifts       10/13 0700 - 10/14 0659 10/14 0700 - 10/15 0659 10/15 0700 - 10/16 0659    P.O. 540 1270 250    I.V. (mL/kg) 1782 (27.1) 1711.5 (26.1) 378.8 (5.8)    IV Piggyback   50    Total Intake(mL/kg) 2322 (35.3) 2981.5 (45.4) 678.8 (10.3)    Urine (mL/kg/hr) 1600 (1) 1350 (0.9)     Stool 0 0     Total Output 1600 1350     Net +722 +1631.5 +678.8           Urine Occurrence 2 x 2 x 3 x    Stool Occurrence 3 x 4 x 3 x          Physical Exam   Constitutional: He is oriented to person, place, and time. He appears well-developed and well-nourished.   HENT:   Head: Normocephalic and atraumatic.   Mouth/Throat: No oropharyngeal exudate.   Eyes: Conjunctivae are normal. Pupils are equal, round, and reactive to light. Right eye exhibits no discharge. Left eye exhibits no discharge.   Neck: Normal range of motion. Neck supple.   Cardiovascular: Normal rate, regular rhythm, normal heart sounds and intact distal pulses.   No murmur heard.  Pulmonary/Chest: Effort normal and breath sounds normal. No respiratory distress. He has no wheezes. He has no rales.   Abdominal: Soft. Bowel sounds are normal. He exhibits no distension. There is no tenderness.   Stool  now more well-formed.   Musculoskeletal: Normal range of motion. He exhibits no edema or deformity.   Neurological: He is alert and oriented to person, place, and time.   Skin: Skin is warm and dry. No rash noted. No erythema.   Psychiatric: He has a normal mood and affect. His behavior is normal. Judgment and thought content normal.       Significant Labs:   CBC:   Recent Labs   Lab  10/14/18   0400  10/15/18   0335   WBC  0.10*  0.05*   HGB  11.0*  11.0*   HCT  32.6*  31.8*   PLT  56*  35*    and CMP:   Recent Labs   Lab  10/14/18   0400  10/15/18   0335   NA  139  138   K  4.2  4.3   CL  105  106   CO2  26  27   GLU  82  96   BUN  11  11   CREATININE  0.7  0.8   CALCIUM  9.2  9.1   PROT  5.7*  5.7*   ALBUMIN  3.1*  2.9*   BILITOT  0.3  0.2   ALKPHOS  76  80   AST  21  12   ALT  67*  50*   ANIONGAP  8  5*   EGFRNONAA  >60.0  >60.0       Diagnostic Results:  None

## 2018-10-15 NOTE — ASSESSMENT & PLAN NOTE
-transfuse prbc for hgb <7; transfuse platelets for plt count <10K  -hgb 11.0 ; wbc 0.05 (ANC 10); plt 35. Stopped lovenox 10/15

## 2018-10-15 NOTE — PLAN OF CARE
Problem: Patient Care Overview  Goal: Plan of Care Review  Outcome: Ongoing (interventions implemented as appropriate)  POC reviewed with patient; understanding verbalized. Magnesium replacements administered this shift. CDiff contact precautions in place. Pt remains independent. Voids clear, yellow urine; multiple BMs this shift. Regular diet with good appetite. NS20K at 75mL. Pt. with nonskid footwear on, bed in lowest position, and locked with bed rails up x2.  Pt. has call light and personal items within reach. VSS and afebrile this shift. All questions and concerns addressed at this time. Will continue to monitor.

## 2018-10-15 NOTE — ASSESSMENT & PLAN NOTE
-c-diff positive 10/12  -PO vancomycin started 10/12  -NS at 75cc/hr  -stool now more well-formed.

## 2018-10-16 LAB
ALBUMIN SERPL BCP-MCNC: 2.9 G/DL
ALP SERPL-CCNC: 84 U/L
ALT SERPL W/O P-5'-P-CCNC: 41 U/L
ANION GAP SERPL CALC-SCNC: 8 MMOL/L
ANISOCYTOSIS BLD QL SMEAR: SLIGHT
AST SERPL-CCNC: 11 U/L
BASOPHILS # BLD AUTO: 0 K/UL
BASOPHILS NFR BLD: 0 %
BILIRUB SERPL-MCNC: 0.3 MG/DL
BUN SERPL-MCNC: 8 MG/DL
CALCIUM SERPL-MCNC: 9.1 MG/DL
CHLORIDE SERPL-SCNC: 105 MMOL/L
CO2 SERPL-SCNC: 25 MMOL/L
CREAT SERPL-MCNC: 0.7 MG/DL
DIFFERENTIAL METHOD: ABNORMAL
EOSINOPHIL # BLD AUTO: 0 K/UL
EOSINOPHIL NFR BLD: 0 %
ERYTHROCYTE [DISTWIDTH] IN BLOOD BY AUTOMATED COUNT: 13.1 %
EST. GFR  (AFRICAN AMERICAN): >60 ML/MIN/1.73 M^2
EST. GFR  (NON AFRICAN AMERICAN): >60 ML/MIN/1.73 M^2
GLUCOSE SERPL-MCNC: 85 MG/DL
HCT VFR BLD AUTO: 31.7 %
HGB BLD-MCNC: 10.5 G/DL
HYPOCHROMIA BLD QL SMEAR: ABNORMAL
IMM GRANULOCYTES # BLD AUTO: 0 K/UL
IMM GRANULOCYTES NFR BLD AUTO: 0 %
LYMPHOCYTES # BLD AUTO: 0 K/UL
LYMPHOCYTES NFR BLD: 80 %
MAGNESIUM SERPL-MCNC: 1.1 MG/DL
MCH RBC QN AUTO: 29.2 PG
MCHC RBC AUTO-ENTMCNC: 33.1 G/DL
MCV RBC AUTO: 88 FL
MONOCYTES # BLD AUTO: 0 K/UL
MONOCYTES NFR BLD: 0 %
NEUTROPHILS # BLD AUTO: 0 K/UL
NEUTROPHILS NFR BLD: 20 %
NRBC BLD-RTO: 0 /100 WBC
OVALOCYTES BLD QL SMEAR: ABNORMAL
PHOSPHATE SERPL-MCNC: 3.9 MG/DL
PLATELET # BLD AUTO: 16 K/UL
PMV BLD AUTO: ABNORMAL FL
POIKILOCYTOSIS BLD QL SMEAR: SLIGHT
POLYCHROMASIA BLD QL SMEAR: ABNORMAL
POTASSIUM SERPL-SCNC: 4.1 MMOL/L
PROT SERPL-MCNC: 5.8 G/DL
RBC # BLD AUTO: 3.59 M/UL
SODIUM SERPL-SCNC: 138 MMOL/L
TACROLIMUS BLD-MCNC: 6.7 NG/ML
WBC # BLD AUTO: 0.04 K/UL

## 2018-10-16 PROCEDURE — 63600175 PHARM REV CODE 636 W HCPCS: Performed by: INTERNAL MEDICINE

## 2018-10-16 PROCEDURE — 97803 MED NUTRITION INDIV SUBSEQ: CPT

## 2018-10-16 PROCEDURE — 63600175 PHARM REV CODE 636 W HCPCS: Mod: JG | Performed by: INTERNAL MEDICINE

## 2018-10-16 PROCEDURE — 25000003 PHARM REV CODE 250: Performed by: INTERNAL MEDICINE

## 2018-10-16 PROCEDURE — 25000003 PHARM REV CODE 250: Performed by: NURSE PRACTITIONER

## 2018-10-16 PROCEDURE — 84100 ASSAY OF PHOSPHORUS: CPT

## 2018-10-16 PROCEDURE — 83735 ASSAY OF MAGNESIUM: CPT

## 2018-10-16 PROCEDURE — 99233 SBSQ HOSP IP/OBS HIGH 50: CPT | Mod: ,,, | Performed by: INTERNAL MEDICINE

## 2018-10-16 PROCEDURE — A9155 ARTIFICIAL SALIVA: HCPCS | Performed by: INTERNAL MEDICINE

## 2018-10-16 PROCEDURE — 63600175 PHARM REV CODE 636 W HCPCS: Performed by: NURSE PRACTITIONER

## 2018-10-16 PROCEDURE — 85025 COMPLETE CBC W/AUTO DIFF WBC: CPT

## 2018-10-16 PROCEDURE — 20600001 HC STEP DOWN PRIVATE ROOM

## 2018-10-16 PROCEDURE — 80053 COMPREHEN METABOLIC PANEL: CPT

## 2018-10-16 PROCEDURE — 25000003 PHARM REV CODE 250: Performed by: STUDENT IN AN ORGANIZED HEALTH CARE EDUCATION/TRAINING PROGRAM

## 2018-10-16 PROCEDURE — 80197 ASSAY OF TACROLIMUS: CPT

## 2018-10-16 RX ORDER — LANOLIN ALCOHOL/MO/W.PET/CERES
400 CREAM (GRAM) TOPICAL EVERY 4 HOURS PRN
Status: DISCONTINUED | OUTPATIENT
Start: 2018-10-16 | End: 2018-10-21

## 2018-10-16 RX ORDER — MAGNESIUM SULFATE HEPTAHYDRATE 40 MG/ML
2 INJECTION, SOLUTION INTRAVENOUS
Status: DISCONTINUED | OUTPATIENT
Start: 2018-10-16 | End: 2018-10-16

## 2018-10-16 RX ORDER — LANOLIN ALCOHOL/MO/W.PET/CERES
800 CREAM (GRAM) TOPICAL EVERY 4 HOURS PRN
Status: DISCONTINUED | OUTPATIENT
Start: 2018-10-16 | End: 2018-10-21

## 2018-10-16 RX ADMIN — Medication 30 ML: at 05:10

## 2018-10-16 RX ADMIN — Medication 125 MG: at 11:10

## 2018-10-16 RX ADMIN — Medication 30 ML: at 11:10

## 2018-10-16 RX ADMIN — Medication 125 MG: at 06:10

## 2018-10-16 RX ADMIN — SODIUM CHLORIDE AND POTASSIUM CHLORIDE: 9; 1.49 INJECTION, SOLUTION INTRAVENOUS at 12:10

## 2018-10-16 RX ADMIN — Medication 30 ML: at 06:10

## 2018-10-16 RX ADMIN — URSODIOL 300 MG: 300 CAPSULE ORAL at 08:10

## 2018-10-16 RX ADMIN — FLUCONAZOLE 400 MG: 200 TABLET ORAL at 08:10

## 2018-10-16 RX ADMIN — TACROLIMUS 2.5 MG: 1 CAPSULE ORAL at 06:10

## 2018-10-16 RX ADMIN — MAGNESIUM OXIDE TAB 400 MG (241.3 MG ELEMENTAL MG) 800 MG: 400 (241.3 MG) TAB at 11:10

## 2018-10-16 RX ADMIN — MAGNESIUM SULFATE HEPTAHYDRATE 3 G: 500 INJECTION, SOLUTION INTRAMUSCULAR; INTRAVENOUS at 12:10

## 2018-10-16 RX ADMIN — LEVOFLOXACIN 500 MG: 500 TABLET, FILM COATED ORAL at 08:10

## 2018-10-16 RX ADMIN — PANTOPRAZOLE SODIUM 40 MG: 40 TABLET, DELAYED RELEASE ORAL at 08:10

## 2018-10-16 RX ADMIN — MAGNESIUM OXIDE TAB 400 MG (241.3 MG ELEMENTAL MG) 800 MG: 400 (241.3 MG) TAB at 06:10

## 2018-10-16 RX ADMIN — AMLODIPINE BESYLATE 5 MG: 5 TABLET ORAL at 08:10

## 2018-10-16 RX ADMIN — TACROLIMUS 2.5 MG: 1 CAPSULE ORAL at 08:10

## 2018-10-16 RX ADMIN — FILGRASTIM 300 MCG: 300 INJECTION, SOLUTION INTRAVENOUS; SUBCUTANEOUS at 08:10

## 2018-10-16 RX ADMIN — Medication 125 MG: at 05:10

## 2018-10-16 RX ADMIN — ACYCLOVIR 800 MG: 200 CAPSULE ORAL at 08:10

## 2018-10-16 NOTE — PROGRESS NOTES
[facility]  Hematology  Bone Marrow Transplant  Progress Note    Patient Name: Wilton Guzmán  Admission Date: 10/1/2018  Hospital Length of Stay: 15 days  Code Status: Full Code    Subjective:     Interval History: Day +7 MRD allo. Stool soft but not liquid per pt. Having 3-4 BMs/day. Continue oral vanc for total of 14 days. Stop date will be 10/26. Continues to eat and ambulate in hallway despite fatigue. Tacro level 6.7. No dose change today. No mucositis noted.     Objective:     Vital Signs (Most Recent):  Temp: 98.5 °F (36.9 °C) (10/16/18 0752)  Pulse: 88 (10/16/18 0752)  Resp: 18 (10/16/18 0752)  BP: (!) 137/94 (10/16/18 0752)  SpO2: 100 % (10/16/18 0752) Vital Signs (24h Range):  Temp:  [98.5 °F (36.9 °C)-99.9 °F (37.7 °C)] 98.5 °F (36.9 °C)  Pulse:  [] 88  Resp:  [16-18] 18  SpO2:  [97 %-100 %] 100 %  BP: (121-137)/(73-94) 137/94     Weight: 64.8 kg (142 lb 13.7 oz)  Body mass index is 19.92 kg/m².  Body surface area is 1.8 meters squared.      Intake/Output - Last 3 Shifts       10/14 0700 - 10/15 0659 10/15 0700 - 10/16 0659 10/16 0700 - 10/17 0659    P.O. 1270 820     I.V. (mL/kg) 1711.5 (26.1) 1895.3 (29.2)     IV Piggyback  50     Total Intake(mL/kg) 2981.5 (45.4) 2765.3 (42.7)     Urine (mL/kg/hr) 1350 (0.9) 1200 (0.8)     Stool 0 0     Total Output 1350 1200     Net +1631.5 +1565.3            Urine Occurrence 2 x 5 x     Stool Occurrence 4 x 5 x           Physical Exam   Constitutional: He is oriented to person, place, and time. He appears well-developed and well-nourished.   HENT:   Head: Normocephalic and atraumatic.   Mouth/Throat: No oropharyngeal exudate.   Eyes: Conjunctivae are normal. Pupils are equal, round, and reactive to light. Right eye exhibits no discharge. Left eye exhibits no discharge.   Neck: Normal range of motion. Neck supple.   Cardiovascular: Normal rate, regular rhythm, normal heart sounds and intact distal pulses.   No murmur  heard.  Pulmonary/Chest: Effort normal and breath sounds normal. No respiratory distress. He has no wheezes. He has no rales.   Abdominal: Soft. Bowel sounds are normal. He exhibits no distension. There is no tenderness.   Stool now more well-formed.   Musculoskeletal: Normal range of motion. He exhibits no edema or deformity.   Neurological: He is alert and oriented to person, place, and time.   Skin: Skin is warm and dry. No rash noted. No erythema.   Psychiatric: He has a normal mood and affect. His behavior is normal. Judgment and thought content normal.       Significant Labs:   CBC:   Recent Labs   Lab  10/15/18   0335  10/16/18   0432   WBC  0.05*  0.04*   HGB  11.0*  10.5*   HCT  31.8*  31.7*   PLT  35*  16*    and CMP:   Recent Labs   Lab  10/15/18   0335  10/16/18   0432   NA  138  138   K  4.3  4.1   CL  106  105   CO2  27  25   GLU  96  85   BUN  11  8   CREATININE  0.8  0.7   CALCIUM  9.1  9.1   PROT  5.7*  5.8*   ALBUMIN  2.9*  2.9*   BILITOT  0.2  0.3   ALKPHOS  80  84   AST  12  11   ALT  50*  41   ANIONGAP  5*  8   EGFRNONAA  >60.0  >60.0       Diagnostic Results:  None    Assessment/Plan:     * S/P allogeneic bone marrow transplant    - Day +7 of Busulfan/Cyclophosphamide matched related donor stem cell transplant from 10/10 match brother:   - Donor B+ and CMV+, recipient O+ and CMV+  - Pre-transplant work-up with no contraindications to proceeding to transplant, sickle cell trait noted. Brother has sickle cell trait as well.   - Kurtz placed 10/1  - Allo transplant on 10/09/2018 without complication.  -Tacro level 6.7 today, will continue tacro 2.5 mg bid  - Daily GVHD charting    Planned conditioning regimen:  Busulfan on Day -7, -6, -5, and -4  Cyclophosphamide on Day -3, and -2   Planned  GVHD Prophylaxis:  Methotrexate on Day +1, +3, +6, and +11  Tacrolimus starting on Day -1  Antimicrobial Prophylaxis:  Acyclovir starting on Day -7  Levaquin starting on Day -1  Fluconazole starting on Day  -1  Mepron starting on Day +30  Seizure Prophylaxis:  Levetiracetam on Day -7 through Day -3  SOS/VOD Prophylaxis:  Ursodiol on Day -7 through Day +30  Growth Factor Support:  Neupogen starting on Day +7          Chronic myelogenous leukemia (CML), BCR-ABL1-positive    - original BMBX 5/26/18 with 20% blasts; CML transformed to AML with blast crisis;   karyotype: 46,XY,t(9;22;22;14)q34;q11.2;q13;q23); BCR/ABL gene fusion 62%  - received induction with 7+3 on 6/1-6/8/2018  - persistent disease post 7+3 induction, repeat BM biopsy done 6/18 here at [facility] after 7+3. 9% blasts.Started on MEC chemotherapy for refractory AML on 6/23/2018  - had BMBX on 7/13/18, without evidence of CML; BCR ABL p210 0.5% (c/w MRD)  - dasatinib 140 mg daily at home, will stop today when chemo starts  -pre-transplant bone marrow biopsy from 9/11/2018 shows a hematologic and molecular remission  - admitted for Busulfan/Cyclophosphamide MRD allogeneic stem cell transplant from his brother  -Today is Day +7 allo SCT.          Cytopenia    -transfuse prbc for hgb <7; transfuse platelets for plt count <10K  -hgb 11.0 ; wbc 0.05 (ANC 10); plt 35. Stopped lovenox 10/15        Chemotherapy-induced nausea    -Resolved  -prn anti-emetics.   -tolerating meals and po meds at this time.          C. difficile diarrhea    -c-diff positive 10/12  -PO vancomycin for 14 days. started 10/12. Stop date will be 10/26.  -NS at 75cc/hr  -stool soft, not liquid, per patient.   -3-4 BMs per day          Chemotherapy induced neutropenia    - ANC 8  - On levoquin, fluconazole, acyclovir for prophylaxis  -patient started neupogen 10/16/2018            VTE Risk Mitigation (From admission, onward)        Ordered     heparin, porcine (PF) 100 unit/mL injection flush 300 Units  As needed (PRN)      10/01/18 9196          Disposition: Awaiting count drop and subsequent recovery.    Leona Beltran, NP  Bone Marrow Transplant  [facility]

## 2018-10-16 NOTE — ASSESSMENT & PLAN NOTE
-c-diff positive 10/12  -PO vancomycin for 14 days. started 10/12. Stop date will be 10/26.  -NS at 75cc/hr  -stool soft, not liquid, per patient.   -3-4 BMs per day

## 2018-10-16 NOTE — ASSESSMENT & PLAN NOTE
- Day +7 of Busulfan/Cyclophosphamide matched related donor stem cell transplant from 10/10 match brother:   - Donor B+ and CMV+, recipient O+ and CMV+  - Pre-transplant work-up with no contraindications to proceeding to transplant, sickle cell trait noted. Brother has sickle cell trait as well.   - Kurtz placed 10/1  - Allo transplant on 10/09/2018 without complication.  -Tacro level 6.7 today, will continue tacro 2.5 mg bid  - Daily GVHD charting    Planned conditioning regimen:  Busulfan on Day -7, -6, -5, and -4  Cyclophosphamide on Day -3, and -2   Planned  GVHD Prophylaxis:  Methotrexate on Day +1, +3, +6, and +11  Tacrolimus starting on Day -1  Antimicrobial Prophylaxis:  Acyclovir starting on Day -7  Levaquin starting on Day -1  Fluconazole starting on Day -1  Mepron starting on Day +30  Seizure Prophylaxis:  Levetiracetam on Day -7 through Day -3  SOS/VOD Prophylaxis:  Ursodiol on Day -7 through Day +30  Growth Factor Support:  Neupogen starting on Day +7

## 2018-10-16 NOTE — PLAN OF CARE
Problem: Patient Care Overview  Goal: Plan of Care Review  Outcome: Ongoing (interventions implemented as appropriate)  Patient remained free from falls throughout shift, call bell within reach. Day +7 following Allo BMT. Only 1 BM overnight, patient reported it was more soft than liquid. cdiff precautions maintained. Oral vanc continued. Denies nausea or pain. Vitals stable, will continue to monitor.

## 2018-10-16 NOTE — ASSESSMENT & PLAN NOTE
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 9% blasts.Started on MEC chemotherapy for refractory AML on 6/23/2018  - had BMBX on 7/13/18, without evidence of CML; BCR ABL p210 0.5% (c/w MRD)  - dasatinib 140 mg daily at home, will stop today when chemo starts  -pre-transplant bone marrow biopsy from 9/11/2018 shows a hematologic and molecular remission  - admitted for Busulfan/Cyclophosphamide MRD allogeneic stem cell transplant from his brother  -Today is Day +7 allo SCT.

## 2018-10-16 NOTE — SUBJECTIVE & OBJECTIVE
Subjective:     Interval History: Day +7 MRD allo. Stool soft but not liquid per pt. Having 3-4 BMs/day. Continue oral vanc for total of 14 days. Stop date will be 10/26. Continues to eat and ambulate in hallway despite fatigue. Tacro level 6.7. No dose change today. No mucositis noted.     Objective:     Vital Signs (Most Recent):  Temp: 98.5 °F (36.9 °C) (10/16/18 0752)  Pulse: 88 (10/16/18 0752)  Resp: 18 (10/16/18 0752)  BP: (!) 137/94 (10/16/18 0752)  SpO2: 100 % (10/16/18 0752) Vital Signs (24h Range):  Temp:  [98.5 °F (36.9 °C)-99.9 °F (37.7 °C)] 98.5 °F (36.9 °C)  Pulse:  [] 88  Resp:  [16-18] 18  SpO2:  [97 %-100 %] 100 %  BP: (121-137)/(73-94) 137/94     Weight: 64.8 kg (142 lb 13.7 oz)  Body mass index is 19.92 kg/m².  Body surface area is 1.8 meters squared.      Intake/Output - Last 3 Shifts       10/14 0700 - 10/15 0659 10/15 0700 - 10/16 0659 10/16 0700 - 10/17 0659    P.O. 1270 820     I.V. (mL/kg) 1711.5 (26.1) 1895.3 (29.2)     IV Piggyback  50     Total Intake(mL/kg) 2981.5 (45.4) 2765.3 (42.7)     Urine (mL/kg/hr) 1350 (0.9) 1200 (0.8)     Stool 0 0     Total Output 1350 1200     Net +1631.5 +1565.3            Urine Occurrence 2 x 5 x     Stool Occurrence 4 x 5 x           Physical Exam   Constitutional: He is oriented to person, place, and time. He appears well-developed and well-nourished.   HENT:   Head: Normocephalic and atraumatic.   Mouth/Throat: No oropharyngeal exudate.   Eyes: Conjunctivae are normal. Pupils are equal, round, and reactive to light. Right eye exhibits no discharge. Left eye exhibits no discharge.   Neck: Normal range of motion. Neck supple.   Cardiovascular: Normal rate, regular rhythm, normal heart sounds and intact distal pulses.   No murmur heard.  Pulmonary/Chest: Effort normal and breath sounds normal. No respiratory distress. He has no wheezes. He has no rales.   Abdominal: Soft. Bowel sounds are normal. He exhibits no distension. There is no tenderness.    Stool now more well-formed.   Musculoskeletal: Normal range of motion. He exhibits no edema or deformity.   Neurological: He is alert and oriented to person, place, and time.   Skin: Skin is warm and dry. No rash noted. No erythema.   Psychiatric: He has a normal mood and affect. His behavior is normal. Judgment and thought content normal.       Significant Labs:   CBC:   Recent Labs   Lab  10/15/18   0335  10/16/18   0432   WBC  0.05*  0.04*   HGB  11.0*  10.5*   HCT  31.8*  31.7*   PLT  35*  16*    and CMP:   Recent Labs   Lab  10/15/18   0335  10/16/18   0432   NA  138  138   K  4.3  4.1   CL  106  105   CO2  27  25   GLU  96  85   BUN  11  8   CREATININE  0.8  0.7   CALCIUM  9.1  9.1   PROT  5.7*  5.8*   ALBUMIN  2.9*  2.9*   BILITOT  0.2  0.3   ALKPHOS  80  84   AST  12  11   ALT  50*  41   ANIONGAP  5*  8   EGFRNONAA  >60.0  >60.0       Diagnostic Results:  None

## 2018-10-16 NOTE — ASSESSMENT & PLAN NOTE
- ANC 8  - On levoquin, fluconazole, acyclovir for prophylaxis  -patient started neupogen 10/16/2018

## 2018-10-16 NOTE — PROGRESS NOTES
" [facility]  Adult Nutrition  Progress Note    SUMMARY       Recommendations    Recommendation/Intervention: 1.Continue w/ Regular diet. 2.Encourage PO intake >/= 75% EEN/EPN 3.If PO intake is <50% encourage HVP w/ each meal, small frequent meal, snacks, ONS, cold/non aromatic foods. 4.RD to follow  Goals: nutrient intake >/= 85% EEN/EPN   Nutrition Goal Status: progressing towards goal  Communication of RD Recs: discussed on rounds    Reason for Assessment    Reason for Assessment: RD follow-up  Diagnosis: (CML)  Relevant Medical History: Relapsed CML  Interdisciplinary Rounds: attended  General Information Comments: Day +7 of Busulfan/Cyclophosphamide Allo. c-diff + precautions in place. PO intake back to 100%. pt still appears nourished and maintaining wt. ambulating around the room.   Nutrition Discharge Planning: Regular diet w/ adequate po intake + ONS as needed to meet increased needs    Nutrition Risk Screen    Nutrition Risk Screen: no indicators present    Nutrition/Diet History    Patient Reported Diet/Restrictions/Preferences: general  Typical Food/Fluid Intake: adequate maintaining nutritional status  Food Preferences: None  Do you have any cultural, spiritual, Religion conflicts, given your current situation?: none  Food Allergies: NKFA  Factors Affecting Nutritional Intake: None identified at this time    Anthropometrics    Temp: 99 °F (37.2 °C)  Height Method: Stated  Height: 5' 11" (180.3 cm)  Height (inches): 71 in  Weight Method: Standard Scale  Weight: 64.8 kg (142 lb 13.7 oz)  Weight (lb): 142.86 lb  Ideal Body Weight (IBW), Male: 172 lb  % Ideal Body Weight, Male (lb): 83.06 lb  BMI (Calculated): 20  BMI Grade: 18.5-24.9 - normal  Usual Body Weight (UBW), k.2 kg  Weight Change Amount: 22 lb 14.9 oz  % Usual Body Weight: 86.35  % Weight Change From Usual Weight: -13.83 %  Weight Loss Since Admission: 6 lb 2.8 oz  % Weight Change Since Admission: 4.32   "     Lab/Procedures/Meds    Pertinent Labs Reviewed: reviewed  Pertinent Labs Comments: WBC-0.04, H/H-10.5/31.7, Plts-16, Alb-2.9, Mg-1.1  Pertinent Medications Reviewed: reviewed  Pertinent Medications Comments: acyclovir, Neupogen, heparin, Methotrexate, pantoprazole    Physical Findings/Assessment    Overall Physical Appearance: nourished  Oral/Mouth Cavity: all teeth present (age appropriate)  Skin: intact    Estimated/Assessed Needs    Weight Used For Calorie Calculations: 64.8 kg (142 lb 13.7 oz)  Energy Calorie Requirements (kcal): 3578-7895 kcal/day  Energy Need Method: Kcal/kg(30-35 kcal/kg)  Protein Requirements: 97-117g/day   (1.5-1.8g/kg)  Weight Used For Protein Calculations: 64.8 kg (142 lb 13.7 oz)     Fluid Need Method: RDA Method(1 ml/kcal or Per MD)  RDA Method (mL): 1944         Nutrition Prescription Ordered    Current Diet Order: Regular  Oral Nutrition Supplement: Boost TID    Evaluation of Received Nutrient/Fluid Intake    Oral Calories (kcal): 1080 from ONS  Oral Protein (gm): 42 from ONS  Oral Fluid (mL): 711  IV Fluid (mL): 1800  I/O: +8.7 L since admit  Energy Calories Required: meeting needs  Protein Required:meeting needs  Fluid Required:  meeting needs  Comments: LBM: 10/15/18  % Intake of Estimated Energy Needs: 75 - 100 %  % Meal Intake: 75 - 100 % po +ONS    Nutrition Risk    Level of Risk/Frequency of Follow-up: high     Assessment and Plan    Nutrition Problem  increased energy needs     Related to (etiology):   Physiological needs 2/2 CML w/SCT      Signs and Symptoms (as evidenced by):   Pt received SCT(day+7, increased Caloric/ Protein needs)     Interventions/Recommendations (treatment strategy):  1.Continue w/ Regular diet.   2.Encourage PO intake >/= 75% EEN/EPN   3.If PO intake is <50% encourage HVP w/ each meal, small frequent meal, snacks, ONS, cold/non aromatic foods.   4.RD to follow     Nutrition Diagnosis Status:   Continues           Monitor and Evaluation    Food and  Nutrient Intake: energy intake, food and beverage intake  Food and Nutrient Adminstration: diet order  Anthropometric Measurements: weight, weight change  Biochemical Data, Medical Tests and Procedures: (All labs)  Nutrition-Focused Physical Findings: overall appearance     Nutrition Follow-Up    RD Follow-up?: Yes

## 2018-10-16 NOTE — PLAN OF CARE
MDR's with Dr Rockwell.  Patient is day+7 post MRD allloSCT.  On isolation for cdiff.  Otherwise progressing as expected.  D/c pending engraftment.  Will continue to follow.

## 2018-10-17 LAB
ABO + RH BLD: NORMAL
ALBUMIN SERPL BCP-MCNC: 2.7 G/DL
ALP SERPL-CCNC: 84 U/L
ALT SERPL W/O P-5'-P-CCNC: 44 U/L
ANION GAP SERPL CALC-SCNC: 5 MMOL/L
ANISOCYTOSIS BLD QL SMEAR: SLIGHT
AST SERPL-CCNC: 12 U/L
BASOPHILS # BLD AUTO: ABNORMAL K/UL
BASOPHILS NFR BLD: 0 %
BILIRUB SERPL-MCNC: 0.3 MG/DL
BLD GP AB SCN CELLS X3 SERPL QL: NORMAL
BLD PROD TYP BPU: NORMAL
BLOOD UNIT EXPIRATION DATE: NORMAL
BLOOD UNIT TYPE CODE: 6200
BLOOD UNIT TYPE: NORMAL
BUN SERPL-MCNC: 10 MG/DL
CALCIUM SERPL-MCNC: 8.9 MG/DL
CHLORIDE SERPL-SCNC: 107 MMOL/L
CO2 SERPL-SCNC: 25 MMOL/L
CODING SYSTEM: NORMAL
CREAT SERPL-MCNC: 0.7 MG/DL
DIFFERENTIAL METHOD: ABNORMAL
DISPENSE STATUS: NORMAL
EOSINOPHIL # BLD AUTO: ABNORMAL K/UL
EOSINOPHIL NFR BLD: 3.3 %
ERYTHROCYTE [DISTWIDTH] IN BLOOD BY AUTOMATED COUNT: 12.7 %
EST. GFR  (AFRICAN AMERICAN): >60 ML/MIN/1.73 M^2
EST. GFR  (NON AFRICAN AMERICAN): >60 ML/MIN/1.73 M^2
GLUCOSE SERPL-MCNC: 94 MG/DL
HCT VFR BLD AUTO: 29.1 %
HGB BLD-MCNC: 9.6 G/DL
HYPOCHROMIA BLD QL SMEAR: ABNORMAL
IMM GRANULOCYTES # BLD AUTO: ABNORMAL K/UL
IMM GRANULOCYTES NFR BLD AUTO: ABNORMAL %
LYMPHOCYTES # BLD AUTO: ABNORMAL K/UL
LYMPHOCYTES NFR BLD: 86.7 %
MAGNESIUM SERPL-MCNC: 1.4 MG/DL
MCH RBC QN AUTO: 29.1 PG
MCHC RBC AUTO-ENTMCNC: 33 G/DL
MCV RBC AUTO: 88 FL
MONOCYTES # BLD AUTO: ABNORMAL K/UL
MONOCYTES NFR BLD: 10 %
NEUTROPHILS NFR BLD: 0 %
NRBC BLD-RTO: 0 /100 WBC
NUM UNITS TRANS WBC-POOR PLATPHERESIS: NORMAL
PHOSPHATE SERPL-MCNC: 3.6 MG/DL
PLATELET # BLD AUTO: 7 K/UL
PLATELET BLD QL SMEAR: ABNORMAL
PMV BLD AUTO: ABNORMAL FL
POTASSIUM SERPL-SCNC: 4.2 MMOL/L
PROT SERPL-MCNC: 5.4 G/DL
RBC # BLD AUTO: 3.3 M/UL
SODIUM SERPL-SCNC: 137 MMOL/L
TACROLIMUS BLD-MCNC: 8.6 NG/ML
WBC # BLD AUTO: 0.05 K/UL

## 2018-10-17 PROCEDURE — P9037 PLATE PHERES LEUKOREDU IRRAD: HCPCS

## 2018-10-17 PROCEDURE — 63600175 PHARM REV CODE 636 W HCPCS: Performed by: NURSE PRACTITIONER

## 2018-10-17 PROCEDURE — 83735 ASSAY OF MAGNESIUM: CPT

## 2018-10-17 PROCEDURE — 25000003 PHARM REV CODE 250: Performed by: INTERNAL MEDICINE

## 2018-10-17 PROCEDURE — 86901 BLOOD TYPING SEROLOGIC RH(D): CPT

## 2018-10-17 PROCEDURE — 63600175 PHARM REV CODE 636 W HCPCS: Mod: JG | Performed by: INTERNAL MEDICINE

## 2018-10-17 PROCEDURE — 80053 COMPREHEN METABOLIC PANEL: CPT

## 2018-10-17 PROCEDURE — 99233 SBSQ HOSP IP/OBS HIGH 50: CPT | Mod: ,,, | Performed by: INTERNAL MEDICINE

## 2018-10-17 PROCEDURE — 84100 ASSAY OF PHOSPHORUS: CPT

## 2018-10-17 PROCEDURE — 63600175 PHARM REV CODE 636 W HCPCS: Performed by: INTERNAL MEDICINE

## 2018-10-17 PROCEDURE — A9155 ARTIFICIAL SALIVA: HCPCS | Performed by: INTERNAL MEDICINE

## 2018-10-17 PROCEDURE — 25000003 PHARM REV CODE 250: Performed by: STUDENT IN AN ORGANIZED HEALTH CARE EDUCATION/TRAINING PROGRAM

## 2018-10-17 PROCEDURE — 25000003 PHARM REV CODE 250: Performed by: NURSE PRACTITIONER

## 2018-10-17 PROCEDURE — 36430 TRANSFUSION BLD/BLD COMPNT: CPT

## 2018-10-17 PROCEDURE — 20600001 HC STEP DOWN PRIVATE ROOM

## 2018-10-17 PROCEDURE — 86644 CMV ANTIBODY: CPT

## 2018-10-17 PROCEDURE — 85007 BL SMEAR W/DIFF WBC COUNT: CPT

## 2018-10-17 PROCEDURE — 85027 COMPLETE CBC AUTOMATED: CPT

## 2018-10-17 PROCEDURE — 80197 ASSAY OF TACROLIMUS: CPT

## 2018-10-17 RX ORDER — HYDROCODONE BITARTRATE AND ACETAMINOPHEN 500; 5 MG/1; MG/1
TABLET ORAL
Status: DISCONTINUED | OUTPATIENT
Start: 2018-10-17 | End: 2018-10-18

## 2018-10-17 RX ADMIN — MAGNESIUM OXIDE TAB 400 MG (241.3 MG ELEMENTAL MG) 400 MG: 400 (241.3 MG) TAB at 11:10

## 2018-10-17 RX ADMIN — Medication 30 ML: at 05:10

## 2018-10-17 RX ADMIN — FLUCONAZOLE 400 MG: 200 TABLET ORAL at 08:10

## 2018-10-17 RX ADMIN — DIPHENHYDRAMINE HYDROCHLORIDE 50 MG: 50 INJECTION, SOLUTION INTRAMUSCULAR; INTRAVENOUS at 05:10

## 2018-10-17 RX ADMIN — Medication 125 MG: at 05:10

## 2018-10-17 RX ADMIN — PANTOPRAZOLE SODIUM 40 MG: 40 TABLET, DELAYED RELEASE ORAL at 08:10

## 2018-10-17 RX ADMIN — FILGRASTIM 300 MCG: 300 INJECTION, SOLUTION INTRAVENOUS; SUBCUTANEOUS at 08:10

## 2018-10-17 RX ADMIN — MAGNESIUM OXIDE TAB 400 MG (241.3 MG ELEMENTAL MG) 400 MG: 400 (241.3 MG) TAB at 05:10

## 2018-10-17 RX ADMIN — URSODIOL 300 MG: 300 CAPSULE ORAL at 08:10

## 2018-10-17 RX ADMIN — Medication 30 ML: at 11:10

## 2018-10-17 RX ADMIN — Medication 125 MG: at 11:10

## 2018-10-17 RX ADMIN — LEVOFLOXACIN 500 MG: 500 TABLET, FILM COATED ORAL at 11:10

## 2018-10-17 RX ADMIN — TACROLIMUS 2.5 MG: 1 CAPSULE ORAL at 06:10

## 2018-10-17 RX ADMIN — Medication 125 MG: at 06:10

## 2018-10-17 RX ADMIN — TACROLIMUS 2.5 MG: 1 CAPSULE ORAL at 08:10

## 2018-10-17 RX ADMIN — ACYCLOVIR 800 MG: 200 CAPSULE ORAL at 08:10

## 2018-10-17 RX ADMIN — AMLODIPINE BESYLATE 5 MG: 5 TABLET ORAL at 08:10

## 2018-10-17 RX ADMIN — SODIUM CHLORIDE: 0.9 INJECTION, SOLUTION INTRAVENOUS at 08:10

## 2018-10-17 RX ADMIN — MAGNESIUM OXIDE TAB 400 MG (241.3 MG ELEMENTAL MG) 400 MG: 400 (241.3 MG) TAB at 03:10

## 2018-10-17 RX ADMIN — Medication 30 ML: at 06:10

## 2018-10-17 NOTE — ASSESSMENT & PLAN NOTE
- Day +8 of Busulfan/Cyclophosphamide matched related donor stem cell transplant from 10/10 match brother:   - Donor B+ and CMV+, recipient O+ and CMV+  - Pre-transplant work-up with no contraindications to proceeding to transplant, sickle cell trait noted. Brother has sickle cell trait as well.   - Kurtz placed 10/1  - Allo transplant on 10/09/2018 without complication.  -Tacro level 8.6 today, will continue tacro 2.5 mg bid  - Daily GVHD charting  -neupogen started 10/17/2018    Planned conditioning regimen:  Busulfan on Day -7, -6, -5, and -4  Cyclophosphamide on Day -3, and -2   Planned  GVHD Prophylaxis:  Methotrexate on Day +1, +3, +6, and +11  Tacrolimus starting on Day -1  Antimicrobial Prophylaxis:  Acyclovir starting on Day -7  Levaquin starting on Day -1  Fluconazole starting on Day -1  Mepron starting on Day +30  Seizure Prophylaxis:  Levetiracetam on Day -7 through Day -3  SOS/VOD Prophylaxis:  Ursodiol on Day -7 through Day +30  Growth Factor Support:  Neupogen starting on Day +7

## 2018-10-17 NOTE — ASSESSMENT & PLAN NOTE
-transfuse prbc for hgb <7; transfuse platelets for plt count <10K  -hgb 9.6 ; wbc 0.05 (ANC 0); plt 7. Stopped lovenox 10/15

## 2018-10-17 NOTE — PLAN OF CARE
Problem: Patient Care Overview  Goal: Plan of Care Review  Outcome: Ongoing (interventions implemented as appropriate)  Side rails up x2; call bell in place; bed in lowest, locked position; skid proof socks on; no evidence of skin breakdown; care plan explained to patient; pt remains free of injury. Pt tolerated po, voids, ambulates,  loose BM x 1, denies pain, and n/v. Pt in day+8 for an allo stem cell transplant. Tolerated po, voids, BM x 1, ambulates in tucker and maintains neutropenic precautions. Mg ride po replacements given. Pt denies pain, or n/v. Vancomycin given as scheduled. Isolation precautions maintained. VSS and afebrile.

## 2018-10-17 NOTE — PROGRESS NOTES
[facility]  Hematology  Bone Marrow Transplant  Progress Note    Patient Name: Wilton Guzmán  Admission Date: 10/1/2018  Hospital Length of Stay: 16 days  Code Status: Full Code    Subjective:     Interval History: Day +8 MRD allo. Continues to have loose to soft stools. Frequency of BMs decreasing. Continue oral vanc until 10/26. Eating approximately 50% of meals. Continues to ambulate in hallway. Reports increased fatigue. Tacro level 8.6. No dose change. Received 1 unit platelets for platelet count of 7.     Objective:     Vital Signs (Most Recent):  Temp: 98.5 °F (36.9 °C) (10/17/18 0814)  Pulse: 82 (10/17/18 0814)  Resp: 18 (10/17/18 0814)  BP: 133/80 (10/17/18 0814)  SpO2: 100 % (10/17/18 0814) Vital Signs (24h Range):  Temp:  [98.2 °F (36.8 °C)-99.3 °F (37.4 °C)] 98.5 °F (36.9 °C)  Pulse:  [] 82  Resp:  [18] 18  SpO2:  [100 %] 100 %  BP: (118-133)/(76-90) 133/80     Weight: 64.6 kg (142 lb 6.7 oz)  Body mass index is 19.86 kg/m².  Body surface area is 1.8 meters squared.      Intake/Output - Last 3 Shifts       10/15 0700 - 10/16 0659 10/16 0700 - 10/17 0659 10/17 0700 - 10/18 0659    P.O. 820 480 180    I.V. (mL/kg) 1895.3 (29.2) 1958.8 (30.3)     Blood  245     IV Piggyback 50      Total Intake(mL/kg) 2765.3 (42.7) 2683.8 (41.5) 180 (2.8)    Urine (mL/kg/hr) 1200 (0.8) 700 (0.5)     Stool 0      Total Output 1200 700     Net +1565.3 +1983.8 +180           Urine Occurrence 5 x 1 x 1 x    Stool Occurrence 5 x 1 x 1 x          Physical Exam   Constitutional: He is oriented to person, place, and time. He appears well-developed and well-nourished.   HENT:   Head: Normocephalic and atraumatic.   Mouth/Throat: No oropharyngeal exudate.   Eyes: Conjunctivae are normal. Pupils are equal, round, and reactive to light. Right eye exhibits no discharge. Left eye exhibits no discharge.   Neck: Normal range of motion. Neck supple.   Cardiovascular: Normal rate, regular rhythm, normal heart  sounds and intact distal pulses.   No murmur heard.  Pulmonary/Chest: Effort normal and breath sounds normal. No respiratory distress. He has no wheezes. He has no rales.   Abdominal: Soft. Bowel sounds are normal. He exhibits no distension. There is no tenderness.   Stool now more well-formed.   Musculoskeletal: Normal range of motion. He exhibits no edema or deformity.   Neurological: He is alert and oriented to person, place, and time.   Skin: Skin is warm and dry. No rash noted. No erythema.   Psychiatric: He has a normal mood and affect. His behavior is normal. Judgment and thought content normal.       Significant Labs:   CBC:   Recent Labs   Lab  10/16/18   0432  10/17/18   0350   WBC  0.04*  0.05*   HGB  10.5*  9.6*   HCT  31.7*  29.1*   PLT  16*  7*    and CMP:   Recent Labs   Lab  10/16/18   0432  10/17/18   0350   NA  138  137   K  4.1  4.2   CL  105  107   CO2  25  25   GLU  85  94   BUN  8  10   CREATININE  0.7  0.7   CALCIUM  9.1  8.9   PROT  5.8*  5.4*   ALBUMIN  2.9*  2.7*   BILITOT  0.3  0.3   ALKPHOS  84  84   AST  11  12   ALT  41  44   ANIONGAP  8  5*   EGFRNONAA  >60.0  >60.0       Diagnostic Results:  None    Assessment/Plan:     * S/P allogeneic bone marrow transplant    - Day +8 of Busulfan/Cyclophosphamide matched related donor stem cell transplant from 10/10 match brother:   - Donor B+ and CMV+, recipient O+ and CMV+  - Pre-transplant work-up with no contraindications to proceeding to transplant, sickle cell trait noted. Brother has sickle cell trait as well.   - Kurtz placed 10/1  - Allo transplant on 10/09/2018 without complication.  -Tacro level 8.6 today, will continue tacro 2.5 mg bid  - Daily GVHD charting  -neupogen started 10/17/2018    Planned conditioning regimen:  Busulfan on Day -7, -6, -5, and -4  Cyclophosphamide on Day -3, and -2   Planned  GVHD Prophylaxis:  Methotrexate on Day +1, +3, +6, and +11  Tacrolimus starting on Day -1  Antimicrobial Prophylaxis:  Acyclovir  starting on Day -7  Levaquin starting on Day -1  Fluconazole starting on Day -1  Mepron starting on Day +30  Seizure Prophylaxis:  Levetiracetam on Day -7 through Day -3  SOS/VOD Prophylaxis:  Ursodiol on Day -7 through Day +30  Growth Factor Support:  Neupogen starting on Day +7          Chronic myelogenous leukemia (CML), BCR-ABL1-positive    - original BMBX 5/26/18 with 20% blasts; CML transformed to AML with blast crisis;   karyotype: 46,XY,t(9;22;22;14)q34;q11.2;q13;q23); BCR/ABL gene fusion 62%  - received induction with 7+3 on 6/1-6/8/2018  - persistent disease post 7+3 induction, repeat BM biopsy done 6/18 here at [facility] after 7+3. 9% blasts.Started on MEC chemotherapy for refractory AML on 6/23/2018  - had BMBX on 7/13/18, without evidence of CML; BCR ABL p210 0.5% (c/w MRD)  - dasatinib 140 mg daily at home, will stop today when chemo starts  -pre-transplant bone marrow biopsy from 9/11/2018 shows a hematologic and molecular remission  - admitted for Busulfan/Cyclophosphamide MRD allogeneic stem cell transplant from his brother  -Today is Day +8 allo SCT.          Cytopenia    -transfuse prbc for hgb <7; transfuse platelets for plt count <10K  -hgb 9.6 ; wbc 0.05 (ANC 0); plt 7. Stopped lovenox 10/15        Chemotherapy-induced nausea    -Resolved  -prn anti-emetics.   -tolerating meals and po meds at this time.           C. difficile diarrhea    -c-diff positive 10/12  -PO vancomycin for 14 days. started 10/12. Stop date will be 10/26.  -NS at 75cc/hr  -stool soft, not liquid, per patient.   -frequency of BMs decreasing.          Chemotherapy induced neutropenia    - ANC 0  - On levoquin, fluconazole, acyclovir for prophylaxis  -patient started neupogen 10/16/2018            VTE Risk Mitigation (From admission, onward)        Ordered     heparin, porcine (PF) 100 unit/mL injection flush 300 Units  As needed (PRN)      10/01/18 2179          Disposition: Awaiting count recovery.    Leona Beltran, NP  Bone  Marrow Transplant  [facility]

## 2018-10-17 NOTE — ASSESSMENT & PLAN NOTE
- ANC 0  - On levoquin, fluconazole, acyclovir for prophylaxis  -patient started neupogen 10/16/2018

## 2018-10-17 NOTE — PLAN OF CARE
Problem: Patient Care Overview  Goal: Plan of Care Review  Outcome: Ongoing (interventions implemented as appropriate)  Side rails up x2; call bell in place; bed in lowest, locked position; skid proof socks on; no evidence of skin breakdown; care plan explained to patient; pt remains free of injury. Pt in day+7 for an allo stem cell transplant. Tolerated po, voids, BM x 2, ambulates in tucker and maintains neutropenic precautions. Mg rider ivpb and po replacements given. Pt denies pain, or n/v. Isolation precautions maintained. VSS and afebrile.

## 2018-10-17 NOTE — ASSESSMENT & PLAN NOTE
-c-diff positive 10/12  -PO vancomycin for 14 days. started 10/12. Stop date will be 10/26.  -NS at 75cc/hr  -stool soft, not liquid, per patient.   -frequency of BMs decreasing.

## 2018-10-17 NOTE — SUBJECTIVE & OBJECTIVE
Subjective:     Interval History: Day +8 MRD allo. Continues to have loose to soft stools. Frequency of BMs decreasing. Continue oral vanc until 10/26. Eating approximately 50% of meals. Continues to ambulate in hallway. Reports increased fatigue. Tacro level 8.6. No dose change. Received 1 unit platelets for platelet count of 7.     Objective:     Vital Signs (Most Recent):  Temp: 98.5 °F (36.9 °C) (10/17/18 0814)  Pulse: 82 (10/17/18 0814)  Resp: 18 (10/17/18 0814)  BP: 133/80 (10/17/18 0814)  SpO2: 100 % (10/17/18 0814) Vital Signs (24h Range):  Temp:  [98.2 °F (36.8 °C)-99.3 °F (37.4 °C)] 98.5 °F (36.9 °C)  Pulse:  [] 82  Resp:  [18] 18  SpO2:  [100 %] 100 %  BP: (118-133)/(76-90) 133/80     Weight: 64.6 kg (142 lb 6.7 oz)  Body mass index is 19.86 kg/m².  Body surface area is 1.8 meters squared.      Intake/Output - Last 3 Shifts       10/15 0700 - 10/16 0659 10/16 0700 - 10/17 0659 10/17 0700 - 10/18 0659    P.O. 820 480 180    I.V. (mL/kg) 1895.3 (29.2) 1958.8 (30.3)     Blood  245     IV Piggyback 50      Total Intake(mL/kg) 2765.3 (42.7) 2683.8 (41.5) 180 (2.8)    Urine (mL/kg/hr) 1200 (0.8) 700 (0.5)     Stool 0      Total Output 1200 700     Net +1565.3 +1983.8 +180           Urine Occurrence 5 x 1 x 1 x    Stool Occurrence 5 x 1 x 1 x          Physical Exam   Constitutional: He is oriented to person, place, and time. He appears well-developed and well-nourished.   HENT:   Head: Normocephalic and atraumatic.   Mouth/Throat: No oropharyngeal exudate.   Eyes: Conjunctivae are normal. Pupils are equal, round, and reactive to light. Right eye exhibits no discharge. Left eye exhibits no discharge.   Neck: Normal range of motion. Neck supple.   Cardiovascular: Normal rate, regular rhythm, normal heart sounds and intact distal pulses.   No murmur heard.  Pulmonary/Chest: Effort normal and breath sounds normal. No respiratory distress. He has no wheezes. He has no rales.   Abdominal: Soft. Bowel sounds  are normal. He exhibits no distension. There is no tenderness.   Stool now more well-formed.   Musculoskeletal: Normal range of motion. He exhibits no edema or deformity.   Neurological: He is alert and oriented to person, place, and time.   Skin: Skin is warm and dry. No rash noted. No erythema.   Psychiatric: He has a normal mood and affect. His behavior is normal. Judgment and thought content normal.       Significant Labs:   CBC:   Recent Labs   Lab  10/16/18   0432  10/17/18   0350   WBC  0.04*  0.05*   HGB  10.5*  9.6*   HCT  31.7*  29.1*   PLT  16*  7*    and CMP:   Recent Labs   Lab  10/16/18   0432  10/17/18   0350   NA  138  137   K  4.1  4.2   CL  105  107   CO2  25  25   GLU  85  94   BUN  8  10   CREATININE  0.7  0.7   CALCIUM  9.1  8.9   PROT  5.8*  5.4*   ALBUMIN  2.9*  2.7*   BILITOT  0.3  0.3   ALKPHOS  84  84   AST  11  12   ALT  41  44   ANIONGAP  8  5*   EGFRNONAA  >60.0  >60.0       Diagnostic Results:  None

## 2018-10-17 NOTE — ASSESSMENT & PLAN NOTE
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 9% blasts.Started on MEC chemotherapy for refractory AML on 6/23/2018  - had BMBX on 7/13/18, without evidence of CML; BCR ABL p210 0.5% (c/w MRD)  - dasatinib 140 mg daily at home, will stop today when chemo starts  -pre-transplant bone marrow biopsy from 9/11/2018 shows a hematologic and molecular remission  - admitted for Busulfan/Cyclophosphamide MRD allogeneic stem cell transplant from his brother  -Today is Day +8 allo SCT.

## 2018-10-18 PROBLEM — K12.31 MUCOSITIS DUE TO CHEMOTHERAPY: Status: ACTIVE | Noted: 2018-10-18

## 2018-10-18 LAB
ALBUMIN SERPL BCP-MCNC: 2.8 G/DL
ALP SERPL-CCNC: 84 U/L
ALT SERPL W/O P-5'-P-CCNC: 37 U/L
ANION GAP SERPL CALC-SCNC: 7 MMOL/L
ANISOCYTOSIS BLD QL SMEAR: SLIGHT
AST SERPL-CCNC: 9 U/L
BASOPHILS # BLD AUTO: 0 K/UL
BASOPHILS NFR BLD: 0 %
BILIRUB SERPL-MCNC: 0.4 MG/DL
BUN SERPL-MCNC: 6 MG/DL
CALCIUM SERPL-MCNC: 9.4 MG/DL
CHLORIDE SERPL-SCNC: 106 MMOL/L
CO2 SERPL-SCNC: 26 MMOL/L
CREAT SERPL-MCNC: 0.6 MG/DL
DACRYOCYTES BLD QL SMEAR: ABNORMAL
DIFFERENTIAL METHOD: ABNORMAL
EOSINOPHIL # BLD AUTO: 0 K/UL
EOSINOPHIL NFR BLD: 0 %
ERYTHROCYTE [DISTWIDTH] IN BLOOD BY AUTOMATED COUNT: 12.5 %
EST. GFR  (AFRICAN AMERICAN): >60 ML/MIN/1.73 M^2
EST. GFR  (NON AFRICAN AMERICAN): >60 ML/MIN/1.73 M^2
GLUCOSE SERPL-MCNC: 92 MG/DL
HCT VFR BLD AUTO: 29.7 %
HGB BLD-MCNC: 10.2 G/DL
HYPOCHROMIA BLD QL SMEAR: ABNORMAL
IMM GRANULOCYTES # BLD AUTO: 0 K/UL
IMM GRANULOCYTES NFR BLD AUTO: 0 %
LYMPHOCYTES # BLD AUTO: 0.1 K/UL
LYMPHOCYTES NFR BLD: 55.6 %
MAGNESIUM SERPL-MCNC: 0.9 MG/DL
MCH RBC QN AUTO: 29.7 PG
MCHC RBC AUTO-ENTMCNC: 34.3 G/DL
MCV RBC AUTO: 87 FL
MONOCYTES # BLD AUTO: 0 K/UL
MONOCYTES NFR BLD: 33.3 %
NEUTROPHILS # BLD AUTO: 0 K/UL
NEUTROPHILS NFR BLD: 11.1 %
NRBC BLD-RTO: 0 /100 WBC
PHOSPHATE SERPL-MCNC: 4.1 MG/DL
PLATELET # BLD AUTO: 19 K/UL
PLATELET BLD QL SMEAR: ABNORMAL
PMV BLD AUTO: ABNORMAL FL
POTASSIUM SERPL-SCNC: 3.9 MMOL/L
PROT SERPL-MCNC: 5.7 G/DL
RBC # BLD AUTO: 3.43 M/UL
SODIUM SERPL-SCNC: 139 MMOL/L
TACROLIMUS BLD-MCNC: 9 NG/ML
WBC # BLD AUTO: 0.09 K/UL

## 2018-10-18 PROCEDURE — 85025 COMPLETE CBC W/AUTO DIFF WBC: CPT

## 2018-10-18 PROCEDURE — 99233 SBSQ HOSP IP/OBS HIGH 50: CPT | Mod: ,,, | Performed by: INTERNAL MEDICINE

## 2018-10-18 PROCEDURE — 25000003 PHARM REV CODE 250: Performed by: STUDENT IN AN ORGANIZED HEALTH CARE EDUCATION/TRAINING PROGRAM

## 2018-10-18 PROCEDURE — 84100 ASSAY OF PHOSPHORUS: CPT

## 2018-10-18 PROCEDURE — 63600175 PHARM REV CODE 636 W HCPCS: Performed by: INTERNAL MEDICINE

## 2018-10-18 PROCEDURE — 63600175 PHARM REV CODE 636 W HCPCS: Performed by: STUDENT IN AN ORGANIZED HEALTH CARE EDUCATION/TRAINING PROGRAM

## 2018-10-18 PROCEDURE — 25000003 PHARM REV CODE 250: Performed by: NURSE PRACTITIONER

## 2018-10-18 PROCEDURE — 80197 ASSAY OF TACROLIMUS: CPT

## 2018-10-18 PROCEDURE — 20600001 HC STEP DOWN PRIVATE ROOM

## 2018-10-18 PROCEDURE — A9155 ARTIFICIAL SALIVA: HCPCS | Performed by: INTERNAL MEDICINE

## 2018-10-18 PROCEDURE — 63600175 PHARM REV CODE 636 W HCPCS: Performed by: NURSE PRACTITIONER

## 2018-10-18 PROCEDURE — 83735 ASSAY OF MAGNESIUM: CPT

## 2018-10-18 PROCEDURE — 63600175 PHARM REV CODE 636 W HCPCS: Mod: JG | Performed by: INTERNAL MEDICINE

## 2018-10-18 PROCEDURE — 25000003 PHARM REV CODE 250: Performed by: INTERNAL MEDICINE

## 2018-10-18 PROCEDURE — 80053 COMPREHEN METABOLIC PANEL: CPT

## 2018-10-18 RX ORDER — MAGNESIUM SULFATE HEPTAHYDRATE 40 MG/ML
2 INJECTION, SOLUTION INTRAVENOUS
Status: COMPLETED | OUTPATIENT
Start: 2018-10-18 | End: 2018-10-18

## 2018-10-18 RX ADMIN — AMLODIPINE BESYLATE 5 MG: 5 TABLET ORAL at 09:10

## 2018-10-18 RX ADMIN — Medication 10 ML: at 05:10

## 2018-10-18 RX ADMIN — SODIUM CHLORIDE AND POTASSIUM CHLORIDE: 9; 1.49 INJECTION, SOLUTION INTRAVENOUS at 09:10

## 2018-10-18 RX ADMIN — FILGRASTIM 300 MCG: 300 INJECTION, SOLUTION INTRAVENOUS; SUBCUTANEOUS at 10:10

## 2018-10-18 RX ADMIN — LEVOFLOXACIN 500 MG: 500 TABLET, FILM COATED ORAL at 10:10

## 2018-10-18 RX ADMIN — MAGNESIUM SULFATE IN WATER 2 G: 40 INJECTION, SOLUTION INTRAVENOUS at 09:10

## 2018-10-18 RX ADMIN — TACROLIMUS 2.5 MG: 1 CAPSULE ORAL at 09:10

## 2018-10-18 RX ADMIN — URSODIOL 300 MG: 300 CAPSULE ORAL at 09:10

## 2018-10-18 RX ADMIN — Medication 125 MG: at 11:10

## 2018-10-18 RX ADMIN — ACYCLOVIR 800 MG: 200 CAPSULE ORAL at 09:10

## 2018-10-18 RX ADMIN — Medication 10 ML: at 10:10

## 2018-10-18 RX ADMIN — Medication 125 MG: at 05:10

## 2018-10-18 RX ADMIN — Medication 30 ML: at 06:10

## 2018-10-18 RX ADMIN — PANTOPRAZOLE SODIUM 40 MG: 40 TABLET, DELAYED RELEASE ORAL at 09:10

## 2018-10-18 RX ADMIN — Medication 125 MG: at 01:10

## 2018-10-18 RX ADMIN — Medication 10 ML: at 09:10

## 2018-10-18 RX ADMIN — MAGNESIUM SULFATE IN WATER 2 G: 40 INJECTION, SOLUTION INTRAVENOUS at 11:10

## 2018-10-18 RX ADMIN — Medication 30 ML: at 01:10

## 2018-10-18 RX ADMIN — FLUCONAZOLE 400 MG: 200 TABLET ORAL at 09:10

## 2018-10-18 RX ADMIN — Medication 10 ML: at 01:10

## 2018-10-18 RX ADMIN — Medication 30 ML: at 05:10

## 2018-10-18 RX ADMIN — TACROLIMUS 2.5 MG: 1 CAPSULE ORAL at 05:10

## 2018-10-18 NOTE — ASSESSMENT & PLAN NOTE
- Day +9 of Busulfan/Cyclophosphamide matched related donor stem cell transplant from 10/10 match brother:   - Donor B+ and CMV+, recipient O+ and CMV+  - Pre-transplant work-up with no contraindications to proceeding to transplant, sickle cell trait noted. Brother has sickle cell trait as well.   - Kurtz placed 10/1  - Allo transplant on 10/09/2018 without complication.  -Tacro level pending today, will continue tacro 2.5 mg bid  - Daily GVHD charting  -neupogen started 10/17/2018    Planned conditioning regimen:  Busulfan on Day -7, -6, -5, and -4  Cyclophosphamide on Day -3, and -2   Planned  GVHD Prophylaxis:  Methotrexate on Day +1, +3, +6, and +11  Tacrolimus starting on Day -1  Antimicrobial Prophylaxis:  Acyclovir starting on Day -7  Levaquin starting on Day -1  Fluconazole starting on Day -1  Mepron starting on Day +30  Seizure Prophylaxis:  Levetiracetam on Day -7 through Day -3  SOS/VOD Prophylaxis:  Ursodiol on Day -7 through Day +30  Growth Factor Support:  Neupogen starting on Day +7

## 2018-10-18 NOTE — PLAN OF CARE
Problem: Patient Care Overview  Goal: Plan of Care Review  Outcome: Ongoing (interventions implemented as appropriate)   Side rails up x2; call bell in place; bed in lowest, locked position; skid proof socks on; no evidence of skin breakdown; care plan explained to patient; pt remains free of injury. Pt tolerated po, voids, ambulates,  loose BM x 1, denies pain, and n/v. Pt in day+9 for an allo stem cell transplant. Tolerated po, voids, BM x 2, ambulates in tucker and maintains neutropenic precautions. Mg ride ivpb replacements given. Pt denies pain, or n/v. Vancomycin given as scheduled. Isolation precautions maintained. VSS and afebrile. Pt with c/o sore throat, dukes given as scheduled and chloraseptic spray ordered.

## 2018-10-18 NOTE — SUBJECTIVE & OBJECTIVE
Subjective:     Interval History: Day +9 MRD allo. Had one loose stool overnight, and reports some mucositis this morning. Continue oral vanc until 10/26. Tolerating good oral intake. Continues to ambulate in hallway. Denies abdominal pain, nausea/vomiting.     Objective:     Vital Signs (Most Recent):  Temp: 98.8 °F (37.1 °C) (10/18/18 0824)  Pulse: 98 (10/18/18 0824)  Resp: 18 (10/18/18 0824)  BP: 121/85 (10/18/18 0824)  SpO2: 100 % (10/18/18 0824) Vital Signs (24h Range):  Temp:  [98.7 °F (37.1 °C)-99.5 °F (37.5 °C)] 98.8 °F (37.1 °C)  Pulse:  [] 98  Resp:  [18] 18  SpO2:  [99 %-100 %] 100 %  BP: (117-124)/(73-85) 121/85     Weight: 65 kg (143 lb 4.8 oz)  Body mass index is 19.99 kg/m².  Body surface area is 1.8 meters squared.      Intake/Output - Last 3 Shifts       10/16 0700 - 10/17 0659 10/17 0700 - 10/18 0659 10/18 0700 - 10/19 0659    P.O. 480 1200     I.V. (mL/kg) 1958.8 (30.3) 723.7 (11.1)     Blood 245      IV Piggyback       Total Intake(mL/kg) 2683.8 (41.5) 1923.7 (29.6)     Urine (mL/kg/hr) 700 (0.5) 1975 (1.3)     Stool  0     Total Output 700 1975     Net +1983.8 -51.3            Urine Occurrence 1 x 1 x     Stool Occurrence 1 x 2 x           Physical Exam   Constitutional: He is oriented to person, place, and time. He appears well-developed and well-nourished.   HENT:   Head: Normocephalic and atraumatic.   Mouth/Throat: No oropharyngeal exudate.   Mucositis noted in posterior oropharynx   Eyes: Conjunctivae are normal. Pupils are equal, round, and reactive to light. Right eye exhibits no discharge. Left eye exhibits no discharge.   Neck: Normal range of motion. Neck supple.   Cardiovascular: Normal rate, regular rhythm, normal heart sounds and intact distal pulses.   No murmur heard.  Pulmonary/Chest: Effort normal and breath sounds normal. No respiratory distress. He has no wheezes. He has no rales.   Abdominal: Soft. Bowel sounds are normal. He exhibits no distension. There is no  tenderness.   Musculoskeletal: Normal range of motion. He exhibits no edema or deformity.   Neurological: He is alert and oriented to person, place, and time.   Skin: Skin is warm and dry. No rash noted. No erythema.   Psychiatric: He has a normal mood and affect. His behavior is normal. Judgment and thought content normal.       Significant Labs:   CBC:   Recent Labs   Lab 10/17/18  0350 10/18/18  0445   WBC 0.05* 0.09*   HGB 9.6* 10.2*   HCT 29.1* 29.7*   PLT 7* 19*    and CMP:   Recent Labs   Lab 10/17/18  0350 10/18/18  0445    139   K 4.2 3.9    106   CO2 25 26   GLU 94 92   BUN 10 6   CREATININE 0.7 0.6   CALCIUM 8.9 9.4   PROT 5.4* 5.7*   ALBUMIN 2.7* 2.8*   BILITOT 0.3 0.4   ALKPHOS 84 84   AST 12 9*   ALT 44 37   ANIONGAP 5* 7*   EGFRNONAA >60.0 >60.0       Diagnostic Results:  None

## 2018-10-18 NOTE — ASSESSMENT & PLAN NOTE
- Day +10 of Busulfan/Cyclophosphamide matched related donor stem cell transplant from 10/10 match brother:   - Donor B+ and CMV+, recipient O+ and CMV+  - Pre-transplant work-up with no contraindications to proceeding to transplant, sickle cell trait noted. Brother has sickle cell trait as well.   - Kurtz placed 10/1  - Allo transplant on 10/09/2018 without complication.  -Tacro level 9.3 today, will continue tacro 2.5 mg bid  - Daily GVHD charting  -neupogen started 10/17/2018    Planned conditioning regimen:  Busulfan on Day -7, -6, -5, and -4  Cyclophosphamide on Day -3, and -2   Planned  GVHD Prophylaxis:  Methotrexate on Day +1, +3, +6, and +11  Tacrolimus starting on Day -1  Antimicrobial Prophylaxis:  Acyclovir starting on Day -7  Levaquin starting on Day -1  Fluconazole starting on Day -1  Mepron starting on Day +30  Seizure Prophylaxis:  Levetiracetam on Day -7 through Day -3  SOS/VOD Prophylaxis:  Ursodiol on Day -7 through Day +30  Growth Factor Support:  Neupogen starting on Day +7

## 2018-10-18 NOTE — PROGRESS NOTES
S/w patient providing resource information.   SWer will plan to meet with pt/wife tomorrow afternoon.  No other needs identified.  Pt/wife agreed to contact SWer as needed.  SWer remains available.

## 2018-10-18 NOTE — ASSESSMENT & PLAN NOTE
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 9% blasts.Started on MEC chemotherapy for refractory AML on 6/23/2018  - had BMBX on 7/13/18, without evidence of CML; BCR ABL p210 0.5% (c/w MRD)  - dasatinib 140 mg daily at home, will stop today when chemo starts  -pre-transplant bone marrow biopsy from 9/11/2018 shows a hematologic and molecular remission  - admitted for Busulfan/Cyclophosphamide MRD allogeneic stem cell transplant from his brother  -Today is Day +9 allo SCT.

## 2018-10-18 NOTE — PLAN OF CARE
Problem: Patient Care Overview  Goal: Plan of Care Review  Outcome: Ongoing (interventions implemented as appropriate)  Afebrile. Free from falls or injury. No complaints of pain. NS infusing at 75. Oral vanc given as scheduled. Pt had one BM this shift. v Bed locked in lowest position, non skid socks on, call light within reach. Pt instructed to call if any assistance is needed. Vitals stable.  Will cont to alexis pt.

## 2018-10-18 NOTE — ASSESSMENT & PLAN NOTE
-transfuse prbc for hgb <7; transfuse platelets for plt count <10K  -hgb 10.2; wbc 0.09 (ANC 0); plt 19. Stopped lovenox 10/15

## 2018-10-18 NOTE — PROGRESS NOTES
[facility]  Hematology  Bone Marrow Transplant  Progress Note    Patient Name: Wilton Guzmán  Admission Date: 10/1/2018  Hospital Length of Stay: 17 days  Code Status: Full Code    Subjective:     Interval History: Day +9 MRD allo. Had one loose stool overnight, and reports some mucositis this morning. Continue oral vanc until 10/26. Tolerating good oral intake. Continues to ambulate in hallway. Denies abdominal pain, nausea/vomiting.     Objective:     Vital Signs (Most Recent):  Temp: 98.8 °F (37.1 °C) (10/18/18 0824)  Pulse: 98 (10/18/18 0824)  Resp: 18 (10/18/18 0824)  BP: 121/85 (10/18/18 0824)  SpO2: 100 % (10/18/18 0824) Vital Signs (24h Range):  Temp:  [98.7 °F (37.1 °C)-99.5 °F (37.5 °C)] 98.8 °F (37.1 °C)  Pulse:  [] 98  Resp:  [18] 18  SpO2:  [99 %-100 %] 100 %  BP: (117-124)/(73-85) 121/85     Weight: 65 kg (143 lb 4.8 oz)  Body mass index is 19.99 kg/m².  Body surface area is 1.8 meters squared.      Intake/Output - Last 3 Shifts       10/16 0700 - 10/17 0659 10/17 0700 - 10/18 0659 10/18 0700 - 10/19 0659    P.O. 480 1200     I.V. (mL/kg) 1958.8 (30.3) 723.7 (11.1)     Blood 245      IV Piggyback       Total Intake(mL/kg) 2683.8 (41.5) 1923.7 (29.6)     Urine (mL/kg/hr) 700 (0.5) 1975 (1.3)     Stool  0     Total Output 700 1975     Net +1983.8 -51.3            Urine Occurrence 1 x 1 x     Stool Occurrence 1 x 2 x           Physical Exam   Constitutional: He is oriented to person, place, and time. He appears well-developed and well-nourished.   HENT:   Head: Normocephalic and atraumatic.   Mouth/Throat: No oropharyngeal exudate.   Mucositis noted in posterior oropharynx   Eyes: Conjunctivae are normal. Pupils are equal, round, and reactive to light. Right eye exhibits no discharge. Left eye exhibits no discharge.   Neck: Normal range of motion. Neck supple.   Cardiovascular: Normal rate, regular rhythm, normal heart sounds and intact distal pulses.   No murmur  heard.  Pulmonary/Chest: Effort normal and breath sounds normal. No respiratory distress. He has no wheezes. He has no rales.   Abdominal: Soft. Bowel sounds are normal. He exhibits no distension. There is no tenderness.   Musculoskeletal: Normal range of motion. He exhibits no edema or deformity.   Neurological: He is alert and oriented to person, place, and time.   Skin: Skin is warm and dry. No rash noted. No erythema.   Psychiatric: He has a normal mood and affect. His behavior is normal. Judgment and thought content normal.       Significant Labs:   CBC:   Recent Labs   Lab 10/17/18  0350 10/18/18  0445   WBC 0.05* 0.09*   HGB 9.6* 10.2*   HCT 29.1* 29.7*   PLT 7* 19*    and CMP:   Recent Labs   Lab 10/17/18  0350 10/18/18  0445    139   K 4.2 3.9    106   CO2 25 26   GLU 94 92   BUN 10 6   CREATININE 0.7 0.6   CALCIUM 8.9 9.4   PROT 5.4* 5.7*   ALBUMIN 2.7* 2.8*   BILITOT 0.3 0.4   ALKPHOS 84 84   AST 12 9*   ALT 44 37   ANIONGAP 5* 7*   EGFRNONAA >60.0 >60.0       Diagnostic Results:  None    Assessment/Plan:     * S/P allogeneic bone marrow transplant    - Day +9 of Busulfan/Cyclophosphamide matched related donor stem cell transplant from 10/10 match brother:   - Donor B+ and CMV+, recipient O+ and CMV+  - Pre-transplant work-up with no contraindications to proceeding to transplant, sickle cell trait noted. Brother has sickle cell trait as well.   - Kurtz placed 10/1  - Allo transplant on 10/09/2018 without complication.  -Tacro level 9.0 today, will continue tacro 2.5 mg bid  - Daily GVHD charting  -neupogen started 10/17/2018    Planned conditioning regimen:  Busulfan on Day -7, -6, -5, and -4  Cyclophosphamide on Day -3, and -2   Planned  GVHD Prophylaxis:  Methotrexate on Day +1, +3, +6, and +11  Tacrolimus starting on Day -1  Antimicrobial Prophylaxis:  Acyclovir starting on Day -7  Levaquin starting on Day -1  Fluconazole starting on Day -1  Mepron starting on Day +30  Seizure  Prophylaxis:  Levetiracetam on Day -7 through Day -3  SOS/VOD Prophylaxis:  Ursodiol on Day -7 through Day +30  Growth Factor Support:  Neupogen starting on Day +7       Mucositis due to chemotherapy    - Scheduled duke's added     Chemotherapy induced neutropenia    - ANC 0  - On levoquin, fluconazole, acyclovir for prophylaxis  -patient started neupogen 10/16/2018     Chemotherapy-induced nausea    -Resolved  -prn anti-emetics.   -tolerating meals and po meds at this time.        Cytopenia    -transfuse prbc for hgb <7; transfuse platelets for plt count <10K  -hgb 10.2; wbc 0.09 (ANC 0); plt 19. Stopped lovenox 10/15     C. difficile diarrhea    -c-diff positive 10/12  -PO vancomycin for 14 days. started 10/12. Stop date will be 10/26.  -NS at 75cc/hr  -stool soft, not liquid, per patient.   -frequency of BMs decreasing.       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 9% blasts.Started on [facility] chemotherapy for refractory AML on 6/23/2018  - had BMBX on 7/13/18, without evidence of CML; BCR ABL p210 0.5% (c/w MRD)  - dasatinib 140 mg daily at home, will stop today when chemo starts  -pre-transplant bone marrow biopsy from 9/11/2018 shows a hematologic and molecular remission  - admitted for Busulfan/Cyclophosphamide MRD allogeneic stem cell transplant from his brother  -Today is Day +9 allo SCT.           VTE Risk Mitigation (From admission, onward)        Ordered     heparin, porcine (PF) 100 unit/mL injection flush 300 Units  As needed (PRN)      10/01/18 6124          Disposition: pending count recovery    Gini Saleem MD  Bone Marrow Transplant  [facility]

## 2018-10-19 LAB
ALBUMIN SERPL BCP-MCNC: 2.9 G/DL
ALP SERPL-CCNC: 99 U/L
ALT SERPL W/O P-5'-P-CCNC: 30 U/L
ANION GAP SERPL CALC-SCNC: 6 MMOL/L
ANISOCYTOSIS BLD QL SMEAR: SLIGHT
AST SERPL-CCNC: 7 U/L
BASOPHILS # BLD AUTO: ABNORMAL K/UL
BASOPHILS NFR BLD: 0 %
BILIRUB SERPL-MCNC: 0.3 MG/DL
BUN SERPL-MCNC: 6 MG/DL
CALCIUM SERPL-MCNC: 9.4 MG/DL
CHLORIDE SERPL-SCNC: 107 MMOL/L
CO2 SERPL-SCNC: 27 MMOL/L
CREAT SERPL-MCNC: 0.7 MG/DL
DIFFERENTIAL METHOD: ABNORMAL
DOHLE BOD BLD QL SMEAR: PRESENT
EOSINOPHIL # BLD AUTO: ABNORMAL K/UL
EOSINOPHIL NFR BLD: 0 %
ERYTHROCYTE [DISTWIDTH] IN BLOOD BY AUTOMATED COUNT: 12.3 %
EST. GFR  (AFRICAN AMERICAN): >60 ML/MIN/1.73 M^2
EST. GFR  (NON AFRICAN AMERICAN): >60 ML/MIN/1.73 M^2
GLUCOSE SERPL-MCNC: 84 MG/DL
HCT VFR BLD AUTO: 29.1 %
HGB BLD-MCNC: 10.1 G/DL
IMM GRANULOCYTES # BLD AUTO: ABNORMAL K/UL
IMM GRANULOCYTES NFR BLD AUTO: ABNORMAL %
LYMPHOCYTES # BLD AUTO: ABNORMAL K/UL
LYMPHOCYTES NFR BLD: 17 %
MAGNESIUM SERPL-MCNC: 1.4 MG/DL
MCH RBC QN AUTO: 30.2 PG
MCHC RBC AUTO-ENTMCNC: 34.7 G/DL
MCV RBC AUTO: 87 FL
MONOCYTES # BLD AUTO: ABNORMAL K/UL
MONOCYTES NFR BLD: 26 %
NEUTROPHILS NFR BLD: 45 %
NEUTS BAND NFR BLD MANUAL: 12 %
NRBC BLD-RTO: 0 /100 WBC
PHOSPHATE SERPL-MCNC: 3.8 MG/DL
PLATELET # BLD AUTO: 16 K/UL
PLATELET BLD QL SMEAR: ABNORMAL
PMV BLD AUTO: ABNORMAL FL
POTASSIUM SERPL-SCNC: 4.3 MMOL/L
PROT SERPL-MCNC: 5.8 G/DL
RBC # BLD AUTO: 3.34 M/UL
SODIUM SERPL-SCNC: 140 MMOL/L
TACROLIMUS BLD-MCNC: 9.3 NG/ML
WBC # BLD AUTO: 0.42 K/UL

## 2018-10-19 PROCEDURE — 25000003 PHARM REV CODE 250: Performed by: NURSE PRACTITIONER

## 2018-10-19 PROCEDURE — 83735 ASSAY OF MAGNESIUM: CPT

## 2018-10-19 PROCEDURE — 25000003 PHARM REV CODE 250: Performed by: STUDENT IN AN ORGANIZED HEALTH CARE EDUCATION/TRAINING PROGRAM

## 2018-10-19 PROCEDURE — 80197 ASSAY OF TACROLIMUS: CPT

## 2018-10-19 PROCEDURE — 80053 COMPREHEN METABOLIC PANEL: CPT

## 2018-10-19 PROCEDURE — 63600175 PHARM REV CODE 636 W HCPCS: Performed by: INTERNAL MEDICINE

## 2018-10-19 PROCEDURE — A9155 ARTIFICIAL SALIVA: HCPCS | Performed by: INTERNAL MEDICINE

## 2018-10-19 PROCEDURE — 63600175 PHARM REV CODE 636 W HCPCS: Mod: JG | Performed by: INTERNAL MEDICINE

## 2018-10-19 PROCEDURE — 99900038 HC OT GENERIC THERAPY SCREENING (STAT)

## 2018-10-19 PROCEDURE — 25000003 PHARM REV CODE 250: Performed by: INTERNAL MEDICINE

## 2018-10-19 PROCEDURE — 85007 BL SMEAR W/DIFF WBC COUNT: CPT

## 2018-10-19 PROCEDURE — 63600175 PHARM REV CODE 636 W HCPCS: Performed by: NURSE PRACTITIONER

## 2018-10-19 PROCEDURE — 85027 COMPLETE CBC AUTOMATED: CPT

## 2018-10-19 PROCEDURE — 84100 ASSAY OF PHOSPHORUS: CPT

## 2018-10-19 PROCEDURE — 20600001 HC STEP DOWN PRIVATE ROOM

## 2018-10-19 PROCEDURE — 99233 SBSQ HOSP IP/OBS HIGH 50: CPT | Mod: ,,, | Performed by: INTERNAL MEDICINE

## 2018-10-19 RX ADMIN — ACYCLOVIR 800 MG: 200 CAPSULE ORAL at 08:10

## 2018-10-19 RX ADMIN — Medication 10 ML: at 05:10

## 2018-10-19 RX ADMIN — Medication 125 MG: at 11:10

## 2018-10-19 RX ADMIN — MAGNESIUM OXIDE TAB 400 MG (241.3 MG ELEMENTAL MG) 400 MG: 400 (241.3 MG) TAB at 05:10

## 2018-10-19 RX ADMIN — LEVOFLOXACIN 500 MG: 500 TABLET, FILM COATED ORAL at 08:10

## 2018-10-19 RX ADMIN — MAGNESIUM OXIDE TAB 400 MG (241.3 MG ELEMENTAL MG) 400 MG: 400 (241.3 MG) TAB at 10:10

## 2018-10-19 RX ADMIN — Medication 30 ML: at 06:10

## 2018-10-19 RX ADMIN — TACROLIMUS 2.5 MG: 1 CAPSULE ORAL at 05:10

## 2018-10-19 RX ADMIN — TACROLIMUS 2.5 MG: 1 CAPSULE ORAL at 07:10

## 2018-10-19 RX ADMIN — Medication 10 ML: at 08:10

## 2018-10-19 RX ADMIN — PANTOPRAZOLE SODIUM 40 MG: 40 TABLET, DELAYED RELEASE ORAL at 08:10

## 2018-10-19 RX ADMIN — AMLODIPINE BESYLATE 5 MG: 5 TABLET ORAL at 08:10

## 2018-10-19 RX ADMIN — FLUCONAZOLE 400 MG: 200 TABLET ORAL at 08:10

## 2018-10-19 RX ADMIN — SODIUM CHLORIDE AND POTASSIUM CHLORIDE: 9; 1.49 INJECTION, SOLUTION INTRAVENOUS at 11:10

## 2018-10-19 RX ADMIN — Medication 125 MG: at 05:10

## 2018-10-19 RX ADMIN — URSODIOL 300 MG: 300 CAPSULE ORAL at 08:10

## 2018-10-19 RX ADMIN — MAGNESIUM OXIDE TAB 400 MG (241.3 MG ELEMENTAL MG) 400 MG: 400 (241.3 MG) TAB at 01:10

## 2018-10-19 RX ADMIN — Medication 30 ML: at 05:10

## 2018-10-19 RX ADMIN — FILGRASTIM 300 MCG: 300 INJECTION, SOLUTION INTRAVENOUS; SUBCUTANEOUS at 08:10

## 2018-10-19 RX ADMIN — Medication 30 ML: at 11:10

## 2018-10-19 NOTE — SUBJECTIVE & OBJECTIVE
Subjective:     Interval History: Day +10 MRD allo. No overnight events. Afebrile. Mucositis stable from yesterday, tolerating good oral intake. Continue oral vanc until 10/26. Continues to ambulate in hallway. Denies abdominal pain, nausea/vomiting/diarrhea.     Objective:     Vital Signs (Most Recent):  Temp: 98.4 °F (36.9 °C) (10/19/18 0723)  Pulse: 93 (10/19/18 0723)  Resp: 17 (10/19/18 0723)  BP: 119/81 (10/19/18 0723)  SpO2: 99 % (10/19/18 0723) Vital Signs (24h Range):  Temp:  [98.4 °F (36.9 °C)-99.6 °F (37.6 °C)] 98.4 °F (36.9 °C)  Pulse:  [] 93  Resp:  [17-18] 17  SpO2:  [96 %-100 %] 99 %  BP: (108-124)/(77-83) 119/81     Weight: 64.7 kg (142 lb 8.4 oz)  Body mass index is 19.88 kg/m².  Body surface area is 1.8 meters squared.      Intake/Output - Last 3 Shifts       10/17 0700 - 10/18 0659 10/18 0700 - 10/19 0659 10/19 0700 - 10/20 0659    P.O. 1200 1320     I.V. (mL/kg) 723.7 (11.1) 1666.8 (25.8) 282 (4.4)    Blood       Total Intake(mL/kg) 1923.7 (29.6) 2986.8 (46.2) 282 (4.4)    Urine (mL/kg/hr) 1975 (1.3) 1600 (1)     Stool 0 0     Total Output 1975 1600     Net -51.3 +1386.8 +282           Urine Occurrence 1 x 1 x     Stool Occurrence 2 x 2 x           Physical Exam   Constitutional: He is oriented to person, place, and time. He appears well-developed and well-nourished.   HENT:   Head: Normocephalic and atraumatic.   Mouth/Throat: No oropharyngeal exudate.   Mucositis noted in posterior oropharynx, stable from yesterday   Eyes: Conjunctivae are normal. Pupils are equal, round, and reactive to light. Right eye exhibits no discharge. Left eye exhibits no discharge.   Neck: Normal range of motion. Neck supple.   Cardiovascular: Normal rate, regular rhythm, normal heart sounds and intact distal pulses.   No murmur heard.  Pulmonary/Chest: Effort normal and breath sounds normal. No respiratory distress. He has no wheezes. He has no rales.   Abdominal: Soft. Bowel sounds are normal. He exhibits no  distension. There is no tenderness.   Musculoskeletal: Normal range of motion. He exhibits no edema or deformity.   Neurological: He is alert and oriented to person, place, and time.   Skin: Skin is warm and dry. No rash noted. No erythema.   Psychiatric: He has a normal mood and affect. His behavior is normal. Judgment and thought content normal.       Significant Labs:   CBC:   Recent Labs   Lab 10/18/18  0445 10/19/18  0325   WBC 0.09* 0.42*   HGB 10.2* 10.1*   HCT 29.7* 29.1*   PLT 19* 16*    and CMP:   Recent Labs   Lab 10/18/18  0445 10/19/18  0325    140   K 3.9 4.3    107   CO2 26 27   GLU 92 84   BUN 6 6   CREATININE 0.6 0.7   CALCIUM 9.4 9.4   PROT 5.7* 5.8*   ALBUMIN 2.8* 2.9*   BILITOT 0.4 0.3   ALKPHOS 84 99   AST 9* 7*   ALT 37 30   ANIONGAP 7* 6*   EGFRNONAA >60.0 >60.0       Diagnostic Results:  None

## 2018-10-19 NOTE — PROGRESS NOTES
[facility]  Hematology  Bone Marrow Transplant  Progress Note    Patient Name: Wilton Guzmán  Admission Date: 10/1/2018  Hospital Length of Stay: 18 days  Code Status: Full Code    Subjective:     Interval History: Day +10 MRD allo. No overnight events. Afebrile. Mucositis stable from yesterday, tolerating good oral intake. Continue oral vanc until 10/26. Continues to ambulate in hallway. Denies abdominal pain, nausea/vomiting/diarrhea.     Objective:     Vital Signs (Most Recent):  Temp: 98.4 °F (36.9 °C) (10/19/18 0723)  Pulse: 93 (10/19/18 0723)  Resp: 17 (10/19/18 0723)  BP: 119/81 (10/19/18 0723)  SpO2: 99 % (10/19/18 0723) Vital Signs (24h Range):  Temp:  [98.4 °F (36.9 °C)-99.6 °F (37.6 °C)] 98.4 °F (36.9 °C)  Pulse:  [] 93  Resp:  [17-18] 17  SpO2:  [96 %-100 %] 99 %  BP: (108-124)/(77-83) 119/81     Weight: 64.7 kg (142 lb 8.4 oz)  Body mass index is 19.88 kg/m².  Body surface area is 1.8 meters squared.      Intake/Output - Last 3 Shifts       10/17 0700 - 10/18 0659 10/18 0700 - 10/19 0659 10/19 0700 - 10/20 0659    P.O. 1200 1320     I.V. (mL/kg) 723.7 (11.1) 1666.8 (25.8) 282 (4.4)    Blood       Total Intake(mL/kg) 1923.7 (29.6) 2986.8 (46.2) 282 (4.4)    Urine (mL/kg/hr) 1975 (1.3) 1600 (1)     Stool 0 0     Total Output 1975 1600     Net -51.3 +1386.8 +282           Urine Occurrence 1 x 1 x     Stool Occurrence 2 x 2 x           Physical Exam   Constitutional: He is oriented to person, place, and time. He appears well-developed and well-nourished.   HENT:   Head: Normocephalic and atraumatic.   Mouth/Throat: No oropharyngeal exudate.   Mucositis noted in posterior oropharynx, stable from yesterday   Eyes: Conjunctivae are normal. Pupils are equal, round, and reactive to light. Right eye exhibits no discharge. Left eye exhibits no discharge.   Neck: Normal range of motion. Neck supple.   Cardiovascular: Normal rate, regular rhythm, normal heart sounds and intact distal  pulses.   No murmur heard.  Pulmonary/Chest: Effort normal and breath sounds normal. No respiratory distress. He has no wheezes. He has no rales.   Abdominal: Soft. Bowel sounds are normal. He exhibits no distension. There is no tenderness.   Musculoskeletal: Normal range of motion. He exhibits no edema or deformity.   Neurological: He is alert and oriented to person, place, and time.   Skin: Skin is warm and dry. No rash noted. No erythema.   Psychiatric: He has a normal mood and affect. His behavior is normal. Judgment and thought content normal.       Significant Labs:   CBC:   Recent Labs   Lab 10/18/18  0445 10/19/18  0325   WBC 0.09* 0.42*   HGB 10.2* 10.1*   HCT 29.7* 29.1*   PLT 19* 16*    and CMP:   Recent Labs   Lab 10/18/18  0445 10/19/18  0325    140   K 3.9 4.3    107   CO2 26 27   GLU 92 84   BUN 6 6   CREATININE 0.6 0.7   CALCIUM 9.4 9.4   PROT 5.7* 5.8*   ALBUMIN 2.8* 2.9*   BILITOT 0.4 0.3   ALKPHOS 84 99   AST 9* 7*   ALT 37 30   ANIONGAP 7* 6*   EGFRNONAA >60.0 >60.0       Diagnostic Results:  None    Assessment/Plan:     * S/P allogeneic bone marrow transplant    - Day +10 of Busulfan/Cyclophosphamide matched related donor stem cell transplant from 10/10 match brother:   - Donor B+ and CMV+, recipient O+ and CMV+  - Pre-transplant work-up with no contraindications to proceeding to transplant, sickle cell trait noted. Brother has sickle cell trait as well.   - Kurtz placed 10/1  - Allo transplant on 10/09/2018 without complication.  -Tacro level 9.3 today, will continue tacro 2.5 mg bid  - Daily GVHD charting  -neupogen started 10/17/2018    Planned conditioning regimen:  Busulfan on Day -7, -6, -5, and -4  Cyclophosphamide on Day -3, and -2   Planned  GVHD Prophylaxis:  Methotrexate on Day +1, +3, +6, and +11  Tacrolimus starting on Day -1  Antimicrobial Prophylaxis:  Acyclovir starting on Day -7  Levaquin starting on Day -1  Fluconazole starting on Day -1  Mepron starting on Day  +30  Seizure Prophylaxis:  Levetiracetam on Day -7 through Day -3  SOS/VOD Prophylaxis:  Ursodiol on Day -7 through Day +30  Growth Factor Support:  Neupogen starting on Day +7       Mucositis due to chemotherapy    - Scheduled [facility] added  - Stable     Chemotherapy induced neutropenia    -   - On levoquin, fluconazole, acyclovir for prophylaxis  - patient started neupogen 10/16/2018     Chemotherapy-induced nausea    -Resolved  -prn anti-emetics.   -tolerating meals and po meds at this time.        Cytopenia    -transfuse prbc for hgb <7; transfuse platelets for plt count <10K  -hgb 10.1; wbc 0.42 (); plt 16. Stopped lovenox 10/15     C. difficile diarrhea    -c-diff positive 10/12  -PO vancomycin for 14 days. started 10/12. Stop date will be 10/26.  -NS at 75cc/hr  -stool soft, not liquid, per patient.   -frequency of BMs decreasing.       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 9% blasts.Started on [facility] chemotherapy for refractory AML on 6/23/2018  - had BMBX on 7/13/18, without evidence of CML; BCR ABL p210 0.5% (c/w MRD)  - dasatinib 140 mg daily at home, will stop today when chemo starts  -pre-transplant bone marrow biopsy from 9/11/2018 shows a hematologic and molecular remission  - admitted for Busulfan/Cyclophosphamide MRD allogeneic stem cell transplant from his brother  -Today is Day +10 allo SCT.           VTE Risk Mitigation (From admission, onward)        Ordered     heparin, porcine (PF) 100 unit/mL injection flush 300 Units  As needed (PRN)      10/01/18 8223          Disposition: pending count recovery, possible d/c early next week to Charisse Saleem MD  Bone  Marrow Transplant  [facility]

## 2018-10-19 NOTE — ASSESSMENT & PLAN NOTE
-transfuse prbc for hgb <7; transfuse platelets for plt count <10K  -hgb 10.1; wbc 0.42 (); plt 16. Stopped lovenox 10/15

## 2018-10-19 NOTE — PLAN OF CARE
MDR's with Dr Rockwell.  Patient is day+10 post MRD alloSCT.   and awaiting engraftment.  Contact isolation continued for cdiff.  Planning for potential d/c early/mid next week to the [facility] if engrafted.  Patient has updated his wife with possible d/c.  SW arranging HL reservation.  Will continue to follow.

## 2018-10-19 NOTE — PLAN OF CARE
Problem: Patient Care Overview  Goal: Plan of Care Review  Outcome: Ongoing (interventions implemented as appropriate)  POC reviewed with patient; understanding verbalized. Magnesium replacements administered. CDiff precautions in place. Pt remains independent. He voids clear, yellow urine per the urinal. Regular diet with good appetite. NS20K at 75mL. Pt. with nonskid footwear on, bed in lowest position, and locked with bed rails up x2.  Pt. has call light and personal items within reach. VSS and afebrile this shift. All questions and concerns addressed at this time. Will continue to monitor.

## 2018-10-19 NOTE — PLAN OF CARE
Problem: Patient Care Overview  Goal: Plan of Care Review  Outcome: Ongoing (interventions implemented as appropriate)  Day +10 Allo SCT. Afebrile. Free from falls or injury. Complains of a sore throat. Manzanares's solution given. NS infusing at 75. Oral vanc given as scheduled. Bed locked in lowest position, non skid socks on, call light within reach. Pt instructed to call if any assistance is needed. Vitals stable.  Will cont to alexis pt.

## 2018-10-19 NOTE — PT/OT/SLP PROGRESS
Occupational Therapy      Patient Name:  Wilton Guzmán   MRN:  [id number]  1212  Patient screened today and con't to be independent with functional mobility and ADL's, pt demonstrates independence with theraband exercises, reports con't daily walking program as much as tolerated with treatment, reports no change in strength or overall activity tolerance at this time. Will con't to follow as per plan.    Argelia Cartwright OT  10/19/2018

## 2018-10-19 NOTE — ASSESSMENT & PLAN NOTE
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 9% blasts.Started on [facility] chemotherapy for refractory AML on 6/23/2018  - had BMBX on 7/13/18, without evidence of CML; BCR ABL p210 0.5% (c/w MRD)  - dasatinib 140 mg daily at home, will stop today when chemo starts  -pre-transplant bone marrow biopsy from 9/11/2018 shows a hematologic and molecular remission  - admitted for Busulfan/Cyclophosphamide MRD allogeneic stem cell transplant from his brother  -Today is Day +10 allo SCT.

## 2018-10-20 LAB
ABO + RH BLD: NORMAL
ALBUMIN SERPL BCP-MCNC: 3 G/DL
ALP SERPL-CCNC: 95 U/L
ALT SERPL W/O P-5'-P-CCNC: 24 U/L
ANION GAP SERPL CALC-SCNC: 9 MMOL/L
ANISOCYTOSIS BLD QL SMEAR: SLIGHT
AST SERPL-CCNC: 7 U/L
BASOPHILS # BLD AUTO: 0.04 K/UL
BASOPHILS NFR BLD: 1.5 %
BILIRUB SERPL-MCNC: 0.3 MG/DL
BLD GP AB SCN CELLS X3 SERPL QL: NORMAL
BUN SERPL-MCNC: 6 MG/DL
CALCIUM SERPL-MCNC: 9.4 MG/DL
CHLORIDE SERPL-SCNC: 107 MMOL/L
CO2 SERPL-SCNC: 23 MMOL/L
CREAT SERPL-MCNC: 0.7 MG/DL
DACRYOCYTES BLD QL SMEAR: ABNORMAL
DIFFERENTIAL METHOD: ABNORMAL
EOSINOPHIL # BLD AUTO: 0 K/UL
EOSINOPHIL NFR BLD: 0 %
ERYTHROCYTE [DISTWIDTH] IN BLOOD BY AUTOMATED COUNT: 12.6 %
EST. GFR  (AFRICAN AMERICAN): >60 ML/MIN/1.73 M^2
EST. GFR  (NON AFRICAN AMERICAN): >60 ML/MIN/1.73 M^2
GLUCOSE SERPL-MCNC: 94 MG/DL
HCT VFR BLD AUTO: 30.1 %
HGB BLD-MCNC: 10.2 G/DL
HYPOCHROMIA BLD QL SMEAR: ABNORMAL
IMM GRANULOCYTES # BLD AUTO: 0.09 K/UL
IMM GRANULOCYTES NFR BLD AUTO: 3.3 %
LYMPHOCYTES # BLD AUTO: 0.1 K/UL
LYMPHOCYTES NFR BLD: 5.1 %
MAGNESIUM SERPL-MCNC: 1 MG/DL
MCH RBC QN AUTO: 29.8 PG
MCHC RBC AUTO-ENTMCNC: 33.9 G/DL
MCV RBC AUTO: 88 FL
MONOCYTES # BLD AUTO: 0.5 K/UL
MONOCYTES NFR BLD: 17.6 %
NEUTROPHILS # BLD AUTO: 2 K/UL
NEUTROPHILS NFR BLD: 72.5 %
NRBC BLD-RTO: 1 /100 WBC
PHOSPHATE SERPL-MCNC: 3.8 MG/DL
PLATELET # BLD AUTO: 15 K/UL
PLATELET BLD QL SMEAR: ABNORMAL
PMV BLD AUTO: ABNORMAL FL
POIKILOCYTOSIS BLD QL SMEAR: SLIGHT
POLYCHROMASIA BLD QL SMEAR: ABNORMAL
POTASSIUM SERPL-SCNC: 3.9 MMOL/L
PROT SERPL-MCNC: 5.9 G/DL
RBC # BLD AUTO: 3.42 M/UL
SODIUM SERPL-SCNC: 139 MMOL/L
TACROLIMUS BLD-MCNC: 9.3 NG/ML
WBC # BLD AUTO: 2.72 K/UL

## 2018-10-20 PROCEDURE — 25000003 PHARM REV CODE 250: Performed by: STUDENT IN AN ORGANIZED HEALTH CARE EDUCATION/TRAINING PROGRAM

## 2018-10-20 PROCEDURE — 63600175 PHARM REV CODE 636 W HCPCS: Mod: JG | Performed by: INTERNAL MEDICINE

## 2018-10-20 PROCEDURE — 80053 COMPREHEN METABOLIC PANEL: CPT

## 2018-10-20 PROCEDURE — 20600001 HC STEP DOWN PRIVATE ROOM

## 2018-10-20 PROCEDURE — 25000003 PHARM REV CODE 250: Performed by: INTERNAL MEDICINE

## 2018-10-20 PROCEDURE — 84100 ASSAY OF PHOSPHORUS: CPT

## 2018-10-20 PROCEDURE — 80197 ASSAY OF TACROLIMUS: CPT

## 2018-10-20 PROCEDURE — 86901 BLOOD TYPING SEROLOGIC RH(D): CPT

## 2018-10-20 PROCEDURE — 25000003 PHARM REV CODE 250: Performed by: NURSE PRACTITIONER

## 2018-10-20 PROCEDURE — 83735 ASSAY OF MAGNESIUM: CPT

## 2018-10-20 PROCEDURE — 99233 SBSQ HOSP IP/OBS HIGH 50: CPT | Mod: ,,, | Performed by: INTERNAL MEDICINE

## 2018-10-20 PROCEDURE — 85025 COMPLETE CBC W/AUTO DIFF WBC: CPT

## 2018-10-20 PROCEDURE — 63600175 PHARM REV CODE 636 W HCPCS: Performed by: STUDENT IN AN ORGANIZED HEALTH CARE EDUCATION/TRAINING PROGRAM

## 2018-10-20 PROCEDURE — 63600175 PHARM REV CODE 636 W HCPCS: Performed by: NURSE PRACTITIONER

## 2018-10-20 PROCEDURE — 63600175 PHARM REV CODE 636 W HCPCS: Performed by: INTERNAL MEDICINE

## 2018-10-20 PROCEDURE — A9155 ARTIFICIAL SALIVA: HCPCS | Performed by: INTERNAL MEDICINE

## 2018-10-20 RX ADMIN — MAGNESIUM SULFATE HEPTAHYDRATE 3 G: 500 INJECTION, SOLUTION INTRAMUSCULAR; INTRAVENOUS at 10:10

## 2018-10-20 RX ADMIN — PANTOPRAZOLE SODIUM 40 MG: 40 TABLET, DELAYED RELEASE ORAL at 08:10

## 2018-10-20 RX ADMIN — Medication 125 MG: at 11:10

## 2018-10-20 RX ADMIN — URSODIOL 300 MG: 300 CAPSULE ORAL at 08:10

## 2018-10-20 RX ADMIN — Medication 10 ML: at 01:10

## 2018-10-20 RX ADMIN — Medication 10 ML: at 08:10

## 2018-10-20 RX ADMIN — MAGNESIUM OXIDE TAB 400 MG (241.3 MG ELEMENTAL MG) 800 MG: 400 (241.3 MG) TAB at 01:10

## 2018-10-20 RX ADMIN — SODIUM CHLORIDE AND POTASSIUM CHLORIDE: 9; 1.49 INJECTION, SOLUTION INTRAVENOUS at 03:10

## 2018-10-20 RX ADMIN — Medication 30 ML: at 06:10

## 2018-10-20 RX ADMIN — MAGNESIUM OXIDE TAB 400 MG (241.3 MG ELEMENTAL MG) 800 MG: 400 (241.3 MG) TAB at 05:10

## 2018-10-20 RX ADMIN — FLUCONAZOLE 400 MG: 200 TABLET ORAL at 08:10

## 2018-10-20 RX ADMIN — Medication 10 ML: at 05:10

## 2018-10-20 RX ADMIN — MAGNESIUM OXIDE TAB 400 MG (241.3 MG ELEMENTAL MG) 800 MG: 400 (241.3 MG) TAB at 08:10

## 2018-10-20 RX ADMIN — ACYCLOVIR 800 MG: 200 CAPSULE ORAL at 08:10

## 2018-10-20 RX ADMIN — Medication 30 ML: at 05:10

## 2018-10-20 RX ADMIN — METHOTREXATE 10 MG: 25 INJECTION, SOLUTION INTRA-ARTERIAL; INTRAMUSCULAR; INTRATHECAL; INTRAVENOUS at 11:10

## 2018-10-20 RX ADMIN — Medication 125 MG: at 05:10

## 2018-10-20 RX ADMIN — SODIUM CHLORIDE AND POTASSIUM CHLORIDE: 9; 1.49 INJECTION, SOLUTION INTRAVENOUS at 04:10

## 2018-10-20 RX ADMIN — FILGRASTIM 300 MCG: 300 INJECTION, SOLUTION INTRAVENOUS; SUBCUTANEOUS at 08:10

## 2018-10-20 RX ADMIN — LEVOFLOXACIN 500 MG: 500 TABLET, FILM COATED ORAL at 08:10

## 2018-10-20 RX ADMIN — Medication 30 ML: at 11:10

## 2018-10-20 RX ADMIN — AMLODIPINE BESYLATE 5 MG: 5 TABLET ORAL at 08:10

## 2018-10-20 RX ADMIN — TACROLIMUS 2.5 MG: 1 CAPSULE ORAL at 05:10

## 2018-10-20 RX ADMIN — TACROLIMUS 2.5 MG: 1 CAPSULE ORAL at 07:10

## 2018-10-20 NOTE — PROGRESS NOTES
[facility]  Hematology  Bone Marrow Transplant  Progress Note    Patient Name: Wilton Guzmán  Admission Date: 10/1/2018  Hospital Length of Stay: 19 days  Code Status: Full Code    Subjective:     Interval History: Day +11 MRD allo.  No acute events overnight. Denies fevers, chills, chest pain, shortness of breath, abdominal pain, nausea, vomiting, or constipation. Diarrhea improving.  Low appetite. No significant issues with mucositis.    Objective:     Vital Signs (Most Recent):  Temp: 98.8 °F (37.1 °C) (10/20/18 0801)  Pulse: 103 (10/20/18 0801)  Resp: 17 (10/20/18 0801)  BP: 114/75 (10/20/18 0801)  SpO2: 99 % (10/20/18 0801) Vital Signs (24h Range):  Temp:  [98.6 °F (37 °C)-99.5 °F (37.5 °C)] 98.8 °F (37.1 °C)  Pulse:  [] 103  Resp:  [17] 17  SpO2:  [99 %-100 %] 99 %  BP: (108-117)/(70-80) 114/75     Weight: 65.2 kg (143 lb 13.6 oz)  Body mass index is 20.06 kg/m².  Body surface area is 1.81 meters squared.    ECOG SCORE         [unfilled]    Intake/Output - Last 3 Shifts       10/18 0700 - 10/19 0659 10/19 0700 - 10/20 0659 10/20 0700 - 10/21 0659    P.O. 1320 660 150    I.V. (mL/kg) 1666.8 (25.8) 1829 (28.1) 462 (7.1)    IV Piggyback   50    Total Intake(mL/kg) 2986.8 (46.2) 2489 (38.2) 662 (10.2)    Urine (mL/kg/hr) 1600 (1) 1275 (0.8)     Stool 0 0     Total Output 1600 1275     Net +1386.8 +1214 +662           Urine Occurrence 1 x  1 x    Stool Occurrence 2 x 2 x 1 x          Physical Exam   Constitutional: He is oriented to person, place, and time. He appears well-developed and well-nourished.   HENT:   Head: Normocephalic and atraumatic.   Mouth/Throat: No oropharyngeal exudate.   Mucositis noted in posterior oropharynx, stable from yesterday   Eyes: Conjunctivae are normal. Pupils are equal, round, and reactive to light. Right eye exhibits no discharge. Left eye exhibits no discharge.   Neck: Normal range of motion. Neck supple.   Cardiovascular: Normal rate, regular rhythm,  normal heart sounds and intact distal pulses.   No murmur heard.  Pulmonary/Chest: Effort normal and breath sounds normal. No respiratory distress. He has no wheezes. He has no rales.   Abdominal: Soft. Bowel sounds are normal. He exhibits no distension. There is no tenderness.   Musculoskeletal: Normal range of motion. He exhibits no edema or deformity.   Neurological: He is alert and oriented to person, place, and time.   Skin: Skin is warm and dry. No rash noted. No erythema.   Psychiatric: He has a normal mood and affect. His behavior is normal. Judgment and thought content normal.       Significant Labs:   CBC:   Recent Labs   Lab 10/19/18  0325 10/20/18  0351   WBC 0.42* 2.72*   HGB 10.1* 10.2*   HCT 29.1* 30.1*   PLT 16* 15*    and CMP:   Recent Labs   Lab 10/19/18  0325 10/20/18  0351    139   K 4.3 3.9    107   CO2 27 23   GLU 84 94   BUN 6 6   CREATININE 0.7 0.7   CALCIUM 9.4 9.4   PROT 5.8* 5.9*   ALBUMIN 2.9* 3.0*   BILITOT 0.3 0.3   ALKPHOS 99 95   AST 7* 7*   ALT 30 24   ANIONGAP 6* 9   EGFRNONAA >60.0 >60.0       Diagnostic Results:  None    Assessment/Plan:     * S/P allogeneic bone marrow transplant    - Day +11 of Busulfan/Cyclophosphamide matched related donor stem cell transplant from 10/10 match brother:   - Donor B+ and CMV+, recipient O+ and CMV+  - Pre-transplant work-up with no contraindications to proceeding to transplant, sickle cell trait noted. Brother has sickle cell trait as well.   - Kurtz placed 10/1  - Allo transplant on 10/09/2018 without complication.  -Tacro level 9.3 today, will continue tacro 2.5 mg bid  - Daily GVHD charting  -neupogen started 10/17/2018  --anticipate engraftment and last dose neupogen 10/21/18    Planned conditioning regimen:  Busulfan on Day -7, -6, -5, and -4  Cyclophosphamide on Day -3, and -2   Planned  GVHD Prophylaxis:  Methotrexate on Day +1, +3, +6, and +11  Tacrolimus starting on Day -1  Antimicrobial Prophylaxis:  Acyclovir starting on  Day -7  Levaquin starting on Day -1  Fluconazole starting on Day -1  Mepron starting on Day +30  Seizure Prophylaxis:  Levetiracetam on Day -7 through Day -3  SOS/VOD Prophylaxis:  Ursodiol on Day -7 through Day +30  Growth Factor Support:  Neupogen starting on Day +7       Mucositis due to chemotherapy    - Scheduled [facility] added  - Stable     Chemotherapy induced neutropenia    - ANC 2000  - On levoquin, fluconazole, acyclovir for prophylaxis  - patient started neupogen 10/16/2018  --anticipate it to stop 10/21/18     Chemotherapy-induced nausea    -Resolved  -prn anti-emetics.   -tolerating meals and po meds at this time.        Cytopenia    -transfuse prbc for hgb <7; transfuse platelets for plt count <10K  -hgb 10; wbc 2.7 (ANC 2000); plt 15. Stopped lovenox 10/15     C. difficile diarrhea    -c-diff positive 10/12  -PO vancomycin for 14 days. started 10/12. Stop date will be 10/26.  -NS at 75cc/hr  -stool soft, not liquid, per patient.   -frequency of BMs decreasing.       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 9% blasts.Started on [facility] chemotherapy for refractory AML on 6/23/2018  - had BMBX on 7/13/18, without evidence of CML; BCR ABL p210 0.5% (c/w MRD)  - dasatinib 140 mg daily at home, will stop today when chemo starts  -pre-transplant bone marrow biopsy from 9/11/2018 shows a hematologic and molecular remission  - admitted for Busulfan/Cyclophosphamide MRD allogeneic stem cell transplant from his brother  -Today is Day +11 allo SCT.           VTE Risk Mitigation (From admission, onward)        Ordered     heparin, porcine (PF) 100 unit/mL injection flush 300 Units  As needed (PRN)       10/01/18 1657          Disposition: inpatient    Roberto Del Rio MD  Bone Marrow Transplant  [facility]

## 2018-10-20 NOTE — ASSESSMENT & PLAN NOTE
- Day +11 of Busulfan/Cyclophosphamide matched related donor stem cell transplant from 10/10 match brother:   - Donor B+ and CMV+, recipient O+ and CMV+  - Pre-transplant work-up with no contraindications to proceeding to transplant, sickle cell trait noted. Brother has sickle cell trait as well.   - Kurtz placed 10/1  - Allo transplant on 10/09/2018 without complication.  -Tacro level 9.3 today, will continue tacro 2.5 mg bid  - Daily GVHD charting  -neupogen started 10/17/2018  --anticipate engraftment and last dose neupogen 10/21/18    Planned conditioning regimen:  Busulfan on Day -7, -6, -5, and -4  Cyclophosphamide on Day -3, and -2   Planned  GVHD Prophylaxis:  Methotrexate on Day +1, +3, +6, and +11  Tacrolimus starting on Day -1  Antimicrobial Prophylaxis:  Acyclovir starting on Day -7  Levaquin starting on Day -1  Fluconazole starting on Day -1  Mepron starting on Day +30  Seizure Prophylaxis:  Levetiracetam on Day -7 through Day -3  SOS/VOD Prophylaxis:  Ursodiol on Day -7 through Day +30  Growth Factor Support:  Neupogen starting on Day +7

## 2018-10-20 NOTE — ASSESSMENT & PLAN NOTE
-transfuse prbc for hgb <7; transfuse platelets for plt count <10K  -hgb 10; wbc 2.7 (ANC 2000); plt 15. Stopped lovenox 10/15

## 2018-10-20 NOTE — PLAN OF CARE
Problem: Patient Care Overview  Goal: Plan of Care Review  Outcome: Ongoing (interventions implemented as appropriate)  POC reviewed with patient; understanding verbalized. Magnesium replacements administered. CDiff precautions in place. Pt remains independent. He voids clear, yellow urine per the urinal; three loose BMs this shift. Regular diet with good appetite. NS20K at 75mL. Pt. with nonskid footwear on, bed in lowest position, and locked with bed rails up x2.  Pt. has call light and personal items within reach. VSS and afebrile this shift. All questions and concerns addressed at this time. Will continue to monitor.

## 2018-10-20 NOTE — ASSESSMENT & PLAN NOTE
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 9% blasts.Started on [facility] chemotherapy for refractory AML on 6/23/2018  - had BMBX on 7/13/18, without evidence of CML; BCR ABL p210 0.5% (c/w MRD)  - dasatinib 140 mg daily at home, will stop today when chemo starts  -pre-transplant bone marrow biopsy from 9/11/2018 shows a hematologic and molecular remission  - admitted for Busulfan/Cyclophosphamide MRD allogeneic stem cell transplant from his brother  -Today is Day +11 allo SCT.

## 2018-10-20 NOTE — PLAN OF CARE
Problem: Patient Care Overview  Goal: Plan of Care Review  Outcome: Ongoing (interventions implemented as appropriate)  Plan of care reviewed with patient at start of shift. Allo BMT day +11. Denies pain and nausea. Free from falls and injuries this shift. Vital signs remain stable. Frequent checks for pain and safety. General weakness noted. Bed in lowest position, wheels locked, side rails up x's 2, call light in reach.Contact precautions maintained.  Instructed to call for assistance as needed. Will continue to monitor.

## 2018-10-20 NOTE — ASSESSMENT & PLAN NOTE
- ANC 2000  - On levoquin, fluconazole, acyclovir for prophylaxis  - patient started neupogen 10/16/2018  --anticipate it to stop 10/21/18

## 2018-10-20 NOTE — SUBJECTIVE & OBJECTIVE
Subjective:     Interval History: Day +11 MRD allo.  No acute events overnight. Denies fevers, chills, chest pain, shortness of breath, abdominal pain, nausea, vomiting, or constipation. Diarrhea improving.  Low appetite. No significant issues with mucositis.    Objective:     Vital Signs (Most Recent):  Temp: 98.8 °F (37.1 °C) (10/20/18 0801)  Pulse: 103 (10/20/18 0801)  Resp: 17 (10/20/18 0801)  BP: 114/75 (10/20/18 0801)  SpO2: 99 % (10/20/18 0801) Vital Signs (24h Range):  Temp:  [98.6 °F (37 °C)-99.5 °F (37.5 °C)] 98.8 °F (37.1 °C)  Pulse:  [] 103  Resp:  [17] 17  SpO2:  [99 %-100 %] 99 %  BP: (108-117)/(70-80) 114/75     Weight: 65.2 kg (143 lb 13.6 oz)  Body mass index is 20.06 kg/m².  Body surface area is 1.81 meters squared.    ECOG SCORE         [unfilled]    Intake/Output - Last 3 Shifts       10/18 0700 - 10/19 0659 10/19 0700 - 10/20 0659 10/20 0700 - 10/21 0659    P.O. 1320 660 150    I.V. (mL/kg) 1666.8 (25.8) 1829 (28.1) 462 (7.1)    IV Piggyback   50    Total Intake(mL/kg) 2986.8 (46.2) 2489 (38.2) 662 (10.2)    Urine (mL/kg/hr) 1600 (1) 1275 (0.8)     Stool 0 0     Total Output 1600 1275     Net +1386.8 +1214 +662           Urine Occurrence 1 x  1 x    Stool Occurrence 2 x 2 x 1 x          Physical Exam   Constitutional: He is oriented to person, place, and time. He appears well-developed and well-nourished.   HENT:   Head: Normocephalic and atraumatic.   Mouth/Throat: No oropharyngeal exudate.   Mucositis noted in posterior oropharynx, stable from yesterday   Eyes: Conjunctivae are normal. Pupils are equal, round, and reactive to light. Right eye exhibits no discharge. Left eye exhibits no discharge.   Neck: Normal range of motion. Neck supple.   Cardiovascular: Normal rate, regular rhythm, normal heart sounds and intact distal pulses.   No murmur heard.  Pulmonary/Chest: Effort normal and breath sounds normal. No respiratory distress. He has no wheezes. He has no rales.   Abdominal: Soft.  Bowel sounds are normal. He exhibits no distension. There is no tenderness.   Musculoskeletal: Normal range of motion. He exhibits no edema or deformity.   Neurological: He is alert and oriented to person, place, and time.   Skin: Skin is warm and dry. No rash noted. No erythema.   Psychiatric: He has a normal mood and affect. His behavior is normal. Judgment and thought content normal.       Significant Labs:   CBC:   Recent Labs   Lab 10/19/18  0325 10/20/18  0351   WBC 0.42* 2.72*   HGB 10.1* 10.2*   HCT 29.1* 30.1*   PLT 16* 15*    and CMP:   Recent Labs   Lab 10/19/18  0325 10/20/18  0351    139   K 4.3 3.9    107   CO2 27 23   GLU 84 94   BUN 6 6   CREATININE 0.7 0.7   CALCIUM 9.4 9.4   PROT 5.8* 5.9*   ALBUMIN 2.9* 3.0*   BILITOT 0.3 0.3   ALKPHOS 99 95   AST 7* 7*   ALT 30 24   ANIONGAP 6* 9   EGFRNONAA >60.0 >60.0       Diagnostic Results:  None

## 2018-10-21 LAB
ALBUMIN SERPL BCP-MCNC: 2.8 G/DL
ALP SERPL-CCNC: 99 U/L
ALT SERPL W/O P-5'-P-CCNC: 19 U/L
ANION GAP SERPL CALC-SCNC: 8 MMOL/L
ANISOCYTOSIS BLD QL SMEAR: SLIGHT
AST SERPL-CCNC: 8 U/L
BASOPHILS NFR BLD: 0 %
BILIRUB SERPL-MCNC: 0.3 MG/DL
BUN SERPL-MCNC: 6 MG/DL
CALCIUM SERPL-MCNC: 9.1 MG/DL
CHLORIDE SERPL-SCNC: 105 MMOL/L
CO2 SERPL-SCNC: 24 MMOL/L
CREAT SERPL-MCNC: 0.7 MG/DL
DACRYOCYTES BLD QL SMEAR: ABNORMAL
DIFFERENTIAL METHOD: ABNORMAL
DOHLE BOD BLD QL SMEAR: PRESENT
EOSINOPHIL NFR BLD: 0 %
ERYTHROCYTE [DISTWIDTH] IN BLOOD BY AUTOMATED COUNT: 12.6 %
EST. GFR  (AFRICAN AMERICAN): >60 ML/MIN/1.73 M^2
EST. GFR  (NON AFRICAN AMERICAN): >60 ML/MIN/1.73 M^2
GLUCOSE SERPL-MCNC: 100 MG/DL
HCT VFR BLD AUTO: 28 %
HGB BLD-MCNC: 9.4 G/DL
IMM GRANULOCYTES # BLD AUTO: ABNORMAL K/UL
IMM GRANULOCYTES NFR BLD AUTO: ABNORMAL %
LYMPHOCYTES NFR BLD: 0 %
MAGNESIUM SERPL-MCNC: 1.2 MG/DL
MCH RBC QN AUTO: 29.6 PG
MCHC RBC AUTO-ENTMCNC: 33.6 G/DL
MCV RBC AUTO: 88 FL
MONOCYTES NFR BLD: 6 %
NEUTROPHILS NFR BLD: 84 %
NEUTS BAND NFR BLD MANUAL: 10 %
NRBC BLD-RTO: 0 /100 WBC
PHOSPHATE SERPL-MCNC: 4.9 MG/DL
PLATELET # BLD AUTO: 24 K/UL
PLATELET BLD QL SMEAR: ABNORMAL
PMV BLD AUTO: ABNORMAL FL
POIKILOCYTOSIS BLD QL SMEAR: SLIGHT
POLYCHROMASIA BLD QL SMEAR: ABNORMAL
POTASSIUM SERPL-SCNC: 3.9 MMOL/L
PROT SERPL-MCNC: 5.5 G/DL
RBC # BLD AUTO: 3.18 M/UL
SODIUM SERPL-SCNC: 137 MMOL/L
TACROLIMUS BLD-MCNC: 10 NG/ML
TOXIC GRANULES BLD QL SMEAR: PRESENT
WBC # BLD AUTO: 6.12 K/UL

## 2018-10-21 PROCEDURE — 80197 ASSAY OF TACROLIMUS: CPT

## 2018-10-21 PROCEDURE — 25000003 PHARM REV CODE 250: Performed by: INTERNAL MEDICINE

## 2018-10-21 PROCEDURE — 25000003 PHARM REV CODE 250: Performed by: NURSE PRACTITIONER

## 2018-10-21 PROCEDURE — 80053 COMPREHEN METABOLIC PANEL: CPT

## 2018-10-21 PROCEDURE — 25000003 PHARM REV CODE 250: Performed by: STUDENT IN AN ORGANIZED HEALTH CARE EDUCATION/TRAINING PROGRAM

## 2018-10-21 PROCEDURE — 99233 SBSQ HOSP IP/OBS HIGH 50: CPT | Mod: ,,, | Performed by: INTERNAL MEDICINE

## 2018-10-21 PROCEDURE — 63600175 PHARM REV CODE 636 W HCPCS: Performed by: NURSE PRACTITIONER

## 2018-10-21 PROCEDURE — 20600001 HC STEP DOWN PRIVATE ROOM

## 2018-10-21 PROCEDURE — 63600175 PHARM REV CODE 636 W HCPCS: Performed by: INTERNAL MEDICINE

## 2018-10-21 PROCEDURE — 83735 ASSAY OF MAGNESIUM: CPT

## 2018-10-21 PROCEDURE — 63600175 PHARM REV CODE 636 W HCPCS: Mod: JG | Performed by: STUDENT IN AN ORGANIZED HEALTH CARE EDUCATION/TRAINING PROGRAM

## 2018-10-21 PROCEDURE — 84100 ASSAY OF PHOSPHORUS: CPT

## 2018-10-21 PROCEDURE — 63600175 PHARM REV CODE 636 W HCPCS: Performed by: STUDENT IN AN ORGANIZED HEALTH CARE EDUCATION/TRAINING PROGRAM

## 2018-10-21 PROCEDURE — A9155 ARTIFICIAL SALIVA: HCPCS | Performed by: INTERNAL MEDICINE

## 2018-10-21 RX ORDER — MAGNESIUM SULFATE HEPTAHYDRATE 40 MG/ML
2 INJECTION, SOLUTION INTRAVENOUS ONCE
Status: COMPLETED | OUTPATIENT
Start: 2018-10-21 | End: 2018-10-21

## 2018-10-21 RX ORDER — TACROLIMUS 1 MG/1
2 CAPSULE ORAL EVERY EVENING
Status: DISCONTINUED | OUTPATIENT
Start: 2018-10-21 | End: 2018-10-22 | Stop reason: HOSPADM

## 2018-10-21 RX ADMIN — Medication 30 ML: at 05:10

## 2018-10-21 RX ADMIN — URSODIOL 300 MG: 300 CAPSULE ORAL at 08:10

## 2018-10-21 RX ADMIN — ACYCLOVIR 800 MG: 200 CAPSULE ORAL at 08:10

## 2018-10-21 RX ADMIN — FLUCONAZOLE 400 MG: 200 TABLET ORAL at 08:10

## 2018-10-21 RX ADMIN — Medication 125 MG: at 05:10

## 2018-10-21 RX ADMIN — Medication 30 ML: at 11:10

## 2018-10-21 RX ADMIN — Medication 10 ML: at 08:10

## 2018-10-21 RX ADMIN — PANTOPRAZOLE SODIUM 40 MG: 40 TABLET, DELAYED RELEASE ORAL at 08:10

## 2018-10-21 RX ADMIN — MAGNESIUM OXIDE TAB 400 MG (241.3 MG ELEMENTAL MG) 800 MG: 400 (241.3 MG) TAB at 05:10

## 2018-10-21 RX ADMIN — Medication 125 MG: at 11:10

## 2018-10-21 RX ADMIN — TACROLIMUS 2.5 MG: 1 CAPSULE ORAL at 07:10

## 2018-10-21 RX ADMIN — AMLODIPINE BESYLATE 5 MG: 5 TABLET ORAL at 08:10

## 2018-10-21 RX ADMIN — FILGRASTIM 300 MCG: 300 INJECTION, SOLUTION INTRAVENOUS; SUBCUTANEOUS at 08:10

## 2018-10-21 RX ADMIN — MAGNESIUM SULFATE IN WATER 2 G: 40 INJECTION, SOLUTION INTRAVENOUS at 10:10

## 2018-10-21 RX ADMIN — SODIUM CHLORIDE AND POTASSIUM CHLORIDE: 9; 1.49 INJECTION, SOLUTION INTRAVENOUS at 05:10

## 2018-10-21 RX ADMIN — TACROLIMUS 2 MG: 1 CAPSULE ORAL at 05:10

## 2018-10-21 RX ADMIN — Medication 10 ML: at 05:10

## 2018-10-21 NOTE — SUBJECTIVE & OBJECTIVE
Subjective:     Interval History: Day +12 MRD allo.  Increased diarrhea without abdominal pain or fevers.  Still with low appetite.  Also with slightly worsening sore throat.  Remains afebrile and without cough.    Objective:     Vital Signs (Most Recent):  Temp: 98.5 °F (36.9 °C) (10/21/18 1252)  Pulse: (!) 111 (10/21/18 1528)  Resp: 16 (10/21/18 1528)  BP: 113/72 (10/21/18 1528)  SpO2: 100 % (10/21/18 1252) Vital Signs (24h Range):  Temp:  [98.3 °F (36.8 °C)-99.6 °F (37.6 °C)] 98.5 °F (36.9 °C)  Pulse:  [] 111  Resp:  [15-17] 16  SpO2:  [99 %-100 %] 100 %  BP: (102-117)/(70-86) 113/72     Weight: 63.9 kg (140 lb 14 oz)  Body mass index is 19.65 kg/m².  Body surface area is 1.79 meters squared.    ECOG SCORE         [unfilled]    Intake/Output - Last 3 Shifts       10/19 0700 - 10/20 0659 10/20 0700 - 10/21 0659 10/21 0700 - 10/22 0659    P.O. 660 600 300    I.V. (mL/kg) 1829 (28.1) 1963.3 (30.7) 718 (11.2)    IV Piggyback  50     Total Intake(mL/kg) 2489 (38.2) 2613.3 (40.9) 1018 (15.9)    Urine (mL/kg/hr) 1275 (0.8) 0 (0)     Stool 0 0     Total Output 1275 0     Net +1214 +2613.3 +1018           Urine Occurrence  5 x 1 x    Stool Occurrence 2 x 6 x 1 x          Physical Exam   Constitutional: He is oriented to person, place, and time. He appears well-developed and well-nourished.   HENT:   Head: Normocephalic and atraumatic.   Mouth/Throat: No oropharyngeal exudate.   Mucositis noted in posterior oropharynx, worse from yesterday   Eyes: Conjunctivae are normal. Pupils are equal, round, and reactive to light. Right eye exhibits no discharge. Left eye exhibits no discharge.   Neck: Normal range of motion. Neck supple.   Cardiovascular: Normal rate, regular rhythm, normal heart sounds and intact distal pulses.   No murmur heard.  Pulmonary/Chest: Effort normal and breath sounds normal. No respiratory distress. He has no wheezes. He has no rales.   Abdominal: Soft. Bowel sounds are normal. He exhibits no  distension. There is no tenderness.   Musculoskeletal: Normal range of motion. He exhibits no edema or deformity.   Neurological: He is alert and oriented to person, place, and time.   Skin: Skin is warm and dry. No rash noted. No erythema.   Psychiatric: He has a normal mood and affect. His behavior is normal. Judgment and thought content normal.       Significant Labs:   CBC:   Recent Labs   Lab 10/20/18  0351 10/21/18  0400   WBC 2.72* 6.12   HGB 10.2* 9.4*   HCT 30.1* 28.0*   PLT 15* 24*    and CMP:   Recent Labs   Lab 10/20/18  0351 10/21/18  0400    137   K 3.9 3.9    105   CO2 23 24   GLU 94 100   BUN 6 6   CREATININE 0.7 0.7   CALCIUM 9.4 9.1   PROT 5.9* 5.5*   ALBUMIN 3.0* 2.8*   BILITOT 0.3 0.3   ALKPHOS 95 99   AST 7* 8*   ALT 24 19   ANIONGAP 9 8   EGFRNONAA >60.0 >60.0       Diagnostic Results:  None

## 2018-10-21 NOTE — ASSESSMENT & PLAN NOTE
- Day +13 of Busulfan/Cyclophosphamide matched related donor stem cell transplant from 10/10 match brother:   - Donor B+ and CMV+, recipient O+ and CMV+  - Pre-transplant work-up with no contraindications to proceeding to transplant, sickle cell trait noted. Brother has sickle cell trait as well.   - Kurtz placed 10/1  - Allo transplant on 10/09/2018 without complication.  -Tacro level 13.2 today, will change tacro to 2/2  - Daily GVHD charting  -neupogen started 10/17/2018  --Engraftment and last dose neupogen 10/21/18    Planned conditioning regimen:  Busulfan on Day -7, -6, -5, and -4  Cyclophosphamide on Day -3, and -2   Planned  GVHD Prophylaxis:  Methotrexate on Day +1, +3, +6, and +11  Tacrolimus starting on Day -1  Antimicrobial Prophylaxis:  Acyclovir starting on Day -7  Levaquin starting on Day -1  Fluconazole starting on Day -1  Mepron starting on Day +30  Seizure Prophylaxis:  Levetiracetam on Day -7 through Day -3  SOS/VOD Prophylaxis:  Ursodiol on Day -7 through Day +30  Growth Factor Support:  Neupogen starting on Day +7

## 2018-10-21 NOTE — ASSESSMENT & PLAN NOTE
-c-diff positive 10/12  -PO vancomycin for 14 days. started 10/12. Stop date will be 10/26.  -NS at 75cc/hr  -stool soft, not liquid, per patient.   -frequency of BMs increased but may be due to tacrolimus

## 2018-10-21 NOTE — PLAN OF CARE
Problem: Patient Care Overview  Goal: Plan of Care Review  Outcome: Ongoing (interventions implemented as appropriate)  POC reviewed with patient; understanding verbalized. Magnesium replacements administered. CDiff precautions in place. Pt remains independent. He voids clear, yellow urine per the urinal; multiple loose BMs this shift. Regular diet with good appetite. NS20K at 75mL. Pt. with nonskid footwear on, bed in lowest position, and locked with bed rails up x2.  Pt. has call light and personal items within reach. VSS and afebrile this shift. All questions and concerns addressed at this time. Will continue to monitor.

## 2018-10-21 NOTE — PLAN OF CARE
Problem: Patient Care Overview  Goal: Plan of Care Review  Outcome: Ongoing (interventions implemented as appropriate)  Plan of care reviewed with patient at start of shift. Allo BMT day +12. Denies pain and nausea. Free from falls and injuries this shift. Vital signs remain stable. Frequent checks for pain and safety. General weakness noted. Bed in lowest position, wheels locked, side rails up x's 2, call light in reach.Contact precautions maintained. Wife at bedside. Instructed to call for assistance as needed. Will continue to monitor.

## 2018-10-21 NOTE — ASSESSMENT & PLAN NOTE
- Neutropenia, resolved  - On fluconazole, acyclovir for prophylaxis  - Patient started neupogen 10/16/2018  - Neupogen and ppx levaquin stopped 10/21/18

## 2018-10-21 NOTE — ASSESSMENT & PLAN NOTE
- Day +12 of Busulfan/Cyclophosphamide matched related donor stem cell transplant from 10/10 match brother:   - Donor B+ and CMV+, recipient O+ and CMV+  - Pre-transplant work-up with no contraindications to proceeding to transplant, sickle cell trait noted. Brother has sickle cell trait as well.   - Kurtz placed 10/1  - Allo transplant on 10/09/2018 without complication.  -Tacro level 10 today, will change tacro to 2.5/2  - Daily GVHD charting  -neupogen started 10/17/2018  --Engraftment and last dose neupogen 10/21/18    Planned conditioning regimen:  Busulfan on Day -7, -6, -5, and -4  Cyclophosphamide on Day -3, and -2   Planned  GVHD Prophylaxis:  Methotrexate on Day +1, +3, +6, and +11  Tacrolimus starting on Day -1  Antimicrobial Prophylaxis:  Acyclovir starting on Day -7  Levaquin starting on Day -1  Fluconazole starting on Day -1  Mepron starting on Day +30  Seizure Prophylaxis:  Levetiracetam on Day -7 through Day -3  SOS/VOD Prophylaxis:  Ursodiol on Day -7 through Day +30  Growth Factor Support:  Neupogen starting on Day +7

## 2018-10-21 NOTE — ASSESSMENT & PLAN NOTE
-transfuse prbc for hgb <7; transfuse platelets for plt count <10K  -hgb 9.4; wbc 6k, plt 24. Stopped lovenox 10/15.  Stopped neupogen 10/21/18

## 2018-10-21 NOTE — ASSESSMENT & PLAN NOTE
- Second day ANC >1500  - On levoquin, fluconazole, acyclovir for prophylaxis  - patient started neupogen 10/16/2018  - Neupogen to stop 10/21/18

## 2018-10-21 NOTE — ASSESSMENT & PLAN NOTE
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 9% blasts.Started on MEC chemotherapy for refractory AML on 6/23/2018  - had BMBX on 7/13/18, without evidence of CML; BCR ABL p210 0.5% (c/w MRD)  - dasatinib 140 mg daily at home, will stop today when chemo starts  -pre-transplant bone marrow biopsy from 9/11/2018 shows a hematologic and molecular remission  - admitted for Busulfan/Cyclophosphamide MRD allogeneic stem cell transplant from his brother  -Today is Day +12 allo SCT.

## 2018-10-21 NOTE — ASSESSMENT & PLAN NOTE
-transfuse prbc for hgb <7; transfuse platelets for plt count <10K  -hgb 9.8; wbc 8.92k, plt 52. Stopped lovenox 10/15.  Stopped neupogen 10/21/18

## 2018-10-21 NOTE — PROGRESS NOTES
[facility]  Hematology  Bone Marrow Transplant  Progress Note    Patient Name: Wilton Guzmán  Admission Date: 10/1/2018  Hospital Length of Stay: 20 days  Code Status: Full Code    Subjective:     Interval History: Day +12 MRD allo.  Increased diarrhea without abdominal pain or fevers.  Still with low appetite.  Also with slightly worsening sore throat.  Remains afebrile and without cough.    Objective:     Vital Signs (Most Recent):  Temp: 98.5 °F (36.9 °C) (10/21/18 1252)  Pulse: (!) 111 (10/21/18 1528)  Resp: 16 (10/21/18 1528)  BP: 113/72 (10/21/18 1528)  SpO2: 100 % (10/21/18 1252) Vital Signs (24h Range):  Temp:  [98.3 °F (36.8 °C)-99.6 °F (37.6 °C)] 98.5 °F (36.9 °C)  Pulse:  [] 111  Resp:  [15-17] 16  SpO2:  [99 %-100 %] 100 %  BP: (102-117)/(70-86) 113/72     Weight: 63.9 kg (140 lb 14 oz)  Body mass index is 19.65 kg/m².  Body surface area is 1.79 meters squared.    ECOG SCORE         [unfilled]    Intake/Output - Last 3 Shifts       10/19 0700 - 10/20 0659 10/20 0700 - 10/21 0659 10/21 0700 - 10/22 0659    P.O. 660 600 300    I.V. (mL/kg) 1829 (28.1) 1963.3 (30.7) 718 (11.2)    IV Piggyback  50     Total Intake(mL/kg) 2489 (38.2) 2613.3 (40.9) 1018 (15.9)    Urine (mL/kg/hr) 1275 (0.8) 0 (0)     Stool 0 0     Total Output 1275 0     Net +1214 +2613.3 +1018           Urine Occurrence  5 x 1 x    Stool Occurrence 2 x 6 x 1 x          Physical Exam   Constitutional: He is oriented to person, place, and time. He appears well-developed and well-nourished.   HENT:   Head: Normocephalic and atraumatic.   Mouth/Throat: No oropharyngeal exudate.   Mucositis noted in posterior oropharynx, worse from yesterday   Eyes: Conjunctivae are normal. Pupils are equal, round, and reactive to light. Right eye exhibits no discharge. Left eye exhibits no discharge.   Neck: Normal range of motion. Neck supple.   Cardiovascular: Normal rate, regular rhythm, normal heart sounds and intact distal pulses.    No murmur heard.  Pulmonary/Chest: Effort normal and breath sounds normal. No respiratory distress. He has no wheezes. He has no rales.   Abdominal: Soft. Bowel sounds are normal. He exhibits no distension. There is no tenderness.   Musculoskeletal: Normal range of motion. He exhibits no edema or deformity.   Neurological: He is alert and oriented to person, place, and time.   Skin: Skin is warm and dry. No rash noted. No erythema.   Psychiatric: He has a normal mood and affect. His behavior is normal. Judgment and thought content normal.       Significant Labs:   CBC:   Recent Labs   Lab 10/20/18  0351 10/21/18  0400   WBC 2.72* 6.12   HGB 10.2* 9.4*   HCT 30.1* 28.0*   PLT 15* 24*    and CMP:   Recent Labs   Lab 10/20/18  0351 10/21/18  0400    137   K 3.9 3.9    105   CO2 23 24   GLU 94 100   BUN 6 6   CREATININE 0.7 0.7   CALCIUM 9.4 9.1   PROT 5.9* 5.5*   ALBUMIN 3.0* 2.8*   BILITOT 0.3 0.3   ALKPHOS 95 99   AST 7* 8*   ALT 24 19   ANIONGAP 9 8   EGFRNONAA >60.0 >60.0       Diagnostic Results:  None    Assessment/Plan:     * S/P allogeneic bone marrow transplant    - Day +12 of Busulfan/Cyclophosphamide matched related donor stem cell transplant from 10/10 match brother:   - Donor B+ and CMV+, recipient O+ and CMV+  - Pre-transplant work-up with no contraindications to proceeding to transplant, sickle cell trait noted. Brother has sickle cell trait as well.   - Kurtz placed 10/1  - Allo transplant on 10/09/2018 without complication.  -Tacro level 10 today, will change tacro to 2.5/2  - Daily GVHD charting  -neupogen started 10/17/2018  --Engraftment and last dose neupogen 10/21/18    Planned conditioning regimen:  Busulfan on Day -7, -6, -5, and -4  Cyclophosphamide on Day -3, and -2   Planned  GVHD Prophylaxis:  Methotrexate on Day +1, +3, +6, and +11  Tacrolimus starting on Day -1  Antimicrobial Prophylaxis:  Acyclovir starting on Day -7  Levaquin starting on Day -1  Fluconazole starting on  Day -1  Mepron starting on Day +30  Seizure Prophylaxis:  Levetiracetam on Day -7 through Day -3  SOS/VOD Prophylaxis:  Ursodiol on Day -7 through Day +30  Growth Factor Support:  Neupogen starting on Day +7       Mucositis due to chemotherapy    - Scheduled [facility] added  --consider throat swab for culture if mucositis worsening     Chemotherapy induced neutropenia    - Second day ANC >1500  - On fluconazole, acyclovir for prophylaxis  - patient started neupogen 10/16/2018  - Neupogen and ppx levaquin to stop 10/21/18     Chemotherapy-induced nausea    -Resolved  -prn anti-emetics.   -tolerating meals and po meds at this time.        Cytopenia    -transfuse prbc for hgb <7; transfuse platelets for plt count <10K  -hgb 9.4; wbc 6k, plt 24. Stopped lovenox 10/15.  Stopped neupogen 10/21/18     C. difficile diarrhea    -c-diff positive 10/12  -PO vancomycin for 14 days. started 10/12. Stop date will be 10/26.  -NS at 75cc/hr  -stool soft, not liquid, per patient.   -frequency of BMs increased but may be due to tacrolimus         Chronic myelogenous leukemia (CML), BCR-ABL1-positive    - original BMBX 5/26/18 with 20% blasts; CML transformed to AML with blast crisis;   karyotype: 46,XY,t(9;22;22;14)q34;q11.2;q13;q23); BCR/ABL gene fusion 62%  - received induction with 7+3 on 6/1-6/8/2018  - persistent disease post 7+3 induction, repeat BM biopsy done 6/18 here at [facility] after 7+3. 9% blasts.Started on MEC chemotherapy for refractory AML on 6/23/2018  - had BMBX on 7/13/18, without evidence of CML; BCR ABL p210 0.5% (c/w MRD)  - dasatinib 140 mg daily at home, will stop today when chemo starts  -pre-transplant bone marrow biopsy from 9/11/2018 shows a hematologic and molecular remission  - admitted for Busulfan/Cyclophosphamide MRD allogeneic stem cell transplant from his brother  -Today is Day +12 allo SCT.           VTE Risk Mitigation (From admission, onward)        Ordered     heparin, porcine (PF) 100 unit/mL  injection flush 300 Units  As needed (PRN)      10/01/18 5860          Disposition: inpatient    Roberto Del Rio MD  Bone Marrow Transplant  [facility]

## 2018-10-22 ENCOUNTER — TELEPHONE (OUTPATIENT)
Dept: TRANSPLANT | Facility: HOSPITAL | Age: 33
End: 2018-10-22

## 2018-10-22 VITALS
HEIGHT: 71 IN | OXYGEN SATURATION: 100 % | SYSTOLIC BLOOD PRESSURE: 108 MMHG | DIASTOLIC BLOOD PRESSURE: 71 MMHG | RESPIRATION RATE: 18 BRPM | WEIGHT: 141.19 LBS | TEMPERATURE: 98 F | HEART RATE: 107 BPM | BODY MASS INDEX: 19.77 KG/M2

## 2018-10-22 DIAGNOSIS — C92.11 CHRONIC MYELOID LEUKEMIA, BCR/ABL-POSITIVE, IN REMISSION: Primary | ICD-10-CM

## 2018-10-22 DIAGNOSIS — Z94.81 S/P ALLOGENEIC BONE MARROW TRANSPLANT: ICD-10-CM

## 2018-10-22 PROBLEM — E83.42 HYPOMAGNESEMIA: Status: ACTIVE | Noted: 2018-10-22

## 2018-10-22 LAB
ALBUMIN SERPL BCP-MCNC: 3 G/DL
ALP SERPL-CCNC: 114 U/L
ALT SERPL W/O P-5'-P-CCNC: 20 U/L
ANION GAP SERPL CALC-SCNC: 9 MMOL/L
ANISOCYTOSIS BLD QL SMEAR: SLIGHT
AST SERPL-CCNC: 8 U/L
BASOPHILS NFR BLD: 0 %
BILIRUB SERPL-MCNC: 0.3 MG/DL
BUN SERPL-MCNC: 5 MG/DL
CALCIUM SERPL-MCNC: 9.4 MG/DL
CHLORIDE SERPL-SCNC: 108 MMOL/L
CO2 SERPL-SCNC: 24 MMOL/L
CREAT SERPL-MCNC: 0.7 MG/DL
DACRYOCYTES BLD QL SMEAR: ABNORMAL
DIFFERENTIAL METHOD: ABNORMAL
DOHLE BOD BLD QL SMEAR: PRESENT
EOSINOPHIL NFR BLD: 0 %
ERYTHROCYTE [DISTWIDTH] IN BLOOD BY AUTOMATED COUNT: 12.6 %
EST. GFR  (AFRICAN AMERICAN): >60 ML/MIN/1.73 M^2
EST. GFR  (NON AFRICAN AMERICAN): >60 ML/MIN/1.73 M^2
GLUCOSE SERPL-MCNC: 98 MG/DL
HCT VFR BLD AUTO: 29.4 %
HGB BLD-MCNC: 9.8 G/DL
HYPOCHROMIA BLD QL SMEAR: ABNORMAL
IMM GRANULOCYTES # BLD AUTO: ABNORMAL K/UL
IMM GRANULOCYTES NFR BLD AUTO: ABNORMAL %
LYMPHOCYTES NFR BLD: 2 %
MAGNESIUM SERPL-MCNC: 1 MG/DL
MCH RBC QN AUTO: 29.2 PG
MCHC RBC AUTO-ENTMCNC: 33.3 G/DL
MCV RBC AUTO: 88 FL
MONOCYTES NFR BLD: 5 %
NEUTROPHILS NFR BLD: 90 %
NEUTS BAND NFR BLD MANUAL: 3 %
NRBC BLD-RTO: 0 /100 WBC
OVALOCYTES BLD QL SMEAR: ABNORMAL
PHOSPHATE SERPL-MCNC: 4.3 MG/DL
PLATELET # BLD AUTO: 52 K/UL
PLATELET BLD QL SMEAR: ABNORMAL
PMV BLD AUTO: ABNORMAL FL
POIKILOCYTOSIS BLD QL SMEAR: SLIGHT
POLYCHROMASIA BLD QL SMEAR: ABNORMAL
POTASSIUM SERPL-SCNC: 4.2 MMOL/L
PROT SERPL-MCNC: 5.8 G/DL
RBC # BLD AUTO: 3.36 M/UL
SCHISTOCYTES BLD QL SMEAR: ABNORMAL
SODIUM SERPL-SCNC: 141 MMOL/L
TACROLIMUS BLD-MCNC: 13.2 NG/ML
TOXIC GRANULES BLD QL SMEAR: PRESENT
WBC # BLD AUTO: 8.92 K/UL

## 2018-10-22 PROCEDURE — 99238 HOSP IP/OBS DSCHRG MGMT 30/<: CPT | Mod: ,,, | Performed by: INTERNAL MEDICINE

## 2018-10-22 PROCEDURE — 80053 COMPREHEN METABOLIC PANEL: CPT

## 2018-10-22 PROCEDURE — 25000003 PHARM REV CODE 250: Performed by: NURSE PRACTITIONER

## 2018-10-22 PROCEDURE — 80197 ASSAY OF TACROLIMUS: CPT

## 2018-10-22 PROCEDURE — 97803 MED NUTRITION INDIV SUBSEQ: CPT

## 2018-10-22 PROCEDURE — 63600175 PHARM REV CODE 636 W HCPCS: Performed by: STUDENT IN AN ORGANIZED HEALTH CARE EDUCATION/TRAINING PROGRAM

## 2018-10-22 PROCEDURE — 83735 ASSAY OF MAGNESIUM: CPT

## 2018-10-22 PROCEDURE — 25000003 PHARM REV CODE 250: Performed by: STUDENT IN AN ORGANIZED HEALTH CARE EDUCATION/TRAINING PROGRAM

## 2018-10-22 PROCEDURE — 25000003 PHARM REV CODE 250: Performed by: INTERNAL MEDICINE

## 2018-10-22 PROCEDURE — 63600175 PHARM REV CODE 636 W HCPCS: Performed by: INTERNAL MEDICINE

## 2018-10-22 PROCEDURE — A9155 ARTIFICIAL SALIVA: HCPCS | Performed by: INTERNAL MEDICINE

## 2018-10-22 PROCEDURE — 84100 ASSAY OF PHOSPHORUS: CPT

## 2018-10-22 RX ORDER — FLUCONAZOLE 200 MG/1
400 TABLET ORAL DAILY
Qty: 60 TABLET | Refills: 5 | Status: SHIPPED | OUTPATIENT
Start: 2018-10-23 | End: 2019-04-09

## 2018-10-22 RX ORDER — LANOLIN ALCOHOL/MO/W.PET/CERES
800 CREAM (GRAM) TOPICAL 2 TIMES DAILY
Refills: 0 | COMMUNITY
Start: 2018-10-22 | End: 2018-10-26

## 2018-10-22 RX ORDER — ONDANSETRON 8 MG/1
8 TABLET, ORALLY DISINTEGRATING ORAL EVERY 8 HOURS PRN
Qty: 15 TABLET | Refills: 2 | Status: SHIPPED | OUTPATIENT
Start: 2018-10-22 | End: 2018-12-10

## 2018-10-22 RX ORDER — URSODIOL 300 MG/1
300 CAPSULE ORAL 2 TIMES DAILY
Qty: 34 CAPSULE | Refills: 0 | Status: SHIPPED | OUTPATIENT
Start: 2018-10-22 | End: 2018-12-03 | Stop reason: ALTCHOICE

## 2018-10-22 RX ORDER — ACYCLOVIR 800 MG/1
800 TABLET ORAL 2 TIMES DAILY
Qty: 60 TABLET | Refills: 11 | Status: SHIPPED | OUTPATIENT
Start: 2018-10-22 | End: 2018-12-19

## 2018-10-22 RX ORDER — MAGNESIUM SULFATE HEPTAHYDRATE 40 MG/ML
4 INJECTION, SOLUTION INTRAVENOUS ONCE
Status: COMPLETED | OUTPATIENT
Start: 2018-10-22 | End: 2018-10-22

## 2018-10-22 RX ORDER — VANCOMYCIN HYDROCHLORIDE 125 MG/1
125 CAPSULE ORAL EVERY 6 HOURS
Qty: 20 CAPSULE | Refills: 0 | Status: SHIPPED | OUTPATIENT
Start: 2018-10-22 | End: 2018-10-27

## 2018-10-22 RX ORDER — TACROLIMUS 1 MG/1
2 CAPSULE ORAL EVERY MORNING
Status: DISCONTINUED | OUTPATIENT
Start: 2018-10-23 | End: 2018-10-22 | Stop reason: HOSPADM

## 2018-10-22 RX ORDER — ATOVAQUONE 750 MG/5ML
1500 SUSPENSION ORAL DAILY
Qty: 300 ML | Refills: 5 | Status: SHIPPED | OUTPATIENT
Start: 2018-11-08 | End: 2019-03-25 | Stop reason: SDUPTHER

## 2018-10-22 RX ORDER — HEPARIN SODIUM 1000 [USP'U]/ML
1600 INJECTION INTRAVENOUS; SUBCUTANEOUS ONCE
Status: DISCONTINUED | OUTPATIENT
Start: 2018-10-22 | End: 2018-10-22 | Stop reason: HOSPADM

## 2018-10-22 RX ORDER — AMLODIPINE BESYLATE 5 MG/1
5 TABLET ORAL DAILY
Qty: 30 TABLET | Refills: 5 | Status: SHIPPED | OUTPATIENT
Start: 2018-10-23 | End: 2018-10-26 | Stop reason: ALTCHOICE

## 2018-10-22 RX ORDER — LANOLIN ALCOHOL/MO/W.PET/CERES
400 CREAM (GRAM) TOPICAL DAILY
Status: DISCONTINUED | OUTPATIENT
Start: 2018-10-22 | End: 2018-10-22 | Stop reason: HOSPADM

## 2018-10-22 RX ORDER — TACROLIMUS 1 MG/1
2 CAPSULE ORAL EVERY 12 HOURS
Qty: 120 CAPSULE | Refills: 11 | Status: SHIPPED | OUTPATIENT
Start: 2018-10-22 | End: 2018-10-24

## 2018-10-22 RX ADMIN — HEPARIN 300 UNITS: 100 SYRINGE at 03:10

## 2018-10-22 RX ADMIN — PANTOPRAZOLE SODIUM 40 MG: 40 TABLET, DELAYED RELEASE ORAL at 09:10

## 2018-10-22 RX ADMIN — TACROLIMUS 2.5 MG: 1 CAPSULE ORAL at 07:10

## 2018-10-22 RX ADMIN — Medication 125 MG: at 11:10

## 2018-10-22 RX ADMIN — Medication 125 MG: at 05:10

## 2018-10-22 RX ADMIN — FLUCONAZOLE 400 MG: 200 TABLET ORAL at 09:10

## 2018-10-22 RX ADMIN — MAGNESIUM OXIDE TAB 400 MG (241.3 MG ELEMENTAL MG) 400 MG: 400 (241.3 MG) TAB at 11:10

## 2018-10-22 RX ADMIN — ACYCLOVIR 800 MG: 200 CAPSULE ORAL at 09:10

## 2018-10-22 RX ADMIN — MAGNESIUM SULFATE IN WATER 4 G: 40 INJECTION, SOLUTION INTRAVENOUS at 09:10

## 2018-10-22 RX ADMIN — AMLODIPINE BESYLATE 5 MG: 5 TABLET ORAL at 09:10

## 2018-10-22 RX ADMIN — SODIUM CHLORIDE AND POTASSIUM CHLORIDE: 9; 1.49 INJECTION, SOLUTION INTRAVENOUS at 09:10

## 2018-10-22 RX ADMIN — URSODIOL 300 MG: 300 CAPSULE ORAL at 09:10

## 2018-10-22 RX ADMIN — Medication 30 ML: at 05:10

## 2018-10-22 NOTE — SUBJECTIVE & OBJECTIVE
Subjective:     Interval History: Day +13 MRD allo. No diarrhea. Sore throat much improved, tolerating good oral intake. No abdominal pain, nausea/vomiting.  Remains afebrile and without cough.    Objective:     Vital Signs (Most Recent):  Temp: 98.6 °F (37 °C) (10/22/18 0800)  Pulse: 105 (10/22/18 0800)  Resp: 16 (10/22/18 0800)  BP: 115/75 (10/22/18 0800)  SpO2: 100 % (10/22/18 0800) Vital Signs (24h Range):  Temp:  [98.3 °F (36.8 °C)-98.9 °F (37.2 °C)] 98.6 °F (37 °C)  Pulse:  [104-119] 105  Resp:  [15-16] 16  SpO2:  [99 %-100 %] 100 %  BP: (102-134)/(71-80) 115/75     Weight: 64 kg (141 lb 3.3 oz)  Body mass index is 19.69 kg/m².  Body surface area is 1.79 meters squared.    ECOG SCORE         [unfilled]    Intake/Output - Last 3 Shifts       10/20 0700 - 10/21 0659 10/21 0700 - 10/22 0659 10/22 0700 - 10/23 0659    P.O. 600 820     I.V. (mL/kg) 1963.3 (30.7) 1566.8 (24.5) 273.8 (4.3)    IV Piggyback 50      Total Intake(mL/kg) 2613.3 (40.9) 2386.8 (37.3) 273.8 (4.3)    Urine (mL/kg/hr) 0 (0) 700 (0.5) 250 (1.6)    Stool 0      Total Output 0 700 250    Net +2613.3 +1686.8 +23.8           Urine Occurrence 5 x 1 x     Stool Occurrence 6 x 1 x           Physical Exam   Constitutional: He is oriented to person, place, and time. He appears well-developed and well-nourished.   HENT:   Head: Normocephalic and atraumatic.   Mouth/Throat: No oropharyngeal exudate.   Mucositis noted in posterior oropharynx, improved   Eyes: Conjunctivae are normal. Pupils are equal, round, and reactive to light. Right eye exhibits no discharge. Left eye exhibits no discharge.   Neck: Normal range of motion. Neck supple.   Cardiovascular: Normal rate, regular rhythm, normal heart sounds and intact distal pulses.   No murmur heard.  Pulmonary/Chest: Effort normal and breath sounds normal. No respiratory distress. He has no wheezes. He has no rales.   Abdominal: Soft. Bowel sounds are normal. He exhibits no distension. There is no  tenderness.   Musculoskeletal: Normal range of motion. He exhibits no edema or deformity.   Neurological: He is alert and oriented to person, place, and time.   Skin: Skin is warm and dry. No rash noted. No erythema.   Psychiatric: He has a normal mood and affect. His behavior is normal. Judgment and thought content normal.       Significant Labs:   CBC:   Recent Labs   Lab 10/21/18  0400 10/22/18  0329   WBC 6.12 8.92   HGB 9.4* 9.8*   HCT 28.0* 29.4*   PLT 24* 52*    and CMP:   Recent Labs   Lab 10/21/18  0400 10/22/18  0329    141   K 3.9 4.2    108   CO2 24 24    98   BUN 6 5*   CREATININE 0.7 0.7   CALCIUM 9.1 9.4   PROT 5.5* 5.8*   ALBUMIN 2.8* 3.0*   BILITOT 0.3 0.3   ALKPHOS 99 114   AST 8* 8*   ALT 19 20   ANIONGAP 8 9   EGFRNONAA >60.0 >60.0       Diagnostic Results:  None

## 2018-10-22 NOTE — PLAN OF CARE
MDR's with Dr Rockwell.  Patient is day+13 post his MRD alloSCT.  He has engrafted and doing well.  Planning to d/c to the [facility] today with the support of his mother.  CM sent a message to the oncology clinic to arrange labs and f/u.  No HH needs.  Kurtz dressing changes will be performed in clinic.  Bedside delivery requested.  Will continue to follow.         10/22/18 1142   Final Note   Assessment Type Final Discharge Note   Discharge Disposition ([facility] )   What phone number can be called within the next 1-3 days to see how you are doing after discharge? ([phone])   Hospital Follow Up  Appt(s) scheduled? (message sent to the oncology clinic)   Discharge plans and expectations educations in teach back method with documentation complete? Yes

## 2018-10-22 NOTE — PROGRESS NOTES
Discharge teaching done with patient and patient's caregiver (mother) on BMT unit.  Approximately 40 minutes spent on discussion of post-transplant guidelines including:  Low microbial diet, safe food handling, dining out, home sanitation, outpatient plan of care, progression of recovery of cells and immune system, ways to prevent infection, fatigue, ways to avoid bleeding, pet and plant exposure and care, returning to work, smoking and alcohol consumption, immunizations, traveling, nutrition, personal hygiene, hand washing, oral care, eye care, signs and symptoms to report immediately, and emotional changes post transplant.  Will discuss sexual activity at a later time when pt's mother is not present, however did provide written information regarding this to pt. Also discussed who to contact during business hours, after hours, and holidays.  Written materials supplied.  Patient and family given the opportunity to ask questions.  Verbalizes understanding.  All questions answered to their satisfaction.  Patient and family instructed to call with any questions or concerns.  Patient and caregiver were given handouts which reiterate all the above teaching. F/U for Wednesday scheduled and reviewed with pt. Pt will have d/c teaching by pharmD prior to discharge today.    Yoanna Andrade DNP, NP  Hematology/Oncology

## 2018-10-22 NOTE — PLAN OF CARE
Problem: Patient Care Overview  Goal: Plan of Care Review  Outcome: Ongoing (interventions implemented as appropriate)  Patient remained free from falls throughout shift, call bell within reach. Day +13 following Allo BMT. Still having liquid BMs. cdiff precautions maintained. Oral vanc continued. No complaints of pain or discomfort. Patient up walking the halls in beginning of shift. Vitals stable, will continue to monitor.

## 2018-10-22 NOTE — PROGRESS NOTES
SW submitted HL request for 10/22/18-1/17/19, intake confirmed they have a room available today. SABRINA will continue to follow.    Kimberly Briggs [facility]  Oncology Social Worker  Phone: [phone]

## 2018-10-22 NOTE — PROGRESS NOTES
[facility]  Hematology  Bone Marrow Transplant  Progress Note    Patient Name: Wilton Guzmán  Admission Date: 10/1/2018  Hospital Length of Stay: 21 days  Code Status: Full Code    Subjective:     Interval History: Day +13 MRD allo. No diarrhea. Sore throat much improved, tolerating good oral intake. No abdominal pain, nausea/vomiting.  Remains afebrile and without cough.    Objective:     Vital Signs (Most Recent):  Temp: 98.6 °F (37 °C) (10/22/18 0800)  Pulse: 105 (10/22/18 0800)  Resp: 16 (10/22/18 0800)  BP: 115/75 (10/22/18 0800)  SpO2: 100 % (10/22/18 0800) Vital Signs (24h Range):  Temp:  [98.3 °F (36.8 °C)-98.9 °F (37.2 °C)] 98.6 °F (37 °C)  Pulse:  [104-119] 105  Resp:  [15-16] 16  SpO2:  [99 %-100 %] 100 %  BP: (102-134)/(71-80) 115/75     Weight: 64 kg (141 lb 3.3 oz)  Body mass index is 19.69 kg/m².  Body surface area is 1.79 meters squared.    ECOG SCORE         [unfilled]    Intake/Output - Last 3 Shifts       10/20 0700 - 10/21 0659 10/21 0700 - 10/22 0659 10/22 0700 - 10/23 0659    P.O. 600 820     I.V. (mL/kg) 1963.3 (30.7) 1566.8 (24.5) 273.8 (4.3)    IV Piggyback 50      Total Intake(mL/kg) 2613.3 (40.9) 2386.8 (37.3) 273.8 (4.3)    Urine (mL/kg/hr) 0 (0) 700 (0.5) 250 (1.6)    Stool 0      Total Output 0 700 250    Net +2613.3 +1686.8 +23.8           Urine Occurrence 5 x 1 x     Stool Occurrence 6 x 1 x           Physical Exam   Constitutional: He is oriented to person, place, and time. He appears well-developed and well-nourished.   HENT:   Head: Normocephalic and atraumatic.   Mouth/Throat: No oropharyngeal exudate.   Mucositis noted in posterior oropharynx, improved   Eyes: Conjunctivae are normal. Pupils are equal, round, and reactive to light. Right eye exhibits no discharge. Left eye exhibits no discharge.   Neck: Normal range of motion. Neck supple.   Cardiovascular: Normal rate, regular rhythm, normal heart sounds and intact distal pulses.   No murmur  heard.  Pulmonary/Chest: Effort normal and breath sounds normal. No respiratory distress. He has no wheezes. He has no rales.   Abdominal: Soft. Bowel sounds are normal. He exhibits no distension. There is no tenderness.   Musculoskeletal: Normal range of motion. He exhibits no edema or deformity.   Neurological: He is alert and oriented to person, place, and time.   Skin: Skin is warm and dry. No rash noted. No erythema.   Psychiatric: He has a normal mood and affect. His behavior is normal. Judgment and thought content normal.       Significant Labs:   CBC:   Recent Labs   Lab 10/21/18  0400 10/22/18  0329   WBC 6.12 8.92   HGB 9.4* 9.8*   HCT 28.0* 29.4*   PLT 24* 52*    and CMP:   Recent Labs   Lab 10/21/18  0400 10/22/18  0329    141   K 3.9 4.2    108   CO2 24 24    98   BUN 6 5*   CREATININE 0.7 0.7   CALCIUM 9.1 9.4   PROT 5.5* 5.8*   ALBUMIN 2.8* 3.0*   BILITOT 0.3 0.3   ALKPHOS 99 114   AST 8* 8*   ALT 19 20   ANIONGAP 8 9   EGFRNONAA >60.0 >60.0       Diagnostic Results:  None    Assessment/Plan:     * S/P allogeneic bone marrow transplant    - Day +13 of Busulfan/Cyclophosphamide matched related donor stem cell transplant from 10/10 match brother:   - Donor B+ and CMV+, recipient O+ and CMV+  - Pre-transplant work-up with no contraindications to proceeding to transplant, sickle cell trait noted. Brother has sickle cell trait as well.   - Kurtz placed 10/1  - Allo transplant on 10/09/2018 without complication.  -Tacro level 13.2 today, will change tacro to 2/2  - Daily GVHD charting  -neupogen started 10/17/2018  --Engraftment and last dose neupogen 10/21/18    Planned conditioning regimen:  Busulfan on Day -7, -6, -5, and -4  Cyclophosphamide on Day -3, and -2   Planned  GVHD Prophylaxis:  Methotrexate on Day +1, +3, +6, and +11  Tacrolimus starting on Day -1  Antimicrobial Prophylaxis:  Acyclovir starting on Day -7  Levaquin starting on Day -1  Fluconazole starting on Day -1  Mepron  starting on Day +30  Seizure Prophylaxis:  Levetiracetam on Day -7 through Day -3  SOS/VOD Prophylaxis:  Ursodiol on Day -7 through Day +30  Growth Factor Support:  Neupogen starting on Day +7       Hypomagnesemia    - Replace as needed     Mucositis due to chemotherapy    - D/c [facility] per patient request  - Mucositis improved     Chemotherapy induced neutropenia    - Neutropenia, resolved  - On fluconazole, acyclovir for prophylaxis  - Patient started neupogen 10/16/2018  - Neupogen and ppx levaquin stopped 10/21/18     Chemotherapy-induced nausea    -Resolved  -prn anti-emetics.   -tolerating meals and po meds at this time.        Cytopenia    -transfuse prbc for hgb <7; transfuse platelets for plt count <10K  -hgb 9.8; wbc 8.92k, plt 52. Stopped lovenox 10/15.  Stopped neupogen 10/21/18     C. difficile diarrhea    -c-diff positive 10/12  -PO vancomycin for 14 days. started 10/12. Stop date will be 10/26.  -NS at 75cc/hr  -stool soft, not liquid, per patient.   -frequency of BMs increased but may be due to tacrolimus         Chronic myelogenous leukemia (CML), BCR-ABL1-positive    - original BMBX 5/26/18 with 20% blasts; CML transformed to AML with blast crisis;   karyotype: 46,XY,t(9;22;22;14)q34;q11.2;q13;q23); BCR/ABL gene fusion 62%  - received induction with 7+3 on 6/1-6/8/2018  - persistent disease post 7+3 induction, repeat BM biopsy done 6/18 here at [facility] after 7+3. 9% blasts.Started on MEC chemotherapy for refractory AML on 6/23/2018  - had BMBX on 7/13/18, without evidence of CML; BCR ABL p210 0.5% (c/w MRD)  - dasatinib 140 mg daily at home, will stop today when chemo starts  -pre-transplant bone marrow biopsy from 9/11/2018 shows a hematologic and molecular remission  - admitted for Busulfan/Cyclophosphamide MRD allogeneic stem cell transplant from his brother         VTE Risk Mitigation (From admission, onward)        Ordered     heparin, porcine (PF) 100 unit/mL injection flush 300 Units  As  needed (PRN)      10/01/18 0411          Disposition: possible discharge today or tomorrow    Gini Saleem MD  Bone Marrow Transplant  [facility]

## 2018-10-22 NOTE — DISCHARGE SUMMARY
[facility]  Hematology  Bone Marrow Transplant  Discharge Summary      Patient Name: Wilton Guzmán  MRN: [id number]  Admission Date: 10/1/2018  Hospital Length of Stay: 21 days  Discharge Date and Time:  10/22/2018 11:42 AM  Attending Physician: Ricki Rockwell MD   Discharging Provider: Gini Saleem MD  Primary Care Provider: Primary Doctor No    HPI: Mr. 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 He has been on dasatinb since receiving induction with 7+3 and re-induction with MEC in 6/2018. He was noted be in hematologic and molecular remission per last bone marrow biopsy from 9/11/2018. Transplant work-up with no contraindications to proceeding with transplant.     * No surgery found *     Hospital Course: Patient was admitted 10/1/18 for By/Cy MRD allogenic stem cell transplant. Course complicated by c diff. On 10/22, counts stable, patient tolerating good oral intake, ambulating, mucositis improved. No diarrhea. Discharge planning done with follow-up in BMT clinic 10/24. Wife and mother to be primary caretakers. Kurtz left in place, to assess remove in 1-2 weeks. To complete PO vancomycin for c diff until 10/26/18. Detailed course of hospitalization as listed below.    10/1/2018: Admit from clinic for Bu/Cy MRD allogeneic stem cell transplant  10/2/2018: Day -7, started Busulfan this am. Dasatinib stopped. VSS, NEON and no complaints this am.  10/3/2018: Day -6. Continue Busulfan until Day -4. VSS. No complaints. NEON.  10/04/2018 : Day -5.  Continue busulfan.  No issues overnight.  10/5/2018: Day -4. Continue busulfan through Day -4. Cytoxan and [place] to begin Day -3. NEON.   10/8/2108: Day -1. No c/o nausea on exam. Appetite still good. Completed Cytoxan and [place]. Tacro started this am. Start tacro levels 10/10/2018 (Wednesday). 5 lb weight gain since  admission. No sign of fluid overload noted. Patient voiding well.   10/9/2018: Day 0 allo SCT. Patient tolerated transplant well. C/o bad taste to mouth. Otherwise no complaints. 1 episode looses stools yesterday. None since per patient. C/o mild nausea prior to transplant. Pt speculated it may have been 2/2 poor intake and anxiety. May also be 2/2 chemo.  10/10/2018: Day +1. Day +1 MTX today. Reported loose stool x 1 yesterday. No other complaints. DBP continues to be intermittently in the 90s. tacro level 2. Additional 0.5 mg administered this am. Dose changed from 2 mg bid to 2.5 mg bid.   10/11/2018: Day +2 MRD allo. Vomited x 1 this am, no diarrhea. VSS. No complaints this am. Tacro level 5.1 today, continue tacro 2.5 mg bid.   10/12/2018: Day +3 MRD allo. No c/o n/v today. Has had 2 loose BMs so far this morning. Order placed for c-diff. Imodium ok if c-diff neg. Pt to receive Day +3 MTX today. Amlodipine 5 mg daily for elevated BP. Tacro 5.8. No changes.   10/15/2018: Day +6 MRD allo. Stool sample from 10/12/2018 + for c-diff. Pt with history of c-diff. Started on oral vanc. Stools now more formed. Per patient. Feeling more fatigued, but still ambulating in hallway 3x/day. Still eating well. Denied n/v. No mucositis noted. BP improved with amlodipine. tacro level 8.5. No changes to tacro dose today.  10/16/2018: Day +7 MRD allo. Stool soft but not liquid per pt. Having 3-4 BMs/day. Continue oral vanc for total of 14 days. Stop date will be 10/26. Continues to eat and ambulate in hallway despite fatigue. Tacro level 6.7. No dose change today. No mucositis noted. Mag level 1.1. 3 gram IV mag dose given in addition to PRN mag.   10/17/2018: Day +8 MRD allo. Continues to have loose to soft stools. Frequency of BMs decreasing. Continue oral vanc until 10/26. Eating approximately 50% of meals. Continues to ambulate in hallway. Reports increased fatigue. Tacro level 8.6. No dose change. Received 1 unit platelets for  platelet count of 7.   10/18: mucositis noted, duke's added  10/19: engraftment, mucositis stable,   10/20: Day+11 MRD.  Stools getting more formed. Low appetite.  10/21: Day +12 MRD allo.  Tacro level 10 with increased diarrhea. Decreased tacro to 2.5/2  10/22: Day +13 MRD allo. Tacro level 13, decreased tacro to 2/2. Patient discharged to [facility].     Consults (From admission, onward)        Status Ordering Provider     Inpatient consult to Registered Dietitian/Nutritionist  Once     Provider:  (Not yet assigned)    Completed EVARISTO LUNA        Physical Exam   Constitutional: He is oriented to person, place, and time. He appears well-developed and well-nourished.   HENT:   Head: Normocephalic and atraumatic.   Mouth/Throat: No oropharyngeal exudate.   Mucositis noted in posterior oropharynx, improved   Eyes: Conjunctivae are normal. Pupils are equal, round, and reactive to light. Right eye exhibits no discharge. Left eye exhibits no discharge.   Neck: Normal range of motion. Neck supple.   Cardiovascular: Normal rate, regular rhythm, normal heart sounds and intact distal pulses.   No murmur heard.  Pulmonary/Chest: Effort normal and breath sounds normal. No respiratory distress. He has no wheezes. He has no rales.   Abdominal: Soft. Bowel sounds are normal. He exhibits no distension. There is no tenderness.   Musculoskeletal: Normal range of motion. He exhibits no edema or deformity.   Neurological: He is alert and oriented to person, place, and time.   Skin: Skin is warm and dry. No rash noted. No erythema.   Psychiatric: He has a normal mood and affect. His behavior is normal. Judgment and thought content normal.         Significant Diagnostic Studies: Labs:   CMP   Recent Labs   Lab 10/21/18  0400 10/22/18  0329    141   K 3.9 4.2    108   CO2 24 24    98   BUN 6 5*   CREATININE 0.7 0.7   CALCIUM 9.1 9.4   PROT 5.5* 5.8*   ALBUMIN 2.8* 3.0*   BILITOT 0.3 0.3   ALKPHOS 99 114    AST 8* 8*   ALT 19 20   ANIONGAP 8 9   ESTGFRAFRICA >60.0 >60.0   EGFRNONAA >60.0 >60.0    and CBC   Recent Labs   Lab 10/21/18  0400 10/22/18  0329   WBC 6.12 8.92   HGB 9.4* 9.8*   HCT 28.0* 29.4*   PLT 24* 52*       Pending Diagnostic Studies:     None        Final Active Diagnoses:    Diagnosis Date Noted POA    PRINCIPAL PROBLEM:  S/P allogeneic bone marrow transplant [Z94.81] 09/19/2018 Not Applicable    Hypomagnesemia [E83.42] 10/22/2018 Unknown    Mucositis due to chemotherapy [K12.31] 10/18/2018 No    Chemotherapy induced neutropenia [D70.1, T45.1X5A] 10/13/2018 No    Cytopenia [D75.9] 10/09/2018 No    Chemotherapy-induced nausea [R11.0, T45.1X5A] 10/09/2018 No    C. difficile diarrhea [A04.72] 10/08/2018 No    Chronic myelogenous leukemia (CML), BCR-ABL1-positive [C92.10] 06/15/2018 Yes      Problems Resolved During this Admission:    Diagnosis Date Noted Date Resolved POA    Pancytopenia due to chemotherapy [D61.810] 10/09/2018 10/09/2018 Unknown    Anemia associated with chemotherapy [D64.81, T45.1X5A] 10/01/2018 10/09/2018 Yes      Discharged Condition: good    Disposition:     Follow Up:    Patient Instructions:   No discharge procedures on file.  Medications:  Reconciled Home Medications:      Medication List      START taking these medications    acyclovir 800 MG Tab  Commonly known as:  ZOVIRAX  Take 1 tablet (800 mg total) by mouth 2 (two) times daily.  Replaces:  acyclovir 200 MG capsule     amLODIPine 5 MG tablet  Commonly known as:  NORVASC  Take 1 tablet (5 mg total) by mouth once daily.  Start taking on:  10/23/2018     atovaquone 750 mg/5 mL Susp  Commonly known as:  MEPRON  Take 10 mLs (1,500 mg total) by mouth once daily. Starting on 11/8/18  Start taking on:  11/8/2018     fluconazole 200 MG Tab  Commonly known as:  DIFLUCAN  Take 2 tablets (400 mg total) by mouth once daily.  Start taking on:  10/23/2018     magnesium oxide 400 mg (241.3 mg magnesium) tablet  Commonly known  as:  MAG-OX  Take 2 tablets (800 mg total) by mouth 2 (two) times daily.     ondansetron 8 MG Tbdl  Commonly known as:  ZOFRAN-ODT  DISSOLVE 1 tablet (8 mg total) by mouth every 8 (eight) hours as needed (nausea).     tacrolimus 1 MG Cap  Commonly known as:  PROGRAF  Take 2 capsules (2 mg total) by mouth every 12 (twelve) hours. Take after blood work on clinic appointment days     ursodiol 300 mg capsule  Commonly known as:  ACTIGALL  Take 1 capsule (300 mg total) by mouth 2 (two) times daily. for 17 days     vancomycin 125 MG capsule  Commonly known as:  VANCOCIN  Take 1 capsule (125 mg total) by mouth every 6 (six) hours. for 5 days        CHANGE how you take these medications    dasatinib 140 mg Tab  Commonly known as:  SPRYCEL  Take 1 tablet by mouth once daily Start on day +60 (12/8/18).  Start taking on:  12/8/2018  What changed:    · additional instructions  · These instructions start on 12/8/2018. If you are unsure what to do until then, ask your doctor or other care provider.        STOP taking these medications    acyclovir 200 MG capsule  Commonly known as:  ZOVIRAX  Replaced by:  acyclovir 800 MG Tab            Gini Saleem MD  Bone Marrow Transplant  [facility]

## 2018-10-22 NOTE — PROGRESS NOTES
" [facility]  Adult Nutrition  Progress Note    SUMMARY       Recommendations    Recommendation/Intervention:     1. Continue w/ Regular diet.   2. Continue PO intake >/= 85% EEN/EPN   3.RD to follow    Goals: nutrient intake >/= 85% EEN/EPN   Nutrition Goal Status: goal met  Communication of RD Recs: discussed on rounds    Reason for Assessment    Reason for Assessment: RD follow-up  Diagnosis: transplant/postoperative complications(s/p allogeneic bone marrow transplant)  Relevant Medical History: Relapsed CML  Interdisciplinary Rounds: attended  General Information Comments: Day +13 of Busulfan/Cyclophosphamide Allo. c-diff + precautions in place. Pt PO intake 100%. pt appears nourished. Pt ambulating around room. Pt mouth sores have improved, no longer difficulty swallowing.   Nutrition Discharge Planning: Regular diet w/ adequate po intake + ONS as needed to meet increased needs    Nutrition Risk Screen    Nutrition Risk Screen: no indicators present    Nutrition/Diet History    Patient Reported Diet/Restrictions/Preferences: general  Typical Food/Fluid Intake: adequate maintaining nutritional status  Food Preferences: None  Do you have any cultural, spiritual, Mandaen conflicts, given your current situation?: none  Food Allergies: NKFA  Factors Affecting Nutritional Intake: None identified at this time    Anthropometrics    Temp: 98.6 °F (37 °C)  Height Method: Stated  Height: 5' 11" (180.3 cm)  Height (inches): 71 in  Weight Method: Standard Scale  Weight: 64 kg (141 lb 3.3 oz)  Weight (lb): 141.21 lb  Ideal Body Weight (IBW), Male: 172 lb  % Ideal Body Weight, Male (lb): 83.06 lb  BMI (Calculated): 20  BMI Grade: 18.5-24.9 - normal  Usual Body Weight (UBW), k.2 kg  Weight Change Amount: 22 lb 14.9 oz  % Usual Body Weight: 86.35  % Weight Change From Usual Weight: -13.83 %  Weight Loss Since Admission: 6 lb 2.8 oz  % Weight Change Since Admission: 4.32   "   Lab/Procedures/Meds    Pertinent Labs Reviewed: reviewed  Pertinent Labs Comments: WBC 9.92, Hemoglobin 9.8, Hematocrit 29.4, Platelets 52, BUN 5, magnesium 1, AST 8    Pertinent Medications Reviewed: reviewed  Pertinent Medications Comments: acyclovir, neupogen, heparin, Methotrexate, pantoprazole    Physical Findings/Assessment    Overall Physical Appearance: nourished  Oral/Mouth Cavity: all teeth present (age appropriate)  Skin: intact    Estimated/Assessed Needs    Weight Used For Calorie Calculations: 64.8 kg (142 lb 13.7 oz)  Energy Calorie Requirements (kcal): 1384-6545 kcal/day  Energy Need Method: Kcal/kg(30-35 kcal/kg)  Protein Requirements: 97-117g/day   (1.5-1.8g/kg)  Weight Used For Protein Calculations: 64.8 kg (142 lb 13.7 oz)     Fluid Need Method: RDA Method(1 ml/kcal or Per MD)  RDA Method (mL): 1944     Nutrition Prescription Ordered    Current Diet Order: Regular  Oral Nutrition Supplement: Boost TID    Evaluation of Received Nutrient/Fluid Intake    Oral Calories (kcal): 1080  Oral Protein (gm): 42  Oral Fluid (mL): 711  IV Fluid (mL): 1800  I/O: +12.8 SA  Energy Calories Required: meeting needs  Protein Required: meeting needs  Fluid Required: meeting needs  Comments: LBM: 10/21/18  % Intake of Estimated Energy Needs: 75 - 100 %  % Meal Intake: 75 - 100 %    Nutrition Risk    Level of Risk/Frequency of Follow-up: high     Assessment and Plan    Nutrition Problem  increased energy needs     Related to (etiology):   Physiological needs 2/2 CML w/SCT      Signs and Symptoms (as evidenced by):   Pt received SCT(day+13, increased Caloric/ Protein needs)      Nutrition Diagnosis Status:   Continues     Monitor and Evaluation    Food and Nutrient Intake: energy intake, food and beverage intake  Food and Nutrient Adminstration: diet order  Physical Activity and Function: nutrition-related ADLs and IADLs, factors affecting access to physical activity  Anthropometric Measurements: weight, weight  change, body mass index  Biochemical Data, Medical Tests and Procedures: electrolyte and renal panel, gastrointestinal profile, glucose/endocrine profile, inflammatory profile, lipid profile  Nutrition-Focused Physical Findings: overall appearance     Nutrition Follow-Up    RD Follow-up?: Yes

## 2018-10-22 NOTE — ASSESSMENT & PLAN NOTE
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 9% blasts.Started on MEC chemotherapy for refractory AML on 6/23/2018  - had BMBX on 7/13/18, without evidence of CML; BCR ABL p210 0.5% (c/w MRD)  - dasatinib 140 mg daily at home, will stop today when chemo starts  -pre-transplant bone marrow biopsy from 9/11/2018 shows a hematologic and molecular remission  - admitted for Busulfan/Cyclophosphamide MRD allogeneic stem cell transplant from his brother

## 2018-10-22 NOTE — TELEPHONE ENCOUNTER
----- Message from Katheryn Meyers RN sent at 10/22/2018 11:38 AM CDT -----  Contact: Katheryn   Can you please schedule Mr Guzmán for morning clinic collect labs (CBC,CMP,Mg,Phos,T&S,Tacro,EBV,CMV) and f/u with Dr Pantoja/NP on Wednesday and Friday?      Thanks

## 2018-10-22 NOTE — PROGRESS NOTES
BMT Pharmacist Discharge Note     All discharge medications were reviewed with the patient and his mother. Nine medications were sent to the [facility] pharmacy for bedside delivery: acyclovir, fluconazole, vancomycin oral, tacrolimus, ursodiol, amlodipine, magnesium, and zofran; atovaquone sent and put on profile to be filled the first week of November. The patient has dasatinib at home and knows to hold until 12/8/18 and will get refills through his mail order pharmacy. All questions were answered.      Medication Indication Morning Lunch/Early Afternoon Evening Night   Dasatinib 140mg  CML (start on 12/8/18) 1 tablet      Acyclovir 800mg  Viral infection prevention 1 tablet   1 tablet   Fluconazole 200mg  Fungal infection prevention 2 tablets      Atovaquone 750mg/5mL PCP pneumonia prevention   (start on 11/8/18) 10 mL      Vancomycin 125mg C. difficile infection 1 capsule 1 capsule 1 capsule 1 capsule   Tacrolimus (Prograf) 1mg* GVHD prevention - take AFTER labs on clinic days* 2 capsules   2 capsules   Ursodiol 300mg Liver protection (through 11/8/18) 1 capsule   1 capsule   Amlodipine 5mg Blood pressure 1 tablet      Magnesium Oxide 400mg  Supplement 2 tablets   2 tablets   *Dose may change at each appointment    AS NEEDED MEDICATIONS:  Ondansetron (Zofran) 8mg every 8 hours as needed for nausea    STOP UNTIL TOLD TO RESTART:  Dasatinib (see above)      Tanya Lambert, PharmD, BCPS, BCOP  Clinical Pharmacy Specialist - BMT/Hematology Oncology  SpectraLink: [phone]

## 2018-10-24 ENCOUNTER — OFFICE VISIT (OUTPATIENT)
Dept: HEMATOLOGY/ONCOLOGY | Facility: CLINIC | Age: 33
End: 2018-10-24
Payer: OTHER GOVERNMENT

## 2018-10-24 ENCOUNTER — INFUSION (OUTPATIENT)
Dept: INFUSION THERAPY | Facility: HOSPITAL | Age: 33
End: 2018-10-24
Attending: INTERNAL MEDICINE
Payer: OTHER GOVERNMENT

## 2018-10-24 VITALS
BODY MASS INDEX: 20.27 KG/M2 | WEIGHT: 144.81 LBS | HEART RATE: 99 BPM | OXYGEN SATURATION: 100 % | DIASTOLIC BLOOD PRESSURE: 76 MMHG | HEIGHT: 71 IN | SYSTOLIC BLOOD PRESSURE: 122 MMHG | TEMPERATURE: 99 F

## 2018-10-24 VITALS
SYSTOLIC BLOOD PRESSURE: 113 MMHG | RESPIRATION RATE: 18 BRPM | TEMPERATURE: 99 F | DIASTOLIC BLOOD PRESSURE: 74 MMHG | HEART RATE: 110 BPM

## 2018-10-24 DIAGNOSIS — C92.10 CML (CHRONIC MYELOCYTIC LEUKEMIA): ICD-10-CM

## 2018-10-24 DIAGNOSIS — C92.10 CML (CHRONIC MYELOCYTIC LEUKEMIA): Primary | ICD-10-CM

## 2018-10-24 DIAGNOSIS — D75.9 CYTOPENIA: ICD-10-CM

## 2018-10-24 DIAGNOSIS — Z94.81 S/P ALLOGENEIC BONE MARROW TRANSPLANT: ICD-10-CM

## 2018-10-24 DIAGNOSIS — A04.72 C. DIFFICILE DIARRHEA: ICD-10-CM

## 2018-10-24 DIAGNOSIS — E83.42 HYPOMAGNESEMIA: Primary | ICD-10-CM

## 2018-10-24 DIAGNOSIS — C92.11 CHRONIC MYELOID LEUKEMIA, BCR/ABL-POSITIVE, IN REMISSION: Primary | ICD-10-CM

## 2018-10-24 DIAGNOSIS — D57.3 SICKLE CELL TRAIT: ICD-10-CM

## 2018-10-24 DIAGNOSIS — E83.42 HYPOMAGNESEMIA: ICD-10-CM

## 2018-10-24 LAB
ABO + RH BLD: NORMAL
ACANTHOCYTES BLD QL SMEAR: PRESENT
ALBUMIN SERPL BCP-MCNC: 3.5 G/DL
ALP SERPL-CCNC: 115 U/L
ALT SERPL W/O P-5'-P-CCNC: 17 U/L
ANION GAP SERPL CALC-SCNC: 8 MMOL/L
ANISOCYTOSIS BLD QL SMEAR: SLIGHT
AST SERPL-CCNC: 9 U/L
BASOPHILS # BLD AUTO: ABNORMAL K/UL
BASOPHILS NFR BLD: 0 %
BILIRUB SERPL-MCNC: 0.3 MG/DL
BLD GP AB SCN CELLS X3 SERPL QL: NORMAL
BUN SERPL-MCNC: 8 MG/DL
CALCIUM SERPL-MCNC: 9.9 MG/DL
CHLORIDE SERPL-SCNC: 107 MMOL/L
CO2 SERPL-SCNC: 26 MMOL/L
CREAT SERPL-MCNC: 1 MG/DL
DACRYOCYTES BLD QL SMEAR: ABNORMAL
DIFFERENTIAL METHOD: ABNORMAL
DOHLE BOD BLD QL SMEAR: PRESENT
EOSINOPHIL # BLD AUTO: ABNORMAL K/UL
EOSINOPHIL NFR BLD: 0 %
ERYTHROCYTE [DISTWIDTH] IN BLOOD BY AUTOMATED COUNT: 12.9 %
EST. GFR  (AFRICAN AMERICAN): >60 ML/MIN/1.73 M^2
EST. GFR  (NON AFRICAN AMERICAN): >60 ML/MIN/1.73 M^2
GLUCOSE SERPL-MCNC: 123 MG/DL
HCT VFR BLD AUTO: 31.9 %
HGB BLD-MCNC: 10.5 G/DL
IMM GRANULOCYTES # BLD AUTO: ABNORMAL K/UL
IMM GRANULOCYTES NFR BLD AUTO: ABNORMAL %
LYMPHOCYTES # BLD AUTO: ABNORMAL K/UL
LYMPHOCYTES NFR BLD: 12 %
MAGNESIUM SERPL-MCNC: 1.3 MG/DL
MCH RBC QN AUTO: 29.6 PG
MCHC RBC AUTO-ENTMCNC: 32.9 G/DL
MCV RBC AUTO: 90 FL
MONOCYTES # BLD AUTO: ABNORMAL K/UL
MONOCYTES NFR BLD: 21 %
NEUTROPHILS NFR BLD: 65 %
NEUTS BAND NFR BLD MANUAL: 2 %
NRBC BLD-RTO: 1 /100 WBC
OVALOCYTES BLD QL SMEAR: ABNORMAL
PHOSPHATE SERPL-MCNC: 4.3 MG/DL
PLATELET # BLD AUTO: 124 K/UL
PLATELET BLD QL SMEAR: ABNORMAL
PMV BLD AUTO: 12 FL
POIKILOCYTOSIS BLD QL SMEAR: SLIGHT
POLYCHROMASIA BLD QL SMEAR: ABNORMAL
POTASSIUM SERPL-SCNC: 3.9 MMOL/L
PROT SERPL-MCNC: 6.5 G/DL
RBC # BLD AUTO: 3.55 M/UL
SCHISTOCYTES BLD QL SMEAR: ABNORMAL
SCHISTOCYTES BLD QL SMEAR: PRESENT
SODIUM SERPL-SCNC: 141 MMOL/L
TACROLIMUS BLD-MCNC: 13 NG/ML
TOXIC GRANULES BLD QL SMEAR: PRESENT
WBC # BLD AUTO: 2.37 K/UL

## 2018-10-24 PROCEDURE — 80053 COMPREHEN METABOLIC PANEL: CPT

## 2018-10-24 PROCEDURE — 96365 THER/PROPH/DIAG IV INF INIT: CPT

## 2018-10-24 PROCEDURE — 36415 COLL VENOUS BLD VENIPUNCTURE: CPT

## 2018-10-24 PROCEDURE — 99999 PR PBB SHADOW E&M-EST. PATIENT-LVL III: CPT | Mod: PBBFAC,,, | Performed by: INTERNAL MEDICINE

## 2018-10-24 PROCEDURE — 80197 ASSAY OF TACROLIMUS: CPT

## 2018-10-24 PROCEDURE — 85007 BL SMEAR W/DIFF WBC COUNT: CPT

## 2018-10-24 PROCEDURE — 63600175 PHARM REV CODE 636 W HCPCS: Performed by: INTERNAL MEDICINE

## 2018-10-24 PROCEDURE — 85027 COMPLETE CBC AUTOMATED: CPT

## 2018-10-24 PROCEDURE — 25000003 PHARM REV CODE 250: Performed by: INTERNAL MEDICINE

## 2018-10-24 PROCEDURE — 99213 OFFICE O/P EST LOW 20 MIN: CPT | Mod: PBBFAC,25 | Performed by: INTERNAL MEDICINE

## 2018-10-24 PROCEDURE — 99215 OFFICE O/P EST HI 40 MIN: CPT | Mod: S$PBB,,, | Performed by: INTERNAL MEDICINE

## 2018-10-24 PROCEDURE — 36592 COLLECT BLOOD FROM PICC: CPT

## 2018-10-24 PROCEDURE — 86850 RBC ANTIBODY SCREEN: CPT

## 2018-10-24 PROCEDURE — 87799 DETECT AGENT NOS DNA QUANT: CPT

## 2018-10-24 PROCEDURE — A4216 STERILE WATER/SALINE, 10 ML: HCPCS | Performed by: INTERNAL MEDICINE

## 2018-10-24 PROCEDURE — 84100 ASSAY OF PHOSPHORUS: CPT

## 2018-10-24 PROCEDURE — 83735 ASSAY OF MAGNESIUM: CPT

## 2018-10-24 RX ORDER — TACROLIMUS 1 MG/1
CAPSULE ORAL
Qty: 90 CAPSULE | Refills: 11 | Status: SHIPPED | OUTPATIENT
Start: 2018-10-24 | End: 2018-11-01

## 2018-10-24 RX ORDER — SODIUM CHLORIDE 0.9 % (FLUSH) 0.9 %
10 SYRINGE (ML) INJECTION
Status: COMPLETED | OUTPATIENT
Start: 2018-10-24 | End: 2018-10-24

## 2018-10-24 RX ORDER — NAPROXEN 500 MG/1
500 TABLET ORAL
COMMUNITY
Start: 2018-02-04 | End: 2018-12-10

## 2018-10-24 RX ORDER — HEPARIN SODIUM 1000 [USP'U]/ML
1000 INJECTION, SOLUTION INTRAVENOUS; SUBCUTANEOUS
Status: CANCELLED
Start: 2018-10-24

## 2018-10-24 RX ORDER — LORAZEPAM/0.9% SODIUM CHLORIDE 100MG/0.1L
2 PLASTIC BAG, INJECTION (ML) INTRAVENOUS
Status: CANCELLED | OUTPATIENT
Start: 2018-10-24 | End: 2018-10-24

## 2018-10-24 RX ORDER — HEPARIN 100 UNIT/ML
500 SYRINGE INTRAVENOUS
Status: CANCELLED | OUTPATIENT
Start: 2018-10-24

## 2018-10-24 RX ORDER — LORAZEPAM/0.9% SODIUM CHLORIDE 100MG/0.1L
2 PLASTIC BAG, INJECTION (ML) INTRAVENOUS
Status: COMPLETED | OUTPATIENT
Start: 2018-10-24 | End: 2018-10-24

## 2018-10-24 RX ORDER — HEPARIN 100 UNIT/ML
500 SYRINGE INTRAVENOUS
Status: COMPLETED | OUTPATIENT
Start: 2018-10-24 | End: 2018-10-24

## 2018-10-24 RX ORDER — SODIUM CHLORIDE 0.9 % (FLUSH) 0.9 %
10 SYRINGE (ML) INJECTION
Status: CANCELLED | OUTPATIENT
Start: 2018-10-24

## 2018-10-24 RX ORDER — HEPARIN 100 UNIT/ML
500 SYRINGE INTRAVENOUS
Status: DISCONTINUED | OUTPATIENT
Start: 2018-10-24 | End: 2018-10-24 | Stop reason: HOSPADM

## 2018-10-24 RX ORDER — HEPARIN SODIUM 1000 [USP'U]/ML
1000 INJECTION, SOLUTION INTRAVENOUS; SUBCUTANEOUS
Status: COMPLETED | OUTPATIENT
Start: 2018-10-24 | End: 2018-10-24

## 2018-10-24 RX ADMIN — Medication 10 ML: at 08:10

## 2018-10-24 RX ADMIN — HEPARIN 500 UNITS: 100 SYRINGE at 04:10

## 2018-10-24 RX ADMIN — MAGNESIUM SULFATE HEPTAHYDRATE 2 G: 40 INJECTION, SOLUTION INTRAVENOUS at 03:10

## 2018-10-24 RX ADMIN — Medication 10 ML: at 04:10

## 2018-10-24 RX ADMIN — HEPARIN SODIUM 1000 UNITS: 1000 INJECTION, SOLUTION INTRAVENOUS; SUBCUTANEOUS at 08:10

## 2018-10-24 NOTE — PLAN OF CARE
Problem: Patient Care Overview  Goal: Plan of Care Review  Outcome: Ongoing (interventions implemented as appropriate)  Pt received 2g IV Mag, tolerated well. VSS and NAD. Pt instructed to call MD with any concerns. Pt discharged home indpendently

## 2018-10-24 NOTE — NURSING
Patient here for blood draw from right chest line-red line with good blood return-blood to lab-line flushed.

## 2018-10-25 LAB
CMV DNA SERPL NAA+PROBE-ACNC: <35 IU/ML
EBV DNA BY PCR: NORMAL IU/ML

## 2018-10-26 ENCOUNTER — INFUSION (OUTPATIENT)
Dept: INFUSION THERAPY | Facility: HOSPITAL | Age: 33
End: 2018-10-26
Attending: INTERNAL MEDICINE
Payer: OTHER GOVERNMENT

## 2018-10-26 ENCOUNTER — OFFICE VISIT (OUTPATIENT)
Dept: HEMATOLOGY/ONCOLOGY | Facility: CLINIC | Age: 33
End: 2018-10-26
Payer: OTHER GOVERNMENT

## 2018-10-26 VITALS
TEMPERATURE: 98 F | RESPIRATION RATE: 19 BRPM | OXYGEN SATURATION: 98 % | HEIGHT: 71 IN | SYSTOLIC BLOOD PRESSURE: 117 MMHG | HEART RATE: 123 BPM | DIASTOLIC BLOOD PRESSURE: 73 MMHG | WEIGHT: 144.19 LBS | BODY MASS INDEX: 20.19 KG/M2

## 2018-10-26 DIAGNOSIS — Z94.81 S/P ALLOGENEIC BONE MARROW TRANSPLANT: Primary | ICD-10-CM

## 2018-10-26 DIAGNOSIS — C92.11 CHRONIC MYELOID LEUKEMIA, BCR/ABL-POSITIVE, IN REMISSION: Primary | ICD-10-CM

## 2018-10-26 DIAGNOSIS — T45.1X5A LEUKOPENIA DUE TO ANTINEOPLASTIC CHEMOTHERAPY: ICD-10-CM

## 2018-10-26 DIAGNOSIS — L29.0 RECTAL ITCHING: ICD-10-CM

## 2018-10-26 DIAGNOSIS — E83.42 HYPOMAGNESEMIA: ICD-10-CM

## 2018-10-26 DIAGNOSIS — C92.10 CML (CHRONIC MYELOCYTIC LEUKEMIA): ICD-10-CM

## 2018-10-26 DIAGNOSIS — D70.1 LEUKOPENIA DUE TO ANTINEOPLASTIC CHEMOTHERAPY: ICD-10-CM

## 2018-10-26 DIAGNOSIS — Z94.81 S/P ALLOGENEIC BONE MARROW TRANSPLANT: ICD-10-CM

## 2018-10-26 DIAGNOSIS — L30.8 DERMATITIS ASSOCIATED WITH MOISTURE: ICD-10-CM

## 2018-10-26 DIAGNOSIS — T45.1X5A ANEMIA ASSOCIATED WITH CHEMOTHERAPY: ICD-10-CM

## 2018-10-26 DIAGNOSIS — D64.81 ANEMIA ASSOCIATED WITH CHEMOTHERAPY: ICD-10-CM

## 2018-10-26 PROBLEM — K12.31 MUCOSITIS DUE TO CHEMOTHERAPY: Status: RESOLVED | Noted: 2018-10-18 | Resolved: 2018-10-26

## 2018-10-26 PROBLEM — R11.0 CHEMOTHERAPY-INDUCED NAUSEA: Status: RESOLVED | Noted: 2018-10-09 | Resolved: 2018-10-26

## 2018-10-26 LAB
ABO + RH BLD: NORMAL
ALBUMIN SERPL BCP-MCNC: 3.5 G/DL
ALP SERPL-CCNC: 114 U/L
ALT SERPL W/O P-5'-P-CCNC: 16 U/L
ANION GAP SERPL CALC-SCNC: 6 MMOL/L
AST SERPL-CCNC: 11 U/L
BASOPHILS # BLD AUTO: 0.03 K/UL
BASOPHILS NFR BLD: 1.3 %
BILIRUB SERPL-MCNC: 0.3 MG/DL
BLD GP AB SCN CELLS X3 SERPL QL: NORMAL
BUN SERPL-MCNC: 17 MG/DL
CALCIUM SERPL-MCNC: 9.7 MG/DL
CHLORIDE SERPL-SCNC: 107 MMOL/L
CO2 SERPL-SCNC: 28 MMOL/L
CREAT SERPL-MCNC: 0.9 MG/DL
DIFFERENTIAL METHOD: ABNORMAL
EOSINOPHIL # BLD AUTO: 0 K/UL
EOSINOPHIL NFR BLD: 1.7 %
ERYTHROCYTE [DISTWIDTH] IN BLOOD BY AUTOMATED COUNT: 13.6 %
EST. GFR  (AFRICAN AMERICAN): >60 ML/MIN/1.73 M^2
EST. GFR  (NON AFRICAN AMERICAN): >60 ML/MIN/1.73 M^2
GLUCOSE SERPL-MCNC: 125 MG/DL
HCT VFR BLD AUTO: 33.1 %
HGB BLD-MCNC: 11.1 G/DL
IMM GRANULOCYTES # BLD AUTO: 0.11 K/UL
IMM GRANULOCYTES NFR BLD AUTO: 4.7 %
LYMPHOCYTES # BLD AUTO: 0.3 K/UL
LYMPHOCYTES NFR BLD: 13.7 %
MAGNESIUM SERPL-MCNC: 1.5 MG/DL
MCH RBC QN AUTO: 30.4 PG
MCHC RBC AUTO-ENTMCNC: 33.5 G/DL
MCV RBC AUTO: 91 FL
MONOCYTES # BLD AUTO: 0.8 K/UL
MONOCYTES NFR BLD: 32.1 %
NEUTROPHILS # BLD AUTO: 1.1 K/UL
NEUTROPHILS NFR BLD: 46.5 %
NRBC BLD-RTO: 0 /100 WBC
PHOSPHATE SERPL-MCNC: 3.8 MG/DL
PLATELET # BLD AUTO: 166 K/UL
PMV BLD AUTO: 10.9 FL
POTASSIUM SERPL-SCNC: 4.3 MMOL/L
PROT SERPL-MCNC: 6.4 G/DL
RBC # BLD AUTO: 3.65 M/UL
SODIUM SERPL-SCNC: 141 MMOL/L
TACROLIMUS BLD-MCNC: 10.5 NG/ML
WBC # BLD AUTO: 2.34 K/UL

## 2018-10-26 PROCEDURE — 80053 COMPREHEN METABOLIC PANEL: CPT

## 2018-10-26 PROCEDURE — 99213 OFFICE O/P EST LOW 20 MIN: CPT | Mod: PBBFAC,25 | Performed by: NURSE PRACTITIONER

## 2018-10-26 PROCEDURE — 87799 DETECT AGENT NOS DNA QUANT: CPT

## 2018-10-26 PROCEDURE — 80197 ASSAY OF TACROLIMUS: CPT

## 2018-10-26 PROCEDURE — 83735 ASSAY OF MAGNESIUM: CPT

## 2018-10-26 PROCEDURE — 63600175 PHARM REV CODE 636 W HCPCS: Performed by: INTERNAL MEDICINE

## 2018-10-26 PROCEDURE — 99215 OFFICE O/P EST HI 40 MIN: CPT | Mod: S$PBB,,, | Performed by: NURSE PRACTITIONER

## 2018-10-26 PROCEDURE — A4216 STERILE WATER/SALINE, 10 ML: HCPCS | Performed by: INTERNAL MEDICINE

## 2018-10-26 PROCEDURE — 84100 ASSAY OF PHOSPHORUS: CPT

## 2018-10-26 PROCEDURE — 25000003 PHARM REV CODE 250: Performed by: INTERNAL MEDICINE

## 2018-10-26 PROCEDURE — 85025 COMPLETE CBC W/AUTO DIFF WBC: CPT

## 2018-10-26 PROCEDURE — 86901 BLOOD TYPING SEROLOGIC RH(D): CPT

## 2018-10-26 PROCEDURE — 36592 COLLECT BLOOD FROM PICC: CPT

## 2018-10-26 PROCEDURE — 99999 PR PBB SHADOW E&M-EST. PATIENT-LVL III: CPT | Mod: PBBFAC,,, | Performed by: NURSE PRACTITIONER

## 2018-10-26 RX ORDER — HEPARIN 100 UNIT/ML
500 SYRINGE INTRAVENOUS
Status: CANCELLED | OUTPATIENT
Start: 2018-10-26

## 2018-10-26 RX ORDER — NYSTATIN 100000 [USP'U]/G
POWDER TOPICAL
Qty: 60 G | Refills: 0 | Status: SHIPPED | OUTPATIENT
Start: 2018-10-26 | End: 2018-12-10

## 2018-10-26 RX ORDER — SODIUM CHLORIDE 0.9 % (FLUSH) 0.9 %
10 SYRINGE (ML) INJECTION
Status: CANCELLED | OUTPATIENT
Start: 2018-10-26

## 2018-10-26 RX ORDER — HEPARIN 100 UNIT/ML
500 SYRINGE INTRAVENOUS
Status: COMPLETED | OUTPATIENT
Start: 2018-10-26 | End: 2018-10-26

## 2018-10-26 RX ORDER — LANOLIN ALCOHOL/MO/W.PET/CERES
1200 CREAM (GRAM) TOPICAL 2 TIMES DAILY
Refills: 0
Start: 2018-10-26 | End: 2019-01-22

## 2018-10-26 RX ORDER — HEPARIN SODIUM 1000 [USP'U]/ML
1000 INJECTION, SOLUTION INTRAVENOUS; SUBCUTANEOUS
Status: CANCELLED
Start: 2018-10-26

## 2018-10-26 RX ORDER — SODIUM CHLORIDE 0.9 % (FLUSH) 0.9 %
10 SYRINGE (ML) INJECTION
Status: COMPLETED | OUTPATIENT
Start: 2018-10-26 | End: 2018-10-26

## 2018-10-26 RX ADMIN — HEPARIN 500 UNITS: 100 SYRINGE at 10:10

## 2018-10-26 RX ADMIN — Medication 10 ML: at 10:10

## 2018-10-26 NOTE — NURSING
10:40--Pt arrived for labs from Triple lumen CVC.  All 3 lumens flushes easily.  Labs sent.  Pt now going to MD appt with family.

## 2018-10-26 NOTE — PROGRESS NOTES
HEMATOLOGIC MALIGNANCIES PROGRESS NOTE    IDENTIFYING STATEMENT   Wilton Guzmán (Wilton) is a 33 y.o. 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      ONCOLOGY HISTORY:    1. Chronic myeloid leukemia, initially presenting in blast phase   A. 2018: Began developing left-sided abdominal pain - eventually requiring evaluation - WBC ~150 at  base; transferred to [facility] in [place], [place]   B. BMBx showed 20% myelomonoblasts concerning for CML in blast phase; bcr-abl positive (returned for Day 8 of induction therapy).    C. 2018: Began 7+3 induction chemotherapy. Began dasatinib (dose-adjusted) on day 8.   D. 2018: Transferred to [facility]   E. 2018: BMBx showed variably cellular marrow (10-40%) with 9% CD34+ blasts, concerning for residual disease.   F. 2018: Reinduction with MEC (mitoxantrone, etoposide, cytarabine) chemotherapy.    G. 2018: BMBx shows hypocellular bone marrow with no definite morphologic or immunophenotypic evidence of residual leukemia; bcr-abl p210 transcript was detected at 0.5%   H. Delay in clinic follow-up due to challenges with  insurance   I. 2018: Follow-up in clinic; WBC 6.77 (normal diff); Hgb 11.2 g/dl; Plt 298; BMBx shows 30% cellular marrow with no morphologic evidence of residual leukemia; bcr-abl p210 detected at 0.05% on international scale (MR 3.3).    J. 2018: BMBx shows 40% cellular marrow with no morphologic evidence of leukemia. Chromosomes 46, XY. Bcr-abl p210 detected at 0.1% on international scale (MR 3.0).    K. 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 Engrafted on Day +12. Course complicated by C. Difficile colitis.     INTERVAL HISTORY:      Mr. Guzmán returns to clinic for first post-hospital follow-up of his allogeneic stem cell transplant. Today is Day +15. He is doing okay  overall. His appetite is improving, and he is starting to eat. Diarrhea from C. Diff colitis is improving. He otherwise has no rash, diarrhea, jaundice, fever, chills. He is feeling well.     Past Medical History, Past Social History and Past Family History have been reviewed and are unchanged except as noted in the interval history.    MEDICATIONS:     Current Outpatient Medications on File Prior to Visit   Medication Sig Dispense Refill    acyclovir (ZOVIRAX) 800 MG Tab Take 1 tablet (800 mg total) by mouth 2 (two) times daily. 60 tablet 11    amLODIPine (NORVASC) 5 MG tablet Take 1 tablet (5 mg total) by mouth once daily. 30 tablet 5    [START ON 11/8/2018] atovaquone (MEPRON) 750 mg/5 mL Susp Take 10 mLs (1,500 mg total) by mouth once daily. Starting on 11/8/18 300 mL 5    [START ON 12/8/2018] dasatinib (SPRYCEL) 140 mg Tab Take 1 tablet by mouth once daily Start on day +60 (12/8/18). 30 tablet 11    fluconazole (DIFLUCAN) 200 MG Tab Take 2 tablets (400 mg total) by mouth once daily. 60 tablet 5    magnesium oxide (MAG-OX) 400 mg (241.3 mg magnesium) tablet Take 2 tablets (800 mg total) by mouth 2 (two) times daily.  0    ondansetron (ZOFRAN-ODT) 8 MG TbDL DISSOLVE 1 tablet (8 mg total) by mouth every 8 (eight) hours as needed (nausea). 15 tablet 2    ursodiol (ACTIGALL) 300 mg capsule Take 1 capsule (300 mg total) by mouth 2 (two) times daily. for 17 days 34 capsule 0    vancomycin (VANCOCIN) 125 MG capsule Take 1 capsule (125 mg total) by mouth every 6 (six) hours. for 5 days 20 capsule 0     No current facility-administered medications on file prior to visit.        ALLERGIES: Review of patient's allergies indicates:  No Known Allergies     ROS:       Review of Systems   Constitutional: Positive for fatigue. Negative for diaphoresis, fever and unexpected weight change.   HENT:   Negative for lump/mass and sore throat.    Eyes: Negative for icterus.   Respiratory: Negative for cough and shortness of  "breath.    Cardiovascular: Negative for chest pain and palpitations.   Gastrointestinal: Positive for diarrhea. Negative for abdominal distention, constipation, nausea and vomiting.   Genitourinary: Negative for dysuria and frequency.    Musculoskeletal: Negative for arthralgias, gait problem and myalgias.   Skin: Negative for rash.   Neurological: Negative for dizziness, gait problem and headaches.   Hematological: Negative for adenopathy. Does not bruise/bleed easily.   Psychiatric/Behavioral: Negative for depression. The patient is nervous/anxious.        PHYSICAL EXAM:  Vitals:    10/24/18 1008   BP: 122/76   Pulse: 99   Temp: 98.7 °F (37.1 °C)   SpO2: 100%   Weight: 65.7 kg (144 lb 13.5 oz)   Height: 5' 11" (1.803 m)   PainSc: 0-No pain       KARNOFSKY PERFORMANCE STATUS 80%  ECOG 1    Physical Exam   Constitutional: He is oriented to person, place, and time. He appears well-developed and well-nourished. No distress.   HENT:   Head: Normocephalic and atraumatic.   Mouth/Throat: Mucous membranes are normal. No oral lesions.   Eyes: Conjunctivae are normal.   Neck: No thyromegaly present.   Cardiovascular: Normal rate, regular rhythm and normal heart sounds.   No murmur heard.  Pulmonary/Chest: Breath sounds normal. He has no wheezes. He has no rales.   Central venous catheter in place.    Abdominal: Soft. He exhibits no distension and no mass. There is no splenomegaly or hepatomegaly. There is no tenderness.   Lymphadenopathy:     He has no cervical adenopathy.        Right cervical: No deep cervical adenopathy present.       Left cervical: No deep cervical adenopathy present.     He has no axillary adenopathy.        Right: No inguinal adenopathy present.        Left: No inguinal adenopathy present.   Neurological: He is alert and oriented to person, place, and time. He has normal strength and normal reflexes. No cranial nerve deficit. Coordination normal.   Skin: No rash noted.       LAB:   Results for orders " placed or performed in visit on 10/24/18   Comprehensive metabolic panel   Result Value Ref Range    Sodium 141 136 - 145 mmol/L    Potassium 3.9 3.5 - 5.1 mmol/L    Chloride 107 95 - 110 mmol/L    CO2 26 23 - 29 mmol/L    Glucose 123 (H) 70 - 110 mg/dL    BUN, Bld 8 6 - 20 mg/dL    Creatinine 1.0 0.5 - 1.4 mg/dL    Calcium 9.9 8.7 - 10.5 mg/dL    Total Protein 6.5 6.0 - 8.4 g/dL    Albumin 3.5 3.5 - 5.2 g/dL    Total Bilirubin 0.3 0.1 - 1.0 mg/dL    Alkaline Phosphatase 115 55 - 135 U/L    AST 9 (L) 10 - 40 U/L    ALT 17 10 - 44 U/L    Anion Gap 8 8 - 16 mmol/L    eGFR if African American >60.0 >60 mL/min/1.73 m^2    eGFR if non African American >60.0 >60 mL/min/1.73 m^2   Magnesium   Result Value Ref Range    Magnesium 1.3 (L) 1.6 - 2.6 mg/dL   Phosphorus   Result Value Ref Range    Phosphorus 4.3 2.7 - 4.5 mg/dL   CMV DNA, quantitative, PCR   Result Value Ref Range    Cytomegalovirus PCR, Quant <35 (A) Undetected IU/mL   MANNIE-GARCIA VIRUS DNA, QUANTITATIVE (PCR)   Result Value Ref Range    EBV DNA, PCR Undetected Undetected IU/mL   TACROLIMUS LEVEL   Result Value Ref Range    Tacrolimus Lvl 13.0 5.0 - 15.0 ng/mL   CBC auto differential   Result Value Ref Range    WBC 2.37 (L) 3.90 - 12.70 K/uL    RBC 3.55 (L) 4.60 - 6.20 M/uL    Hemoglobin 10.5 (L) 14.0 - 18.0 g/dL    Hematocrit 31.9 (L) 40.0 - 54.0 %    MCV 90 82 - 98 fL    MCH 29.6 27.0 - 31.0 pg    MCHC 32.9 32.0 - 36.0 g/dL    RDW 12.9 11.5 - 14.5 %    Platelets 124 (L) 150 - 350 K/uL    MPV 12.0 9.2 - 12.9 fL    Immature Granulocytes CANCELED 0.0 - 0.5 %    Immature Grans (Abs) CANCELED 0.00 - 0.04 K/uL    Lymph # CANCELED 1.0 - 4.8 K/uL    Mono # CANCELED 0.3 - 1.0 K/uL    Eos # CANCELED 0.0 - 0.5 K/uL    Baso # CANCELED 0.00 - 0.20 K/uL    nRBC 1 (A) 0 /100 WBC    Gran% 65.0 38.0 - 73.0 %    Lymph% 12.0 (L) 18.0 - 48.0 %    Mono% 21.0 (H) 4.0 - 15.0 %    Eosinophil% 0.0 0.0 - 8.0 %    Basophil% 0.0 0.0 - 1.9 %    Bands 2.0 %    Platelet Estimate Decreased  (A)     Aniso Slight     Poik Slight     Poly Occasional     Ovalocytes Occasional     Tear Drop Cells Occasional     Acanthocytes Present     Schistocytes Present     Dohle Bodies Present     Toxic Granulation Present     Fragmented Cells Occasional     Differential Method Manual    Type & Screen   Result Value Ref Range    Group & Rh O POS     Indirect Wes NEG        PROBLEMS ASSESSED THIS VISIT:    1. CML (chronic myelocytic leukemia)    2. S/P allogeneic bone marrow transplant    3. Sickle cell trait    4. C. difficile diarrhea    5. Cytopenia    6. Hypomagnesemia        PLAN:        History allo transplant for CML  - See oncology history above  - s/p alloSCT on 10/9/2018  - Neutrophil engraftment on Day +12.   - Currently Day +15  - Will plan maintenance dasatinib at Day +60     GVHD   - No evidence of acute GVHD today. Continue tacrolimus prophylaxis  - 10/24/2018: Tacro reduced to 2 mg qAM and 1 mg qPM     ID  - Complete PO vancomycin for C. Diff  - Continue ppx with acyclovir, fluconazole  - Begin atovaquone ppx at Day +30  - Monitor CMV once weekly and treat pre-emptively if PCR is positive    Cytopenias  - Today: WBC 2.37, Hgb 10.5, Plt 124. No transfusions indicated  - Donor has sickle cell trait. Some anemia is expected long-term  - Both neutrophils and platelets appear engrafted.      FEN/GI  - electrolytes normal, continue mg ox 400 BID while on tacro  - 2 mg IV mag sulfate in infusion today    Follow-up: On friday and twice weekly thereafter    Kishor Pantoja MD  Hematology and Stem Cell Transplant  Distress Screening Results: Psychosocial Distress screening score of Distress Score: 0 noted and reviewed. No intervention indicated.

## 2018-10-26 NOTE — PROGRESS NOTES
HEMATOLOGIC MALIGNANCIES PROGRESS NOTE    IDENTIFYING STATEMENT   Wilton Guzmán (Wilton) is a 33 y.o. male with a  of 1985 from [place], [place] with the diagnosis of CML in blast phase.      ONCOLOGY HISTORY:    1. Chronic myeloid leukemia, initially presenting in blast phase   A. 2018: Began developing left-sided abdominal pain - eventually requiring evaluation - WBC ~150 at  base; transferred to [facility] in [place], [place]   B. BMBx showed 20% myelomonoblasts concerning for CML in blast phase; bcr-abl positive (returned for Day 8 of induction therapy).    C. 2018: Began 7+3 induction chemotherapy. Began dasatinib (dose-adjusted) on day 8.   D. 2018: Transferred to [facility]   E. 2018: BMBx showed variably cellular marrow (10-40%) with 9% CD34+ blasts, concerning for residual disease.   F. 2018: Reinduction with MEC (mitoxantrone, etoposide, cytarabine) chemotherapy.    G. 2018: BMBx shows hypocellular bone marrow with no definite morphologic or immunophenotypic evidence of residual leukemia; bcr-abl p210 transcript was detected at 0.5%   H. Delay in clinic follow-up due to challenges with  insurance   I. 2018: Follow-up in clinic; WBC 6.77 (normal diff); Hgb 11.2 g/dl; Plt 298; BMBx shows 30% cellular marrow with no morphologic evidence of residual leukemia; bcr-abl p210 detected at 0.05% on international scale (MR 3.3).    J. 2018: BMBx shows 40% cellular marrow with no morphologic evidence of leukemia. Chromosomes 46, XY. Bcr-abl p210 detected at 0.1% on international scale (MR 3.0).    K. 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 Engrafted on Day +12. Course complicated by C. Difficile colitis.     INTERVAL HISTORY:      Mr. Guzmán returns to clinic for first post-hospital follow-up of his allogeneic stem cell transplant. Today is Day +17. He is doing okay  overall. His appetite is improving, and he is starting to eat. Diarrhea resolved. He has completed his course of PO Vanc for C. Diff. He otherwise has no rash, diarrhea, jaundice, fever, chills, nausea, or vomiting. He is feeling well. He does have some itching and moisture associated dermatitis noted to inner buttocks.     Past Medical History, Past Social History and Past Family History have been reviewed and are unchanged except as noted in the interval history.    MEDICATIONS:     Current Outpatient Medications on File Prior to Visit   Medication Sig Dispense Refill    acyclovir (ZOVIRAX) 800 MG Tab Take 1 tablet (800 mg total) by mouth 2 (two) times daily. 60 tablet 11    [START ON 11/8/2018] atovaquone (MEPRON) 750 mg/5 mL Susp Take 10 mLs (1,500 mg total) by mouth once daily. Starting on 11/8/18 300 mL 5    [START ON 12/8/2018] dasatinib (SPRYCEL) 140 mg Tab Take 1 tablet by mouth once daily Start on day +60 (12/8/18). 30 tablet 11    fluconazole (DIFLUCAN) 200 MG Tab Take 2 tablets (400 mg total) by mouth once daily. 60 tablet 5    naproxen (NAPROSYN) 500 MG tablet Take 500 mg by mouth.      ondansetron (ZOFRAN-ODT) 8 MG TbDL DISSOLVE 1 tablet (8 mg total) by mouth every 8 (eight) hours as needed (nausea). 15 tablet 2    tacrolimus (PROGRAF) 1 MG Cap Take 2 capsules (2 mg total) by mouth every morning AND 1 capsule (1 mg total) every evening.  Take after blood work on clinic appointment days 90 capsule 11    ursodiol (ACTIGALL) 300 mg capsule Take 1 capsule (300 mg total) by mouth 2 (two) times daily. for 17 days 34 capsule 0     No current facility-administered medications on file prior to visit.        ALLERGIES: Review of patient's allergies indicates:  No Known Allergies     ROS:       Review of Systems   Constitutional: Positive for fatigue. Negative for diaphoresis, fever and unexpected weight change.   HENT:   Negative for lump/mass and sore throat.    Eyes: Negative for icterus.  "  Respiratory: Negative for cough and shortness of breath.    Cardiovascular: Negative for chest pain and palpitations.   Gastrointestinal: Negative for abdominal distention, constipation, diarrhea, nausea and vomiting.   Genitourinary: Negative for dysuria and frequency.    Musculoskeletal: Negative for arthralgias, gait problem and myalgias.   Skin: Negative for rash. Itching and peeling skin to inner buttocks.  Neurological: Negative for dizziness, gait problem and headaches.   Hematological: Negative for adenopathy. Does not bruise/bleed easily.   Psychiatric/Behavioral: Negative for depression. The patient is nervous/anxious.        PHYSICAL EXAM:  Vitals:    10/26/18 1115   BP: 117/73   Pulse: (!) 123   Resp: 19   Temp: 98.3 °F (36.8 °C)   SpO2: 98%   Weight: 65.4 kg (144 lb 2.9 oz)   Height: 5' 11" (1.803 m)   PainSc: 0-No pain       KARNOFSKY PERFORMANCE STATUS 80%  ECOG 1    Physical Exam   Constitutional: He is oriented to person, place, and time. He appears well-developed and well-nourished. No distress.   HENT:   Head: Normocephalic and atraumatic.   Mouth/Throat: Mucous membranes are normal. No oral lesions.   Eyes: Conjunctivae are normal.   Neck: No thyromegaly present.   Cardiovascular: Normal rate, regular rhythm and normal heart sounds.   No murmur heard.  Pulmonary/Chest: Breath sounds normal. He has no wheezes. He has no rales.   Central venous catheter in place.  CDI; no S/S infection.  Abdominal: Soft. He exhibits no distension and no mass. There is no splenomegaly or hepatomegaly. There is no tenderness.   Lymphadenopathy:     He has no cervical adenopathy.        Right cervical: No deep cervical adenopathy present.       Left cervical: No deep cervical adenopathy present.     He has no axillary adenopathy.        Right: No inguinal adenopathy present.        Left: No inguinal adenopathy present.   Neurological: He is alert and oriented to person, place, and time. He has normal strength and " normal reflexes. No cranial nerve deficit. Coordination normal.   Skin: No rash noted. Moisture associated dermatitis with some peeling skin noted to inner buttocks; no s/s infection; no gvhd.      LAB:   Results for orders placed or performed in visit on 10/26/18   Comprehensive metabolic panel   Result Value Ref Range    Sodium 141 136 - 145 mmol/L    Potassium 4.3 3.5 - 5.1 mmol/L    Chloride 107 95 - 110 mmol/L    CO2 28 23 - 29 mmol/L    Glucose 125 (H) 70 - 110 mg/dL    BUN, Bld 17 6 - 20 mg/dL    Creatinine 0.9 0.5 - 1.4 mg/dL    Calcium 9.7 8.7 - 10.5 mg/dL    Total Protein 6.4 6.0 - 8.4 g/dL    Albumin 3.5 3.5 - 5.2 g/dL    Total Bilirubin 0.3 0.1 - 1.0 mg/dL    Alkaline Phosphatase 114 55 - 135 U/L    AST 11 10 - 40 U/L    ALT 16 10 - 44 U/L    Anion Gap 6 (L) 8 - 16 mmol/L    eGFR if African American >60.0 >60 mL/min/1.73 m^2    eGFR if non African American >60.0 >60 mL/min/1.73 m^2   Magnesium   Result Value Ref Range    Magnesium 1.5 (L) 1.6 - 2.6 mg/dL   Phosphorus   Result Value Ref Range    Phosphorus 3.8 2.7 - 4.5 mg/dL   CMV DNA, quantitative, PCR   Result Value Ref Range    Cytomegalovirus PCR, Quant 68 (A) Undetected IU/mL   MANNIE-GARCIA VIRUS DNA, QUANTITATIVE (PCR)   Result Value Ref Range    EBV DNA, PCR Undetected Undetected IU/mL   TACROLIMUS LEVEL   Result Value Ref Range    Tacrolimus Lvl 10.5 5.0 - 15.0 ng/mL   CBC auto differential   Result Value Ref Range    WBC 2.34 (L) 3.90 - 12.70 K/uL    RBC 3.65 (L) 4.60 - 6.20 M/uL    Hemoglobin 11.1 (L) 14.0 - 18.0 g/dL    Hematocrit 33.1 (L) 40.0 - 54.0 %    MCV 91 82 - 98 fL    MCH 30.4 27.0 - 31.0 pg    MCHC 33.5 32.0 - 36.0 g/dL    RDW 13.6 11.5 - 14.5 %    Platelets 166 150 - 350 K/uL    MPV 10.9 9.2 - 12.9 fL    Immature Granulocytes 4.7 (H) 0.0 - 0.5 %    Gran # (ANC) 1.1 (L) 1.8 - 7.7 K/uL    Immature Grans (Abs) 0.11 (H) 0.00 - 0.04 K/uL    Lymph # 0.3 (L) 1.0 - 4.8 K/uL    Mono # 0.8 0.3 - 1.0 K/uL    Eos # 0.0 0.0 - 0.5 K/uL    Baso #  0.03 0.00 - 0.20 K/uL    nRBC 0 0 /100 WBC    Gran% 46.5 38.0 - 73.0 %    Lymph% 13.7 (L) 18.0 - 48.0 %    Mono% 32.1 (H) 4.0 - 15.0 %    Eosinophil% 1.7 0.0 - 8.0 %    Basophil% 1.3 0.0 - 1.9 %    Differential Method Automated    Type & Screen   Result Value Ref Range    Group & Rh O POS     Indirect Wes NEG        PROBLEMS ASSESSED THIS VISIT:    1. S/P allogeneic bone marrow transplant    2. Hypomagnesemia    3. Rectal itching    4. CML (chronic myelocytic leukemia)    5. Dermatitis associated with moisture    6. Anemia associated with chemotherapy    7. Leukopenia due to antineoplastic chemotherapy        PLAN:        History allo transplant for CML  - See oncology history above  - s/p alloSCT on 10/9/2018  - Neutrophil engraftment on Day +12.   - Currently Day +17  - Will plan maintenance dasatinib at Day +60  - plan for +30 & +100 & +1 year restaging     GVHD   - No evidence of acute GVHD today. Continue tacrolimus prophylaxis  - 10/24/2018: Tacro reduced to 2 mg qAM and 1 mg qPM. Continue current dose.   - tacro level 10.5.     ID  - Complete PO vancomycin for C. Diff  - Continue ppx with acyclovir, fluconazole  - Begin atovaquone ppx at Day +30  - Monitor CMV BIW; EBV once weekly  - EBV negative to date  - last CMV <35; continue to monitor BIW      Cytopenias  - Today: Leukopenia and anemia stable and trending up. No transfusions indicated  - Donor has sickle cell trait. Some anemia is expected long-term  - Both neutrophils and platelets appear engrafted.      FEN/GI  - hypomagnesemia, increase mg ox to 1200 BID while on tacro; no diarrhea noted    Moisture associated dermatitis/itching  - start nystatin powder    HTN  - stop amlodipine 10/26    Follow-up: BIW appts through day +100 with CBC, CMP, tacro, CMV. EBV once weekly only. Appts with Dr. Pantoja or NP.     Whitney Curiel NP  Hematology and Stem Cell Transplant  Distress Screening Results: Psychosocial Distress screening score of Distress Score: 0  noted and reviewed. No intervention indicated.

## 2018-10-29 ENCOUNTER — INFUSION (OUTPATIENT)
Dept: INFUSION THERAPY | Facility: HOSPITAL | Age: 33
End: 2018-10-29
Attending: INTERNAL MEDICINE
Payer: OTHER GOVERNMENT

## 2018-10-29 ENCOUNTER — OFFICE VISIT (OUTPATIENT)
Dept: HEMATOLOGY/ONCOLOGY | Facility: CLINIC | Age: 33
End: 2018-10-29
Payer: OTHER GOVERNMENT

## 2018-10-29 VITALS
BODY MASS INDEX: 20.68 KG/M2 | DIASTOLIC BLOOD PRESSURE: 72 MMHG | WEIGHT: 147.69 LBS | HEIGHT: 71 IN | HEART RATE: 100 BPM | TEMPERATURE: 99 F | SYSTOLIC BLOOD PRESSURE: 118 MMHG | OXYGEN SATURATION: 99 %

## 2018-10-29 DIAGNOSIS — T45.1X5A LEUKOPENIA DUE TO ANTINEOPLASTIC CHEMOTHERAPY: ICD-10-CM

## 2018-10-29 DIAGNOSIS — T45.1X5A ANEMIA ASSOCIATED WITH CHEMOTHERAPY: ICD-10-CM

## 2018-10-29 DIAGNOSIS — D64.81 ANEMIA ASSOCIATED WITH CHEMOTHERAPY: ICD-10-CM

## 2018-10-29 DIAGNOSIS — E83.42 HYPOMAGNESEMIA: ICD-10-CM

## 2018-10-29 DIAGNOSIS — C92.11 CHRONIC MYELOID LEUKEMIA, BCR/ABL-POSITIVE, IN REMISSION: Primary | ICD-10-CM

## 2018-10-29 DIAGNOSIS — D70.1 LEUKOPENIA DUE TO ANTINEOPLASTIC CHEMOTHERAPY: ICD-10-CM

## 2018-10-29 DIAGNOSIS — Z94.81 S/P ALLOGENEIC BONE MARROW TRANSPLANT: ICD-10-CM

## 2018-10-29 DIAGNOSIS — L30.8 DERMATITIS ASSOCIATED WITH MOISTURE: ICD-10-CM

## 2018-10-29 DIAGNOSIS — C92.10 CML (CHRONIC MYELOCYTIC LEUKEMIA): Primary | ICD-10-CM

## 2018-10-29 DIAGNOSIS — C92.10 CML (CHRONIC MYELOCYTIC LEUKEMIA): ICD-10-CM

## 2018-10-29 DIAGNOSIS — B25.9 CYTOMEGALOVIRUS INFECTION, UNSPECIFIED CYTOMEGALOVIRAL INFECTION TYPE: ICD-10-CM

## 2018-10-29 LAB
ABO + RH BLD: NORMAL
ALBUMIN SERPL BCP-MCNC: 3.6 G/DL
ALP SERPL-CCNC: 101 U/L
ALT SERPL W/O P-5'-P-CCNC: 15 U/L
ANION GAP SERPL CALC-SCNC: 6 MMOL/L
AST SERPL-CCNC: 12 U/L
BASOPHILS # BLD AUTO: 0.05 K/UL
BASOPHILS NFR BLD: 1.7 %
BILIRUB SERPL-MCNC: 0.2 MG/DL
BLD GP AB SCN CELLS X3 SERPL QL: NORMAL
BUN SERPL-MCNC: 15 MG/DL
CALCIUM SERPL-MCNC: 10 MG/DL
CHLORIDE SERPL-SCNC: 108 MMOL/L
CMV DNA SERPL NAA+PROBE-ACNC: 68 IU/ML
CO2 SERPL-SCNC: 26 MMOL/L
CREAT SERPL-MCNC: 1 MG/DL
DIFFERENTIAL METHOD: ABNORMAL
EBV DNA BY PCR: NORMAL IU/ML
EOSINOPHIL # BLD AUTO: 0.2 K/UL
EOSINOPHIL NFR BLD: 7.4 %
ERYTHROCYTE [DISTWIDTH] IN BLOOD BY AUTOMATED COUNT: 14.5 %
EST. GFR  (AFRICAN AMERICAN): >60 ML/MIN/1.73 M^2
EST. GFR  (NON AFRICAN AMERICAN): >60 ML/MIN/1.73 M^2
GLUCOSE SERPL-MCNC: 123 MG/DL
HCT VFR BLD AUTO: 33.1 %
HGB BLD-MCNC: 11.2 G/DL
IMM GRANULOCYTES # BLD AUTO: 0.04 K/UL
IMM GRANULOCYTES NFR BLD AUTO: 1.3 %
LYMPHOCYTES # BLD AUTO: 0.5 K/UL
LYMPHOCYTES NFR BLD: 16.7 %
MAGNESIUM SERPL-MCNC: 1.5 MG/DL
MCH RBC QN AUTO: 30.7 PG
MCHC RBC AUTO-ENTMCNC: 33.8 G/DL
MCV RBC AUTO: 91 FL
MONOCYTES # BLD AUTO: 0.7 K/UL
MONOCYTES NFR BLD: 22.4 %
NEUTROPHILS # BLD AUTO: 1.5 K/UL
NEUTROPHILS NFR BLD: 50.5 %
NRBC BLD-RTO: 0 /100 WBC
PHOSPHATE SERPL-MCNC: 3.8 MG/DL
PLATELET # BLD AUTO: 215 K/UL
PMV BLD AUTO: 10.6 FL
POTASSIUM SERPL-SCNC: 4.4 MMOL/L
PROT SERPL-MCNC: 6.4 G/DL
RBC # BLD AUTO: 3.65 M/UL
SODIUM SERPL-SCNC: 140 MMOL/L
TACROLIMUS BLD-MCNC: 9.9 NG/ML
WBC # BLD AUTO: 2.99 K/UL

## 2018-10-29 PROCEDURE — A4216 STERILE WATER/SALINE, 10 ML: HCPCS | Performed by: INTERNAL MEDICINE

## 2018-10-29 PROCEDURE — 99214 OFFICE O/P EST MOD 30 MIN: CPT | Mod: PBBFAC,25 | Performed by: NURSE PRACTITIONER

## 2018-10-29 PROCEDURE — 63600175 PHARM REV CODE 636 W HCPCS: Performed by: INTERNAL MEDICINE

## 2018-10-29 PROCEDURE — 99215 OFFICE O/P EST HI 40 MIN: CPT | Mod: S$PBB,,, | Performed by: NURSE PRACTITIONER

## 2018-10-29 PROCEDURE — 84100 ASSAY OF PHOSPHORUS: CPT

## 2018-10-29 PROCEDURE — 83735 ASSAY OF MAGNESIUM: CPT

## 2018-10-29 PROCEDURE — 80197 ASSAY OF TACROLIMUS: CPT

## 2018-10-29 PROCEDURE — 99999 PR PBB SHADOW E&M-EST. PATIENT-LVL IV: CPT | Mod: PBBFAC,,, | Performed by: NURSE PRACTITIONER

## 2018-10-29 PROCEDURE — 86850 RBC ANTIBODY SCREEN: CPT

## 2018-10-29 PROCEDURE — 36592 COLLECT BLOOD FROM PICC: CPT

## 2018-10-29 PROCEDURE — 25000003 PHARM REV CODE 250: Performed by: INTERNAL MEDICINE

## 2018-10-29 PROCEDURE — 87799 DETECT AGENT NOS DNA QUANT: CPT

## 2018-10-29 PROCEDURE — 85025 COMPLETE CBC W/AUTO DIFF WBC: CPT

## 2018-10-29 PROCEDURE — 80053 COMPREHEN METABOLIC PANEL: CPT

## 2018-10-29 RX ORDER — HEPARIN 100 UNIT/ML
500 SYRINGE INTRAVENOUS
Status: CANCELLED | OUTPATIENT
Start: 2018-10-29

## 2018-10-29 RX ORDER — SODIUM CHLORIDE 0.9 % (FLUSH) 0.9 %
10 SYRINGE (ML) INJECTION
Status: CANCELLED | OUTPATIENT
Start: 2018-10-29

## 2018-10-29 RX ORDER — VALGANCICLOVIR 450 MG/1
900 TABLET, FILM COATED ORAL 2 TIMES DAILY
Qty: 120 TABLET | Refills: 3 | Status: SHIPPED | OUTPATIENT
Start: 2018-10-29 | End: 2018-12-19 | Stop reason: SDUPTHER

## 2018-10-29 RX ORDER — SODIUM CHLORIDE 0.9 % (FLUSH) 0.9 %
10 SYRINGE (ML) INJECTION
Status: COMPLETED | OUTPATIENT
Start: 2018-10-29 | End: 2018-10-29

## 2018-10-29 RX ORDER — HEPARIN 100 UNIT/ML
500 SYRINGE INTRAVENOUS
Status: COMPLETED | OUTPATIENT
Start: 2018-10-29 | End: 2018-10-29

## 2018-10-29 RX ADMIN — Medication 10 ML: at 09:10

## 2018-10-29 RX ADMIN — HEPARIN 500 UNITS: 100 SYRINGE at 09:10

## 2018-10-29 NOTE — PT/OT/SLP DISCHARGE
Physical Therapy Discharge Summary    Name: Wilton Guzmán  MRN: [id number]   Principal Problem: S/P allogeneic bone marrow transplant     Patient Discharged from acute Physical Therapy on 10/22/2018.  Please refer to prior PT noted date on 10/10/2018 for functional status.     Assessment:     Patient was discharged unexpectedly.  Information required to complete an accurate discharge summary is unknown.  Refer to therapy initial evaluation and last progress note for initial and most recent functional status and goal achievement.  Recommendations made may be found in medical record.    Objective:     GOALS:   Multidisciplinary Problems     Physical Therapy Goals        Problem: Physical Therapy Goal    Goal Priority Disciplines Outcome Goal Variances Interventions   Physical Therapy Goal     PT, PT/OT Ongoing (interventions implemented as appropriate)     Description:  Pt currently independent c functional mobility.  Pt will continue to follow pt 1x/wk while admitted to ensure pt's functional status does not change while receiving additional treatments.    POC will be reassessed and adjusted if changes to pt's functional status occurs.                       Reasons for Discontinuation of Therapy Services  Transfer to alternate level of care.      Plan:     Patient Discharged to: Home no PT services needed.    Jayden Nguyen, PT  10/29/2018

## 2018-10-29 NOTE — PROGRESS NOTES
HEMATOLOGIC MALIGNANCIES PROGRESS NOTE    IDENTIFYING STATEMENT   Wilton Guzmán (Wilton) is a 33 y.o. 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      ONCOLOGY HISTORY:    1. Chronic myeloid leukemia, initially presenting in blast phase   A. 2018: Began developing left-sided abdominal pain - eventually requiring evaluation - WBC ~150 at  base; transferred to [facility] in [place], [place]   B. BMBx showed 20% myelomonoblasts concerning for CML in blast phase; bcr-abl positive (returned for Day 8 of induction therapy).    C. 2018: Began 7+3 induction chemotherapy. Began dasatinib (dose-adjusted) on day 8.   D. 2018: Transferred to [facility]   E. 2018: BMBx showed variably cellular marrow (10-40%) with 9% CD34+ blasts, concerning for residual disease.   F. 2018: Reinduction with MEC (mitoxantrone, etoposide, cytarabine) chemotherapy.    G. 2018: BMBx shows hypocellular bone marrow with no definite morphologic or immunophenotypic evidence of residual leukemia; bcr-abl p210 transcript was detected at 0.5%   H. Delay in clinic follow-up due to challenges with  insurance   I. 2018: Follow-up in clinic; WBC 6.77 (normal diff); Hgb 11.2 g/dl; Plt 298; BMBx shows 30% cellular marrow with no morphologic evidence of residual leukemia; bcr-abl p210 detected at 0.05% on international scale (MR 3.3).    J. 2018: BMBx shows 40% cellular marrow with no morphologic evidence of leukemia. Chromosomes 46, XY. Bcr-abl p210 detected at 0.1% on international scale (MR 3.0).    K. 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 Engrafted on Day +12. Course complicated by C. Difficile colitis.     INTERVAL HISTORY:      Mr. Guzmán returns to clinic for first post-hospital follow-up of his allogeneic stem cell transplant. Today is Day +20. He is doing okay  overall. His appetite is improving, and he is starting to eat.  He has no rash, diarrhea, jaundice, fever, chills, nausea, or vomiting. He is feeling well.     Past Medical History, Past Social History and Past Family History have been reviewed and are unchanged except as noted in the interval history.    MEDICATIONS:     Current Outpatient Medications on File Prior to Visit   Medication Sig Dispense Refill    acyclovir (ZOVIRAX) 800 MG Tab Take 1 tablet (800 mg total) by mouth 2 (two) times daily. 60 tablet 11    [START ON 11/8/2018] atovaquone (MEPRON) 750 mg/5 mL Susp Take 10 mLs (1,500 mg total) by mouth once daily. Starting on 11/8/18 300 mL 5    [START ON 12/8/2018] dasatinib (SPRYCEL) 140 mg Tab Take 1 tablet by mouth once daily Start on day +60 (12/8/18). 30 tablet 11    fluconazole (DIFLUCAN) 200 MG Tab Take 2 tablets (400 mg total) by mouth once daily. 60 tablet 5    magnesium oxide (MAG-OX) 400 mg (241.3 mg magnesium) tablet Take 3 tablets (1,200 mg total) by mouth 2 (two) times daily.  0    naproxen (NAPROSYN) 500 MG tablet Take 500 mg by mouth.      nystatin (MYCOSTATIN) powder Apply to affected area 3 times daily 60 g 0    ondansetron (ZOFRAN-ODT) 8 MG TbDL DISSOLVE 1 tablet (8 mg total) by mouth every 8 (eight) hours as needed (nausea). 15 tablet 2    tacrolimus (PROGRAF) 1 MG Cap Take 2 capsules (2 mg total) by mouth every morning AND 1 capsule (1 mg total) every evening.  Take after blood work on clinic appointment days 90 capsule 11    ursodiol (ACTIGALL) 300 mg capsule Take 1 capsule (300 mg total) by mouth 2 (two) times daily. for 17 days 34 capsule 0     No current facility-administered medications on file prior to visit.        ALLERGIES: Review of patient's allergies indicates:  No Known Allergies     ROS:       Review of Systems   Constitutional: Positive for fatigue. Negative for diaphoresis, fever and unexpected weight change.   HENT:   Negative for lump/mass and sore throat.   "  Eyes: Negative for icterus.   Respiratory: Negative for cough and shortness of breath.    Cardiovascular: Negative for chest pain and palpitations.   Gastrointestinal: Negative for abdominal distention, constipation, diarrhea, nausea and vomiting.   Genitourinary: Negative for dysuria and frequency.    Musculoskeletal: Negative for arthralgias, gait problem and myalgias.   Skin: Negative for rash. Itching and peeling skin to inner buttocks- improving.  Neurological: Negative for dizziness, gait problem and headaches.   Hematological: Negative for adenopathy. Does not bruise/bleed easily.   Psychiatric/Behavioral: Negative for depression. The patient is nervous/anxious.        PHYSICAL EXAM:  Vitals:    10/29/18 1543   BP: 118/72   Pulse: 100   Temp: 98.7 °F (37.1 °C)   SpO2: 99%   Weight: 67 kg (147 lb 11.3 oz)   Height: 5' 11" (1.803 m)   PainSc: 0-No pain       KARNOFSKY PERFORMANCE STATUS 80%  ECOG 1    Physical Exam   Constitutional: He is oriented to person, place, and time. He appears well-developed and well-nourished. No distress.   HENT:   Head: Normocephalic and atraumatic.   Mouth/Throat: Mucous membranes are normal. No oral lesions.   Eyes: Conjunctivae are normal.   Neck: No thyromegaly present.   Cardiovascular: Normal rate, regular rhythm and normal heart sounds.   No murmur heard.  Pulmonary/Chest: Breath sounds normal. He has no wheezes. He has no rales.   Central venous catheter in place.  CDI; no S/S infection.  Abdominal: Soft. He exhibits no distension and no mass. There is no splenomegaly or hepatomegaly. There is no tenderness.   Lymphadenopathy:     He has no cervical adenopathy.        Right cervical: No deep cervical adenopathy present.       Left cervical: No deep cervical adenopathy present.     He has no axillary adenopathy.        Right: No inguinal adenopathy present.        Left: No inguinal adenopathy present.   Neurological: He is alert and oriented to person, place, and time. He " has normal strength and normal reflexes. No cranial nerve deficit. Coordination normal.   Skin: No rash noted. Moisture associated dermatitis with some peeling skin noted to inner buttocks- improving; no s/s infection; no gvhd.      LAB:   Results for orders placed or performed in visit on 10/29/18   Comprehensive metabolic panel   Result Value Ref Range    Sodium 140 136 - 145 mmol/L    Potassium 4.4 3.5 - 5.1 mmol/L    Chloride 108 95 - 110 mmol/L    CO2 26 23 - 29 mmol/L    Glucose 123 (H) 70 - 110 mg/dL    BUN, Bld 15 6 - 20 mg/dL    Creatinine 1.0 0.5 - 1.4 mg/dL    Calcium 10.0 8.7 - 10.5 mg/dL    Total Protein 6.4 6.0 - 8.4 g/dL    Albumin 3.6 3.5 - 5.2 g/dL    Total Bilirubin 0.2 0.1 - 1.0 mg/dL    Alkaline Phosphatase 101 55 - 135 U/L    AST 12 10 - 40 U/L    ALT 15 10 - 44 U/L    Anion Gap 6 (L) 8 - 16 mmol/L    eGFR if African American >60.0 >60 mL/min/1.73 m^2    eGFR if non African American >60.0 >60 mL/min/1.73 m^2   Magnesium   Result Value Ref Range    Magnesium 1.5 (L) 1.6 - 2.6 mg/dL   Phosphorus   Result Value Ref Range    Phosphorus 3.8 2.7 - 4.5 mg/dL   TACROLIMUS LEVEL   Result Value Ref Range    Tacrolimus Lvl 9.9 5.0 - 15.0 ng/mL   CBC auto differential   Result Value Ref Range    WBC 2.99 (L) 3.90 - 12.70 K/uL    RBC 3.65 (L) 4.60 - 6.20 M/uL    Hemoglobin 11.2 (L) 14.0 - 18.0 g/dL    Hematocrit 33.1 (L) 40.0 - 54.0 %    MCV 91 82 - 98 fL    MCH 30.7 27.0 - 31.0 pg    MCHC 33.8 32.0 - 36.0 g/dL    RDW 14.5 11.5 - 14.5 %    Platelets 215 150 - 350 K/uL    MPV 10.6 9.2 - 12.9 fL    Immature Granulocytes 1.3 (H) 0.0 - 0.5 %    Gran # (ANC) 1.5 (L) 1.8 - 7.7 K/uL    Immature Grans (Abs) 0.04 0.00 - 0.04 K/uL    Lymph # 0.5 (L) 1.0 - 4.8 K/uL    Mono # 0.7 0.3 - 1.0 K/uL    Eos # 0.2 0.0 - 0.5 K/uL    Baso # 0.05 0.00 - 0.20 K/uL    nRBC 0 0 /100 WBC    Gran% 50.5 38.0 - 73.0 %    Lymph% 16.7 (L) 18.0 - 48.0 %    Mono% 22.4 (H) 4.0 - 15.0 %    Eosinophil% 7.4 0.0 - 8.0 %    Basophil% 1.7 0.0 -  1.9 %    Differential Method Automated    Type & Screen   Result Value Ref Range    Group & Rh O POS     Indirect Wes NEG        PROBLEMS ASSESSED THIS VISIT:    1. CML (chronic myelocytic leukemia)    2. S/P allogeneic bone marrow transplant    3. Hypomagnesemia    4. Leukopenia due to antineoplastic chemotherapy    5. Anemia associated with chemotherapy    6. Cytomegalovirus infection, unspecified cytomegaloviral infection type    7. Dermatitis associated with moisture        PLAN:        History allo transplant for CML  - See oncology history above  - s/p alloSCT on 10/9/2018  - Neutrophil engraftment on Day +12.   - Currently Day +20  - Will plan maintenance dasatinib at Day +60  - plan for +30 & +100 & +1 year restaging  - message sent to  to schedule day +30 BMBX in clinic 11/13/18     GVHD   - No evidence of acute GVHD today. Continue tacrolimus prophylaxis  - 10/24/2018: Tacro reduced to 2 mg qAM and 1 mg qPM. Continue current dose.   - tacro level 9.9 today.  - gvhd charting with each clinic visit     ID  - Complete PO vancomycin for C. Diff  - Continue ppx with acyclovir, fluconazole  - Begin atovaquone ppx at Day +30  - Monitor CMV BIW; EBV once weekly  - EBV negative to date  - last CMV 68; continue to monitor BIW for now per Dr. Pantoja; Valcyte script sent to get insurance approval started- pt educated not to start until we instruct him to do so.   - CVC remains in place without S/S infection- consider removing when no longer needing IVF or IV Mg      Cytopenias  - Today: Leukopenia and anemia stable and trending up. No transfusions indicated  - Donor has sickle cell trait. Some anemia is expected long-term  - Both neutrophils and platelets appear engrafted.      FEN/GI  - hypomagnesemia, continue mg ox 1200 BID while on tacro; no diarrhea noted  - 2g IV Mag upstairs 10/30/18    Moisture associated dermatitis/itching to inner buttocks  - continue nystatin powder    HTN- resolved  - stopped  amlodipine 10/26    Follow-up: BIW appts through day +100 with CBC, CMP, tacro, CMV. EBV once weekly only. Appts with Dr. Pantoja or NP.   Scheduled through November.     Whitney Curiel NP  Hematology and Stem Cell Transplant  Distress Screening Results: Psychosocial Distress screening score of Distress Score: 0 noted and reviewed. No intervention indicated.

## 2018-10-30 ENCOUNTER — INFUSION (OUTPATIENT)
Dept: INFUSION THERAPY | Facility: HOSPITAL | Age: 33
End: 2018-10-30
Attending: INTERNAL MEDICINE
Payer: OTHER GOVERNMENT

## 2018-10-30 VITALS — DIASTOLIC BLOOD PRESSURE: 71 MMHG | TEMPERATURE: 99 F | HEART RATE: 95 BPM | SYSTOLIC BLOOD PRESSURE: 110 MMHG

## 2018-10-30 DIAGNOSIS — Z94.81 S/P ALLOGENEIC BONE MARROW TRANSPLANT: ICD-10-CM

## 2018-10-30 DIAGNOSIS — E83.42 HYPOMAGNESEMIA: Primary | ICD-10-CM

## 2018-10-30 LAB
CMV DNA SERPL NAA+PROBE-ACNC: <35 IU/ML
EBV DNA BY PCR: NORMAL IU/ML

## 2018-10-30 PROCEDURE — 96365 THER/PROPH/DIAG IV INF INIT: CPT

## 2018-10-30 PROCEDURE — 63600175 PHARM REV CODE 636 W HCPCS: Performed by: NURSE PRACTITIONER

## 2018-10-30 RX ORDER — MAGNESIUM SULFATE HEPTAHYDRATE 40 MG/ML
2 INJECTION, SOLUTION INTRAVENOUS
Status: COMPLETED | OUTPATIENT
Start: 2018-10-30 | End: 2018-10-30

## 2018-10-30 RX ADMIN — MAGNESIUM SULFATE HEPTAHYDRATE 2 G: 40 INJECTION, SOLUTION INTRAVENOUS at 01:10

## 2018-10-30 NOTE — PLAN OF CARE
Problem: Patient Care Overview  Goal: Discharge Needs Assessment  Outcome: Ongoing (interventions implemented as appropriate)  Tolerated 2gm mag infusion well, central line flushed hep locked capped, dressing intact, avs given future appts reviewed no questions, instructed to contact MD office with future concerns.

## 2018-11-01 ENCOUNTER — INFUSION (OUTPATIENT)
Dept: INFUSION THERAPY | Facility: HOSPITAL | Age: 33
End: 2018-11-01
Attending: INTERNAL MEDICINE
Payer: OTHER GOVERNMENT

## 2018-11-01 ENCOUNTER — OFFICE VISIT (OUTPATIENT)
Dept: HEMATOLOGY/ONCOLOGY | Facility: CLINIC | Age: 33
End: 2018-11-01
Payer: OTHER GOVERNMENT

## 2018-11-01 VITALS
OXYGEN SATURATION: 96 % | SYSTOLIC BLOOD PRESSURE: 113 MMHG | TEMPERATURE: 99 F | WEIGHT: 147.5 LBS | HEIGHT: 71 IN | DIASTOLIC BLOOD PRESSURE: 80 MMHG | HEART RATE: 89 BPM | BODY MASS INDEX: 20.65 KG/M2

## 2018-11-01 DIAGNOSIS — C92.10 CML (CHRONIC MYELOCYTIC LEUKEMIA): ICD-10-CM

## 2018-11-01 DIAGNOSIS — D64.81 ANEMIA ASSOCIATED WITH CHEMOTHERAPY: ICD-10-CM

## 2018-11-01 DIAGNOSIS — Z94.81 S/P ALLOGENEIC BONE MARROW TRANSPLANT: Primary | ICD-10-CM

## 2018-11-01 DIAGNOSIS — L30.8 DERMATITIS ASSOCIATED WITH MOISTURE: ICD-10-CM

## 2018-11-01 DIAGNOSIS — T45.1X5A ANEMIA ASSOCIATED WITH CHEMOTHERAPY: ICD-10-CM

## 2018-11-01 DIAGNOSIS — C92.11 CHRONIC MYELOID LEUKEMIA, BCR/ABL-POSITIVE, IN REMISSION: Primary | ICD-10-CM

## 2018-11-01 DIAGNOSIS — Z94.81 S/P ALLOGENEIC BONE MARROW TRANSPLANT: ICD-10-CM

## 2018-11-01 DIAGNOSIS — E83.42 HYPOMAGNESEMIA: ICD-10-CM

## 2018-11-01 LAB
ABO + RH BLD: NORMAL
ABO GROUP BLD: NORMAL
ALBUMIN SERPL BCP-MCNC: 3.4 G/DL
ALP SERPL-CCNC: 91 U/L
ALT SERPL W/O P-5'-P-CCNC: 14 U/L
ANION GAP SERPL CALC-SCNC: 7 MMOL/L
AST SERPL-CCNC: 11 U/L
BASOPHILS # BLD AUTO: 0.06 K/UL
BASOPHILS NFR BLD: 1.4 %
BILIRUB SERPL-MCNC: 0.3 MG/DL
BLD GP AB SCN CELLS X3 SERPL QL: NORMAL
BUN SERPL-MCNC: 14 MG/DL
CALCIUM SERPL-MCNC: 9.7 MG/DL
CHLORIDE SERPL-SCNC: 106 MMOL/L
CO2 SERPL-SCNC: 27 MMOL/L
CREAT SERPL-MCNC: 1 MG/DL
DIFFERENTIAL METHOD: ABNORMAL
EOSINOPHIL # BLD AUTO: 0.8 K/UL
EOSINOPHIL NFR BLD: 18.5 %
ERYTHROCYTE [DISTWIDTH] IN BLOOD BY AUTOMATED COUNT: 14.7 %
EST. GFR  (AFRICAN AMERICAN): >60 ML/MIN/1.73 M^2
EST. GFR  (NON AFRICAN AMERICAN): >60 ML/MIN/1.73 M^2
GLUCOSE SERPL-MCNC: 147 MG/DL
HCT VFR BLD AUTO: 33.3 %
HGB BLD-MCNC: 10.9 G/DL
IMM GRANULOCYTES # BLD AUTO: 0.02 K/UL
IMM GRANULOCYTES NFR BLD AUTO: 0.5 %
LYMPHOCYTES # BLD AUTO: 0.6 K/UL
LYMPHOCYTES NFR BLD: 13.2 %
MAGNESIUM SERPL-MCNC: 1.5 MG/DL
MCH RBC QN AUTO: 29.7 PG
MCHC RBC AUTO-ENTMCNC: 32.7 G/DL
MCV RBC AUTO: 91 FL
MONOCYTES # BLD AUTO: 0.8 K/UL
MONOCYTES NFR BLD: 19 %
NEUTROPHILS # BLD AUTO: 2.1 K/UL
NEUTROPHILS NFR BLD: 47.4 %
NRBC BLD-RTO: 0 /100 WBC
PHOSPHATE SERPL-MCNC: 3.4 MG/DL
PLATELET # BLD AUTO: 253 K/UL
PMV BLD AUTO: 10.8 FL
POTASSIUM SERPL-SCNC: 4.3 MMOL/L
PROT SERPL-MCNC: 6.1 G/DL
RBC # BLD AUTO: 3.67 M/UL
RH BLD: NORMAL
SODIUM SERPL-SCNC: 140 MMOL/L
TACROLIMUS BLD-MCNC: 10.3 NG/ML
WBC # BLD AUTO: 4.32 K/UL

## 2018-11-01 PROCEDURE — 85025 COMPLETE CBC W/AUTO DIFF WBC: CPT

## 2018-11-01 PROCEDURE — 80053 COMPREHEN METABOLIC PANEL: CPT

## 2018-11-01 PROCEDURE — 36592 COLLECT BLOOD FROM PICC: CPT

## 2018-11-01 PROCEDURE — 63600175 PHARM REV CODE 636 W HCPCS: Performed by: INTERNAL MEDICINE

## 2018-11-01 PROCEDURE — 84100 ASSAY OF PHOSPHORUS: CPT

## 2018-11-01 PROCEDURE — 87799 DETECT AGENT NOS DNA QUANT: CPT

## 2018-11-01 PROCEDURE — 83735 ASSAY OF MAGNESIUM: CPT

## 2018-11-01 PROCEDURE — 25000003 PHARM REV CODE 250: Performed by: INTERNAL MEDICINE

## 2018-11-01 PROCEDURE — 80197 ASSAY OF TACROLIMUS: CPT

## 2018-11-01 PROCEDURE — 86901 BLOOD TYPING SEROLOGIC RH(D): CPT | Mod: 91

## 2018-11-01 PROCEDURE — A4216 STERILE WATER/SALINE, 10 ML: HCPCS | Performed by: INTERNAL MEDICINE

## 2018-11-01 PROCEDURE — 99999 PR PBB SHADOW E&M-EST. PATIENT-LVL IV: CPT | Mod: PBBFAC,,, | Performed by: NURSE PRACTITIONER

## 2018-11-01 PROCEDURE — 86901 BLOOD TYPING SEROLOGIC RH(D): CPT

## 2018-11-01 PROCEDURE — 99214 OFFICE O/P EST MOD 30 MIN: CPT | Mod: S$PBB,,, | Performed by: NURSE PRACTITIONER

## 2018-11-01 PROCEDURE — 99214 OFFICE O/P EST MOD 30 MIN: CPT | Mod: PBBFAC,25 | Performed by: NURSE PRACTITIONER

## 2018-11-01 RX ORDER — HEPARIN 100 UNIT/ML
500 SYRINGE INTRAVENOUS
Status: CANCELLED | OUTPATIENT
Start: 2018-11-01

## 2018-11-01 RX ORDER — SODIUM CHLORIDE 0.9 % (FLUSH) 0.9 %
10 SYRINGE (ML) INJECTION
Status: CANCELLED | OUTPATIENT
Start: 2018-11-01

## 2018-11-01 RX ORDER — SODIUM CHLORIDE 0.9 % (FLUSH) 0.9 %
10 SYRINGE (ML) INJECTION
Status: COMPLETED | OUTPATIENT
Start: 2018-11-01 | End: 2018-11-01

## 2018-11-01 RX ORDER — HEPARIN 100 UNIT/ML
500 SYRINGE INTRAVENOUS
Status: COMPLETED | OUTPATIENT
Start: 2018-11-01 | End: 2018-11-01

## 2018-11-01 RX ORDER — TACROLIMUS 0.5 MG/1
CAPSULE ORAL
Qty: 150 CAPSULE | Refills: 2 | Status: SHIPPED | OUTPATIENT
Start: 2018-11-01 | End: 2018-11-12

## 2018-11-01 RX ADMIN — Medication 10 ML: at 09:11

## 2018-11-01 RX ADMIN — HEPARIN 500 UNITS: 100 SYRINGE at 09:11

## 2018-11-01 NOTE — PROGRESS NOTES
HEMATOLOGIC MALIGNANCIES PROGRESS NOTE    IDENTIFYING STATEMENT   Wilton Guzmán (Wilton) is a 33 y.o. male with a  of 1985 from [place], [place] with the diagnosis of CML in blast phase.      ONCOLOGY HISTORY:    1. Chronic myeloid leukemia, initially presenting in blast phase   A. 2018: Began developing left-sided abdominal pain - eventually requiring evaluation - WBC ~150 at  base; transferred to [facility] in [place], [place]   B. BMBx showed 20% myelomonoblasts concerning for CML in blast phase; bcr-abl positive (returned for Day 8 of induction therapy).    C. 2018: Began 7+3 induction chemotherapy. Began dasatinib (dose-adjusted) on day 8.   D. 2018: Transferred to [facility]   E. 2018: BMBx showed variably cellular marrow (10-40%) with 9% CD34+ blasts, concerning for residual disease.   F. 2018: Reinduction with MEC (mitoxantrone, etoposide, cytarabine) chemotherapy.    G. 2018: BMBx shows hypocellular bone marrow with no definite morphologic or immunophenotypic evidence of residual leukemia; bcr-abl p210 transcript was detected at 0.5%   H. Delay in clinic follow-up due to challenges with  insurance   I. 2018: Follow-up in clinic; WBC 6.77 (normal diff); Hgb 11.2 g/dl; Plt 298; BMBx shows 30% cellular marrow with no morphologic evidence of residual leukemia; bcr-abl p210 detected at 0.05% on international scale (MR 3.3).    J. 2018: BMBx shows 40% cellular marrow with no morphologic evidence of leukemia. Chromosomes 46, XY. Bcr-abl p210 detected at 0.1% on international scale (MR 3.0).    K. 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 Engrafted on Day +12. Course complicated by C. Difficile colitis.     INTERVAL HISTORY:      Mr. Guzmán returns to clinic for first post-hospital follow-up of his allogeneic stem cell transplant. Today is Day +23. He is doing okay  overall. His appetite is good and he is eating well.  He has no rash, diarrhea, jaundice, fever, chills, nausea, or vomiting. He is feeling well. He reports irritated skin to scrotum, no other rashes or itching. No other complaints.     Past Medical History, Past Social History and Past Family History have been reviewed and are unchanged except as noted in the interval history.    MEDICATIONS:     Current Outpatient Medications on File Prior to Visit   Medication Sig Dispense Refill    acyclovir (ZOVIRAX) 800 MG Tab Take 1 tablet (800 mg total) by mouth 2 (two) times daily. 60 tablet 11    [START ON 11/8/2018] atovaquone (MEPRON) 750 mg/5 mL Susp Take 10 mLs (1,500 mg total) by mouth once daily. Starting on 11/8/18 300 mL 5    [START ON 12/8/2018] dasatinib (SPRYCEL) 140 mg Tab Take 1 tablet by mouth once daily Start on day +60 (12/8/18). 30 tablet 11    fluconazole (DIFLUCAN) 200 MG Tab Take 2 tablets (400 mg total) by mouth once daily. 60 tablet 5    magnesium oxide (MAG-OX) 400 mg (241.3 mg magnesium) tablet Take 3 tablets (1,200 mg total) by mouth 2 (two) times daily.  0    naproxen (NAPROSYN) 500 MG tablet Take 500 mg by mouth.      nystatin (MYCOSTATIN) powder Apply to affected area 3 times daily 60 g 0    ondansetron (ZOFRAN-ODT) 8 MG TbDL DISSOLVE 1 tablet (8 mg total) by mouth every 8 (eight) hours as needed (nausea). 15 tablet 2    tacrolimus (PROGRAF) 1 MG Cap Take 2 capsules (2 mg total) by mouth every morning AND 1 capsule (1 mg total) every evening.  Take after blood work on clinic appointment days 90 capsule 11    ursodiol (ACTIGALL) 300 mg capsule Take 1 capsule (300 mg total) by mouth 2 (two) times daily. for 17 days 34 capsule 0    valGANciclovir (VALCYTE) 450 mg Tab Take 2 tablets (900 mg total) by mouth 2 (two) times daily. 120 tablet 3     No current facility-administered medications on file prior to visit.        ALLERGIES: Review of patient's allergies indicates:  No Known Allergies  "    ROS:       Review of Systems   Constitutional: Negative for fatigue, diaphoresis, fever and unexpected weight change.   HENT:   Negative for lump/mass and sore throat.    Eyes: Negative for icterus.   Respiratory: Negative for cough and shortness of breath.    Cardiovascular: Negative for chest pain and palpitations.   Gastrointestinal: Negative for abdominal distention, constipation, diarrhea, nausea and vomiting.   Genitourinary: Negative for dysuria and frequency.    Musculoskeletal: Negative for arthralgias, gait problem and myalgias.   Skin: Negative for rash. Itching and peeling skin to inner buttocks improved/now to right scrotum.  Neurological: Negative for dizziness, gait problem and headaches.   Hematological: Negative for adenopathy. Does not bruise/bleed easily.   Psychiatric/Behavioral: Negative for depression or anxiety.      PHYSICAL EXAM:  Vitals:    11/01/18 1017   BP: 113/80   Pulse: 89   Temp: 98.6 °F (37 °C)   SpO2: 96%   Weight: 66.9 kg (147 lb 7.8 oz)   Height: 5' 11" (1.803 m)   PainSc: 0-No pain       KARNOFSKY PERFORMANCE STATUS 80%  ECOG 1    Physical Exam   Constitutional: He is oriented to person, place, and time. He appears well-developed and well-nourished. No distress.   HENT:   Head: Normocephalic and atraumatic.   Mouth/Throat: Mucous membranes are normal. No oral lesions.   Eyes: Conjunctivae are normal.   Neck: No thyromegaly present.   Cardiovascular: Normal rate, regular rhythm and normal heart sounds.   No murmur heard.  Pulmonary/Chest: Breath sounds normal. He has no wheezes. He has no rales.   Central venous catheter in place.  CDI; no S/S infection.  Abdominal: Soft. He exhibits no distension and no mass. There is no splenomegaly or hepatomegaly. There is no tenderness.   Lymphadenopathy:     He has no cervical adenopathy.        Right cervical: No deep cervical adenopathy present.       Left cervical: No deep cervical adenopathy present.     He has no axillary " adenopathy.        Right: No inguinal adenopathy present.        Left: No inguinal adenopathy present.   Neurological: He is alert and oriented to person, place, and time. He has normal strength and normal reflexes. No cranial nerve deficit. Coordination normal.   Skin: No rash noted. Moisture associated dermatitis/redness with some peeling skin noted to right scrotum.    LAB:   Results for orders placed or performed in visit on 11/01/18   Comprehensive metabolic panel   Result Value Ref Range    Sodium 140 136 - 145 mmol/L    Potassium 4.3 3.5 - 5.1 mmol/L    Chloride 106 95 - 110 mmol/L    CO2 27 23 - 29 mmol/L    Glucose 147 (H) 70 - 110 mg/dL    BUN, Bld 14 6 - 20 mg/dL    Creatinine 1.0 0.5 - 1.4 mg/dL    Calcium 9.7 8.7 - 10.5 mg/dL    Total Protein 6.1 6.0 - 8.4 g/dL    Albumin 3.4 (L) 3.5 - 5.2 g/dL    Total Bilirubin 0.3 0.1 - 1.0 mg/dL    Alkaline Phosphatase 91 55 - 135 U/L    AST 11 10 - 40 U/L    ALT 14 10 - 44 U/L    Anion Gap 7 (L) 8 - 16 mmol/L    eGFR if African American >60.0 >60 mL/min/1.73 m^2    eGFR if non African American >60.0 >60 mL/min/1.73 m^2   Magnesium   Result Value Ref Range    Magnesium 1.5 (L) 1.6 - 2.6 mg/dL   Phosphorus   Result Value Ref Range    Phosphorus 3.4 2.7 - 4.5 mg/dL   TACROLIMUS LEVEL   Result Value Ref Range    Tacrolimus Lvl 10.3 5.0 - 15.0 ng/mL   CBC auto differential   Result Value Ref Range    WBC 4.32 3.90 - 12.70 K/uL    RBC 3.67 (L) 4.60 - 6.20 M/uL    Hemoglobin 10.9 (L) 14.0 - 18.0 g/dL    Hematocrit 33.3 (L) 40.0 - 54.0 %    MCV 91 82 - 98 fL    MCH 29.7 27.0 - 31.0 pg    MCHC 32.7 32.0 - 36.0 g/dL    RDW 14.7 (H) 11.5 - 14.5 %    Platelets 253 150 - 350 K/uL    MPV 10.8 9.2 - 12.9 fL    Immature Granulocytes 0.5 0.0 - 0.5 %    Gran # (ANC) 2.1 1.8 - 7.7 K/uL    Immature Grans (Abs) 0.02 0.00 - 0.04 K/uL    Lymph # 0.6 (L) 1.0 - 4.8 K/uL    Mono # 0.8 0.3 - 1.0 K/uL    Eos # 0.8 (H) 0.0 - 0.5 K/uL    Baso # 0.06 0.00 - 0.20 K/uL    nRBC 0 0 /100 WBC     Gran% 47.4 38.0 - 73.0 %    Lymph% 13.2 (L) 18.0 - 48.0 %    Mono% 19.0 (H) 4.0 - 15.0 %    Eosinophil% 18.5 (H) 0.0 - 8.0 %    Basophil% 1.4 0.0 - 1.9 %    Differential Method Automated    Type & Screen   Result Value Ref Range    Group & Rh O POS     Indirect Wes NEG    Group & Rh   Result Value Ref Range    ABO O     Rh Type POS        PROBLEMS ASSESSED THIS VISIT:    1. S/P allogeneic bone marrow transplant    2. CML (chronic myelocytic leukemia)    3. Anemia associated with chemotherapy    4. Hypomagnesemia    5. Dermatitis associated with moisture        PLAN:        History allo transplant for CML  - See oncology history above  - s/p alloSCT on 10/9/2018  - Neutrophil engraftment on Day +12.   - Currently Day +23  - Will plan maintenance dasatinib at Day +60  - plan for +30 & +100 & +1 year restaging  - day +30 BMBX in clinic 11/13/18     GVHD   - No evidence of acute GVHD today. Continue tacrolimus prophylaxis  -Tacro 2 mg qAM and 1 mg qPM (10/24).   - tacro level 10.3 today. Will decrease tacro to 1.5mg/1mg. New script sent for 0.5 mg capsules.  - gvhd charting with each clinic visit     ID  - Complete PO vancomycin for C. Diff  - Continue ppx with acyclovir, fluconazole  - Begin atovaquone ppx at Day +30  - Monitor CMV BIW; EBV once weekly  - EBV negative to date  - CMV 68 (10/26); Valcyte script sent to get insurance approval started- pt educated not to start until we instruct him to do so, down to <35 on 10/29. Will continue to hold Valcyte and continue acyclovir  - CVC remains in place without S/S infection- consider removing when no longer needing IVF or IV Mg    Cytopenias  - Today: anemia stable, hgb 10.9 today. No transfusions indicated  - Donor has sickle cell trait. Some anemia is expected long-term  - Both neutrophils and platelets engrafted.      FEN/GI  - hypomagnesemia  - On mg ox 1200 BID with no side effects. Will add 400 mg daily at lunch time  - Mag 1.5 today    Moisture associated  dermatitis/itching to right scrotum  - start nystatin powder to this area    HTN- resolved  - stopped amlodipine 10/26    Follow-up: BIW appts through day +100 with CBC, CMP, tacro, CMV. EBV once weekly only. Appts with Dr. Pantoja or NP.   Scheduled through November.     Haily Serna NP  Hematology and Stem Cell Transplant    Distress Screening Results: Psychosocial Distress screening score of Distress Score: 0 noted and reviewed. No intervention indicated.

## 2018-11-02 LAB — CMV DNA SERPL NAA+PROBE-ACNC: <35 IU/ML

## 2018-11-05 ENCOUNTER — INFUSION (OUTPATIENT)
Dept: INFUSION THERAPY | Facility: HOSPITAL | Age: 33
End: 2018-11-05
Attending: INTERNAL MEDICINE
Payer: OTHER GOVERNMENT

## 2018-11-05 ENCOUNTER — OFFICE VISIT (OUTPATIENT)
Dept: HEMATOLOGY/ONCOLOGY | Facility: CLINIC | Age: 33
End: 2018-11-05
Payer: OTHER GOVERNMENT

## 2018-11-05 VITALS
RESPIRATION RATE: 20 BRPM | OXYGEN SATURATION: 98 % | BODY MASS INDEX: 20.68 KG/M2 | WEIGHT: 147.69 LBS | HEART RATE: 82 BPM | HEIGHT: 71 IN | TEMPERATURE: 99 F | DIASTOLIC BLOOD PRESSURE: 82 MMHG | SYSTOLIC BLOOD PRESSURE: 118 MMHG

## 2018-11-05 DIAGNOSIS — E83.42 HYPOMAGNESEMIA: ICD-10-CM

## 2018-11-05 DIAGNOSIS — L30.8 DERMATITIS ASSOCIATED WITH MOISTURE: ICD-10-CM

## 2018-11-05 DIAGNOSIS — C92.10 CML (CHRONIC MYELOCYTIC LEUKEMIA): ICD-10-CM

## 2018-11-05 DIAGNOSIS — Z94.81 S/P ALLOGENEIC BONE MARROW TRANSPLANT: ICD-10-CM

## 2018-11-05 DIAGNOSIS — D75.9 CYTOPENIA: ICD-10-CM

## 2018-11-05 DIAGNOSIS — C92.11 CHRONIC MYELOID LEUKEMIA, BCR/ABL-POSITIVE, IN REMISSION: Primary | ICD-10-CM

## 2018-11-05 DIAGNOSIS — Z94.81 S/P ALLOGENEIC BONE MARROW TRANSPLANT: Primary | ICD-10-CM

## 2018-11-05 LAB
ABO + RH BLD: NORMAL
ABO GROUP BLD: NORMAL
ALBUMIN SERPL BCP-MCNC: 3.5 G/DL
ALP SERPL-CCNC: 90 U/L
ALT SERPL W/O P-5'-P-CCNC: 16 U/L
ANION GAP SERPL CALC-SCNC: 5 MMOL/L
AST SERPL-CCNC: 16 U/L
BASOPHILS # BLD AUTO: 0.06 K/UL
BASOPHILS NFR BLD: 1.4 %
BILIRUB SERPL-MCNC: 0.2 MG/DL
BLD GP AB SCN CELLS X3 SERPL QL: NORMAL
BUN SERPL-MCNC: 13 MG/DL
CALCIUM SERPL-MCNC: 9.5 MG/DL
CHLORIDE SERPL-SCNC: 107 MMOL/L
CO2 SERPL-SCNC: 27 MMOL/L
CREAT SERPL-MCNC: 0.9 MG/DL
DIFFERENTIAL METHOD: ABNORMAL
EBV DNA BY PCR: NORMAL IU/ML
EOSINOPHIL # BLD AUTO: 1 K/UL
EOSINOPHIL NFR BLD: 21.8 %
ERYTHROCYTE [DISTWIDTH] IN BLOOD BY AUTOMATED COUNT: 14.8 %
EST. GFR  (AFRICAN AMERICAN): >60 ML/MIN/1.73 M^2
EST. GFR  (NON AFRICAN AMERICAN): >60 ML/MIN/1.73 M^2
GLUCOSE SERPL-MCNC: 111 MG/DL
HCT VFR BLD AUTO: 35.7 %
HGB BLD-MCNC: 11.8 G/DL
IMM GRANULOCYTES # BLD AUTO: 0.02 K/UL
IMM GRANULOCYTES NFR BLD AUTO: 0.5 %
LYMPHOCYTES # BLD AUTO: 0.6 K/UL
LYMPHOCYTES NFR BLD: 12.5 %
MAGNESIUM SERPL-MCNC: 1.7 MG/DL
MCH RBC QN AUTO: 30 PG
MCHC RBC AUTO-ENTMCNC: 33.1 G/DL
MCV RBC AUTO: 91 FL
MONOCYTES # BLD AUTO: 1.3 K/UL
MONOCYTES NFR BLD: 29.3 %
NEUTROPHILS # BLD AUTO: 1.5 K/UL
NEUTROPHILS NFR BLD: 34.5 %
NRBC BLD-RTO: 0 /100 WBC
PHOSPHATE SERPL-MCNC: 3.9 MG/DL
PLATELET # BLD AUTO: 248 K/UL
PMV BLD AUTO: 10.9 FL
POTASSIUM SERPL-SCNC: 4.2 MMOL/L
PROT SERPL-MCNC: 6.2 G/DL
RBC # BLD AUTO: 3.93 M/UL
RH BLD: NORMAL
SODIUM SERPL-SCNC: 139 MMOL/L
TACROLIMUS BLD-MCNC: 9.4 NG/ML
WBC # BLD AUTO: 4.41 K/UL

## 2018-11-05 PROCEDURE — 36592 COLLECT BLOOD FROM PICC: CPT

## 2018-11-05 PROCEDURE — 25000003 PHARM REV CODE 250: Performed by: INTERNAL MEDICINE

## 2018-11-05 PROCEDURE — 80053 COMPREHEN METABOLIC PANEL: CPT

## 2018-11-05 PROCEDURE — 63600175 PHARM REV CODE 636 W HCPCS: Performed by: INTERNAL MEDICINE

## 2018-11-05 PROCEDURE — 80197 ASSAY OF TACROLIMUS: CPT

## 2018-11-05 PROCEDURE — 87799 DETECT AGENT NOS DNA QUANT: CPT

## 2018-11-05 PROCEDURE — 86900 BLOOD TYPING SEROLOGIC ABO: CPT

## 2018-11-05 PROCEDURE — A4216 STERILE WATER/SALINE, 10 ML: HCPCS | Performed by: INTERNAL MEDICINE

## 2018-11-05 PROCEDURE — 85025 COMPLETE CBC W/AUTO DIFF WBC: CPT

## 2018-11-05 PROCEDURE — 86850 RBC ANTIBODY SCREEN: CPT

## 2018-11-05 PROCEDURE — 99213 OFFICE O/P EST LOW 20 MIN: CPT | Mod: PBBFAC,25 | Performed by: NURSE PRACTITIONER

## 2018-11-05 PROCEDURE — 84100 ASSAY OF PHOSPHORUS: CPT

## 2018-11-05 PROCEDURE — 86901 BLOOD TYPING SEROLOGIC RH(D): CPT

## 2018-11-05 PROCEDURE — 99215 OFFICE O/P EST HI 40 MIN: CPT | Mod: S$PBB,,, | Performed by: NURSE PRACTITIONER

## 2018-11-05 PROCEDURE — 99999 PR PBB SHADOW E&M-EST. PATIENT-LVL III: CPT | Mod: PBBFAC,,, | Performed by: NURSE PRACTITIONER

## 2018-11-05 PROCEDURE — 83735 ASSAY OF MAGNESIUM: CPT

## 2018-11-05 RX ORDER — SODIUM CHLORIDE 0.9 % (FLUSH) 0.9 %
10 SYRINGE (ML) INJECTION
Status: COMPLETED | OUTPATIENT
Start: 2018-11-05 | End: 2018-11-05

## 2018-11-05 RX ORDER — SODIUM CHLORIDE 0.9 % (FLUSH) 0.9 %
10 SYRINGE (ML) INJECTION
Status: CANCELLED | OUTPATIENT
Start: 2018-11-05

## 2018-11-05 RX ORDER — HEPARIN 100 UNIT/ML
500 SYRINGE INTRAVENOUS
Status: COMPLETED | OUTPATIENT
Start: 2018-11-05 | End: 2018-11-05

## 2018-11-05 RX ORDER — HEPARIN 100 UNIT/ML
500 SYRINGE INTRAVENOUS
Status: CANCELLED | OUTPATIENT
Start: 2018-11-05

## 2018-11-05 RX ADMIN — HEPARIN 500 UNITS: 100 SYRINGE at 09:11

## 2018-11-05 RX ADMIN — Medication 10 ML: at 09:11

## 2018-11-05 NOTE — NURSING
Pt arrived for labs from CVC and dressing change.  All 3 lumens with good blood return, labs sent.  Dressing and end caps changed.  Pt now going to MD appt

## 2018-11-05 NOTE — Clinical Note
Schedule lab appt. for CBC, CMP, mag, phos, and tacro and appt with Dr. Pantoja or NP on 11/23. Thank you.

## 2018-11-05 NOTE — PROGRESS NOTES
HEMATOLOGIC MALIGNANCIES PROGRESS NOTE    IDENTIFYING STATEMENT   Wilton Guzmán (Wilton) is a 33 y.o. 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      ONCOLOGY HISTORY:    1. Chronic myeloid leukemia, initially presenting in blast phase   A. 2018: Began developing left-sided abdominal pain - eventually requiring evaluation - WBC ~150 at  base; transferred to [facility] in [place], [place]   B. BMBx showed 20% myelomonoblasts concerning for CML in blast phase; bcr-abl positive (returned for Day 8 of induction therapy).    C. 2018: Began 7+3 induction chemotherapy. Began dasatinib (dose-adjusted) on day 8.   D. 2018: Transferred to [facility]   E. 2018: BMBx showed variably cellular marrow (10-40%) with 9% CD34+ blasts, concerning for residual disease.   F. 2018: Reinduction with MEC (mitoxantrone, etoposide, cytarabine) chemotherapy.    G. 2018: BMBx shows hypocellular bone marrow with no definite morphologic or immunophenotypic evidence of residual leukemia; bcr-abl p210 transcript was detected at 0.5%   H. Delay in clinic follow-up due to challenges with  insurance   I. 2018: Follow-up in clinic; WBC 6.77 (normal diff); Hgb 11.2 g/dl; Plt 298; BMBx shows 30% cellular marrow with no morphologic evidence of residual leukemia; bcr-abl p210 detected at 0.05% on international scale (MR 3.3).    J. 2018: BMBx shows 40% cellular marrow with no morphologic evidence of leukemia. Chromosomes 46, XY. Bcr-abl p210 detected at 0.1% on international scale (MR 3.0).    K. 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 Engrafted on Day +12. Course complicated by C. Difficile colitis.     INTERVAL HISTORY:      Mr. Guzmán returns to clinic for first post-hospital follow-up of his allogeneic stem cell transplant. Today is Day +27. He is doing okay  overall. His appetite is good and he is eating well.  He has no rash, jaundice, fever, chills, nausea, or vomiting. He is feeling well. He reports irritated skin to scrotum and penis, no other rashes or itching. C/o 1 loose stool daily for last 3 days. No other complaints.     Past Medical History, Past Social History and Past Family History have been reviewed and are unchanged except as noted in the interval history.    MEDICATIONS:     Current Outpatient Medications on File Prior to Visit   Medication Sig Dispense Refill    acyclovir (ZOVIRAX) 800 MG Tab Take 1 tablet (800 mg total) by mouth 2 (two) times daily. 60 tablet 11    [START ON 11/8/2018] atovaquone (MEPRON) 750 mg/5 mL Susp Take 10 mLs (1,500 mg total) by mouth once daily. Starting on 11/8/18 300 mL 5    fluconazole (DIFLUCAN) 200 MG Tab Take 2 tablets (400 mg total) by mouth once daily. 60 tablet 5    magnesium oxide (MAG-OX) 400 mg (241.3 mg magnesium) tablet Take 3 tablets (1,200 mg total) by mouth 2 (two) times daily.  0    naproxen (NAPROSYN) 500 MG tablet Take 500 mg by mouth.      nystatin (MYCOSTATIN) powder Apply to affected area 3 times daily 60 g 0    ondansetron (ZOFRAN-ODT) 8 MG TbDL DISSOLVE 1 tablet (8 mg total) by mouth every 8 (eight) hours as needed (nausea). 15 tablet 2    tacrolimus (PROGRAF) 0.5 MG Cap Take 1.5 mg (3 capsules) by mouth in the morning and 1 mg (2 capsules) by mouth in the evening 150 capsule 2    ursodiol (ACTIGALL) 300 mg capsule Take 1 capsule (300 mg total) by mouth 2 (two) times daily. for 17 days 34 capsule 0    [START ON 12/8/2018] dasatinib (SPRYCEL) 140 mg Tab Take 1 tablet by mouth once daily Start on day +60 (12/8/18). 30 tablet 11    valGANciclovir (VALCYTE) 450 mg Tab Take 2 tablets (900 mg total) by mouth 2 (two) times daily. 120 tablet 3     No current facility-administered medications on file prior to visit.        ALLERGIES: Review of patient's allergies indicates:  No Known Allergies  "    ROS:       Review of Systems   Constitutional: Negative for fatigue, diaphoresis, fever and unexpected weight change.   HENT:   Negative for lump/mass and sore throat.    Eyes: Negative for icterus.   Respiratory: Negative for cough and shortness of breath.    Cardiovascular: Negative for chest pain and palpitations.   Gastrointestinal: Negative for abdominal distention, constipation, diarrhea, nausea and vomiting.   Genitourinary: Negative for dysuria and frequency.    Musculoskeletal: Negative for arthralgias, gait problem and myalgias.   Skin: Negative for rash. Chaffing noted to right scrotum on previous visit. Now to left scrotum and penis with small ulcer noted head of penis.  Neurological: Negative for dizziness, gait problem and headaches.   Hematological: Negative for adenopathy. Does not bruise/bleed easily.   Psychiatric/Behavioral: Negative for depression or anxiety.      PHYSICAL EXAM:  Vitals:    11/05/18 1012   BP: 118/82   Pulse: 82   Resp: 20   Temp: 98.7 °F (37.1 °C)   TempSrc: Oral   SpO2: 98%   Weight: 67 kg (147 lb 11.3 oz)   Height: 5' 11" (1.803 m)   PainSc:   3   PainLoc: Groin       KARNOFSKY PERFORMANCE STATUS 80%  ECOG 1    Physical Exam   Constitutional: He is oriented to person, place, and time. He appears well-developed and well-nourished. No distress.   HENT:   Head: Normocephalic and atraumatic.   Mouth/Throat: Mucous membranes are normal. No oral lesions. Dry lips   Eyes: Conjunctivae are normal.   Neck: No thyromegaly present.   Cardiovascular: Normal rate, regular rhythm and normal heart sounds.   No murmur heard.  Pulmonary/Chest: Breath sounds normal. He has no wheezes. He has no rales.   Central venous catheter in place.  CDI; no S/S infection.  Abdominal: Soft. He exhibits no distension and no mass. There is no splenomegaly or hepatomegaly. There is no tenderness.   Neurological: He is alert and oriented to person, place, and time. He has normal strength and normal " reflexes. No cranial nerve deficit. Coordination normal.   Skin: No rash noted. Chaffing noted to right scrotum on previous visit. Now to left scrotum and penis with small ulcer noted head of penis.    LAB:   Results for orders placed or performed in visit on 11/05/18   Comprehensive metabolic panel   Result Value Ref Range    Sodium 139 136 - 145 mmol/L    Potassium 4.2 3.5 - 5.1 mmol/L    Chloride 107 95 - 110 mmol/L    CO2 27 23 - 29 mmol/L    Glucose 111 (H) 70 - 110 mg/dL    BUN, Bld 13 6 - 20 mg/dL    Creatinine 0.9 0.5 - 1.4 mg/dL    Calcium 9.5 8.7 - 10.5 mg/dL    Total Protein 6.2 6.0 - 8.4 g/dL    Albumin 3.5 3.5 - 5.2 g/dL    Total Bilirubin 0.2 0.1 - 1.0 mg/dL    Alkaline Phosphatase 90 55 - 135 U/L    AST 16 10 - 40 U/L    ALT 16 10 - 44 U/L    Anion Gap 5 (L) 8 - 16 mmol/L    eGFR if African American >60.0 >60 mL/min/1.73 m^2    eGFR if non African American >60.0 >60 mL/min/1.73 m^2   Magnesium   Result Value Ref Range    Magnesium 1.7 1.6 - 2.6 mg/dL   Phosphorus   Result Value Ref Range    Phosphorus 3.9 2.7 - 4.5 mg/dL   TACROLIMUS LEVEL   Result Value Ref Range    Tacrolimus Lvl 9.4 5.0 - 15.0 ng/mL   CBC auto differential   Result Value Ref Range    WBC 4.41 3.90 - 12.70 K/uL    RBC 3.93 (L) 4.60 - 6.20 M/uL    Hemoglobin 11.8 (L) 14.0 - 18.0 g/dL    Hematocrit 35.7 (L) 40.0 - 54.0 %    MCV 91 82 - 98 fL    MCH 30.0 27.0 - 31.0 pg    MCHC 33.1 32.0 - 36.0 g/dL    RDW 14.8 (H) 11.5 - 14.5 %    Platelets 248 150 - 350 K/uL    MPV 10.9 9.2 - 12.9 fL    Immature Granulocytes 0.5 0.0 - 0.5 %    Gran # (ANC) 1.5 (L) 1.8 - 7.7 K/uL    Immature Grans (Abs) 0.02 0.00 - 0.04 K/uL    Lymph # 0.6 (L) 1.0 - 4.8 K/uL    Mono # 1.3 (H) 0.3 - 1.0 K/uL    Eos # 1.0 (H) 0.0 - 0.5 K/uL    Baso # 0.06 0.00 - 0.20 K/uL    nRBC 0 0 /100 WBC    Gran% 34.5 (L) 38.0 - 73.0 %    Lymph% 12.5 (L) 18.0 - 48.0 %    Mono% 29.3 (H) 4.0 - 15.0 %    Eosinophil% 21.8 (H) 0.0 - 8.0 %    Basophil% 1.4 0.0 - 1.9 %    Differential  Method Automated    Type & Screen   Result Value Ref Range    Group & Rh O POS     Indirect Wes NEG    Group & Rh   Result Value Ref Range    ABO O     Rh Type POS        PROBLEMS ASSESSED THIS VISIT:    1. S/P allogeneic bone marrow transplant    2. CML (chronic myelocytic leukemia)    3. Cytopenia    4. Hypomagnesemia    5. Dermatitis associated with moisture        PLAN:        History allo transplant for CML  - See oncology history above  - s/p alloSCT on 10/9/2018  - Neutrophil engraftment on Day +12.   - Currently Day +27  - Will plan maintenance dasatinib at Day +60  - plan for +30 & +100 & +1 year restaging  - day +30 BMBX in clinic 11/13/18     GVHD   - No evidence of acute GVHD today. Continue tacrolimus prophylaxis  -Tacro 2 mg qAM and 1 mg qPM (10/24), decreased to 1.5/1mg (11/1)   - tacro level 9.4 today. Keep tacro dose at 1.5mg/1mg.   - gvhd charting with each clinic visit     ID  - Complete PO vancomycin for C. Diff  - Continue ppx with acyclovir, fluconazole  - Begin atovaquone ppx at Day +30  - Monitor CMV BIW; EBV once weekly  - EBV negative to date  - CMV 68 (10/26); Valcyte script sent to get insurance approval started- pt educated not to start until we instruct him to do so, down to <35 on 10/29 and 11/6. Will continue to hold Valcyte and continue acyclovir  - CVC remains in place without S/S infection- consider removing when no longer needing IVF or IV Mg    Cytopenias  - Today: anemia stable, hgb 11.8 today. No transfusions indicated  - Donor has sickle cell trait. Some anemia is expected long-term  - Both neutrophils and platelets engrafted.      FEN/GI  - hypomagnesemia  - On mg ox 1200 BID with no side effects, 400 mg daily at lunch time added 11/1  - Mag 1.7 today--continue current dose    Moisture associated dermatitis/itching to right scrotum  - started nystatin powder to this area on 11/1/18  - chafing now to left scrotum and penis. Ulcer to head of penis.   - start desitin or  comparable barrier cream    HTN- resolved  - stopped amlodipine 10/26    Follow-up: BIW appts through day +100 with CBC, CMP, tacro, Mag, CMV. EBV once weekly only. Appts with Dr. Pantoja or NP.   Scheduled through November.    Patient seen with collaborating physician, Dr. Kishor Serna NP  Hematology and Stem Cell Transplant    Distress Screening Results: Psychosocial Distress screening score of Distress Score: 0 noted and reviewed. No intervention indicated.

## 2018-11-06 LAB — EBV DNA BY PCR: NORMAL IU/ML

## 2018-11-07 LAB — CMV DNA SERPL NAA+PROBE-ACNC: 56 IU/ML

## 2018-11-08 ENCOUNTER — PATIENT MESSAGE (OUTPATIENT)
Dept: HEMATOLOGY/ONCOLOGY | Facility: CLINIC | Age: 33
End: 2018-11-08

## 2018-11-08 ENCOUNTER — OFFICE VISIT (OUTPATIENT)
Dept: HEMATOLOGY/ONCOLOGY | Facility: CLINIC | Age: 33
End: 2018-11-08
Payer: OTHER GOVERNMENT

## 2018-11-08 ENCOUNTER — INFUSION (OUTPATIENT)
Dept: INFUSION THERAPY | Facility: HOSPITAL | Age: 33
End: 2018-11-08
Attending: INTERNAL MEDICINE
Payer: OTHER GOVERNMENT

## 2018-11-08 VITALS
WEIGHT: 146.81 LBS | HEART RATE: 80 BPM | OXYGEN SATURATION: 99 % | TEMPERATURE: 99 F | SYSTOLIC BLOOD PRESSURE: 126 MMHG | HEIGHT: 71 IN | BODY MASS INDEX: 20.55 KG/M2 | DIASTOLIC BLOOD PRESSURE: 86 MMHG

## 2018-11-08 DIAGNOSIS — Z94.81 S/P ALLOGENEIC BONE MARROW TRANSPLANT: Primary | ICD-10-CM

## 2018-11-08 DIAGNOSIS — E83.42 HYPOMAGNESEMIA: ICD-10-CM

## 2018-11-08 DIAGNOSIS — C92.10 CML (CHRONIC MYELOCYTIC LEUKEMIA): ICD-10-CM

## 2018-11-08 DIAGNOSIS — C92.11 CHRONIC MYELOID LEUKEMIA, BCR/ABL-POSITIVE, IN REMISSION: Primary | ICD-10-CM

## 2018-11-08 DIAGNOSIS — L30.4 CHAFING: ICD-10-CM

## 2018-11-08 DIAGNOSIS — C92.11 CHRONIC MYELOID LEUKEMIA, BCR/ABL-POSITIVE, IN REMISSION: ICD-10-CM

## 2018-11-08 DIAGNOSIS — Z94.81 S/P ALLOGENEIC BONE MARROW TRANSPLANT: ICD-10-CM

## 2018-11-08 DIAGNOSIS — D75.9 CYTOPENIA: ICD-10-CM

## 2018-11-08 LAB
ALBUMIN SERPL BCP-MCNC: 3.5 G/DL
ALP SERPL-CCNC: 80 U/L
ALT SERPL W/O P-5'-P-CCNC: 15 U/L
ANION GAP SERPL CALC-SCNC: 7 MMOL/L
AST SERPL-CCNC: 17 U/L
BASOPHILS # BLD AUTO: 0.03 K/UL
BASOPHILS NFR BLD: 0.7 %
BILIRUB SERPL-MCNC: 0.2 MG/DL
BUN SERPL-MCNC: 14 MG/DL
CALCIUM SERPL-MCNC: 9.7 MG/DL
CHLORIDE SERPL-SCNC: 106 MMOL/L
CO2 SERPL-SCNC: 28 MMOL/L
CREAT SERPL-MCNC: 1 MG/DL
DIFFERENTIAL METHOD: ABNORMAL
EOSINOPHIL # BLD AUTO: 0.9 K/UL
EOSINOPHIL NFR BLD: 22 %
ERYTHROCYTE [DISTWIDTH] IN BLOOD BY AUTOMATED COUNT: 14.9 %
EST. GFR  (AFRICAN AMERICAN): >60 ML/MIN/1.73 M^2
EST. GFR  (NON AFRICAN AMERICAN): >60 ML/MIN/1.73 M^2
GLUCOSE SERPL-MCNC: 117 MG/DL
HCT VFR BLD AUTO: 34.7 %
HGB BLD-MCNC: 11.6 G/DL
IMM GRANULOCYTES # BLD AUTO: 0.07 K/UL
IMM GRANULOCYTES NFR BLD AUTO: 1.7 %
LYMPHOCYTES # BLD AUTO: 0.9 K/UL
LYMPHOCYTES NFR BLD: 20.6 %
MAGNESIUM SERPL-MCNC: 1.9 MG/DL
MCH RBC QN AUTO: 30.1 PG
MCHC RBC AUTO-ENTMCNC: 33.4 G/DL
MCV RBC AUTO: 90 FL
MONOCYTES # BLD AUTO: 0.9 K/UL
MONOCYTES NFR BLD: 20.1 %
NEUTROPHILS # BLD AUTO: 1.5 K/UL
NEUTROPHILS NFR BLD: 34.9 %
NRBC BLD-RTO: 0 /100 WBC
PHOSPHATE SERPL-MCNC: 3.4 MG/DL
PLATELET # BLD AUTO: 228 K/UL
PMV BLD AUTO: 10.9 FL
POTASSIUM SERPL-SCNC: 4.5 MMOL/L
PROT SERPL-MCNC: 6.1 G/DL
RBC # BLD AUTO: 3.85 M/UL
SODIUM SERPL-SCNC: 141 MMOL/L
TACROLIMUS BLD-MCNC: 9.9 NG/ML
WBC # BLD AUTO: 4.23 K/UL

## 2018-11-08 PROCEDURE — 99213 OFFICE O/P EST LOW 20 MIN: CPT | Mod: PBBFAC,25 | Performed by: NURSE PRACTITIONER

## 2018-11-08 PROCEDURE — 85025 COMPLETE CBC W/AUTO DIFF WBC: CPT

## 2018-11-08 PROCEDURE — 83735 ASSAY OF MAGNESIUM: CPT

## 2018-11-08 PROCEDURE — 80197 ASSAY OF TACROLIMUS: CPT

## 2018-11-08 PROCEDURE — 87799 DETECT AGENT NOS DNA QUANT: CPT

## 2018-11-08 PROCEDURE — 63600175 PHARM REV CODE 636 W HCPCS: Performed by: INTERNAL MEDICINE

## 2018-11-08 PROCEDURE — 84100 ASSAY OF PHOSPHORUS: CPT

## 2018-11-08 PROCEDURE — 99999 PR PBB SHADOW E&M-EST. PATIENT-LVL III: CPT | Mod: PBBFAC,,, | Performed by: NURSE PRACTITIONER

## 2018-11-08 PROCEDURE — 80053 COMPREHEN METABOLIC PANEL: CPT

## 2018-11-08 PROCEDURE — A4216 STERILE WATER/SALINE, 10 ML: HCPCS | Performed by: INTERNAL MEDICINE

## 2018-11-08 PROCEDURE — 36592 COLLECT BLOOD FROM PICC: CPT

## 2018-11-08 PROCEDURE — 25000003 PHARM REV CODE 250: Performed by: INTERNAL MEDICINE

## 2018-11-08 PROCEDURE — 99215 OFFICE O/P EST HI 40 MIN: CPT | Mod: S$PBB,,, | Performed by: NURSE PRACTITIONER

## 2018-11-08 RX ORDER — SODIUM CHLORIDE 0.9 % (FLUSH) 0.9 %
10 SYRINGE (ML) INJECTION
Status: COMPLETED | OUTPATIENT
Start: 2018-11-08 | End: 2018-11-08

## 2018-11-08 RX ORDER — HEPARIN 100 UNIT/ML
500 SYRINGE INTRAVENOUS
Status: COMPLETED | OUTPATIENT
Start: 2018-11-08 | End: 2018-11-08

## 2018-11-08 RX ORDER — SODIUM CHLORIDE 0.9 % (FLUSH) 0.9 %
10 SYRINGE (ML) INJECTION
Status: CANCELLED | OUTPATIENT
Start: 2018-11-08

## 2018-11-08 RX ORDER — HEPARIN 100 UNIT/ML
500 SYRINGE INTRAVENOUS
Status: CANCELLED | OUTPATIENT
Start: 2018-11-08

## 2018-11-08 RX ADMIN — HEPARIN 500 UNITS: 100 SYRINGE at 09:11

## 2018-11-08 RX ADMIN — Medication 10 ML: at 09:11

## 2018-11-08 NOTE — NURSING
Pt  Arrived for labs from cvc.  All 3 lumens with good blood return and flushes easily, labs sent.  Pt now going to [facility]

## 2018-11-08 NOTE — PROGRESS NOTES
HEMATOLOGIC MALIGNANCIES PROGRESS NOTE    IDENTIFYING STATEMENT   Wilton Guzmán (Wilton) is a 33 y.o. male with a  of 1985 from [place], [place] with the diagnosis of CML in blast phase.      ONCOLOGY HISTORY:    1. Chronic myeloid leukemia, initially presenting in blast phase   A. 2018: Began developing left-sided abdominal pain - eventually requiring evaluation - WBC ~150 at  base; transferred to [facility] in [place], [place]   B. BMBx showed 20% myelomonoblasts concerning for CML in blast phase; bcr-abl positive (returned for Day 8 of induction therapy).    C. 2018: Began 7+3 induction chemotherapy. Began dasatinib (dose-adjusted) on day 8.   D. 2018: Transferred to [facility]   E. 2018: BMBx showed variably cellular marrow (10-40%) with 9% CD34+ blasts, concerning for residual disease.   F. 2018: Reinduction with MEC (mitoxantrone, etoposide, cytarabine) chemotherapy.    G. 2018: BMBx shows hypocellular bone marrow with no definite morphologic or immunophenotypic evidence of residual leukemia; bcr-abl p210 transcript was detected at 0.5%   H. Delay in clinic follow-up due to challenges with  insurance   I. 2018: Follow-up in clinic; WBC 6.77 (normal diff); Hgb 11.2 g/dl; Plt 298; BMBx shows 30% cellular marrow with no morphologic evidence of residual leukemia; bcr-abl p210 detected at 0.05% on international scale (MR 3.3).    J. 2018: BMBx shows 40% cellular marrow with no morphologic evidence of leukemia. Chromosomes 46, XY. Bcr-abl p210 detected at 0.1% on international scale (MR 3.0).    K. 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 Engrafted on Day +12. Course complicated by C. Difficile colitis.     INTERVAL HISTORY:      Mr. Guzmán returns to clinic for follow-up of his allogeneic stem cell transplant. Today is Day +30. He is doing okay overall. He reports  that he is unable to eat 3 large meals per day and tends to eat smaller portions throughout the day. Overall, he is eating well.  He has no rash, jaundice, fever, chills, nausea, or vomiting. He is feeling well. He reports fatigue after taking medication in the morning that resolves later in day. He reports irritated skin to scrotum and penis is improving with barrier cream. No other rashes or itching. 1-2 loose/soft stool daily. No other complaints.     Past Medical History, Past Social History and Past Family History have been reviewed and are unchanged except as noted in the interval history.    MEDICATIONS:     Current Outpatient Medications on File Prior to Visit   Medication Sig Dispense Refill    acyclovir (ZOVIRAX) 800 MG Tab Take 1 tablet (800 mg total) by mouth 2 (two) times daily. 60 tablet 11    atovaquone (MEPRON) 750 mg/5 mL Susp Take 10 mLs (1,500 mg total) by mouth once daily. Starting on 11/8/18 300 mL 5    [START ON 12/8/2018] dasatinib (SPRYCEL) 140 mg Tab Take 1 tablet by mouth once daily Start on day +60 (12/8/18). 30 tablet 11    fluconazole (DIFLUCAN) 200 MG Tab Take 2 tablets (400 mg total) by mouth once daily. 60 tablet 5    magnesium oxide (MAG-OX) 400 mg (241.3 mg magnesium) tablet Take 3 tablets (1,200 mg total) by mouth 2 (two) times daily.  0    naproxen (NAPROSYN) 500 MG tablet Take 500 mg by mouth.      nystatin (MYCOSTATIN) powder Apply to affected area 3 times daily 60 g 0    ondansetron (ZOFRAN-ODT) 8 MG TbDL DISSOLVE 1 tablet (8 mg total) by mouth every 8 (eight) hours as needed (nausea). 15 tablet 2    tacrolimus (PROGRAF) 0.5 MG Cap Take 1.5 mg (3 capsules) by mouth in the morning and 1 mg (2 capsules) by mouth in the evening 150 capsule 2    ursodiol (ACTIGALL) 300 mg capsule Take 1 capsule (300 mg total) by mouth 2 (two) times daily. for 17 days 34 capsule 0    valGANciclovir (VALCYTE) 450 mg Tab Take 2 tablets (900 mg total) by mouth 2 (two) times daily. 120 tablet  "3     No current facility-administered medications on file prior to visit.        ALLERGIES: Review of patient's allergies indicates:  No Known Allergies     ROS:       Review of Systems   Constitutional: Negative for fatigue, diaphoresis, fever and unexpected weight change.   HENT:   Negative for lump/mass and sore throat.    Eyes: Negative for icterus.   Respiratory: Negative for cough and shortness of breath.    Cardiovascular: Negative for chest pain and palpitations.   Gastrointestinal: Negative for abdominal distention, constipation, diarrhea, nausea and vomiting.   Genitourinary: Negative for dysuria and frequency.    Musculoskeletal: Negative for arthralgias, gait problem and myalgias.   Skin: Negative for rash. Chafing to scrotum and penis improving with barrier cream per patient.  Neurological: Negative for dizziness, gait problem and headaches.   Hematological: Negative for adenopathy. Does not bruise/bleed easily.   Psychiatric/Behavioral: Negative for depression or anxiety.      PHYSICAL EXAM:  Vitals:    11/08/18 1028   BP: 126/86   Pulse: 80   Temp: 98.6 °F (37 °C)   SpO2: 99%   Weight: 66.6 kg (146 lb 13.2 oz)   Height: 5' 11" (1.803 m)   PainSc: 0-No pain       KARNOFSKY PERFORMANCE STATUS 80%  ECOG 1    Physical Exam   Constitutional: He is oriented to person, place, and time. He appears well-developed and well-nourished. No distress.   HENT:   Head: Normocephalic and atraumatic.   Mouth/Throat: Mucous membranes are normal. No oral lesions. Dry lips   Eyes: Conjunctivae are normal.   Neck: No thyromegaly present.   Cardiovascular: Normal rate, regular rhythm and normal heart sounds.   No murmur heard.  Pulmonary/Chest: Breath sounds normal. He has no wheezes. He has no rales.   Central venous catheter in place.  CDI; no S/S infection.  Abdominal: Soft. He exhibits no distension and no mass. There is no splenomegaly or hepatomegaly. There is no tenderness.   Neurological: He is alert and oriented to " person, place, and time. He has normal strength and normal reflexes. No cranial nerve deficit. Coordination normal.   Skin: No rash noted. Chafing to scrotum/penis improving with barrier cream per patient.    LAB:   Results for orders placed or performed in visit on 11/08/18   Comprehensive metabolic panel   Result Value Ref Range    Sodium 141 136 - 145 mmol/L    Potassium 4.5 3.5 - 5.1 mmol/L    Chloride 106 95 - 110 mmol/L    CO2 28 23 - 29 mmol/L    Glucose 117 (H) 70 - 110 mg/dL    BUN, Bld 14 6 - 20 mg/dL    Creatinine 1.0 0.5 - 1.4 mg/dL    Calcium 9.7 8.7 - 10.5 mg/dL    Total Protein 6.1 6.0 - 8.4 g/dL    Albumin 3.5 3.5 - 5.2 g/dL    Total Bilirubin 0.2 0.1 - 1.0 mg/dL    Alkaline Phosphatase 80 55 - 135 U/L    AST 17 10 - 40 U/L    ALT 15 10 - 44 U/L    Anion Gap 7 (L) 8 - 16 mmol/L    eGFR if African American >60.0 >60 mL/min/1.73 m^2    eGFR if non African American >60.0 >60 mL/min/1.73 m^2   Magnesium   Result Value Ref Range    Magnesium 1.9 1.6 - 2.6 mg/dL   Phosphorus   Result Value Ref Range    Phosphorus 3.4 2.7 - 4.5 mg/dL   CBC auto differential   Result Value Ref Range    WBC 4.23 3.90 - 12.70 K/uL    RBC 3.85 (L) 4.60 - 6.20 M/uL    Hemoglobin 11.6 (L) 14.0 - 18.0 g/dL    Hematocrit 34.7 (L) 40.0 - 54.0 %    MCV 90 82 - 98 fL    MCH 30.1 27.0 - 31.0 pg    MCHC 33.4 32.0 - 36.0 g/dL    RDW 14.9 (H) 11.5 - 14.5 %    Platelets 228 150 - 350 K/uL    MPV 10.9 9.2 - 12.9 fL    Immature Granulocytes 1.7 (H) 0.0 - 0.5 %    Gran # (ANC) 1.5 (L) 1.8 - 7.7 K/uL    Immature Grans (Abs) 0.07 (H) 0.00 - 0.04 K/uL    Lymph # 0.9 (L) 1.0 - 4.8 K/uL    Mono # 0.9 0.3 - 1.0 K/uL    Eos # 0.9 (H) 0.0 - 0.5 K/uL    Baso # 0.03 0.00 - 0.20 K/uL    nRBC 0 0 /100 WBC    Gran% 34.9 (L) 38.0 - 73.0 %    Lymph% 20.6 18.0 - 48.0 %    Mono% 20.1 (H) 4.0 - 15.0 %    Eosinophil% 22.0 (H) 0.0 - 8.0 %    Basophil% 0.7 0.0 - 1.9 %    Differential Method Automated        PROBLEMS ASSESSED THIS VISIT:    1. S/P allogeneic bone  marrow transplant    2. Chronic myeloid leukemia, BCR/ABL-positive, in remission    3. Cytopenia    4. Chafing    5. Hypomagnesemia        PLAN:        History allo transplant for CML  - See oncology history above  - s/p alloSCT on 10/9/2018  - Neutrophil engraftment on Day +12.   - Currently Day +30  - Will plan maintenance dasatinib at Day +60  - plan for +30 & +100 & +1 year restaging  - day +30 BMBX in clinic 11/13/18     GVHD   - No evidence of acute GVHD today. Continue tacrolimus prophylaxis  -Tacro 2 mg qAM and 1 mg qPM (10/24), decreased to 1.5/1mg (11/1)   - tacro level 9.9 today. Keep tacro dose at 1.5mg/1mg.   - gvhd charting with each clinic visit     ID  - Complete PO vancomycin for C. Diff  - Continue ppx with acyclovir, fluconazole  - Begin atovaquone ppx at Day +30  - Monitor CMV BIW; EBV once weekly  - EBV negative to date  - CMV 68 (10/26); Valcyte script sent to get insurance approval started- pt educated not to start until we instruct him to do so, down to <35 on 10/29 and 11/6. 56 today (11/7). Will continue to hold Valcyte and continue acyclovir  - CVC remains in place without S/S infection- consider removing when no longer needing IVF or IV Mg    Cytopenias  - Today: anemia stable, hgb 11.6 today. No transfusions indicated  - Donor has sickle cell trait. Some anemia is expected long-term  - Both neutrophils and platelets engrafted.      FEN/GI  - hypomagnesemia  - On mg ox 1200 BID with no side effects, 400 mg daily at lunch time added 11/1  - Mag 1.9 today--continue current dose    Moisture associated dermatitis/itching to right scrotum  - started nystatin powder to this area on 11/1/18  - chafing to left scrotum and penis. Ulcer to head of penis.   - started desitin or comparable barrier cream 11/5 with improvement    Follow-up: BIW appts through day +100 with CBC, CMP, tacro, Mag, CMV. EBV once weekly only. Appts with Dr. Pantoja or NP.   Scheduled through November.    Patient seen with  collaborating physician, Dr. Kishor Beltran NP  Hematology and Stem Cell Transplant    Distress Screening Results: Psychosocial Distress screening score of Distress Score: 0 noted and reviewed. No intervention indicated.

## 2018-11-10 LAB — CMV DNA SERPL NAA+PROBE-ACNC: 50 IU/ML

## 2018-11-12 ENCOUNTER — INFUSION (OUTPATIENT)
Dept: INFUSION THERAPY | Facility: HOSPITAL | Age: 33
End: 2018-11-12
Attending: INTERNAL MEDICINE
Payer: OTHER GOVERNMENT

## 2018-11-12 ENCOUNTER — OFFICE VISIT (OUTPATIENT)
Dept: HEMATOLOGY/ONCOLOGY | Facility: CLINIC | Age: 33
End: 2018-11-12
Payer: OTHER GOVERNMENT

## 2018-11-12 VITALS
HEART RATE: 76 BPM | HEIGHT: 71 IN | DIASTOLIC BLOOD PRESSURE: 85 MMHG | TEMPERATURE: 99 F | SYSTOLIC BLOOD PRESSURE: 126 MMHG | RESPIRATION RATE: 18 BRPM | WEIGHT: 147.94 LBS | BODY MASS INDEX: 20.71 KG/M2 | OXYGEN SATURATION: 100 %

## 2018-11-12 DIAGNOSIS — Z94.81 S/P ALLOGENEIC BONE MARROW TRANSPLANT: ICD-10-CM

## 2018-11-12 DIAGNOSIS — C92.10 CML (CHRONIC MYELOCYTIC LEUKEMIA): ICD-10-CM

## 2018-11-12 DIAGNOSIS — D57.3 SICKLE CELL TRAIT: ICD-10-CM

## 2018-11-12 DIAGNOSIS — E83.42 HYPOMAGNESEMIA: ICD-10-CM

## 2018-11-12 DIAGNOSIS — C92.11 CHRONIC MYELOID LEUKEMIA, BCR/ABL-POSITIVE, IN REMISSION: Primary | ICD-10-CM

## 2018-11-12 DIAGNOSIS — D75.9 CYTOPENIA: ICD-10-CM

## 2018-11-12 LAB
ALBUMIN SERPL BCP-MCNC: 3.7 G/DL
ALP SERPL-CCNC: 76 U/L
ALT SERPL W/O P-5'-P-CCNC: 14 U/L
ANION GAP SERPL CALC-SCNC: 7 MMOL/L
AST SERPL-CCNC: 15 U/L
BASOPHILS # BLD AUTO: 0.04 K/UL
BASOPHILS NFR BLD: 0.7 %
BILIRUB SERPL-MCNC: 0.2 MG/DL
BUN SERPL-MCNC: 14 MG/DL
CALCIUM SERPL-MCNC: 9.9 MG/DL
CHLORIDE SERPL-SCNC: 108 MMOL/L
CO2 SERPL-SCNC: 27 MMOL/L
CREAT SERPL-MCNC: 1 MG/DL
DIFFERENTIAL METHOD: ABNORMAL
EBV DNA BY PCR: NORMAL IU/ML
EOSINOPHIL # BLD AUTO: 0.8 K/UL
EOSINOPHIL NFR BLD: 13.6 %
ERYTHROCYTE [DISTWIDTH] IN BLOOD BY AUTOMATED COUNT: 14.8 %
EST. GFR  (AFRICAN AMERICAN): >60 ML/MIN/1.73 M^2
EST. GFR  (NON AFRICAN AMERICAN): >60 ML/MIN/1.73 M^2
GLUCOSE SERPL-MCNC: 93 MG/DL
HCT VFR BLD AUTO: 37 %
HGB BLD-MCNC: 12.3 G/DL
IMM GRANULOCYTES # BLD AUTO: 0.12 K/UL
IMM GRANULOCYTES NFR BLD AUTO: 2.2 %
LYMPHOCYTES # BLD AUTO: 0.9 K/UL
LYMPHOCYTES NFR BLD: 16.3 %
MAGNESIUM SERPL-MCNC: 1.7 MG/DL
MCH RBC QN AUTO: 30.6 PG
MCHC RBC AUTO-ENTMCNC: 33.2 G/DL
MCV RBC AUTO: 92 FL
MONOCYTES # BLD AUTO: 1.2 K/UL
MONOCYTES NFR BLD: 21.4 %
NEUTROPHILS # BLD AUTO: 2.5 K/UL
NEUTROPHILS NFR BLD: 45.8 %
NRBC BLD-RTO: 0 /100 WBC
PHOSPHATE SERPL-MCNC: 3.7 MG/DL
PLATELET # BLD AUTO: 234 K/UL
PMV BLD AUTO: 9.9 FL
POTASSIUM SERPL-SCNC: 4.4 MMOL/L
PROT SERPL-MCNC: 6.3 G/DL
RBC # BLD AUTO: 4.02 M/UL
SODIUM SERPL-SCNC: 142 MMOL/L
TACROLIMUS BLD-MCNC: 11.2 NG/ML
WBC # BLD AUTO: 5.52 K/UL

## 2018-11-12 PROCEDURE — 84100 ASSAY OF PHOSPHORUS: CPT

## 2018-11-12 PROCEDURE — 36592 COLLECT BLOOD FROM PICC: CPT

## 2018-11-12 PROCEDURE — A4216 STERILE WATER/SALINE, 10 ML: HCPCS | Performed by: INTERNAL MEDICINE

## 2018-11-12 PROCEDURE — 80197 ASSAY OF TACROLIMUS: CPT

## 2018-11-12 PROCEDURE — 99213 OFFICE O/P EST LOW 20 MIN: CPT | Mod: PBBFAC,25 | Performed by: INTERNAL MEDICINE

## 2018-11-12 PROCEDURE — 99215 OFFICE O/P EST HI 40 MIN: CPT | Mod: S$PBB,,, | Performed by: INTERNAL MEDICINE

## 2018-11-12 PROCEDURE — 63600175 PHARM REV CODE 636 W HCPCS: Performed by: INTERNAL MEDICINE

## 2018-11-12 PROCEDURE — 25000003 PHARM REV CODE 250: Performed by: INTERNAL MEDICINE

## 2018-11-12 PROCEDURE — 80053 COMPREHEN METABOLIC PANEL: CPT

## 2018-11-12 PROCEDURE — 87799 DETECT AGENT NOS DNA QUANT: CPT

## 2018-11-12 PROCEDURE — 83735 ASSAY OF MAGNESIUM: CPT

## 2018-11-12 PROCEDURE — 99999 PR PBB SHADOW E&M-EST. PATIENT-LVL III: CPT | Mod: PBBFAC,,, | Performed by: INTERNAL MEDICINE

## 2018-11-12 PROCEDURE — 85025 COMPLETE CBC W/AUTO DIFF WBC: CPT

## 2018-11-12 RX ORDER — TACROLIMUS 0.5 MG/1
CAPSULE ORAL
Qty: 150 CAPSULE | Refills: 2 | Status: SHIPPED | OUTPATIENT
Start: 2018-11-12 | End: 2018-11-29

## 2018-11-12 RX ORDER — HEPARIN 100 UNIT/ML
500 SYRINGE INTRAVENOUS
Status: COMPLETED | OUTPATIENT
Start: 2018-11-12 | End: 2018-11-12

## 2018-11-12 RX ORDER — HEPARIN 100 UNIT/ML
500 SYRINGE INTRAVENOUS
Status: CANCELLED | OUTPATIENT
Start: 2018-11-12

## 2018-11-12 RX ORDER — SODIUM CHLORIDE 0.9 % (FLUSH) 0.9 %
10 SYRINGE (ML) INJECTION
Status: COMPLETED | OUTPATIENT
Start: 2018-11-12 | End: 2018-11-12

## 2018-11-12 RX ORDER — SODIUM CHLORIDE 0.9 % (FLUSH) 0.9 %
10 SYRINGE (ML) INJECTION
Status: CANCELLED | OUTPATIENT
Start: 2018-11-12

## 2018-11-12 RX ADMIN — Medication 10 ML: at 08:11

## 2018-11-12 RX ADMIN — HEPARIN 500 UNITS: 100 SYRINGE at 08:11

## 2018-11-13 ENCOUNTER — DOCUMENTATION ONLY (OUTPATIENT)
Dept: HEMATOLOGY/ONCOLOGY | Facility: CLINIC | Age: 33
End: 2018-11-13

## 2018-11-13 ENCOUNTER — OFFICE VISIT (OUTPATIENT)
Dept: HEMATOLOGY/ONCOLOGY | Facility: CLINIC | Age: 33
End: 2018-11-13
Payer: OTHER GOVERNMENT

## 2018-11-13 VITALS
OXYGEN SATURATION: 98 % | TEMPERATURE: 98 F | HEART RATE: 88 BPM | SYSTOLIC BLOOD PRESSURE: 128 MMHG | DIASTOLIC BLOOD PRESSURE: 85 MMHG | RESPIRATION RATE: 18 BRPM

## 2018-11-13 DIAGNOSIS — C92.10 CML (CHRONIC MYELOCYTIC LEUKEMIA): Primary | ICD-10-CM

## 2018-11-13 PROCEDURE — 81268 CHIMERISM ANAL W/CELL SELECT: CPT | Mod: 91

## 2018-11-13 PROCEDURE — 88185 FLOWCYTOMETRY/TC ADD-ON: CPT | Performed by: PATHOLOGY

## 2018-11-13 PROCEDURE — 38221 DX BONE MARROW BIOPSIES: CPT | Mod: PBBFAC | Performed by: NURSE PRACTITIONER

## 2018-11-13 PROCEDURE — 88342 IMHCHEM/IMCYTCHM 1ST ANTB: CPT | Performed by: PATHOLOGY

## 2018-11-13 PROCEDURE — 81206 BCR/ABL1 GENE MAJOR BP: CPT

## 2018-11-13 PROCEDURE — 99499 UNLISTED E&M SERVICE: CPT | Mod: S$PBB,,, | Performed by: NURSE PRACTITIONER

## 2018-11-13 PROCEDURE — 88305 TISSUE EXAM BY PATHOLOGIST: CPT | Mod: 59 | Performed by: PATHOLOGY

## 2018-11-13 PROCEDURE — 88264 CHROMOSOME ANALYSIS 20-25: CPT

## 2018-11-13 PROCEDURE — 88237 TISSUE CULTURE BONE MARROW: CPT

## 2018-11-13 PROCEDURE — 88313 SPECIAL STAINS GROUP 2: CPT

## 2018-11-13 PROCEDURE — 88342 IMHCHEM/IMCYTCHM 1ST ANTB: CPT | Mod: 26,59,, | Performed by: PATHOLOGY

## 2018-11-13 PROCEDURE — 99999 PR PBB SHADOW E&M-EST. PATIENT-LVL III: CPT | Mod: PBBFAC,,, | Performed by: NURSE PRACTITIONER

## 2018-11-13 PROCEDURE — 38222 DX BONE MARROW BX & ASPIR: CPT | Mod: S$PBB,LT,, | Performed by: NURSE PRACTITIONER

## 2018-11-13 PROCEDURE — 88189 FLOWCYTOMETRY/READ 16 & >: CPT | Mod: ,,, | Performed by: PATHOLOGY

## 2018-11-13 PROCEDURE — 81268 CHIMERISM ANAL W/CELL SELECT: CPT

## 2018-11-13 PROCEDURE — 88313 SPECIAL STAINS GROUP 2: CPT | Performed by: PATHOLOGY

## 2018-11-13 PROCEDURE — 99213 OFFICE O/P EST LOW 20 MIN: CPT | Mod: PBBFAC | Performed by: NURSE PRACTITIONER

## 2018-11-13 PROCEDURE — 38220 DX BONE MARROW ASPIRATIONS: CPT | Mod: PBBFAC | Performed by: NURSE PRACTITIONER

## 2018-11-13 PROCEDURE — 88311 DECALCIFY TISSUE: CPT | Mod: 26,,, | Performed by: PATHOLOGY

## 2018-11-13 PROCEDURE — 88313 SPECIAL STAINS GROUP 2: CPT | Mod: 26,,, | Performed by: PATHOLOGY

## 2018-11-13 PROCEDURE — 88305 TISSUE EXAM BY PATHOLOGIST: CPT | Mod: 26,,, | Performed by: PATHOLOGY

## 2018-11-13 PROCEDURE — 38222 DX BONE MARROW BX & ASPIR: CPT | Mod: PBBFAC | Performed by: NURSE PRACTITIONER

## 2018-11-13 PROCEDURE — 85097 BONE MARROW INTERPRETATION: CPT | Mod: ,,, | Performed by: PATHOLOGY

## 2018-11-13 PROCEDURE — 88184 FLOWCYTOMETRY/ TC 1 MARKER: CPT | Performed by: PATHOLOGY

## 2018-11-13 NOTE — PROGRESS NOTES
Patient reported to clinic with wife for Day +30 BMB, s/p allo SCT for CML. Tolerated procedure well.  Leona Beltran, FNP  Hematology/Oncology/Bone Marrow Transplant

## 2018-11-13 NOTE — PROCEDURES
Bone marrow  Date/Time: 11/13/2018 8:44 AM  Performed by: Leona Beltran NP  Authorized by: Lenoa Beltran NP     Aspiration?: Yes    Biopsy?: Yes      PROCEDURE NOTE:  Date of Procedure: 11/13/2018  Bone Marrow Biopsy and Aspiration  Indication: Day +30 s/p allo SCT for CML  Consent: Informed consent was obtained from patient.  Timeout: Done and documented.  Position: prone  Site: Left posterior illiac crest.  Prep: Betadine.  Needle used: 11 gauge Jamshidi needle.  Anesthetic: 2% lidocaine 5 cc.  Biopsy: The biopsy needle was introduced into the marrow cavity and an aspirate was obtained without complications and sent for flow cytometry and cytogenetics. Core biopsy obtained without difficulty and sent for routine histologic examination.  Complications: None.  Disposition: The patient was left in room with RN and instructed to lie on back for 20 minutes. Instructed to leave band aid dry and intact for 24 hours.  Blood loss: Minimal.     CORNELL So  Hematology/Oncology/Bone Marrow Transplant

## 2018-11-13 NOTE — LETTER
November 13, 2018      Leona Beltran NP  [address]  [facility] [address]           [facility]  [address]  [facility] [address]  Phone: [phone]          Patient: Wilton Guzmán   MR Number: [id number]   YOB: 1985   Date of Visit: 11/13/2018       Dear Leona Beltran:    Thank you for referring Wilton Guzmán to me for evaluation. Attached you will find relevant portions of my assessment and plan of care.    If you have questions, please do not hesitate to call me. I look forward to following Wilton Guzmán along with you.    Sincerely,    Leona Beltran NP    Enclosure  CC:  No Recipients    If you would like to receive this communication electronically, please contact [email or web address] or [phone] to request more information on [employer] access.    For providers and/or their staff who would like to refer a patient to [facility], please contact us through our one-stop-shop provider referral line, [facility] , at [phone].    If you feel you have received this communication in error or would no longer like to receive these types of communications, please e-mail [email or web address]

## 2018-11-14 LAB
CMV DNA SERPL NAA+PROBE-ACNC: 105 IU/ML
EBV DNA BY PCR: NORMAL IU/ML

## 2018-11-14 NOTE — PROGRESS NOTES
HEMATOLOGIC MALIGNANCIES PROGRESS NOTE    IDENTIFYING STATEMENT   Wilton Guzmán (Wilton) is a 33 y.o. 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      ONCOLOGY HISTORY:    1. Chronic myeloid leukemia, initially presenting in blast phase   A. 2018: Began developing left-sided abdominal pain - eventually requiring evaluation - WBC ~150 at  base; transferred to [facility] in [place], [place]   B. BMBx showed 20% myelomonoblasts concerning for CML in blast phase; bcr-abl positive (returned for Day 8 of induction therapy).    C. 2018: Began 7+3 induction chemotherapy. Began dasatinib (dose-adjusted) on day 8.   D. 2018: Transferred to [facility]   E. 2018: BMBx showed variably cellular marrow (10-40%) with 9% CD34+ blasts, concerning for residual disease.   F. 2018: Reinduction with MEC (mitoxantrone, etoposide, cytarabine) chemotherapy.    G. 2018: BMBx shows hypocellular bone marrow with no definite morphologic or immunophenotypic evidence of residual leukemia; bcr-abl p210 transcript was detected at 0.5%   H. Delay in clinic follow-up due to challenges with  insurance   I. 2018: Follow-up in clinic; WBC 6.77 (normal diff); Hgb 11.2 g/dl; Plt 298; BMBx shows 30% cellular marrow with no morphologic evidence of residual leukemia; bcr-abl p210 detected at 0.05% on international scale (MR 3.3).    J. 2018: BMBx shows 40% cellular marrow with no morphologic evidence of leukemia. Chromosomes 46, XY. Bcr-abl p210 detected at 0.1% on international scale (MR 3.0).    K. 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 Engrafted on Day +12. Course complicated by C. Difficile colitis.     INTERVAL HISTORY:      Mr. Guzmán returns to clinic for follow-up of his allogeneic stem cell transplant. Today is Day +34. He is doing okay overall. He reports  that he is unable to eat 3 large meals per day and tends to eat smaller portions throughout the day. Overall, he is eating well.  He has no rash, jaundice, fever, chills, nausea, or vomiting. He is feeling well. He reports fatigue after taking medication in the morning that resolves later in day. He reports irritated skin to scrotum and penis is improving with barrier cream. No other rashes or itching. 1-2 loose/soft stool daily. No other complaints.     Past Medical History, Past Social History and Past Family History have been reviewed and are unchanged except as noted in the interval history.    MEDICATIONS:     Current Outpatient Medications on File Prior to Visit   Medication Sig Dispense Refill    acyclovir (ZOVIRAX) 800 MG Tab Take 1 tablet (800 mg total) by mouth 2 (two) times daily. 60 tablet 11    atovaquone (MEPRON) 750 mg/5 mL Susp Take 10 mLs (1,500 mg total) by mouth once daily. Starting on 11/8/18 300 mL 5    [START ON 12/8/2018] dasatinib (SPRYCEL) 140 mg Tab Take 1 tablet by mouth once daily Start on day +60 (12/8/18). 30 tablet 11    fluconazole (DIFLUCAN) 200 MG Tab Take 2 tablets (400 mg total) by mouth once daily. 60 tablet 5    magnesium oxide (MAG-OX) 400 mg (241.3 mg magnesium) tablet Take 3 tablets (1,200 mg total) by mouth 2 (two) times daily.  0    naproxen (NAPROSYN) 500 MG tablet Take 500 mg by mouth.      nystatin (MYCOSTATIN) powder Apply to affected area 3 times daily 60 g 0    ondansetron (ZOFRAN-ODT) 8 MG TbDL DISSOLVE 1 tablet (8 mg total) by mouth every 8 (eight) hours as needed (nausea). 15 tablet 2    valGANciclovir (VALCYTE) 450 mg Tab Take 2 tablets (900 mg total) by mouth 2 (two) times daily. 120 tablet 3    ursodiol (ACTIGALL) 300 mg capsule Take 1 capsule (300 mg total) by mouth 2 (two) times daily. for 17 days 34 capsule 0     No current facility-administered medications on file prior to visit.        ALLERGIES: Review of patient's allergies indicates:  No Known  "Allergies     ROS:       Review of Systems   Constitutional: Negative for fatigue, diaphoresis, fever and unexpected weight change.   HENT:   Negative for lump/mass and sore throat.    Eyes: Negative for icterus.   Respiratory: Negative for cough and shortness of breath.    Cardiovascular: Negative for chest pain and palpitations.   Gastrointestinal: Negative for abdominal distention, constipation, diarrhea, nausea and vomiting.   Genitourinary: Negative for dysuria and frequency.    Musculoskeletal: Negative for arthralgias, gait problem and myalgias.   Skin: Negative for rash. Chafing to scrotum and penis improving with barrier cream per patient.  Neurological: Negative for dizziness, gait problem and headaches.   Hematological: Negative for adenopathy. Does not bruise/bleed easily.   Psychiatric/Behavioral: Negative for depression or anxiety.      PHYSICAL EXAM:  Vitals:    11/12/18 0855   BP: 126/85   Pulse: 76   Resp: 18   Temp: 98.5 °F (36.9 °C)   TempSrc: Oral   SpO2: 100%   Weight: 67.1 kg (147 lb 14.9 oz)   Height: 5' 11" (1.803 m)   PainSc: 0-No pain       KARNOFSKY PERFORMANCE STATUS 80%  ECOG 1    Physical Exam   Constitutional: He is oriented to person, place, and time. He appears well-developed and well-nourished. No distress.   HENT:   Head: Normocephalic and atraumatic.   Mouth/Throat: Mucous membranes are normal. No oral lesions. Dry lips   Eyes: Conjunctivae are normal.   Neck: No thyromegaly present.   Cardiovascular: Normal rate, regular rhythm and normal heart sounds.   No murmur heard.  Pulmonary/Chest: Breath sounds normal. He has no wheezes. He has no rales.   Central venous catheter in place.  CDI; no S/S infection.  Abdominal: Soft. He exhibits no distension and no mass. There is no splenomegaly or hepatomegaly. There is no tenderness.   Neurological: He is alert and oriented to person, place, and time. He has normal strength and normal reflexes. No cranial nerve deficit. Coordination " normal.   Skin: No rash noted. Chafing to scrotum/penis improving with barrier cream per patient.    LAB:   Results for orders placed or performed in visit on 11/12/18   Comprehensive metabolic panel   Result Value Ref Range    Sodium 142 136 - 145 mmol/L    Potassium 4.4 3.5 - 5.1 mmol/L    Chloride 108 95 - 110 mmol/L    CO2 27 23 - 29 mmol/L    Glucose 93 70 - 110 mg/dL    BUN, Bld 14 6 - 20 mg/dL    Creatinine 1.0 0.5 - 1.4 mg/dL    Calcium 9.9 8.7 - 10.5 mg/dL    Total Protein 6.3 6.0 - 8.4 g/dL    Albumin 3.7 3.5 - 5.2 g/dL    Total Bilirubin 0.2 0.1 - 1.0 mg/dL    Alkaline Phosphatase 76 55 - 135 U/L    AST 15 10 - 40 U/L    ALT 14 10 - 44 U/L    Anion Gap 7 (L) 8 - 16 mmol/L    eGFR if African American >60.0 >60 mL/min/1.73 m^2    eGFR if non African American >60.0 >60 mL/min/1.73 m^2   Magnesium   Result Value Ref Range    Magnesium 1.7 1.6 - 2.6 mg/dL   Phosphorus   Result Value Ref Range    Phosphorus 3.7 2.7 - 4.5 mg/dL   TACROLIMUS LEVEL   Result Value Ref Range    Tacrolimus Lvl 11.2 5.0 - 15.0 ng/mL   CBC auto differential   Result Value Ref Range    WBC 5.52 3.90 - 12.70 K/uL    RBC 4.02 (L) 4.60 - 6.20 M/uL    Hemoglobin 12.3 (L) 14.0 - 18.0 g/dL    Hematocrit 37.0 (L) 40.0 - 54.0 %    MCV 92 82 - 98 fL    MCH 30.6 27.0 - 31.0 pg    MCHC 33.2 32.0 - 36.0 g/dL    RDW 14.8 (H) 11.5 - 14.5 %    Platelets 234 150 - 350 K/uL    MPV 9.9 9.2 - 12.9 fL    Immature Granulocytes 2.2 (H) 0.0 - 0.5 %    Gran # (ANC) 2.5 1.8 - 7.7 K/uL    Immature Grans (Abs) 0.12 (H) 0.00 - 0.04 K/uL    Lymph # 0.9 (L) 1.0 - 4.8 K/uL    Mono # 1.2 (H) 0.3 - 1.0 K/uL    Eos # 0.8 (H) 0.0 - 0.5 K/uL    Baso # 0.04 0.00 - 0.20 K/uL    nRBC 0 0 /100 WBC    Gran% 45.8 38.0 - 73.0 %    Lymph% 16.3 (L) 18.0 - 48.0 %    Mono% 21.4 (H) 4.0 - 15.0 %    Eosinophil% 13.6 (H) 0.0 - 8.0 %    Basophil% 0.7 0.0 - 1.9 %    Differential Method Automated        PROBLEMS ASSESSED THIS VISIT:    1. Chronic myeloid leukemia, BCR/ABL-positive, in  remission    2. S/P allogeneic bone marrow transplant    3. Sickle cell trait    4. Cytopenia    5. Hypomagnesemia        PLAN:        History allo transplant for CML  - See oncology history above  - s/p alloSCT on 10/9/2018  - Neutrophil engraftment on Day +12.   - Currently Day +34  - Will plan maintenance dasatinib at Day +60  - plan for +30 & +100 & +1 year restaging  - day +30 BMBX in clinic 11/13/18     GVHD   - No evidence of acute GVHD today. Continue tacrolimus prophylaxis  - Tacro level 11.2 today  -Tacro decreased to 1mg qAM and 1mg qPM  - gvhd charting with each clinic visit     ID  - Complete PO vancomycin for C. Diff  - Continue ppx with acyclovir, fluconazole  - Begin atovaquone ppx at Day +30  - Monitor CMV BIW; EBV once weekly  - EBV negative to date  - CMV 68 (10/26); Valcyte script sent to get insurance approval started- pt educated not to start until we instruct him to do so, down to <35 on 10/29 and 11/6. 56 today (11/7). Will continue to hold Valcyte and continue acyclovir  - CVC remains in place without S/S infection- consider removing when no longer needing IVF or IV Mg    Cytopenias  - Today: anemia stable, hgb 12.3 today. No transfusions indicated  - Donor has sickle cell trait. Some anemia is expected long-term  - Both neutrophils and platelets engrafted.      FEN/GI  - hypomagnesemia  - On mg ox 1200 BID with no side effects, 400 mg daily at lunch time added 11/1  - Mag 1.7 today--continue current dose    Moisture associated dermatitis/itching to right scrotum  - started nystatin powder to this area on 11/1/18  - chafing to left scrotum and penis. Ulcer to head of penis.   - started desitin or comparable barrier cream 11/5 with improvement    Follow-up: Twice weekly appts through day +100 with CBC, CMP, tacro, Mag, CMV. EBV once weekly only. Appts with Dr. Pantoja or NP.   Scheduled through November.    Kishor Pantoja MD  Hematology and Stem Cell Transplant    Distress Screening Results:  Psychosocial Distress screening score of Distress Score: 0 noted and reviewed. No intervention indicated.

## 2018-11-15 ENCOUNTER — INFUSION (OUTPATIENT)
Dept: INFUSION THERAPY | Facility: HOSPITAL | Age: 33
End: 2018-11-15
Attending: INTERNAL MEDICINE
Payer: OTHER GOVERNMENT

## 2018-11-15 ENCOUNTER — OFFICE VISIT (OUTPATIENT)
Dept: HEMATOLOGY/ONCOLOGY | Facility: CLINIC | Age: 33
End: 2018-11-15
Payer: OTHER GOVERNMENT

## 2018-11-15 VITALS
SYSTOLIC BLOOD PRESSURE: 126 MMHG | OXYGEN SATURATION: 100 % | HEIGHT: 71 IN | DIASTOLIC BLOOD PRESSURE: 87 MMHG | HEART RATE: 75 BPM | BODY MASS INDEX: 21.02 KG/M2 | RESPIRATION RATE: 18 BRPM | WEIGHT: 150.13 LBS | TEMPERATURE: 99 F

## 2018-11-15 DIAGNOSIS — C92.11 CHRONIC MYELOID LEUKEMIA, BCR/ABL-POSITIVE, IN REMISSION: Primary | ICD-10-CM

## 2018-11-15 DIAGNOSIS — T45.1X5A ANEMIA ASSOCIATED WITH CHEMOTHERAPY: ICD-10-CM

## 2018-11-15 DIAGNOSIS — D64.81 ANEMIA ASSOCIATED WITH CHEMOTHERAPY: ICD-10-CM

## 2018-11-15 DIAGNOSIS — C92.10 CML (CHRONIC MYELOCYTIC LEUKEMIA): ICD-10-CM

## 2018-11-15 DIAGNOSIS — B25.9 CYTOMEGALOVIRUS INFECTION, UNSPECIFIED CYTOMEGALOVIRAL INFECTION TYPE: ICD-10-CM

## 2018-11-15 DIAGNOSIS — Z94.81 S/P ALLOGENEIC BONE MARROW TRANSPLANT: Primary | ICD-10-CM

## 2018-11-15 DIAGNOSIS — C92.11 CHRONIC MYELOID LEUKEMIA, BCR/ABL-POSITIVE, IN REMISSION: ICD-10-CM

## 2018-11-15 DIAGNOSIS — Z94.81 S/P ALLOGENEIC BONE MARROW TRANSPLANT: ICD-10-CM

## 2018-11-15 LAB
ABO + RH BLD: NORMAL
ALBUMIN SERPL BCP-MCNC: 3.8 G/DL
ALP SERPL-CCNC: 75 U/L
ALT SERPL W/O P-5'-P-CCNC: 16 U/L
ANION GAP SERPL CALC-SCNC: 9 MMOL/L
AST SERPL-CCNC: 19 U/L
BASOPHILS # BLD AUTO: 0.05 K/UL
BASOPHILS NFR BLD: 0.8 %
BCR/ABL,P210 RESULT: NORMAL
BILIRUB SERPL-MCNC: 0.2 MG/DL
BLD GP AB SCN CELLS X3 SERPL QL: NORMAL
BUN SERPL-MCNC: 13 MG/DL
CALCIUM SERPL-MCNC: 10.2 MG/DL
CHLORIDE SERPL-SCNC: 107 MMOL/L
CO2 SERPL-SCNC: 26 MMOL/L
CREAT SERPL-MCNC: 1.1 MG/DL
DIFFERENTIAL METHOD: ABNORMAL
EOSINOPHIL # BLD AUTO: 0.6 K/UL
EOSINOPHIL NFR BLD: 9.7 %
ERYTHROCYTE [DISTWIDTH] IN BLOOD BY AUTOMATED COUNT: 14.9 %
EST. GFR  (AFRICAN AMERICAN): >60 ML/MIN/1.73 M^2
EST. GFR  (NON AFRICAN AMERICAN): >60 ML/MIN/1.73 M^2
GLUCOSE SERPL-MCNC: 106 MG/DL
HCT VFR BLD AUTO: 36.7 %
HGB BLD-MCNC: 12 G/DL
IMM GRANULOCYTES # BLD AUTO: 0.09 K/UL
IMM GRANULOCYTES NFR BLD AUTO: 1.4 %
LYMPHOCYTES # BLD AUTO: 1 K/UL
LYMPHOCYTES NFR BLD: 15.6 %
MAGNESIUM SERPL-MCNC: 1.8 MG/DL
MCH RBC QN AUTO: 30 PG
MCHC RBC AUTO-ENTMCNC: 32.7 G/DL
MCV RBC AUTO: 92 FL
MONOCYTES # BLD AUTO: 1.2 K/UL
MONOCYTES NFR BLD: 18.9 %
NEUTROPHILS # BLD AUTO: 3.5 K/UL
NEUTROPHILS NFR BLD: 53.6 %
NRBC BLD-RTO: 0 /100 WBC
PATH REPORT.FINAL DX SPEC: NORMAL
PHOSPHATE SERPL-MCNC: 4 MG/DL
PLATELET # BLD AUTO: 255 K/UL
PMV BLD AUTO: 10.9 FL
POTASSIUM SERPL-SCNC: 4.3 MMOL/L
PROT SERPL-MCNC: 6.4 G/DL
RBC # BLD AUTO: 4 M/UL
SODIUM SERPL-SCNC: 142 MMOL/L
SPECIMEN TYPE: NORMAL
TACROLIMUS BLD-MCNC: 11.2 NG/ML
WBC # BLD AUTO: 6.52 K/UL

## 2018-11-15 PROCEDURE — 84100 ASSAY OF PHOSPHORUS: CPT

## 2018-11-15 PROCEDURE — 86901 BLOOD TYPING SEROLOGIC RH(D): CPT

## 2018-11-15 PROCEDURE — 85025 COMPLETE CBC W/AUTO DIFF WBC: CPT

## 2018-11-15 PROCEDURE — 87799 DETECT AGENT NOS DNA QUANT: CPT

## 2018-11-15 PROCEDURE — A4216 STERILE WATER/SALINE, 10 ML: HCPCS | Performed by: INTERNAL MEDICINE

## 2018-11-15 PROCEDURE — 83735 ASSAY OF MAGNESIUM: CPT

## 2018-11-15 PROCEDURE — 25000003 PHARM REV CODE 250: Performed by: INTERNAL MEDICINE

## 2018-11-15 PROCEDURE — 99999 PR PBB SHADOW E&M-EST. PATIENT-LVL IV: CPT | Mod: PBBFAC,,, | Performed by: NURSE PRACTITIONER

## 2018-11-15 PROCEDURE — 80053 COMPREHEN METABOLIC PANEL: CPT

## 2018-11-15 PROCEDURE — 63600175 PHARM REV CODE 636 W HCPCS: Performed by: INTERNAL MEDICINE

## 2018-11-15 PROCEDURE — 99214 OFFICE O/P EST MOD 30 MIN: CPT | Mod: PBBFAC,25 | Performed by: NURSE PRACTITIONER

## 2018-11-15 PROCEDURE — 36592 COLLECT BLOOD FROM PICC: CPT

## 2018-11-15 PROCEDURE — 99214 OFFICE O/P EST MOD 30 MIN: CPT | Mod: S$PBB,,, | Performed by: NURSE PRACTITIONER

## 2018-11-15 PROCEDURE — 80197 ASSAY OF TACROLIMUS: CPT

## 2018-11-15 RX ORDER — HEPARIN 100 UNIT/ML
500 SYRINGE INTRAVENOUS
Status: CANCELLED | OUTPATIENT
Start: 2018-11-15

## 2018-11-15 RX ORDER — HEPARIN 100 UNIT/ML
500 SYRINGE INTRAVENOUS
Status: COMPLETED | OUTPATIENT
Start: 2018-11-15 | End: 2018-11-15

## 2018-11-15 RX ORDER — SODIUM CHLORIDE 0.9 % (FLUSH) 0.9 %
10 SYRINGE (ML) INJECTION
Status: COMPLETED | OUTPATIENT
Start: 2018-11-15 | End: 2018-11-15

## 2018-11-15 RX ORDER — SODIUM CHLORIDE 0.9 % (FLUSH) 0.9 %
10 SYRINGE (ML) INJECTION
Status: CANCELLED | OUTPATIENT
Start: 2018-11-15

## 2018-11-15 RX ADMIN — Medication 10 ML: at 09:11

## 2018-11-15 RX ADMIN — HEPARIN 500 UNITS: 100 SYRINGE at 09:11

## 2018-11-15 NOTE — Clinical Note
BI-Weekly labs and appts with Dr. Pantoja or NP through day +100 with CBC, CMP, tacro, Mag, CMV.  EBV once weekly only. Please schedule appts through December. Already scheduled through November.

## 2018-11-15 NOTE — PROGRESS NOTES
HEMATOLOGIC MALIGNANCIES PROGRESS NOTE    IDENTIFYING STATEMENT   Wilton Guzmán (Wilton) is a 33 y.o. 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      ONCOLOGY HISTORY:    1. Chronic myeloid leukemia, initially presenting in blast phase   A. 2018: Began developing left-sided abdominal pain - eventually requiring evaluation - WBC ~150 at  base; transferred to [facility] in [place], [place]   B. BMBx showed 20% myelomonoblasts concerning for CML in blast phase; bcr-abl positive (returned for Day 8 of induction therapy).    C. 2018: Began 7+3 induction chemotherapy. Began dasatinib (dose-adjusted) on day 8.   D. 2018: Transferred to [facility]   E. 2018: BMBx showed variably cellular marrow (10-40%) with 9% CD34+ blasts, concerning for residual disease.   F. 2018: Reinduction with MEC (mitoxantrone, etoposide, cytarabine) chemotherapy.    G. 2018: BMBx shows hypocellular bone marrow with no definite morphologic or immunophenotypic evidence of residual leukemia; bcr-abl p210 transcript was detected at 0.5%   H. Delay in clinic follow-up due to challenges with  insurance   I. 2018: Follow-up in clinic; WBC 6.77 (normal diff); Hgb 11.2 g/dl; Plt 298; BMBx shows 30% cellular marrow with no morphologic evidence of residual leukemia; bcr-abl p210 detected at 0.05% on international scale (MR 3.3).    J. 2018: BMBx shows 40% cellular marrow with no morphologic evidence of leukemia. Chromosomes 46, XY. Bcr-abl p210 detected at 0.1% on international scale (MR 3.0).    K. 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 Engrafted on Day +12. Course complicated by C. Difficile colitis.     INTERVAL HISTORY:      Mr. Guzmán returns to clinic for follow-up of his allogeneic stem cell transplant. Today is Day +37. He is doing well overall. He has no  rash, jaundice, fever, chills, nausea, or vomiting. Energy improving and eating well. Irritated skin to scrotum and penis resolved. 1-2 loose/soft stool daily. He reports some mild soreness to bx site      Past Medical History, Past Social History and Past Family History have been reviewed and are unchanged except as noted in the interval history.    MEDICATIONS:     Current Outpatient Medications on File Prior to Visit   Medication Sig Dispense Refill    acyclovir (ZOVIRAX) 800 MG Tab Take 1 tablet (800 mg total) by mouth 2 (two) times daily. 60 tablet 11    atovaquone (MEPRON) 750 mg/5 mL Susp Take 10 mLs (1,500 mg total) by mouth once daily. Starting on 11/8/18 300 mL 5    [START ON 12/8/2018] dasatinib (SPRYCEL) 140 mg Tab Take 1 tablet by mouth once daily Start on day +60 (12/8/18). 30 tablet 11    fluconazole (DIFLUCAN) 200 MG Tab Take 2 tablets (400 mg total) by mouth once daily. 60 tablet 5    magnesium oxide (MAG-OX) 400 mg (241.3 mg magnesium) tablet Take 3 tablets (1,200 mg total) by mouth 2 (two) times daily.  0    naproxen (NAPROSYN) 500 MG tablet Take 500 mg by mouth.      nystatin (MYCOSTATIN) powder Apply to affected area 3 times daily 60 g 0    ondansetron (ZOFRAN-ODT) 8 MG TbDL DISSOLVE 1 tablet (8 mg total) by mouth every 8 (eight) hours as needed (nausea). 15 tablet 2    tacrolimus (PROGRAF) 0.5 MG Cap Take 1 mg (2 capsules) by mouth in the morning and 1 mg (2 capsules) by mouth in the evening 150 capsule 2    valGANciclovir (VALCYTE) 450 mg Tab Take 2 tablets (900 mg total) by mouth 2 (two) times daily. 120 tablet 3    ursodiol (ACTIGALL) 300 mg capsule Take 1 capsule (300 mg total) by mouth 2 (two) times daily. for 17 days 34 capsule 0     No current facility-administered medications on file prior to visit.        ALLERGIES: Review of patient's allergies indicates:  No Known Allergies     ROS:       Review of Systems   Constitutional: Negative for fatigue, diaphoresis, fever and  "unexpected weight change.   HENT:   Negative for lump/mass and sore throat.    Eyes: Negative for icterus.   Respiratory: Negative for cough and shortness of breath.    Cardiovascular: Negative for chest pain and palpitations.   Gastrointestinal: Negative for abdominal distention, constipation, diarrhea, nausea and vomiting.   Genitourinary: Negative for dysuria and frequency.    Musculoskeletal: Negative for arthralgias, gait problem and myalgias.   Skin: Negative for rash or itching.   Neurological: Negative for dizziness, gait problem and headaches.   Hematological: Negative for adenopathy. Does not bruise/bleed easily.   Psychiatric/Behavioral: Negative for depression or anxiety.    PHYSICAL EXAM:  Vitals:    11/15/18 1052   BP: 126/87   Pulse: 75   Resp: 18   Temp: 98.5 °F (36.9 °C)   TempSrc: Oral   SpO2: 100%   Weight: 68.1 kg (150 lb 2.1 oz)   Height: 5' 11" (1.803 m)   PainSc: 0-No pain       KARNOFSKY PERFORMANCE STATUS 80%  ECOG 1    Physical Exam   Constitutional: He is oriented to person, place, and time. He appears well-developed and well-nourished. No distress.   HENT:   Head: Normocephalic and atraumatic.   Mouth/Throat: Mucous membranes are normal. No oral lesions. Dry lips   Eyes: Conjunctivae are normal.   Neck: No thyromegaly present.   Cardiovascular: Normal rate, regular rhythm and normal heart sounds.   No murmur heard.  Pulmonary/Chest: Breath sounds normal. He has no wheezes. He has no rales.   Central venous catheter in place.  CDI; no S/S infection.  Abdominal: Soft. He exhibits no distension and no mass. There is no splenomegaly or hepatomegaly. There is no tenderness.   Neurological: He is alert and oriented to person, place, and time. He has normal strength and normal reflexes. No cranial nerve deficit. Coordination normal.   Skin: No rash noted.     LAB:   Results for orders placed or performed in visit on 11/15/18   Comprehensive metabolic panel   Result Value Ref Range    Sodium 142 " 136 - 145 mmol/L    Potassium 4.3 3.5 - 5.1 mmol/L    Chloride 107 95 - 110 mmol/L    CO2 26 23 - 29 mmol/L    Glucose 106 70 - 110 mg/dL    BUN, Bld 13 6 - 20 mg/dL    Creatinine 1.1 0.5 - 1.4 mg/dL    Calcium 10.2 8.7 - 10.5 mg/dL    Total Protein 6.4 6.0 - 8.4 g/dL    Albumin 3.8 3.5 - 5.2 g/dL    Total Bilirubin 0.2 0.1 - 1.0 mg/dL    Alkaline Phosphatase 75 55 - 135 U/L    AST 19 10 - 40 U/L    ALT 16 10 - 44 U/L    Anion Gap 9 8 - 16 mmol/L    eGFR if African American >60.0 >60 mL/min/1.73 m^2    eGFR if non African American >60.0 >60 mL/min/1.73 m^2   Magnesium   Result Value Ref Range    Magnesium 1.8 1.6 - 2.6 mg/dL   Phosphorus   Result Value Ref Range    Phosphorus 4.0 2.7 - 4.5 mg/dL   TACROLIMUS LEVEL   Result Value Ref Range    Tacrolimus Lvl 11.2 5.0 - 15.0 ng/mL   CBC auto differential   Result Value Ref Range    WBC 6.52 3.90 - 12.70 K/uL    RBC 4.00 (L) 4.60 - 6.20 M/uL    Hemoglobin 12.0 (L) 14.0 - 18.0 g/dL    Hematocrit 36.7 (L) 40.0 - 54.0 %    MCV 92 82 - 98 fL    MCH 30.0 27.0 - 31.0 pg    MCHC 32.7 32.0 - 36.0 g/dL    RDW 14.9 (H) 11.5 - 14.5 %    Platelets 255 150 - 350 K/uL    MPV 10.9 9.2 - 12.9 fL    Immature Granulocytes 1.4 (H) 0.0 - 0.5 %    Gran # (ANC) 3.5 1.8 - 7.7 K/uL    Immature Grans (Abs) 0.09 (H) 0.00 - 0.04 K/uL    Lymph # 1.0 1.0 - 4.8 K/uL    Mono # 1.2 (H) 0.3 - 1.0 K/uL    Eos # 0.6 (H) 0.0 - 0.5 K/uL    Baso # 0.05 0.00 - 0.20 K/uL    nRBC 0 0 /100 WBC    Gran% 53.6 38.0 - 73.0 %    Lymph% 15.6 (L) 18.0 - 48.0 %    Mono% 18.9 (H) 4.0 - 15.0 %    Eosinophil% 9.7 (H) 0.0 - 8.0 %    Basophil% 0.8 0.0 - 1.9 %    Differential Method Automated    Type & Screen   Result Value Ref Range    Group & Rh O POS     Indirect Wes NEG        PROBLEMS ASSESSED THIS VISIT:    1. S/P allogeneic bone marrow transplant    2. Chronic myeloid leukemia, BCR/ABL-positive, in remission    3. Anemia associated with chemotherapy    4. Cytomegalovirus infection, unspecified cytomegaloviral  infection type        PLAN:        History allo transplant for CML  - See oncology history above  - s/p alloSCT on 10/9/2018  - Neutrophil engraftment on Day +12.   - Currently Day +37  - Will plan maintenance dasatinib at Day +60  - plan for +30 & +100 & +1 year restaging  - day +30 BMBX was performed in clinic 11/13/18, no evidence of CML, BCR/ABL and cytogenetics and chimerism pending     GVHD   - No evidence of acute GVHD today. Continue tacrolimus prophylaxis  -Tacro 2 mg qAM and 1 mg qPM (10/24), decreased to 1.5/1mg (11/1). Decreased to 1 mg BID (11/12).   - tacro level 11.2 today. Keep tacro dose at 1mg BID per Dr. Pantoja   - gvhd charting with each clinic visit     ID  - Complete PO vancomycin for C. Diff  - Continue ppx with acyclovir, fluconazole  - Begin atovaquone ppx at Day +30  - Monitor CMV BIW; EBV once weekly  - EBV negative to date  -  (11/12); Valcyte script sent to get insurance approval, patient now has medication and has been instructed to hold. Per ID MD, continue to hold Valcyte until CMV > 250 and continue acyclovir. Patient has appt with ID MD on Monday for CMV.  - CVC remains in place without S/S infection- consider removing in the near future    Cytopenias  - Today: anemia stable, hgb 12.0 today. No transfusions indicated  - Donor has sickle cell trait. Some anemia is expected long-term  - Both neutrophils and platelets engrafted.      FEN/GI  - hypomagnesemia  - On mg ox 1200 BID with no side effects, 400 mg daily at lunch time added 11/1  - Mag 1.8 today--continue current dose    Moisture associated dermatitis/itching to right scrotum  - started nystatin powder to this area on 11/1/18  - chafing to left scrotum and penis. Ulcer to head of penis.   - started desitin or comparable barrier cream 11/5 resolution    Follow-up: BI-Weekly labs and appts with Dr. Pantoja or NP through day +100 with CBC, CMP, tacro, Mag, CMV.  EBV once weekly only. Please schedule appts through December.      Haily Serna NP  Hematology and Stem Cell Transplant    Distress Screening Results: Psychosocial Distress screening score of Distress Score: 0 noted and reviewed. No intervention indicated.

## 2018-11-16 ENCOUNTER — TELEPHONE (OUTPATIENT)
Dept: HEMATOLOGY/ONCOLOGY | Facility: CLINIC | Age: 33
End: 2018-11-16

## 2018-11-16 LAB
CMV DNA SERPL NAA+PROBE-ACNC: 80 IU/ML
EBV DNA BY PCR: NORMAL IU/ML

## 2018-11-19 ENCOUNTER — INFUSION (OUTPATIENT)
Dept: INFUSION THERAPY | Facility: HOSPITAL | Age: 33
End: 2018-11-19
Attending: INTERNAL MEDICINE
Payer: OTHER GOVERNMENT

## 2018-11-19 ENCOUNTER — RESEARCH ENCOUNTER (OUTPATIENT)
Dept: RESEARCH | Facility: HOSPITAL | Age: 33
End: 2018-11-19

## 2018-11-19 ENCOUNTER — OFFICE VISIT (OUTPATIENT)
Dept: HEMATOLOGY/ONCOLOGY | Facility: CLINIC | Age: 33
End: 2018-11-19
Payer: OTHER GOVERNMENT

## 2018-11-19 VITALS
TEMPERATURE: 99 F | BODY MASS INDEX: 21.02 KG/M2 | RESPIRATION RATE: 18 BRPM | HEART RATE: 81 BPM | HEIGHT: 71 IN | OXYGEN SATURATION: 99 % | WEIGHT: 150.13 LBS | DIASTOLIC BLOOD PRESSURE: 95 MMHG | SYSTOLIC BLOOD PRESSURE: 129 MMHG

## 2018-11-19 DIAGNOSIS — R19.7 DIARRHEA, UNSPECIFIED TYPE: Primary | ICD-10-CM

## 2018-11-19 DIAGNOSIS — Z94.81 S/P ALLOGENEIC BONE MARROW TRANSPLANT: ICD-10-CM

## 2018-11-19 DIAGNOSIS — C92.10 CML (CHRONIC MYELOCYTIC LEUKEMIA): ICD-10-CM

## 2018-11-19 DIAGNOSIS — C92.11 CHRONIC MYELOID LEUKEMIA, BCR/ABL-POSITIVE, IN REMISSION: Primary | ICD-10-CM

## 2018-11-19 DIAGNOSIS — D89.810 ACUTE GVHD: ICD-10-CM

## 2018-11-19 DIAGNOSIS — D75.9 CYTOPENIA: ICD-10-CM

## 2018-11-19 LAB
ALBUMIN SERPL BCP-MCNC: 3.6 G/DL
ALP SERPL-CCNC: 75 U/L
ALT SERPL W/O P-5'-P-CCNC: 27 U/L
ANION GAP SERPL CALC-SCNC: 7 MMOL/L
AST SERPL-CCNC: 28 U/L
BASOPHILS # BLD AUTO: 0.04 K/UL
BASOPHILS NFR BLD: 0.7 %
BILIRUB SERPL-MCNC: 0.3 MG/DL
BUN SERPL-MCNC: 12 MG/DL
CALCIUM SERPL-MCNC: 9.7 MG/DL
CHLORIDE SERPL-SCNC: 107 MMOL/L
CO2 SERPL-SCNC: 28 MMOL/L
CREAT SERPL-MCNC: 1.2 MG/DL
DIFFERENTIAL METHOD: ABNORMAL
EOSINOPHIL # BLD AUTO: 0.6 K/UL
EOSINOPHIL NFR BLD: 9.4 %
ERYTHROCYTE [DISTWIDTH] IN BLOOD BY AUTOMATED COUNT: 14.6 %
EST. GFR  (AFRICAN AMERICAN): >60 ML/MIN/1.73 M^2
EST. GFR  (NON AFRICAN AMERICAN): >60 ML/MIN/1.73 M^2
FINAL DIAGNOSIS - CHIMERISM SORT: NORMAL
GLUCOSE SERPL-MCNC: 132 MG/DL
HCT VFR BLD AUTO: 36.3 %
HGB BLD-MCNC: 12 G/DL
IMM GRANULOCYTES # BLD AUTO: 0.06 K/UL
IMM GRANULOCYTES NFR BLD AUTO: 1 %
LYMPHOCYTES # BLD AUTO: 1.1 K/UL
LYMPHOCYTES NFR BLD: 18.7 %
MAGNESIUM SERPL-MCNC: 1.9 MG/DL
MCH RBC QN AUTO: 29.8 PG
MCHC RBC AUTO-ENTMCNC: 33.1 G/DL
MCV RBC AUTO: 90 FL
MONOCYTES # BLD AUTO: 0.9 K/UL
MONOCYTES NFR BLD: 14.6 %
NEUTROPHILS # BLD AUTO: 3.2 K/UL
NEUTROPHILS NFR BLD: 55.6 %
NRBC BLD-RTO: 0 /100 WBC
PHOSPHATE SERPL-MCNC: 3.7 MG/DL
PLATELET # BLD AUTO: 170 K/UL
PMV BLD AUTO: 11.6 FL
POTASSIUM SERPL-SCNC: 4 MMOL/L
PROT SERPL-MCNC: 6.1 G/DL
RBC # BLD AUTO: 4.03 M/UL
SODIUM SERPL-SCNC: 142 MMOL/L
SPECIMEN TYPE -CHIMERISM SORT: NORMAL
TACROLIMUS BLD-MCNC: 13.3 NG/ML
WBC # BLD AUTO: 5.83 K/UL

## 2018-11-19 PROCEDURE — 99999 PR PBB SHADOW E&M-EST. PATIENT-LVL IV: CPT | Mod: PBBFAC,,, | Performed by: INTERNAL MEDICINE

## 2018-11-19 PROCEDURE — 99214 OFFICE O/P EST MOD 30 MIN: CPT | Mod: PBBFAC,25 | Performed by: INTERNAL MEDICINE

## 2018-11-19 PROCEDURE — A4216 STERILE WATER/SALINE, 10 ML: HCPCS | Performed by: INTERNAL MEDICINE

## 2018-11-19 PROCEDURE — 80053 COMPREHEN METABOLIC PANEL: CPT

## 2018-11-19 PROCEDURE — 83735 ASSAY OF MAGNESIUM: CPT

## 2018-11-19 PROCEDURE — 25000003 PHARM REV CODE 250: Performed by: INTERNAL MEDICINE

## 2018-11-19 PROCEDURE — 85025 COMPLETE CBC W/AUTO DIFF WBC: CPT

## 2018-11-19 PROCEDURE — 87449 NOS EACH ORGANISM AG IA: CPT

## 2018-11-19 PROCEDURE — 80197 ASSAY OF TACROLIMUS: CPT

## 2018-11-19 PROCEDURE — 87799 DETECT AGENT NOS DNA QUANT: CPT

## 2018-11-19 PROCEDURE — 36592 COLLECT BLOOD FROM PICC: CPT

## 2018-11-19 PROCEDURE — 99215 OFFICE O/P EST HI 40 MIN: CPT | Mod: S$PBB,,, | Performed by: INTERNAL MEDICINE

## 2018-11-19 PROCEDURE — 63600175 PHARM REV CODE 636 W HCPCS: Performed by: INTERNAL MEDICINE

## 2018-11-19 PROCEDURE — 84100 ASSAY OF PHOSPHORUS: CPT

## 2018-11-19 RX ORDER — HEPARIN 100 UNIT/ML
500 SYRINGE INTRAVENOUS
Status: CANCELLED | OUTPATIENT
Start: 2018-11-19

## 2018-11-19 RX ORDER — BUDESONIDE 3 MG/1
9 CAPSULE, COATED PELLETS ORAL DAILY
Qty: 90 CAPSULE | Refills: 2 | Status: SHIPPED | OUTPATIENT
Start: 2018-11-19 | End: 2019-01-04

## 2018-11-19 RX ORDER — HEPARIN 100 UNIT/ML
500 SYRINGE INTRAVENOUS
Status: COMPLETED | OUTPATIENT
Start: 2018-11-19 | End: 2018-11-19

## 2018-11-19 RX ORDER — SODIUM CHLORIDE 0.9 % (FLUSH) 0.9 %
10 SYRINGE (ML) INJECTION
Status: COMPLETED | OUTPATIENT
Start: 2018-11-19 | End: 2018-11-19

## 2018-11-19 RX ORDER — SODIUM CHLORIDE 0.9 % (FLUSH) 0.9 %
10 SYRINGE (ML) INJECTION
Status: CANCELLED | OUTPATIENT
Start: 2018-11-19

## 2018-11-19 RX ADMIN — HEPARIN 500 UNITS: 100 SYRINGE at 09:11

## 2018-11-19 RX ADMIN — Medication 10 ML: at 09:11

## 2018-11-19 NOTE — PROGRESS NOTES
Pt and a family member were approached in BMT clinic regarding participation in protocol IRB #2015.101.C, project #[id number]. Pt was agreeable.     The ICF was reviewed with pt. The discussion included:   - participation is voluntary;   - pt can change his mind about participating up until the samples are collected;  - if he changes his mind about participation after collection we cannot get that sample back from sponsor;   - samples that have been used prior to his notification will still be included in research;   - specimens collected include only those discussed with the patient at the time of consent and are indicated on the ICF;    - blood specimen will be collected during next lab visit after routine tests have been conducted;  - Dr. Pantoja will continue care per his usual protocol - participation in [facility] program will not change any treatment;   - specimens will be given a unique code that can only be linked to pt by [facility] staff;  - all medical information released to researchers will be stripped of identifiers;   - there is a small risk of loss of confidentiality, but we make every effort to ensure privacy;  - no other physical risks outside of those involved in standard of care procedure.      Pt was informed that participation in this study would not preclude him from participating in any drug study/other research if offered.   Pt did not have any questions. Pt willingly and independently signed ICF.    A copy of signed ICF was given to pt with instructions to call with any questions that may arise or if he should change his mind regarding participation in [employer] program.

## 2018-11-20 DIAGNOSIS — Z00.6 EXAMINATION OF PARTICIPANT IN CLINICAL TRIAL: ICD-10-CM

## 2018-11-20 DIAGNOSIS — Z94.81 S/P ALLOGENEIC BONE MARROW TRANSPLANT: Primary | ICD-10-CM

## 2018-11-20 LAB
C DIFF GDH STL QL: NEGATIVE
C DIFF TOX A+B STL QL IA: NEGATIVE
CHROM BANDING METHOD: NORMAL
CHROMOSOME ANALYSIS BM ADDITIONAL INFORMATION: NORMAL
CHROMOSOME ANALYSIS BM RELEASED BY: NORMAL
CHROMOSOME ANALYSIS BM RESULT SUMMARY: NORMAL
CLINICAL CYTOGENETICIST REVIEW: NORMAL
CMV DNA SERPL NAA+PROBE-ACNC: 168 IU/ML
EBV DNA BY PCR: NORMAL IU/ML
KARYOTYP MAR: NORMAL
REASON FOR REFERRAL (NARRATIVE): NORMAL
REF LAB TEST METHOD: NORMAL
SPECIMEN SOURCE: NORMAL
SPECIMEN: NORMAL

## 2018-11-21 ENCOUNTER — INFUSION (OUTPATIENT)
Dept: INFUSION THERAPY | Facility: HOSPITAL | Age: 33
End: 2018-11-21
Attending: INTERNAL MEDICINE
Payer: OTHER GOVERNMENT

## 2018-11-21 ENCOUNTER — OFFICE VISIT (OUTPATIENT)
Dept: HEMATOLOGY/ONCOLOGY | Facility: CLINIC | Age: 33
End: 2018-11-21
Payer: OTHER GOVERNMENT

## 2018-11-21 VITALS
HEART RATE: 83 BPM | SYSTOLIC BLOOD PRESSURE: 140 MMHG | HEIGHT: 71 IN | TEMPERATURE: 99 F | DIASTOLIC BLOOD PRESSURE: 89 MMHG | RESPIRATION RATE: 18 BRPM | OXYGEN SATURATION: 100 % | BODY MASS INDEX: 20.96 KG/M2 | WEIGHT: 149.69 LBS

## 2018-11-21 DIAGNOSIS — R19.7 DIARRHEA, UNSPECIFIED TYPE: ICD-10-CM

## 2018-11-21 DIAGNOSIS — B25.9 CYTOMEGALOVIRUS INFECTION, UNSPECIFIED CYTOMEGALOVIRAL INFECTION TYPE: ICD-10-CM

## 2018-11-21 DIAGNOSIS — Z00.6 EXAMINATION OF PARTICIPANT IN CLINICAL TRIAL: ICD-10-CM

## 2018-11-21 DIAGNOSIS — C92.11 CHRONIC MYELOID LEUKEMIA, BCR/ABL-POSITIVE, IN REMISSION: Primary | ICD-10-CM

## 2018-11-21 DIAGNOSIS — Z94.81 S/P ALLOGENEIC BONE MARROW TRANSPLANT: Primary | ICD-10-CM

## 2018-11-21 DIAGNOSIS — D89.810 ACUTE GVHD: ICD-10-CM

## 2018-11-21 DIAGNOSIS — C92.11 CHRONIC MYELOID LEUKEMIA, BCR/ABL-POSITIVE, IN REMISSION: ICD-10-CM

## 2018-11-21 DIAGNOSIS — C92.10 CML (CHRONIC MYELOCYTIC LEUKEMIA): ICD-10-CM

## 2018-11-21 DIAGNOSIS — E83.42 HYPOMAGNESEMIA: ICD-10-CM

## 2018-11-21 DIAGNOSIS — Z94.81 S/P ALLOGENEIC BONE MARROW TRANSPLANT: ICD-10-CM

## 2018-11-21 LAB
ALBUMIN SERPL BCP-MCNC: 3.8 G/DL
ALP SERPL-CCNC: 72 U/L
ALT SERPL W/O P-5'-P-CCNC: 33 U/L
ANION GAP SERPL CALC-SCNC: 8 MMOL/L
AST SERPL-CCNC: 25 U/L
BASOPHILS # BLD AUTO: 0.02 K/UL
BASOPHILS NFR BLD: 0.2 %
BILIRUB SERPL-MCNC: 0.3 MG/DL
BUN SERPL-MCNC: 18 MG/DL
CALCIUM SERPL-MCNC: 10.1 MG/DL
CHLORIDE SERPL-SCNC: 109 MMOL/L
CO2 SERPL-SCNC: 25 MMOL/L
CREAT SERPL-MCNC: 1.4 MG/DL
DIFFERENTIAL METHOD: ABNORMAL
EOSINOPHIL # BLD AUTO: 0 K/UL
EOSINOPHIL NFR BLD: 0.2 %
ERYTHROCYTE [DISTWIDTH] IN BLOOD BY AUTOMATED COUNT: 14.8 %
EST. GFR  (AFRICAN AMERICAN): >60 ML/MIN/1.73 M^2
EST. GFR  (NON AFRICAN AMERICAN): >60 ML/MIN/1.73 M^2
GLUCOSE SERPL-MCNC: 125 MG/DL
HCT VFR BLD AUTO: 34.7 %
HGB BLD-MCNC: 11.6 G/DL
IMM GRANULOCYTES # BLD AUTO: 0.08 K/UL
IMM GRANULOCYTES NFR BLD AUTO: 0.6 %
LYMPHOCYTES # BLD AUTO: 1.4 K/UL
LYMPHOCYTES NFR BLD: 10.3 %
MAGNESIUM SERPL-MCNC: 2 MG/DL
MCH RBC QN AUTO: 30.1 PG
MCHC RBC AUTO-ENTMCNC: 33.4 G/DL
MCV RBC AUTO: 90 FL
MONOCYTES # BLD AUTO: 1.5 K/UL
MONOCYTES NFR BLD: 11.5 %
NEUTROPHILS # BLD AUTO: 10.2 K/UL
NEUTROPHILS NFR BLD: 77.2 %
NRBC BLD-RTO: 0 /100 WBC
PHOSPHATE SERPL-MCNC: 3.8 MG/DL
PLATELET # BLD AUTO: 164 K/UL
PMV BLD AUTO: 11 FL
POTASSIUM SERPL-SCNC: 3.9 MMOL/L
PROT SERPL-MCNC: 6.4 G/DL
RBC # BLD AUTO: 3.85 M/UL
SODIUM SERPL-SCNC: 142 MMOL/L
TACROLIMUS BLD-MCNC: 12.2 NG/ML
WBC # BLD AUTO: 13.13 K/UL

## 2018-11-21 PROCEDURE — 99215 OFFICE O/P EST HI 40 MIN: CPT | Mod: PBBFAC,25 | Performed by: NURSE PRACTITIONER

## 2018-11-21 PROCEDURE — 99999 PR PBB SHADOW E&M-EST. PATIENT-LVL V: CPT | Mod: PBBFAC,,, | Performed by: NURSE PRACTITIONER

## 2018-11-21 PROCEDURE — 80053 COMPREHEN METABOLIC PANEL: CPT

## 2018-11-21 PROCEDURE — 85025 COMPLETE CBC W/AUTO DIFF WBC: CPT

## 2018-11-21 PROCEDURE — 36592 COLLECT BLOOD FROM PICC: CPT

## 2018-11-21 PROCEDURE — 25000003 PHARM REV CODE 250: Performed by: INTERNAL MEDICINE

## 2018-11-21 PROCEDURE — A4216 STERILE WATER/SALINE, 10 ML: HCPCS | Performed by: INTERNAL MEDICINE

## 2018-11-21 PROCEDURE — 99214 OFFICE O/P EST MOD 30 MIN: CPT | Mod: S$PBB,,, | Performed by: NURSE PRACTITIONER

## 2018-11-21 PROCEDURE — 83735 ASSAY OF MAGNESIUM: CPT

## 2018-11-21 PROCEDURE — 80197 ASSAY OF TACROLIMUS: CPT

## 2018-11-21 PROCEDURE — 63600175 PHARM REV CODE 636 W HCPCS: Performed by: INTERNAL MEDICINE

## 2018-11-21 PROCEDURE — 84100 ASSAY OF PHOSPHORUS: CPT

## 2018-11-21 RX ORDER — SODIUM CHLORIDE 0.9 % (FLUSH) 0.9 %
10 SYRINGE (ML) INJECTION
Status: COMPLETED | OUTPATIENT
Start: 2018-11-21 | End: 2018-11-21

## 2018-11-21 RX ORDER — HEPARIN 100 UNIT/ML
500 SYRINGE INTRAVENOUS
Status: CANCELLED | OUTPATIENT
Start: 2018-11-21

## 2018-11-21 RX ORDER — SODIUM CHLORIDE 0.9 % (FLUSH) 0.9 %
10 SYRINGE (ML) INJECTION
Status: CANCELLED | OUTPATIENT
Start: 2018-11-21

## 2018-11-21 RX ORDER — HEPARIN 100 UNIT/ML
500 SYRINGE INTRAVENOUS
Status: COMPLETED | OUTPATIENT
Start: 2018-11-21 | End: 2018-11-21

## 2018-11-21 RX ADMIN — Medication 10 ML: at 10:11

## 2018-11-21 RX ADMIN — HEPARIN 500 UNITS: 100 SYRINGE at 10:11

## 2018-11-21 NOTE — PROGRESS NOTES
HEMATOLOGIC MALIGNANCIES PROGRESS NOTE    IDENTIFYING STATEMENT   Wilton Guzmán (Wilton) is a 33 y.o. 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      ONCOLOGY HISTORY:    1. Chronic myeloid leukemia, initially presenting in blast phase   A. 2018: Began developing left-sided abdominal pain - eventually requiring evaluation - WBC ~150 at  base; transferred to [facility] in [place], [place]   B. BMBx showed 20% myelomonoblasts concerning for CML in blast phase; bcr-abl positive (returned for Day 8 of induction therapy).    C. 2018: Began 7+3 induction chemotherapy. Began dasatinib (dose-adjusted) on day 8.   D. 2018: Transferred to [facility]   E. 2018: BMBx showed variably cellular marrow (10-40%) with 9% CD34+ blasts, concerning for residual disease.   F. 2018: Reinduction with MEC (mitoxantrone, etoposide, cytarabine) chemotherapy.    G. 2018: BMBx shows hypocellular bone marrow with no definite morphologic or immunophenotypic evidence of residual leukemia; bcr-abl p210 transcript was detected at 0.5%   H. Delay in clinic follow-up due to challenges with  insurance   I. 2018: Follow-up in clinic; WBC 6.77 (normal diff); Hgb 11.2 g/dl; Plt 298; BMBx shows 30% cellular marrow with no morphologic evidence of residual leukemia; bcr-abl p210 detected at 0.05% on international scale (MR 3.3).    J. 2018: BMBx shows 40% cellular marrow with no morphologic evidence of leukemia. Chromosomes 46, XY. Bcr-abl p210 detected at 0.1% on international scale (MR 3.0).    K. 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 Engrafted on Day +12. Course complicated by C. Difficile colitis.     INTERVAL HISTORY:      Mr. Guzmán returns to clinic for follow-up of his allogeneic stem cell transplant. Today is Day +41. He is doing well overall. He has no  rash, jaundice, fever, chills, nausea, or vomiting. Energy improving and eating well. Irritated skin to scrotum and penis resolved. He states that he is starting to have more liquid diarrhea. He is moving his bowels approximately twice per day, but his stools are no longer formed.       Past Medical History, Past Social History and Past Family History have been reviewed and are unchanged except as noted in the interval history.    MEDICATIONS:     Current Outpatient Medications on File Prior to Visit   Medication Sig Dispense Refill    acyclovir (ZOVIRAX) 800 MG Tab Take 1 tablet (800 mg total) by mouth 2 (two) times daily. 60 tablet 11    atovaquone (MEPRON) 750 mg/5 mL Susp Take 10 mLs (1,500 mg total) by mouth once daily. Starting on 11/8/18 300 mL 5    [START ON 12/8/2018] dasatinib (SPRYCEL) 140 mg Tab Take 1 tablet by mouth once daily Start on day +60 (12/8/18). 30 tablet 11    fluconazole (DIFLUCAN) 200 MG Tab Take 2 tablets (400 mg total) by mouth once daily. 60 tablet 5    magnesium oxide (MAG-OX) 400 mg (241.3 mg magnesium) tablet Take 3 tablets (1,200 mg total) by mouth 2 (two) times daily.  0    naproxen (NAPROSYN) 500 MG tablet Take 500 mg by mouth.      nystatin (MYCOSTATIN) powder Apply to affected area 3 times daily 60 g 0    ondansetron (ZOFRAN-ODT) 8 MG TbDL DISSOLVE 1 tablet (8 mg total) by mouth every 8 (eight) hours as needed (nausea). 15 tablet 2    tacrolimus (PROGRAF) 0.5 MG Cap Take 1 mg (2 capsules) by mouth in the morning and 1 mg (2 capsules) by mouth in the evening 150 capsule 2    valGANciclovir (VALCYTE) 450 mg Tab Take 2 tablets (900 mg total) by mouth 2 (two) times daily. 120 tablet 3    ursodiol (ACTIGALL) 300 mg capsule Take 1 capsule (300 mg total) by mouth 2 (two) times daily. for 17 days 34 capsule 0     No current facility-administered medications on file prior to visit.        ALLERGIES: Review of patient's allergies indicates:  No Known Allergies     ROS:    "    Review of Systems   Constitutional: Negative for fatigue, diaphoresis, fever and unexpected weight change.   HENT:   Negative for lump/mass and sore throat.    Eyes: Negative for icterus.   Respiratory: Negative for cough and shortness of breath.    Cardiovascular: Negative for chest pain and palpitations.   Gastrointestinal: Negative for abdominal distention, constipation, nausea and vomiting. Positive for diarrhea (1-2 liquid bowel movements per day)  Genitourinary: Negative for dysuria and frequency.    Musculoskeletal: Negative for arthralgias, gait problem and myalgias.   Skin: Negative for rash or itching.   Neurological: Negative for dizziness, gait problem and headaches.   Hematological: Negative for adenopathy. Does not bruise/bleed easily.   Psychiatric/Behavioral: Negative for depression or anxiety.    PHYSICAL EXAM:  Vitals:    11/19/18 1016   BP: (!) 129/95   Pulse: 81   Resp: 18   Temp: 98.5 °F (36.9 °C)   TempSrc: Oral   SpO2: 99%   Weight: 68.1 kg (150 lb 2.1 oz)   Height: 5' 11" (1.803 m)   PainSc: 0-No pain       KARNOFSKY PERFORMANCE STATUS 80%  ECOG 1    Physical Exam   Constitutional: He is oriented to person, place, and time. He appears well-developed and well-nourished. No distress.   HENT:   Head: Normocephalic and atraumatic.   Mouth/Throat: Mucous membranes are normal. No oral lesions. Dry lips   Eyes: Conjunctivae are normal.   Neck: No thyromegaly present.   Cardiovascular: Normal rate, regular rhythm and normal heart sounds.   No murmur heard.  Pulmonary/Chest: Breath sounds normal. He has no wheezes. He has no rales.   Central venous catheter in place.  CDI; no S/S infection.  Abdominal: Soft. He exhibits no distension and no mass. There is no splenomegaly or hepatomegaly. There is no tenderness.   Neurological: He is alert and oriented to person, place, and time. He has normal strength and normal reflexes. No cranial nerve deficit. Coordination normal.   Skin: No rash noted. "     LAB:   Results for orders placed or performed in visit on 11/19/18   Clostridium difficile EIA   Result Value Ref Range    C. diff Antigen Negative Negative    C difficile Toxins A+B, EIA Negative Negative       PROBLEMS ASSESSED THIS VISIT:    1. Diarrhea, unspecified type        PLAN:        History allo transplant for CML  - See oncology history above  - s/p alloSCT on 10/9/2018  - Neutrophil engraftment on Day +12.   - Currently Day +41  - Will plan maintenance dasatinib at Day +60  - plan for +30 & +100 & +1 year restaging  - day +30 BMBX was performed in clinic 11/13/18, no evidence of CML, BCR/ABL 0.005% (MR 4.3). Chimerism studies show 60% donor in CD3+ fraction and 100% donor in CD33+ fraction.   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 Will start oral budesonide. C. Diff ruled out by stool testing.    Continue tacrolimus prophylaxis  - tacro level 13.3 today. Reduce tacro dose to 1mg qAM and 0.5 mg qPM    - gvhd charting with each clinic visit     ID  - Completed PO vancomycin for C. Diff  - Continue ppx with acyclovir, fluconazole  - Begin atovaquone ppx at Day +30  - Monitor CMV BIW; EBV once weekly  - EBV negative to date  -  (11/19); Valcyte is obtained by patient. Per MONIKA RODRIGUEZ, continue to hold Valcyte until CMV > 250 and continue acyclovir. Patient has appt with MONIKA RODRIGUEZ on 11/26 for CMV.  - CVC remains in place without S/S infection- consider removing in the near future    Cytopenias  - Today: anemia stable, hgb 12 today. No transfusions indicated  - Donor has sickle cell trait. Some anemia is expected long-term  - Both neutrophils and platelets engrafted.      FEN/GI  - hypomagnesemia  - On mg ox 1200 BID with no side effects, 400 mg daily at lunch time added 11/1  - Mag 1.9 today--continue current dose    Moisture associated dermatitis/itching to right scrotum  - started nystatin powder to this area on 11/1/18  - chafing to left scrotum  and penis. Ulcer to head of penis.   - started desitin or comparable barrier cream 11/5 resolution    Follow-up: BI-Weekly labs and appts with Dr. Pantoja or NP through day +100 with CBC, CMP, tacro, Mag, CMV.  EBV once weekly only. Please schedule appts through December.     Kishor Pantoja MD  Hematology and Stem Cell Transplant    Distress Screening Results: Psychosocial Distress screening score of Distress Score: 2 noted and reviewed. No intervention indicated.

## 2018-11-21 NOTE — NURSING
Patient here for blood draw from right chest cvc-red line with good blood return-blood to lab-line flushed

## 2018-11-21 NOTE — PROGRESS NOTES
HEMATOLOGIC MALIGNANCIES PROGRESS NOTE    IDENTIFYING STATEMENT   Wilton Guzmán (Wilton) is a 33 y.o. 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      ONCOLOGY HISTORY:    1. Chronic myeloid leukemia, initially presenting in blast phase   A. 2018: Began developing left-sided abdominal pain - eventually requiring evaluation - WBC ~150 at  base; transferred to [facility] in [place], [place]   B. BMBx showed 20% myelomonoblasts concerning for CML in blast phase; bcr-abl positive (returned for Day 8 of induction therapy).    C. 2018: Began 7+3 induction chemotherapy. Began dasatinib (dose-adjusted) on day 8.   D. 2018: Transferred to [facility]   E. 2018: BMBx showed variably cellular marrow (10-40%) with 9% CD34+ blasts, concerning for residual disease.   F. 2018: Reinduction with MEC (mitoxantrone, etoposide, cytarabine) chemotherapy.    G. 2018: BMBx shows hypocellular bone marrow with no definite morphologic or immunophenotypic evidence of residual leukemia; bcr-abl p210 transcript was detected at 0.5%   H. Delay in clinic follow-up due to challenges with  insurance   I. 2018: Follow-up in clinic; WBC 6.77 (normal diff); Hgb 11.2 g/dl; Plt 298; BMBx shows 30% cellular marrow with no morphologic evidence of residual leukemia; bcr-abl p210 detected at 0.05% on international scale (MR 3.3).    J. 2018: BMBx shows 40% cellular marrow with no morphologic evidence of leukemia. Chromosomes 46, XY. Bcr-abl p210 detected at 0.1% on international scale (MR 3.0).    K. 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 Engrafted on Day +12. Course complicated by C. Difficile colitis.     INTERVAL HISTORY:      Mr. Guzmán returns to clinic for follow-up of his allogeneic stem cell transplant. Today is Day +43. He is doing well overall. He has a  folliculitis rash to his lower abdomen, no other rash or itching. He continues to have liquid stool, about 2 a day. Recent C.diff testing negative. He denies fever, chills, nausea, or vomiting. No cough or sob. Energy improved and eating well.       Past Medical History, Past Social History and Past Family History have been reviewed and are unchanged except as noted in the interval history.    MEDICATIONS:     Current Outpatient Medications on File Prior to Visit   Medication Sig Dispense Refill    acyclovir (ZOVIRAX) 800 MG Tab Take 1 tablet (800 mg total) by mouth 2 (two) times daily. 60 tablet 11    atovaquone (MEPRON) 750 mg/5 mL Susp Take 10 mLs (1,500 mg total) by mouth once daily. Starting on 11/8/18 300 mL 5    budesonide (ENTOCORT EC) 3 mg capsule Take 3 capsules (9 mg total) by mouth once daily. 90 capsule 2    [START ON 12/8/2018] dasatinib (SPRYCEL) 140 mg Tab Take 1 tablet by mouth once daily Start on day +60 (12/8/18). 30 tablet 11    fluconazole (DIFLUCAN) 200 MG Tab Take 2 tablets (400 mg total) by mouth once daily. 60 tablet 5    magnesium oxide (MAG-OX) 400 mg (241.3 mg magnesium) tablet Take 3 tablets (1,200 mg total) by mouth 2 (two) times daily.  0    naproxen (NAPROSYN) 500 MG tablet Take 500 mg by mouth.      nystatin (MYCOSTATIN) powder Apply to affected area 3 times daily 60 g 0    ondansetron (ZOFRAN-ODT) 8 MG TbDL DISSOLVE 1 tablet (8 mg total) by mouth every 8 (eight) hours as needed (nausea). 15 tablet 2    tacrolimus (PROGRAF) 0.5 MG Cap Take 1 mg (2 capsules) by mouth in the morning and 1 mg (2 capsules) by mouth in the evening 150 capsule 2    valGANciclovir (VALCYTE) 450 mg Tab Take 2 tablets (900 mg total) by mouth 2 (two) times daily. 120 tablet 3    ursodiol (ACTIGALL) 300 mg capsule Take 1 capsule (300 mg total) by mouth 2 (two) times daily. for 17 days 34 capsule 0     No current facility-administered medications on file prior to visit.        ALLERGIES: Review of  "patient's allergies indicates:  No Known Allergies     ROS:       Review of Systems   Constitutional: Negative for fatigue, diaphoresis, fever and unexpected weight change.   HENT:   Negative for lump/mass and sore throat.    Eyes: Negative for icterus.   Respiratory: Negative for cough and shortness of breath.    Cardiovascular: Negative for chest pain and palpitations.   Gastrointestinal: Negative for abdominal distention, constipation, nausea and vomiting. Positive for diarrhea (1-2 liquid bowel movements per day)  Genitourinary: Negative for dysuria and frequency.    Musculoskeletal: Negative for arthralgias, gait problem and myalgias.   Skin: Negative for rash or itching.   Neurological: Negative for dizziness, gait problem and headaches.   Hematological: Negative for adenopathy. Does not bruise/bleed easily.   Psychiatric/Behavioral: Negative for depression or anxiety.    PHYSICAL EXAM:  Vitals:    11/21/18 1130   BP: (!) 140/89   Pulse: 83   Resp: 18   Temp: 98.6 °F (37 °C)   TempSrc: Oral   SpO2: 100%   Weight: 67.9 kg (149 lb 11.1 oz)   Height: 5' 11" (1.803 m)   PainSc: 0-No pain       KARNOFSKY PERFORMANCE STATUS 80%  ECOG 1    Physical Exam   Constitutional: He is oriented to person, place, and time. He appears well-developed and well-nourished. No distress.   HENT:   Head: Normocephalic and atraumatic.   Mouth/Throat: Mucous membranes are normal. No oral lesions. Dry lips   Eyes: Conjunctivae are normal.   Neck: No thyromegaly present.   Cardiovascular: Normal rate, regular rhythm and normal heart sounds.   No murmur heard.  Pulmonary/Chest: Breath sounds normal. He has no wheezes. He has no rales.   Central venous catheter in place.  CDI; no S/S infection.  Abdominal: Soft. He exhibits no distension and no mass. There is no splenomegaly or hepatomegaly. There is no tenderness.   Neurological: He is alert and oriented to person, place, and time. He has normal strength and normal reflexes. No cranial " nerve deficit. Coordination normal.   Skin: Darkened raised rash to pubic upper pubic area.     LAB:   Results for orders placed or performed in visit on 11/21/18   Comprehensive metabolic panel   Result Value Ref Range    Sodium 142 136 - 145 mmol/L    Potassium 3.9 3.5 - 5.1 mmol/L    Chloride 109 95 - 110 mmol/L    CO2 25 23 - 29 mmol/L    Glucose 125 (H) 70 - 110 mg/dL    BUN, Bld 18 6 - 20 mg/dL    Creatinine 1.4 0.5 - 1.4 mg/dL    Calcium 10.1 8.7 - 10.5 mg/dL    Total Protein 6.4 6.0 - 8.4 g/dL    Albumin 3.8 3.5 - 5.2 g/dL    Total Bilirubin 0.3 0.1 - 1.0 mg/dL    Alkaline Phosphatase 72 55 - 135 U/L    AST 25 10 - 40 U/L    ALT 33 10 - 44 U/L    Anion Gap 8 8 - 16 mmol/L    eGFR if African American >60.0 >60 mL/min/1.73 m^2    eGFR if non African American >60.0 >60 mL/min/1.73 m^2   Magnesium   Result Value Ref Range    Magnesium 2.0 1.6 - 2.6 mg/dL   Phosphorus   Result Value Ref Range    Phosphorus 3.8 2.7 - 4.5 mg/dL   CBC auto differential   Result Value Ref Range    WBC 13.13 (H) 3.90 - 12.70 K/uL    RBC 3.85 (L) 4.60 - 6.20 M/uL    Hemoglobin 11.6 (L) 14.0 - 18.0 g/dL    Hematocrit 34.7 (L) 40.0 - 54.0 %    MCV 90 82 - 98 fL    MCH 30.1 27.0 - 31.0 pg    MCHC 33.4 32.0 - 36.0 g/dL    RDW 14.8 (H) 11.5 - 14.5 %    Platelets 164 150 - 350 K/uL    MPV 11.0 9.2 - 12.9 fL    Immature Granulocytes 0.6 (H) 0.0 - 0.5 %    Gran # (ANC) 10.2 (H) 1.8 - 7.7 K/uL    Immature Grans (Abs) 0.08 (H) 0.00 - 0.04 K/uL    Lymph # 1.4 1.0 - 4.8 K/uL    Mono # 1.5 (H) 0.3 - 1.0 K/uL    Eos # 0.0 0.0 - 0.5 K/uL    Baso # 0.02 0.00 - 0.20 K/uL    nRBC 0 0 /100 WBC    Gran% 77.2 (H) 38.0 - 73.0 %    Lymph% 10.3 (L) 18.0 - 48.0 %    Mono% 11.5 4.0 - 15.0 %    Eosinophil% 0.2 0.0 - 8.0 %    Basophil% 0.2 0.0 - 1.9 %    Differential Method Automated        PROBLEMS ASSESSED THIS VISIT:    1. S/P allogeneic bone marrow transplant    2. Chronic myeloid leukemia, BCR/ABL-positive, in remission    3. Cytomegalovirus infection,  unspecified cytomegaloviral infection type    4. Diarrhea, unspecified type    5. Acute GVHD    6. Hypomagnesemia        PLAN:        History allo transplant for CML  - See oncology history above  - s/p alloSCT on 10/9/2018  - Neutrophil engraftment on Day +12.   - Currently Day +43  - Will plan maintenance dasatinib at Day +60  - plan for +30 & +100 & +1 year restaging  - day +30 BMBX was performed in clinic 11/13/18, no evidence of CML, BCR/ABL 0.005% (MR 4.3). Chimerism studies show 60% donor in CD3+ fraction and 100% donor in CD33+ fraction.   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 Oral budesonide started 11/19. Patient states helping with some upper GI discomfort  - C. Diff negative.   - Continue tacrolimus prophylaxis  - tacro level 13.3 on 11/19 and tacro dose was reduced to 1mg qAM and 0.5 mg qPM, tacro level 12.2 today and will decrease to 0.5 mg bid.    - gvhd charting with each clinic visit     ID  - Completed PO vancomycin for C. Diff  - Continue ppx with acyclovir, fluconazole  - Begin atovaquone ppx at Day +30  - Monitor CMV BIW; EBV once weekly  - EBV negative to date  -  (11/19); Valcyte is obtained by patient. Per MONIKA RODRIGUEZ, continue to hold Valcyte until CMV > 250 and continue acyclovir. Patient has appt with MONIKA RODRIGUEZ on 11/26 for CMV.  - CVC remains in place without S/S infection- consider removing in the near future    Cytopenias  - Today: anemia stable, hgb 11.6 today. No transfusions indicated  - Donor has sickle cell trait. Some anemia is expected long-term  - Both neutrophils and platelets engrafted.      FEN/GI  - hypomagnesemia  - On mg ox 1200 BID with no side effects, 400 mg daily at lunch time added 11/1  - Mag 2.0 today--will stop lunch time dose as possible cause of diarrhea    Folliculitis  - patient states he has had similar rash that has resolved on its own  - patient instructed he may use OTC topical antibiotic if  worsens    Follow-up: BI-Weekly labs and appts with Dr. Pantoja or NP through day +100 with CBC, CMP, tacro, Mag, CMV.  EBV once weekly only.     Haily Serna NP  Hematology and Stem Cell Transplant

## 2018-11-26 ENCOUNTER — OFFICE VISIT (OUTPATIENT)
Dept: HEMATOLOGY/ONCOLOGY | Facility: CLINIC | Age: 33
End: 2018-11-26
Payer: OTHER GOVERNMENT

## 2018-11-26 ENCOUNTER — OFFICE VISIT (OUTPATIENT)
Dept: INFECTIOUS DISEASES | Facility: CLINIC | Age: 33
End: 2018-11-26
Payer: OTHER GOVERNMENT

## 2018-11-26 ENCOUNTER — INFUSION (OUTPATIENT)
Dept: INFUSION THERAPY | Facility: HOSPITAL | Age: 33
End: 2018-11-26
Attending: INTERNAL MEDICINE
Payer: OTHER GOVERNMENT

## 2018-11-26 VITALS
BODY MASS INDEX: 20.77 KG/M2 | DIASTOLIC BLOOD PRESSURE: 99 MMHG | SYSTOLIC BLOOD PRESSURE: 141 MMHG | BODY MASS INDEX: 20.8 KG/M2 | TEMPERATURE: 98 F | DIASTOLIC BLOOD PRESSURE: 94 MMHG | TEMPERATURE: 99 F | SYSTOLIC BLOOD PRESSURE: 144 MMHG | HEIGHT: 71 IN | HEART RATE: 78 BPM | HEART RATE: 75 BPM | WEIGHT: 148.38 LBS | RESPIRATION RATE: 18 BRPM | HEIGHT: 71 IN | OXYGEN SATURATION: 99 % | WEIGHT: 148.56 LBS

## 2018-11-26 DIAGNOSIS — B25.9 CYTOMEGALOVIRUS (CMV) VIREMIA: Primary | ICD-10-CM

## 2018-11-26 DIAGNOSIS — E83.42 HYPOMAGNESEMIA: ICD-10-CM

## 2018-11-26 DIAGNOSIS — C92.11 CHRONIC MYELOID LEUKEMIA, BCR/ABL-POSITIVE, IN REMISSION: ICD-10-CM

## 2018-11-26 DIAGNOSIS — C92.10 CML (CHRONIC MYELOCYTIC LEUKEMIA): ICD-10-CM

## 2018-11-26 DIAGNOSIS — I10 HYPERTENSION, UNSPECIFIED TYPE: ICD-10-CM

## 2018-11-26 DIAGNOSIS — Z94.81 S/P ALLOGENEIC BONE MARROW TRANSPLANT: ICD-10-CM

## 2018-11-26 DIAGNOSIS — Z94.81 S/P ALLOGENEIC BONE MARROW TRANSPLANT: Primary | ICD-10-CM

## 2018-11-26 DIAGNOSIS — A04.72 C. DIFFICILE DIARRHEA: ICD-10-CM

## 2018-11-26 DIAGNOSIS — C92.11 CHRONIC MYELOID LEUKEMIA, BCR/ABL-POSITIVE, IN REMISSION: Primary | ICD-10-CM

## 2018-11-26 LAB
ALBUMIN SERPL BCP-MCNC: 3.7 G/DL
ALP SERPL-CCNC: 69 U/L
ALT SERPL W/O P-5'-P-CCNC: 39 U/L
ANION GAP SERPL CALC-SCNC: 8 MMOL/L
AST SERPL-CCNC: 23 U/L
BASOPHILS # BLD AUTO: 0.01 K/UL
BASOPHILS NFR BLD: 0.1 %
BILIRUB SERPL-MCNC: 0.4 MG/DL
BUN SERPL-MCNC: 11 MG/DL
CALCIUM SERPL-MCNC: 9.6 MG/DL
CHLORIDE SERPL-SCNC: 106 MMOL/L
CO2 SERPL-SCNC: 27 MMOL/L
CREAT SERPL-MCNC: 0.9 MG/DL
DIFFERENTIAL METHOD: ABNORMAL
EOSINOPHIL # BLD AUTO: 0.1 K/UL
EOSINOPHIL NFR BLD: 0.9 %
ERYTHROCYTE [DISTWIDTH] IN BLOOD BY AUTOMATED COUNT: 15.4 %
EST. GFR  (AFRICAN AMERICAN): >60 ML/MIN/1.73 M^2
EST. GFR  (NON AFRICAN AMERICAN): >60 ML/MIN/1.73 M^2
GLUCOSE SERPL-MCNC: 111 MG/DL
HCT VFR BLD AUTO: 35.6 %
HGB BLD-MCNC: 12.2 G/DL
IMM GRANULOCYTES # BLD AUTO: 0.09 K/UL
IMM GRANULOCYTES NFR BLD AUTO: 1 %
LYMPHOCYTES # BLD AUTO: 1.4 K/UL
LYMPHOCYTES NFR BLD: 14.8 %
MAGNESIUM SERPL-MCNC: 2 MG/DL
MCH RBC QN AUTO: 31.1 PG
MCHC RBC AUTO-ENTMCNC: 34.3 G/DL
MCV RBC AUTO: 91 FL
MONOCYTES # BLD AUTO: 0.9 K/UL
MONOCYTES NFR BLD: 9.4 %
NEUTROPHILS # BLD AUTO: 6.7 K/UL
NEUTROPHILS NFR BLD: 73.8 %
NRBC BLD-RTO: 0 /100 WBC
PHOSPHATE SERPL-MCNC: 3.4 MG/DL
PLATELET # BLD AUTO: 129 K/UL
PMV BLD AUTO: 11.2 FL
POTASSIUM SERPL-SCNC: 3.5 MMOL/L
PROT SERPL-MCNC: 6.2 G/DL
RBC # BLD AUTO: 3.92 M/UL
SODIUM SERPL-SCNC: 141 MMOL/L
TACROLIMUS BLD-MCNC: 6.6 NG/ML
WBC # BLD AUTO: 9.11 K/UL

## 2018-11-26 PROCEDURE — 25000003 PHARM REV CODE 250: Performed by: INTERNAL MEDICINE

## 2018-11-26 PROCEDURE — 99215 OFFICE O/P EST HI 40 MIN: CPT | Mod: S$PBB,,, | Performed by: INTERNAL MEDICINE

## 2018-11-26 PROCEDURE — 84100 ASSAY OF PHOSPHORUS: CPT

## 2018-11-26 PROCEDURE — 99204 OFFICE O/P NEW MOD 45 MIN: CPT | Mod: S$PBB,,, | Performed by: INTERNAL MEDICINE

## 2018-11-26 PROCEDURE — 99213 OFFICE O/P EST LOW 20 MIN: CPT | Mod: PBBFAC,25 | Performed by: INTERNAL MEDICINE

## 2018-11-26 PROCEDURE — A4216 STERILE WATER/SALINE, 10 ML: HCPCS | Performed by: INTERNAL MEDICINE

## 2018-11-26 PROCEDURE — 87799 DETECT AGENT NOS DNA QUANT: CPT

## 2018-11-26 PROCEDURE — 63600175 PHARM REV CODE 636 W HCPCS: Performed by: INTERNAL MEDICINE

## 2018-11-26 PROCEDURE — 80197 ASSAY OF TACROLIMUS: CPT

## 2018-11-26 PROCEDURE — 36592 COLLECT BLOOD FROM PICC: CPT

## 2018-11-26 PROCEDURE — 83735 ASSAY OF MAGNESIUM: CPT

## 2018-11-26 PROCEDURE — 85025 COMPLETE CBC W/AUTO DIFF WBC: CPT

## 2018-11-26 PROCEDURE — 99213 OFFICE O/P EST LOW 20 MIN: CPT | Mod: PBBFAC,25,27 | Performed by: INTERNAL MEDICINE

## 2018-11-26 PROCEDURE — 80053 COMPREHEN METABOLIC PANEL: CPT

## 2018-11-26 PROCEDURE — 99999 PR PBB SHADOW E&M-EST. PATIENT-LVL III: CPT | Mod: PBBFAC,,, | Performed by: INTERNAL MEDICINE

## 2018-11-26 RX ORDER — AMLODIPINE BESYLATE 5 MG/1
5 TABLET ORAL DAILY
Qty: 30 TABLET | Refills: 11 | Status: SHIPPED | OUTPATIENT
Start: 2018-11-26 | End: 2018-12-10

## 2018-11-26 RX ORDER — SODIUM CHLORIDE 0.9 % (FLUSH) 0.9 %
10 SYRINGE (ML) INJECTION
Status: COMPLETED | OUTPATIENT
Start: 2018-11-26 | End: 2018-11-26

## 2018-11-26 RX ORDER — SODIUM CHLORIDE 0.9 % (FLUSH) 0.9 %
10 SYRINGE (ML) INJECTION
Status: CANCELLED | OUTPATIENT
Start: 2018-11-26

## 2018-11-26 RX ORDER — HEPARIN 100 UNIT/ML
500 SYRINGE INTRAVENOUS
Status: CANCELLED | OUTPATIENT
Start: 2018-11-26

## 2018-11-26 RX ORDER — HEPARIN 100 UNIT/ML
500 SYRINGE INTRAVENOUS
Status: COMPLETED | OUTPATIENT
Start: 2018-11-26 | End: 2018-11-26

## 2018-11-26 RX ADMIN — Medication 10 ML: at 08:11

## 2018-11-26 RX ADMIN — HEPARIN 500 UNITS: 100 SYRINGE at 08:11

## 2018-11-26 NOTE — PROGRESS NOTES
Subjective:      Patient ID: Wilton Guzmán is a 33 y.o. male.    Chief Complaint: CMV viremia    History of Present Illness  33-year-old male with history of CML diagnosed 5/2018 bcr-abl positive s/p 7+3 induction + dasatinib (6/1/2018), reinduction with MEC (6/23/2018), s/p alloSCT HLA-matched brother with Bu/Cy2 conditioning 10/9/18 CMV D+R+, engrafted day +12, history of C. Difficile colitis, presents for evaluation of CMV viremia.     On routine monitoring, patient found to have low-grade CMV viremia.  Patient reports starting to have diarrhea about two weeks ago, coincided with increase in his magnesium dosage.  He was started on immodium with resolution of his diarrhea.  Denied having any bloody diarrhea.  Reports intermittent abdominal grumbling.  Denied any fevers, chills, SOB, CP, nausea, vomiting, abdominal pain.       Review of Systems   Constitution: Negative for chills, decreased appetite, fever, weakness, malaise/fatigue, night sweats, weight gain and weight loss.   HENT: Negative for congestion, ear pain, hearing loss, hoarse voice, sore throat and tinnitus.    Eyes: Negative for blurred vision, redness and visual disturbance.   Cardiovascular: Negative for chest pain, leg swelling and palpitations.   Respiratory: Negative for cough, hemoptysis, shortness of breath and sputum production.    Hematologic/Lymphatic: Negative for adenopathy. Does not bruise/bleed easily.   Skin: Negative for dry skin, itching, rash and suspicious lesions.   Musculoskeletal: Negative for back pain, joint pain, myalgias and neck pain.   Gastrointestinal: Negative for abdominal pain, constipation, diarrhea, heartburn, nausea and vomiting.   Genitourinary: Negative for dysuria, flank pain, frequency, hematuria, hesitancy and urgency.   Neurological: Negative for dizziness, headaches, numbness and paresthesias.   Psychiatric/Behavioral: Negative for depression and memory loss. The patient does not have insomnia and is not  nervous/anxious.      Objective:   Physical Exam   Constitutional: He is oriented to person, place, and time. He appears well-developed and well-nourished. No distress.   HENT:   Head: Normocephalic and atraumatic.   Eyes: Conjunctivae and EOM are normal.   Neck: Normal range of motion. Neck supple.   Cardiovascular: Normal rate, regular rhythm and normal heart sounds. Exam reveals no friction rub.   No murmur heard.  Pulmonary/Chest: Effort normal and breath sounds normal. No respiratory distress. He has no wheezes. He has no rales.   Abdominal: Soft. Bowel sounds are normal. He exhibits no distension and no mass. There is no tenderness. There is no guarding.   Musculoskeletal: Normal range of motion. He exhibits no edema.   Neurological: He is alert and oriented to person, place, and time.   Skin: Skin is warm and dry. No rash noted. He is not diaphoretic. No erythema.   Psychiatric: He has a normal mood and affect. His behavior is normal.      7d ago  (11/19/18) 11d ago  (11/15/18) 2wk ago  (11/12/18) 2wk ago  (11/8/18) 3wk ago  (11/5/18) 3wk ago  (11/1/18) 4wk ago  (10/29/18)    Cytomegalovirus PCR, Quant Undetected IU/mL 168 Abnormal   80 Abnormal   Abnormal  CM 50 Abnormal  CM 56 Abnormal  CM <35 Abnormal  CM <35 Abnormal  CM         Assessment:   33-year-old male  - CML diagnosed 5/2018 bcr-abl positive s/p 7+3 induction + dasatinib (6/1/2018), reinduction with MEC (6/23/2018), s/p alloSCT HLA-matched brother with Bu/Cy2 conditioning 10/9/18 CMV D+R+, engrafted day +12  - History of C. Difficile colitis  - Low-grade CMV viremia, asymptomatic    Patient with diarrhea which could be a symptom of invasive CMV disease, however, patient believes diarrhea started with increase in magnesium dosage.  If patient develops any additional signs or symptoms consistent with invasive CMV disease, would begin valganciclovir.  Otherwise, would continue to monitor viral load to avoid any possible bone marrow suppression  associated with valganciclovir.    Plan:   - Continue to monitor CMV VL - if asymptomatic and CMV VL above 250-500 will discuss starting valganciclovir 900 mg PO BID  - If patient develops systemic signs or symptoms of infection including fevers, malaise, worsening diarrhea, abdominal pain, SOB, cough - low threshold to begin valganciclovir 900 mg PO BID.      Yuliya Reardon MD MPH  Infectious Diseases [facility]

## 2018-11-26 NOTE — Clinical Note
Please keep me updated on his CMV viral loads.  If he develops any systemic signs or symptoms of infection or worsening diarrhea, I would recommend starting valcyte. Thanks.

## 2018-11-27 PROBLEM — A04.72 C. DIFFICILE DIARRHEA: Status: RESOLVED | Noted: 2018-10-08 | Resolved: 2018-11-27

## 2018-11-27 LAB
CMV DNA SERPL NAA+PROBE-ACNC: 131 IU/ML
EBV DNA BY PCR: NORMAL IU/ML

## 2018-11-27 NOTE — PROGRESS NOTES
HEMATOLOGIC MALIGNANCIES PROGRESS NOTE    IDENTIFYING STATEMENT   Wilton Guzmán (Wilton) is a 33 y.o. 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      ONCOLOGY HISTORY:    1. Chronic myeloid leukemia, initially presenting in blast phase   A. 2018: Began developing left-sided abdominal pain - eventually requiring evaluation - WBC ~150 at  base; transferred to [facility] in [place], [place]   B. BMBx showed 20% myelomonoblasts concerning for CML in blast phase; bcr-abl positive (returned for Day 8 of induction therapy).    C. 2018: Began 7+3 induction chemotherapy. Began dasatinib (dose-adjusted) on day 8.   D. 2018: Transferred to [facility]   E. 2018: BMBx showed variably cellular marrow (10-40%) with 9% CD34+ blasts, concerning for residual disease.   F. 2018: Reinduction with MEC (mitoxantrone, etoposide, cytarabine) chemotherapy.    G. 2018: BMBx shows hypocellular bone marrow with no definite morphologic or immunophenotypic evidence of residual leukemia; bcr-abl p210 transcript was detected at 0.5%   H. Delay in clinic follow-up due to challenges with  insurance   I. 2018: Follow-up in clinic; WBC 6.77 (normal diff); Hgb 11.2 g/dl; Plt 298; BMBx shows 30% cellular marrow with no morphologic evidence of residual leukemia; bcr-abl p210 detected at 0.05% on international scale (MR 3.3).    J. 2018: BMBx shows 40% cellular marrow with no morphologic evidence of leukemia. Chromosomes 46, XY. Bcr-abl p210 detected at 0.1% on international scale (MR 3.0).    K. 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 Engrafted on Day +12. Course complicated by C. Difficile colitis.     INTERVAL HISTORY:      Mr. Guzmán returns to clinic for follow-up of his allogeneic stem cell transplant. Today is Day +48. He is doing well overall. He denies  rash today. He states that since starting loperamide, he is no longer having diarrhea. he stopped taking a mid-day magnesium supplement, which he also thinks has helped his diarrhea.     His lips are very dry.     He denies fever, chills, nausea, or vomiting. No cough or sob. Energy improved and eating well.       Past Medical History, Past Social History and Past Family History have been reviewed and are unchanged except as noted in the interval history.    MEDICATIONS:     Current Outpatient Medications on File Prior to Visit   Medication Sig Dispense Refill    acyclovir (ZOVIRAX) 800 MG Tab Take 1 tablet (800 mg total) by mouth 2 (two) times daily. 60 tablet 11    atovaquone (MEPRON) 750 mg/5 mL Susp Take 10 mLs (1,500 mg total) by mouth once daily. Starting on 11/8/18 300 mL 5    budesonide (ENTOCORT EC) 3 mg capsule Take 3 capsules (9 mg total) by mouth once daily. 90 capsule 2    [START ON 12/8/2018] dasatinib (SPRYCEL) 140 mg Tab Take 1 tablet by mouth once daily Start on day +60 (12/8/18). 30 tablet 11    fluconazole (DIFLUCAN) 200 MG Tab Take 2 tablets (400 mg total) by mouth once daily. 60 tablet 5    magnesium oxide (MAG-OX) 400 mg (241.3 mg magnesium) tablet Take 3 tablets (1,200 mg total) by mouth 2 (two) times daily.  0    naproxen (NAPROSYN) 500 MG tablet Take 500 mg by mouth.      nystatin (MYCOSTATIN) powder Apply to affected area 3 times daily 60 g 0    ondansetron (ZOFRAN-ODT) 8 MG TbDL DISSOLVE 1 tablet (8 mg total) by mouth every 8 (eight) hours as needed (nausea). 15 tablet 2    tacrolimus (PROGRAF) 0.5 MG Cap Take 1 mg (2 capsules) by mouth in the morning and 1 mg (2 capsules) by mouth in the evening 150 capsule 2    valGANciclovir (VALCYTE) 450 mg Tab Take 2 tablets (900 mg total) by mouth 2 (two) times daily. 120 tablet 3    ursodiol (ACTIGALL) 300 mg capsule Take 1 capsule (300 mg total) by mouth 2 (two) times daily. for 17 days 34 capsule 0     No current facility-administered  "medications on file prior to visit.        ALLERGIES: Review of patient's allergies indicates:  No Known Allergies     ROS:       Review of Systems   Constitutional: Negative for fatigue, diaphoresis, fever and unexpected weight change.   HENT:   Negative for lump/mass and sore throat.    Eyes: Negative for icterus.   Respiratory: Negative for cough and shortness of breath.    Cardiovascular: Negative for chest pain and palpitations.   Gastrointestinal: Negative for abdominal distention, constipation, nausea and vomiting. Positive for diarrhea (1-2 liquid bowel movements per day, though improving on loperamide)  Genitourinary: Negative for dysuria and frequency.    Musculoskeletal: Negative for arthralgias, gait problem and myalgias.   Skin: Negative for rash or itching. Dry lips  Neurological: Negative for dizziness, gait problem and headaches.   Hematological: Negative for adenopathy. Does not bruise/bleed easily.   Psychiatric/Behavioral: Negative for depression or anxiety.    PHYSICAL EXAM:  Vitals:    11/26/18 0921   BP: (!) 144/94   Pulse: 78   Resp: 18   Temp: 98.4 °F (36.9 °C)   SpO2: 99%   Weight: 67.3 kg (148 lb 5.9 oz)   Height: 5' 11" (1.803 m)   PainSc: 0-No pain       KARNOFSKY PERFORMANCE STATUS 80%  ECOG 1    Physical Exam   Constitutional: He is oriented to person, place, and time. He appears well-developed and well-nourished. No distress.   HENT:   Head: Normocephalic and atraumatic.   Mouth/Throat: Mucous membranes are normal. No oral lesions. Dry lips   Eyes: Conjunctivae are normal.   Neck: No thyromegaly present.   Cardiovascular: Normal rate, regular rhythm and normal heart sounds.   No murmur heard.  Pulmonary/Chest: Breath sounds normal. He has no wheezes. He has no rales.   Central venous catheter in place.  CDI; no S/S infection.  Abdominal: Soft. He exhibits no distension and no mass. There is no splenomegaly or hepatomegaly. There is no tenderness.   Neurological: He is alert and " oriented to person, place, and time. He has normal strength and normal reflexes. No cranial nerve deficit. Coordination normal.   Skin: No rash observed.     LAB:   Results for orders placed or performed in visit on 11/26/18   Comprehensive metabolic panel   Result Value Ref Range    Sodium 141 136 - 145 mmol/L    Potassium 3.5 3.5 - 5.1 mmol/L    Chloride 106 95 - 110 mmol/L    CO2 27 23 - 29 mmol/L    Glucose 111 (H) 70 - 110 mg/dL    BUN, Bld 11 6 - 20 mg/dL    Creatinine 0.9 0.5 - 1.4 mg/dL    Calcium 9.6 8.7 - 10.5 mg/dL    Total Protein 6.2 6.0 - 8.4 g/dL    Albumin 3.7 3.5 - 5.2 g/dL    Total Bilirubin 0.4 0.1 - 1.0 mg/dL    Alkaline Phosphatase 69 55 - 135 U/L    AST 23 10 - 40 U/L    ALT 39 10 - 44 U/L    Anion Gap 8 8 - 16 mmol/L    eGFR if African American >60.0 >60 mL/min/1.73 m^2    eGFR if non African American >60.0 >60 mL/min/1.73 m^2   Magnesium   Result Value Ref Range    Magnesium 2.0 1.6 - 2.6 mg/dL   Phosphorus   Result Value Ref Range    Phosphorus 3.4 2.7 - 4.5 mg/dL   TACROLIMUS LEVEL   Result Value Ref Range    Tacrolimus Lvl 6.6 5.0 - 15.0 ng/mL   CBC auto differential   Result Value Ref Range    WBC 9.11 3.90 - 12.70 K/uL    RBC 3.92 (L) 4.60 - 6.20 M/uL    Hemoglobin 12.2 (L) 14.0 - 18.0 g/dL    Hematocrit 35.6 (L) 40.0 - 54.0 %    MCV 91 82 - 98 fL    MCH 31.1 (H) 27.0 - 31.0 pg    MCHC 34.3 32.0 - 36.0 g/dL    RDW 15.4 (H) 11.5 - 14.5 %    Platelets 129 (L) 150 - 350 K/uL    MPV 11.2 9.2 - 12.9 fL    Immature Granulocytes 1.0 (H) 0.0 - 0.5 %    Gran # (ANC) 6.7 1.8 - 7.7 K/uL    Immature Grans (Abs) 0.09 (H) 0.00 - 0.04 K/uL    Lymph # 1.4 1.0 - 4.8 K/uL    Mono # 0.9 0.3 - 1.0 K/uL    Eos # 0.1 0.0 - 0.5 K/uL    Baso # 0.01 0.00 - 0.20 K/uL    nRBC 0 0 /100 WBC    Gran% 73.8 (H) 38.0 - 73.0 %    Lymph% 14.8 (L) 18.0 - 48.0 %    Mono% 9.4 4.0 - 15.0 %    Eosinophil% 0.9 0.0 - 8.0 %    Basophil% 0.1 0.0 - 1.9 %    Differential Method Automated        PROBLEMS ASSESSED THIS VISIT:    1.  Hypertension, unspecified type    2. Hypomagnesemia        PLAN:        History allo transplant for CML  - See oncology history above  - s/p alloSCT on 10/9/2018  - Neutrophil engraftment on Day +12.   - Currently Day +49  - Will plan maintenance dasatinib at Day +60  - plan for +30 & +100 & +1 year restaging  - day +30 BMBX was performed in clinic 11/13/18, no evidence of CML, BCR/ABL 0.005% (MR 4.3). Chimerism studies show 60% donor in CD3+ fraction and 100% donor in CD33+ fraction.   - Repeat chimers on peripheral blood on ~ day +60  - Plan to restart dasatinib 140 mg PO daily on ~day +60. I discussed retrieving his prescription with his wife today.     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 Oral budesonide started 11/19. Patient states helping with some upper GI discomfort  - C. Diff negative.   - Continue tacrolimus prophylaxis  - tacro level 6.6 today - continue  0.5 mg qAM and 0.5 mg qPM  - gvhd charting with each clinic visit     ID  - Completed PO vancomycin for C. Diff  - Continue ppx with acyclovir, fluconazole  - Begin atovaquone ppx at Day +30  - Monitor CMV BIW; EBV once weekly  - EBV negative to date  -  (11/19); Valcyte is obtained by patient. Per MONIKA RODRIGUEZ, continue to hold Valcyte until CMV > 250 and continue acyclovir. Patient has appt with MONIKA RODRIGUEZ on 11/26 for CMV.  - CVC remains in place without S/S infection- consider removing in the near future    Cytopenias  - Today: anemia stable, hgb 12.2 today. No transfusions indicated  - Donor has sickle cell trait. Some anemia is expected long-term  - Both neutrophils and platelets engrafted.      FEN/GI  - hypomagnesemia  - On mg ox 1200 BID with no side effects, 400 mg daily at lunch time added 11/1  - Mag 2.0 today    Folliculitis  - patient states he has had similar rash that has resolved on its own  - patient instructed he may use OTC topical antibiotic if worsens    Hypertension  - Restarted amlodipine 5 mg daily    Follow-up: BI-Weekly  labs and appts with Dr. Pantoja or NP through day +100 with CBC, CMP, tacro, Mag, CMV.  EBV once weekly only.     Kishor Pantoja MD  Hematology and Stem Cell Transplant

## 2018-11-28 NOTE — PROGRESS NOTES
HEMATOLOGIC MALIGNANCIES PROGRESS NOTE    IDENTIFYING STATEMENT   Wilton Guzmán (Wilton) is a 33 y.o. 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      ONCOLOGY HISTORY:    1. Chronic myeloid leukemia, initially presenting in blast phase   A. 2018: Began developing left-sided abdominal pain - eventually requiring evaluation - WBC ~150 at  base; transferred to [facility] [place] [place]   B. BMBx showed 20% myelomonoblasts concerning for CML in blast phase; bcr-abl positive (returned for Day 8 of induction therapy).    C. 2018: Began 7+3 induction chemotherapy. Began dasatinib (dose-adjusted) on day 8.   D. 2018: Transferred to [facility]   E. 2018: BMBx showed variably cellular marrow (10-40%) with 9% CD34+ blasts, concerning for residual disease.   F. 2018: Reinduction with MEC (mitoxantrone, etoposide, cytarabine) chemotherapy.    G. 2018: BMBx shows hypocellular bone marrow with no definite morphologic or immunophenotypic evidence of residual leukemia; bcr-abl p210 transcript was detected at 0.5%   H. Delay in clinic follow-up due to challenges with  insurance   I. 2018: Follow-up in clinic; WBC 6.77 (normal diff); Hgb 11.2 g/dl; Plt 298; BMBx shows 30% cellular marrow with no morphologic evidence of residual leukemia; bcr-abl p210 detected at 0.05% on international scale (MR 3.3).    J. 2018: BMBx shows 40% cellular marrow with no morphologic evidence of leukemia. Chromosomes 46, XY. Bcr-abl p210 detected at 0.1% on international scale (MR 3.0).    K. 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 Engrafted on Day +12. Course complicated by C. Difficile colitis.     INTERVAL HISTORY:      Mr. Guzmán presents to clinic with his mother for f/u allo SCT. He is Day +51. C/o diarrhea at previous visit. c-diff neg. Diarrhea  improved with imodium. States he is taking imodium 1-2 x daily. Denies constipation. Denies n/v. Reports good appetite and oral fluid intake. Denies fevers, chills, aches, night sweat, SOB, rashes, mouth sores, palpitations. Mild fatigue in afternoon. Lip dryness has improved with gentle exfoliation and blistex.     Past Medical History, Past Social History and Past Family History have been reviewed and are unchanged except as noted in the interval history.    MEDICATIONS:     Current Outpatient Medications on File Prior to Visit   Medication Sig Dispense Refill    acyclovir (ZOVIRAX) 800 MG Tab Take 1 tablet (800 mg total) by mouth 2 (two) times daily. 60 tablet 11    amLODIPine (NORVASC) 5 MG tablet Take 1 tablet (5 mg total) by mouth once daily. 30 tablet 11    atovaquone (MEPRON) 750 mg/5 mL Susp Take 10 mLs (1,500 mg total) by mouth once daily. Starting on 11/8/18 300 mL 5    budesonide (ENTOCORT EC) 3 mg capsule Take 3 capsules (9 mg total) by mouth once daily. 90 capsule 2    fluconazole (DIFLUCAN) 200 MG Tab Take 2 tablets (400 mg total) by mouth once daily. 60 tablet 5    magnesium oxide (MAG-OX) 400 mg (241.3 mg magnesium) tablet Take 3 tablets (1,200 mg total) by mouth 2 (two) times daily.  0    [DISCONTINUED] tacrolimus (PROGRAF) 0.5 MG Cap Take 1 mg (2 capsules) by mouth in the morning and 1 mg (2 capsules) by mouth in the evening 150 capsule 2    [START ON 12/8/2018] dasatinib (SPRYCEL) 140 mg Tab Take 1 tablet by mouth once daily Start on day +60 (12/8/18). 30 tablet 11    naproxen (NAPROSYN) 500 MG tablet Take 500 mg by mouth.      nystatin (MYCOSTATIN) powder Apply to affected area 3 times daily 60 g 0    ondansetron (ZOFRAN-ODT) 8 MG TbDL DISSOLVE 1 tablet (8 mg total) by mouth every 8 (eight) hours as needed (nausea). 15 tablet 2    ursodiol (ACTIGALL) 300 mg capsule Take 1 capsule (300 mg total) by mouth 2 (two) times daily. for 17 days 34 capsule 0    valGANciclovir (VALCYTE) 450 mg  "Tab Take 2 tablets (900 mg total) by mouth 2 (two) times daily. 120 tablet 3     No current facility-administered medications on file prior to visit.        ALLERGIES: Review of patient's allergies indicates:  No Known Allergies     ROS:       Review of Systems   Constitutional: Negative for fatigue, diaphoresis, fever and unexpected weight change.   HENT:   Negative for lump/mass and sore throat.    Eyes: Negative for icterus.   Respiratory: Negative for cough and shortness of breath.    Cardiovascular: Negative for chest pain and palpitations.   Gastrointestinal: Negative for abdominal distention, constipation, nausea and vomiting. Neg for diarrhea. Improved with imodium.  Genitourinary: Negative for dysuria and frequency.    Musculoskeletal: Negative for arthralgias, gait problem and myalgias.   Skin: Negative for rash or itching. Dry lips  Neurological: Negative for dizziness, gait problem and headaches.   Hematological: Negative for adenopathy. Does not bruise/bleed easily.   Psychiatric/Behavioral: Negative for depression or anxiety.    PHYSICAL EXAM:  Vitals:    11/29/18 1059   BP: (!) 142/88   Pulse: 86   Resp: 20   Temp: 98.9 °F (37.2 °C)   TempSrc: Oral   SpO2: 99%   Weight: 68.1 kg (150 lb 2.1 oz)   Height: 5' 11" (1.803 m)   PainSc: 0-No pain       KARNOFSKY PERFORMANCE STATUS 80%  ECOG 1    Physical Exam   Constitutional: He is oriented to person, place, and time. He appears well-developed and well-nourished. No distress.   HENT:   Head: Normocephalic and atraumatic.   Mouth/Throat: Mucous membranes are normal. No oral lesions.    Eyes: Conjunctivae are normal.   Neck: No thyromegaly present.   Cardiovascular: Normal rate, regular rhythm and normal heart sounds.   No murmur heard.  Pulmonary/Chest: Breath sounds normal. He has no wheezes. He has no rales.   Central venous catheter in place.  CDI; no S/S infection.  Abdominal: Soft. He exhibits no distension and no mass. There is no splenomegaly or " hepatomegaly. There is no tenderness.   Neurological: He is alert and oriented to person, place, and time. He has normal strength. Coordination normal.   Skin: No rash observed.     LAB:   Results for orders placed or performed in visit on 11/29/18   Comprehensive metabolic panel   Result Value Ref Range    Sodium 139 136 - 145 mmol/L    Potassium 3.7 3.5 - 5.1 mmol/L    Chloride 106 95 - 110 mmol/L    CO2 27 23 - 29 mmol/L    Glucose 95 70 - 110 mg/dL    BUN, Bld 17 6 - 20 mg/dL    Creatinine 0.9 0.5 - 1.4 mg/dL    Calcium 9.6 8.7 - 10.5 mg/dL    Total Protein 6.3 6.0 - 8.4 g/dL    Albumin 3.7 3.5 - 5.2 g/dL    Total Bilirubin 0.4 0.1 - 1.0 mg/dL    Alkaline Phosphatase 76 55 - 135 U/L    AST 28 10 - 40 U/L    ALT 52 (H) 10 - 44 U/L    Anion Gap 6 (L) 8 - 16 mmol/L    eGFR if African American >60.0 >60 mL/min/1.73 m^2    eGFR if non African American >60.0 >60 mL/min/1.73 m^2   Magnesium   Result Value Ref Range    Magnesium 1.8 1.6 - 2.6 mg/dL   Phosphorus   Result Value Ref Range    Phosphorus 3.3 2.7 - 4.5 mg/dL   TACROLIMUS LEVEL   Result Value Ref Range    Tacrolimus Lvl 5.0 5.0 - 15.0 ng/mL   CBC auto differential   Result Value Ref Range    WBC 10.55 3.90 - 12.70 K/uL    RBC 3.96 (L) 4.60 - 6.20 M/uL    Hemoglobin 12.1 (L) 14.0 - 18.0 g/dL    Hematocrit 36.4 (L) 40.0 - 54.0 %    MCV 92 82 - 98 fL    MCH 30.6 27.0 - 31.0 pg    MCHC 33.2 32.0 - 36.0 g/dL    RDW 16.1 (H) 11.5 - 14.5 %    Platelets 143 (L) 150 - 350 K/uL    MPV 11.4 9.2 - 12.9 fL    Immature Granulocytes 1.8 (H) 0.0 - 0.5 %    Gran # (ANC) 7.7 1.8 - 7.7 K/uL    Immature Grans (Abs) 0.19 (H) 0.00 - 0.04 K/uL    Lymph # 1.3 1.0 - 4.8 K/uL    Mono # 1.3 (H) 0.3 - 1.0 K/uL    Eos # 0.0 0.0 - 0.5 K/uL    Baso # 0.02 0.00 - 0.20 K/uL    nRBC 0 0 /100 WBC    Gran% 73.4 (H) 38.0 - 73.0 %    Lymph% 11.8 (L) 18.0 - 48.0 %    Mono% 12.4 4.0 - 15.0 %    Eosinophil% 0.4 0.0 - 8.0 %    Basophil% 0.2 0.0 - 1.9 %    Differential Method Automated        PROBLEMS  ASSESSED THIS VISIT:    1. S/P allogeneic bone marrow transplant    2. Chronic myeloid leukemia, BCR/ABL-positive, in remission    3. Anemia associated with chemotherapy    4. Hypomagnesemia    5. Folliculitis    6. Secondary hypertension    7. Cytomegalovirus infection, unspecified cytomegaloviral infection type        PLAN:        History allo transplant for CML  - See oncology history above  - s/p alloSCT on 10/9/2018  - Neutrophil engraftment on Day +12.   - Currently Day +51  - plan for +30 & +100 & +1 year restaging  - day +30 BMBX was performed in clinic 11/13/18, no evidence of CML, BCR/ABL 0.005% (MR 4.3). Chimerism studies show 60% donor in CD3+ fraction and 100% donor in CD33+ fraction.   - Repeat chimers on peripheral blood on ~ day +60. Ordered. Will need to be linked to visit on 12/10, which is day +62  - Plan to restart dasatinib 140 mg PO daily on ~day +60.     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 Oral budesonide started 11/19. Continues to endorse improvement to upper GI discomfort  - C. Diff negative. Diarrhea improved with imodium. Taking 1-2 times daily. Pt understands that imodium can cause constipation.  - Continue tacrolimus prophylaxis  - tacro level 5.0 today - Increase from 0.5 mg qAM and 0.5 mg qPM to 1 mg (2 capsules) q am and 0.5 mg (1capsule) q pm.   - gvhd charting with each clinic visit     ID  - Completed PO vancomycin for C. Diff  - Continue ppx with acyclovir, fluconazole  - Begin atovaquone ppx at Day +30  - Monitor CMV BIW; EBV once weekly  - EBV negative to date  -  (11/26); Valcyte was obtained by patient. Per MONIKA RODRIGUEZ, continue to hold Valcyte until CMV > 250 and continue acyclovir. Seen by MONIKA RODRIGUEZ on 11/26 for CMV. Recommended to continue to hold off on Valcyte until CMV is 250-500 or if patient develops systemic signs or symptoms of infection including fevers, malaise, worsening diarrhea, abdominal pain, SOB, cough   - CVC remains in place without S/S infection-  consider removing in the near future    Cytopenias  - Today: anemia stable, hgb 12.1 today. No transfusions indicated  - Donor has sickle cell trait. Some anemia is expected long-term  - Both neutrophils and platelets engrafted.      FEN/GI  - hypomagnesemia  - On mg ox 1200 BID with no side effects  - Mag 1.8 today    Folliculitis  - resolving  - patient instructed he may use OTC topical antibiotic if worsens. Patient states that he has not used topical abx to date    Hypertension  - Restarted amlodipine 5 mg daily  - /88 today    Follow-up: BI-Weekly labs and appts with Dr. Pantoja or NP through day +100 with CBC, CMP, tacro, Mag, CMV (Mondays and Thursdays).  EBV once weekly only. Patient needs to be scheduled through December.     Plan of care discussed with collaborating physician Dr. Kishor Serna NP  Hematology and Stem Cell Transplant

## 2018-11-29 ENCOUNTER — OFFICE VISIT (OUTPATIENT)
Dept: HEMATOLOGY/ONCOLOGY | Facility: CLINIC | Age: 33
End: 2018-11-29
Payer: OTHER GOVERNMENT

## 2018-11-29 ENCOUNTER — INFUSION (OUTPATIENT)
Dept: INFUSION THERAPY | Facility: HOSPITAL | Age: 33
End: 2018-11-29
Attending: INTERNAL MEDICINE
Payer: OTHER GOVERNMENT

## 2018-11-29 VITALS
RESPIRATION RATE: 20 BRPM | BODY MASS INDEX: 21.02 KG/M2 | DIASTOLIC BLOOD PRESSURE: 88 MMHG | HEIGHT: 71 IN | TEMPERATURE: 99 F | OXYGEN SATURATION: 99 % | SYSTOLIC BLOOD PRESSURE: 142 MMHG | HEART RATE: 86 BPM | WEIGHT: 150.13 LBS

## 2018-11-29 DIAGNOSIS — Z94.81 S/P ALLOGENEIC BONE MARROW TRANSPLANT: ICD-10-CM

## 2018-11-29 DIAGNOSIS — T45.1X5A ANEMIA ASSOCIATED WITH CHEMOTHERAPY: ICD-10-CM

## 2018-11-29 DIAGNOSIS — C92.10 CML (CHRONIC MYELOCYTIC LEUKEMIA): ICD-10-CM

## 2018-11-29 DIAGNOSIS — D64.81 ANEMIA ASSOCIATED WITH CHEMOTHERAPY: ICD-10-CM

## 2018-11-29 DIAGNOSIS — E83.42 HYPOMAGNESEMIA: ICD-10-CM

## 2018-11-29 DIAGNOSIS — L73.9 FOLLICULITIS: ICD-10-CM

## 2018-11-29 DIAGNOSIS — B25.9 CYTOMEGALOVIRUS INFECTION, UNSPECIFIED CYTOMEGALOVIRAL INFECTION TYPE: ICD-10-CM

## 2018-11-29 DIAGNOSIS — Z94.81 S/P ALLOGENEIC BONE MARROW TRANSPLANT: Primary | ICD-10-CM

## 2018-11-29 DIAGNOSIS — I15.9 SECONDARY HYPERTENSION: ICD-10-CM

## 2018-11-29 DIAGNOSIS — C92.11 CHRONIC MYELOID LEUKEMIA, BCR/ABL-POSITIVE, IN REMISSION: ICD-10-CM

## 2018-11-29 DIAGNOSIS — C92.11 CHRONIC MYELOID LEUKEMIA, BCR/ABL-POSITIVE, IN REMISSION: Primary | ICD-10-CM

## 2018-11-29 LAB
ALBUMIN SERPL BCP-MCNC: 3.7 G/DL
ALP SERPL-CCNC: 76 U/L
ALT SERPL W/O P-5'-P-CCNC: 52 U/L
ANION GAP SERPL CALC-SCNC: 6 MMOL/L
AST SERPL-CCNC: 28 U/L
BASOPHILS # BLD AUTO: 0.02 K/UL
BASOPHILS NFR BLD: 0.2 %
BILIRUB SERPL-MCNC: 0.4 MG/DL
BUN SERPL-MCNC: 17 MG/DL
CALCIUM SERPL-MCNC: 9.6 MG/DL
CHLORIDE SERPL-SCNC: 106 MMOL/L
CO2 SERPL-SCNC: 27 MMOL/L
CREAT SERPL-MCNC: 0.9 MG/DL
DIFFERENTIAL METHOD: ABNORMAL
EOSINOPHIL # BLD AUTO: 0 K/UL
EOSINOPHIL NFR BLD: 0.4 %
ERYTHROCYTE [DISTWIDTH] IN BLOOD BY AUTOMATED COUNT: 16.1 %
EST. GFR  (AFRICAN AMERICAN): >60 ML/MIN/1.73 M^2
EST. GFR  (NON AFRICAN AMERICAN): >60 ML/MIN/1.73 M^2
GLUCOSE SERPL-MCNC: 95 MG/DL
HCT VFR BLD AUTO: 36.4 %
HGB BLD-MCNC: 12.1 G/DL
IMM GRANULOCYTES # BLD AUTO: 0.19 K/UL
IMM GRANULOCYTES NFR BLD AUTO: 1.8 %
LYMPHOCYTES # BLD AUTO: 1.3 K/UL
LYMPHOCYTES NFR BLD: 11.8 %
MAGNESIUM SERPL-MCNC: 1.8 MG/DL
MCH RBC QN AUTO: 30.6 PG
MCHC RBC AUTO-ENTMCNC: 33.2 G/DL
MCV RBC AUTO: 92 FL
MONOCYTES # BLD AUTO: 1.3 K/UL
MONOCYTES NFR BLD: 12.4 %
NEUTROPHILS # BLD AUTO: 7.7 K/UL
NEUTROPHILS NFR BLD: 73.4 %
NRBC BLD-RTO: 0 /100 WBC
PHOSPHATE SERPL-MCNC: 3.3 MG/DL
PLATELET # BLD AUTO: 143 K/UL
PMV BLD AUTO: 11.4 FL
POTASSIUM SERPL-SCNC: 3.7 MMOL/L
PROT SERPL-MCNC: 6.3 G/DL
RBC # BLD AUTO: 3.96 M/UL
SODIUM SERPL-SCNC: 139 MMOL/L
TACROLIMUS BLD-MCNC: 5 NG/ML
WBC # BLD AUTO: 10.55 K/UL

## 2018-11-29 PROCEDURE — 63600175 PHARM REV CODE 636 W HCPCS: Performed by: INTERNAL MEDICINE

## 2018-11-29 PROCEDURE — A4216 STERILE WATER/SALINE, 10 ML: HCPCS | Performed by: INTERNAL MEDICINE

## 2018-11-29 PROCEDURE — 85025 COMPLETE CBC W/AUTO DIFF WBC: CPT

## 2018-11-29 PROCEDURE — 80053 COMPREHEN METABOLIC PANEL: CPT

## 2018-11-29 PROCEDURE — 99215 OFFICE O/P EST HI 40 MIN: CPT | Mod: S$PBB,,, | Performed by: NURSE PRACTITIONER

## 2018-11-29 PROCEDURE — 99215 OFFICE O/P EST HI 40 MIN: CPT | Mod: PBBFAC,25 | Performed by: NURSE PRACTITIONER

## 2018-11-29 PROCEDURE — 99999 PR PBB SHADOW E&M-EST. PATIENT-LVL V: CPT | Mod: PBBFAC,,, | Performed by: NURSE PRACTITIONER

## 2018-11-29 PROCEDURE — 25000003 PHARM REV CODE 250: Performed by: INTERNAL MEDICINE

## 2018-11-29 PROCEDURE — 84100 ASSAY OF PHOSPHORUS: CPT

## 2018-11-29 PROCEDURE — 80197 ASSAY OF TACROLIMUS: CPT

## 2018-11-29 PROCEDURE — 83735 ASSAY OF MAGNESIUM: CPT

## 2018-11-29 PROCEDURE — 36591 DRAW BLOOD OFF VENOUS DEVICE: CPT

## 2018-11-29 RX ORDER — SODIUM CHLORIDE 0.9 % (FLUSH) 0.9 %
10 SYRINGE (ML) INJECTION
Status: COMPLETED | OUTPATIENT
Start: 2018-11-29 | End: 2018-11-29

## 2018-11-29 RX ORDER — TACROLIMUS 0.5 MG/1
CAPSULE ORAL
Qty: 150 CAPSULE | Refills: 2
Start: 2018-11-29 | End: 2018-11-29

## 2018-11-29 RX ORDER — HEPARIN 100 UNIT/ML
500 SYRINGE INTRAVENOUS
Status: CANCELLED | OUTPATIENT
Start: 2018-11-29

## 2018-11-29 RX ORDER — HEPARIN 100 UNIT/ML
500 SYRINGE INTRAVENOUS
Status: COMPLETED | OUTPATIENT
Start: 2018-11-29 | End: 2018-11-29

## 2018-11-29 RX ORDER — SODIUM CHLORIDE 0.9 % (FLUSH) 0.9 %
10 SYRINGE (ML) INJECTION
Status: CANCELLED | OUTPATIENT
Start: 2018-11-29

## 2018-11-29 RX ORDER — TACROLIMUS 0.5 MG/1
CAPSULE ORAL
Qty: 150 CAPSULE | Refills: 2
Start: 2018-11-29 | End: 2018-12-04 | Stop reason: SDUPTHER

## 2018-11-29 RX ADMIN — HEPARIN 500 UNITS: 100 SYRINGE at 09:11

## 2018-11-29 RX ADMIN — Medication 10 ML: at 09:11

## 2018-11-29 NOTE — Clinical Note
Patient needs to be seen twice a week on Mondays and Thursdays..he does not have an appt for upcoming Monday 12/3

## 2018-11-29 NOTE — Clinical Note
BI-Weekly labs and appts with Dr. Pantoja or NP through day +100 with CBC, CMP, tacro, Mag, CMV (Mondays and Thursdays).  EBV once weekly only. Patient needs to be scheduled through December. Please cancel lab appts that were scheduled no [place].

## 2018-11-29 NOTE — Clinical Note
BI-Weekly labs and appts with Dr. Pantoja or NP through day +100 with CBC, CMP, tacro, Mag, CMV (Mondays and Thursdays).  EBV once weekly only. Patient needs to be scheduled through December. Please cancel lab appts that were scheduled no [place].

## 2018-12-01 LAB — CMV DNA SERPL NAA+PROBE-ACNC: 438 IU/ML

## 2018-12-03 ENCOUNTER — INFUSION (OUTPATIENT)
Dept: INFUSION THERAPY | Facility: HOSPITAL | Age: 33
End: 2018-12-03
Attending: INTERNAL MEDICINE
Payer: OTHER GOVERNMENT

## 2018-12-03 ENCOUNTER — OFFICE VISIT (OUTPATIENT)
Dept: HEMATOLOGY/ONCOLOGY | Facility: CLINIC | Age: 33
End: 2018-12-03
Payer: OTHER GOVERNMENT

## 2018-12-03 VITALS
WEIGHT: 151.25 LBS | DIASTOLIC BLOOD PRESSURE: 88 MMHG | OXYGEN SATURATION: 100 % | HEIGHT: 71 IN | SYSTOLIC BLOOD PRESSURE: 136 MMHG | BODY MASS INDEX: 21.18 KG/M2 | RESPIRATION RATE: 18 BRPM | HEART RATE: 97 BPM | TEMPERATURE: 99 F

## 2018-12-03 DIAGNOSIS — E83.42 HYPOMAGNESEMIA: ICD-10-CM

## 2018-12-03 DIAGNOSIS — C92.11 CML IN REMISSION: ICD-10-CM

## 2018-12-03 DIAGNOSIS — L73.9 FOLLICULITIS: ICD-10-CM

## 2018-12-03 DIAGNOSIS — C92.10 CML (CHRONIC MYELOCYTIC LEUKEMIA): ICD-10-CM

## 2018-12-03 DIAGNOSIS — Z94.81 S/P ALLOGENEIC BONE MARROW TRANSPLANT: ICD-10-CM

## 2018-12-03 DIAGNOSIS — D57.3 SICKLE CELL TRAIT: ICD-10-CM

## 2018-12-03 DIAGNOSIS — I15.9 SECONDARY HYPERTENSION: ICD-10-CM

## 2018-12-03 DIAGNOSIS — C92.11 CHRONIC MYELOID LEUKEMIA, BCR/ABL-POSITIVE, IN REMISSION: Primary | ICD-10-CM

## 2018-12-03 DIAGNOSIS — D75.9 CYTOPENIA: ICD-10-CM

## 2018-12-03 DIAGNOSIS — D89.810 ACUTE GVHD: ICD-10-CM

## 2018-12-03 LAB
ABO + RH BLD: NORMAL
ALBUMIN SERPL BCP-MCNC: 3.7 G/DL
ALP SERPL-CCNC: 78 U/L
ALT SERPL W/O P-5'-P-CCNC: 58 U/L
ANION GAP SERPL CALC-SCNC: 9 MMOL/L
AST SERPL-CCNC: 29 U/L
BASOPHILS # BLD AUTO: 0.02 K/UL
BASOPHILS NFR BLD: 0.2 %
BILIRUB SERPL-MCNC: 0.4 MG/DL
BLD GP AB SCN CELLS X3 SERPL QL: NORMAL
BUN SERPL-MCNC: 16 MG/DL
CALCIUM SERPL-MCNC: 9.5 MG/DL
CHLORIDE SERPL-SCNC: 104 MMOL/L
CO2 SERPL-SCNC: 27 MMOL/L
CREAT SERPL-MCNC: 0.8 MG/DL
DIFFERENTIAL METHOD: ABNORMAL
EOSINOPHIL # BLD AUTO: 0.1 K/UL
EOSINOPHIL NFR BLD: 0.9 %
ERYTHROCYTE [DISTWIDTH] IN BLOOD BY AUTOMATED COUNT: 17 %
EST. GFR  (AFRICAN AMERICAN): >60 ML/MIN/1.73 M^2
EST. GFR  (NON AFRICAN AMERICAN): >60 ML/MIN/1.73 M^2
GLUCOSE SERPL-MCNC: 114 MG/DL
HCT VFR BLD AUTO: 35.5 %
HGB BLD-MCNC: 12.5 G/DL
IMM GRANULOCYTES # BLD AUTO: 0.15 K/UL
IMM GRANULOCYTES NFR BLD AUTO: 1.7 %
LYMPHOCYTES # BLD AUTO: 1.2 K/UL
LYMPHOCYTES NFR BLD: 13.6 %
MAGNESIUM SERPL-MCNC: 2.1 MG/DL
MCH RBC QN AUTO: 31.9 PG
MCHC RBC AUTO-ENTMCNC: 35.2 G/DL
MCV RBC AUTO: 91 FL
MONOCYTES # BLD AUTO: 1 K/UL
MONOCYTES NFR BLD: 11.5 %
NEUTROPHILS # BLD AUTO: 6.5 K/UL
NEUTROPHILS NFR BLD: 72.1 %
NRBC BLD-RTO: 0 /100 WBC
PHOSPHATE SERPL-MCNC: 2.8 MG/DL
PLATELET # BLD AUTO: 147 K/UL
PMV BLD AUTO: 11.4 FL
POTASSIUM SERPL-SCNC: 3.4 MMOL/L
PROT SERPL-MCNC: 6.3 G/DL
RBC # BLD AUTO: 3.92 M/UL
SODIUM SERPL-SCNC: 140 MMOL/L
WBC # BLD AUTO: 8.98 K/UL

## 2018-12-03 PROCEDURE — 63600175 PHARM REV CODE 636 W HCPCS: Performed by: INTERNAL MEDICINE

## 2018-12-03 PROCEDURE — 99213 OFFICE O/P EST LOW 20 MIN: CPT | Mod: PBBFAC,25 | Performed by: NURSE PRACTITIONER

## 2018-12-03 PROCEDURE — 80053 COMPREHEN METABOLIC PANEL: CPT

## 2018-12-03 PROCEDURE — 99999 PR PBB SHADOW E&M-EST. PATIENT-LVL III: CPT | Mod: PBBFAC,,, | Performed by: NURSE PRACTITIONER

## 2018-12-03 PROCEDURE — 87799 DETECT AGENT NOS DNA QUANT: CPT

## 2018-12-03 PROCEDURE — 36592 COLLECT BLOOD FROM PICC: CPT

## 2018-12-03 PROCEDURE — 86901 BLOOD TYPING SEROLOGIC RH(D): CPT

## 2018-12-03 PROCEDURE — 80197 ASSAY OF TACROLIMUS: CPT

## 2018-12-03 PROCEDURE — 84100 ASSAY OF PHOSPHORUS: CPT

## 2018-12-03 PROCEDURE — 85025 COMPLETE CBC W/AUTO DIFF WBC: CPT

## 2018-12-03 PROCEDURE — A4216 STERILE WATER/SALINE, 10 ML: HCPCS | Performed by: INTERNAL MEDICINE

## 2018-12-03 PROCEDURE — 25000003 PHARM REV CODE 250: Performed by: INTERNAL MEDICINE

## 2018-12-03 PROCEDURE — 83735 ASSAY OF MAGNESIUM: CPT

## 2018-12-03 PROCEDURE — 99215 OFFICE O/P EST HI 40 MIN: CPT | Mod: S$PBB,,, | Performed by: NURSE PRACTITIONER

## 2018-12-03 RX ORDER — SODIUM CHLORIDE 0.9 % (FLUSH) 0.9 %
10 SYRINGE (ML) INJECTION
Status: COMPLETED | OUTPATIENT
Start: 2018-12-03 | End: 2018-12-03

## 2018-12-03 RX ORDER — HEPARIN 100 UNIT/ML
500 SYRINGE INTRAVENOUS
Status: COMPLETED | OUTPATIENT
Start: 2018-12-03 | End: 2018-12-03

## 2018-12-03 RX ORDER — HEPARIN 100 UNIT/ML
500 SYRINGE INTRAVENOUS
Status: CANCELLED | OUTPATIENT
Start: 2018-12-03

## 2018-12-03 RX ORDER — SODIUM CHLORIDE 0.9 % (FLUSH) 0.9 %
10 SYRINGE (ML) INJECTION
Status: CANCELLED | OUTPATIENT
Start: 2018-12-03

## 2018-12-03 RX ADMIN — Medication 10 ML: at 01:12

## 2018-12-03 RX ADMIN — HEPARIN 500 UNITS: 100 SYRINGE at 01:12

## 2018-12-03 NOTE — PROGRESS NOTES
HEMATOLOGIC MALIGNANCIES PROGRESS NOTE    IDENTIFYING STATEMENT   Wilton Guzmán (Wilton) is a 33 y.o. 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      ONCOLOGY HISTORY:    1. Chronic myeloid leukemia, initially presenting in blast phase   A. 2018: Began developing left-sided abdominal pain - eventually requiring evaluation - WBC ~150 at  base; transferred to [facility] in [place], [place]   B. BMBx showed 20% myelomonoblasts concerning for CML in blast phase; bcr-abl positive (returned for Day 8 of induction therapy).    C. 2018: Began 7+3 induction chemotherapy. Began dasatinib (dose-adjusted) on day 8.   D. 2018: Transferred to [facility]   E. 2018: BMBx showed variably cellular marrow (10-40%) with 9% CD34+ blasts, concerning for residual disease.   F. 2018: Reinduction with MEC (mitoxantrone, etoposide, cytarabine) chemotherapy.    G. 2018: BMBx shows hypocellular bone marrow with no definite morphologic or immunophenotypic evidence of residual leukemia; bcr-abl p210 transcript was detected at 0.5%   H. Delay in clinic follow-up due to challenges with  insurance   I. 2018: Follow-up in clinic; WBC 6.77 (normal diff); Hgb 11.2 g/dl; Plt 298; BMBx shows 30% cellular marrow with no morphologic evidence of residual leukemia; bcr-abl p210 detected at 0.05% on international scale (MR 3.3).    J. 2018: BMBx shows 40% cellular marrow with no morphologic evidence of leukemia. Chromosomes 46, XY. Bcr-abl p210 detected at 0.1% on international scale (MR 3.0).    K. 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 Engrafted on Day +12. Course complicated by C. Difficile colitis.     INTERVAL HISTORY:      Mr. Guzmán presents to clinic with his mother for f/u allo SCT. He is Day +55. C/o diarrhea at previously, cdiff negative. Diarrhea  improved with imodium, output remains soft. States he is taking imodium 1-2 x daily as needed. Denies constipation. Denies n/v. Reports good appetite and oral fluid intake. Denies fevers, chills, aches, night sweat, SOB, rashes, mouth sores, palpitations, fatigue. Lip dryness has improved with gentle exfoliation and blistex/moisturizer.     Past Medical History, Past Social History and Past Family History have been reviewed and are unchanged except as noted in the interval history.    MEDICATIONS:     Current Outpatient Medications on File Prior to Visit   Medication Sig Dispense Refill    acyclovir (ZOVIRAX) 800 MG Tab Take 1 tablet (800 mg total) by mouth 2 (two) times daily. 60 tablet 11    amLODIPine (NORVASC) 5 MG tablet Take 1 tablet (5 mg total) by mouth once daily. 30 tablet 11    atovaquone (MEPRON) 750 mg/5 mL Susp Take 10 mLs (1,500 mg total) by mouth once daily. Starting on 11/8/18 300 mL 5    budesonide (ENTOCORT EC) 3 mg capsule Take 3 capsules (9 mg total) by mouth once daily. 90 capsule 2    [START ON 12/8/2018] dasatinib (SPRYCEL) 140 mg Tab Take 1 tablet by mouth once daily Start on day +60 (12/8/18). 30 tablet 11    fluconazole (DIFLUCAN) 200 MG Tab Take 2 tablets (400 mg total) by mouth once daily. 60 tablet 5    magnesium oxide (MAG-OX) 400 mg (241.3 mg magnesium) tablet Take 3 tablets (1,200 mg total) by mouth 2 (two) times daily.  0    naproxen (NAPROSYN) 500 MG tablet Take 500 mg by mouth.      nystatin (MYCOSTATIN) powder Apply to affected area 3 times daily 60 g 0    ondansetron (ZOFRAN-ODT) 8 MG TbDL DISSOLVE 1 tablet (8 mg total) by mouth every 8 (eight) hours as needed (nausea). 15 tablet 2    tacrolimus (PROGRAF) 0.5 MG Cap Take 1 mg (2 capsules) by mouth in the morning and 0.5 mg (1 capsules) by mouth in the evening 150 capsule 2    valGANciclovir (VALCYTE) 450 mg Tab Take 2 tablets (900 mg total) by mouth 2 (two) times daily. 120 tablet 3    [DISCONTINUED] ursodiol  "(ACTIGALL) 300 mg capsule Take 1 capsule (300 mg total) by mouth 2 (two) times daily. for 17 days 34 capsule 0     No current facility-administered medications on file prior to visit.        ALLERGIES: Review of patient's allergies indicates:  No Known Allergies     ROS:       Review of Systems   Constitutional: Negative for fatigue, diaphoresis, fever and unexpected weight change.   HENT:   Negative for lump/mass and sore throat.    Eyes: Negative for icterus.   Respiratory: Negative for cough and shortness of breath.    Cardiovascular: Negative for chest pain and palpitations.   Gastrointestinal: Negative for abdominal distention, diarrhea, constipation, nausea and vomiting.   Genitourinary: Negative for dysuria and frequency.    Musculoskeletal: Negative for arthralgias, gait problem and myalgias.   Skin: Negative for rash or itching.   Neurological: Negative for dizziness, gait problem and headaches.   Hematological: Negative for adenopathy. Does not bruise/bleed easily.   Psychiatric/Behavioral: Negative for depression or anxiety.    PHYSICAL EXAM:  Vitals:    12/03/18 1339   BP: 136/88   Pulse: 97   Resp: 18   Temp: 98.9 °F (37.2 °C)   TempSrc: Oral   SpO2: 100%   Weight: 68.6 kg (151 lb 3.8 oz)   Height: 5' 11" (1.803 m)   PainSc: 0-No pain       KARNOFSKY PERFORMANCE STATUS 80%  ECOG 1    Physical Exam   Constitutional: He is oriented to person, place, and time. He appears well-developed and well-nourished. No distress.   HENT:   Head: Normocephalic and atraumatic.   Mouth/Throat: Mucous membranes are normal. No oral lesions.    Eyes: Conjunctivae are normal.   Neck: No thyromegaly present.   Cardiovascular: Normal rate, regular rhythm and normal heart sounds.   No murmur heard.  Pulmonary/Chest: Breath sounds normal. He has no wheezes. He has no rales.   Central venous catheter in place.  CDI; no S/S infection.  Abdominal: Soft. He exhibits no distension and no mass. There is no splenomegaly or " hepatomegaly. There is no tenderness.   Neurological: He is alert and oriented to person, place, and time. He has normal strength. Coordination normal.   Skin: No rash observed.     LAB:   Results for orders placed or performed in visit on 12/03/18   CBC auto differential   Result Value Ref Range    WBC 8.98 3.90 - 12.70 K/uL    RBC 3.92 (L) 4.60 - 6.20 M/uL    Hemoglobin 12.5 (L) 14.0 - 18.0 g/dL    Hematocrit 35.5 (L) 40.0 - 54.0 %    MCV 91 82 - 98 fL    MCH 31.9 (H) 27.0 - 31.0 pg    MCHC 35.2 32.0 - 36.0 g/dL    RDW 17.0 (H) 11.5 - 14.5 %    Platelets 147 (L) 150 - 350 K/uL    MPV 11.4 9.2 - 12.9 fL    Immature Granulocytes 1.7 (H) 0.0 - 0.5 %    Gran # (ANC) 6.5 1.8 - 7.7 K/uL    Immature Grans (Abs) 0.15 (H) 0.00 - 0.04 K/uL    Lymph # 1.2 1.0 - 4.8 K/uL    Mono # 1.0 0.3 - 1.0 K/uL    Eos # 0.1 0.0 - 0.5 K/uL    Baso # 0.02 0.00 - 0.20 K/uL    nRBC 0 0 /100 WBC    Gran% 72.1 38.0 - 73.0 %    Lymph% 13.6 (L) 18.0 - 48.0 %    Mono% 11.5 4.0 - 15.0 %    Eosinophil% 0.9 0.0 - 8.0 %    Basophil% 0.2 0.0 - 1.9 %    Differential Method Automated        PROBLEMS ASSESSED THIS VISIT:    1. Chronic myeloid leukemia, BCR/ABL-positive, in remission    2. S/P allogeneic bone marrow transplant    3. Acute GVHD    4. Folliculitis    5. Secondary hypertension    6. Cytopenia    7. Hypomagnesemia    8. Sickle cell trait        PLAN:        History allo transplant for CML  - See oncology history above  - s/p alloSCT on 10/9/2018  - Neutrophil engraftment on Day +12.   - Currently Day +55  - plan for +30 & +100 & +1 year restaging  - day +30 BMBX was performed in clinic 11/13/18, no evidence of CML, BCR/ABL 0.005% (MR 4.3). Chimerism studies show 60% donor in CD3+ fraction and 100% donor in CD33+ fraction.   - Repeat chimers on peripheral blood on ~ day +60. Ordered. Will need to be linked to visit on 12/10, which is day +62  - Plan to restart dasatinib 140 mg PO daily on ~day +60.     GVHD   - Diarrhea may signify early GI  GVHD. Oral budesonide started 11/19, continuing at this time.  - C. Diff negative. Diarrhea improved with imodium. Taking 1-2 times daily. Pt understands that imodium can cause constipation.  - Continue tacrolimus prophylaxis  - tacro level pending today - Currently on 1 mg (2 capsules) q am and 0.5 mg (1capsule) q pm.   - gvhd charting with each clinic visit     ID  - Completed PO vancomycin for C. Diff  - Continue ppx with acyclovir, fluconazole  - Begin atovaquone ppx at Day +30  - Monitor CMV BIW; EBV once weekly  - EBV negative to date  -  (11/29), pending today; Will start on valcyte 900mg BID today (12/3) per ID  - CVC remains in place without S/S infection- consider removing in the near future    Cytopenias  - Today: anemia stable, hgb 12.5 today. No transfusions indicated  - Donor has sickle cell trait. Some anemia is expected long-term  - Both neutrophils and platelets engrafted.      FEN/GI  - previous hypomagnesemia  - On Mg ox 1200 BID with no side effects  - Mag 2.1 today  - Continue current dose as above  - hypokalemia, level today 3.4, will monitor at next appt, pt educated on some high potassium foods    Folliculitis  - resolved  - patient instructed he may use OTC topical antibiotic if worsens. Patient states that he has not used topical abx to date    Hypertension  - Continue amlodipine 5 mg daily  - BP stable today    Follow-up: BI-Weekly labs and appts with Dr. Pantoja or NP through day +100 with CBC, CMP, tacro, Mag, CMV (Mondays and Thursdays).  EBV once weekly only. Patient scheduled through December.     Plan of care discussed with collaborating physician Dr. Kishor Chance NP  Hematology and Stem Cell Transplant    Distress Screening Results: Psychosocial Distress screening score of Distress Score: 0 noted and reviewed. No intervention indicated.

## 2018-12-04 ENCOUNTER — TELEPHONE (OUTPATIENT)
Dept: HEMATOLOGY/ONCOLOGY | Facility: CLINIC | Age: 33
End: 2018-12-04

## 2018-12-04 DIAGNOSIS — Z94.81 S/P ALLOGENEIC BONE MARROW TRANSPLANT: ICD-10-CM

## 2018-12-04 LAB
CMV DNA SERPL NAA+PROBE-ACNC: 160 IU/ML
TACROLIMUS BLD-MCNC: 4.2 NG/ML

## 2018-12-04 RX ORDER — TACROLIMUS 0.5 MG/1
CAPSULE ORAL
Qty: 150 CAPSULE | Refills: 2
Start: 2018-12-04 | End: 2018-12-10

## 2018-12-04 NOTE — TELEPHONE ENCOUNTER
Tacro level 4.2. Informed pt to increase tacrolimus dose to 1 mg bid (2 pills in AM and 2 pills in PM). Pt verbalized understanding.    Yoanna Andrade, ADRIAN, NP  Hematology/Oncology

## 2018-12-05 DIAGNOSIS — C92.11 CHRONIC MYELOID LEUKEMIA, BCR/ABL-POSITIVE, IN REMISSION: Primary | ICD-10-CM

## 2018-12-05 LAB — EBV DNA BY PCR: NORMAL IU/ML

## 2018-12-05 NOTE — PROGRESS NOTES
HEMATOLOGIC MALIGNANCIES PROGRESS NOTE    IDENTIFYING STATEMENT   Wilton Guzmán (Wilton) is a 33 y.o. male with a  of 1985 from [place], [place] with the diagnosis of CML in blast phase.      ONCOLOGY HISTORY:    1. Chronic myeloid leukemia, initially presenting in blast phase   A. 2018: Began developing left-sided abdominal pain - eventually requiring evaluation - WBC ~150 at  base; transferred to [facility] in [place], [place]   B. BMBx showed 20% myelomonoblasts concerning for CML in blast phase; bcr-abl positive (returned for Day 8 of induction therapy).    C. 2018: Began 7+3 induction chemotherapy. Began dasatinib (dose-adjusted) on day 8.   D. 2018: Transferred to [facility]   E. 2018: BMBx showed variably cellular marrow (10-40%) with 9% CD34+ blasts, concerning for residual disease.   F. 2018: Reinduction with MEC (mitoxantrone, etoposide, cytarabine) chemotherapy.    G. 2018: BMBx shows hypocellular bone marrow with no definite morphologic or immunophenotypic evidence of residual leukemia; bcr-abl p210 transcript was detected at 0.5%   H. Delay in clinic follow-up due to challenges with  insurance   I. 2018: Follow-up in clinic; WBC 6.77 (normal diff); Hgb 11.2 g/dl; Plt 298; BMBx shows 30% cellular marrow with no morphologic evidence of residual leukemia; bcr-abl p210 detected at 0.05% on international scale (MR 3.3).    J. 2018: BMBx shows 40% cellular marrow with no morphologic evidence of leukemia. Chromosomes 46, XY. Bcr-abl p210 detected at 0.1% on international scale (MR 3.0).    K. 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 Engrafted on Day +12. Course complicated by C. Difficile colitis.     INTERVAL HISTORY:      Mr. Guzmán presents to clinic with his mother for f/u allo SCT. He is Day +58. C/o diarrhea previously, cdiff negative. Much improved,  states only needing imodium every other day. Denies constipation. Denies n/v. Reports good appetite and oral fluid intake. Denies fevers, chills, aches, night sweat, SOB, rashes, mouth sores, palpitations, fatigue. Continues with blistex/moisturizer for lip dryness.    Past Medical History, Past Social History and Past Family History have been reviewed and are unchanged except as noted in the interval history.    MEDICATIONS:     Current Outpatient Medications on File Prior to Visit   Medication Sig Dispense Refill    acyclovir (ZOVIRAX) 800 MG Tab Take 1 tablet (800 mg total) by mouth 2 (two) times daily. 60 tablet 11    amLODIPine (NORVASC) 5 MG tablet Take 1 tablet (5 mg total) by mouth once daily. 30 tablet 11    atovaquone (MEPRON) 750 mg/5 mL Susp Take 10 mLs (1,500 mg total) by mouth once daily. Starting on 11/8/18 300 mL 5    budesonide (ENTOCORT EC) 3 mg capsule Take 3 capsules (9 mg total) by mouth once daily. 90 capsule 2    [START ON 12/8/2018] dasatinib (SPRYCEL) 140 mg Tab Take 1 tablet by mouth once daily Start on day +60 (12/8/18). 30 tablet 11    fluconazole (DIFLUCAN) 200 MG Tab Take 2 tablets (400 mg total) by mouth once daily. 60 tablet 5    magnesium oxide (MAG-OX) 400 mg (241.3 mg magnesium) tablet Take 3 tablets (1,200 mg total) by mouth 2 (two) times daily.  0    naproxen (NAPROSYN) 500 MG tablet Take 500 mg by mouth.      nystatin (MYCOSTATIN) powder Apply to affected area 3 times daily 60 g 0    ondansetron (ZOFRAN-ODT) 8 MG TbDL DISSOLVE 1 tablet (8 mg total) by mouth every 8 (eight) hours as needed (nausea). 15 tablet 2    tacrolimus (PROGRAF) 0.5 MG Cap Take 1 mg (2 capsules) by mouth in the morning and 1 mg (2 capsules) by mouth in the evening. Hold until labs are drawn. 150 capsule 2    valGANciclovir (VALCYTE) 450 mg Tab Take 2 tablets (900 mg total) by mouth 2 (two) times daily. 120 tablet 3     No current facility-administered medications on file prior to visit.   "      ALLERGIES: Review of patient's allergies indicates:  No Known Allergies     ROS:       Review of Systems   Constitutional: Negative for fatigue, diaphoresis, fever and unexpected weight change.   HENT:   Negative for lump/mass and sore throat.    Eyes: Negative for icterus.   Respiratory: Negative for cough and shortness of breath.    Cardiovascular: Negative for chest pain and palpitations.   Gastrointestinal: Negative for abdominal distention, diarrhea, constipation, nausea and vomiting.   Genitourinary: Negative for dysuria and frequency.    Musculoskeletal: Negative for arthralgias, gait problem and myalgias.   Skin: Negative for rash or itching.   Neurological: Negative for dizziness, gait problem and headaches.   Hematological: Negative for adenopathy. Does not bruise/bleed easily.   Psychiatric/Behavioral: Negative for depression or anxiety.    PHYSICAL EXAM:  Vitals:    12/06/18 1401   BP: 131/84   Pulse: 98   Resp: 18   Temp: 99 °F (37.2 °C)   TempSrc: Oral   SpO2: 99%   Weight: 68.9 kg (152 lb)   Height: 5' 11" (1.803 m)   PainSc: 0-No pain       KARNOFSKY PERFORMANCE STATUS 80%  ECOG 1    Physical Exam   Constitutional: He is oriented to person, place, and time. He appears well-developed and well-nourished. No distress.   HENT:   Head: Normocephalic and atraumatic.   Mouth/Throat: Mucous membranes are normal. No oral lesions.    Eyes: Conjunctivae are normal.   Neck: No thyromegaly present.   Cardiovascular: Normal rate, regular rhythm and normal heart sounds.   No murmur heard.  Pulmonary/Chest: Breath sounds normal. He has no wheezes. He has no rales.   Central venous catheter in place.  CDI; no S/S infection.  Abdominal: Soft. He exhibits no distension and no mass. There is no splenomegaly or hepatomegaly. There is no tenderness.   Neurological: He is alert and oriented to person, place, and time. He has normal strength. Coordination normal.   Skin: No rash observed.     LAB:   Results for orders " placed or performed in visit on 12/06/18   Magnesium   Result Value Ref Range    Magnesium 2.0 1.6 - 2.6 mg/dL   Phosphorus   Result Value Ref Range    Phosphorus 3.2 2.7 - 4.5 mg/dL   CBC auto differential   Result Value Ref Range    WBC 8.93 3.90 - 12.70 K/uL    RBC 4.02 (L) 4.60 - 6.20 M/uL    Hemoglobin 12.4 (L) 14.0 - 18.0 g/dL    Hematocrit 37.1 (L) 40.0 - 54.0 %    MCV 92 82 - 98 fL    MCH 30.8 27.0 - 31.0 pg    MCHC 33.4 32.0 - 36.0 g/dL    RDW 16.9 (H) 11.5 - 14.5 %    Platelets 162 150 - 350 K/uL    MPV 10.9 9.2 - 12.9 fL    Immature Granulocytes 1.3 (H) 0.0 - 0.5 %    Gran # (ANC) 6.1 1.8 - 7.7 K/uL    Immature Grans (Abs) 0.12 (H) 0.00 - 0.04 K/uL    Lymph # 1.5 1.0 - 4.8 K/uL    Mono # 1.0 0.3 - 1.0 K/uL    Eos # 0.1 0.0 - 0.5 K/uL    Baso # 0.02 0.00 - 0.20 K/uL    nRBC 0 0 /100 WBC    Gran% 68.9 38.0 - 73.0 %    Lymph% 17.2 (L) 18.0 - 48.0 %    Mono% 11.6 4.0 - 15.0 %    Eosinophil% 0.8 0.0 - 8.0 %    Basophil% 0.2 0.0 - 1.9 %    Differential Method Automated    Comprehensive metabolic panel   Result Value Ref Range    Sodium 139 136 - 145 mmol/L    Potassium 3.5 3.5 - 5.1 mmol/L    Chloride 105 95 - 110 mmol/L    CO2 26 23 - 29 mmol/L    Glucose 109 70 - 110 mg/dL    BUN, Bld 16 6 - 20 mg/dL    Creatinine 0.9 0.5 - 1.4 mg/dL    Calcium 9.3 8.7 - 10.5 mg/dL    Total Protein 6.3 6.0 - 8.4 g/dL    Albumin 3.7 3.5 - 5.2 g/dL    Total Bilirubin 0.4 0.1 - 1.0 mg/dL    Alkaline Phosphatase 84 55 - 135 U/L    AST 27 10 - 40 U/L    ALT 59 (H) 10 - 44 U/L    Anion Gap 8 8 - 16 mmol/L    eGFR if African American >60.0 >60 mL/min/1.73 m^2    eGFR if non African American >60.0 >60 mL/min/1.73 m^2   Type & Screen   Result Value Ref Range    Group & Rh O POS     Indirect Wes NEG        PROBLEMS ASSESSED THIS VISIT:    1. S/P allogeneic bone marrow transplant    2. Chronic myeloid leukemia, BCR/ABL-positive, in remission    3. Acute GVHD    4. Cytopenia    5. Secondary hypertension    6. Hypomagnesemia         PLAN:        History allo transplant for CML  - See oncology history above  - s/p alloSCT on 10/9/2018  - Neutrophil engraftment on Day +12.   - Currently Day +58  - plan for +30 & +100 & +1 year restaging  - day +30 BMBX was performed in clinic 11/13/18, no evidence of CML, BCR/ABL 0.005% (MR 4.3). Chimerism studies show 60% donor in CD3+ fraction and 100% donor in CD33+ fraction.   - Repeat chimers on peripheral blood on ~ day +60. Ordered. Will need to be linked to visit on 12/10, which is day +62  - Plan to restart dasatinib 140 mg PO daily on ~day +60. Plan to start Mon 12/10 after visit     GVHD   - Diarrhea may signify early GI GVHD. Oral budesonide started 11/19, continuing at this time.  - C. Diff negative. Diarrhea improved with imodium. Taking every other day. Pt understands that imodium can cause constipation.  - Continue tacrolimus prophylaxis  - tacro level 5.1 - Continue 1 mg (2 capsules) BID  - gvhd charting with each clinic visit     ID  - Completed PO vancomycin for C. Diff  - Continue ppx with acyclovir, fluconazole  - Begin atovaquone ppx at Day +30  - Monitor CMV BIW; EBV once weekly  - EBV negative to date  -  (11/29), 160 (12/3), pending today; Started on valcyte 900mg BID on 12/3 per ID  - F/u appt with Dr. Reardon 12/6/18  - CVC remains in place without S/S infection- consider removing in the near future    Cytopenias  - Today: anemia stable, hgb 12.4 today. No transfusions indicated  - Donor has sickle cell trait. Some anemia is expected long-term  - Both neutrophils and platelets engrafted.      FEN/GI  - previous hypomagnesemia  - On Mg ox 1200 BID with no side effects  - Mag 2 today  - Continue current dose as above  - hypokalemia, 3.4 on 12/3, level normal today, continue to monitor    Hypertension  - Continue amlodipine 5 mg daily  - BP stable today    Follow-up: BI-Weekly labs and appts with Dr. Pantoja or NP through day +100 with CBC, CMP, tacro, Mag, CMV (Mondays and  Thursdays).  EBV once weekly only. Patient scheduled through December.     Plan of care discussed with collaborating physician Dr. Kishor Chance NP  Hematology and Stem Cell Transplant

## 2018-12-06 ENCOUNTER — INFUSION (OUTPATIENT)
Dept: INFUSION THERAPY | Facility: HOSPITAL | Age: 33
End: 2018-12-06
Attending: INTERNAL MEDICINE
Payer: OTHER GOVERNMENT

## 2018-12-06 ENCOUNTER — OFFICE VISIT (OUTPATIENT)
Dept: INFECTIOUS DISEASES | Facility: CLINIC | Age: 33
End: 2018-12-06
Payer: OTHER GOVERNMENT

## 2018-12-06 ENCOUNTER — OFFICE VISIT (OUTPATIENT)
Dept: HEMATOLOGY/ONCOLOGY | Facility: CLINIC | Age: 33
End: 2018-12-06
Payer: OTHER GOVERNMENT

## 2018-12-06 VITALS
HEART RATE: 105 BPM | SYSTOLIC BLOOD PRESSURE: 145 MMHG | BODY MASS INDEX: 21.6 KG/M2 | WEIGHT: 154.31 LBS | HEIGHT: 71 IN | TEMPERATURE: 99 F | DIASTOLIC BLOOD PRESSURE: 88 MMHG

## 2018-12-06 VITALS
TEMPERATURE: 99 F | HEIGHT: 71 IN | RESPIRATION RATE: 18 BRPM | OXYGEN SATURATION: 99 % | DIASTOLIC BLOOD PRESSURE: 84 MMHG | WEIGHT: 152 LBS | BODY MASS INDEX: 21.28 KG/M2 | SYSTOLIC BLOOD PRESSURE: 131 MMHG | HEART RATE: 98 BPM

## 2018-12-06 DIAGNOSIS — B25.9 CYTOMEGALOVIRUS INFECTION, UNSPECIFIED CYTOMEGALOVIRAL INFECTION TYPE: ICD-10-CM

## 2018-12-06 DIAGNOSIS — D89.810 ACUTE GVHD: ICD-10-CM

## 2018-12-06 DIAGNOSIS — C92.11 CHRONIC MYELOID LEUKEMIA, BCR/ABL-POSITIVE, IN REMISSION: Primary | ICD-10-CM

## 2018-12-06 DIAGNOSIS — C92.11 CHRONIC MYELOID LEUKEMIA, BCR/ABL-POSITIVE, IN REMISSION: ICD-10-CM

## 2018-12-06 DIAGNOSIS — D75.9 CYTOPENIA: ICD-10-CM

## 2018-12-06 DIAGNOSIS — Z94.81 S/P ALLOGENEIC BONE MARROW TRANSPLANT: ICD-10-CM

## 2018-12-06 DIAGNOSIS — Z94.81 S/P ALLOGENEIC BONE MARROW TRANSPLANT: Primary | ICD-10-CM

## 2018-12-06 DIAGNOSIS — I15.9 SECONDARY HYPERTENSION: ICD-10-CM

## 2018-12-06 DIAGNOSIS — C92.10 CML (CHRONIC MYELOCYTIC LEUKEMIA): ICD-10-CM

## 2018-12-06 DIAGNOSIS — E83.42 HYPOMAGNESEMIA: ICD-10-CM

## 2018-12-06 LAB
ALBUMIN SERPL BCP-MCNC: 3.7 G/DL
ALP SERPL-CCNC: 84 U/L
ALT SERPL W/O P-5'-P-CCNC: 59 U/L
ANION GAP SERPL CALC-SCNC: 8 MMOL/L
AST SERPL-CCNC: 27 U/L
BASOPHILS # BLD AUTO: 0.02 K/UL
BASOPHILS NFR BLD: 0.2 %
BILIRUB SERPL-MCNC: 0.4 MG/DL
BUN SERPL-MCNC: 16 MG/DL
CALCIUM SERPL-MCNC: 9.3 MG/DL
CHLORIDE SERPL-SCNC: 105 MMOL/L
CO2 SERPL-SCNC: 26 MMOL/L
CREAT SERPL-MCNC: 0.9 MG/DL
DIFFERENTIAL METHOD: ABNORMAL
EOSINOPHIL # BLD AUTO: 0.1 K/UL
EOSINOPHIL NFR BLD: 0.8 %
ERYTHROCYTE [DISTWIDTH] IN BLOOD BY AUTOMATED COUNT: 16.9 %
EST. GFR  (AFRICAN AMERICAN): >60 ML/MIN/1.73 M^2
EST. GFR  (NON AFRICAN AMERICAN): >60 ML/MIN/1.73 M^2
GLUCOSE SERPL-MCNC: 109 MG/DL
HCT VFR BLD AUTO: 37.1 %
HGB BLD-MCNC: 12.4 G/DL
IMM GRANULOCYTES # BLD AUTO: 0.12 K/UL
IMM GRANULOCYTES NFR BLD AUTO: 1.3 %
LYMPHOCYTES # BLD AUTO: 1.5 K/UL
LYMPHOCYTES NFR BLD: 17.2 %
MAGNESIUM SERPL-MCNC: 2 MG/DL
MCH RBC QN AUTO: 30.8 PG
MCHC RBC AUTO-ENTMCNC: 33.4 G/DL
MCV RBC AUTO: 92 FL
MONOCYTES # BLD AUTO: 1 K/UL
MONOCYTES NFR BLD: 11.6 %
NEUTROPHILS # BLD AUTO: 6.1 K/UL
NEUTROPHILS NFR BLD: 68.9 %
NRBC BLD-RTO: 0 /100 WBC
PHOSPHATE SERPL-MCNC: 3.2 MG/DL
PLATELET # BLD AUTO: 162 K/UL
PMV BLD AUTO: 10.9 FL
POTASSIUM SERPL-SCNC: 3.5 MMOL/L
PROT SERPL-MCNC: 6.3 G/DL
RBC # BLD AUTO: 4.02 M/UL
SODIUM SERPL-SCNC: 139 MMOL/L
TACROLIMUS BLD-MCNC: 5.1 NG/ML
WBC # BLD AUTO: 8.93 K/UL

## 2018-12-06 PROCEDURE — 25000003 PHARM REV CODE 250: Performed by: INTERNAL MEDICINE

## 2018-12-06 PROCEDURE — 99999 PR PBB SHADOW E&M-EST. PATIENT-LVL III: CPT | Mod: PBBFAC,,, | Performed by: NURSE PRACTITIONER

## 2018-12-06 PROCEDURE — 99214 OFFICE O/P EST MOD 30 MIN: CPT | Mod: S$PBB,,, | Performed by: INTERNAL MEDICINE

## 2018-12-06 PROCEDURE — 99213 OFFICE O/P EST LOW 20 MIN: CPT | Mod: PBBFAC,25,27 | Performed by: INTERNAL MEDICINE

## 2018-12-06 PROCEDURE — 80053 COMPREHEN METABOLIC PANEL: CPT

## 2018-12-06 PROCEDURE — 99215 OFFICE O/P EST HI 40 MIN: CPT | Mod: S$PBB,,, | Performed by: NURSE PRACTITIONER

## 2018-12-06 PROCEDURE — 63600175 PHARM REV CODE 636 W HCPCS: Performed by: INTERNAL MEDICINE

## 2018-12-06 PROCEDURE — 80197 ASSAY OF TACROLIMUS: CPT

## 2018-12-06 PROCEDURE — 36592 COLLECT BLOOD FROM PICC: CPT

## 2018-12-06 PROCEDURE — 85025 COMPLETE CBC W/AUTO DIFF WBC: CPT

## 2018-12-06 PROCEDURE — 99999 PR PBB SHADOW E&M-EST. PATIENT-LVL III: CPT | Mod: PBBFAC,,, | Performed by: INTERNAL MEDICINE

## 2018-12-06 PROCEDURE — 84100 ASSAY OF PHOSPHORUS: CPT

## 2018-12-06 PROCEDURE — A4216 STERILE WATER/SALINE, 10 ML: HCPCS | Performed by: INTERNAL MEDICINE

## 2018-12-06 PROCEDURE — 86901 BLOOD TYPING SEROLOGIC RH(D): CPT

## 2018-12-06 PROCEDURE — 83735 ASSAY OF MAGNESIUM: CPT

## 2018-12-06 PROCEDURE — 99213 OFFICE O/P EST LOW 20 MIN: CPT | Mod: PBBFAC,25 | Performed by: NURSE PRACTITIONER

## 2018-12-06 PROCEDURE — 36415 COLL VENOUS BLD VENIPUNCTURE: CPT

## 2018-12-06 RX ORDER — SODIUM CHLORIDE 0.9 % (FLUSH) 0.9 %
10 SYRINGE (ML) INJECTION
Status: COMPLETED | OUTPATIENT
Start: 2018-12-06 | End: 2018-12-06

## 2018-12-06 RX ORDER — HEPARIN 100 UNIT/ML
500 SYRINGE INTRAVENOUS
Status: COMPLETED | OUTPATIENT
Start: 2018-12-06 | End: 2018-12-06

## 2018-12-06 RX ORDER — SODIUM CHLORIDE 0.9 % (FLUSH) 0.9 %
10 SYRINGE (ML) INJECTION
Status: CANCELLED | OUTPATIENT
Start: 2018-12-06

## 2018-12-06 RX ORDER — HEPARIN 100 UNIT/ML
500 SYRINGE INTRAVENOUS
Status: CANCELLED | OUTPATIENT
Start: 2018-12-06

## 2018-12-06 RX ADMIN — Medication 10 ML: at 11:12

## 2018-12-06 RX ADMIN — HEPARIN 500 UNITS: 100 SYRINGE at 11:12

## 2018-12-06 NOTE — PROGRESS NOTES
Subjective:      Patient ID: Wilton Guzmán is a 33 y.o. male.    Chief Complaint: Follow-up for CMV viremia    History of Present Illness  33-year-old male with history of CML diagnosed 5/2018 bcr-abl positive s/p 7+3 induction + dasatinib (6/1/2018), reinduction with MEC (6/23/2018), s/p alloSCT HLA-matched brother with Bu/Cy2 conditioning 10/9/18 CMV D+R+, engrafted day +12, history of C. Difficile colitis, presents for follow-up CMV viremia.      Summary of illness:  On routine monitoring, patient found to have low-grade CMV viremia.  Patient reports starting to have diarrhea about two weeks ago, coincided with increase in his magnesium dosage.  He was started on immodium with resolution of his diarrhea.         Subjective:  Patient with increase in CMV viral load. He is otherwise asymptomatic, denied any fevers, chills, SOB, CP, nausea, vomiting, abdominal pain, diarrhea.  He started valganciclovir 900 mg PO BID - no adverse reaction to medications.     Review of Systems   Constitution: Negative for chills, decreased appetite, fever, weakness, malaise/fatigue, night sweats, weight gain and weight loss.   HENT: Negative for congestion, ear pain, hearing loss, hoarse voice, sore throat and tinnitus.    Eyes: Negative for blurred vision, redness and visual disturbance.   Cardiovascular: Negative for chest pain, leg swelling and palpitations.   Respiratory: Negative for cough, hemoptysis, shortness of breath and sputum production.    Hematologic/Lymphatic: Negative for adenopathy. Does not bruise/bleed easily.   Skin: Negative for dry skin, itching, rash and suspicious lesions.   Musculoskeletal: Negative for back pain, joint pain, myalgias and neck pain.   Gastrointestinal: Negative for abdominal pain, constipation, diarrhea, heartburn, nausea and vomiting.   Genitourinary: Negative for dysuria, flank pain, frequency, hematuria, hesitancy and urgency.   Neurological: Negative for dizziness, headaches, numbness  and paresthesias.   Psychiatric/Behavioral: Negative for depression and memory loss. The patient does not have insomnia and is not nervous/anxious.      Objective:   Physical Exam   Constitutional: He is oriented to person, place, and time. He appears well-developed and well-nourished. No distress.   HENT:   Head: Normocephalic and atraumatic.   Eyes: Conjunctivae and EOM are normal.   Neck: Normal range of motion. Neck supple.   Cardiovascular: Normal rate, regular rhythm and normal heart sounds.   Pulmonary/Chest: Effort normal and breath sounds normal. No respiratory distress. He has no wheezes. He has no rales.   Abdominal: Soft. Bowel sounds are normal. He exhibits no distension. There is no tenderness. There is no guarding.   Musculoskeletal: Normal range of motion. He exhibits no edema.   Neurological: He is alert and oriented to person, place, and time.   Skin: Skin is warm and dry. No rash noted. He is not diaphoretic. No erythema.   Psychiatric: He has a normal mood and affect. His behavior is normal.   Vitals reviewed.     Ref Range & Units 5d ago 9d ago 12d ago 2wk ago   Cytomegalovirus PCR, Quant Undetected IU/mL 160 Abnormal   438 Abnormal   Abnormal   Abnormal  CM         Assessment:   33-year-old male  - CML diagnosed 5/2018 bcr-abl positive s/p 7+3 induction + dasatinib (6/1/2018), reinduction with MEC (6/23/2018), s/p alloSCT HLA-matched brother with Bu/Cy2 conditioning 10/9/18 CMV D+R+, engrafted day +12  - History of C. Difficile colitis  - CMV viremia, asymptomatic     Plan:   - Continue valganciclovir 900 mg PO BID until qweekly CMV negative x2, then restart qweekly monitoring    Yuliya Reardon MD MPH  Infectious Diseases [facility]

## 2018-12-07 LAB
ABO + RH BLD: NORMAL
BLD GP AB SCN CELLS X3 SERPL QL: NORMAL

## 2018-12-08 LAB — CMV DNA SERPL NAA+PROBE-ACNC: 44 IU/ML

## 2018-12-10 ENCOUNTER — OFFICE VISIT (OUTPATIENT)
Dept: HEMATOLOGY/ONCOLOGY | Facility: CLINIC | Age: 33
End: 2018-12-10
Payer: OTHER GOVERNMENT

## 2018-12-10 ENCOUNTER — INFUSION (OUTPATIENT)
Dept: INFUSION THERAPY | Facility: HOSPITAL | Age: 33
End: 2018-12-10
Attending: INTERNAL MEDICINE
Payer: OTHER GOVERNMENT

## 2018-12-10 VITALS
BODY MASS INDEX: 21.26 KG/M2 | HEIGHT: 71 IN | HEART RATE: 103 BPM | WEIGHT: 151.88 LBS | OXYGEN SATURATION: 98 % | SYSTOLIC BLOOD PRESSURE: 141 MMHG | DIASTOLIC BLOOD PRESSURE: 94 MMHG | TEMPERATURE: 99 F

## 2018-12-10 DIAGNOSIS — C92.10 CML (CHRONIC MYELOCYTIC LEUKEMIA): ICD-10-CM

## 2018-12-10 DIAGNOSIS — Z94.81 S/P ALLOGENEIC BONE MARROW TRANSPLANT: ICD-10-CM

## 2018-12-10 DIAGNOSIS — Z94.81 S/P ALLOGENEIC BONE MARROW TRANSPLANT: Primary | ICD-10-CM

## 2018-12-10 DIAGNOSIS — I10 HYPERTENSION, UNSPECIFIED TYPE: ICD-10-CM

## 2018-12-10 DIAGNOSIS — C92.11 CHRONIC MYELOID LEUKEMIA, BCR/ABL-POSITIVE, IN REMISSION: Primary | ICD-10-CM

## 2018-12-10 DIAGNOSIS — B25.9 CYTOMEGALOVIRUS (CMV) VIREMIA: ICD-10-CM

## 2018-12-10 LAB
ABO + RH BLD: NORMAL
ALBUMIN SERPL BCP-MCNC: 3.9 G/DL
ALP SERPL-CCNC: 80 U/L
ALT SERPL W/O P-5'-P-CCNC: 98 U/L
ANION GAP SERPL CALC-SCNC: 9 MMOL/L
AST SERPL-CCNC: 43 U/L
BASOPHILS # BLD AUTO: 0.01 K/UL
BASOPHILS NFR BLD: 0.1 %
BILIRUB SERPL-MCNC: 0.4 MG/DL
BLD GP AB SCN CELLS X3 SERPL QL: NORMAL
BUN SERPL-MCNC: 17 MG/DL
CALCIUM SERPL-MCNC: 10.2 MG/DL
CHLORIDE SERPL-SCNC: 105 MMOL/L
CO2 SERPL-SCNC: 25 MMOL/L
CREAT SERPL-MCNC: 0.8 MG/DL
DIFFERENTIAL METHOD: ABNORMAL
EOSINOPHIL # BLD AUTO: 0 K/UL
EOSINOPHIL NFR BLD: 0.4 %
ERYTHROCYTE [DISTWIDTH] IN BLOOD BY AUTOMATED COUNT: 17.2 %
EST. GFR  (AFRICAN AMERICAN): >60 ML/MIN/1.73 M^2
EST. GFR  (NON AFRICAN AMERICAN): >60 ML/MIN/1.73 M^2
GLUCOSE SERPL-MCNC: 85 MG/DL
HCT VFR BLD AUTO: 38.3 %
HGB BLD-MCNC: 12.8 G/DL
IMM GRANULOCYTES # BLD AUTO: 0.1 K/UL
IMM GRANULOCYTES NFR BLD AUTO: 1.4 %
LYMPHOCYTES # BLD AUTO: 1.4 K/UL
LYMPHOCYTES NFR BLD: 19.2 %
MAGNESIUM SERPL-MCNC: 2 MG/DL
MCH RBC QN AUTO: 31.1 PG
MCHC RBC AUTO-ENTMCNC: 33.4 G/DL
MCV RBC AUTO: 93 FL
MONOCYTES # BLD AUTO: 0.6 K/UL
MONOCYTES NFR BLD: 7.8 %
NEUTROPHILS # BLD AUTO: 5 K/UL
NEUTROPHILS NFR BLD: 71.1 %
NRBC BLD-RTO: 0 /100 WBC
PHOSPHATE SERPL-MCNC: 3.7 MG/DL
PLATELET # BLD AUTO: 145 K/UL
PMV BLD AUTO: 10.9 FL
POTASSIUM SERPL-SCNC: 3.8 MMOL/L
PROT SERPL-MCNC: 6.7 G/DL
RBC # BLD AUTO: 4.11 M/UL
SODIUM SERPL-SCNC: 139 MMOL/L
TACROLIMUS BLD-MCNC: 5 NG/ML
WBC # BLD AUTO: 7.09 K/UL

## 2018-12-10 PROCEDURE — 81268 CHIMERISM ANAL W/CELL SELECT: CPT | Mod: 91

## 2018-12-10 PROCEDURE — 85025 COMPLETE CBC W/AUTO DIFF WBC: CPT

## 2018-12-10 PROCEDURE — 87799 DETECT AGENT NOS DNA QUANT: CPT

## 2018-12-10 PROCEDURE — 25000003 PHARM REV CODE 250: Performed by: INTERNAL MEDICINE

## 2018-12-10 PROCEDURE — 86901 BLOOD TYPING SEROLOGIC RH(D): CPT

## 2018-12-10 PROCEDURE — 36592 COLLECT BLOOD FROM PICC: CPT

## 2018-12-10 PROCEDURE — 84100 ASSAY OF PHOSPHORUS: CPT

## 2018-12-10 PROCEDURE — 99215 OFFICE O/P EST HI 40 MIN: CPT | Mod: S$PBB,,, | Performed by: INTERNAL MEDICINE

## 2018-12-10 PROCEDURE — 99213 OFFICE O/P EST LOW 20 MIN: CPT | Mod: PBBFAC,25 | Performed by: INTERNAL MEDICINE

## 2018-12-10 PROCEDURE — 36415 COLL VENOUS BLD VENIPUNCTURE: CPT

## 2018-12-10 PROCEDURE — A4216 STERILE WATER/SALINE, 10 ML: HCPCS | Performed by: INTERNAL MEDICINE

## 2018-12-10 PROCEDURE — 81268 CHIMERISM ANAL W/CELL SELECT: CPT

## 2018-12-10 PROCEDURE — 63600175 PHARM REV CODE 636 W HCPCS: Performed by: INTERNAL MEDICINE

## 2018-12-10 PROCEDURE — 99999 PR PBB SHADOW E&M-EST. PATIENT-LVL III: CPT | Mod: PBBFAC,,, | Performed by: INTERNAL MEDICINE

## 2018-12-10 PROCEDURE — 83735 ASSAY OF MAGNESIUM: CPT

## 2018-12-10 PROCEDURE — 80053 COMPREHEN METABOLIC PANEL: CPT

## 2018-12-10 PROCEDURE — 80197 ASSAY OF TACROLIMUS: CPT

## 2018-12-10 RX ORDER — TACROLIMUS 0.5 MG/1
CAPSULE ORAL
Qty: 150 CAPSULE | Refills: 2 | Status: SHIPPED | OUTPATIENT
Start: 2018-12-10 | End: 2019-01-29

## 2018-12-10 RX ORDER — AMLODIPINE BESYLATE 5 MG/1
10 TABLET ORAL DAILY
Qty: 60 TABLET | Refills: 11 | Status: SHIPPED | OUTPATIENT
Start: 2018-12-10 | End: 2019-06-13

## 2018-12-10 RX ORDER — SODIUM CHLORIDE 0.9 % (FLUSH) 0.9 %
10 SYRINGE (ML) INJECTION
Status: COMPLETED | OUTPATIENT
Start: 2018-12-10 | End: 2018-12-10

## 2018-12-10 RX ORDER — SODIUM CHLORIDE 0.9 % (FLUSH) 0.9 %
10 SYRINGE (ML) INJECTION
Status: CANCELLED | OUTPATIENT
Start: 2018-12-10

## 2018-12-10 RX ORDER — HEPARIN 100 UNIT/ML
500 SYRINGE INTRAVENOUS
Status: COMPLETED | OUTPATIENT
Start: 2018-12-10 | End: 2018-12-10

## 2018-12-10 RX ORDER — HEPARIN 100 UNIT/ML
500 SYRINGE INTRAVENOUS
Status: CANCELLED | OUTPATIENT
Start: 2018-12-10

## 2018-12-10 RX ORDER — TACROLIMUS 0.5 MG/1
CAPSULE ORAL
Qty: 150 CAPSULE | Refills: 2 | Status: SHIPPED | OUTPATIENT
Start: 2018-12-10 | End: 2018-12-10

## 2018-12-10 RX ADMIN — Medication 10 ML: at 09:12

## 2018-12-10 RX ADMIN — HEPARIN 500 UNITS: 100 SYRINGE at 09:12

## 2018-12-10 NOTE — NURSING
Pt arrive for labs and dsg change.  All 3 lumens flushes easily with good blood return, labs sent.  Dressing and end caps changed.  Pt now going to MD appt

## 2018-12-11 LAB — EBV DNA BY PCR: NORMAL IU/ML

## 2018-12-12 LAB — CMV DNA SERPL NAA+PROBE-ACNC: 41 IU/ML

## 2018-12-13 ENCOUNTER — INFUSION (OUTPATIENT)
Dept: INFUSION THERAPY | Facility: HOSPITAL | Age: 33
End: 2018-12-13
Attending: INTERNAL MEDICINE
Payer: OTHER GOVERNMENT

## 2018-12-13 ENCOUNTER — OFFICE VISIT (OUTPATIENT)
Dept: HEMATOLOGY/ONCOLOGY | Facility: CLINIC | Age: 33
End: 2018-12-13
Payer: OTHER GOVERNMENT

## 2018-12-13 VITALS
BODY MASS INDEX: 21.2 KG/M2 | DIASTOLIC BLOOD PRESSURE: 85 MMHG | TEMPERATURE: 99 F | HEIGHT: 71 IN | WEIGHT: 151.44 LBS | OXYGEN SATURATION: 99 % | HEART RATE: 91 BPM | SYSTOLIC BLOOD PRESSURE: 125 MMHG

## 2018-12-13 DIAGNOSIS — C92.10 CML (CHRONIC MYELOCYTIC LEUKEMIA): ICD-10-CM

## 2018-12-13 DIAGNOSIS — E83.42 HYPOMAGNESEMIA: ICD-10-CM

## 2018-12-13 DIAGNOSIS — Z94.81 S/P ALLOGENEIC BONE MARROW TRANSPLANT: ICD-10-CM

## 2018-12-13 DIAGNOSIS — C92.11 CHRONIC MYELOID LEUKEMIA, BCR/ABL-POSITIVE, IN REMISSION: ICD-10-CM

## 2018-12-13 DIAGNOSIS — Z94.81 S/P ALLOGENEIC BONE MARROW TRANSPLANT: Primary | ICD-10-CM

## 2018-12-13 DIAGNOSIS — B25.9 CYTOMEGALOVIRUS (CMV) VIREMIA: ICD-10-CM

## 2018-12-13 DIAGNOSIS — C92.11 CHRONIC MYELOID LEUKEMIA, BCR/ABL-POSITIVE, IN REMISSION: Primary | ICD-10-CM

## 2018-12-13 LAB
ABO + RH BLD: NORMAL
ALBUMIN SERPL BCP-MCNC: 3.6 G/DL
ALP SERPL-CCNC: 84 U/L
ALT SERPL W/O P-5'-P-CCNC: 90 U/L
ANION GAP SERPL CALC-SCNC: 7 MMOL/L
AST SERPL-CCNC: 35 U/L
BASOPHILS # BLD AUTO: 0.01 K/UL
BASOPHILS NFR BLD: 0.2 %
BILIRUB SERPL-MCNC: 0.4 MG/DL
BLD GP AB SCN CELLS X3 SERPL QL: NORMAL
BUN SERPL-MCNC: 14 MG/DL
CALCIUM SERPL-MCNC: 9.6 MG/DL
CHLORIDE SERPL-SCNC: 102 MMOL/L
CO2 SERPL-SCNC: 27 MMOL/L
CREAT SERPL-MCNC: 0.9 MG/DL
DIFFERENTIAL METHOD: ABNORMAL
EOSINOPHIL # BLD AUTO: 0 K/UL
EOSINOPHIL NFR BLD: 0.7 %
ERYTHROCYTE [DISTWIDTH] IN BLOOD BY AUTOMATED COUNT: 17.3 %
EST. GFR  (AFRICAN AMERICAN): >60 ML/MIN/1.73 M^2
EST. GFR  (NON AFRICAN AMERICAN): >60 ML/MIN/1.73 M^2
FINAL DIAGNOSIS - CHIMERISM SORT: NORMAL
GLUCOSE SERPL-MCNC: 125 MG/DL
HCT VFR BLD AUTO: 38 %
HGB BLD-MCNC: 12.9 G/DL
IMM GRANULOCYTES # BLD AUTO: 0.05 K/UL
IMM GRANULOCYTES NFR BLD AUTO: 0.9 %
LYMPHOCYTES # BLD AUTO: 1.2 K/UL
LYMPHOCYTES NFR BLD: 20.4 %
MAGNESIUM SERPL-MCNC: 1.9 MG/DL
MCH RBC QN AUTO: 31.6 PG
MCHC RBC AUTO-ENTMCNC: 33.9 G/DL
MCV RBC AUTO: 93 FL
MONOCYTES # BLD AUTO: 0.3 K/UL
MONOCYTES NFR BLD: 4.3 %
NEUTROPHILS # BLD AUTO: 4.3 K/UL
NEUTROPHILS NFR BLD: 73.5 %
NRBC BLD-RTO: 0 /100 WBC
PHOSPHATE SERPL-MCNC: 3.3 MG/DL
PLATELET # BLD AUTO: 124 K/UL
PMV BLD AUTO: 9.9 FL
POTASSIUM SERPL-SCNC: 3.7 MMOL/L
PROT SERPL-MCNC: 6.4 G/DL
RBC # BLD AUTO: 4.08 M/UL
SODIUM SERPL-SCNC: 136 MMOL/L
SPECIMEN TYPE -CHIMERISM SORT: NORMAL
TACROLIMUS BLD-MCNC: 6.3 NG/ML
WBC # BLD AUTO: 5.78 K/UL

## 2018-12-13 PROCEDURE — 99999 PR PBB SHADOW E&M-EST. PATIENT-LVL III: CPT | Mod: PBBFAC,,, | Performed by: INTERNAL MEDICINE

## 2018-12-13 PROCEDURE — 80197 ASSAY OF TACROLIMUS: CPT

## 2018-12-13 PROCEDURE — A4216 STERILE WATER/SALINE, 10 ML: HCPCS | Performed by: INTERNAL MEDICINE

## 2018-12-13 PROCEDURE — 63600175 PHARM REV CODE 636 W HCPCS: Performed by: INTERNAL MEDICINE

## 2018-12-13 PROCEDURE — 99213 OFFICE O/P EST LOW 20 MIN: CPT | Mod: PBBFAC,25 | Performed by: INTERNAL MEDICINE

## 2018-12-13 PROCEDURE — 86850 RBC ANTIBODY SCREEN: CPT

## 2018-12-13 PROCEDURE — 99215 OFFICE O/P EST HI 40 MIN: CPT | Mod: S$PBB,,, | Performed by: INTERNAL MEDICINE

## 2018-12-13 PROCEDURE — 36592 COLLECT BLOOD FROM PICC: CPT

## 2018-12-13 PROCEDURE — 96523 IRRIG DRUG DELIVERY DEVICE: CPT

## 2018-12-13 PROCEDURE — 84100 ASSAY OF PHOSPHORUS: CPT

## 2018-12-13 PROCEDURE — 25000003 PHARM REV CODE 250: Performed by: INTERNAL MEDICINE

## 2018-12-13 PROCEDURE — 80053 COMPREHEN METABOLIC PANEL: CPT

## 2018-12-13 PROCEDURE — 83735 ASSAY OF MAGNESIUM: CPT

## 2018-12-13 PROCEDURE — 85025 COMPLETE CBC W/AUTO DIFF WBC: CPT

## 2018-12-13 RX ORDER — SODIUM CHLORIDE 0.9 % (FLUSH) 0.9 %
10 SYRINGE (ML) INJECTION
Status: CANCELLED | OUTPATIENT
Start: 2018-12-13

## 2018-12-13 RX ORDER — HEPARIN 100 UNIT/ML
500 SYRINGE INTRAVENOUS
Status: COMPLETED | OUTPATIENT
Start: 2018-12-13 | End: 2018-12-13

## 2018-12-13 RX ORDER — SODIUM CHLORIDE 0.9 % (FLUSH) 0.9 %
10 SYRINGE (ML) INJECTION
Status: COMPLETED | OUTPATIENT
Start: 2018-12-13 | End: 2018-12-13

## 2018-12-13 RX ORDER — HEPARIN 100 UNIT/ML
500 SYRINGE INTRAVENOUS
Status: CANCELLED | OUTPATIENT
Start: 2018-12-13

## 2018-12-13 RX ADMIN — HEPARIN 500 UNITS: 100 SYRINGE at 10:12

## 2018-12-13 RX ADMIN — Medication 10 ML: at 10:12

## 2018-12-13 NOTE — PROGRESS NOTES
HEMATOLOGIC MALIGNANCIES PROGRESS NOTE    IDENTIFYING STATEMENT   Wilton Guzmán (Wilton) is a 33 y.o. 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      ONCOLOGY HISTORY:    1. Chronic myeloid leukemia, initially presenting in blast phase   A. 2018: Began developing left-sided abdominal pain - eventually requiring evaluation - WBC ~150 at  base; transferred to [facility] in [place], [place]   B. BMBx showed 20% myelomonoblasts concerning for CML in blast phase; bcr-abl positive (returned for Day 8 of induction therapy).    C. 2018: Began 7+3 induction chemotherapy. Began dasatinib (dose-adjusted) on day 8.   D. 2018: Transferred to [facility]   E. 2018: BMBx showed variably cellular marrow (10-40%) with 9% CD34+ blasts, concerning for residual disease.   F. 2018: Reinduction with MEC (mitoxantrone, etoposide, cytarabine) chemotherapy.    G. 2018: BMBx shows hypocellular bone marrow with no definite morphologic or immunophenotypic evidence of residual leukemia; bcr-abl p210 transcript was detected at 0.5%   H. Delay in clinic follow-up due to challenges with  insurance   I. 2018: Follow-up in clinic; WBC 6.77 (normal diff); Hgb 11.2 g/dl; Plt 298; BMBx shows 30% cellular marrow with no morphologic evidence of residual leukemia; bcr-abl p210 detected at 0.05% on international scale (MR 3.3).    J. 2018: BMBx shows 40% cellular marrow with no morphologic evidence of leukemia. Chromosomes 46, XY. Bcr-abl p210 detected at 0.1% on international scale (MR 3.0).    K. 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 Engrafted on Day +12. Course complicated by C. Difficile colitis.     INTERVAL HISTORY:      Mr. Guzmán presents to clinic with his mother for f/u allo SCT. He is Day +62. He is not having much diarrhea at this time. Denies  constipation. Denies n/v. Reports good appetite and oral fluid intake. Denies fevers, chills, aches, night sweat, SOB, rashes, mouth sores, palpitations, fatigue. Continues with blistex/moisturizer for lip dryness.    Past Medical History, Past Social History and Past Family History have been reviewed and are unchanged except as noted in the interval history.    MEDICATIONS:     Current Outpatient Medications on File Prior to Visit   Medication Sig Dispense Refill    acyclovir (ZOVIRAX) 800 MG Tab Take 1 tablet (800 mg total) by mouth 2 (two) times daily. 60 tablet 11    budesonide (ENTOCORT EC) 3 mg capsule Take 3 capsules (9 mg total) by mouth once daily. 90 capsule 2    fluconazole (DIFLUCAN) 200 MG Tab Take 2 tablets (400 mg total) by mouth once daily. 60 tablet 5    magnesium oxide (MAG-OX) 400 mg (241.3 mg magnesium) tablet Take 3 tablets (1,200 mg total) by mouth 2 (two) times daily.  0    valGANciclovir (VALCYTE) 450 mg Tab Take 2 tablets (900 mg total) by mouth 2 (two) times daily. 120 tablet 3    atovaquone (MEPRON) 750 mg/5 mL Susp Take 10 mLs (1,500 mg total) by mouth once daily. Starting on 11/8/18 300 mL 5    dasatinib (SPRYCEL) 140 mg Tab Take 1 tablet by mouth once daily Start on day +60 (12/8/18). 30 tablet 11     No current facility-administered medications on file prior to visit.        ALLERGIES: Review of patient's allergies indicates:  No Known Allergies     ROS:       Review of Systems   Constitutional: Negative for fatigue, diaphoresis, fever and unexpected weight change.   HENT:   Negative for lump/mass and sore throat.    Eyes: Negative for icterus.   Respiratory: Negative for cough and shortness of breath.    Cardiovascular: Negative for chest pain and palpitations.   Gastrointestinal: Negative for abdominal distention, diarrhea, constipation, nausea and vomiting.   Genitourinary: Negative for dysuria and frequency.    Musculoskeletal: Negative for arthralgias, gait problem and  "myalgias.   Skin: Negative for rash or itching.   Neurological: Negative for dizziness, gait problem and headaches.   Hematological: Negative for adenopathy. Does not bruise/bleed easily.   Psychiatric/Behavioral: Negative for depression or anxiety.    PHYSICAL EXAM:  Vitals:    12/10/18 1054   BP: (!) 141/94   Pulse: 103   Temp: 99.2 °F (37.3 °C)   SpO2: 98%   Weight: 68.9 kg (151 lb 14.4 oz)   Height: 5' 11" (1.803 m)   PainSc: 0-No pain       KARNOFSKY PERFORMANCE STATUS 80%  ECOG 1    Physical Exam   Constitutional: He is oriented to person, place, and time. He appears well-developed and well-nourished. No distress.   HENT:   Head: Normocephalic and atraumatic.   Mouth/Throat: Mucous membranes are normal. No oral lesions.    Eyes: Conjunctivae are normal.   Neck: No thyromegaly present.   Cardiovascular: Normal rate, regular rhythm and normal heart sounds.   No murmur heard.  Pulmonary/Chest: Breath sounds normal. He has no wheezes. He has no rales.   Central venous catheter in place.  CDI; no S/S infection.  Abdominal: Soft. He exhibits no distension and no mass. There is no splenomegaly or hepatomegaly. There is no tenderness.   Neurological: He is alert and oriented to person, place, and time. He has normal strength. Coordination normal.   Skin: No rash observed.     LAB:   Results for orders placed or performed in visit on 12/10/18   Magnesium   Result Value Ref Range    Magnesium 2.0 1.6 - 2.6 mg/dL   Phosphorus   Result Value Ref Range    Phosphorus 3.7 2.7 - 4.5 mg/dL   CMV DNA, quantitative, PCR   Result Value Ref Range    Cytomegalovirus PCR, Quant 41 (A) Undetected IU/mL   MANNIE-GARCIA VIRUS DNA, QUANTITATIVE (PCR)   Result Value Ref Range    EBV DNA, PCR Undetected Undetected IU/mL   TACROLIMUS LEVEL   Result Value Ref Range    Tacrolimus Lvl 5.0 5.0 - 15.0 ng/mL   CBC auto differential   Result Value Ref Range    WBC 7.09 3.90 - 12.70 K/uL    RBC 4.11 (L) 4.60 - 6.20 M/uL    Hemoglobin 12.8 (L) 14.0 " - 18.0 g/dL    Hematocrit 38.3 (L) 40.0 - 54.0 %    MCV 93 82 - 98 fL    MCH 31.1 (H) 27.0 - 31.0 pg    MCHC 33.4 32.0 - 36.0 g/dL    RDW 17.2 (H) 11.5 - 14.5 %    Platelets 145 (L) 150 - 350 K/uL    MPV 10.9 9.2 - 12.9 fL    Immature Granulocytes 1.4 (H) 0.0 - 0.5 %    Gran # (ANC) 5.0 1.8 - 7.7 K/uL    Immature Grans (Abs) 0.10 (H) 0.00 - 0.04 K/uL    Lymph # 1.4 1.0 - 4.8 K/uL    Mono # 0.6 0.3 - 1.0 K/uL    Eos # 0.0 0.0 - 0.5 K/uL    Baso # 0.01 0.00 - 0.20 K/uL    nRBC 0 0 /100 WBC    Gran% 71.1 38.0 - 73.0 %    Lymph% 19.2 18.0 - 48.0 %    Mono% 7.8 4.0 - 15.0 %    Eosinophil% 0.4 0.0 - 8.0 %    Basophil% 0.1 0.0 - 1.9 %    Differential Method Automated    Chimerism Transplant Sorted Cells Blood   Result Value Ref Range    Specimen Type - Chimerism Sort Blood    Comprehensive metabolic panel   Result Value Ref Range    Sodium 139 136 - 145 mmol/L    Potassium 3.8 3.5 - 5.1 mmol/L    Chloride 105 95 - 110 mmol/L    CO2 25 23 - 29 mmol/L    Glucose 85 70 - 110 mg/dL    BUN, Bld 17 6 - 20 mg/dL    Creatinine 0.8 0.5 - 1.4 mg/dL    Calcium 10.2 8.7 - 10.5 mg/dL    Total Protein 6.7 6.0 - 8.4 g/dL    Albumin 3.9 3.5 - 5.2 g/dL    Total Bilirubin 0.4 0.1 - 1.0 mg/dL    Alkaline Phosphatase 80 55 - 135 U/L    AST 43 (H) 10 - 40 U/L    ALT 98 (H) 10 - 44 U/L    Anion Gap 9 8 - 16 mmol/L    eGFR if African American >60.0 >60 mL/min/1.73 m^2    eGFR if non African American >60.0 >60 mL/min/1.73 m^2   Type & Screen   Result Value Ref Range    Group & Rh B POS     Indirect Wes NEG        PROBLEMS ASSESSED THIS VISIT:    1. S/P allogeneic bone marrow transplant    2. Hypertension, unspecified type    3. CML (chronic myelocytic leukemia)    4. Cytomegalovirus (CMV) viremia        PLAN:        History allo transplant for CML  - See oncology history above  - s/p alloSCT on 10/9/2018  - Neutrophil engraftment on Day +12.   - Currently Day +62  - plan for +30 & +100 & +1 year restaging  - day +30 BMBX was performed in  clinic 11/13/18, no evidence of CML, BCR/ABL 0.005% (MR 4.3). Chimerism studies show 60% donor in CD3+ fraction and 100% donor in CD33+ fraction.   - Repeat chimers from peripheral blood pending  - Restart dasatinib 140 mg daily today    GVHD   - Continue oral budesonide. Diarrhea controlled.  - C. Diff negative. Diarrhea improved with imodium. Taking every other day. Pt understands that imodium can cause constipation.  - Continue tacrolimus prophylaxis  - tacro level 5.0 - Increase to 1 mg qAM and 1.5 mg qPM  - gvhd charting with each clinic visit     ID  - Completed PO vancomycin for C. Diff  - Continue ppx with acyclovir, fluconazole  - Begin atovaquone ppx at Day +30  - Monitor CMV BIW; EBV once weekly  - EBV negative to date  -  (11/29), 160 (12/3), 44 (12/6), pending today; Started on valcyte 900mg BID on 12/3 per ID  - CVC remains in place without S/S infection- consider removing in the near future    Cytopenias  - Today: anemia stable, hgb 12.8 today. No transfusions indicated  - Donor has sickle cell trait. Some anemia is expected long-term  - Both neutrophils and platelets engrafted.       FEN/GI  - previous hypomagnesemia  - On Mg ox 1200 BID with no side effects  - Mag 2 today  - Continue current dose as above  - hypokalemia, 3.4 on 12/3, level normal today, continue to monitor    Hypertension  - Amlodipine 10 mg daily  - BP stable today    Follow-up: BI-Weekly labs and appts with Dr. Pantoja or NP through day +100 with CBC, CMP, tacro, Mag, CMV (Mondays and Thursdays).  EBV once weekly only. Patient scheduled through December.     Kishor Pantoja MD  Hematology and Stem Cell Transplant

## 2018-12-13 NOTE — NURSING
Labs drawn from right CVC. All 3 lumens, + blood return present, flushed and heparin locked. Patient states having diarrhea and nausea from chemo pill just started. Encouraged increase fluid intake. Discharge to MD appt.

## 2018-12-14 LAB — CMV DNA SERPL NAA+PROBE-ACNC: <35 IU/ML

## 2018-12-17 ENCOUNTER — INFUSION (OUTPATIENT)
Dept: INFUSION THERAPY | Facility: HOSPITAL | Age: 33
End: 2018-12-17
Attending: INTERNAL MEDICINE
Payer: OTHER GOVERNMENT

## 2018-12-17 ENCOUNTER — OFFICE VISIT (OUTPATIENT)
Dept: HEMATOLOGY/ONCOLOGY | Facility: CLINIC | Age: 33
End: 2018-12-17
Payer: OTHER GOVERNMENT

## 2018-12-17 VITALS
OXYGEN SATURATION: 99 % | HEART RATE: 95 BPM | TEMPERATURE: 99 F | RESPIRATION RATE: 20 BRPM | HEIGHT: 71 IN | DIASTOLIC BLOOD PRESSURE: 76 MMHG | WEIGHT: 147.5 LBS | SYSTOLIC BLOOD PRESSURE: 123 MMHG | BODY MASS INDEX: 20.65 KG/M2

## 2018-12-17 DIAGNOSIS — C92.11 CHRONIC MYELOID LEUKEMIA, BCR/ABL-POSITIVE, IN REMISSION: ICD-10-CM

## 2018-12-17 DIAGNOSIS — Z94.81 S/P ALLOGENEIC BONE MARROW TRANSPLANT: ICD-10-CM

## 2018-12-17 DIAGNOSIS — C92.11 CHRONIC MYELOID LEUKEMIA, BCR/ABL-POSITIVE, IN REMISSION: Primary | ICD-10-CM

## 2018-12-17 DIAGNOSIS — C92.10 CML (CHRONIC MYELOCYTIC LEUKEMIA): ICD-10-CM

## 2018-12-17 DIAGNOSIS — Z94.81 S/P ALLOGENEIC BONE MARROW TRANSPLANT: Primary | ICD-10-CM

## 2018-12-17 DIAGNOSIS — D89.810 ACUTE GVHD: ICD-10-CM

## 2018-12-17 DIAGNOSIS — R09.81 CONGESTION OF NASAL SINUS: ICD-10-CM

## 2018-12-17 DIAGNOSIS — D89.813 GVHD (GRAFT VERSUS HOST DISEASE): ICD-10-CM

## 2018-12-17 DIAGNOSIS — D69.6 THROMBOCYTOPENIA: ICD-10-CM

## 2018-12-17 LAB
ABO + RH BLD: NORMAL
ALBUMIN SERPL BCP-MCNC: 4 G/DL
ALP SERPL-CCNC: 84 U/L
ALT SERPL W/O P-5'-P-CCNC: 86 U/L
ANION GAP SERPL CALC-SCNC: 9 MMOL/L
AST SERPL-CCNC: 43 U/L
BASOPHILS # BLD AUTO: 0.01 K/UL
BASOPHILS NFR BLD: 0.2 %
BILIRUB SERPL-MCNC: 0.4 MG/DL
BLD GP AB SCN CELLS X3 SERPL QL: NORMAL
BUN SERPL-MCNC: 15 MG/DL
CALCIUM SERPL-MCNC: 9.7 MG/DL
CHLORIDE SERPL-SCNC: 102 MMOL/L
CO2 SERPL-SCNC: 26 MMOL/L
CREAT SERPL-MCNC: 0.9 MG/DL
DIFFERENTIAL METHOD: ABNORMAL
EOSINOPHIL # BLD AUTO: 0.1 K/UL
EOSINOPHIL NFR BLD: 1.6 %
ERYTHROCYTE [DISTWIDTH] IN BLOOD BY AUTOMATED COUNT: 16.9 %
EST. GFR  (AFRICAN AMERICAN): >60 ML/MIN/1.73 M^2
EST. GFR  (NON AFRICAN AMERICAN): >60 ML/MIN/1.73 M^2
GLUCOSE SERPL-MCNC: 99 MG/DL
HCT VFR BLD AUTO: 36.4 %
HGB BLD-MCNC: 12.2 G/DL
IMM GRANULOCYTES # BLD AUTO: 0.04 K/UL
IMM GRANULOCYTES NFR BLD AUTO: 0.7 %
LYMPHOCYTES # BLD AUTO: 0.8 K/UL
LYMPHOCYTES NFR BLD: 13.1 %
MAGNESIUM SERPL-MCNC: 2.1 MG/DL
MCH RBC QN AUTO: 30.4 PG
MCHC RBC AUTO-ENTMCNC: 33.5 G/DL
MCV RBC AUTO: 91 FL
MONOCYTES # BLD AUTO: 0.2 K/UL
MONOCYTES NFR BLD: 3.5 %
NEUTROPHILS # BLD AUTO: 4.6 K/UL
NEUTROPHILS NFR BLD: 80.9 %
NRBC BLD-RTO: 0 /100 WBC
PHOSPHATE SERPL-MCNC: 3.2 MG/DL
PLATELET # BLD AUTO: 97 K/UL
PMV BLD AUTO: 8.4 FL
POTASSIUM SERPL-SCNC: 4 MMOL/L
PROT SERPL-MCNC: 6.9 G/DL
RBC # BLD AUTO: 4.01 M/UL
SODIUM SERPL-SCNC: 137 MMOL/L
TACROLIMUS BLD-MCNC: 8.7 NG/ML
WBC # BLD AUTO: 5.71 K/UL

## 2018-12-17 PROCEDURE — 80053 COMPREHEN METABOLIC PANEL: CPT

## 2018-12-17 PROCEDURE — 85025 COMPLETE CBC W/AUTO DIFF WBC: CPT

## 2018-12-17 PROCEDURE — 99215 OFFICE O/P EST HI 40 MIN: CPT | Mod: S$PBB,,, | Performed by: INTERNAL MEDICINE

## 2018-12-17 PROCEDURE — 63600175 PHARM REV CODE 636 W HCPCS: Performed by: INTERNAL MEDICINE

## 2018-12-17 PROCEDURE — A4216 STERILE WATER/SALINE, 10 ML: HCPCS | Performed by: INTERNAL MEDICINE

## 2018-12-17 PROCEDURE — 87799 DETECT AGENT NOS DNA QUANT: CPT

## 2018-12-17 PROCEDURE — 99999 PR PBB SHADOW E&M-EST. PATIENT-LVL III: CPT | Mod: PBBFAC,,, | Performed by: INTERNAL MEDICINE

## 2018-12-17 PROCEDURE — 99213 OFFICE O/P EST LOW 20 MIN: CPT | Mod: PBBFAC,25 | Performed by: INTERNAL MEDICINE

## 2018-12-17 PROCEDURE — 80197 ASSAY OF TACROLIMUS: CPT

## 2018-12-17 PROCEDURE — 83735 ASSAY OF MAGNESIUM: CPT

## 2018-12-17 PROCEDURE — 36592 COLLECT BLOOD FROM PICC: CPT

## 2018-12-17 PROCEDURE — 86901 BLOOD TYPING SEROLOGIC RH(D): CPT

## 2018-12-17 PROCEDURE — 84100 ASSAY OF PHOSPHORUS: CPT

## 2018-12-17 PROCEDURE — 25000003 PHARM REV CODE 250: Performed by: INTERNAL MEDICINE

## 2018-12-17 RX ORDER — SODIUM CHLORIDE 0.9 % (FLUSH) 0.9 %
10 SYRINGE (ML) INJECTION
Status: COMPLETED | OUTPATIENT
Start: 2018-12-17 | End: 2018-12-17

## 2018-12-17 RX ORDER — SODIUM CHLORIDE 0.9 % (FLUSH) 0.9 %
10 SYRINGE (ML) INJECTION
Status: CANCELLED | OUTPATIENT
Start: 2018-12-17

## 2018-12-17 RX ORDER — HEPARIN 100 UNIT/ML
500 SYRINGE INTRAVENOUS
Status: COMPLETED | OUTPATIENT
Start: 2018-12-17 | End: 2018-12-17

## 2018-12-17 RX ORDER — FLUTICASONE PROPIONATE 50 MCG
1 SPRAY, SUSPENSION (ML) NASAL DAILY
Qty: 16 G | Refills: 2 | Status: SHIPPED | OUTPATIENT
Start: 2018-12-17 | End: 2020-06-11 | Stop reason: SDUPTHER

## 2018-12-17 RX ORDER — HEPARIN 100 UNIT/ML
500 SYRINGE INTRAVENOUS
Status: CANCELLED | OUTPATIENT
Start: 2018-12-17

## 2018-12-17 RX ORDER — TRIAMCINOLONE ACETONIDE 1 MG/G
OINTMENT TOPICAL 2 TIMES DAILY
Qty: 80 G | Refills: 2 | Status: SHIPPED | OUTPATIENT
Start: 2018-12-17 | End: 2019-04-09

## 2018-12-17 RX ADMIN — Medication 10 ML: at 11:12

## 2018-12-17 RX ADMIN — HEPARIN 500 UNITS: 100 SYRINGE at 11:12

## 2018-12-17 NOTE — PROGRESS NOTES
HEMATOLOGIC MALIGNANCIES PROGRESS NOTE    IDENTIFYING STATEMENT   Wilton Guzmán (Wilton) is a 33 y.o. male with a  of 1985 from [place], [place] with the diagnosis of CML in blast phase.      ONCOLOGY HISTORY:    1. Chronic myeloid leukemia, initially presenting in blast phase   A. 2018: Began developing left-sided abdominal pain - eventually requiring evaluation - WBC ~150 at  base; transferred to [facility] in [place], [place]   B. BMBx showed 20% myelomonoblasts concerning for CML in blast phase; bcr-abl positive (returned for Day 8 of induction therapy).    C. 2018: Began 7+3 induction chemotherapy. Began dasatinib (dose-adjusted) on day 8.   D. 2018: Transferred to [facility]   E. 2018: BMBx showed variably cellular marrow (10-40%) with 9% CD34+ blasts, concerning for residual disease.   F. 2018: Reinduction with MEC (mitoxantrone, etoposide, cytarabine) chemotherapy.    G. 2018: BMBx shows hypocellular bone marrow with no definite morphologic or immunophenotypic evidence of residual leukemia; bcr-abl p210 transcript was detected at 0.5%   H. Delay in clinic follow-up due to challenges with  insurance   I. 2018: Follow-up in clinic; WBC 6.77 (normal diff); Hgb 11.2 g/dl; Plt 298; BMBx shows 30% cellular marrow with no morphologic evidence of residual leukemia; bcr-abl p210 detected at 0.05% on international scale (MR 3.3).    J. 2018: BMBx shows 40% cellular marrow with no morphologic evidence of leukemia. Chromosomes 46, XY. Bcr-abl p210 detected at 0.1% on international scale (MR 3.0).    K. 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 Engrafted on Day +12. Course complicated by C. Difficile colitis.     INTERVAL HISTORY:      Mr. Guzmán presents to clinic with his mother for f/u allo SCT. He is Day +65. He is not having much diarrhea at this time. Denies  constipation. Denies n/v. Reports good appetite and oral fluid intake. Denies fevers, chills, aches, night sweat, SOB, rashes, mouth sores, palpitations, fatigue. Continues with blistex/moisturizer for lip dryness.    Past Medical History, Past Social History and Past Family History have been reviewed and are unchanged except as noted in the interval history.    MEDICATIONS:     Current Outpatient Medications on File Prior to Visit   Medication Sig Dispense Refill    acyclovir (ZOVIRAX) 800 MG Tab Take 1 tablet (800 mg total) by mouth 2 (two) times daily. 60 tablet 11    amLODIPine (NORVASC) 5 MG tablet Take 2 tablets (10 mg total) by mouth once daily. 60 tablet 11    atovaquone (MEPRON) 750 mg/5 mL Susp Take 10 mLs (1,500 mg total) by mouth once daily. Starting on 11/8/18 300 mL 5    budesonide (ENTOCORT EC) 3 mg capsule Take 3 capsules (9 mg total) by mouth once daily. 90 capsule 2    dasatinib (SPRYCEL) 140 mg Tab Take 1 tablet by mouth once daily Start on day +60 (12/8/18). 30 tablet 11    fluconazole (DIFLUCAN) 200 MG Tab Take 2 tablets (400 mg total) by mouth once daily. 60 tablet 5    magnesium oxide (MAG-OX) 400 mg (241.3 mg magnesium) tablet Take 3 tablets (1,200 mg total) by mouth 2 (two) times daily.  0    tacrolimus (PROGRAF) 0.5 MG Cap Take 1 mg (2 capsules) by mouth in the morning and 1.5 mg (3 capsules) by mouth in the evening. 150 capsule 2    valGANciclovir (VALCYTE) 450 mg Tab Take 2 tablets (900 mg total) by mouth 2 (two) times daily. 120 tablet 3     No current facility-administered medications on file prior to visit.        ALLERGIES: Review of patient's allergies indicates:  No Known Allergies     ROS:       Review of Systems   Constitutional: Negative for fatigue, diaphoresis, fever and unexpected weight change.   HENT:   Negative for lump/mass and sore throat.    Eyes: Negative for icterus.   Respiratory: Negative for cough and shortness of breath.    Cardiovascular: Negative for  "chest pain and palpitations.   Gastrointestinal: Negative for abdominal distention, diarrhea, constipation, nausea and vomiting.   Genitourinary: Negative for dysuria and frequency.    Musculoskeletal: Negative for arthralgias, gait problem and myalgias.   Skin: Negative for rash or itching.   Neurological: Negative for dizziness, gait problem and headaches.   Hematological: Negative for adenopathy. Does not bruise/bleed easily.   Psychiatric/Behavioral: Negative for depression or anxiety.    PHYSICAL EXAM:  Vitals:    12/13/18 1141   BP: 125/85   Pulse: 91   Temp: 98.5 °F (36.9 °C)   SpO2: 99%   Weight: 68.7 kg (151 lb 7.3 oz)   Height: 5' 11" (1.803 m)   PainSc: 0-No pain       KARNOFSKY PERFORMANCE STATUS 80%  ECOG 1    Physical Exam   Constitutional: He is oriented to person, place, and time. He appears well-developed and well-nourished. No distress.   HENT:   Head: Normocephalic and atraumatic.   Mouth/Throat: Mucous membranes are normal. No oral lesions.    Eyes: Conjunctivae are normal.   Neck: No thyromegaly present.   Cardiovascular: Normal rate, regular rhythm and normal heart sounds.   No murmur heard.  Pulmonary/Chest: Breath sounds normal. He has no wheezes. He has no rales.   Central venous catheter in place.  CDI; no S/S infection.  Abdominal: Soft. He exhibits no distension and no mass. There is no splenomegaly or hepatomegaly. There is no tenderness.   Neurological: He is alert and oriented to person, place, and time. He has normal strength. Coordination normal.   Skin: No rash observed.     LAB:   Results for orders placed or performed in visit on 12/13/18   Comprehensive metabolic panel   Result Value Ref Range    Sodium 136 136 - 145 mmol/L    Potassium 3.7 3.5 - 5.1 mmol/L    Chloride 102 95 - 110 mmol/L    CO2 27 23 - 29 mmol/L    Glucose 125 (H) 70 - 110 mg/dL    BUN, Bld 14 6 - 20 mg/dL    Creatinine 0.9 0.5 - 1.4 mg/dL    Calcium 9.6 8.7 - 10.5 mg/dL    Total Protein 6.4 6.0 - 8.4 g/dL    " Albumin 3.6 3.5 - 5.2 g/dL    Total Bilirubin 0.4 0.1 - 1.0 mg/dL    Alkaline Phosphatase 84 55 - 135 U/L    AST 35 10 - 40 U/L    ALT 90 (H) 10 - 44 U/L    Anion Gap 7 (L) 8 - 16 mmol/L    eGFR if African American >60.0 >60 mL/min/1.73 m^2    eGFR if non African American >60.0 >60 mL/min/1.73 m^2   Magnesium   Result Value Ref Range    Magnesium 1.9 1.6 - 2.6 mg/dL   Phosphorus   Result Value Ref Range    Phosphorus 3.3 2.7 - 4.5 mg/dL   CMV DNA, quantitative, PCR   Result Value Ref Range    Cytomegalovirus PCR, Quant <35 (A) Undetected IU/mL   TACROLIMUS LEVEL   Result Value Ref Range    Tacrolimus Lvl 6.3 5.0 - 15.0 ng/mL   CBC auto differential   Result Value Ref Range    WBC 5.78 3.90 - 12.70 K/uL    RBC 4.08 (L) 4.60 - 6.20 M/uL    Hemoglobin 12.9 (L) 14.0 - 18.0 g/dL    Hematocrit 38.0 (L) 40.0 - 54.0 %    MCV 93 82 - 98 fL    MCH 31.6 (H) 27.0 - 31.0 pg    MCHC 33.9 32.0 - 36.0 g/dL    RDW 17.3 (H) 11.5 - 14.5 %    Platelets 124 (L) 150 - 350 K/uL    MPV 9.9 9.2 - 12.9 fL    Immature Granulocytes 0.9 (H) 0.0 - 0.5 %    Gran # (ANC) 4.3 1.8 - 7.7 K/uL    Immature Grans (Abs) 0.05 (H) 0.00 - 0.04 K/uL    Lymph # 1.2 1.0 - 4.8 K/uL    Mono # 0.3 0.3 - 1.0 K/uL    Eos # 0.0 0.0 - 0.5 K/uL    Baso # 0.01 0.00 - 0.20 K/uL    nRBC 0 0 /100 WBC    Gran% 73.5 (H) 38.0 - 73.0 %    Lymph% 20.4 18.0 - 48.0 %    Mono% 4.3 4.0 - 15.0 %    Eosinophil% 0.7 0.0 - 8.0 %    Basophil% 0.2 0.0 - 1.9 %    Differential Method Automated    Type & Screen   Result Value Ref Range    Group & Rh B POS     Indirect Wes NEG        PROBLEMS ASSESSED THIS VISIT:    1. S/P allogeneic bone marrow transplant    2. Chronic myeloid leukemia, BCR/ABL-positive, in remission    3. Cytomegalovirus (CMV) viremia    4. Hypomagnesemia        PLAN:        History allo transplant for CML  - See oncology history above  - s/p alloSCT on 10/9/2018  - Neutrophil engraftment on Day +12.   - Currently Day +65  - plan for +30 & +100 & +1 year restaging  -  day +30 BMBX was performed in clinic 11/13/18, no evidence of CML, BCR/ABL 0.005% (MR 4.3). Chimerism studies show 60% donor in CD3+ fraction and 100% donor in CD33+ fraction.   - Repeat chimers from peripheral blood on 12/10 shows 80% donor in CD3+ cells and 100% donor in CD33+ cells.  - Restart dasatinib 140 mg daily on 12/10    GVHD   - Continue oral budesonide. Diarrhea controlled.  - C. Diff negative. Diarrhea improved with imodium. Taking every other day. Pt understands that imodium can cause constipation.  - Continue tacrolimus prophylaxis  - tacro level 6.3 - Continue  1 mg qAM and 1.5 mg qPM  - gvhd charting with each clinic visit     ID  - Completed PO vancomycin for C. Diff  - Continue ppx with acyclovir, fluconazole  - Begin atovaquone ppx at Day +30  - Monitor CMV BIW; EBV once weekly  - EBV negative to date  -  (11/29), 160 (12/3), 44 (12/6), <35 (12/13); Started on valcyte 900mg BID on 12/3 per ID  - CVC remains in place without S/S infection- consider removing in the near future    Cytopenias  - Today: anemia stable, hgb 12.9 today. No transfusions indicated  - Donor has sickle cell trait. Some anemia is expected long-term  - Both neutrophils and platelets engrafted.       FEN/GI  - previous hypomagnesemia  - On Mg ox 1200 BID with no side effects  - Mag 1.9 today  - Continue current dose as above  - hypokalemia, 3.4 on 12/3, level normal today, continue to monitor    Hypertension  - Amlodipine 10 mg daily  - BP stable today    Follow-up: BI-Weekly labs and appts with Dr. Pantoja or NP through day +100 with CBC, CMP, tacro, Mag, CMV (Mondays and Thursdays).  EBV once weekly only. Patient scheduled through December.     Kishor Pantoja MD  Hematology and Stem Cell Transplant

## 2018-12-18 ENCOUNTER — TELEPHONE (OUTPATIENT)
Dept: INFECTIOUS DISEASES | Facility: CLINIC | Age: 33
End: 2018-12-18

## 2018-12-18 LAB — CMV DNA SERPL NAA+PROBE-ACNC: <35 IU/ML

## 2018-12-18 NOTE — TELEPHONE ENCOUNTER
Patient completed 14 day course of valganciclovir, CMV VL <35.  Patient was advised to continue valganciclovir at 900 mg PO qdaily for prophylaxis for 3 months.  Discussed with Dr. Kishor Pantoja who agreed with plan.

## 2018-12-19 ENCOUNTER — TELEPHONE (OUTPATIENT)
Dept: HEMATOLOGY/ONCOLOGY | Facility: CLINIC | Age: 33
End: 2018-12-19

## 2018-12-19 DIAGNOSIS — B25.9 CYTOMEGALOVIRUS INFECTION, UNSPECIFIED CYTOMEGALOVIRAL INFECTION TYPE: ICD-10-CM

## 2018-12-19 PROBLEM — D69.6 THROMBOCYTOPENIA: Status: ACTIVE | Noted: 2018-12-19

## 2018-12-19 RX ORDER — VALGANCICLOVIR 450 MG/1
900 TABLET, FILM COATED ORAL DAILY
Qty: 120 TABLET | Refills: 3
Start: 2018-12-19 | End: 2019-01-14 | Stop reason: SDUPTHER

## 2018-12-19 NOTE — PROGRESS NOTES
HEMATOLOGIC MALIGNANCIES PROGRESS NOTE    IDENTIFYING STATEMENT   Wilton Guzmán (Wilton) is a 33 y.o. 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      ONCOLOGY HISTORY:    1. Chronic myeloid leukemia, initially presenting in blast phase   A. 2018: Began developing left-sided abdominal pain - eventually requiring evaluation - WBC ~150 at  base; transferred to [facility] in [place], [place]   B. BMBx showed 20% myelomonoblasts concerning for CML in blast phase; bcr-abl positive (returned for Day 8 of induction therapy).    C. 2018: Began 7+3 induction chemotherapy. Began dasatinib (dose-adjusted) on day 8.   D. 2018: Transferred to [facility]   E. 2018: BMBx showed variably cellular marrow (10-40%) with 9% CD34+ blasts, concerning for residual disease.   F. 2018: Reinduction with MEC (mitoxantrone, etoposide, cytarabine) chemotherapy.    G. 2018: BMBx shows hypocellular bone marrow with no definite morphologic or immunophenotypic evidence of residual leukemia; bcr-abl p210 transcript was detected at 0.5%   H. Delay in clinic follow-up due to challenges with  insurance   I. 2018: Follow-up in clinic; WBC 6.77 (normal diff); Hgb 11.2 g/dl; Plt 298; BMBx shows 30% cellular marrow with no morphologic evidence of residual leukemia; bcr-abl p210 detected at 0.05% on international scale (MR 3.3).    J. 2018: BMBx shows 40% cellular marrow with no morphologic evidence of leukemia. Chromosomes 46, XY. Bcr-abl p210 detected at 0.1% on international scale (MR 3.0).    K. 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 Engrafted on Day +12. Course complicated by C. Difficile colitis.     INTERVAL HISTORY:      Mr. Guzmán presents to clinic with his mother for f/u allo SCT. He is Day +72. Pt reports feeling well overall. Reports good appetite and  oral fluid intake. He is having intermittent nausea and persistent headaches with his dasatinib. He states he has only been taking the medication every other day. He self premedicates with zofran prior to taking the dasatinib. He states he usually wakes up in the middle of the night following administration of the dasatinib with a horrible headache that he cannot sleep through. Per Dr. Pantoja, it is okay for him to take OTC ibuprofen for these headaches if necessary. He would like him to try and take the dasatinib daily. If his side effects persist and continue to remain limiting to him, Dr. Pantoja will consider lowering his dose at next visit. Pt denies fevers, chills, aches, night sweat, SOB, rashes, mouth sores, palpitations, fatigue.    Past Medical History, Past Social History and Past Family History have been reviewed and are unchanged except as noted in the interval history.    MEDICATIONS:     Current Outpatient Medications on File Prior to Visit   Medication Sig Dispense Refill    amLODIPine (NORVASC) 5 MG tablet Take 2 tablets (10 mg total) by mouth once daily. 60 tablet 11    atovaquone (MEPRON) 750 mg/5 mL Susp Take 10 mLs (1,500 mg total) by mouth once daily. Starting on 11/8/18 300 mL 5    budesonide (ENTOCORT EC) 3 mg capsule Take 3 capsules (9 mg total) by mouth once daily. 90 capsule 2    dasatinib (SPRYCEL) 140 mg Tab Take 1 tablet by mouth once daily Start on day +60 (12/8/18). 30 tablet 11    fluconazole (DIFLUCAN) 200 MG Tab Take 2 tablets (400 mg total) by mouth once daily. 60 tablet 5    fluticasone (FLONASE) 50 mcg/actuation nasal spray 1 spray (50 mcg total) by Each Nare route once daily. 16 g 2    magnesium oxide (MAG-OX) 400 mg (241.3 mg magnesium) tablet Take 3 tablets (1,200 mg total) by mouth 2 (two) times daily.  0    tacrolimus (PROGRAF) 0.5 MG Cap Take 1 mg (2 capsules) by mouth in the morning and 1.5 mg (3 capsules) by mouth in the evening. 150 capsule 2    triamcinolone  "acetonide 0.1% (KENALOG) 0.1 % ointment Apply topically 2 (two) times daily. 80 g 2    valGANciclovir (VALCYTE) 450 mg Tab Take 2 tablets (900 mg total) by mouth once daily. 120 tablet 3     No current facility-administered medications on file prior to visit.        ALLERGIES: Review of patient's allergies indicates:  No Known Allergies     ROS:       Review of Systems   Constitutional: Negative for fatigue, diaphoresis, fever and unexpected weight change.   HENT:   Negative for lump/mass and sore throat.    Eyes: Negative for icterus.   Respiratory: Negative for cough and shortness of breath.    Cardiovascular: Negative for chest pain and palpitations.   Gastrointestinal: Negative for abdominal distention, diarrhea, constipation, nausea and vomiting.   Genitourinary: Negative for dysuria and frequency.    Musculoskeletal: Negative for arthralgias, gait problem and myalgias.   Skin: Negative for rash or itching.   Neurological: Negative for dizziness, gait problem. Headaches with dasatinib  Hematological: Negative for adenopathy. Does not bruise/bleed easily.   Psychiatric/Behavioral: Negative for depression or anxiety.    PHYSICAL EXAM:  Vitals:    12/20/18 1027   BP: 125/79   Pulse: 98   Resp: 20   Temp: 98.7 °F (37.1 °C)   TempSrc: Oral   SpO2: 100%   Weight: 66.2 kg (145 lb 15.1 oz)   Height: 5' 11" (1.803 m)   PainSc: 0-No pain       KARNOFSKY PERFORMANCE STATUS 80%  ECOG 1    Physical Exam   Constitutional: He is oriented to person, place, and time. He appears well-developed and well-nourished. No distress.   HENT:   Head: Normocephalic and atraumatic.   Mouth/Throat: Mucous membranes are normal. No oral lesions.    Eyes: Conjunctivae are normal.   Neck: No thyromegaly present.   Cardiovascular: Normal rate, regular rhythm and normal heart sounds.   No murmur heard.  Pulmonary/Chest: Breath sounds normal. He has no wheezes. He has no rales.   Central venous catheter in place.  CDI; no S/S " infection.  Abdominal: Soft. He exhibits no distension and no mass. There is no splenomegaly or hepatomegaly. There is no tenderness.   Neurological: He is alert and oriented to person, place, and time. He has normal strength. Coordination normal.   Skin: No rash observed.     LAB:   Results for orders placed or performed in visit on 12/20/18   Magnesium   Result Value Ref Range    Magnesium 1.8 1.6 - 2.6 mg/dL   Phosphorus   Result Value Ref Range    Phosphorus 2.4 (L) 2.7 - 4.5 mg/dL   CBC auto differential   Result Value Ref Range    WBC 4.31 3.90 - 12.70 K/uL    RBC 3.80 (L) 4.60 - 6.20 M/uL    Hemoglobin 11.6 (L) 14.0 - 18.0 g/dL    Hematocrit 34.4 (L) 40.0 - 54.0 %    MCV 91 82 - 98 fL    MCH 30.5 27.0 - 31.0 pg    MCHC 33.7 32.0 - 36.0 g/dL    RDW 16.8 (H) 11.5 - 14.5 %    Platelets 81 (L) 150 - 350 K/uL    MPV 11.1 9.2 - 12.9 fL    Immature Granulocytes 0.5 0.0 - 0.5 %    Gran # (ANC) 3.4 1.8 - 7.7 K/uL    Immature Grans (Abs) 0.02 0.00 - 0.04 K/uL    Lymph # 0.7 (L) 1.0 - 4.8 K/uL    Mono # 0.1 (L) 0.3 - 1.0 K/uL    Eos # 0.0 0.0 - 0.5 K/uL    Baso # 0.00 0.00 - 0.20 K/uL    nRBC 0 0 /100 WBC    Gran% 78.4 (H) 38.0 - 73.0 %    Lymph% 17.2 (L) 18.0 - 48.0 %    Mono% 3.2 (L) 4.0 - 15.0 %    Eosinophil% 0.7 0.0 - 8.0 %    Basophil% 0.0 0.0 - 1.9 %    Differential Method Automated    Comprehensive metabolic panel   Result Value Ref Range    Sodium 141 136 - 145 mmol/L    Potassium 3.6 3.5 - 5.1 mmol/L    Chloride 109 95 - 110 mmol/L    CO2 24 23 - 29 mmol/L    Glucose 119 (H) 70 - 110 mg/dL    BUN, Bld 17 6 - 20 mg/dL    Creatinine 0.9 0.5 - 1.4 mg/dL    Calcium 9.4 8.7 - 10.5 mg/dL    Total Protein 6.5 6.0 - 8.4 g/dL    Albumin 3.7 3.5 - 5.2 g/dL    Total Bilirubin 0.4 0.1 - 1.0 mg/dL    Alkaline Phosphatase 81 55 - 135 U/L    AST 34 10 - 40 U/L    ALT 65 (H) 10 - 44 U/L    Anion Gap 8 8 - 16 mmol/L    eGFR if African American >60.0 >60 mL/min/1.73 m^2    eGFR if non African American >60.0 >60 mL/min/1.73 m^2        PROBLEMS ASSESSED THIS VISIT:    1. Chronic myeloid leukemia, BCR/ABL-positive, in remission    2. S/P allogeneic bone marrow transplant    3. Acute GVHD    4. Cytomegalovirus (CMV) viremia    5. Cytopenia    6. Hypomagnesemia    7. Secondary hypertension        PLAN:        History allo transplant for CML  - See oncology history above  - s/p alloSCT on 10/9/2018  - Neutrophil engraftment on Day +12.   - Currently Day +72  - plan for +30 & +100 & +1 year restaging  - day +30 BMBX was performed in clinic 11/13/18, no evidence of CML, BCR/ABL 0.005% (MR 4.3). Chimerism studies show 60% donor in CD3+ fraction and 100% donor in CD33+ fraction.   - Repeat chimers from peripheral blood on 12/10 shows 80% donor in CD3+ cells and 100% donor in CD33+ cells.  - Restart dasatinib 140 mg daily on 12/10 (Pt states he has only been taking every other day due to nausea and headaches, okay to use ibuprofen per Dr. Pantoja. Will have patient try to take daily from now on and reassess the possibility of lowering his dose at next visit)     GVHD   - Continue oral budesonide. Diarrhea controlled.  - C. Diff negative. Diarrhea improved with imodium. Taking every other day. Pt understands that imodium can cause constipation.  - Continue tacrolimus prophylaxis  - tacro level 5.9- Continue  1 mg qAM and 1.5 mg qPM  - gvhd charting with each clinic visit     ID  - Completed PO vancomycin for C. Diff  - Continue ppx with acyclovir, fluconazole  - Begin atovaquone ppx at Day +30  - Monitor CMV BIW; EBV once weekly  - EBV negative to date  -  (11/29), 160 (12/3), 44 (12/6), <35 (12/13); Started on valcyte 900mg BID. Dose changed per ID on 12/18 to 900mg daily as CMV <35. Will continue this dose for 3 months  - CVC remains in place without S/S infection- consider removing in the near future    Cytopenias  - Today: anemia stable, hgb 11.6 today. No transfusions indicated  - Donor has sickle cell trait. Some anemia is expected  long-term  - Both neutrophils and platelets engrafted.       FEN/GI  - previous hypomagnesemia  - On Mg ox 1200 BID with no side effects  - Mag 1.8 today, continue current dose as above  - hypokalemia, 3.4 on 12/3, level normal today, continue to monitor  - hypophosphatemia today, will continue to monitor closely, no need to treat at this time    Hypertension  - Amlodipine 10 mg daily  - BP stable today    Follow-up: Twice weekly labs and appts with Dr. Pantoja or NP through day +100 with CBC, CMP, tacro, Mag, CMV (Mondays and Thursdays AM or Tuesdays and Fridays AM if pt okay with this due to scheduling constraints).  EBV once weekly only. Please schedule through January.     Plan of care discussed with collaborating physician, Dr. Pantoja.    Nola Chance, NP  Hematology and Stem Cell Transplant    And     Yoanna Andrade DNP, NP  Hematology/Oncology

## 2018-12-19 NOTE — TELEPHONE ENCOUNTER
Spoke with transplant ID. As pt's CMV has been <35 x2, will transition to ppx dosing of valcyte instead of treatment dosing. He was called on 12/18/18 by ID to change dosing to valcyte 900 mg daily. Will assure he is taking this dose at visit tomorrow.    Yoanna Andrade, DNP, NP  Hematology/Oncology

## 2018-12-19 NOTE — PROGRESS NOTES
HEMATOLOGIC MALIGNANCIES PROGRESS NOTE    IDENTIFYING STATEMENT   Wilton Guzmán (Wilton) is a 33 y.o. male with a  of 1985 from [place], [place] with the diagnosis of CML in blast phase.      ONCOLOGY HISTORY:    1. Chronic myeloid leukemia, initially presenting in blast phase   A. 2018: Began developing left-sided abdominal pain - eventually requiring evaluation - WBC ~150 at  base; transferred to [facility] in [place], [place]   B. BMBx showed 20% myelomonoblasts concerning for CML in blast phase; bcr-abl positive (returned for Day 8 of induction therapy).    C. 2018: Began 7+3 induction chemotherapy. Began dasatinib (dose-adjusted) on day 8.   D. 2018: Transferred to [facility]   E. 2018: BMBx showed variably cellular marrow (10-40%) with 9% CD34+ blasts, concerning for residual disease.   F. 2018: Reinduction with MEC (mitoxantrone, etoposide, cytarabine) chemotherapy.    G. 2018: BMBx shows hypocellular bone marrow with no definite morphologic or immunophenotypic evidence of residual leukemia; bcr-abl p210 transcript was detected at 0.5%   H. Delay in clinic follow-up due to challenges with  insurance   I. 2018: Follow-up in clinic; WBC 6.77 (normal diff); Hgb 11.2 g/dl; Plt 298; BMBx shows 30% cellular marrow with no morphologic evidence of residual leukemia; bcr-abl p210 detected at 0.05% on international scale (MR 3.3).    J. 2018: BMBx shows 40% cellular marrow with no morphologic evidence of leukemia. Chromosomes 46, XY. Bcr-abl p210 detected at 0.1% on international scale (MR 3.0).    K. 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 Engrafted on Day +12. Course complicated by C. Difficile colitis.     INTERVAL HISTORY:      Mr. Guzmán presents to clinic with his mother for f/u allo SCT. He is Day +69. He is not having much diarrhea at this time. Denies  constipation. Denies n/v. Reports poor appetite and oral fluid intake. Denies fevers, chills, aches, night sweat, SOB, rashes, mouth sores, palpitations, fatigue. Continues with blistex/moisturizer for lip dryness.    Overall, he is very fatigued and tired. He has more difficulty with sinus drainage. He is also having a bit of a rash across the chest.     Past Medical History, Past Social History and Past Family History have been reviewed and are unchanged except as noted in the interval history.    MEDICATIONS:     Current Outpatient Medications on File Prior to Visit   Medication Sig Dispense Refill    amLODIPine (NORVASC) 5 MG tablet Take 2 tablets (10 mg total) by mouth once daily. 60 tablet 11    atovaquone (MEPRON) 750 mg/5 mL Susp Take 10 mLs (1,500 mg total) by mouth once daily. Starting on 11/8/18 300 mL 5    budesonide (ENTOCORT EC) 3 mg capsule Take 3 capsules (9 mg total) by mouth once daily. 90 capsule 2    dasatinib (SPRYCEL) 140 mg Tab Take 1 tablet by mouth once daily Start on day +60 (12/8/18). 30 tablet 11    fluconazole (DIFLUCAN) 200 MG Tab Take 2 tablets (400 mg total) by mouth once daily. 60 tablet 5    magnesium oxide (MAG-OX) 400 mg (241.3 mg magnesium) tablet Take 3 tablets (1,200 mg total) by mouth 2 (two) times daily.  0    tacrolimus (PROGRAF) 0.5 MG Cap Take 1 mg (2 capsules) by mouth in the morning and 1.5 mg (3 capsules) by mouth in the evening. 150 capsule 2    [DISCONTINUED] acyclovir (ZOVIRAX) 800 MG Tab Take 1 tablet (800 mg total) by mouth 2 (two) times daily. 60 tablet 11    [DISCONTINUED] valGANciclovir (VALCYTE) 450 mg Tab Take 2 tablets (900 mg total) by mouth 2 (two) times daily. 120 tablet 3     No current facility-administered medications on file prior to visit.        ALLERGIES: Review of patient's allergies indicates:  No Known Allergies     ROS:       Review of Systems   Constitutional: Negative for  diaphoresis, fever and unexpected weight change. Positive for  "fatigue   HENT:   Negative for lump/mass and sore throat.  Positive for sinus congestion  Eyes: Negative for icterus.   Respiratory: Negative for cough and shortness of breath.    Cardiovascular: Negative for chest pain and palpitations.   Gastrointestinal: Negative for abdominal distention, diarrhea, constipation, nausea and vomiting. Poor appetite  Genitourinary: Negative for dysuria and frequency.    Musculoskeletal: Negative for arthralgias, gait problem and myalgias.   Skin: Negative for rash or itching.   Neurological: Negative for dizziness, gait problem and headaches.   Hematological: Negative for adenopathy. Does not bruise/bleed easily.   Psychiatric/Behavioral: Negative for depression or anxiety.    PHYSICAL EXAM:  Vitals:    12/17/18 1200   BP: 123/76   Pulse: 95   Resp: 20   Temp: 98.6 °F (37 °C)   TempSrc: Oral   SpO2: 99%   Weight: 66.9 kg (147 lb 7.8 oz)   Height: 5' 11" (1.803 m)   PainSc:   2   PainLoc: Head       KARNOFSKY PERFORMANCE STATUS 80%  ECOG 1    Physical Exam   Constitutional: He is oriented to person, place, and time. He appears well-developed and well-nourished. No distress.   HENT:   Head: Normocephalic and atraumatic.   Mouth/Throat: Mucous membranes are normal. No oral lesions.    Eyes: Conjunctivae are normal.   Neck: No thyromegaly present.   Cardiovascular: Normal rate, regular rhythm and normal heart sounds.   No murmur heard.  Pulmonary/Chest: Breath sounds normal. He has no wheezes. He has no rales.   Central venous catheter in place.  CDI; no S/S infection.  Abdominal: Soft. He exhibits no distension and no mass. There is no splenomegaly or hepatomegaly. There is no tenderness.   Neurological: He is alert and oriented to person, place, and time. He has normal strength. Coordination normal.   Skin: very fine erythematous rash across the chest. There is hypopigmentation along the forehead    LAB:   Results for orders placed or performed in visit on 12/17/18   Comprehensive " metabolic panel   Result Value Ref Range    Sodium 137 136 - 145 mmol/L    Potassium 4.0 3.5 - 5.1 mmol/L    Chloride 102 95 - 110 mmol/L    CO2 26 23 - 29 mmol/L    Glucose 99 70 - 110 mg/dL    BUN, Bld 15 6 - 20 mg/dL    Creatinine 0.9 0.5 - 1.4 mg/dL    Calcium 9.7 8.7 - 10.5 mg/dL    Total Protein 6.9 6.0 - 8.4 g/dL    Albumin 4.0 3.5 - 5.2 g/dL    Total Bilirubin 0.4 0.1 - 1.0 mg/dL    Alkaline Phosphatase 84 55 - 135 U/L    AST 43 (H) 10 - 40 U/L    ALT 86 (H) 10 - 44 U/L    Anion Gap 9 8 - 16 mmol/L    eGFR if African American >60.0 >60 mL/min/1.73 m^2    eGFR if non African American >60.0 >60 mL/min/1.73 m^2   Magnesium   Result Value Ref Range    Magnesium 2.1 1.6 - 2.6 mg/dL   Phosphorus   Result Value Ref Range    Phosphorus 3.2 2.7 - 4.5 mg/dL   CMV DNA, quantitative, PCR   Result Value Ref Range    Cytomegalovirus PCR, Quant <35 (A) Undetected IU/mL   TACROLIMUS LEVEL   Result Value Ref Range    Tacrolimus Lvl 8.7 5.0 - 15.0 ng/mL   CBC auto differential   Result Value Ref Range    WBC 5.71 3.90 - 12.70 K/uL    RBC 4.01 (L) 4.60 - 6.20 M/uL    Hemoglobin 12.2 (L) 14.0 - 18.0 g/dL    Hematocrit 36.4 (L) 40.0 - 54.0 %    MCV 91 82 - 98 fL    MCH 30.4 27.0 - 31.0 pg    MCHC 33.5 32.0 - 36.0 g/dL    RDW 16.9 (H) 11.5 - 14.5 %    Platelets 97 (L) 150 - 350 K/uL    MPV 8.4 (L) 9.2 - 12.9 fL    Immature Granulocytes 0.7 (H) 0.0 - 0.5 %    Gran # (ANC) 4.6 1.8 - 7.7 K/uL    Immature Grans (Abs) 0.04 0.00 - 0.04 K/uL    Lymph # 0.8 (L) 1.0 - 4.8 K/uL    Mono # 0.2 (L) 0.3 - 1.0 K/uL    Eos # 0.1 0.0 - 0.5 K/uL    Baso # 0.01 0.00 - 0.20 K/uL    nRBC 0 0 /100 WBC    Gran% 80.9 (H) 38.0 - 73.0 %    Lymph% 13.1 (L) 18.0 - 48.0 %    Mono% 3.5 (L) 4.0 - 15.0 %    Eosinophil% 1.6 0.0 - 8.0 %    Basophil% 0.2 0.0 - 1.9 %    Differential Method Automated    Type & Screen   Result Value Ref Range    Group & Rh B POS     Indirect Wes NEG        PROBLEMS ASSESSED THIS VISIT:    1. S/P allogeneic bone marrow transplant     2. GVHD (graft versus host disease)    3. Congestion of nasal sinus    4. Chronic myeloid leukemia, BCR/ABL-positive, in remission    5. Acute GVHD    6. Thrombocytopenia        PLAN:        History allo transplant for CML  - See oncology history above  - s/p alloSCT on 10/9/2018  - Neutrophil engraftment on Day +12.   - Currently Day +69  - plan for +30 & +100 & +1 year restaging  - day +30 BMBX was performed in clinic 11/13/18, no evidence of CML, BCR/ABL 0.005% (MR 4.3). Chimerism studies show 60% donor in CD3+ fraction and 100% donor in CD33+ fraction.   - Repeat chimers from peripheral blood on 12/10 shows 80% donor in CD3+ cells and 100% donor in CD33+ cells.  - Restart dasatinib 140 mg daily on 12/10    GVHD   - Continue oral budesonide. Diarrhea controlled.  - C. Diff negative. Diarrhea improved with imodium. Taking every other day. Pt understands that imodium can cause constipation.  - Continue tacrolimus prophylaxis  - tacro level 8.7 - Continue  1 mg qAM and 1.5 mg qPM  - prescribed triamcinolone ointment for very fine rash  - gvhd charting with each clinic visit     ID  - Completed PO vancomycin for C. Diff  - Continue ppx with acyclovir, fluconazole  - Begin atovaquone ppx at Day +30  - Monitor CMV BIW; EBV once weekly  - EBV negative to date  -  (11/29), 160 (12/3), 44 (12/6), <35 (12/13); <35 (12/17) Started on valcyte 900mg BID on 12/3 per ID; now transitioning to maintenance dose (900 mg daily per Dr. Reardon)  - CVC remains in place without S/S infection- consider removing in the near future    Cytopenias  - Today: anemia stable, hgb 12.2 today. No transfusions indicated  - thrombocytopenia: platelets 97 today, likely due in part to dasatinib  - Donor has sickle cell trait. Some anemia is expected long-term  - Both neutrophils and platelets engrafted.       FEN/GI  - previous hypomagnesemia  - On Mg ox 1200 BID with no side effects  - Mag 2.1 today  - Continue current dose as above  -  hypokalemia, 3.4 on 12/3, level normal today, continue to monitor    Hypertension  - Amlodipine 10 mg daily  - BP stable today    Follow-up: BI-Weekly labs and appts with Dr. Pantoja or NP through day +100 with CBC, CMP, tacro, Mag, CMV (Mondays and Thursdays).  EBV once weekly only. Patient scheduled through December.     Kishor Pantoja MD  Hematology and Stem Cell Transplant     Attending Only

## 2018-12-20 ENCOUNTER — INFUSION (OUTPATIENT)
Dept: INFUSION THERAPY | Facility: HOSPITAL | Age: 33
End: 2018-12-20
Attending: INTERNAL MEDICINE
Payer: OTHER GOVERNMENT

## 2018-12-20 ENCOUNTER — OFFICE VISIT (OUTPATIENT)
Dept: HEMATOLOGY/ONCOLOGY | Facility: CLINIC | Age: 33
End: 2018-12-20
Payer: OTHER GOVERNMENT

## 2018-12-20 VITALS
SYSTOLIC BLOOD PRESSURE: 125 MMHG | RESPIRATION RATE: 20 BRPM | TEMPERATURE: 99 F | DIASTOLIC BLOOD PRESSURE: 79 MMHG | WEIGHT: 145.94 LBS | BODY MASS INDEX: 20.43 KG/M2 | HEIGHT: 71 IN | OXYGEN SATURATION: 100 % | HEART RATE: 98 BPM

## 2018-12-20 DIAGNOSIS — C92.10 CML (CHRONIC MYELOCYTIC LEUKEMIA): ICD-10-CM

## 2018-12-20 DIAGNOSIS — C92.11 CHRONIC MYELOID LEUKEMIA, BCR/ABL-POSITIVE, IN REMISSION: Primary | ICD-10-CM

## 2018-12-20 DIAGNOSIS — Z94.81 S/P ALLOGENEIC BONE MARROW TRANSPLANT: Primary | ICD-10-CM

## 2018-12-20 DIAGNOSIS — I15.9 SECONDARY HYPERTENSION: ICD-10-CM

## 2018-12-20 DIAGNOSIS — Z94.81 S/P ALLOGENEIC BONE MARROW TRANSPLANT: ICD-10-CM

## 2018-12-20 DIAGNOSIS — D75.9 CYTOPENIA: ICD-10-CM

## 2018-12-20 DIAGNOSIS — B25.9 CYTOMEGALOVIRUS (CMV) VIREMIA: ICD-10-CM

## 2018-12-20 DIAGNOSIS — E83.42 HYPOMAGNESEMIA: ICD-10-CM

## 2018-12-20 DIAGNOSIS — D89.810 ACUTE GVHD: ICD-10-CM

## 2018-12-20 LAB
ABO + RH BLD: NORMAL
ALBUMIN SERPL BCP-MCNC: 3.7 G/DL
ALP SERPL-CCNC: 81 U/L
ALT SERPL W/O P-5'-P-CCNC: 65 U/L
ANION GAP SERPL CALC-SCNC: 8 MMOL/L
AST SERPL-CCNC: 34 U/L
BASOPHILS # BLD AUTO: 0 K/UL
BASOPHILS NFR BLD: 0 %
BILIRUB SERPL-MCNC: 0.4 MG/DL
BLD GP AB SCN CELLS X3 SERPL QL: NORMAL
BUN SERPL-MCNC: 17 MG/DL
CALCIUM SERPL-MCNC: 9.4 MG/DL
CHLORIDE SERPL-SCNC: 109 MMOL/L
CO2 SERPL-SCNC: 24 MMOL/L
CREAT SERPL-MCNC: 0.9 MG/DL
DIFFERENTIAL METHOD: ABNORMAL
EBV DNA BY PCR: NORMAL IU/ML
EOSINOPHIL # BLD AUTO: 0 K/UL
EOSINOPHIL NFR BLD: 0.7 %
ERYTHROCYTE [DISTWIDTH] IN BLOOD BY AUTOMATED COUNT: 16.8 %
EST. GFR  (AFRICAN AMERICAN): >60 ML/MIN/1.73 M^2
EST. GFR  (NON AFRICAN AMERICAN): >60 ML/MIN/1.73 M^2
GLUCOSE SERPL-MCNC: 119 MG/DL
HCT VFR BLD AUTO: 34.4 %
HGB BLD-MCNC: 11.6 G/DL
IMM GRANULOCYTES # BLD AUTO: 0.02 K/UL
IMM GRANULOCYTES NFR BLD AUTO: 0.5 %
LYMPHOCYTES # BLD AUTO: 0.7 K/UL
LYMPHOCYTES NFR BLD: 17.2 %
MAGNESIUM SERPL-MCNC: 1.8 MG/DL
MCH RBC QN AUTO: 30.5 PG
MCHC RBC AUTO-ENTMCNC: 33.7 G/DL
MCV RBC AUTO: 91 FL
MONOCYTES # BLD AUTO: 0.1 K/UL
MONOCYTES NFR BLD: 3.2 %
NEUTROPHILS # BLD AUTO: 3.4 K/UL
NEUTROPHILS NFR BLD: 78.4 %
NRBC BLD-RTO: 0 /100 WBC
PHOSPHATE SERPL-MCNC: 2.4 MG/DL
PLATELET # BLD AUTO: 81 K/UL
PMV BLD AUTO: 11.1 FL
POTASSIUM SERPL-SCNC: 3.6 MMOL/L
PROT SERPL-MCNC: 6.5 G/DL
RBC # BLD AUTO: 3.8 M/UL
SODIUM SERPL-SCNC: 141 MMOL/L
TACROLIMUS BLD-MCNC: 5.9 NG/ML
WBC # BLD AUTO: 4.31 K/UL

## 2018-12-20 PROCEDURE — 80197 ASSAY OF TACROLIMUS: CPT

## 2018-12-20 PROCEDURE — 99999 PR PBB SHADOW E&M-EST. PATIENT-LVL III: CPT | Mod: PBBFAC,,, | Performed by: NURSE PRACTITIONER

## 2018-12-20 PROCEDURE — 84100 ASSAY OF PHOSPHORUS: CPT

## 2018-12-20 PROCEDURE — 83735 ASSAY OF MAGNESIUM: CPT

## 2018-12-20 PROCEDURE — 63600175 PHARM REV CODE 636 W HCPCS: Performed by: INTERNAL MEDICINE

## 2018-12-20 PROCEDURE — 80053 COMPREHEN METABOLIC PANEL: CPT

## 2018-12-20 PROCEDURE — 99213 OFFICE O/P EST LOW 20 MIN: CPT | Mod: PBBFAC,25 | Performed by: NURSE PRACTITIONER

## 2018-12-20 PROCEDURE — 36592 COLLECT BLOOD FROM PICC: CPT

## 2018-12-20 PROCEDURE — 99215 OFFICE O/P EST HI 40 MIN: CPT | Mod: S$PBB,,, | Performed by: NURSE PRACTITIONER

## 2018-12-20 PROCEDURE — 85025 COMPLETE CBC W/AUTO DIFF WBC: CPT

## 2018-12-20 PROCEDURE — 25000003 PHARM REV CODE 250: Performed by: INTERNAL MEDICINE

## 2018-12-20 PROCEDURE — A4216 STERILE WATER/SALINE, 10 ML: HCPCS | Performed by: INTERNAL MEDICINE

## 2018-12-20 PROCEDURE — 87799 DETECT AGENT NOS DNA QUANT: CPT

## 2018-12-20 PROCEDURE — 86850 RBC ANTIBODY SCREEN: CPT

## 2018-12-20 RX ORDER — HEPARIN 100 UNIT/ML
500 SYRINGE INTRAVENOUS
Status: CANCELLED | OUTPATIENT
Start: 2018-12-20

## 2018-12-20 RX ORDER — SODIUM CHLORIDE 0.9 % (FLUSH) 0.9 %
10 SYRINGE (ML) INJECTION
Status: CANCELLED | OUTPATIENT
Start: 2018-12-20

## 2018-12-20 RX ORDER — HEPARIN 100 UNIT/ML
500 SYRINGE INTRAVENOUS
Status: COMPLETED | OUTPATIENT
Start: 2018-12-20 | End: 2018-12-20

## 2018-12-20 RX ORDER — SODIUM CHLORIDE 0.9 % (FLUSH) 0.9 %
10 SYRINGE (ML) INJECTION
Status: COMPLETED | OUTPATIENT
Start: 2018-12-20 | End: 2018-12-20

## 2018-12-20 RX ADMIN — HEPARIN 500 UNITS: 100 SYRINGE at 09:12

## 2018-12-20 RX ADMIN — Medication 10 ML: at 09:12

## 2018-12-20 NOTE — Clinical Note
Bi-weekly labs and appts with Dr. Pantoja or NP through day +100 with CBC, CMP, tacro, Mag, CMV (Mondays and Thursdays AM).  EBV once weekly only. Please schedule through January.

## 2018-12-20 NOTE — LETTER
December 20, 2018      [facility]  [address]  [facility] [address]  Phone: [phone]       Patient: Wilton Guzmán   Social Security Number:   YOB: 1985  Date of Visit: 12/20/2018    To Whom It May Concern:    Bailey Guzmán  was at [facility] on 12/20/2018. He recently has a bone marrow transplant on 10/09/2018 for Chronic myelocytic leukemia. Due to the fact he will be on long term immunosuppression therapy, he will not be able to work for the next 6 months to a 1 year (approximately January 17, 2019). This all depends on the course of his recovery. If you have any questions or concerns, or if I can be of further assistance, please do not hesitate to contact me.    Sincerely,          Kishor Pantoja MD  Hematology/Oncology/Bone Marrow Transplanlt

## 2018-12-22 LAB — CMV DNA SERPL NAA+PROBE-ACNC: <35 IU/ML

## 2018-12-24 ENCOUNTER — OFFICE VISIT (OUTPATIENT)
Dept: HEMATOLOGY/ONCOLOGY | Facility: CLINIC | Age: 33
End: 2018-12-24
Payer: OTHER GOVERNMENT

## 2018-12-24 ENCOUNTER — INFUSION (OUTPATIENT)
Dept: INFUSION THERAPY | Facility: HOSPITAL | Age: 33
End: 2018-12-24
Attending: INTERNAL MEDICINE
Payer: OTHER GOVERNMENT

## 2018-12-24 VITALS — HEIGHT: 71 IN | BODY MASS INDEX: 20.49 KG/M2 | WEIGHT: 146.38 LBS

## 2018-12-24 DIAGNOSIS — Z94.81 S/P ALLOGENEIC BONE MARROW TRANSPLANT: ICD-10-CM

## 2018-12-24 DIAGNOSIS — E83.42 HYPOMAGNESEMIA: ICD-10-CM

## 2018-12-24 DIAGNOSIS — D69.6 THROMBOCYTOPENIA: ICD-10-CM

## 2018-12-24 DIAGNOSIS — D64.81 ANEMIA ASSOCIATED WITH CHEMOTHERAPY: ICD-10-CM

## 2018-12-24 DIAGNOSIS — T45.1X5A ANEMIA ASSOCIATED WITH CHEMOTHERAPY: ICD-10-CM

## 2018-12-24 DIAGNOSIS — I15.9 SECONDARY HYPERTENSION: ICD-10-CM

## 2018-12-24 DIAGNOSIS — Z94.81 S/P ALLOGENEIC BONE MARROW TRANSPLANT: Primary | ICD-10-CM

## 2018-12-24 DIAGNOSIS — C92.10 CML (CHRONIC MYELOCYTIC LEUKEMIA): ICD-10-CM

## 2018-12-24 DIAGNOSIS — E83.39 HYPOPHOSPHATEMIA: ICD-10-CM

## 2018-12-24 DIAGNOSIS — B25.9 CYTOMEGALOVIRUS (CMV) VIREMIA: ICD-10-CM

## 2018-12-24 DIAGNOSIS — D89.810 ACUTE GVHD: ICD-10-CM

## 2018-12-24 DIAGNOSIS — C92.11 CHRONIC MYELOID LEUKEMIA, BCR/ABL-POSITIVE, IN REMISSION: ICD-10-CM

## 2018-12-24 DIAGNOSIS — C92.11 CHRONIC MYELOID LEUKEMIA, BCR/ABL-POSITIVE, IN REMISSION: Primary | ICD-10-CM

## 2018-12-24 LAB
ABO + RH BLD: NORMAL
ALBUMIN SERPL BCP-MCNC: 3.7 G/DL
ALP SERPL-CCNC: 76 U/L
ALT SERPL W/O P-5'-P-CCNC: 71 U/L
ANION GAP SERPL CALC-SCNC: 10 MMOL/L
AST SERPL-CCNC: 40 U/L
BASOPHILS # BLD AUTO: 0.01 K/UL
BASOPHILS NFR BLD: 0.2 %
BILIRUB SERPL-MCNC: 0.4 MG/DL
BLD GP AB SCN CELLS X3 SERPL QL: NORMAL
BUN SERPL-MCNC: 18 MG/DL
CALCIUM SERPL-MCNC: 9.2 MG/DL
CHLORIDE SERPL-SCNC: 107 MMOL/L
CO2 SERPL-SCNC: 24 MMOL/L
CREAT SERPL-MCNC: 1 MG/DL
DIFFERENTIAL METHOD: ABNORMAL
EBV DNA BY PCR: NORMAL IU/ML
EOSINOPHIL # BLD AUTO: 0 K/UL
EOSINOPHIL NFR BLD: 0.2 %
ERYTHROCYTE [DISTWIDTH] IN BLOOD BY AUTOMATED COUNT: 16.7 %
EST. GFR  (AFRICAN AMERICAN): >60 ML/MIN/1.73 M^2
EST. GFR  (NON AFRICAN AMERICAN): >60 ML/MIN/1.73 M^2
GLUCOSE SERPL-MCNC: 143 MG/DL
HCT VFR BLD AUTO: 32.8 %
HGB BLD-MCNC: 11.1 G/DL
IMM GRANULOCYTES # BLD AUTO: 0.02 K/UL
IMM GRANULOCYTES NFR BLD AUTO: 0.5 %
LYMPHOCYTES # BLD AUTO: 1.1 K/UL
LYMPHOCYTES NFR BLD: 26.7 %
MAGNESIUM SERPL-MCNC: 1.9 MG/DL
MCH RBC QN AUTO: 31.9 PG
MCHC RBC AUTO-ENTMCNC: 33.8 G/DL
MCV RBC AUTO: 94 FL
MONOCYTES # BLD AUTO: 0.3 K/UL
MONOCYTES NFR BLD: 6.6 %
NEUTROPHILS # BLD AUTO: 2.8 K/UL
NEUTROPHILS NFR BLD: 65.8 %
NRBC BLD-RTO: 0 /100 WBC
PHOSPHATE SERPL-MCNC: 2.4 MG/DL
PLATELET # BLD AUTO: 56 K/UL
PMV BLD AUTO: 8.9 FL
POTASSIUM SERPL-SCNC: 3.5 MMOL/L
PROT SERPL-MCNC: 6.2 G/DL
RBC # BLD AUTO: 3.48 M/UL
SODIUM SERPL-SCNC: 141 MMOL/L
TACROLIMUS BLD-MCNC: 6.3 NG/ML
WBC # BLD AUTO: 4.24 K/UL

## 2018-12-24 PROCEDURE — 99213 OFFICE O/P EST LOW 20 MIN: CPT | Mod: PBBFAC,25 | Performed by: NURSE PRACTITIONER

## 2018-12-24 PROCEDURE — 85025 COMPLETE CBC W/AUTO DIFF WBC: CPT

## 2018-12-24 PROCEDURE — 83735 ASSAY OF MAGNESIUM: CPT

## 2018-12-24 PROCEDURE — 87799 DETECT AGENT NOS DNA QUANT: CPT

## 2018-12-24 PROCEDURE — 99999 PR PBB SHADOW E&M-EST. PATIENT-LVL III: CPT | Mod: PBBFAC,,, | Performed by: NURSE PRACTITIONER

## 2018-12-24 PROCEDURE — 80197 ASSAY OF TACROLIMUS: CPT

## 2018-12-24 PROCEDURE — A4216 STERILE WATER/SALINE, 10 ML: HCPCS | Performed by: INTERNAL MEDICINE

## 2018-12-24 PROCEDURE — 86850 RBC ANTIBODY SCREEN: CPT

## 2018-12-24 PROCEDURE — 25000003 PHARM REV CODE 250: Performed by: INTERNAL MEDICINE

## 2018-12-24 PROCEDURE — 63600175 PHARM REV CODE 636 W HCPCS: Performed by: INTERNAL MEDICINE

## 2018-12-24 PROCEDURE — 99214 OFFICE O/P EST MOD 30 MIN: CPT | Mod: S$PBB,,, | Performed by: NURSE PRACTITIONER

## 2018-12-24 PROCEDURE — 36592 COLLECT BLOOD FROM PICC: CPT

## 2018-12-24 PROCEDURE — 80053 COMPREHEN METABOLIC PANEL: CPT

## 2018-12-24 PROCEDURE — 84100 ASSAY OF PHOSPHORUS: CPT

## 2018-12-24 RX ORDER — SODIUM,POTASSIUM PHOSPHATES 280-250MG
1 POWDER IN PACKET (EA) ORAL 2 TIMES DAILY
Qty: 60 PACKET | Refills: 3 | COMMUNITY
Start: 2018-12-24 | End: 2019-01-04

## 2018-12-24 RX ORDER — SODIUM CHLORIDE 0.9 % (FLUSH) 0.9 %
10 SYRINGE (ML) INJECTION
Status: COMPLETED | OUTPATIENT
Start: 2018-12-24 | End: 2018-12-24

## 2018-12-24 RX ORDER — SODIUM CHLORIDE 0.9 % (FLUSH) 0.9 %
10 SYRINGE (ML) INJECTION
Status: CANCELLED | OUTPATIENT
Start: 2018-12-24

## 2018-12-24 RX ORDER — HEPARIN 100 UNIT/ML
500 SYRINGE INTRAVENOUS
Status: CANCELLED | OUTPATIENT
Start: 2018-12-24

## 2018-12-24 RX ORDER — HEPARIN 100 UNIT/ML
500 SYRINGE INTRAVENOUS
Status: COMPLETED | OUTPATIENT
Start: 2018-12-24 | End: 2018-12-24

## 2018-12-24 RX ADMIN — Medication 10 ML: at 09:12

## 2018-12-24 RX ADMIN — HEPARIN 500 UNITS: 100 SYRINGE at 09:12

## 2018-12-24 NOTE — PROGRESS NOTES
HEMATOLOGIC MALIGNANCIES PROGRESS NOTE    IDENTIFYING STATEMENT   Wilton Guzmán (Wilton) is a 33 y.o. 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      ONCOLOGY HISTORY:    1. Chronic myeloid leukemia, initially presenting in blast phase   A. 2018: Began developing left-sided abdominal pain - eventually requiring evaluation - WBC ~150 at  base; transferred to [facility] in [place], [place]   B. BMBx showed 20% myelomonoblasts concerning for CML in blast phase; bcr-abl positive (returned for Day 8 of induction therapy).    C. 2018: Began 7+3 induction chemotherapy. Began dasatinib (dose-adjusted) on day 8.   D. 2018: Transferred to [facility]   E. 2018: BMBx showed variably cellular marrow (10-40%) with 9% CD34+ blasts, concerning for residual disease.   F. 2018: Reinduction with MEC (mitoxantrone, etoposide, cytarabine) chemotherapy.    G. 2018: BMBx shows hypocellular bone marrow with no definite morphologic or immunophenotypic evidence of residual leukemia; bcr-abl p210 transcript was detected at 0.5%   H. Delay in clinic follow-up due to challenges with  insurance   I. 2018: Follow-up in clinic; WBC 6.77 (normal diff); Hgb 11.2 g/dl; Plt 298; BMBx shows 30% cellular marrow with no morphologic evidence of residual leukemia; bcr-abl p210 detected at 0.05% on international scale (MR 3.3).    J. 2018: BMBx shows 40% cellular marrow with no morphologic evidence of leukemia. Chromosomes 46, XY. Bcr-abl p210 detected at 0.1% on international scale (MR 3.0).    K. 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 Engrafted on Day +12. Course complicated by C. Difficile colitis.     INTERVAL HISTORY:      Mr. Guzmán presents to clinic with his mother for f/u allo SCT. He is Day +76. Pt reports feeling well overall. Continues with good  appetite and oral fluid intake. Continues with daily headaches from dasatinib. He states he usually wakes up in the middle of the night around 5am. Per Dr. Pantoja, it is okay for him to take OTC ibuprofen for these headaches if necessary. He has been taking Aleve once daily, stating headaches resolve around 12 pm. If his side effects persist and continue to remain limiting to him, Dr. Pantoja will consider lowering his dose at next visit. Pt denies fevers, chills, aches, night sweat, SOB, rashes, mouth sores, palpitations, fatigue.    Past Medical History, Past Social History and Past Family History have been reviewed and are unchanged except as noted in the interval history.    MEDICATIONS:     Current Outpatient Medications on File Prior to Visit   Medication Sig Dispense Refill    amLODIPine (NORVASC) 5 MG tablet Take 2 tablets (10 mg total) by mouth once daily. 60 tablet 11    atovaquone (MEPRON) 750 mg/5 mL Susp Take 10 mLs (1,500 mg total) by mouth once daily. Starting on 11/8/18 300 mL 5    budesonide (ENTOCORT EC) 3 mg capsule Take 3 capsules (9 mg total) by mouth once daily. 90 capsule 2    dasatinib (SPRYCEL) 140 mg Tab Take 1 tablet by mouth once daily Start on day +60 (12/8/18). 30 tablet 11    fluconazole (DIFLUCAN) 200 MG Tab Take 2 tablets (400 mg total) by mouth once daily. 60 tablet 5    fluticasone (FLONASE) 50 mcg/actuation nasal spray 1 spray (50 mcg total) by Each Nare route once daily. 16 g 2    magnesium oxide (MAG-OX) 400 mg (241.3 mg magnesium) tablet Take 3 tablets (1,200 mg total) by mouth 2 (two) times daily.  0    tacrolimus (PROGRAF) 0.5 MG Cap Take 1 mg (2 capsules) by mouth in the morning and 1.5 mg (3 capsules) by mouth in the evening. 150 capsule 2    triamcinolone acetonide 0.1% (KENALOG) 0.1 % ointment Apply topically 2 (two) times daily. 80 g 2    valGANciclovir (VALCYTE) 450 mg Tab Take 2 tablets (900 mg total) by mouth once daily. 120 tablet 3     No current  "facility-administered medications on file prior to visit.        ALLERGIES: Review of patient's allergies indicates:  No Known Allergies     ROS:       Review of Systems   Constitutional: Negative for fatigue, diaphoresis, fever and unexpected weight change.   HENT:   Negative for lump/mass and sore throat.    Eyes: Negative for icterus.   Respiratory: Negative for cough and shortness of breath.    Cardiovascular: Negative for chest pain and palpitations.   Gastrointestinal: Negative for abdominal distention, diarrhea, constipation, nausea and vomiting.   Genitourinary: Negative for dysuria and frequency.    Musculoskeletal: Negative for arthralgias, gait problem and myalgias.   Skin: Negative for rash or itching.   Neurological: Negative for dizziness, gait problem. Headaches with dasatinib  Hematological: Negative for adenopathy. Does not bruise/bleed easily.   Psychiatric/Behavioral: Negative for depression or anxiety.    PHYSICAL EXAM:  Vitals:    12/24/18 1003   Weight: 66.4 kg (146 lb 6.2 oz)   Height: 5' 11" (1.803 m)   PainSc: 0-No pain       KARNOFSKY PERFORMANCE STATUS 80%  ECOG 1    Physical Exam   Constitutional: He is oriented to person, place, and time. He appears well-developed and well-nourished. No distress.   HENT:   Head: Normocephalic and atraumatic.   Mouth/Throat: Mucous membranes are normal. No oral lesions.    Eyes: Conjunctivae are normal.   Neck: No thyromegaly present.   Cardiovascular: Normal rate, regular rhythm and normal heart sounds.   No murmur heard.  Pulmonary/Chest: Breath sounds normal. He has no wheezes. He has no rales.   Central venous catheter in place.  CDI; no S/S infection.  Abdominal: Soft. He exhibits no distension and no mass. There is no splenomegaly or hepatomegaly. There is no tenderness.   Neurological: He is alert and oriented to person, place, and time. He has normal strength. Coordination normal.   Skin: No rash observed.     LAB:   Results for orders placed or " performed in visit on 12/24/18   Magnesium   Result Value Ref Range    Magnesium 1.9 1.6 - 2.6 mg/dL   Phosphorus   Result Value Ref Range    Phosphorus 2.4 (L) 2.7 - 4.5 mg/dL   TACROLIMUS LEVEL   Result Value Ref Range    Tacrolimus Lvl 6.3 5.0 - 15.0 ng/mL   CBC auto differential   Result Value Ref Range    WBC 4.24 3.90 - 12.70 K/uL    RBC 3.48 (L) 4.60 - 6.20 M/uL    Hemoglobin 11.1 (L) 14.0 - 18.0 g/dL    Hematocrit 32.8 (L) 40.0 - 54.0 %    MCV 94 82 - 98 fL    MCH 31.9 (H) 27.0 - 31.0 pg    MCHC 33.8 32.0 - 36.0 g/dL    RDW 16.7 (H) 11.5 - 14.5 %    Platelets 56 (L) 150 - 350 K/uL    MPV 8.9 (L) 9.2 - 12.9 fL    Immature Granulocytes 0.5 0.0 - 0.5 %    Gran # (ANC) 2.8 1.8 - 7.7 K/uL    Immature Grans (Abs) 0.02 0.00 - 0.04 K/uL    Lymph # 1.1 1.0 - 4.8 K/uL    Mono # 0.3 0.3 - 1.0 K/uL    Eos # 0.0 0.0 - 0.5 K/uL    Baso # 0.01 0.00 - 0.20 K/uL    nRBC 0 0 /100 WBC    Gran% 65.8 38.0 - 73.0 %    Lymph% 26.7 18.0 - 48.0 %    Mono% 6.6 4.0 - 15.0 %    Eosinophil% 0.2 0.0 - 8.0 %    Basophil% 0.2 0.0 - 1.9 %    Differential Method Automated    Comprehensive metabolic panel   Result Value Ref Range    Sodium 141 136 - 145 mmol/L    Potassium 3.5 3.5 - 5.1 mmol/L    Chloride 107 95 - 110 mmol/L    CO2 24 23 - 29 mmol/L    Glucose 143 (H) 70 - 110 mg/dL    BUN, Bld 18 6 - 20 mg/dL    Creatinine 1.0 0.5 - 1.4 mg/dL    Calcium 9.2 8.7 - 10.5 mg/dL    Total Protein 6.2 6.0 - 8.4 g/dL    Albumin 3.7 3.5 - 5.2 g/dL    Total Bilirubin 0.4 0.1 - 1.0 mg/dL    Alkaline Phosphatase 76 55 - 135 U/L    AST 40 10 - 40 U/L    ALT 71 (H) 10 - 44 U/L    Anion Gap 10 8 - 16 mmol/L    eGFR if African American >60.0 >60 mL/min/1.73 m^2    eGFR if non African American >60.0 >60 mL/min/1.73 m^2   Type & Screen   Result Value Ref Range    Group & Rh B POS     Indirect Wes NEG        PROBLEMS ASSESSED THIS VISIT:    1. S/P allogeneic bone marrow transplant    2. Chronic myeloid leukemia, BCR/ABL-positive, in remission    3. Acute  GVHD    4. Cytomegalovirus (CMV) viremia    5. Hypomagnesemia    6. Secondary hypertension    7. Hypophosphatemia    8. Thrombocytopenia    9. Anemia associated with chemotherapy        PLAN:        History allo transplant for CML  - See oncology history above  - s/p alloSCT on 10/9/2018  - Neutrophil engraftment on Day +12.   - Currently Day +76  - plan for +30 & +100 & +1 year restaging  - day +30 BMBX was performed in clinic 11/13/18, no evidence of CML, BCR/ABL 0.005% (MR 4.3). Chimerism studies show 60% donor in CD3+ fraction and 100% donor in CD33+ fraction.   - Repeat chimers from peripheral blood on 12/10 shows 80% donor in CD3+ cells and 100% donor in CD33+ cells.  - Restart dasatinib 140 mg daily on 12/10 (Pt states he has only been taking every other day due to nausea and headaches, okay to use ibuprofen per Dr. Pantoja. Continue daily dose for now and reassess the possibility of lowering his dose at next visit)     GVHD   - Continue oral budesonide. Diarrhea controlled.  - C. Diff negative. Diarrhea improved with imodium. Taking every other day. Pt understands that imodium can cause constipation.  - Continue tacrolimus prophylaxis  - tacro level 6.3 today (12/24)- Continue  1 mg qAM and 1.5 mg qPM  - gvhd charting with each clinic visit     ID  - Completed PO vancomycin for C. Diff  - Continue ppx with acyclovir, fluconazole  - Begin atovaquone ppx at Day +30  - Monitor CMV BIW; EBV once weekly  - EBV negative to date  -  (11/29), 160 (12/3), 44 (12/6), <35 (12/13); Started on valcyte 900mg BID. Dose changed per ID on 12/18 to 900mg daily as CMV <35. Will continue this dose for 3 months  - CVC remains in place without S/S infection- consider removing in the near future    Cytopenias  - Today: anemia stable, hgb 11.1, Plt 56K today. No transfusions indicated  - Donor has sickle cell trait. Some anemia is expected long-term   - platelets likely dropping due to dasatinib and valcyte. Considering  lowering dose of dasatinib due to side effects     FEN/GI  - previous hypomagnesemia  - On Mg ox 1200 BID with no side effects  - Mag 1.9 today, continue current dose as above  - hypokalemia, 3.4 on 12/3, level normal today, continue to monitor  - hypophosphatemia today, level 2.4. Will start on neutra phos 1 packet BID    Hypertension  - Amlodipine 10 mg daily  - BP stable today    Follow-up: Twice weekly labs and appts with Dr. Pantoja or NP through day +100 with CBC, CMP, tacro, Mag, CMV.  EBV once weekly only. Has already been scheduled through January.     Haily Serna NP  Hematology and Stem Cell Transplant

## 2018-12-24 NOTE — NURSING
Labs drawn from CVC. Dressing changed. All 3 lumens flushed, heparin locked, caps changed. No complications. Pt discharged to MD appt independently.

## 2018-12-24 NOTE — PROGRESS NOTES
HEMATOLOGIC MALIGNANCIES PROGRESS NOTE    IDENTIFYING STATEMENT   Wilton Guzmán (Wilton) is a 33 y.o. male with a  of 1985 from [place], [place] with the diagnosis of CML in blast phase.      ONCOLOGY HISTORY:    1. Chronic myeloid leukemia, initially presenting in blast phase   A. 2018: Began developing left-sided abdominal pain - eventually requiring evaluation - WBC ~150 at  base; transferred to [facility] in [place], [place]   B. BMBx showed 20% myelomonoblasts concerning for CML in blast phase; bcr-abl positive (returned for Day 8 of induction therapy).    C. 2018: Began 7+3 induction chemotherapy. Began dasatinib (dose-adjusted) on day 8.   D. 2018: Transferred to [facility]   E. 2018: BMBx showed variably cellular marrow (10-40%) with 9% CD34+ blasts, concerning for residual disease.   F. 2018: Reinduction with MEC (mitoxantrone, etoposide, cytarabine) chemotherapy.    G. 2018: BMBx shows hypocellular bone marrow with no definite morphologic or immunophenotypic evidence of residual leukemia; bcr-abl p210 transcript was detected at 0.5%   H. Delay in clinic follow-up due to challenges with  insurance   I. 2018: Follow-up in clinic; WBC 6.77 (normal diff); Hgb 11.2 g/dl; Plt 298; BMBx shows 30% cellular marrow with no morphologic evidence of residual leukemia; bcr-abl p210 detected at 0.05% on international scale (MR 3.3).    J. 2018: BMBx shows 40% cellular marrow with no morphologic evidence of leukemia. Chromosomes 46, XY. Bcr-abl p210 detected at 0.1% on international scale (MR 3.0).    K. 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 Engrafted on Day +12. Course complicated by C. Difficile colitis.     INTERVAL HISTORY:      Mr. Guzmán presents to clinic with his wife for f/u allo SCT. He is Day +78. Pt reports feeling well overall. Continues with good appetite  and oral fluid intake. States his headaches from dasatinib are starting to improve, not as severe. Pt denies fevers, chills, aches, night sweat, SOB, rashes, mouth sores, palpitations, fatigue.    Past Medical History, Past Social History and Past Family History have been reviewed and are unchanged except as noted in the interval history.    MEDICATIONS:     Current Outpatient Medications on File Prior to Visit   Medication Sig Dispense Refill    amLODIPine (NORVASC) 5 MG tablet Take 2 tablets (10 mg total) by mouth once daily. 60 tablet 11    atovaquone (MEPRON) 750 mg/5 mL Susp Take 10 mLs (1,500 mg total) by mouth once daily. Starting on 11/8/18 300 mL 5    budesonide (ENTOCORT EC) 3 mg capsule Take 3 capsules (9 mg total) by mouth once daily. 90 capsule 2    dasatinib (SPRYCEL) 140 mg Tab Take 1 tablet by mouth once daily Start on day +60 (12/8/18). 30 tablet 11    fluconazole (DIFLUCAN) 200 MG Tab Take 2 tablets (400 mg total) by mouth once daily. 60 tablet 5    fluticasone (FLONASE) 50 mcg/actuation nasal spray 1 spray (50 mcg total) by Each Nare route once daily. 16 g 2    magnesium oxide (MAG-OX) 400 mg (241.3 mg magnesium) tablet Take 3 tablets (1,200 mg total) by mouth 2 (two) times daily.  0    potassium, sodium phosphates (PHOS-NAK) 280-160-250 mg PwPk Mix 1 packet with liquid and take by mouth 2 (two) times daily. 60 packet 3    tacrolimus (PROGRAF) 0.5 MG Cap Take 1 mg (2 capsules) by mouth in the morning and 1.5 mg (3 capsules) by mouth in the evening. 150 capsule 2    triamcinolone acetonide 0.1% (KENALOG) 0.1 % ointment Apply topically 2 (two) times daily. 80 g 2    valGANciclovir (VALCYTE) 450 mg Tab Take 2 tablets (900 mg total) by mouth once daily. 120 tablet 3     No current facility-administered medications on file prior to visit.        ALLERGIES: Review of patient's allergies indicates:  No Known Allergies     ROS:       Review of Systems   Constitutional: Negative for fatigue,  "diaphoresis, fever and unexpected weight change.   HENT:   Negative for lump/mass and sore throat.    Eyes: Negative for icterus.   Respiratory: Negative for cough and shortness of breath.    Cardiovascular: Negative for chest pain and palpitations.   Gastrointestinal: Negative for abdominal distention, diarrhea, constipation, nausea and vomiting.   Genitourinary: Negative for dysuria and frequency.    Musculoskeletal: Negative for arthralgias, gait problem and myalgias.   Skin: Negative for rash or itching.   Neurological: Negative for dizziness, gait problem. Headaches with dasatinib, improving  Hematological: Negative for adenopathy. Does not bruise/bleed easily.   Psychiatric/Behavioral: Negative for depression or anxiety.    PHYSICAL EXAM:  Vitals:    12/26/18 1116   BP: 132/74   Pulse: 80   Temp: 99 °F (37.2 °C)   SpO2: 100%   Weight: 66.7 kg (147 lb 0.8 oz)   Height: 5' 11" (1.803 m)   PainSc: 0-No pain       KARNOFSKY PERFORMANCE STATUS 80%  ECOG 1    Physical Exam   Constitutional: He is oriented to person, place, and time. He appears well-developed and well-nourished. No distress.   HENT:   Head: Normocephalic and atraumatic.   Mouth/Throat: Mucous membranes are normal. No oral lesions.    Eyes: Conjunctivae are normal.   Neck: No thyromegaly present.   Cardiovascular: Normal rate, regular rhythm and normal heart sounds.   No murmur heard.  Pulmonary/Chest: Breath sounds normal. He has no wheezes. He has no rales.   Central venous catheter in place.  CDI; no S/S infection.  Abdominal: Soft. He exhibits no distension and no mass. There is no splenomegaly or hepatomegaly. There is no tenderness.   Neurological: He is alert and oriented to person, place, and time. He has normal strength. Coordination normal.   Skin: No rash observed.     LAB:   Results for orders placed or performed in visit on 12/26/18   Comprehensive metabolic panel   Result Value Ref Range    Sodium 140 136 - 145 mmol/L    Potassium 3.7 " 3.5 - 5.1 mmol/L    Chloride 108 95 - 110 mmol/L    CO2 26 23 - 29 mmol/L    Glucose 104 70 - 110 mg/dL    BUN, Bld 14 6 - 20 mg/dL    Creatinine 0.8 0.5 - 1.4 mg/dL    Calcium 9.1 8.7 - 10.5 mg/dL    Total Protein 6.0 6.0 - 8.4 g/dL    Albumin 3.5 3.5 - 5.2 g/dL    Total Bilirubin 0.4 0.1 - 1.0 mg/dL    Alkaline Phosphatase 71 55 - 135 U/L    AST 35 10 - 40 U/L    ALT 67 (H) 10 - 44 U/L    Anion Gap 6 (L) 8 - 16 mmol/L    eGFR if African American >60.0 >60 mL/min/1.73 m^2    eGFR if non African American >60.0 >60 mL/min/1.73 m^2   Magnesium   Result Value Ref Range    Magnesium 1.7 1.6 - 2.6 mg/dL   Phosphorus   Result Value Ref Range    Phosphorus 2.3 (L) 2.7 - 4.5 mg/dL   CBC auto differential   Result Value Ref Range    WBC 4.36 3.90 - 12.70 K/uL    RBC 3.07 (L) 4.60 - 6.20 M/uL    Hemoglobin 9.9 (L) 14.0 - 18.0 g/dL    Hematocrit 28.7 (L) 40.0 - 54.0 %    MCV 94 82 - 98 fL    MCH 32.2 (H) 27.0 - 31.0 pg    MCHC 34.5 32.0 - 36.0 g/dL    RDW 17.2 (H) 11.5 - 14.5 %    Platelets 73 (L) 150 - 350 K/uL    MPV 10.6 9.2 - 12.9 fL    Immature Granulocytes 0.7 (H) 0.0 - 0.5 %    Gran # (ANC) 3.4 1.8 - 7.7 K/uL    Immature Grans (Abs) 0.03 0.00 - 0.04 K/uL    Lymph # 0.5 (L) 1.0 - 4.8 K/uL    Mono # 0.4 0.3 - 1.0 K/uL    Eos # 0.0 0.0 - 0.5 K/uL    Baso # 0.00 0.00 - 0.20 K/uL    nRBC 0 0 /100 WBC    Gran% 78.0 (H) 38.0 - 73.0 %    Lymph% 11.9 (L) 18.0 - 48.0 %    Mono% 9.2 4.0 - 15.0 %    Eosinophil% 0.2 0.0 - 8.0 %    Basophil% 0.0 0.0 - 1.9 %    Differential Method Automated        PROBLEMS ASSESSED THIS VISIT:    1. S/P allogeneic bone marrow transplant    2. Chronic myeloid leukemia, BCR/ABL-positive, in remission    3. Cytopenia    4. Cytomegalovirus (CMV) viremia    5. Hypomagnesemia    6. Hypophosphatemia    7. Secondary hypertension        PLAN:        History allo transplant for CML  - See oncology history above  - s/p alloSCT on 10/9/2018  - Neutrophil engraftment on Day +12.   - Currently Day +78  - plan for  +30 & +100 & +1 year restaging  - day +30 BMBX was performed in clinic 11/13/18, no evidence of CML, BCR/ABL 0.005% (MR 4.3). Chimerism studies show 60% donor in CD3+ fraction and 100% donor in CD33+ fraction.   - Repeat chimers from peripheral blood on 12/10/18 shows 80% donor in CD3+ cells and 100% donor in CD33+ cells.  - Restart dasatinib 140 mg daily on 12/10 (monitor headaches and possibility of decreasing dose)    GVHD   - Continue oral budesonide. Diarrhea controlled. Can discontinue budesonide at next visit as symptoms have resolved  - C. Diff negative. Diarrhea improved with imodium. Taking every other day. Pt understands that imodium can cause constipation.  - Continue tacrolimus prophylaxis  - tacro level 5.4 today; Continue 1 mg qAM and 1.5 mg qPM  - gvhd charting with each clinic visit; begin cGVHD charting at day +100     ID  - Completed PO vancomycin for C. Diff  - Continue ppx with acyclovir, fluconazole  - Begin atovaquone ppx at Day +30  - Monitor CMV BIW; EBV once weekly  - EBV negative to date  - CMV <35 since 12/13; treatment dosing of valcyte 900mg BID was changed to ppx dosing per ID on 12/18/18 to valcyte 900mg daily. Will continue this dose for 3 months (end date mid March 2019)  - CVC remains in place without S/S infection- consider removing in the near future    Cytopenias- Anemia and thrombocytopenia  - multifactorial  - Today: WBC 4.36, hgb 9.9, Plt 73K today. No transfusions indicated  - Donor has sickle cell trait. Some anemia is expected long-term   - Platelets likely dropping due to dasatinib and valcyte. May consider lowering dose of dasatinib due to side effects in future     FEN/GI  - previous hypomagnesemia  - On Mg ox 1200 BID with no side effects  - Mag 1.7 today, continue current dose as above  - Potassium level normal today, continue to monitor  - hypophosphatemia today, level 2.3. Was started on neutra phos 1 packet BID 12/24, will continue BID dose for now as patient just  received them yesterday, can increase dose if level still low at next visit    Hypertension  - Amlodipine 10 mg daily  - BP stable today    Follow-up: Twice weekly labs and appts with Dr. Pantoja or NP through day +100 with CBC, CMP, tacro, Mag, CMV.  EBV once weekly only. Has already been scheduled through January.       Nola Chance NP  Hematology and Stem Cell Transplant

## 2018-12-26 ENCOUNTER — OFFICE VISIT (OUTPATIENT)
Dept: HEMATOLOGY/ONCOLOGY | Facility: CLINIC | Age: 33
End: 2018-12-26
Payer: OTHER GOVERNMENT

## 2018-12-26 ENCOUNTER — INFUSION (OUTPATIENT)
Dept: INFUSION THERAPY | Facility: HOSPITAL | Age: 33
End: 2018-12-26
Attending: INTERNAL MEDICINE
Payer: OTHER GOVERNMENT

## 2018-12-26 VITALS
DIASTOLIC BLOOD PRESSURE: 74 MMHG | SYSTOLIC BLOOD PRESSURE: 132 MMHG | HEART RATE: 80 BPM | OXYGEN SATURATION: 100 % | WEIGHT: 147.06 LBS | HEIGHT: 71 IN | TEMPERATURE: 99 F | BODY MASS INDEX: 20.59 KG/M2

## 2018-12-26 DIAGNOSIS — E83.39 HYPOPHOSPHATEMIA: ICD-10-CM

## 2018-12-26 DIAGNOSIS — Z94.81 S/P ALLOGENEIC BONE MARROW TRANSPLANT: ICD-10-CM

## 2018-12-26 DIAGNOSIS — D75.9 CYTOPENIA: ICD-10-CM

## 2018-12-26 DIAGNOSIS — B25.9 CYTOMEGALOVIRUS (CMV) VIREMIA: ICD-10-CM

## 2018-12-26 DIAGNOSIS — C92.11 CHRONIC MYELOID LEUKEMIA, BCR/ABL-POSITIVE, IN REMISSION: ICD-10-CM

## 2018-12-26 DIAGNOSIS — E83.42 HYPOMAGNESEMIA: ICD-10-CM

## 2018-12-26 DIAGNOSIS — C92.11 CHRONIC MYELOID LEUKEMIA, BCR/ABL-POSITIVE, IN REMISSION: Primary | ICD-10-CM

## 2018-12-26 DIAGNOSIS — Z94.81 S/P ALLOGENEIC BONE MARROW TRANSPLANT: Primary | ICD-10-CM

## 2018-12-26 DIAGNOSIS — I15.9 SECONDARY HYPERTENSION: ICD-10-CM

## 2018-12-26 DIAGNOSIS — C92.10 CML (CHRONIC MYELOCYTIC LEUKEMIA): ICD-10-CM

## 2018-12-26 LAB
ABO + RH BLD: NORMAL
ALBUMIN SERPL BCP-MCNC: 3.5 G/DL
ALP SERPL-CCNC: 71 U/L
ALT SERPL W/O P-5'-P-CCNC: 67 U/L
ANION GAP SERPL CALC-SCNC: 6 MMOL/L
AST SERPL-CCNC: 35 U/L
BASOPHILS # BLD AUTO: 0 K/UL
BASOPHILS NFR BLD: 0 %
BILIRUB SERPL-MCNC: 0.4 MG/DL
BLD GP AB SCN CELLS X3 SERPL QL: NORMAL
BUN SERPL-MCNC: 14 MG/DL
CALCIUM SERPL-MCNC: 9.1 MG/DL
CHLORIDE SERPL-SCNC: 108 MMOL/L
CMV DNA SERPL NAA+PROBE-ACNC: <35 IU/ML
CO2 SERPL-SCNC: 26 MMOL/L
CREAT SERPL-MCNC: 0.8 MG/DL
DIFFERENTIAL METHOD: ABNORMAL
EOSINOPHIL # BLD AUTO: 0 K/UL
EOSINOPHIL NFR BLD: 0.2 %
ERYTHROCYTE [DISTWIDTH] IN BLOOD BY AUTOMATED COUNT: 17.2 %
EST. GFR  (AFRICAN AMERICAN): >60 ML/MIN/1.73 M^2
EST. GFR  (NON AFRICAN AMERICAN): >60 ML/MIN/1.73 M^2
GLUCOSE SERPL-MCNC: 104 MG/DL
HCT VFR BLD AUTO: 28.7 %
HGB BLD-MCNC: 9.9 G/DL
IMM GRANULOCYTES # BLD AUTO: 0.03 K/UL
IMM GRANULOCYTES NFR BLD AUTO: 0.7 %
LYMPHOCYTES # BLD AUTO: 0.5 K/UL
LYMPHOCYTES NFR BLD: 11.9 %
MAGNESIUM SERPL-MCNC: 1.7 MG/DL
MCH RBC QN AUTO: 32.2 PG
MCHC RBC AUTO-ENTMCNC: 34.5 G/DL
MCV RBC AUTO: 94 FL
MONOCYTES # BLD AUTO: 0.4 K/UL
MONOCYTES NFR BLD: 9.2 %
NEUTROPHILS # BLD AUTO: 3.4 K/UL
NEUTROPHILS NFR BLD: 78 %
NRBC BLD-RTO: 0 /100 WBC
PHOSPHATE SERPL-MCNC: 2.3 MG/DL
PLATELET # BLD AUTO: 73 K/UL
PMV BLD AUTO: 10.6 FL
POTASSIUM SERPL-SCNC: 3.7 MMOL/L
PROT SERPL-MCNC: 6 G/DL
RBC # BLD AUTO: 3.07 M/UL
SODIUM SERPL-SCNC: 140 MMOL/L
TACROLIMUS BLD-MCNC: 5.4 NG/ML
WBC # BLD AUTO: 4.36 K/UL

## 2018-12-26 PROCEDURE — 36592 COLLECT BLOOD FROM PICC: CPT

## 2018-12-26 PROCEDURE — 99215 OFFICE O/P EST HI 40 MIN: CPT | Mod: S$PBB,,, | Performed by: NURSE PRACTITIONER

## 2018-12-26 PROCEDURE — 63600175 PHARM REV CODE 636 W HCPCS: Performed by: INTERNAL MEDICINE

## 2018-12-26 PROCEDURE — 99999 PR PBB SHADOW E&M-EST. PATIENT-LVL III: CPT | Mod: PBBFAC,,, | Performed by: NURSE PRACTITIONER

## 2018-12-26 PROCEDURE — 80197 ASSAY OF TACROLIMUS: CPT

## 2018-12-26 PROCEDURE — 80053 COMPREHEN METABOLIC PANEL: CPT

## 2018-12-26 PROCEDURE — 36415 COLL VENOUS BLD VENIPUNCTURE: CPT

## 2018-12-26 PROCEDURE — 83735 ASSAY OF MAGNESIUM: CPT

## 2018-12-26 PROCEDURE — 99213 OFFICE O/P EST LOW 20 MIN: CPT | Mod: PBBFAC,25 | Performed by: NURSE PRACTITIONER

## 2018-12-26 PROCEDURE — 85025 COMPLETE CBC W/AUTO DIFF WBC: CPT

## 2018-12-26 PROCEDURE — 84100 ASSAY OF PHOSPHORUS: CPT

## 2018-12-26 PROCEDURE — 25000003 PHARM REV CODE 250: Performed by: INTERNAL MEDICINE

## 2018-12-26 PROCEDURE — 86850 RBC ANTIBODY SCREEN: CPT

## 2018-12-26 PROCEDURE — A4216 STERILE WATER/SALINE, 10 ML: HCPCS | Performed by: INTERNAL MEDICINE

## 2018-12-26 RX ORDER — SODIUM CHLORIDE 0.9 % (FLUSH) 0.9 %
10 SYRINGE (ML) INJECTION
Status: CANCELLED | OUTPATIENT
Start: 2018-12-26

## 2018-12-26 RX ORDER — SODIUM CHLORIDE 0.9 % (FLUSH) 0.9 %
10 SYRINGE (ML) INJECTION
Status: COMPLETED | OUTPATIENT
Start: 2018-12-26 | End: 2018-12-26

## 2018-12-26 RX ORDER — HEPARIN 100 UNIT/ML
500 SYRINGE INTRAVENOUS
Status: CANCELLED | OUTPATIENT
Start: 2018-12-26

## 2018-12-26 RX ORDER — HEPARIN 100 UNIT/ML
500 SYRINGE INTRAVENOUS
Status: COMPLETED | OUTPATIENT
Start: 2018-12-26 | End: 2018-12-26

## 2018-12-26 RX ADMIN — HEPARIN 500 UNITS: 100 SYRINGE at 10:12

## 2018-12-26 RX ADMIN — Medication 10 ML: at 10:12

## 2018-12-26 NOTE — NURSING
Labs drawn from CVC without problems. No new complaints. Lumens x3 flushed and heparin locked. Dressing not due to be changed.

## 2018-12-27 LAB
CMV DNA SERPL NAA+PROBE-ACNC: <35 IU/ML
EBV DNA BY PCR: NORMAL IU/ML

## 2018-12-31 ENCOUNTER — INFUSION (OUTPATIENT)
Dept: INFUSION THERAPY | Facility: HOSPITAL | Age: 33
End: 2018-12-31
Attending: INTERNAL MEDICINE
Payer: OTHER GOVERNMENT

## 2018-12-31 ENCOUNTER — OFFICE VISIT (OUTPATIENT)
Dept: HEMATOLOGY/ONCOLOGY | Facility: CLINIC | Age: 33
End: 2018-12-31
Payer: OTHER GOVERNMENT

## 2018-12-31 VITALS
HEIGHT: 71 IN | TEMPERATURE: 99 F | DIASTOLIC BLOOD PRESSURE: 75 MMHG | RESPIRATION RATE: 16 BRPM | WEIGHT: 146.81 LBS | BODY MASS INDEX: 20.55 KG/M2 | OXYGEN SATURATION: 98 % | HEART RATE: 100 BPM | SYSTOLIC BLOOD PRESSURE: 124 MMHG

## 2018-12-31 DIAGNOSIS — Z94.81 S/P ALLOGENEIC BONE MARROW TRANSPLANT: ICD-10-CM

## 2018-12-31 DIAGNOSIS — D69.6 THROMBOCYTOPENIA: ICD-10-CM

## 2018-12-31 DIAGNOSIS — D57.3 SICKLE CELL TRAIT: ICD-10-CM

## 2018-12-31 DIAGNOSIS — C92.11 CHRONIC MYELOID LEUKEMIA, BCR/ABL-POSITIVE, IN REMISSION: ICD-10-CM

## 2018-12-31 DIAGNOSIS — C92.11 CHRONIC MYELOID LEUKEMIA, BCR/ABL-POSITIVE, IN REMISSION: Primary | ICD-10-CM

## 2018-12-31 DIAGNOSIS — C92.10 CML (CHRONIC MYELOCYTIC LEUKEMIA): Primary | ICD-10-CM

## 2018-12-31 LAB
ABO + RH BLD: NORMAL
ALBUMIN SERPL BCP-MCNC: 3.7 G/DL
ALP SERPL-CCNC: 74 U/L
ALT SERPL W/O P-5'-P-CCNC: 59 U/L
ANION GAP SERPL CALC-SCNC: 10 MMOL/L
AST SERPL-CCNC: 36 U/L
BASOPHILS # BLD AUTO: 0.01 K/UL
BASOPHILS NFR BLD: 0.2 %
BILIRUB SERPL-MCNC: 0.5 MG/DL
BLD GP AB SCN CELLS X3 SERPL QL: NORMAL
BUN SERPL-MCNC: 15 MG/DL
CALCIUM SERPL-MCNC: 9.1 MG/DL
CHLORIDE SERPL-SCNC: 106 MMOL/L
CO2 SERPL-SCNC: 24 MMOL/L
CREAT SERPL-MCNC: 1 MG/DL
DIFFERENTIAL METHOD: ABNORMAL
EOSINOPHIL # BLD AUTO: 0 K/UL
EOSINOPHIL NFR BLD: 0.2 %
ERYTHROCYTE [DISTWIDTH] IN BLOOD BY AUTOMATED COUNT: 18.2 %
EST. GFR  (AFRICAN AMERICAN): >60 ML/MIN/1.73 M^2
EST. GFR  (NON AFRICAN AMERICAN): >60 ML/MIN/1.73 M^2
GLUCOSE SERPL-MCNC: 128 MG/DL
HCT VFR BLD AUTO: 31.5 %
HGB BLD-MCNC: 10.6 G/DL
IMM GRANULOCYTES # BLD AUTO: 0.03 K/UL
IMM GRANULOCYTES NFR BLD AUTO: 0.6 %
LYMPHOCYTES # BLD AUTO: 1.3 K/UL
LYMPHOCYTES NFR BLD: 27.7 %
MAGNESIUM SERPL-MCNC: 1.8 MG/DL
MCH RBC QN AUTO: 32 PG
MCHC RBC AUTO-ENTMCNC: 33.7 G/DL
MCV RBC AUTO: 95 FL
MONOCYTES # BLD AUTO: 0.3 K/UL
MONOCYTES NFR BLD: 7.2 %
NEUTROPHILS # BLD AUTO: 3 K/UL
NEUTROPHILS NFR BLD: 64.1 %
NRBC BLD-RTO: 1 /100 WBC
PHOSPHATE SERPL-MCNC: 2.7 MG/DL
PLATELET # BLD AUTO: 112 K/UL
PMV BLD AUTO: 9.5 FL
POTASSIUM SERPL-SCNC: 3.7 MMOL/L
PROT SERPL-MCNC: 6.2 G/DL
RBC # BLD AUTO: 3.31 M/UL
SODIUM SERPL-SCNC: 140 MMOL/L
TACROLIMUS BLD-MCNC: 6.2 NG/ML
WBC # BLD AUTO: 4.7 K/UL

## 2018-12-31 PROCEDURE — 87799 DETECT AGENT NOS DNA QUANT: CPT

## 2018-12-31 PROCEDURE — 86901 BLOOD TYPING SEROLOGIC RH(D): CPT

## 2018-12-31 PROCEDURE — 99215 OFFICE O/P EST HI 40 MIN: CPT | Mod: S$PBB,,, | Performed by: INTERNAL MEDICINE

## 2018-12-31 PROCEDURE — 83735 ASSAY OF MAGNESIUM: CPT

## 2018-12-31 PROCEDURE — 99213 OFFICE O/P EST LOW 20 MIN: CPT | Mod: PBBFAC,25 | Performed by: INTERNAL MEDICINE

## 2018-12-31 PROCEDURE — 63600175 PHARM REV CODE 636 W HCPCS: Performed by: INTERNAL MEDICINE

## 2018-12-31 PROCEDURE — 84100 ASSAY OF PHOSPHORUS: CPT

## 2018-12-31 PROCEDURE — 99999 PR PBB SHADOW E&M-EST. PATIENT-LVL III: CPT | Mod: PBBFAC,,, | Performed by: INTERNAL MEDICINE

## 2018-12-31 PROCEDURE — 85025 COMPLETE CBC W/AUTO DIFF WBC: CPT

## 2018-12-31 PROCEDURE — 99215 PR OFFICE/OUTPT VISIT, EST, LEVL V, 40-54 MIN: ICD-10-PCS | Mod: S$PBB,,, | Performed by: INTERNAL MEDICINE

## 2018-12-31 PROCEDURE — A4216 STERILE WATER/SALINE, 10 ML: HCPCS | Performed by: INTERNAL MEDICINE

## 2018-12-31 PROCEDURE — 99999 PR PBB SHADOW E&M-EST. PATIENT-LVL III: ICD-10-PCS | Mod: PBBFAC,,, | Performed by: INTERNAL MEDICINE

## 2018-12-31 PROCEDURE — 36592 COLLECT BLOOD FROM PICC: CPT

## 2018-12-31 PROCEDURE — 25000003 PHARM REV CODE 250: Performed by: INTERNAL MEDICINE

## 2018-12-31 PROCEDURE — 80197 ASSAY OF TACROLIMUS: CPT

## 2018-12-31 PROCEDURE — 80053 COMPREHEN METABOLIC PANEL: CPT

## 2018-12-31 RX ORDER — SODIUM CHLORIDE 0.9 % (FLUSH) 0.9 %
10 SYRINGE (ML) INJECTION
Status: CANCELLED | OUTPATIENT
Start: 2018-12-31

## 2018-12-31 RX ORDER — HEPARIN 100 UNIT/ML
500 SYRINGE INTRAVENOUS
Status: COMPLETED | OUTPATIENT
Start: 2018-12-31 | End: 2018-12-31

## 2018-12-31 RX ORDER — SODIUM CHLORIDE 0.9 % (FLUSH) 0.9 %
10 SYRINGE (ML) INJECTION
Status: COMPLETED | OUTPATIENT
Start: 2018-12-31 | End: 2018-12-31

## 2018-12-31 RX ORDER — HEPARIN 100 UNIT/ML
500 SYRINGE INTRAVENOUS
Status: CANCELLED | OUTPATIENT
Start: 2018-12-31

## 2018-12-31 RX ADMIN — Medication 10 ML: at 08:12

## 2018-12-31 RX ADMIN — HEPARIN 500 UNITS: 100 SYRINGE at 08:12

## 2019-01-01 ENCOUNTER — PATIENT MESSAGE (OUTPATIENT)
Dept: HEMATOLOGY/ONCOLOGY | Facility: CLINIC | Age: 34
End: 2019-01-01

## 2019-01-02 ENCOUNTER — PATIENT MESSAGE (OUTPATIENT)
Dept: HEMATOLOGY/ONCOLOGY | Facility: CLINIC | Age: 34
End: 2019-01-02

## 2019-01-02 DIAGNOSIS — C92.10 CML (CHRONIC MYELOCYTIC LEUKEMIA): Primary | ICD-10-CM

## 2019-01-02 DIAGNOSIS — C92.10 CML (CHRONIC MYELOCYTIC LEUKEMIA): ICD-10-CM

## 2019-01-02 NOTE — TELEPHONE ENCOUNTER
"----- Message from Tj Wilhelm sent at 1/2/2019 10:17 AM CST -----  Regarding: Ana Paula Pantoja and staff,    Patients insurance requires the patient to fill through [facility].   Patient has been informed.    Please send prescription to [facility].      To complete this in EPIC  The original order MUST be discontinued and re-typed as a new prescription with the updated pharmacy listed.     I have added [facility] to the patients preferred pharmacies.       Uncheck the box that says "[facility] to [facility]" AND Check box with [facility].    #please do not  click reorder -- as it will continue to route the rx to [facility] even if the pharmacy is changed.     Please opt the patient out of [facility] when the BPA is fired.     Please inform us if there is anything further we can do to assist.     Thank you,  Tj"

## 2019-01-02 NOTE — PROGRESS NOTES
HEMATOLOGIC MALIGNANCIES PROGRESS NOTE    IDENTIFYING STATEMENT   Wilton Guzmán (Wilton) is a 33 y.o. 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      ONCOLOGY HISTORY:    1. Chronic myeloid leukemia, initially presenting in blast phase   A. 2018: Began developing left-sided abdominal pain - eventually requiring evaluation - WBC ~150 at  base; transferred to [facility] [place] in [place], [place]   B. BMBx showed 20% myelomonoblasts concerning for CML in blast phase; bcr-abl positive (returned for Day 8 of induction therapy).    C. 2018: Began 7+3 induction chemotherapy. Began dasatinib (dose-adjusted) on day 8.   D. 2018: Transferred to [facility]   E. 2018: BMBx showed variably cellular marrow (10-40%) with 9% CD34+ blasts, concerning for residual disease.   F. 2018: Reinduction with MEC (mitoxantrone, etoposide, cytarabine) chemotherapy.    G. 2018: BMBx shows hypocellular bone marrow with no definite morphologic or immunophenotypic evidence of residual leukemia; bcr-abl p210 transcript was detected at 0.5%   H. Delay in clinic follow-up due to challenges with  insurance   I. 2018: Follow-up in clinic; WBC 6.77 (normal diff); Hgb 11.2 g/dl; Plt 298; BMBx shows 30% cellular marrow with no morphologic evidence of residual leukemia; bcr-abl p210 detected at 0.05% on international scale (MR 3.3).    J. 2018: BMBx shows 40% cellular marrow with no morphologic evidence of leukemia. Chromosomes 46, XY. Bcr-abl p210 detected at 0.1% on international scale (MR 3.0).    K. 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 Engrafted on Day +12. Course complicated by C. Difficile colitis.     INTERVAL HISTORY:      Mr. Guzmán presents to clinic with his wife for f/u allo SCT. He is Day +83. Pt reports feeling well overall. Continues with good appetite  and oral fluid intake. States his headaches from dasatinib are starting to improve, not as severe. Pt denies fevers, chills, aches, night sweat, SOB, rashes, mouth sores, palpitations, fatigue.    He is taking dasatinib daily at this time.     Past Medical History, Past Social History and Past Family History have been reviewed and are unchanged except as noted in the interval history.    MEDICATIONS:     Current Outpatient Medications on File Prior to Visit   Medication Sig Dispense Refill    amLODIPine (NORVASC) 5 MG tablet Take 2 tablets (10 mg total) by mouth once daily. 60 tablet 11    atovaquone (MEPRON) 750 mg/5 mL Susp Take 10 mLs (1,500 mg total) by mouth once daily. Starting on 11/8/18 300 mL 5    budesonide (ENTOCORT EC) 3 mg capsule Take 3 capsules (9 mg total) by mouth once daily. 90 capsule 2    fluconazole (DIFLUCAN) 200 MG Tab Take 2 tablets (400 mg total) by mouth once daily. 60 tablet 5    fluticasone (FLONASE) 50 mcg/actuation nasal spray 1 spray (50 mcg total) by Each Nare route once daily. 16 g 2    magnesium oxide (MAG-OX) 400 mg (241.3 mg magnesium) tablet Take 3 tablets (1,200 mg total) by mouth 2 (two) times daily.  0    potassium, sodium phosphates (PHOS-NAK) 280-160-250 mg PwPk Mix 1 packet with liquid and take by mouth 2 (two) times daily. 60 packet 3    tacrolimus (PROGRAF) 0.5 MG Cap Take 1 mg (2 capsules) by mouth in the morning and 1.5 mg (3 capsules) by mouth in the evening. 150 capsule 2    triamcinolone acetonide 0.1% (KENALOG) 0.1 % ointment Apply topically 2 (two) times daily. 80 g 2    valGANciclovir (VALCYTE) 450 mg Tab Take 2 tablets (900 mg total) by mouth once daily. 120 tablet 3    [DISCONTINUED] dasatinib (SPRYCEL) 140 mg Tab Take 1 tablet by mouth once daily Start on day +60 (12/8/18). 30 tablet 11     No current facility-administered medications on file prior to visit.        ALLERGIES: Review of patient's allergies indicates:  No Known Allergies     ROS:    "    Review of Systems   Constitutional: Negative for fatigue, diaphoresis, fever and unexpected weight change.   HENT:   Negative for lump/mass and sore throat.    Eyes: Negative for icterus.   Respiratory: Negative for cough and shortness of breath.    Cardiovascular: Negative for chest pain and palpitations.   Gastrointestinal: Negative for abdominal distention, diarrhea, constipation, nausea and vomiting.   Genitourinary: Negative for dysuria and frequency.    Musculoskeletal: Negative for arthralgias, gait problem and myalgias.   Skin: Negative for rash or itching.   Neurological: Negative for dizziness, gait problem. Headaches with dasatinib, improving  Hematological: Negative for adenopathy. Does not bruise/bleed easily.   Psychiatric/Behavioral: Negative for depression or anxiety.    PHYSICAL EXAM:  Vitals:    12/31/18 0914   BP: 124/75   Pulse: 100   Resp: 16   Temp: 98.5 °F (36.9 °C)   TempSrc: Oral   SpO2: 98%   Weight: 66.6 kg (146 lb 13.2 oz)   Height: 5' 11" (1.803 m)   PainSc: 0-No pain       KARNOFSKY PERFORMANCE STATUS 80%  ECOG 1    Physical Exam   Constitutional: He is oriented to person, place, and time. He appears well-developed and well-nourished. No distress.   HENT:   Head: Normocephalic and atraumatic.   Mouth/Throat: Mucous membranes are normal. No oral lesions.    Eyes: Conjunctivae are normal.   Neck: No thyromegaly present.   Cardiovascular: Normal rate, regular rhythm and normal heart sounds.   No murmur heard.  Pulmonary/Chest: Breath sounds normal. He has no wheezes. He has no rales.   Central venous catheter in place.  CDI; no S/S infection.  Abdominal: Soft. He exhibits no distension and no mass. There is no splenomegaly or hepatomegaly. There is no tenderness.   Neurological: He is alert and oriented to person, place, and time. He has normal strength. Coordination normal.   Skin: No rash observed.     LAB:   Results for orders placed or performed in visit on 12/31/18   CBC auto " differential   Result Value Ref Range    WBC 4.70 3.90 - 12.70 K/uL    RBC 3.31 (L) 4.60 - 6.20 M/uL    Hemoglobin 10.6 (L) 14.0 - 18.0 g/dL    Hematocrit 31.5 (L) 40.0 - 54.0 %    MCV 95 82 - 98 fL    MCH 32.0 (H) 27.0 - 31.0 pg    MCHC 33.7 32.0 - 36.0 g/dL    RDW 18.2 (H) 11.5 - 14.5 %    Platelets 112 (L) 150 - 350 K/uL    MPV 9.5 9.2 - 12.9 fL    Immature Granulocytes 0.6 (H) 0.0 - 0.5 %    Gran # (ANC) 3.0 1.8 - 7.7 K/uL    Immature Grans (Abs) 0.03 0.00 - 0.04 K/uL    Lymph # 1.3 1.0 - 4.8 K/uL    Mono # 0.3 0.3 - 1.0 K/uL    Eos # 0.0 0.0 - 0.5 K/uL    Baso # 0.01 0.00 - 0.20 K/uL    nRBC 1 (A) 0 /100 WBC    Gran% 64.1 38.0 - 73.0 %    Lymph% 27.7 18.0 - 48.0 %    Mono% 7.2 4.0 - 15.0 %    Eosinophil% 0.2 0.0 - 8.0 %    Basophil% 0.2 0.0 - 1.9 %    Differential Method Automated    Comprehensive metabolic panel   Result Value Ref Range    Sodium 140 136 - 145 mmol/L    Potassium 3.7 3.5 - 5.1 mmol/L    Chloride 106 95 - 110 mmol/L    CO2 24 23 - 29 mmol/L    Glucose 128 (H) 70 - 110 mg/dL    BUN, Bld 15 6 - 20 mg/dL    Creatinine 1.0 0.5 - 1.4 mg/dL    Calcium 9.1 8.7 - 10.5 mg/dL    Total Protein 6.2 6.0 - 8.4 g/dL    Albumin 3.7 3.5 - 5.2 g/dL    Total Bilirubin 0.5 0.1 - 1.0 mg/dL    Alkaline Phosphatase 74 55 - 135 U/L    AST 36 10 - 40 U/L    ALT 59 (H) 10 - 44 U/L    Anion Gap 10 8 - 16 mmol/L    eGFR if African American >60.0 >60 mL/min/1.73 m^2    eGFR if non African American >60.0 >60 mL/min/1.73 m^2   Magnesium   Result Value Ref Range    Magnesium 1.8 1.6 - 2.6 mg/dL   Phosphorus   Result Value Ref Range    Phosphorus 2.7 2.7 - 4.5 mg/dL   TACROLIMUS LEVEL   Result Value Ref Range    Tacrolimus Lvl 6.2 5.0 - 15.0 ng/mL   Type & Screen   Result Value Ref Range    Group & Rh B POS     Indirect Wes NEG        PROBLEMS ASSESSED THIS VISIT:    1. Chronic myeloid leukemia, BCR/ABL-positive, in remission    2. S/P allogeneic bone marrow transplant    3. Thrombocytopenia    4. Sickle cell trait         PLAN:        History allo transplant for CML  - See oncology history above  - s/p alloSCT on 10/9/2018  - Neutrophil engraftment on Day +12.   - Currently Day +83  - plan for +30 & +100 & +1 year restaging  - day +30 BMBX was performed in clinic 11/13/18, no evidence of CML, BCR/ABL 0.005% (MR 4.3). Chimerism studies show 60% donor in CD3+ fraction and 100% donor in CD33+ fraction.   - Repeat chimers from peripheral blood on 12/10/18 shows 80% donor in CD3+ cells and 100% donor in CD33+ cells.  - Restart dasatinib 140 mg daily on 12/10 (monitor headaches and possibility of decreasing dose)    GVHD   - Continue oral budesonide. Diarrhea controlled. Can discontinue budesonide at next visit as symptoms have resolved  - C. Diff negative. Diarrhea improved with imodium. Taking every other day. Pt understands that imodium can cause constipation.  - Continue tacrolimus prophylaxis  - tacro level 6.2 today; Continue 1 mg qAM and 1.5 mg qPM  - gvhd charting with each clinic visit; begin cGVHD charting at day +100     ID  - Completed PO vancomycin for C. Diff  - Continue ppx with acyclovir, fluconazole  - Begin atovaquone ppx at Day +30  - Monitor CMV BIW; EBV once weekly  - EBV negative to date  - CMV <35 since 12/13; treatment dosing of valcyte 900mg BID was changed to ppx dosing per ID on 12/18/18 to valcyte 900mg daily. Will continue this dose for 3 months (end date mid March 2019)  - CVC remains in place without S/S infection- consider removing in the near future    Cytopenias- Anemia and thrombocytopenia  - multifactorial  - Today: WBC 4.7, hgb 10.6, Plt 112K today. No transfusions indicated  - Donor has sickle cell trait. Some anemia is expected long-term   - Platelets likely dropping due to dasatinib and valcyte. May consider lowering dose of dasatinib due to side effects in future     FEN/GI  - previous hypomagnesemia  - On Mg ox 1200 BID with only mild diarrhea as adverse effect  - Mag 1.8 today, continue  current dose as above  - Potassium level normal today, continue to monitor  - Phos today 2.7. Was started on neutra phos 1 packet BID 12/24, will continue BID dose for now    Hypertension  - Amlodipine 10 mg daily  - BP stable today    Follow-up: Twice weekly labs and appts with Dr. Pantoja or NP through day +100 with CBC, CMP, tacro, Mag, CMV.  EBV once weekly only. Has already been scheduled through January.       Kishor Pantoja MD  Hematology and Stem Cell Transplant

## 2019-01-03 LAB — EBV DNA BY PCR: NORMAL IU/ML

## 2019-01-03 NOTE — PROGRESS NOTES
HEMATOLOGIC MALIGNANCIES PROGRESS NOTE    IDENTIFYING STATEMENT   Wilton Guzmán (Wilton) is a 33 y.o. male with a  of 1985 from [place], [place] with the diagnosis of CML in blast phase.      ONCOLOGY HISTORY:    1. Chronic myeloid leukemia, initially presenting in blast phase   A. 2018: Began developing left-sided abdominal pain - eventually requiring evaluation - WBC ~150 at  base; transferred to [facility] in [place], [place]   B. BMBx showed 20% myelomonoblasts concerning for CML in blast phase; bcr-abl positive (returned for Day 8 of induction therapy).    C. 2018: Began 7+3 induction chemotherapy. Began dasatinib (dose-adjusted) on day 8.   D. 2018: Transferred to [facility]   E. 2018: BMBx showed variably cellular marrow (10-40%) with 9% CD34+ blasts, concerning for residual disease.   F. 2018: Reinduction with MEC (mitoxantrone, etoposide, cytarabine) chemotherapy.    G. 2018: BMBx shows hypocellular bone marrow with no definite morphologic or immunophenotypic evidence of residual leukemia; bcr-abl p210 transcript was detected at 0.5%   H. Delay in clinic follow-up due to challenges with  insurance   I. 2018: Follow-up in clinic; WBC 6.77 (normal diff); Hgb 11.2 g/dl; Plt 298; BMBx shows 30% cellular marrow with no morphologic evidence of residual leukemia; bcr-abl p210 detected at 0.05% on international scale (MR 3.3).    J. 2018: BMBx shows 40% cellular marrow with no morphologic evidence of leukemia. Chromosomes 46, XY. Bcr-abl p210 detected at 0.1% on international scale (MR 3.0).    K. 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 Engrafted on Day +12. Course complicated by C. Difficile colitis.     INTERVAL HISTORY:      Mr. Guzmán presents to clinic with his wife for f/u allo SCT. He is Day +87. Pt reports feeling well overall. Continues with good appetite  and oral fluid intake. States his headaches from dasatinib have completely resolved, continues to take daily. Pt denies fevers, chills, aches, night sweat, SOB, rashes, mouth sores, palpitations, fatigue. His decreased appetite and diarrhea have also resolved, will start to wean budesonide today, 9mg every other day. Phos remains low, will increase from 1 to 2 packets BID.      Past Medical History, Past Social History and Past Family History have been reviewed and are unchanged except as noted in the interval history.    MEDICATIONS:     Current Outpatient Medications on File Prior to Visit   Medication Sig Dispense Refill    amLODIPine (NORVASC) 5 MG tablet Take 2 tablets (10 mg total) by mouth once daily. 60 tablet 11    atovaquone (MEPRON) 750 mg/5 mL Susp Take 10 mLs (1,500 mg total) by mouth once daily. Starting on 11/8/18 300 mL 5    dasatinib (SPRYCEL) 140 mg Tab Take 140 mg by mouth once daily Take 1 tablet by mouth starting on day +60. 30 tablet 11    fluconazole (DIFLUCAN) 200 MG Tab Take 2 tablets (400 mg total) by mouth once daily. 60 tablet 5    fluticasone (FLONASE) 50 mcg/actuation nasal spray 1 spray (50 mcg total) by Each Nare route once daily. 16 g 2    magnesium oxide (MAG-OX) 400 mg (241.3 mg magnesium) tablet Take 3 tablets (1,200 mg total) by mouth 2 (two) times daily.  0    tacrolimus (PROGRAF) 0.5 MG Cap Take 1 mg (2 capsules) by mouth in the morning and 1.5 mg (3 capsules) by mouth in the evening. 150 capsule 2    triamcinolone acetonide 0.1% (KENALOG) 0.1 % ointment Apply topically 2 (two) times daily. 80 g 2    valGANciclovir (VALCYTE) 450 mg Tab Take 2 tablets (900 mg total) by mouth once daily. 120 tablet 3    [DISCONTINUED] budesonide (ENTOCORT EC) 3 mg capsule Take 3 capsules (9 mg total) by mouth once daily. 90 capsule 2    [DISCONTINUED] potassium, sodium phosphates (PHOS-NAK) 280-160-250 mg PwPk Mix 1 packet with liquid and take by mouth 2 (two) times daily. 60 packet 3  "    No current facility-administered medications on file prior to visit.        ALLERGIES: Review of patient's allergies indicates:  No Known Allergies     ROS:       Review of Systems   Constitutional: Negative for fatigue, diaphoresis, fever and unexpected weight change.   HENT:   Negative for lump/mass and sore throat.    Eyes: Negative for icterus.   Respiratory: Negative for cough and shortness of breath.    Cardiovascular: Negative for chest pain and palpitations.   Gastrointestinal: Negative for abdominal distention, diarrhea, constipation, nausea and vomiting.   Genitourinary: Negative for dysuria and frequency.    Musculoskeletal: Negative for arthralgias, gait problem and myalgias.   Skin: Negative for rash or itching.   Neurological: Negative for dizziness, gait problem. No headache.  Hematological: Negative for adenopathy. Does not bruise/bleed easily.   Psychiatric/Behavioral: Negative for depression or anxiety.    PHYSICAL EXAM:  Vitals:    01/04/19 0837   BP: 123/64   Pulse: 83   Resp: 17   Temp: 98.7 °F (37.1 °C)   SpO2: 100%   Weight: 66.3 kg (146 lb 2.6 oz)   Height: 5' 11" (1.803 m)   PainSc: 0-No pain       KARNOFSKY PERFORMANCE STATUS 80%  ECOG 1    Physical Exam   Constitutional: He is oriented to person, place, and time. He appears well-developed and well-nourished. No distress.   HENT:   Head: Normocephalic and atraumatic.   Mouth/Throat: Mucous membranes are normal. No oral lesions.    Eyes: Conjunctivae are normal.   Neck: No thyromegaly present.   Cardiovascular: Normal rate, regular rhythm and normal heart sounds.   No murmur heard.  Pulmonary/Chest: Breath sounds normal. He has no wheezes. He has no rales.   Central venous catheter in place.  CDI; no S/S infection.  Abdominal: Soft. He exhibits no distension and no mass. There is no splenomegaly or hepatomegaly. There is no tenderness.   Neurological: He is alert and oriented to person, place, and time. He has normal strength. " Coordination normal.   Skin: No rash observed.     LAB:   Results for orders placed or performed in visit on 01/04/19   Comprehensive metabolic panel   Result Value Ref Range    Sodium 139 136 - 145 mmol/L    Potassium 4.0 3.5 - 5.1 mmol/L    Chloride 106 95 - 110 mmol/L    CO2 26 23 - 29 mmol/L    Glucose 95 70 - 110 mg/dL    BUN, Bld 11 6 - 20 mg/dL    Creatinine 0.9 0.5 - 1.4 mg/dL    Calcium 9.0 8.7 - 10.5 mg/dL    Total Protein 6.2 6.0 - 8.4 g/dL    Albumin 3.7 3.5 - 5.2 g/dL    Total Bilirubin 0.5 0.1 - 1.0 mg/dL    Alkaline Phosphatase 68 55 - 135 U/L    AST 38 10 - 40 U/L    ALT 56 (H) 10 - 44 U/L    Anion Gap 7 (L) 8 - 16 mmol/L    eGFR if African American >60.0 >60 mL/min/1.73 m^2    eGFR if non African American >60.0 >60 mL/min/1.73 m^2   Magnesium   Result Value Ref Range    Magnesium 2.1 1.6 - 2.6 mg/dL   Phosphorus   Result Value Ref Range    Phosphorus 2.4 (L) 2.7 - 4.5 mg/dL   CBC auto differential   Result Value Ref Range    WBC 5.01 3.90 - 12.70 K/uL    RBC 3.03 (L) 4.60 - 6.20 M/uL    Hemoglobin 9.9 (L) 14.0 - 18.0 g/dL    Hematocrit 29.5 (L) 40.0 - 54.0 %    MCV 97 82 - 98 fL    MCH 32.7 (H) 27.0 - 31.0 pg    MCHC 33.6 32.0 - 36.0 g/dL    RDW 19.3 (H) 11.5 - 14.5 %    Platelets 139 (L) 150 - 350 K/uL    MPV 9.0 (L) 9.2 - 12.9 fL    Immature Granulocytes 0.4 0.0 - 0.5 %    Gran # (ANC) 3.2 1.8 - 7.7 K/uL    Immature Grans (Abs) 0.02 0.00 - 0.04 K/uL    Lymph # 1.2 1.0 - 4.8 K/uL    Mono # 0.6 0.3 - 1.0 K/uL    Eos # 0.0 0.0 - 0.5 K/uL    Baso # 0.00 0.00 - 0.20 K/uL    nRBC 0 0 /100 WBC    Gran% 64.0 38.0 - 73.0 %    Lymph% 23.0 18.0 - 48.0 %    Mono% 12.4 4.0 - 15.0 %    Eosinophil% 0.2 0.0 - 8.0 %    Basophil% 0.0 0.0 - 1.9 %    Differential Method Automated        PROBLEMS ASSESSED THIS VISIT:    1. CML (chronic myelocytic leukemia)    2. S/P allogeneic bone marrow transplant    3. Sickle cell trait    4. Cytopenia    5. Hypomagnesemia    6. Secondary hypertension    7. Cytomegalovirus (CMV)  viremia    8. Chronic myeloid leukemia, BCR/ABL-positive, in remission    9. Diarrhea, unspecified type    10. Hypophosphatemia        PLAN:        History allo transplant for CML  - See oncology history above  - s/p alloSCT on 10/9/2018  - Neutrophil engraftment on Day +12.   - Currently Day +87  - plan for +30 & +100 & +1 year restaging  - day +30 BMBX was performed in clinic 11/13/18, no evidence of CML, BCR/ABL 0.005% (MR 4.3). Chimerism studies show 60% donor in CD3+ fraction and 100% donor in CD33+ fraction.   - Repeat chimers from peripheral blood on 12/10/18 shows 80% donor in CD3+ cells and 100% donor in CD33+ cells.  - Restart dasatinib 140 mg daily on 12/10 (monitor headaches and possibility of decreasing dose)  - Will schedule day 100 Bmbx for in clinic per patient preference-- 1/15/18    GVHD   - Continue oral budesonide. Diarrhea controlled. Will wean to 9mg every other day (1/4), can discontinue in ~2 weeks if symptoms have not returned  - C. Diff negative. Diarrhea improved with imodium. Taking every other day. Pt understands that imodium can cause constipation.  - Continue tacrolimus prophylaxis  - tacro level 6.6 today; Continue 1 mg qAM and 1.5 mg qPM  - gvhd charting with each clinic visit; begin cGVHD charting at day +100     ID  - Completed PO vancomycin for C. Diff  - Continue ppx with acyclovir, fluconazole  - Begin atovaquone ppx at Day +30  - Monitor CMV BIW; EBV once weekly  - EBV negative to date  - CMV <35 since 12/13; treatment dosing of valcyte 900mg BID was changed to ppx dosing per ID on 12/18/18 to valcyte 900mg daily. Will continue this dose for 3 months (end date mid March 2019)  - CVC remains in place without S/S infection- order placed for removal and sent to schedulers.     Cytopenias- Anemia and thrombocytopenia  - multifactorial  - Today: WBC 5.01, Hgb 9.9, Plt 139k . No transfusions indicated  - Donor has sickle cell trait. Some anemia is expected long-term   - Platelets  stable at this time. May consider lowering dose of dasatinib if counts worsen     FEN/GI  - previous hypomagnesemia  - On Mg ox 1200 BID with only mild diarrhea as adverse effect  - Mag 2.1 today, continue current dose as above  - Phos today 2.4. Started on neutra phos 1 packet BID 12/24, will increase to 2 packets BID today (1/4/19)    Hypertension  - Amlodipine 10 mg daily  - BP stable today    Follow-up:  - Twice weekly labs and appts with Dr. Pantoja or NP through day +100 with CBC, CMP, tacro, Mag, CMV.  EBV once weekly only. Has already been scheduled through January 29.   - Start spacing out visits after day +100 if doing well. May need to cancel BIW appts after +100 (1/17).  - Scheduled for day +100 BMBX on 1/15/19 (day + 98) at 8am      Nola Chance NP  Hematology and Stem Cell Transplant

## 2019-01-04 ENCOUNTER — OFFICE VISIT (OUTPATIENT)
Dept: HEMATOLOGY/ONCOLOGY | Facility: CLINIC | Age: 34
End: 2019-01-04
Payer: OTHER GOVERNMENT

## 2019-01-04 ENCOUNTER — INFUSION (OUTPATIENT)
Dept: INFUSION THERAPY | Facility: HOSPITAL | Age: 34
End: 2019-01-04
Attending: INTERNAL MEDICINE
Payer: OTHER GOVERNMENT

## 2019-01-04 ENCOUNTER — PATIENT MESSAGE (OUTPATIENT)
Dept: HEMATOLOGY/ONCOLOGY | Facility: CLINIC | Age: 34
End: 2019-01-04

## 2019-01-04 VITALS
TEMPERATURE: 99 F | WEIGHT: 146.19 LBS | BODY MASS INDEX: 20.47 KG/M2 | OXYGEN SATURATION: 100 % | SYSTOLIC BLOOD PRESSURE: 123 MMHG | DIASTOLIC BLOOD PRESSURE: 64 MMHG | HEIGHT: 71 IN | RESPIRATION RATE: 17 BRPM | HEART RATE: 83 BPM

## 2019-01-04 DIAGNOSIS — D75.9 CYTOPENIA: ICD-10-CM

## 2019-01-04 DIAGNOSIS — E83.39 HYPOPHOSPHATEMIA: ICD-10-CM

## 2019-01-04 DIAGNOSIS — D57.3 SICKLE CELL TRAIT: ICD-10-CM

## 2019-01-04 DIAGNOSIS — B25.9 CYTOMEGALOVIRUS (CMV) VIREMIA: ICD-10-CM

## 2019-01-04 DIAGNOSIS — R19.7 DIARRHEA, UNSPECIFIED TYPE: ICD-10-CM

## 2019-01-04 DIAGNOSIS — Z94.81 S/P ALLOGENEIC BONE MARROW TRANSPLANT: ICD-10-CM

## 2019-01-04 DIAGNOSIS — I15.9 SECONDARY HYPERTENSION: ICD-10-CM

## 2019-01-04 DIAGNOSIS — C92.11 CHRONIC MYELOID LEUKEMIA, BCR/ABL-POSITIVE, IN REMISSION: ICD-10-CM

## 2019-01-04 DIAGNOSIS — C92.10 CML (CHRONIC MYELOCYTIC LEUKEMIA): Primary | ICD-10-CM

## 2019-01-04 DIAGNOSIS — C92.10 CML (CHRONIC MYELOCYTIC LEUKEMIA): ICD-10-CM

## 2019-01-04 DIAGNOSIS — E83.42 HYPOMAGNESEMIA: ICD-10-CM

## 2019-01-04 DIAGNOSIS — C92.11 CHRONIC MYELOID LEUKEMIA, BCR/ABL-POSITIVE, IN REMISSION: Primary | ICD-10-CM

## 2019-01-04 LAB
ABO + RH BLD: NORMAL
ALBUMIN SERPL BCP-MCNC: 3.7 G/DL
ALP SERPL-CCNC: 68 U/L
ALT SERPL W/O P-5'-P-CCNC: 56 U/L
ANION GAP SERPL CALC-SCNC: 7 MMOL/L
AST SERPL-CCNC: 38 U/L
BASOPHILS # BLD AUTO: 0 K/UL
BASOPHILS NFR BLD: 0 %
BILIRUB SERPL-MCNC: 0.5 MG/DL
BLD GP AB SCN CELLS X3 SERPL QL: NORMAL
BUN SERPL-MCNC: 11 MG/DL
CALCIUM SERPL-MCNC: 9 MG/DL
CHLORIDE SERPL-SCNC: 106 MMOL/L
CMV DNA SERPL NAA+PROBE-ACNC: 54 IU/ML
CO2 SERPL-SCNC: 26 MMOL/L
CREAT SERPL-MCNC: 0.9 MG/DL
DIFFERENTIAL METHOD: ABNORMAL
EOSINOPHIL # BLD AUTO: 0 K/UL
EOSINOPHIL NFR BLD: 0.2 %
ERYTHROCYTE [DISTWIDTH] IN BLOOD BY AUTOMATED COUNT: 19.3 %
EST. GFR  (AFRICAN AMERICAN): >60 ML/MIN/1.73 M^2
EST. GFR  (NON AFRICAN AMERICAN): >60 ML/MIN/1.73 M^2
GLUCOSE SERPL-MCNC: 95 MG/DL
HCT VFR BLD AUTO: 29.5 %
HGB BLD-MCNC: 9.9 G/DL
IMM GRANULOCYTES # BLD AUTO: 0.02 K/UL
IMM GRANULOCYTES NFR BLD AUTO: 0.4 %
LYMPHOCYTES # BLD AUTO: 1.2 K/UL
LYMPHOCYTES NFR BLD: 23 %
MAGNESIUM SERPL-MCNC: 2.1 MG/DL
MCH RBC QN AUTO: 32.7 PG
MCHC RBC AUTO-ENTMCNC: 33.6 G/DL
MCV RBC AUTO: 97 FL
MONOCYTES # BLD AUTO: 0.6 K/UL
MONOCYTES NFR BLD: 12.4 %
NEUTROPHILS # BLD AUTO: 3.2 K/UL
NEUTROPHILS NFR BLD: 64 %
NRBC BLD-RTO: 0 /100 WBC
PHOSPHATE SERPL-MCNC: 2.4 MG/DL
PLATELET # BLD AUTO: 139 K/UL
PMV BLD AUTO: 9 FL
POTASSIUM SERPL-SCNC: 4 MMOL/L
PROT SERPL-MCNC: 6.2 G/DL
RBC # BLD AUTO: 3.03 M/UL
SODIUM SERPL-SCNC: 139 MMOL/L
TACROLIMUS BLD-MCNC: 6.6 NG/ML
WBC # BLD AUTO: 5.01 K/UL

## 2019-01-04 PROCEDURE — 63600175 PHARM REV CODE 636 W HCPCS: Performed by: INTERNAL MEDICINE

## 2019-01-04 PROCEDURE — 99214 OFFICE O/P EST MOD 30 MIN: CPT | Mod: PBBFAC,25 | Performed by: NURSE PRACTITIONER

## 2019-01-04 PROCEDURE — 99999 PR PBB SHADOW E&M-EST. PATIENT-LVL IV: ICD-10-PCS | Mod: PBBFAC,,, | Performed by: NURSE PRACTITIONER

## 2019-01-04 PROCEDURE — 80053 COMPREHEN METABOLIC PANEL: CPT

## 2019-01-04 PROCEDURE — A4216 STERILE WATER/SALINE, 10 ML: HCPCS | Performed by: INTERNAL MEDICINE

## 2019-01-04 PROCEDURE — 86901 BLOOD TYPING SEROLOGIC RH(D): CPT

## 2019-01-04 PROCEDURE — 99999 PR PBB SHADOW E&M-EST. PATIENT-LVL IV: CPT | Mod: PBBFAC,,, | Performed by: NURSE PRACTITIONER

## 2019-01-04 PROCEDURE — 85025 COMPLETE CBC W/AUTO DIFF WBC: CPT

## 2019-01-04 PROCEDURE — 36415 COLL VENOUS BLD VENIPUNCTURE: CPT

## 2019-01-04 PROCEDURE — 36592 COLLECT BLOOD FROM PICC: CPT

## 2019-01-04 PROCEDURE — 84100 ASSAY OF PHOSPHORUS: CPT

## 2019-01-04 PROCEDURE — 99215 PR OFFICE/OUTPT VISIT, EST, LEVL V, 40-54 MIN: ICD-10-PCS | Mod: S$PBB,,, | Performed by: NURSE PRACTITIONER

## 2019-01-04 PROCEDURE — 99215 OFFICE O/P EST HI 40 MIN: CPT | Mod: S$PBB,,, | Performed by: NURSE PRACTITIONER

## 2019-01-04 PROCEDURE — 80197 ASSAY OF TACROLIMUS: CPT

## 2019-01-04 PROCEDURE — 25000003 PHARM REV CODE 250: Performed by: INTERNAL MEDICINE

## 2019-01-04 PROCEDURE — 83735 ASSAY OF MAGNESIUM: CPT

## 2019-01-04 PROCEDURE — 87799 DETECT AGENT NOS DNA QUANT: CPT

## 2019-01-04 RX ORDER — SODIUM CHLORIDE 0.9 % (FLUSH) 0.9 %
10 SYRINGE (ML) INJECTION
Status: CANCELLED | OUTPATIENT
Start: 2019-01-04

## 2019-01-04 RX ORDER — BUDESONIDE 3 MG/1
9 CAPSULE, COATED PELLETS ORAL EVERY OTHER DAY
Qty: 90 CAPSULE | Refills: 2
Start: 2019-01-04 | End: 2019-02-05 | Stop reason: SDUPTHER

## 2019-01-04 RX ORDER — SODIUM CHLORIDE 0.9 % (FLUSH) 0.9 %
10 SYRINGE (ML) INJECTION
Status: COMPLETED | OUTPATIENT
Start: 2019-01-04 | End: 2019-01-04

## 2019-01-04 RX ORDER — HEPARIN 100 UNIT/ML
500 SYRINGE INTRAVENOUS
Status: CANCELLED | OUTPATIENT
Start: 2019-01-04

## 2019-01-04 RX ORDER — SODIUM,POTASSIUM PHOSPHATES 280-250MG
2 POWDER IN PACKET (EA) ORAL 2 TIMES DAILY
Qty: 60 PACKET | Refills: 3
Start: 2019-01-04 | End: 2019-01-29

## 2019-01-04 RX ORDER — HEPARIN 100 UNIT/ML
500 SYRINGE INTRAVENOUS
Status: COMPLETED | OUTPATIENT
Start: 2019-01-04 | End: 2019-01-04

## 2019-01-04 RX ADMIN — HEPARIN 500 UNITS: 100 SYRINGE at 08:01

## 2019-01-04 RX ADMIN — Medication 10 ML: at 08:01

## 2019-01-04 NOTE — Clinical Note
Please schedule day +100 BMBX on 1/22/19 (day + 105) at 8am in clinic on NP schedule prior to his 10am appt with Dr. Pantoja

## 2019-01-04 NOTE — NURSING
Patient's labs obtained using red lumen of CVC.  Line flushed and heparinized.  Specimens sent to lab.  Patient ambulated off unit with steady gait.

## 2019-01-07 LAB — EBV DNA BY PCR: NORMAL IU/ML

## 2019-01-07 NOTE — PROGRESS NOTES
HEMATOLOGIC MALIGNANCIES PROGRESS NOTE    IDENTIFYING STATEMENT   Wilton Guzmán (Wilton) is a 33 y.o. 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      ONCOLOGY HISTORY:    1. Chronic myeloid leukemia, initially presenting in blast phase   A. 2018: Began developing left-sided abdominal pain - eventually requiring evaluation - WBC ~150 at  base; transferred to [facility] in [place], [place]   B. BMBx showed 20% myelomonoblasts concerning for CML in blast phase; bcr-abl positive (returned for Day 8 of induction therapy).    C. 2018: Began 7+3 induction chemotherapy. Began dasatinib (dose-adjusted) on day 8.   D. 2018: Transferred to [facility]   E. 2018: BMBx showed variably cellular marrow (10-40%) with 9% CD34+ blasts, concerning for residual disease.   F. 2018: Reinduction with MEC (mitoxantrone, etoposide, cytarabine) chemotherapy.    G. 2018: BMBx shows hypocellular bone marrow with no definite morphologic or immunophenotypic evidence of residual leukemia; bcr-abl p210 transcript was detected at 0.5%   H. Delay in clinic follow-up due to challenges with  insurance   I. 2018: Follow-up in clinic; WBC 6.77 (normal diff); Hgb 11.2 g/dl; Plt 298; BMBx shows 30% cellular marrow with no morphologic evidence of residual leukemia; bcr-abl p210 detected at 0.05% on international scale (MR 3.3).    J. 2018: BMBx shows 40% cellular marrow with no morphologic evidence of leukemia. Chromosomes 46, XY. Bcr-abl p210 detected at 0.1% on international scale (MR 3.0).    K. 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 Engrafted on Day +12. Course complicated by C. Difficile colitis.     INTERVAL HISTORY:      Mr. Guzmán presents to clinic with his wife for f/u allo SCT. He is Day +91. Pt reports feeling well overall. No issues with appetite,  encouraged increased water intake as creatinine slightly elevated today. States his headaches from dasatinib have completely resolved, continues to take daily. Pt denies fevers, chills, aches, night sweat, SOB, rashes, mouth sores, palpitations, fatigue. His decreased appetite and diarrhea have also resolved, continues with budesonide wean, 9mg every other day.    Past Medical History, Past Social History and Past Family History have been reviewed and are unchanged except as noted in the interval history.    MEDICATIONS:     Current Outpatient Medications on File Prior to Visit   Medication Sig Dispense Refill    amLODIPine (NORVASC) 5 MG tablet Take 2 tablets (10 mg total) by mouth once daily. 60 tablet 11    atovaquone (MEPRON) 750 mg/5 mL Susp Take 10 mLs (1,500 mg total) by mouth once daily. Starting on 11/8/18 300 mL 5    budesonide (ENTOCORT EC) 3 mg capsule Take 3 capsules (9 mg total) by mouth every other day. 90 capsule 2    dasatinib (SPRYCEL) 140 mg Tab Take 140 mg by mouth once daily Take 1 tablet by mouth starting on day +60. 30 tablet 11    fluconazole (DIFLUCAN) 200 MG Tab Take 2 tablets (400 mg total) by mouth once daily. 60 tablet 5    fluticasone (FLONASE) 50 mcg/actuation nasal spray 1 spray (50 mcg total) by Each Nare route once daily. 16 g 2    magnesium oxide (MAG-OX) 400 mg (241.3 mg magnesium) tablet Take 3 tablets (1,200 mg total) by mouth 2 (two) times daily.  0    potassium, sodium phosphates (PHOS-NAK) 280-160-250 mg PwPk Take 2 packets by mouth 2 (two) times daily. 60 packet 3    tacrolimus (PROGRAF) 0.5 MG Cap Take 1 mg (2 capsules) by mouth in the morning and 1.5 mg (3 capsules) by mouth in the evening. 150 capsule 2    triamcinolone acetonide 0.1% (KENALOG) 0.1 % ointment Apply topically 2 (two) times daily. 80 g 2    valGANciclovir (VALCYTE) 450 mg Tab Take 2 tablets (900 mg total) by mouth once daily. 120 tablet 3     No current facility-administered medications on file  "prior to visit.        ALLERGIES: Review of patient's allergies indicates:  No Known Allergies     ROS:       Review of Systems   Constitutional: Negative for fatigue, diaphoresis, fever and unexpected weight change.   HENT:   Negative for lump/mass and sore throat.    Eyes: Negative for icterus.   Respiratory: Negative for cough and shortness of breath.    Cardiovascular: Negative for chest pain and palpitations.   Gastrointestinal: Negative for abdominal distention, diarrhea, constipation, nausea and vomiting.   Genitourinary: Negative for dysuria and frequency.    Musculoskeletal: Negative for arthralgias, gait problem and myalgias.   Skin: Negative for rash or itching.   Neurological: Negative for dizziness, gait problem. No headache.  Hematological: Negative for adenopathy. Does not bruise/bleed easily.   Psychiatric/Behavioral: Negative for depression or anxiety.    PHYSICAL EXAM:  Vitals:    01/08/19 1126   BP: 130/78   Pulse: 88   Resp: 18   Temp: 98.2 °F (36.8 °C)   TempSrc: Oral   SpO2: 100%   Weight: 66.8 kg (147 lb 4.3 oz)   Height: 5' 11" (1.803 m)   PainSc: 0-No pain       KARNOFSKY PERFORMANCE STATUS 80%  ECOG 1    Physical Exam   Constitutional: He is oriented to person, place, and time. He appears well-developed and well-nourished. No distress.   HENT:   Head: Normocephalic and atraumatic.   Mouth/Throat: Mucous membranes are normal. No oral lesions.    Eyes: Conjunctivae are normal.   Neck: No thyromegaly present.   Cardiovascular: Normal rate, regular rhythm and normal heart sounds.   No murmur heard.  Pulmonary/Chest: Breath sounds normal. He has no wheezes. He has no rales.   Central venous catheter in place.  CDI; no S/S infection.  Abdominal: Soft. He exhibits no distension and no mass. There is no splenomegaly or hepatomegaly. There is no tenderness.   Neurological: He is alert and oriented to person, place, and time. He has normal strength. Coordination normal.   Skin: No rash observed. "     LAB:   Results for orders placed or performed in visit on 01/08/19   Comprehensive metabolic panel   Result Value Ref Range    Sodium 139 136 - 145 mmol/L    Potassium 3.9 3.5 - 5.1 mmol/L    Chloride 106 95 - 110 mmol/L    CO2 26 23 - 29 mmol/L    Glucose 94 70 - 110 mg/dL    BUN, Bld 14 6 - 20 mg/dL    Creatinine 1.1 0.5 - 1.4 mg/dL    Calcium 9.1 8.7 - 10.5 mg/dL    Total Protein 6.1 6.0 - 8.4 g/dL    Albumin 3.8 3.5 - 5.2 g/dL    Total Bilirubin 0.5 0.1 - 1.0 mg/dL    Alkaline Phosphatase 65 55 - 135 U/L    AST 32 10 - 40 U/L    ALT 48 (H) 10 - 44 U/L    Anion Gap 7 (L) 8 - 16 mmol/L    eGFR if African American >60.0 >60 mL/min/1.73 m^2    eGFR if non African American >60.0 >60 mL/min/1.73 m^2   Magnesium   Result Value Ref Range    Magnesium 1.8 1.6 - 2.6 mg/dL   Phosphorus   Result Value Ref Range    Phosphorus 2.7 2.7 - 4.5 mg/dL   Rapid BMT CBC with Diff   Result Value Ref Range    WBC 4.55 3.90 - 12.70 K/uL    RBC 2.84 (L) 4.60 - 6.20 M/uL    Hemoglobin 9.5 (L) 14.0 - 18.0 g/dL    Hematocrit 28.3 (L) 40.0 - 54.0 %     (H) 82 - 98 fL    MCH 33.5 (H) 27.0 - 31.0 pg    MCHC 33.6 32.0 - 36.0 g/dL    RDW 20.3 (H) 11.5 - 14.5 %    Platelets 138 (L) 150 - 350 K/uL    MPV 9.2 9.2 - 12.9 fL    Immature Granulocytes 0.2 0.0 - 0.5 %    Gran # (ANC) 3.4 1.8 - 7.7 K/uL    Immature Grans (Abs) 0.01 0.00 - 0.04 K/uL    Lymph # 0.8 (L) 1.0 - 4.8 K/uL    Mono # 0.3 0.3 - 1.0 K/uL    Eos # 0.0 0.0 - 0.5 K/uL    Baso # 0.01 0.00 - 0.20 K/uL    nRBC 0 0 /100 WBC    Gran% 74.7 (H) 38.0 - 73.0 %    Lymph% 17.4 (L) 18.0 - 48.0 %    Mono% 6.8 4.0 - 15.0 %    Eosinophil% 0.7 0.0 - 8.0 %    Basophil% 0.2 0.0 - 1.9 %    Differential Method Automated    Type & Screen   Result Value Ref Range    Group & Rh B POS     Indirect Wes NEG        PROBLEMS ASSESSED THIS VISIT:    1. CML (chronic myelocytic leukemia)    2. S/P allogeneic bone marrow transplant    3. Sickle cell trait    4. Cytopenia    5. Hypomagnesemia    6.  Secondary hypertension    7. Cytomegalovirus (CMV) viremia    8. Hypophosphatemia        PLAN:        History allo transplant for CML  - See oncology history above  - s/p alloSCT on 10/9/2018  - Neutrophil engraftment on Day +12.   - Currently Day +91  - plan for +30 & +100 & +1 year restaging  - day +30 BMBX was performed in clinic 11/13/18, no evidence of CML, BCR/ABL 0.005% (MR 4.3). Chimerism studies show 60% donor in CD3+ fraction and 100% donor in CD33+ fraction.   - Repeat chimers from peripheral blood on 12/10/18 shows 80% donor in CD3+ cells and 100% donor in CD33+ cells.  - Restart dasatinib 140 mg daily on 12/10 (monitor headaches and possibility of decreasing dose)  - Scheduled for day 100 Bmbx for in clinic per patient preference-- 1/15/18    GVHD   - Continue oral budesonide. Diarrhea controlled. Will wean to 9mg every other day (1/4), can discontinue in ~2 weeks if symptoms have not returned  - C. Diff negative. Diarrhea improved with imodium. Taking every other day. Pt understands that imodium can cause constipation.  - Continue tacrolimus prophylaxis  - tacro level 5.9 today; Continue 1 mg qAM and 1.5 mg qPM per Dr. Pantoja.   - gvhd charting with each clinic visit; begin cGVHD charting at day +100     ID  - Completed PO vancomycin for C. Diff  - Continue ppx with acyclovir, fluconazole  - Begin atovaquone ppx at Day +30  - Monitor CMV BIW; EBV once weekly  - EBV negative to date  - last CMV level 54 (12/31); treatment dosing of valcyte 900mg BID was changed to ppx dosing per ID on 12/18/18 to valcyte 900mg daily. Will continue this dose for 3 months (end date mid March 2019) unless recommended change by ID  - CVC remains in place without S/S infection- order placed for removal at last visit and sent to schedulers. Called IR directly 1/8/19 to get set up.    Cytopenias- Anemia and thrombocytopenia  - multifactorial  - Today: WBC 4.55, Hgb 9.5, Plt 138k . No transfusions indicated  - Donor has sickle  cell trait. Some anemia is expected long-term   - Platelets stable at this time. May consider lowering dose of dasatinib if counts worsen     FEN/GI  - previous hypomagnesemia  - On Mg ox 1200 BID with only mild diarrhea as adverse effect  - Mag 1.8 today, continue current dose as above  - Phos today 2.7. Continue neutraphos 2 packets BID (1/4/19)    Hypertension  - Amlodipine 10 mg daily  - BP stable today    Follow-up:  - Twice weekly labs and appts with Dr. Pantoja or NP through day +100 with CBC, CMP, tacro, Mag, CMV.  EBV once weekly only. Has already been scheduled through January 29.   - Start spacing out visits after day +100 if doing well. May need to cancel BIW appts after +100 (1/17).  - Scheduled for day +100 BMBX on 1/15/19 (day + 98) at 8am  - Line removal requested; set up for 1/11/19 at 1100. Pt is to be NPO p MN the previous night.   We will need to make all labs after 1/11/19 lab collect.  notified.      Nola Chance NP  Hematology and Stem Cell Transplant

## 2019-01-08 ENCOUNTER — INFUSION (OUTPATIENT)
Dept: INFUSION THERAPY | Facility: HOSPITAL | Age: 34
End: 2019-01-08
Attending: INTERNAL MEDICINE
Payer: OTHER GOVERNMENT

## 2019-01-08 ENCOUNTER — OFFICE VISIT (OUTPATIENT)
Dept: HEMATOLOGY/ONCOLOGY | Facility: CLINIC | Age: 34
End: 2019-01-08
Payer: OTHER GOVERNMENT

## 2019-01-08 VITALS
HEIGHT: 71 IN | DIASTOLIC BLOOD PRESSURE: 78 MMHG | BODY MASS INDEX: 20.61 KG/M2 | HEART RATE: 88 BPM | SYSTOLIC BLOOD PRESSURE: 130 MMHG | WEIGHT: 147.25 LBS | RESPIRATION RATE: 18 BRPM | TEMPERATURE: 98 F | OXYGEN SATURATION: 100 %

## 2019-01-08 DIAGNOSIS — C92.11 CHRONIC MYELOID LEUKEMIA, BCR/ABL-POSITIVE, IN REMISSION: Primary | ICD-10-CM

## 2019-01-08 DIAGNOSIS — Z94.81 S/P ALLOGENEIC BONE MARROW TRANSPLANT: ICD-10-CM

## 2019-01-08 DIAGNOSIS — I15.9 SECONDARY HYPERTENSION: ICD-10-CM

## 2019-01-08 DIAGNOSIS — D75.9 CYTOPENIA: ICD-10-CM

## 2019-01-08 DIAGNOSIS — E83.42 HYPOMAGNESEMIA: ICD-10-CM

## 2019-01-08 DIAGNOSIS — C92.10 CML (CHRONIC MYELOCYTIC LEUKEMIA): Primary | ICD-10-CM

## 2019-01-08 DIAGNOSIS — C92.10 CML (CHRONIC MYELOCYTIC LEUKEMIA): ICD-10-CM

## 2019-01-08 DIAGNOSIS — D57.3 SICKLE CELL TRAIT: ICD-10-CM

## 2019-01-08 DIAGNOSIS — E83.39 HYPOPHOSPHATEMIA: ICD-10-CM

## 2019-01-08 DIAGNOSIS — B25.9 CYTOMEGALOVIRUS (CMV) VIREMIA: ICD-10-CM

## 2019-01-08 PROBLEM — D89.810 ACUTE GVHD: Status: RESOLVED | Noted: 2018-11-21 | Resolved: 2019-01-08

## 2019-01-08 PROBLEM — L73.9 FOLLICULITIS: Status: RESOLVED | Noted: 2018-07-23 | Resolved: 2019-01-08

## 2019-01-08 PROBLEM — L30.4 CHAFING: Status: RESOLVED | Noted: 2018-11-08 | Resolved: 2019-01-08

## 2019-01-08 LAB
ABO + RH BLD: NORMAL
ALBUMIN SERPL BCP-MCNC: 3.8 G/DL
ALP SERPL-CCNC: 65 U/L
ALT SERPL W/O P-5'-P-CCNC: 48 U/L
ANION GAP SERPL CALC-SCNC: 7 MMOL/L
AST SERPL-CCNC: 32 U/L
BASOPHILS # BLD AUTO: 0.01 K/UL
BASOPHILS NFR BLD: 0.2 %
BILIRUB SERPL-MCNC: 0.5 MG/DL
BLD GP AB SCN CELLS X3 SERPL QL: NORMAL
BUN SERPL-MCNC: 14 MG/DL
CALCIUM SERPL-MCNC: 9.1 MG/DL
CHLORIDE SERPL-SCNC: 106 MMOL/L
CO2 SERPL-SCNC: 26 MMOL/L
CREAT SERPL-MCNC: 1.1 MG/DL
DIFFERENTIAL METHOD: ABNORMAL
EOSINOPHIL # BLD AUTO: 0 K/UL
EOSINOPHIL NFR BLD: 0.7 %
ERYTHROCYTE [DISTWIDTH] IN BLOOD BY AUTOMATED COUNT: 20.3 %
EST. GFR  (AFRICAN AMERICAN): >60 ML/MIN/1.73 M^2
EST. GFR  (NON AFRICAN AMERICAN): >60 ML/MIN/1.73 M^2
GLUCOSE SERPL-MCNC: 94 MG/DL
HCT VFR BLD AUTO: 28.3 %
HGB BLD-MCNC: 9.5 G/DL
IMM GRANULOCYTES # BLD AUTO: 0.01 K/UL
IMM GRANULOCYTES NFR BLD AUTO: 0.2 %
LYMPHOCYTES # BLD AUTO: 0.8 K/UL
LYMPHOCYTES NFR BLD: 17.4 %
MAGNESIUM SERPL-MCNC: 1.8 MG/DL
MCH RBC QN AUTO: 33.5 PG
MCHC RBC AUTO-ENTMCNC: 33.6 G/DL
MCV RBC AUTO: 100 FL
MONOCYTES # BLD AUTO: 0.3 K/UL
MONOCYTES NFR BLD: 6.8 %
NEUTROPHILS # BLD AUTO: 3.4 K/UL
NEUTROPHILS NFR BLD: 74.7 %
NRBC BLD-RTO: 0 /100 WBC
PHOSPHATE SERPL-MCNC: 2.7 MG/DL
PLATELET # BLD AUTO: 138 K/UL
PMV BLD AUTO: 9.2 FL
POTASSIUM SERPL-SCNC: 3.9 MMOL/L
PROT SERPL-MCNC: 6.1 G/DL
RBC # BLD AUTO: 2.84 M/UL
SODIUM SERPL-SCNC: 139 MMOL/L
TACROLIMUS BLD-MCNC: 5.9 NG/ML
WBC # BLD AUTO: 4.55 K/UL

## 2019-01-08 PROCEDURE — 63600175 PHARM REV CODE 636 W HCPCS: Performed by: INTERNAL MEDICINE

## 2019-01-08 PROCEDURE — 85025 COMPLETE CBC W/AUTO DIFF WBC: CPT

## 2019-01-08 PROCEDURE — 84100 ASSAY OF PHOSPHORUS: CPT

## 2019-01-08 PROCEDURE — 99999 PR PBB SHADOW E&M-EST. PATIENT-LVL III: CPT | Mod: PBBFAC,,, | Performed by: NURSE PRACTITIONER

## 2019-01-08 PROCEDURE — 99215 PR OFFICE/OUTPT VISIT, EST, LEVL V, 40-54 MIN: ICD-10-PCS | Mod: S$PBB,,, | Performed by: NURSE PRACTITIONER

## 2019-01-08 PROCEDURE — A4216 STERILE WATER/SALINE, 10 ML: HCPCS | Performed by: INTERNAL MEDICINE

## 2019-01-08 PROCEDURE — 86850 RBC ANTIBODY SCREEN: CPT

## 2019-01-08 PROCEDURE — 36592 COLLECT BLOOD FROM PICC: CPT

## 2019-01-08 PROCEDURE — 36415 COLL VENOUS BLD VENIPUNCTURE: CPT

## 2019-01-08 PROCEDURE — 99215 OFFICE O/P EST HI 40 MIN: CPT | Mod: S$PBB,,, | Performed by: NURSE PRACTITIONER

## 2019-01-08 PROCEDURE — 83735 ASSAY OF MAGNESIUM: CPT

## 2019-01-08 PROCEDURE — 87799 DETECT AGENT NOS DNA QUANT: CPT

## 2019-01-08 PROCEDURE — 25000003 PHARM REV CODE 250: Performed by: INTERNAL MEDICINE

## 2019-01-08 PROCEDURE — 99999 PR PBB SHADOW E&M-EST. PATIENT-LVL III: ICD-10-PCS | Mod: PBBFAC,,, | Performed by: NURSE PRACTITIONER

## 2019-01-08 PROCEDURE — 99213 OFFICE O/P EST LOW 20 MIN: CPT | Mod: PBBFAC,25 | Performed by: NURSE PRACTITIONER

## 2019-01-08 PROCEDURE — 80197 ASSAY OF TACROLIMUS: CPT

## 2019-01-08 PROCEDURE — 80053 COMPREHEN METABOLIC PANEL: CPT

## 2019-01-08 RX ORDER — SODIUM CHLORIDE 0.9 % (FLUSH) 0.9 %
10 SYRINGE (ML) INJECTION
Status: CANCELLED | OUTPATIENT
Start: 2019-01-08

## 2019-01-08 RX ORDER — SODIUM CHLORIDE 0.9 % (FLUSH) 0.9 %
10 SYRINGE (ML) INJECTION
Status: COMPLETED | OUTPATIENT
Start: 2019-01-08 | End: 2019-01-08

## 2019-01-08 RX ORDER — HEPARIN 100 UNIT/ML
500 SYRINGE INTRAVENOUS
Status: COMPLETED | OUTPATIENT
Start: 2019-01-08 | End: 2019-01-08

## 2019-01-08 RX ORDER — HEPARIN 100 UNIT/ML
500 SYRINGE INTRAVENOUS
Status: CANCELLED | OUTPATIENT
Start: 2019-01-08

## 2019-01-08 RX ADMIN — Medication 10 ML: at 10:01

## 2019-01-08 RX ADMIN — HEPARIN 500 UNITS: 100 SYRINGE at 10:01

## 2019-01-08 NOTE — NURSING
Patient here for blood draw and dressing change on right chest CVC-distal line with good blood return-blood to lab-line flushed-old dressing removed-no sign od infection at insertion site-area cleaned and new dressing applied over site-patient tolerated well.

## 2019-01-09 DIAGNOSIS — C92.10 CML (CHRONIC MYELOCYTIC LEUKEMIA): Primary | ICD-10-CM

## 2019-01-10 DIAGNOSIS — C92.11 CHRONIC MYELOID LEUKEMIA, BCR/ABL-POSITIVE, IN REMISSION: Primary | ICD-10-CM

## 2019-01-10 LAB — EBV DNA BY PCR: NORMAL IU/ML

## 2019-01-10 RX ORDER — FENTANYL CITRATE 50 UG/ML
50 INJECTION, SOLUTION INTRAMUSCULAR; INTRAVENOUS
Status: CANCELLED | OUTPATIENT
Start: 2019-01-10

## 2019-01-10 RX ORDER — MIDAZOLAM HYDROCHLORIDE 1 MG/ML
1 INJECTION INTRAMUSCULAR; INTRAVENOUS
Status: CANCELLED | OUTPATIENT
Start: 2019-01-10

## 2019-01-10 NOTE — PROGRESS NOTES
HEMATOLOGIC MALIGNANCIES PROGRESS NOTE    IDENTIFYING STATEMENT   Wilton Guzmán (Wilton) is a 33 y.o. male with a  of 1985 from [place], [place] with the diagnosis of CML in blast phase.      ONCOLOGY HISTORY:    1. Chronic myeloid leukemia, initially presenting in blast phase   A. 2018: Began developing left-sided abdominal pain - eventually requiring evaluation - WBC ~150 at  base; transferred to [facility] in [place], [place]   B. BMBx showed 20% myelomonoblasts concerning for CML in blast phase; bcr-abl positive (returned for Day 8 of induction therapy).    C. 2018: Began 7+3 induction chemotherapy. Began dasatinib (dose-adjusted) on day 8.   D. 2018: Transferred to [facility]   E. 2018: BMBx showed variably cellular marrow (10-40%) with 9% CD34+ blasts, concerning for residual disease.   F. 2018: Reinduction with MEC (mitoxantrone, etoposide, cytarabine) chemotherapy.    G. 2018: BMBx shows hypocellular bone marrow with no definite morphologic or immunophenotypic evidence of residual leukemia; bcr-abl p210 transcript was detected at 0.5%   H. Delay in clinic follow-up due to challenges with  insurance   I. 2018: Follow-up in clinic; WBC 6.77 (normal diff); Hgb 11.2 g/dl; Plt 298; BMBx shows 30% cellular marrow with no morphologic evidence of residual leukemia; bcr-abl p210 detected at 0.05% on international scale (MR 3.3).    J. 2018: BMBx shows 40% cellular marrow with no morphologic evidence of leukemia. Chromosomes 46, XY. Bcr-abl p210 detected at 0.1% on international scale (MR 3.0).    K. 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 Engrafted on Day +12. Course complicated by C. Difficile colitis.     INTERVAL HISTORY:      Mr. Guzmán presents to clinic with his wife for f/u allo SCT. He is Day +94. Pt reports feeling well overall. No issues with appetite,  drinking 1-1.5L water daily. States his headaches from dasatinib have completely resolved, continues to take daily. Pt denies fevers, chills, aches, night sweat, SOB, rashes, mouth sores, palpitations, fatigue. His decreased appetite and diarrhea have also resolved, continues with budesonide wean, 9mg every other day. Consider stopping at next visit. Pt getting line removed today.    Past Medical History, Past Social History and Past Family History have been reviewed and are unchanged except as noted in the interval history.    MEDICATIONS:     Current Outpatient Medications on File Prior to Visit   Medication Sig Dispense Refill    amLODIPine (NORVASC) 5 MG tablet Take 2 tablets (10 mg total) by mouth once daily. 60 tablet 11    atovaquone (MEPRON) 750 mg/5 mL Susp Take 10 mLs (1,500 mg total) by mouth once daily. Starting on 11/8/18 300 mL 5    budesonide (ENTOCORT EC) 3 mg capsule Take 3 capsules (9 mg total) by mouth every other day. 90 capsule 2    dasatinib (SPRYCEL) 140 mg Tab Take 140 mg by mouth once daily Take 1 tablet by mouth starting on day +60. 30 tablet 11    fluconazole (DIFLUCAN) 200 MG Tab Take 2 tablets (400 mg total) by mouth once daily. 60 tablet 5    fluticasone (FLONASE) 50 mcg/actuation nasal spray 1 spray (50 mcg total) by Each Nare route once daily. 16 g 2    magnesium oxide (MAG-OX) 400 mg (241.3 mg magnesium) tablet Take 3 tablets (1,200 mg total) by mouth 2 (two) times daily.  0    potassium, sodium phosphates (PHOS-NAK) 280-160-250 mg PwPk Take 2 packets by mouth 2 (two) times daily. 60 packet 3    tacrolimus (PROGRAF) 0.5 MG Cap Take 1 mg (2 capsules) by mouth in the morning and 1.5 mg (3 capsules) by mouth in the evening. 150 capsule 2    triamcinolone acetonide 0.1% (KENALOG) 0.1 % ointment Apply topically 2 (two) times daily. 80 g 2    valGANciclovir (VALCYTE) 450 mg Tab Take 2 tablets (900 mg total) by mouth once daily. 120 tablet 3     No current facility-administered  "medications on file prior to visit.        ALLERGIES: Review of patient's allergies indicates:  No Known Allergies     ROS:     Review of Systems   Constitutional: Negative for fatigue, diaphoresis, fever and unexpected weight change.   HENT:   Negative for lump/mass and sore throat.    Eyes: Negative for icterus.   Respiratory: Negative for cough and shortness of breath.    Cardiovascular: Negative for chest pain and palpitations.   Gastrointestinal: Negative for abdominal distention, diarrhea, constipation, nausea and vomiting.   Genitourinary: Negative for dysuria and frequency.    Musculoskeletal: Negative for arthralgias, gait problem and myalgias.   Skin: Negative for rash or itching.   Neurological: Negative for dizziness, gait problem. No headache.  Hematological: Negative for adenopathy. Does not bruise/bleed easily.   Psychiatric/Behavioral: Negative for depression or anxiety.    PHYSICAL EXAM:  Vitals:    01/11/19 1003   BP: 123/77   Pulse: 81   Temp: 98.6 °F (37 °C)   SpO2: 99%   Weight: 67.3 kg (148 lb 5.9 oz)   Height: 5' 11" (1.803 m)   PainSc: 0-No pain       KARNOFSKY PERFORMANCE STATUS 80%  ECOG 1    Physical Exam   Constitutional: He is oriented to person, place, and time. He appears well-developed and well-nourished. No distress.   HENT:   Head: Normocephalic and atraumatic.   Mouth/Throat: Mucous membranes are normal. No oral lesions.    Eyes: Conjunctivae are normal.   Neck: No thyromegaly present.   Cardiovascular: Normal rate, regular rhythm and normal heart sounds.   No murmur heard.  Pulmonary/Chest: Breath sounds normal. He has no wheezes. He has no rales.   Central venous catheter in place.  CDI; no S/S infection.  Abdominal: Soft. He exhibits no distension and no mass. There is no splenomegaly or hepatomegaly. There is no tenderness.   Neurological: He is alert and oriented to person, place, and time. He has normal strength. Coordination normal.   Skin: No rash observed.     LAB: "   Results for orders placed or performed in visit on 01/11/19   Comprehensive metabolic panel   Result Value Ref Range    Sodium 138 136 - 145 mmol/L    Potassium 4.0 3.5 - 5.1 mmol/L    Chloride 108 95 - 110 mmol/L    CO2 24 23 - 29 mmol/L    Glucose 87 70 - 110 mg/dL    BUN, Bld 10 6 - 20 mg/dL    Creatinine 0.8 0.5 - 1.4 mg/dL    Calcium 8.6 (L) 8.7 - 10.5 mg/dL    Total Protein 5.9 (L) 6.0 - 8.4 g/dL    Albumin 3.5 3.5 - 5.2 g/dL    Total Bilirubin 0.5 0.1 - 1.0 mg/dL    Alkaline Phosphatase 60 55 - 135 U/L    AST 34 10 - 40 U/L    ALT 47 (H) 10 - 44 U/L    Anion Gap 6 (L) 8 - 16 mmol/L    eGFR if African American >60.0 >60 mL/min/1.73 m^2    eGFR if non African American >60.0 >60 mL/min/1.73 m^2   Magnesium   Result Value Ref Range    Magnesium 1.8 1.6 - 2.6 mg/dL   Phosphorus   Result Value Ref Range    Phosphorus 1.7 (L) 2.7 - 4.5 mg/dL   Rapid BMT CBC with Diff   Result Value Ref Range    WBC 4.11 3.90 - 12.70 K/uL    RBC 2.73 (L) 4.60 - 6.20 M/uL    Hemoglobin 9.0 (L) 14.0 - 18.0 g/dL    Hematocrit 27.0 (L) 40.0 - 54.0 %    MCV 99 (H) 82 - 98 fL    MCH 33.0 (H) 27.0 - 31.0 pg    MCHC 33.3 32.0 - 36.0 g/dL    RDW 21.2 (H) 11.5 - 14.5 %    Platelets 104 (L) 150 - 350 K/uL    MPV 8.0 (L) 9.2 - 12.9 fL    Immature Granulocytes 0.2 0.0 - 0.5 %    Gran # (ANC) 2.7 1.8 - 7.7 K/uL    Immature Grans (Abs) 0.01 0.00 - 0.04 K/uL    Lymph # 1.1 1.0 - 4.8 K/uL    Mono # 0.4 0.3 - 1.0 K/uL    Eos # 0.0 0.0 - 0.5 K/uL    Baso # 0.01 0.00 - 0.20 K/uL    nRBC 0 0 /100 WBC    Gran% 64.8 38.0 - 73.0 %    Lymph% 25.5 18.0 - 48.0 %    Mono% 8.8 4.0 - 15.0 %    Eosinophil% 0.5 0.0 - 8.0 %    Basophil% 0.2 0.0 - 1.9 %    Differential Method Automated        PROBLEMS ASSESSED THIS VISIT:    1. CML (chronic myelocytic leukemia)    2. S/P allogeneic bone marrow transplant    3. Sickle cell trait    4. Cytopenia    5. Hypomagnesemia    6. Secondary hypertension    7. Cytomegalovirus (CMV) viremia    8. Hypophosphatemia    9. Anemia  associated with chemotherapy    10. Thrombocytopenia        PLAN:        History allo transplant for CML  - See oncology history above  - s/p alloSCT on 10/9/2018  - Neutrophil engraftment on Day +12.   - Currently Day +94  - plan for +30 & +100 & +1 year restaging  - day +30 BMBX was performed in clinic 11/13/18, no evidence of CML, BCR/ABL 0.005% (MR 4.3). Chimerism studies show 60% donor in CD3+ fraction and 100% donor in CD33+ fraction.   - Repeat chimers from peripheral blood on 12/10/18 shows 80% donor in CD3+ cells and 100% donor in CD33+ cells.  - Restart dasatinib 140 mg daily on 12/10 (monitor headaches and possibility of decreasing dose)  - Scheduled for day 100 Bmbx for in clinic per patient preference-- 1/15/18    GVHD   - Continue oral budesonide. Diarrhea controlled. Will wean to 9mg every other day (1/4), can discontinue in ~2 weeks if symptoms have not returned (consider stopping at next appt)  - C. Diff negative. Diarrhea improved with imodium. Taking every other day. Pt understands that imodium can cause constipation.  - Continue tacrolimus prophylaxis  - tacro level 8.2 today; Continue current dose 1 mg qAM and 1.5 mg qPM per Dr. Pantoja.   - gvhd charting with each clinic visit; begin cGVHD charting at day +100     ID  - Completed PO vancomycin for C. Diff  - Continue ppx with acyclovir, fluconazole  - Begin atovaquone ppx at Day +30  - Monitor CMV BIW; EBV once weekly  - EBV negative to date  - last CMV level 54 (12/31); treatment dosing of valcyte 900mg BID was changed to ppx dosing per ID on 12/18/18 to valcyte 900mg daily. Will continue this dose for 3 months (end date mid March 2019) unless recommended change by ID  - CVC remains in place without S/S infection-Line to be removed today, 1/11/19.    Cytopenias- Anemia and thrombocytopenia  - multifactorial  - Today:  Hgb 9.0, Plt 104k . No transfusions indicated  - Donor has sickle cell trait. Some anemia is expected long-term   - Platelets  stable at this time. May consider lowering dose of dasatinib if counts worsen     FEN/GI  - previous hypomagnesemia  - On Mg ox 1200 BID with only mild diarrhea as adverse effect  - Mag 1.8 today, continue current dose as above  - Phos today 1.7. Continue neutraphos 2 packets BID (1/4/19). Pt has not taken neutraphos today because he is NPO for procedure. Will take 4 packs this PM.     Hypertension  - Amlodipine 10 mg daily  - BP stable today    Follow-up:  - Twice weekly labs and appts with Dr. Pantoja or NP through day +100 with CBC, CMP, tacro, Mag, CMV.  EBV once weekly only. Has already been scheduled through January 29.   - Start spacing out visits after day +100 if doing well. May need to cancel BIW appts after +100 (1/17).  - Scheduled for day +100 BMBX on 1/15/19 (day + 98) at 8am  - Line removal requested; set up for 1/11/19 at 1100.   We will need to make all labs after 1/11/19 lab collect.       Whitney Curiel NP  Hematology and Stem Cell Transplant

## 2019-01-11 ENCOUNTER — INFUSION (OUTPATIENT)
Dept: INFUSION THERAPY | Facility: HOSPITAL | Age: 34
End: 2019-01-11
Attending: INTERNAL MEDICINE
Payer: OTHER GOVERNMENT

## 2019-01-11 ENCOUNTER — OFFICE VISIT (OUTPATIENT)
Dept: HEMATOLOGY/ONCOLOGY | Facility: CLINIC | Age: 34
End: 2019-01-11
Payer: OTHER GOVERNMENT

## 2019-01-11 ENCOUNTER — HOSPITAL ENCOUNTER (OUTPATIENT)
Facility: HOSPITAL | Age: 34
Discharge: HOME | End: 2019-01-11
Attending: RADIOLOGY | Admitting: RADIOLOGY
Payer: OTHER GOVERNMENT

## 2019-01-11 VITALS
OXYGEN SATURATION: 99 % | TEMPERATURE: 99 F | HEART RATE: 88 BPM | WEIGHT: 145 LBS | HEIGHT: 71 IN | RESPIRATION RATE: 18 BRPM | BODY MASS INDEX: 20.3 KG/M2 | DIASTOLIC BLOOD PRESSURE: 76 MMHG | SYSTOLIC BLOOD PRESSURE: 121 MMHG

## 2019-01-11 VITALS
TEMPERATURE: 99 F | WEIGHT: 148.38 LBS | BODY MASS INDEX: 20.77 KG/M2 | OXYGEN SATURATION: 99 % | HEART RATE: 81 BPM | SYSTOLIC BLOOD PRESSURE: 123 MMHG | DIASTOLIC BLOOD PRESSURE: 77 MMHG | HEIGHT: 71 IN

## 2019-01-11 DIAGNOSIS — D69.6 THROMBOCYTOPENIA: ICD-10-CM

## 2019-01-11 DIAGNOSIS — D57.3 SICKLE CELL TRAIT: ICD-10-CM

## 2019-01-11 DIAGNOSIS — C92.10 CML (CHRONIC MYELOCYTIC LEUKEMIA): ICD-10-CM

## 2019-01-11 DIAGNOSIS — B25.9 CYTOMEGALOVIRUS (CMV) VIREMIA: ICD-10-CM

## 2019-01-11 DIAGNOSIS — I15.9 SECONDARY HYPERTENSION: ICD-10-CM

## 2019-01-11 DIAGNOSIS — D75.9 CYTOPENIA: ICD-10-CM

## 2019-01-11 DIAGNOSIS — E83.42 HYPOMAGNESEMIA: ICD-10-CM

## 2019-01-11 DIAGNOSIS — E83.39 HYPOPHOSPHATEMIA: ICD-10-CM

## 2019-01-11 DIAGNOSIS — Z94.81 S/P ALLOGENEIC BONE MARROW TRANSPLANT: ICD-10-CM

## 2019-01-11 DIAGNOSIS — D64.81 ANEMIA ASSOCIATED WITH CHEMOTHERAPY: ICD-10-CM

## 2019-01-11 DIAGNOSIS — C92.10 CML (CHRONIC MYELOCYTIC LEUKEMIA): Primary | ICD-10-CM

## 2019-01-11 DIAGNOSIS — C92.11 CHRONIC MYELOID LEUKEMIA, BCR/ABL-POSITIVE, IN REMISSION: Primary | ICD-10-CM

## 2019-01-11 DIAGNOSIS — T45.1X5A ANEMIA ASSOCIATED WITH CHEMOTHERAPY: ICD-10-CM

## 2019-01-11 LAB
ABO + RH BLD: NORMAL
ALBUMIN SERPL BCP-MCNC: 3.5 G/DL
ALP SERPL-CCNC: 60 U/L
ALT SERPL W/O P-5'-P-CCNC: 47 U/L
ANION GAP SERPL CALC-SCNC: 6 MMOL/L
AST SERPL-CCNC: 34 U/L
BASOPHILS # BLD AUTO: 0.01 K/UL
BASOPHILS NFR BLD: 0.2 %
BILIRUB SERPL-MCNC: 0.5 MG/DL
BLD GP AB SCN CELLS X3 SERPL QL: NORMAL
BUN SERPL-MCNC: 10 MG/DL
CALCIUM SERPL-MCNC: 8.6 MG/DL
CHLORIDE SERPL-SCNC: 108 MMOL/L
CO2 SERPL-SCNC: 24 MMOL/L
CREAT SERPL-MCNC: 0.8 MG/DL
DIFFERENTIAL METHOD: ABNORMAL
EOSINOPHIL # BLD AUTO: 0 K/UL
EOSINOPHIL NFR BLD: 0.5 %
ERYTHROCYTE [DISTWIDTH] IN BLOOD BY AUTOMATED COUNT: 21.2 %
EST. GFR  (AFRICAN AMERICAN): >60 ML/MIN/1.73 M^2
EST. GFR  (NON AFRICAN AMERICAN): >60 ML/MIN/1.73 M^2
GLUCOSE SERPL-MCNC: 87 MG/DL
HCT VFR BLD AUTO: 27 %
HGB BLD-MCNC: 9 G/DL
IMM GRANULOCYTES # BLD AUTO: 0.01 K/UL
IMM GRANULOCYTES NFR BLD AUTO: 0.2 %
LYMPHOCYTES # BLD AUTO: 1.1 K/UL
LYMPHOCYTES NFR BLD: 25.5 %
MAGNESIUM SERPL-MCNC: 1.8 MG/DL
MCH RBC QN AUTO: 33 PG
MCHC RBC AUTO-ENTMCNC: 33.3 G/DL
MCV RBC AUTO: 99 FL
MONOCYTES # BLD AUTO: 0.4 K/UL
MONOCYTES NFR BLD: 8.8 %
NEUTROPHILS # BLD AUTO: 2.7 K/UL
NEUTROPHILS NFR BLD: 64.8 %
NRBC BLD-RTO: 0 /100 WBC
PHOSPHATE SERPL-MCNC: 1.7 MG/DL
PLATELET # BLD AUTO: 104 K/UL
PMV BLD AUTO: 8 FL
POTASSIUM SERPL-SCNC: 4 MMOL/L
PROT SERPL-MCNC: 5.9 G/DL
RBC # BLD AUTO: 2.73 M/UL
SODIUM SERPL-SCNC: 138 MMOL/L
TACROLIMUS BLD-MCNC: 8.2 NG/ML
WBC # BLD AUTO: 4.11 K/UL

## 2019-01-11 PROCEDURE — 83735 ASSAY OF MAGNESIUM: CPT

## 2019-01-11 PROCEDURE — 99999 PR PBB SHADOW E&M-EST. PATIENT-LVL IV: ICD-10-PCS | Mod: PBBFAC,,, | Performed by: NURSE PRACTITIONER

## 2019-01-11 PROCEDURE — 85025 COMPLETE CBC W/AUTO DIFF WBC: CPT

## 2019-01-11 PROCEDURE — 80053 COMPREHEN METABOLIC PANEL: CPT

## 2019-01-11 PROCEDURE — 63600175 PHARM REV CODE 636 W HCPCS: Performed by: STUDENT IN AN ORGANIZED HEALTH CARE EDUCATION/TRAINING PROGRAM

## 2019-01-11 PROCEDURE — 36592 COLLECT BLOOD FROM PICC: CPT

## 2019-01-11 PROCEDURE — 80197 ASSAY OF TACROLIMUS: CPT

## 2019-01-11 PROCEDURE — 99215 OFFICE O/P EST HI 40 MIN: CPT | Mod: S$PBB,,, | Performed by: NURSE PRACTITIONER

## 2019-01-11 PROCEDURE — 86850 RBC ANTIBODY SCREEN: CPT

## 2019-01-11 PROCEDURE — 99214 OFFICE O/P EST MOD 30 MIN: CPT | Mod: PBBFAC,25 | Performed by: NURSE PRACTITIONER

## 2019-01-11 PROCEDURE — 84100 ASSAY OF PHOSPHORUS: CPT

## 2019-01-11 PROCEDURE — 99999 PR PBB SHADOW E&M-EST. PATIENT-LVL IV: CPT | Mod: PBBFAC,,, | Performed by: NURSE PRACTITIONER

## 2019-01-11 PROCEDURE — 99215 PR OFFICE/OUTPT VISIT, EST, LEVL V, 40-54 MIN: ICD-10-PCS | Mod: S$PBB,,, | Performed by: NURSE PRACTITIONER

## 2019-01-11 RX ORDER — CEFAZOLIN SODIUM 1 G/3ML
1 INJECTION, POWDER, FOR SOLUTION INTRAMUSCULAR; INTRAVENOUS
Status: DISCONTINUED | OUTPATIENT
Start: 2019-01-11 | End: 2019-01-11 | Stop reason: HOSPADM

## 2019-01-11 RX ORDER — HEPARIN 100 UNIT/ML
500 SYRINGE INTRAVENOUS
Status: CANCELLED | OUTPATIENT
Start: 2019-01-11

## 2019-01-11 RX ORDER — HEPARIN 100 UNIT/ML
500 SYRINGE INTRAVENOUS
Status: DISCONTINUED | OUTPATIENT
Start: 2019-01-11 | End: 2019-01-11 | Stop reason: HOSPADM

## 2019-01-11 RX ORDER — SODIUM CHLORIDE 0.9 % (FLUSH) 0.9 %
10 SYRINGE (ML) INJECTION
Status: DISCONTINUED | OUTPATIENT
Start: 2019-01-11 | End: 2019-01-11 | Stop reason: HOSPADM

## 2019-01-11 RX ORDER — CEFAZOLIN SODIUM 1 G/50ML
SOLUTION INTRAVENOUS
Status: COMPLETED | OUTPATIENT
Start: 2019-01-11 | End: 2019-01-11

## 2019-01-11 RX ORDER — SODIUM CHLORIDE 9 MG/ML
500 INJECTION, SOLUTION INTRAVENOUS ONCE
Status: DISCONTINUED | OUTPATIENT
Start: 2019-01-11 | End: 2019-01-11 | Stop reason: HOSPADM

## 2019-01-11 RX ORDER — MIDAZOLAM HYDROCHLORIDE 1 MG/ML
INJECTION INTRAMUSCULAR; INTRAVENOUS CODE/TRAUMA/SEDATION MEDICATION
Status: COMPLETED | OUTPATIENT
Start: 2019-01-11 | End: 2019-01-11

## 2019-01-11 RX ORDER — SODIUM CHLORIDE 0.9 % (FLUSH) 0.9 %
10 SYRINGE (ML) INJECTION
Status: CANCELLED | OUTPATIENT
Start: 2019-01-11

## 2019-01-11 RX ADMIN — CEFAZOLIN SODIUM 1 G: 1 SOLUTION INTRAVENOUS at 01:01

## 2019-01-11 RX ADMIN — MIDAZOLAM HYDROCHLORIDE 2 MG: 1 INJECTION, SOLUTION INTRAMUSCULAR; INTRAVENOUS at 01:01

## 2019-01-11 NOTE — PROGRESS NOTES
Pt arrived to [facility] bay 3 s/p saldivar removal.  NAD noted.  Report received from SWAPNA Eli.  DSG to right chest CDI.  Will continue to monitor.

## 2019-01-11 NOTE — PROGRESS NOTES
Tessie remvoed intact by Dr Spicer. Dressing applied, CDI. VSS Stable. Patient to remain flat for one hour per Dr Valdivia. Patient to [facility] to recover.

## 2019-01-11 NOTE — DISCHARGE INSTRUCTIONS
Please call with any questions or concerns.      Monday thru Friday 8:00 am - 4:30 pm    Interventional Radiology   [phone]    After Hours    Ask for the Radiology Intern on call  [phone]

## 2019-01-11 NOTE — H&P
Radiology History & Physical      SUBJECTIVE:     Chief Complaint: tunneled catheter no longer needed.    History of Present Illness:  Wilton Guzmán is a 33 y.o. male who presents for tunneled IJ line removal. Line previously used for therapy for CML.    Past Medical History:   Diagnosis Date    Chronic myelogenous leukemia (CML), BCR-ABL1-positive     Depression     2010    Encounter for blood transfusion     Secondary hypertension 11/29/2018     Past Surgical History:   Procedure Laterality Date    BONE MARROW TRANSPLANT      INSERTION-CATHETER N/A 10/1/2018    Performed by [facility] Diagnostic Provider at [facility] [address] [place] [address]       Home Meds:   Prior to Admission medications    Medication Sig Start Date End Date Taking? Authorizing Provider   amLODIPine (NORVASC) 5 MG tablet Take 2 tablets (10 mg total) by mouth once daily. 12/10/18 12/10/19 Yes Kishor Pantoja MD   atovaquone (MEPRON) 750 mg/5 mL Susp Take 10 mLs (1,500 mg total) by mouth once daily. Starting on 11/8/18 11/8/18  Yes Yoanna Andrade NP   budesonide (ENTOCORT EC) 3 mg capsule Take 3 capsules (9 mg total) by mouth every other day. 1/4/19 1/4/20 Yes Nola Chance NP   dasatinib (SPRYCEL) 140 mg Tab Take 140 mg by mouth once daily Take 1 tablet by mouth starting on day +60. 1/4/19  Yes Kishor Pantoja MD   fluconazole (DIFLUCAN) 200 MG Tab Take 2 tablets (400 mg total) by mouth once daily. 10/23/18  Yes Yoanna Andrade NP   fluticasone (FLONASE) 50 mcg/actuation nasal spray 1 spray (50 mcg total) by Each Nare route once daily. 12/17/18  Yes Kishor Pantoja MD   magnesium oxide (MAG-OX) 400 mg (241.3 mg magnesium) tablet Take 3 tablets (1,200 mg total) by mouth 2 (two) times daily. 10/26/18  Yes Whitney Curiel NP   potassium, sodium phosphates (PHOS-NAK) 280-160-250 mg PwPk Take 2 packets by mouth 2 (two) times daily. 1/4/19  Yes Nola Chance NP   tacrolimus (PROGRAF) 0.5 MG Cap Take 1 mg (2 capsules) by mouth in the morning and  1.5 mg (3 capsules) by mouth in the evening. 12/10/18  Yes Kishor Pantoja MD   triamcinolone acetonide 0.1% (KENALOG) 0.1 % ointment Apply topically 2 (two) times daily. 12/17/18  Yes Kishor Pantoja MD   valGANciclovir (VALCYTE) 450 mg Tab Take 2 tablets (900 mg total) by mouth once daily. 12/19/18  Yes Yoanna Andrade NP     Anticoagulants/Antiplatelets: no anticoagulation    Allergies: Review of patient's allergies indicates:  No Known Allergies  Sedation History:  no adverse reactions    Review of Systems:   Hematological: no known coagulopathies  Respiratory: no shortness of breath  Cardiovascular: no chest pain  Gastrointestinal: no abdominal pain  Genito-Urinary: no dysuria  Musculoskeletal: negative  Neurological: no TIA or stroke symptoms         OBJECTIVE:     Vital Signs (Most Recent)  Temp: 98.4 °F (36.9 °C) (01/11/19 1302)  Pulse: 80 (01/11/19 1302)  Resp: 18 (01/11/19 1302)  BP: 125/81 (01/11/19 1302)  SpO2: 100 % (01/11/19 1302)    Physical Exam:  ASA: 1  Mallampati: 1    General: no acute distress  Mental Status: alert and oriented to person, place and time  HEENT: normocephalic, atraumatic  Chest: unlabored breathing  Heart: no heaves  Abdomen: nondistended  Extremity: moves all extremities    Laboratory  Lab Results   Component Value Date    INR 1.0 01/11/2019       Lab Results   Component Value Date    WBC 4.11 01/11/2019    HGB 9.0 (L) 01/11/2019    HCT 27.0 (L) 01/11/2019    MCV 99 (H) 01/11/2019     (L) 01/11/2019      Lab Results   Component Value Date    GLU 87 01/11/2019     01/11/2019    K 4.0 01/11/2019     01/11/2019    CO2 24 01/11/2019    BUN 10 01/11/2019    CREATININE 0.8 01/11/2019    CALCIUM 8.6 (L) 01/11/2019    MG 1.8 01/11/2019    ALT 47 (H) 01/11/2019    AST 34 01/11/2019    ALBUMIN 3.5 01/11/2019    BILITOT 0.5 01/11/2019       ASSESSMENT/PLAN:     Sedation Plan: up to moderate  Patient will undergo tunneled line removal.    Venkata  MD [facility]  Radiology

## 2019-01-11 NOTE — PROGRESS NOTES
Pt given discharge instructions, wife at bedside.  Verbalized understanding.  NAD noted.  Dsg to right chest wall CDI.  IV d/c'd with cath tip intact.  Pt refused wheelchair, gait steady.

## 2019-01-14 DIAGNOSIS — B25.9 CYTOMEGALOVIRUS INFECTION, UNSPECIFIED CYTOMEGALOVIRAL INFECTION TYPE: ICD-10-CM

## 2019-01-14 LAB — CMV DNA SERPL NAA+PROBE-ACNC: <35 IU/ML

## 2019-01-14 RX ORDER — VALGANCICLOVIR 450 MG/1
900 TABLET, FILM COATED ORAL DAILY
Qty: 120 TABLET | Refills: 3 | Status: SHIPPED | OUTPATIENT
Start: 2019-01-14 | End: 2019-03-25

## 2019-01-15 ENCOUNTER — OFFICE VISIT (OUTPATIENT)
Dept: HEMATOLOGY/ONCOLOGY | Facility: CLINIC | Age: 34
End: 2019-01-15
Payer: OTHER GOVERNMENT

## 2019-01-15 ENCOUNTER — LAB VISIT (OUTPATIENT)
Dept: LAB | Facility: HOSPITAL | Age: 34
End: 2019-01-15
Attending: INTERNAL MEDICINE
Payer: OTHER GOVERNMENT

## 2019-01-15 VITALS
HEART RATE: 85 BPM | OXYGEN SATURATION: 100 % | SYSTOLIC BLOOD PRESSURE: 122 MMHG | RESPIRATION RATE: 18 BRPM | DIASTOLIC BLOOD PRESSURE: 72 MMHG | TEMPERATURE: 98 F

## 2019-01-15 DIAGNOSIS — C92.10 CML (CHRONIC MYELOCYTIC LEUKEMIA): Primary | ICD-10-CM

## 2019-01-15 DIAGNOSIS — D89.813 GVHD (GRAFT VERSUS HOST DISEASE): ICD-10-CM

## 2019-01-15 DIAGNOSIS — I10 HYPERTENSION, UNSPECIFIED TYPE: ICD-10-CM

## 2019-01-15 DIAGNOSIS — B25.9 CYTOMEGALOVIRUS (CMV) VIREMIA: ICD-10-CM

## 2019-01-15 DIAGNOSIS — E83.42 HYPOMAGNESEMIA: ICD-10-CM

## 2019-01-15 DIAGNOSIS — T45.1X5A PANCYTOPENIA DUE TO ANTINEOPLASTIC CHEMOTHERAPY: ICD-10-CM

## 2019-01-15 DIAGNOSIS — I15.9 SECONDARY HYPERTENSION: ICD-10-CM

## 2019-01-15 DIAGNOSIS — C92.11 CHRONIC MYELOID LEUKEMIA, BCR/ABL-POSITIVE, IN REMISSION: ICD-10-CM

## 2019-01-15 DIAGNOSIS — D57.3 SICKLE CELL TRAIT: ICD-10-CM

## 2019-01-15 DIAGNOSIS — D69.6 THROMBOCYTOPENIA: ICD-10-CM

## 2019-01-15 DIAGNOSIS — Z94.81 S/P ALLOGENEIC BONE MARROW TRANSPLANT: ICD-10-CM

## 2019-01-15 DIAGNOSIS — C92.10 CML (CHRONIC MYELOCYTIC LEUKEMIA): ICD-10-CM

## 2019-01-15 DIAGNOSIS — D61.810 PANCYTOPENIA DUE TO ANTINEOPLASTIC CHEMOTHERAPY: ICD-10-CM

## 2019-01-15 DIAGNOSIS — E83.39 HYPOPHOSPHATEMIA: ICD-10-CM

## 2019-01-15 LAB
ALBUMIN SERPL BCP-MCNC: 3.7 G/DL
ALP SERPL-CCNC: 63 U/L
ALT SERPL W/O P-5'-P-CCNC: 45 U/L
ANION GAP SERPL CALC-SCNC: 7 MMOL/L
ANISOCYTOSIS BLD QL SMEAR: SLIGHT
AST SERPL-CCNC: 38 U/L
BASOPHILS # BLD AUTO: 0 K/UL
BASOPHILS NFR BLD: 0 %
BILIRUB SERPL-MCNC: 0.5 MG/DL
BONE MARROW IRON STAIN COMMENT: NORMAL
BONE MARROW WRIGHT STAIN COMMENT: NORMAL
BUN SERPL-MCNC: 11 MG/DL
CALCIUM SERPL-MCNC: 9.2 MG/DL
CHLORIDE SERPL-SCNC: 107 MMOL/L
CO2 SERPL-SCNC: 28 MMOL/L
CREAT SERPL-MCNC: 1 MG/DL
DIFFERENTIAL METHOD: ABNORMAL
EOSINOPHIL # BLD AUTO: 0 K/UL
EOSINOPHIL NFR BLD: 1.1 %
ERYTHROCYTE [DISTWIDTH] IN BLOOD BY AUTOMATED COUNT: 21.5 %
EST. GFR  (AFRICAN AMERICAN): >60 ML/MIN/1.73 M^2
EST. GFR  (NON AFRICAN AMERICAN): >60 ML/MIN/1.73 M^2
GLUCOSE SERPL-MCNC: 111 MG/DL
HCT VFR BLD AUTO: 28.6 %
HGB BLD-MCNC: 9.1 G/DL
HYPOCHROMIA BLD QL SMEAR: ABNORMAL
IMM GRANULOCYTES # BLD AUTO: 0.01 K/UL
IMM GRANULOCYTES NFR BLD AUTO: 0.3 %
LYMPHOCYTES # BLD AUTO: 1.1 K/UL
LYMPHOCYTES NFR BLD: 30.6 %
MAGNESIUM SERPL-MCNC: 2 MG/DL
MCH RBC QN AUTO: 33.1 PG
MCHC RBC AUTO-ENTMCNC: 31.8 G/DL
MCV RBC AUTO: 104 FL
MONOCYTES # BLD AUTO: 0.4 K/UL
MONOCYTES NFR BLD: 10.2 %
NEUTROPHILS # BLD AUTO: 2.2 K/UL
NEUTROPHILS NFR BLD: 57.8 %
NRBC BLD-RTO: 0 /100 WBC
OVALOCYTES BLD QL SMEAR: ABNORMAL
PHOSPHATE SERPL-MCNC: 2.8 MG/DL
PLATELET # BLD AUTO: 109 K/UL
PMV BLD AUTO: 9.6 FL
POIKILOCYTOSIS BLD QL SMEAR: SLIGHT
POLYCHROMASIA BLD QL SMEAR: ABNORMAL
POTASSIUM SERPL-SCNC: 4.5 MMOL/L
PROT SERPL-MCNC: 6.2 G/DL
RBC # BLD AUTO: 2.75 M/UL
SODIUM SERPL-SCNC: 142 MMOL/L
TACROLIMUS BLD-MCNC: 7.3 NG/ML
WBC # BLD AUTO: 3.72 K/UL

## 2019-01-15 PROCEDURE — 88341 IMHCHEM/IMCYTCHM EA ADD ANTB: CPT | Mod: 26,,, | Performed by: PATHOLOGY

## 2019-01-15 PROCEDURE — 88189 PR  FLOWCYTOMETRY/READ, 16 & > MARKERS: ICD-10-PCS | Mod: ,,, | Performed by: PATHOLOGY

## 2019-01-15 PROCEDURE — 88365 INSITU HYBRIDIZATION (FISH): CPT | Mod: 26,,, | Performed by: PATHOLOGY

## 2019-01-15 PROCEDURE — 36415 COLL VENOUS BLD VENIPUNCTURE: CPT

## 2019-01-15 PROCEDURE — 38222 DX BONE MARROW BX & ASPIR: CPT | Mod: S$PBB,LT,, | Performed by: NURSE PRACTITIONER

## 2019-01-15 PROCEDURE — 99215 PR OFFICE/OUTPT VISIT, EST, LEVL V, 40-54 MIN: ICD-10-PCS | Mod: S$PBB,,, | Performed by: NURSE PRACTITIONER

## 2019-01-15 PROCEDURE — 88311 TISSUE SPECIMEN TO PATHOLOGY, BONE MARROW ASPIRATION/BIOPSY PROCEDURE: ICD-10-PCS | Mod: 26,,, | Performed by: PATHOLOGY

## 2019-01-15 PROCEDURE — 88305 TISSUE EXAM BY PATHOLOGIST: CPT | Mod: 59 | Performed by: PATHOLOGY

## 2019-01-15 PROCEDURE — 88342 IMHCHEM/IMCYTCHM 1ST ANTB: CPT | Mod: 59 | Performed by: PATHOLOGY

## 2019-01-15 PROCEDURE — 88237 TISSUE CULTURE BONE MARROW: CPT

## 2019-01-15 PROCEDURE — 88341 IMHCHEM/IMCYTCHM EA ADD ANTB: CPT | Mod: 59 | Performed by: PATHOLOGY

## 2019-01-15 PROCEDURE — 38222 PR BONE MARROW BIOPSY(IES) W/ASPIRATION(S); DIAGNOSTIC: ICD-10-PCS | Mod: S$PBB,LT,, | Performed by: NURSE PRACTITIONER

## 2019-01-15 PROCEDURE — 80053 COMPREHEN METABOLIC PANEL: CPT

## 2019-01-15 PROCEDURE — 88313 SPECIAL STAINS GROUP 2: CPT | Mod: 26,,, | Performed by: PATHOLOGY

## 2019-01-15 PROCEDURE — 88313 SPECIAL STAINS GROUP 2: CPT

## 2019-01-15 PROCEDURE — 85025 COMPLETE CBC W/AUTO DIFF WBC: CPT

## 2019-01-15 PROCEDURE — 85097 BONE MARROW INTERPRETATION: CPT | Mod: ,,, | Performed by: PATHOLOGY

## 2019-01-15 PROCEDURE — 83735 ASSAY OF MAGNESIUM: CPT

## 2019-01-15 PROCEDURE — 38222 DX BONE MARROW BX & ASPIR: CPT | Mod: PBBFAC | Performed by: NURSE PRACTITIONER

## 2019-01-15 PROCEDURE — 88313 TISSUE SPECIMEN TO PATHOLOGY, BONE MARROW ASPIRATION/BIOPSY PROCEDURE: ICD-10-PCS | Mod: 26,,, | Performed by: PATHOLOGY

## 2019-01-15 PROCEDURE — 88305 TISSUE EXAM BY PATHOLOGIST: CPT | Mod: 26,,, | Performed by: PATHOLOGY

## 2019-01-15 PROCEDURE — 87799 DETECT AGENT NOS DNA QUANT: CPT | Mod: 59

## 2019-01-15 PROCEDURE — 81268 CHIMERISM ANAL W/CELL SELECT: CPT | Mod: 91

## 2019-01-15 PROCEDURE — 88342 TISSUE SPECIMEN TO PATHOLOGY, BONE MARROW ASPIRATION/BIOPSY PROCEDURE: ICD-10-PCS | Mod: 26,59,, | Performed by: PATHOLOGY

## 2019-01-15 PROCEDURE — 80197 ASSAY OF TACROLIMUS: CPT

## 2019-01-15 PROCEDURE — 88364 INSITU HYBRIDIZATION (FISH): CPT | Mod: 26,,, | Performed by: PATHOLOGY

## 2019-01-15 PROCEDURE — 99213 OFFICE O/P EST LOW 20 MIN: CPT | Mod: PBBFAC | Performed by: NURSE PRACTITIONER

## 2019-01-15 PROCEDURE — 85097 TISSUE SPECIMEN TO PATHOLOGY, BONE MARROW ASPIRATION/BIOPSY PROCEDURE: ICD-10-PCS | Mod: ,,, | Performed by: PATHOLOGY

## 2019-01-15 PROCEDURE — 88264 CHROMOSOME ANALYSIS 20-25: CPT

## 2019-01-15 PROCEDURE — 88189 FLOWCYTOMETRY/READ 16 & >: CPT | Mod: ,,, | Performed by: PATHOLOGY

## 2019-01-15 PROCEDURE — 88342 IMHCHEM/IMCYTCHM 1ST ANTB: CPT | Mod: 26,59,, | Performed by: PATHOLOGY

## 2019-01-15 PROCEDURE — 81206 BCR/ABL1 GENE MAJOR BP: CPT

## 2019-01-15 PROCEDURE — 88311 DECALCIFY TISSUE: CPT | Mod: 26,,, | Performed by: PATHOLOGY

## 2019-01-15 PROCEDURE — 99215 OFFICE O/P EST HI 40 MIN: CPT | Mod: S$PBB,,, | Performed by: NURSE PRACTITIONER

## 2019-01-15 PROCEDURE — 88185 FLOWCYTOMETRY/TC ADD-ON: CPT | Mod: 59 | Performed by: PATHOLOGY

## 2019-01-15 PROCEDURE — 88341 TISSUE SPECIMEN TO PATHOLOGY, BONE MARROW ASPIRATION/BIOPSY PROCEDURE: ICD-10-PCS | Mod: 26,,, | Performed by: PATHOLOGY

## 2019-01-15 PROCEDURE — 88365 TISSUE SPECIMEN TO PATHOLOGY, BONE MARROW ASPIRATION/BIOPSY PROCEDURE: ICD-10-PCS | Mod: 26,,, | Performed by: PATHOLOGY

## 2019-01-15 PROCEDURE — 99999 PR PBB SHADOW E&M-EST. PATIENT-LVL III: CPT | Mod: PBBFAC,,, | Performed by: NURSE PRACTITIONER

## 2019-01-15 PROCEDURE — 88305 TISSUE SPECIMEN TO PATHOLOGY, BONE MARROW ASPIRATION/BIOPSY PROCEDURE: ICD-10-PCS | Mod: 26,,, | Performed by: PATHOLOGY

## 2019-01-15 PROCEDURE — 88364 TISSUE SPECIMEN TO PATHOLOGY, BONE MARROW ASPIRATION/BIOPSY PROCEDURE: ICD-10-PCS | Mod: 26,,, | Performed by: PATHOLOGY

## 2019-01-15 PROCEDURE — 99999 PR PBB SHADOW E&M-EST. PATIENT-LVL III: ICD-10-PCS | Mod: PBBFAC,,, | Performed by: NURSE PRACTITIONER

## 2019-01-15 PROCEDURE — 81268 CHIMERISM ANAL W/CELL SELECT: CPT

## 2019-01-15 PROCEDURE — 88184 FLOWCYTOMETRY/ TC 1 MARKER: CPT | Performed by: PATHOLOGY

## 2019-01-15 PROCEDURE — 84100 ASSAY OF PHOSPHORUS: CPT

## 2019-01-15 NOTE — Clinical Note
Please keep Tuesday appts and cancel Friday appts (as pt is now going to weekly appts). Please schedule with NP (Leona) or Dr. Pantoja through end of February. Lab collect labs: CBC, CMP, tacro, Mag, CMV, EBV. Has already been scheduled through January 29.

## 2019-01-15 NOTE — PROGRESS NOTES
HEMATOLOGIC MALIGNANCIES PROGRESS NOTE    IDENTIFYING STATEMENT   Wilton Guzmán (Wilton) is a 33 y.o. male with a  of 1985 from [place], [place] with the diagnosis of CML in blast phase.      ONCOLOGY HISTORY:    1. Chronic myeloid leukemia, initially presenting in blast phase   A. 2018: Began developing left-sided abdominal pain - eventually requiring evaluation - WBC ~150 at  base; transferred to [facility] in [place], [place]   B. BMBx showed 20% myelomonoblasts concerning for CML in blast phase; bcr-abl positive (returned for Day 8 of induction therapy).    C. 2018: Began 7+3 induction chemotherapy. Began dasatinib (dose-adjusted) on day 8.   D. 2018: Transferred to [facility]   E. 2018: BMBx showed variably cellular marrow (10-40%) with 9% CD34+ blasts, concerning for residual disease.   F. 2018: Reinduction with MEC (mitoxantrone, etoposide, cytarabine) chemotherapy.    G. 2018: BMBx shows hypocellular bone marrow with no definite morphologic or immunophenotypic evidence of residual leukemia; bcr-abl p210 transcript was detected at 0.5%   H. Delay in clinic follow-up due to challenges with  insurance   I. 2018: Follow-up in clinic; WBC 6.77 (normal diff); Hgb 11.2 g/dl; Plt 298; BMBx shows 30% cellular marrow with no morphologic evidence of residual leukemia; bcr-abl p210 detected at 0.05% on international scale (MR 3.3).    J. 2018: BMBx shows 40% cellular marrow with no morphologic evidence of leukemia. Chromosomes 46, XY. Bcr-abl p210 detected at 0.1% on international scale (MR 3.0).    K. 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 Engrafted on Day +12. Course complicated by C. Difficile colitis.     INTERVAL HISTORY:      Mr. Guzmán presents to clinic with his wife for f/u allo SCT. He is Day +98. Pt reports feeling well overall. No issues with appetite,  drinking 1-1.5L water daily. Continues daily dasatinib. Pt denies fevers, chills, aches, night sweat, SOB, rashes, mouth sores, palpitations, fatigue. Still having 1-2 episodes of diarrhea daily in the mornings, continue current budesonide dose, 9mg every other day. Can consider stopping or further wean at next visit. Pt does have flushed face to both sides of nose, no worsening. Unsure if from glasses or mask, will continue to monitor.  Pt is also here for Day 100 Bmbx. See procedure note below.    Past Medical History, Past Social History and Past Family History have been reviewed and are unchanged except as noted in the interval history.    MEDICATIONS:     Current Outpatient Medications on File Prior to Visit   Medication Sig Dispense Refill    amLODIPine (NORVASC) 5 MG tablet Take 2 tablets (10 mg total) by mouth once daily. 60 tablet 11    atovaquone (MEPRON) 750 mg/5 mL Susp Take 10 mLs (1,500 mg total) by mouth once daily. Starting on 11/8/18 300 mL 5    budesonide (ENTOCORT EC) 3 mg capsule Take 3 capsules (9 mg total) by mouth every other day. 90 capsule 2    dasatinib (SPRYCEL) 140 mg Tab Take 140 mg by mouth once daily Take 1 tablet by mouth starting on day +60. 30 tablet 11    fluconazole (DIFLUCAN) 200 MG Tab Take 2 tablets (400 mg total) by mouth once daily. 60 tablet 5    fluticasone (FLONASE) 50 mcg/actuation nasal spray 1 spray (50 mcg total) by Each Nare route once daily. 16 g 2    magnesium oxide (MAG-OX) 400 mg (241.3 mg magnesium) tablet Take 3 tablets (1,200 mg total) by mouth 2 (two) times daily.  0    potassium, sodium phosphates (PHOS-NAK) 280-160-250 mg PwPk Take 2 packets by mouth 2 (two) times daily. 60 packet 3    tacrolimus (PROGRAF) 0.5 MG Cap Take 1 mg (2 capsules) by mouth in the morning and 1.5 mg (3 capsules) by mouth in the evening. 150 capsule 2    triamcinolone acetonide 0.1% (KENALOG) 0.1 % ointment Apply topically 2 (two) times daily. 80 g 2    valGANciclovir  (VALCYTE) 450 mg Tab Take 2 tablets (900 mg total) by mouth once daily. 120 tablet 3     No current facility-administered medications on file prior to visit.        ALLERGIES: Review of patient's allergies indicates:  No Known Allergies     ROS:     Review of Systems   Constitutional: Negative for fatigue, diaphoresis, fever and unexpected weight change.   HENT:   Negative for lump/mass and sore throat.    Eyes: Negative for icterus.   Respiratory: Negative for cough and shortness of breath.    Cardiovascular: Negative for chest pain and palpitations.   Gastrointestinal: Negative for abdominal distention, constipation, nausea and vomiting. Intermittent diarrhea in AMs  Genitourinary: Negative for dysuria and frequency.    Musculoskeletal: Negative for arthralgias, gait problem and myalgias.   Skin: Negative for rash or itching. Facial flushing present  Neurological: Negative for dizziness, gait problem. No headache.  Hematological: Negative for adenopathy. Does not bruise/bleed easily.   Psychiatric/Behavioral: Negative for depression or anxiety.    PHYSICAL EXAM:  There were no vitals filed for this visit.    KARNOFSKY PERFORMANCE STATUS 80%  ECOG 1    Physical Exam   Constitutional: He is oriented to person, place, and time. He appears well-developed and well-nourished. No distress.   HENT:   Head: Normocephalic and atraumatic.   Mouth/Throat: Mucous membranes are normal. No oral lesions.    Eyes: Conjunctivae are normal.   Neck: No thyromegaly present.   Cardiovascular: Normal rate, regular rhythm and normal heart sounds.   No murmur heard.  Pulmonary/Chest: Breath sounds normal. He has no wheezes. He has no rales.   Central venous catheter removed 1/11/19. No S/S infection at removal site.  Abdominal: Soft. He exhibits no distension and no mass. There is no splenomegaly or hepatomegaly. There is no tenderness.   Neurological: He is alert and oriented to person, place, and time. He has normal strength.  Coordination normal.   Skin: No rash observed. Flushing noted to face/cheek bones    LAB:   Results for orders placed or performed in visit on 01/15/19   Rapid BMT CBC with Diff   Result Value Ref Range    WBC 3.72 (L) 3.90 - 12.70 K/uL    RBC 2.75 (L) 4.60 - 6.20 M/uL    Hemoglobin 9.1 (L) 14.0 - 18.0 g/dL    Hematocrit 28.6 (L) 40.0 - 54.0 %     (H) 82 - 98 fL    MCH 33.1 (H) 27.0 - 31.0 pg    MCHC 31.8 (L) 32.0 - 36.0 g/dL    RDW 21.5 (H) 11.5 - 14.5 %    Platelets 109 (L) 150 - 350 K/uL    MPV 9.6 9.2 - 12.9 fL    Immature Granulocytes 0.3 0.0 - 0.5 %    Gran # (ANC) 2.2 1.8 - 7.7 K/uL    Immature Grans (Abs) 0.01 0.00 - 0.04 K/uL    Lymph # 1.1 1.0 - 4.8 K/uL    Mono # 0.4 0.3 - 1.0 K/uL    Eos # 0.0 0.0 - 0.5 K/uL    Baso # 0.00 0.00 - 0.20 K/uL    nRBC 0 0 /100 WBC    Gran% 57.8 38.0 - 73.0 %    Lymph% 30.6 18.0 - 48.0 %    Mono% 10.2 4.0 - 15.0 %    Eosinophil% 1.1 0.0 - 8.0 %    Basophil% 0.0 0.0 - 1.9 %    Aniso Slight     Poik Slight     Poly Occasional     Hypo Occasional     Ovalocytes Occasional     Differential Method Automated    Magnesium   Result Value Ref Range    Magnesium 2.0 1.6 - 2.6 mg/dL   Phosphorus   Result Value Ref Range    Phosphorus 2.8 2.7 - 4.5 mg/dL   Comprehensive metabolic panel   Result Value Ref Range    Sodium 142 136 - 145 mmol/L    Potassium 4.5 3.5 - 5.1 mmol/L    Chloride 107 95 - 110 mmol/L    CO2 28 23 - 29 mmol/L    Glucose 111 (H) 70 - 110 mg/dL    BUN, Bld 11 6 - 20 mg/dL    Creatinine 1.0 0.5 - 1.4 mg/dL    Calcium 9.2 8.7 - 10.5 mg/dL    Total Protein 6.2 6.0 - 8.4 g/dL    Albumin 3.7 3.5 - 5.2 g/dL    Total Bilirubin 0.5 0.1 - 1.0 mg/dL    Alkaline Phosphatase 63 55 - 135 U/L    AST 38 10 - 40 U/L    ALT 45 (H) 10 - 44 U/L    Anion Gap 7 (L) 8 - 16 mmol/L    eGFR if African American >60.0 >60 mL/min/1.73 m^2    eGFR if non African American >60.0 >60 mL/min/1.73 m^2       PROBLEMS ASSESSED THIS VISIT:    1. CML (chronic myelocytic leukemia)    2. S/P  allogeneic bone marrow transplant    3. Sickle cell trait    4. Hypomagnesemia    5. Secondary hypertension    6. Cytomegalovirus (CMV) viremia    7. Hypophosphatemia    8. Chronic myeloid leukemia, BCR/ABL-positive, in remission    9. Hypertension, unspecified type    10. GVHD (graft versus host disease)    11. Thrombocytopenia    12. Pancytopenia due to antineoplastic chemotherapy        PLAN:        History allo transplant for CML  - See oncology history above  - s/p alloSCT on 10/9/2018  - Neutrophil engraftment on Day +12.   - Currently Day +98  - plan for +30 & +100 & +1 year restaging  - day +30 BMBX was performed in clinic 11/13/18, no evidence of CML, BCR/ABL 0.005% (MR 4.3). Chimerism studies show 60% donor in CD3+ fraction and 100% donor in CD33+ fraction.   - Repeat chimers from peripheral blood on 12/10/18 shows 80% donor in CD3+ cells and 100% donor in CD33+ cells.  - Restart dasatinib 140 mg daily on 12/10 (monitor headaches and possibility of decreasing dose)  - Day 100 Bmbx completed in clinic-- 1/15/18- results pending    GVHD   - Continue oral budesonide. Continues with 1-2 episodes of diarrhea in the mornings. Will wean to 9mg every other day (1/4), can discontinue if symptoms have not worsened or returned (consider stopping at next appt)  - C. Diff negative. Diarrhea improved with imodium. Taking every other day. Pt understands that imodium can cause constipation.  - Continue tacrolimus prophylaxis  - tacro level 7.3 today; Continue current dose 1 mg qAM and 1.5 mg qPM per Dr. Pantoja.   - gvhd charting with each clinic visit; begin cGVHD charting at day +100     ID  - Completed PO vancomycin for C. Diff  - Continue ppx with acyclovir, fluconazole  - Begin atovaquone ppx at Day +30  - Monitor CMV BIW; EBV once weekly  - EBV negative to date  - last CMV level <35 (1/11); treatment dosing of valcyte 900mg BID was changed to ppx dosing per ID on 12/18/18 to valcyte 900mg daily. Will continue this  dose for 3 months (end date mid March 2019) unless recommended change by ID  - CVC removed 1/11/19, removal site remains without s/s of infection    Cytopenias- Pancytopenia secondary to chemotherapy  - multifactorial  - Today:  WBC 3.72, Hgb 9.1, Plt 109k . No transfusions indicated  - Donor has sickle cell trait. Some anemia is expected long-term   - Platelets stable at this time. May consider lowering dose of dasatinib if counts worsen     FEN/GI  - previous hypomagnesemia  - On Mg ox 1200 BID with only mild diarrhea as adverse effect  - Mag 2.0 today, continue current dose as above  - Phos today 2.8. Continue neutraphos 2 packets BID (1/4/19).    Hypertension  - Amlodipine 10 mg daily  - BP stable today    Follow-up:  - Weekly labs and appts with Dr. Pantoja or NP  with CBC, CMP, tacro, Mag, CMV, EBV. Has already been scheduled through January 29.   -Please schedule weekly visits with Np (Leona) or Dr. Pantoja through end of February.   We will need to make all labs after 1/11/19 lab collect.       Whitney Curiel NP  Hematology and Stem Cell Transplant      PROCEDURE NOTE:  Date of Procedure: 1/15/19  Bone Marrow Biopsy and Aspiration  Indication: Day 100 s/p allo SCT for CML  Consent: Informed consent was obtained from patient.  Timeout: Done and documented.  Position: prone  Site: Left posterior illiac crest.  Prep: chlorohexidine.  Needle used: 11 gauge Jamshidi needle.  Anesthetic: 2% lidocaine 10 cc.  Biopsy: The biopsy needle was introduced into the marrow cavity and an aspirate was obtained without complications and sent for flow cytometry, BCR/ABL P210, sorted chimerism, and cytogenetics. Core biopsy obtained without difficulty and sent for routine histologic examination.  Complications: None.  Disposition: The patient was placed supine for 20min following procedure. RN to assess bandaid and site prior to sending patient home  Blood loss: Minimal.     Nola Chance NP  Hematology/Oncology/BMT

## 2019-01-16 LAB — CMV DNA SERPL NAA+PROBE-ACNC: <35 IU/ML

## 2019-01-17 LAB
BCR/ABL,P210 RESULT: NORMAL
EBV DNA BY PCR: NORMAL IU/ML
PATH REPORT.FINAL DX SPEC: NORMAL
SPECIMEN TYPE: NORMAL

## 2019-01-18 ENCOUNTER — PATIENT MESSAGE (OUTPATIENT)
Dept: HEMATOLOGY/ONCOLOGY | Facility: CLINIC | Age: 34
End: 2019-01-18

## 2019-01-18 DIAGNOSIS — Z94.81 S/P ALLOGENEIC BONE MARROW TRANSPLANT: ICD-10-CM

## 2019-01-18 DIAGNOSIS — C92.10 CML (CHRONIC MYELOCYTIC LEUKEMIA): Primary | ICD-10-CM

## 2019-01-18 LAB
FINAL DIAGNOSIS - CHIMERISM SORT: NORMAL
SPECIMEN TYPE -CHIMERISM SORT: NORMAL

## 2019-01-18 RX ORDER — ACYCLOVIR 800 MG/1
800 TABLET ORAL 2 TIMES DAILY
Qty: 60 TABLET | Refills: 11 | Status: SHIPPED | OUTPATIENT
Start: 2019-01-18 | End: 2019-05-30 | Stop reason: SDUPTHER

## 2019-01-22 ENCOUNTER — OFFICE VISIT (OUTPATIENT)
Dept: HEMATOLOGY/ONCOLOGY | Facility: CLINIC | Age: 34
End: 2019-01-22
Payer: OTHER GOVERNMENT

## 2019-01-22 VITALS
BODY MASS INDEX: 20.71 KG/M2 | SYSTOLIC BLOOD PRESSURE: 125 MMHG | TEMPERATURE: 99 F | WEIGHT: 147.94 LBS | HEIGHT: 71 IN | OXYGEN SATURATION: 99 % | DIASTOLIC BLOOD PRESSURE: 68 MMHG | HEART RATE: 82 BPM | RESPIRATION RATE: 16 BRPM

## 2019-01-22 DIAGNOSIS — D57.3 SICKLE CELL TRAIT: ICD-10-CM

## 2019-01-22 DIAGNOSIS — Z94.81 S/P ALLOGENEIC BONE MARROW TRANSPLANT: ICD-10-CM

## 2019-01-22 DIAGNOSIS — C92.11 CHRONIC MYELOID LEUKEMIA, BCR/ABL-POSITIVE, IN REMISSION: Primary | ICD-10-CM

## 2019-01-22 DIAGNOSIS — B25.9 CYTOMEGALOVIRUS (CMV) VIREMIA: ICD-10-CM

## 2019-01-22 DIAGNOSIS — E83.42 HYPOMAGNESEMIA: ICD-10-CM

## 2019-01-22 PROCEDURE — 99215 PR OFFICE/OUTPT VISIT, EST, LEVL V, 40-54 MIN: ICD-10-PCS | Mod: S$PBB,,, | Performed by: INTERNAL MEDICINE

## 2019-01-22 PROCEDURE — 99999 PR PBB SHADOW E&M-EST. PATIENT-LVL III: CPT | Mod: PBBFAC,,, | Performed by: INTERNAL MEDICINE

## 2019-01-22 PROCEDURE — 99999 PR PBB SHADOW E&M-EST. PATIENT-LVL III: ICD-10-PCS | Mod: PBBFAC,,, | Performed by: INTERNAL MEDICINE

## 2019-01-22 PROCEDURE — 99215 OFFICE O/P EST HI 40 MIN: CPT | Mod: S$PBB,,, | Performed by: INTERNAL MEDICINE

## 2019-01-22 PROCEDURE — 99213 OFFICE O/P EST LOW 20 MIN: CPT | Mod: PBBFAC | Performed by: INTERNAL MEDICINE

## 2019-01-22 RX ORDER — LANOLIN ALCOHOL/MO/W.PET/CERES
800 CREAM (GRAM) TOPICAL 2 TIMES DAILY
Qty: 120 TABLET | Refills: 11 | Status: SHIPPED | OUTPATIENT
Start: 2019-01-22 | End: 2019-01-22 | Stop reason: SDUPTHER

## 2019-01-22 RX ORDER — LANOLIN ALCOHOL/MO/W.PET/CERES
800 CREAM (GRAM) TOPICAL 2 TIMES DAILY
Qty: 120 TABLET | Refills: 11 | Status: SHIPPED | OUTPATIENT
Start: 2019-01-22 | End: 2019-01-29

## 2019-01-28 NOTE — PROGRESS NOTES
HEMATOLOGIC MALIGNANCIES PROGRESS NOTE    IDENTIFYING STATEMENT   Wilton Guzmán (Wilton) is a 33 y.o. 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      ONCOLOGY HISTORY:    1. Chronic myeloid leukemia, initially presenting in blast phase   A. 2018: Began developing left-sided abdominal pain - eventually requiring evaluation - WBC ~150 at  base; transferred to [facility] in [place], [place]   B. BMBx showed 20% myelomonoblasts concerning for CML in blast phase; bcr-abl positive (returned for Day 8 of induction therapy).    C. 2018: Began 7+3 induction chemotherapy. Began dasatinib (dose-adjusted) on day 8.   D. 2018: Transferred to [facility]   E. 2018: BMBx showed variably cellular marrow (10-40%) with 9% CD34+ blasts, concerning for residual disease.   F. 2018: Reinduction with MEC (mitoxantrone, etoposide, cytarabine) chemotherapy.    G. 2018: BMBx shows hypocellular bone marrow with no definite morphologic or immunophenotypic evidence of residual leukemia; bcr-abl p210 transcript was detected at 0.5%   H. Delay in clinic follow-up due to challenges with  insurance   I. 2018: Follow-up in clinic; WBC 6.77 (normal diff); Hgb 11.2 g/dl; Plt 298; BMBx shows 30% cellular marrow with no morphologic evidence of residual leukemia; bcr-abl p210 detected at 0.05% on international scale (MR 3.3).    J. 2018: BMBx shows 40% cellular marrow with no morphologic evidence of leukemia. Chromosomes 46, XY. Bcr-abl p210 detected at 0.1% on international scale (MR 3.0).    K. 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 Engrafted on Day +12. Course complicated by C. Difficile colitis.     INTERVAL HISTORY:      Mr. Guzmán presents to clinic with his wife for f/u allo SCT. He is Day +105. Pt reports feeling well overall. No issues with appetite,  drinking 1-1.5L water daily. Continues daily dasatinib. Pt denies fevers, chills, aches, night sweat, SOB, rashes, mouth sores, palpitations, fatigue. Still having 1-2 episodes of diarrhea daily in the mornings, continue current budesonide dose, 9mg every other day. This may be related to magnesium. Unsure if from glasses or mask, will continue to monitor.    Past Medical History, Past Social History and Past Family History have been reviewed and are unchanged except as noted in the interval history.    MEDICATIONS:     Current Outpatient Medications on File Prior to Visit   Medication Sig Dispense Refill    acyclovir (ZOVIRAX) 800 MG Tab Take 1 tablet (800 mg total) by mouth 2 (two) times daily. 60 tablet 11    amLODIPine (NORVASC) 5 MG tablet Take 2 tablets (10 mg total) by mouth once daily. 60 tablet 11    atovaquone (MEPRON) 750 mg/5 mL Susp Take 10 mLs (1,500 mg total) by mouth once daily. Starting on 11/8/18 300 mL 5    budesonide (ENTOCORT EC) 3 mg capsule Take 3 capsules (9 mg total) by mouth every other day. 90 capsule 2    dasatinib (SPRYCEL) 140 mg Tab Take 140 mg by mouth once daily Take 1 tablet by mouth starting on day +60. 30 tablet 11    fluconazole (DIFLUCAN) 200 MG Tab Take 2 tablets (400 mg total) by mouth once daily. 60 tablet 5    fluticasone (FLONASE) 50 mcg/actuation nasal spray 1 spray (50 mcg total) by Each Nare route once daily. 16 g 2    potassium, sodium phosphates (PHOS-NAK) 280-160-250 mg PwPk Take 2 packets by mouth 2 (two) times daily. 60 packet 3    tacrolimus (PROGRAF) 0.5 MG Cap Take 1 mg (2 capsules) by mouth in the morning and 1.5 mg (3 capsules) by mouth in the evening. 150 capsule 2    triamcinolone acetonide 0.1% (KENALOG) 0.1 % ointment Apply topically 2 (two) times daily. 80 g 2    valGANciclovir (VALCYTE) 450 mg Tab Take 2 tablets (900 mg total) by mouth once daily. 120 tablet 3     No current facility-administered medications on file prior to visit.   "      ALLERGIES: Review of patient's allergies indicates:  No Known Allergies     ROS:     Review of Systems   Constitutional: Negative for fatigue, diaphoresis, fever and unexpected weight change.   HENT:   Negative for lump/mass and sore throat.    Eyes: Negative for icterus.   Respiratory: Negative for cough and shortness of breath.    Cardiovascular: Negative for chest pain and palpitations.   Gastrointestinal: Negative for abdominal distention, constipation, nausea and vomiting. Intermittent diarrhea in AMs  Genitourinary: Negative for dysuria and frequency.    Musculoskeletal: Negative for arthralgias, gait problem and myalgias.   Skin: Negative for rash or itching.  Neurological: Negative for dizziness, gait problem. No headache.  Hematological: Negative for adenopathy. Does not bruise/bleed easily.   Psychiatric/Behavioral: Negative for depression or anxiety.    PHYSICAL EXAM:  Vitals:    01/22/19 0957   BP: 125/68   Pulse: 82   Resp: 16   Temp: 98.6 °F (37 °C)   SpO2: 99%   Weight: 67.1 kg (147 lb 14.9 oz)   Height: 5' 11" (1.803 m)   PainSc: 0-No pain       KARNOFSKY PERFORMANCE STATUS 80%  ECOG 1    Physical Exam   Constitutional: He is oriented to person, place, and time. He appears well-developed and well-nourished. No distress.   HENT:   Head: Normocephalic and atraumatic.   Mouth/Throat: Mucous membranes are normal. No oral lesions.    Eyes: Conjunctivae are normal.   Neck: No thyromegaly present.   Cardiovascular: Normal rate, regular rhythm and normal heart sounds.   No murmur heard.  Pulmonary/Chest: Breath sounds normal. He has no wheezes. He has no rales.   Central venous catheter removed 1/11/19. No S/S infection at removal site.  Abdominal: Soft. He exhibits no distension and no mass. There is no splenomegaly or hepatomegaly. There is no tenderness.   Neurological: He is alert and oriented to person, place, and time. He has normal strength. Coordination normal.   Skin: No rash observed. "     LAB:   Results for orders placed or performed in visit on 01/22/19   Rapid BMT CBC with Diff   Result Value Ref Range    WBC 4.66 3.90 - 12.70 K/uL    RBC 2.42 (L) 4.60 - 6.20 M/uL    Hemoglobin 8.0 (L) 14.0 - 18.0 g/dL    Hematocrit 25.4 (L) 40.0 - 54.0 %     (H) 82 - 98 fL    MCH 33.1 (H) 27.0 - 31.0 pg    MCHC 31.5 (L) 32.0 - 36.0 g/dL    RDW 21.3 (H) 11.5 - 14.5 %    Platelets 123 (L) 150 - 350 K/uL    MPV 8.7 (L) 9.2 - 12.9 fL    Immature Granulocytes 0.6 (H) 0.0 - 0.5 %    Gran # (ANC) 3.3 1.8 - 7.7 K/uL    Immature Grans (Abs) 0.03 0.00 - 0.04 K/uL    Lymph # 0.7 (L) 1.0 - 4.8 K/uL    Mono # 0.5 0.3 - 1.0 K/uL    Eos # 0.1 0.0 - 0.5 K/uL    Baso # 0.01 0.00 - 0.20 K/uL    nRBC 0 0 /100 WBC    Gran% 71.7 38.0 - 73.0 %    Lymph% 15.9 (L) 18.0 - 48.0 %    Mono% 10.3 4.0 - 15.0 %    Eosinophil% 1.3 0.0 - 8.0 %    Basophil% 0.2 0.0 - 1.9 %    Differential Method Automated    Magnesium   Result Value Ref Range    Magnesium 2.1 1.6 - 2.6 mg/dL   Phosphorus   Result Value Ref Range    Phosphorus 2.1 (L) 2.7 - 4.5 mg/dL   Comprehensive metabolic panel   Result Value Ref Range    Sodium 142 136 - 145 mmol/L    Potassium 4.8 3.5 - 5.1 mmol/L    Chloride 109 95 - 110 mmol/L    CO2 27 23 - 29 mmol/L    Glucose 90 70 - 110 mg/dL    BUN, Bld 13 6 - 20 mg/dL    Creatinine 1.1 0.5 - 1.4 mg/dL    Calcium 9.1 8.7 - 10.5 mg/dL    Total Protein 6.2 6.0 - 8.4 g/dL    Albumin 3.5 3.5 - 5.2 g/dL    Total Bilirubin 0.4 0.1 - 1.0 mg/dL    Alkaline Phosphatase 62 55 - 135 U/L    AST 26 10 - 40 U/L    ALT 27 10 - 44 U/L    Anion Gap 6 (L) 8 - 16 mmol/L    eGFR if African American >60.0 >60 mL/min/1.73 m^2    eGFR if non African American >60.0 >60 mL/min/1.73 m^2   CMV DNA, quantitative, PCR   Result Value Ref Range    Cytomegalovirus PCR, Quant Undetected Undetected IU/mL   Hallie-Kauffman virus DNA, quantitative   Result Value Ref Range    EBV DNA,  (A) Undetected IU/mL   Tacrolimus level   Result Value Ref Range     Tacrolimus Lvl 7.0 5.0 - 15.0 ng/mL       PROBLEMS ASSESSED THIS VISIT:    1. Cytomegalovirus (CMV) viremia    2. Hypomagnesemia    3. Chronic myeloid leukemia, BCR/ABL-positive, in remission    4. S/P allogeneic bone marrow transplant    5. Sickle cell trait        PLAN:        History allo transplant for CML  - See oncology history above  - s/p alloSCT on 10/9/2018  - Neutrophil engraftment on Day +12.   - Currently Day +105  - plan for +30 & +100 & +1 year restaging  - day +30 BMBX was performed in clinic 11/13/18, no evidence of CML, BCR/ABL 0.005% (MR 4.3). Chimerism studies show 60% donor in CD3+ fraction and 100% donor in CD33+ fraction.   - Repeat chimers from peripheral blood on 12/10/18 shows 80% donor in CD3+ cells and 100% donor in CD33+ cells.  - Restart dasatinib 140 mg daily on 12/10 (monitor headaches and possibility of decreasing dose)  - Day 100 Bmbx completed in clinic-- 1/15/18- No morphologic evidence of CML. BCR-ABL transcipt is 0.003%, consistent with MR 4.5. Chimerisms show 100% donor in CD33+ cells and 70% donor in CD3+ cells.     GVHD   - Continue oral budesonide. Continues with 1-2 episodes of diarrhea in the mornings. Will wean to 9mg every other day (1/4), can discontinue if symptoms have not worsened or returned (consider stopping at next appt)  - C. Diff negative. Diarrhea improved with imodium. Taking every other day. Pt understands that imodium can cause constipation.  - Continue tacrolimus prophylaxis  - tacro level 7 today; Continue current dose 1 mg qAM and 1.5 mg qPM.   - gvhd charting with each clinic visit; begin cGVHD charting at day +100     ID  - Completed PO vancomycin for C. Diff  - Continue ppx with acyclovir, fluconazole  - Begin atovaquone ppx at Day +30  - Monitor CMV BIW; EBV once weekly  - EBV negative to date  - last CMV level undetectable (1/22); treatment dosing of valcyte 900mg BID was changed to ppx dosing per ID on 12/18/18 to valcyte 900mg daily. Will continue  this dose for 3 months (end date mid March 2019) unless recommended change by ID  - CVC removed 1/11/19, removal site remains without s/s of infection    Cytopenias- Pancytopenia secondary to chemotherapy  - multifactorial  - Today:  WBC 4.66, Hgb 8, Plt 123k . No transfusions indicated  - Donor has sickle cell trait. Some anemia is expected long-term   - Platelets stable at this time. May consider lowering dose of dasatinib if counts worsen     FEN/GI  - previous hypomagnesemia  - On Mg ox 1200 BID with only mild diarrhea as adverse effect  - Mag 2.1 today, continue current dose as above  - Phos today 2.1. Continue neutraphos 2 packets BID (1/4/19).    Hypertension  - Amlodipine 10 mg daily  - BP stable today    Follow-up:  - Weekly labs and appts with Dr. Pantoja or NP  with CBC, CMP, tacro, Mag, CMV, EBV. Has already been scheduled through January 29.   -Please schedule weekly visits with Np (Leona) or Dr. Pantoja through end of February.      Kishor Pantoja MD  Hematology and Stem Cell Transplant

## 2019-01-29 ENCOUNTER — OFFICE VISIT (OUTPATIENT)
Dept: HEMATOLOGY/ONCOLOGY | Facility: CLINIC | Age: 34
End: 2019-01-29
Payer: OTHER GOVERNMENT

## 2019-01-29 VITALS
SYSTOLIC BLOOD PRESSURE: 112 MMHG | HEART RATE: 85 BPM | BODY MASS INDEX: 20.68 KG/M2 | DIASTOLIC BLOOD PRESSURE: 67 MMHG | OXYGEN SATURATION: 100 % | TEMPERATURE: 99 F | WEIGHT: 147.69 LBS | HEIGHT: 71 IN

## 2019-01-29 DIAGNOSIS — C92.10 CML (CHRONIC MYELOCYTIC LEUKEMIA): ICD-10-CM

## 2019-01-29 DIAGNOSIS — E83.42 HYPOMAGNESEMIA: ICD-10-CM

## 2019-01-29 DIAGNOSIS — E83.39 HYPOPHOSPHATEMIA: ICD-10-CM

## 2019-01-29 DIAGNOSIS — Z94.81 S/P ALLOGENEIC BONE MARROW TRANSPLANT: ICD-10-CM

## 2019-01-29 PROCEDURE — 99213 OFFICE O/P EST LOW 20 MIN: CPT | Mod: PBBFAC | Performed by: INTERNAL MEDICINE

## 2019-01-29 PROCEDURE — 99999 PR PBB SHADOW E&M-EST. PATIENT-LVL III: CPT | Mod: PBBFAC,,, | Performed by: INTERNAL MEDICINE

## 2019-01-29 PROCEDURE — 99215 PR OFFICE/OUTPT VISIT, EST, LEVL V, 40-54 MIN: ICD-10-PCS | Mod: S$PBB,,, | Performed by: INTERNAL MEDICINE

## 2019-01-29 PROCEDURE — 99215 OFFICE O/P EST HI 40 MIN: CPT | Mod: S$PBB,,, | Performed by: INTERNAL MEDICINE

## 2019-01-29 PROCEDURE — 99999 PR PBB SHADOW E&M-EST. PATIENT-LVL III: ICD-10-PCS | Mod: PBBFAC,,, | Performed by: INTERNAL MEDICINE

## 2019-01-29 RX ORDER — TACROLIMUS 0.5 MG/1
CAPSULE ORAL
Qty: 150 CAPSULE | Refills: 2 | Status: SHIPPED | OUTPATIENT
Start: 2019-01-29 | End: 2019-02-12

## 2019-01-29 RX ORDER — SODIUM,POTASSIUM PHOSPHATES 280-250MG
1 POWDER IN PACKET (EA) ORAL 2 TIMES DAILY
Qty: 60 PACKET | Refills: 3
Start: 2019-01-29 | End: 2019-04-18

## 2019-01-29 RX ORDER — LANOLIN ALCOHOL/MO/W.PET/CERES
400 CREAM (GRAM) TOPICAL 2 TIMES DAILY
Qty: 120 TABLET | Refills: 11 | Status: SHIPPED | OUTPATIENT
Start: 2019-01-29 | End: 2019-02-05

## 2019-02-03 ENCOUNTER — PATIENT MESSAGE (OUTPATIENT)
Dept: HEMATOLOGY/ONCOLOGY | Facility: CLINIC | Age: 34
End: 2019-02-03

## 2019-02-04 DIAGNOSIS — C92.10 CML (CHRONIC MYELOCYTIC LEUKEMIA): Primary | ICD-10-CM

## 2019-02-05 ENCOUNTER — OFFICE VISIT (OUTPATIENT)
Dept: HEMATOLOGY/ONCOLOGY | Facility: CLINIC | Age: 34
End: 2019-02-05
Payer: OTHER GOVERNMENT

## 2019-02-05 ENCOUNTER — LAB VISIT (OUTPATIENT)
Dept: LAB | Facility: HOSPITAL | Age: 34
End: 2019-02-05
Attending: INTERNAL MEDICINE
Payer: OTHER GOVERNMENT

## 2019-02-05 ENCOUNTER — INFUSION (OUTPATIENT)
Dept: INFUSION THERAPY | Facility: HOSPITAL | Age: 34
End: 2019-02-05
Attending: INTERNAL MEDICINE
Payer: OTHER GOVERNMENT

## 2019-02-05 VITALS
TEMPERATURE: 99 F | DIASTOLIC BLOOD PRESSURE: 73 MMHG | BODY MASS INDEX: 19.56 KG/M2 | WEIGHT: 139.75 LBS | HEIGHT: 71 IN | HEART RATE: 84 BPM | OXYGEN SATURATION: 98 % | RESPIRATION RATE: 20 BRPM | SYSTOLIC BLOOD PRESSURE: 122 MMHG

## 2019-02-05 VITALS
TEMPERATURE: 99 F | DIASTOLIC BLOOD PRESSURE: 74 MMHG | BODY MASS INDEX: 19.49 KG/M2 | SYSTOLIC BLOOD PRESSURE: 128 MMHG | HEIGHT: 71 IN | HEART RATE: 80 BPM | RESPIRATION RATE: 17 BRPM

## 2019-02-05 DIAGNOSIS — Z94.81 S/P ALLOGENEIC BONE MARROW TRANSPLANT: Primary | ICD-10-CM

## 2019-02-05 DIAGNOSIS — D64.9 ANEMIA, UNSPECIFIED TYPE: ICD-10-CM

## 2019-02-05 DIAGNOSIS — C92.10 CML (CHRONIC MYELOCYTIC LEUKEMIA): ICD-10-CM

## 2019-02-05 DIAGNOSIS — C92.11 CHRONIC MYELOID LEUKEMIA, BCR/ABL-POSITIVE, IN REMISSION: ICD-10-CM

## 2019-02-05 DIAGNOSIS — E83.42 HYPOMAGNESEMIA: ICD-10-CM

## 2019-02-05 DIAGNOSIS — Z94.81 S/P ALLOGENEIC BONE MARROW TRANSPLANT: ICD-10-CM

## 2019-02-05 DIAGNOSIS — N30.90 CYSTITIS: ICD-10-CM

## 2019-02-05 DIAGNOSIS — R19.7 DIARRHEA, UNSPECIFIED TYPE: ICD-10-CM

## 2019-02-05 DIAGNOSIS — K92.1 HEMATOCHEZIA: ICD-10-CM

## 2019-02-05 LAB
ABO + RH BLD: NORMAL
ALBUMIN SERPL BCP-MCNC: 3.6 G/DL
ALP SERPL-CCNC: 72 U/L
ALT SERPL W/O P-5'-P-CCNC: 33 U/L
ANION GAP SERPL CALC-SCNC: 5 MMOL/L
ANISOCYTOSIS BLD QL SMEAR: SLIGHT
AST SERPL-CCNC: 34 U/L
BACTERIA #/AREA URNS AUTO: ABNORMAL /HPF
BASOPHILS # BLD AUTO: 0.01 K/UL
BASOPHILS NFR BLD: 0.2 %
BILIRUB SERPL-MCNC: 0.4 MG/DL
BILIRUB UR QL STRIP: NEGATIVE
BLD GP AB SCN CELLS X3 SERPL QL: NORMAL
BLD PROD TYP BPU: NORMAL
BLOOD UNIT EXPIRATION DATE: NORMAL
BLOOD UNIT TYPE CODE: 5100
BLOOD UNIT TYPE: NORMAL
BUN SERPL-MCNC: 16 MG/DL
CALCIUM SERPL-MCNC: 9 MG/DL
CHLORIDE SERPL-SCNC: 106 MMOL/L
CLARITY UR REFRACT.AUTO: ABNORMAL
CO2 SERPL-SCNC: 26 MMOL/L
CODING SYSTEM: NORMAL
COLOR UR AUTO: YELLOW
CREAT SERPL-MCNC: 1.4 MG/DL
DIFFERENTIAL METHOD: ABNORMAL
DISPENSE STATUS: NORMAL
EOSINOPHIL # BLD AUTO: 0.2 K/UL
EOSINOPHIL NFR BLD: 4 %
ERYTHROCYTE [DISTWIDTH] IN BLOOD BY AUTOMATED COUNT: 17.5 %
EST. GFR  (AFRICAN AMERICAN): >60 ML/MIN/1.73 M^2
EST. GFR  (NON AFRICAN AMERICAN): >60 ML/MIN/1.73 M^2
GLUCOSE SERPL-MCNC: 98 MG/DL
GLUCOSE UR QL STRIP: NEGATIVE
HCT VFR BLD AUTO: 24.6 %
HGB BLD-MCNC: 7.7 G/DL
HGB UR QL STRIP: ABNORMAL
HYALINE CASTS UR QL AUTO: 1 /LPF
IMM GRANULOCYTES # BLD AUTO: 0.09 K/UL
IMM GRANULOCYTES NFR BLD AUTO: 2.1 %
KETONES UR QL STRIP: NEGATIVE
LEUKOCYTE ESTERASE UR QL STRIP: ABNORMAL
LYMPHOCYTES # BLD AUTO: 0.6 K/UL
LYMPHOCYTES NFR BLD: 14.2 %
MAGNESIUM SERPL-MCNC: 2.4 MG/DL
MCH RBC QN AUTO: 34.1 PG
MCHC RBC AUTO-ENTMCNC: 31.3 G/DL
MCV RBC AUTO: 109 FL
MICROSCOPIC COMMENT: ABNORMAL
MONOCYTES # BLD AUTO: 0.4 K/UL
MONOCYTES NFR BLD: 9.7 %
NEUTROPHILS # BLD AUTO: 3 K/UL
NEUTROPHILS NFR BLD: 69.8 %
NITRITE UR QL STRIP: NEGATIVE
NRBC BLD-RTO: 0 /100 WBC
NUM UNITS TRANS PACKED RBC: NORMAL
OVALOCYTES BLD QL SMEAR: ABNORMAL
PH UR STRIP: 5 [PH] (ref 5–8)
PHOSPHATE SERPL-MCNC: 2.5 MG/DL
PLATELET # BLD AUTO: 195 K/UL
PLATELET BLD QL SMEAR: ABNORMAL
PMV BLD AUTO: 8.5 FL
POIKILOCYTOSIS BLD QL SMEAR: SLIGHT
POTASSIUM SERPL-SCNC: 4.3 MMOL/L
PROT SERPL-MCNC: 6.3 G/DL
PROT UR QL STRIP: ABNORMAL
RBC # BLD AUTO: 2.26 M/UL
RBC #/AREA URNS AUTO: 71 /HPF (ref 0–4)
SODIUM SERPL-SCNC: 137 MMOL/L
SP GR UR STRIP: 1.01 (ref 1–1.03)
TACROLIMUS BLD-MCNC: 7.4 NG/ML
URN SPEC COLLECT METH UR: ABNORMAL
WBC # BLD AUTO: 4.24 K/UL
WBC #/AREA URNS AUTO: 15 /HPF (ref 0–5)

## 2019-02-05 PROCEDURE — 99999 PR PBB SHADOW E&M-EST. PATIENT-LVL IV: ICD-10-PCS | Mod: PBBFAC,,, | Performed by: INTERNAL MEDICINE

## 2019-02-05 PROCEDURE — 99215 OFFICE O/P EST HI 40 MIN: CPT | Mod: S$PBB,,, | Performed by: INTERNAL MEDICINE

## 2019-02-05 PROCEDURE — 80197 ASSAY OF TACROLIMUS: CPT

## 2019-02-05 PROCEDURE — 99214 OFFICE O/P EST MOD 30 MIN: CPT | Mod: PBBFAC,25 | Performed by: INTERNAL MEDICINE

## 2019-02-05 PROCEDURE — 87798 DETECT AGENT NOS DNA AMP: CPT

## 2019-02-05 PROCEDURE — 83735 ASSAY OF MAGNESIUM: CPT

## 2019-02-05 PROCEDURE — 87799 DETECT AGENT NOS DNA QUANT: CPT

## 2019-02-05 PROCEDURE — P9040 RBC LEUKOREDUCED IRRADIATED: HCPCS

## 2019-02-05 PROCEDURE — 81001 URINALYSIS AUTO W/SCOPE: CPT

## 2019-02-05 PROCEDURE — 87799 DETECT AGENT NOS DNA QUANT: CPT | Mod: 91

## 2019-02-05 PROCEDURE — 36430 TRANSFUSION BLD/BLD COMPNT: CPT

## 2019-02-05 PROCEDURE — 99215 PR OFFICE/OUTPT VISIT, EST, LEVL V, 40-54 MIN: ICD-10-PCS | Mod: S$PBB,,, | Performed by: INTERNAL MEDICINE

## 2019-02-05 PROCEDURE — 86920 COMPATIBILITY TEST SPIN: CPT

## 2019-02-05 PROCEDURE — 25000003 PHARM REV CODE 250: Performed by: INTERNAL MEDICINE

## 2019-02-05 PROCEDURE — 99999 PR PBB SHADOW E&M-EST. PATIENT-LVL IV: CPT | Mod: PBBFAC,,, | Performed by: INTERNAL MEDICINE

## 2019-02-05 PROCEDURE — 36415 COLL VENOUS BLD VENIPUNCTURE: CPT

## 2019-02-05 PROCEDURE — 86850 RBC ANTIBODY SCREEN: CPT

## 2019-02-05 PROCEDURE — 80053 COMPREHEN METABOLIC PANEL: CPT

## 2019-02-05 PROCEDURE — 85025 COMPLETE CBC W/AUTO DIFF WBC: CPT

## 2019-02-05 PROCEDURE — 86644 CMV ANTIBODY: CPT

## 2019-02-05 PROCEDURE — 84100 ASSAY OF PHOSPHORUS: CPT

## 2019-02-05 RX ORDER — HYDROCODONE BITARTRATE AND ACETAMINOPHEN 500; 5 MG/1; MG/1
TABLET ORAL ONCE
Status: COMPLETED | OUTPATIENT
Start: 2019-02-05 | End: 2019-02-05

## 2019-02-05 RX ORDER — ACETAMINOPHEN 325 MG/1
650 TABLET ORAL
Status: CANCELLED | OUTPATIENT
Start: 2019-02-05

## 2019-02-05 RX ORDER — LANOLIN ALCOHOL/MO/W.PET/CERES
400 CREAM (GRAM) TOPICAL DAILY
Qty: 120 TABLET | Refills: 11 | Status: SHIPPED | OUTPATIENT
Start: 2019-02-05 | End: 2019-04-18 | Stop reason: ALTCHOICE

## 2019-02-05 RX ORDER — ACETAMINOPHEN 325 MG/1
650 TABLET ORAL
Status: COMPLETED | OUTPATIENT
Start: 2019-02-05 | End: 2019-02-05

## 2019-02-05 RX ORDER — BUDESONIDE 3 MG/1
9 CAPSULE, COATED PELLETS ORAL EVERY OTHER DAY
Qty: 90 CAPSULE | Refills: 2
Start: 2019-02-05 | End: 2019-02-11 | Stop reason: SDUPTHER

## 2019-02-05 RX ORDER — HYDROCODONE BITARTRATE AND ACETAMINOPHEN 500; 5 MG/1; MG/1
TABLET ORAL ONCE
Status: CANCELLED | OUTPATIENT
Start: 2019-02-05 | End: 2019-02-05

## 2019-02-05 RX ORDER — LEVOFLOXACIN 750 MG/1
750 TABLET ORAL DAILY
Qty: 7 TABLET | Refills: 0 | Status: SHIPPED | OUTPATIENT
Start: 2019-02-05 | End: 2019-02-12

## 2019-02-05 RX ADMIN — SODIUM CHLORIDE: 0.9 INJECTION, SOLUTION INTRAVENOUS at 01:02

## 2019-02-05 RX ADMIN — ACETAMINOPHEN 650 MG: 325 TABLET ORAL at 01:02

## 2019-02-05 NOTE — PLAN OF CARE
Problem: Anemia  Goal: Anemia Symptom Improvement    Intervention: Monitor and Manage Anemia  Pt here for 1 unit PRBC, labs, hx, meds, allergies reviewed, pt with no complaints at this time states just feel tired, reclined in chair, continue to monitor

## 2019-02-05 NOTE — PLAN OF CARE
Problem: Adult Inpatient Plan of Care  Goal: Plan of Care Review  Outcome: Ongoing (interventions implemented as appropriate)  Pt tolerated PRBC without issue, pt has no upcoming appts at this time, no distress noted upon d/c to home

## 2019-02-06 LAB
BKV DNA SPEC NAA+PROBE-LOG#: <2.1 LOG (10) COPIES/ML
BKV DNA UR NAA+PROBE-ACNC: <125 COPIES/ML
BKV DNA UR QL NAA+PROBE: NOT DETECTED
CMV DNA SERPL NAA+PROBE-ACNC: <35 IU/ML
EBV DNA BY PCR: <100 IU/ML

## 2019-02-07 ENCOUNTER — PATIENT MESSAGE (OUTPATIENT)
Dept: HEMATOLOGY/ONCOLOGY | Facility: CLINIC | Age: 34
End: 2019-02-07

## 2019-02-07 ENCOUNTER — TELEPHONE (OUTPATIENT)
Dept: GASTROENTEROLOGY | Facility: CLINIC | Age: 34
End: 2019-02-07

## 2019-02-07 DIAGNOSIS — K92.1 HEMATOCHEZIA: Primary | ICD-10-CM

## 2019-02-07 NOTE — TELEPHONE ENCOUNTER
Ma spoke to pt offer pt appt with Dr. Pedro on 2/14 at 3 pm     Pt confirmed appt and thank Ma     ----- Message from Paulina Ferrari sent at 2/7/2019  8:51 AM CST -----  Regarding: RE: CONSULTATION  Perfect book it!  Thank you so much.  ----- Message -----  From: Judah Blount MA  Sent: 2/7/2019   8:50 AM  To: Paulina Ferrari  Subject: FW: CONSULTATION                                 We have 2/14 at 8 am       Judah  ----- Message -----  From: Paulina Ferrari  Sent: 2/6/2019   4:42 PM  To: Judah Blount MA  Subject: RE: CONSULTATION                                 Okay. Do he have anything before the 19th.  ----- Message -----  From: Judah Blount MA  Sent: 2/6/2019   4:40 PM  To: Paulina Ferrari  Subject: FW: CONSULTATION                                 Dr. Pedro doesn't have any openings on 2/12      Judah   ----- Message -----  From: Paulina Ferrari  Sent: 2/6/2019   4:27 PM  To: Willis Arnold Staff  Subject: CONSULTATION                                     Dr Kit Osorio is referring patient for Hematochezia.  Would like to know if he can be seen when he comes in on Feb 12th.  We have a referral in place. Please let us know. Thanks.

## 2019-02-11 DIAGNOSIS — R19.7 DIARRHEA, UNSPECIFIED TYPE: ICD-10-CM

## 2019-02-12 ENCOUNTER — OFFICE VISIT (OUTPATIENT)
Dept: HEMATOLOGY/ONCOLOGY | Facility: CLINIC | Age: 34
End: 2019-02-12
Payer: OTHER GOVERNMENT

## 2019-02-12 VITALS
RESPIRATION RATE: 16 BRPM | BODY MASS INDEX: 19.85 KG/M2 | SYSTOLIC BLOOD PRESSURE: 135 MMHG | OXYGEN SATURATION: 100 % | DIASTOLIC BLOOD PRESSURE: 79 MMHG | WEIGHT: 141.75 LBS | HEIGHT: 71 IN | TEMPERATURE: 99 F | HEART RATE: 94 BPM

## 2019-02-12 DIAGNOSIS — D57.3 SICKLE CELL TRAIT: ICD-10-CM

## 2019-02-12 DIAGNOSIS — B25.9 CYTOMEGALOVIRUS (CMV) VIREMIA: ICD-10-CM

## 2019-02-12 DIAGNOSIS — E83.42 HYPOMAGNESEMIA: ICD-10-CM

## 2019-02-12 DIAGNOSIS — Z94.81 S/P ALLOGENEIC BONE MARROW TRANSPLANT: ICD-10-CM

## 2019-02-12 DIAGNOSIS — C92.11 CHRONIC MYELOID LEUKEMIA, BCR/ABL-POSITIVE, IN REMISSION: Primary | ICD-10-CM

## 2019-02-12 PROCEDURE — 99213 OFFICE O/P EST LOW 20 MIN: CPT | Mod: PBBFAC,25 | Performed by: INTERNAL MEDICINE

## 2019-02-12 PROCEDURE — 99215 PR OFFICE/OUTPT VISIT, EST, LEVL V, 40-54 MIN: ICD-10-PCS | Mod: S$PBB,,, | Performed by: INTERNAL MEDICINE

## 2019-02-12 PROCEDURE — 99999 PR PBB SHADOW E&M-EST. PATIENT-LVL III: CPT | Mod: PBBFAC,,, | Performed by: INTERNAL MEDICINE

## 2019-02-12 PROCEDURE — 99999 PR PBB SHADOW E&M-EST. PATIENT-LVL III: ICD-10-PCS | Mod: PBBFAC,,, | Performed by: INTERNAL MEDICINE

## 2019-02-12 PROCEDURE — 99215 OFFICE O/P EST HI 40 MIN: CPT | Mod: S$PBB,,, | Performed by: INTERNAL MEDICINE

## 2019-02-12 RX ORDER — BUDESONIDE 3 MG/1
9 CAPSULE, COATED PELLETS ORAL EVERY OTHER DAY
Qty: 90 CAPSULE | Refills: 2 | Status: SHIPPED | OUTPATIENT
Start: 2019-02-12 | End: 2019-03-07

## 2019-02-12 RX ORDER — TACROLIMUS 0.5 MG/1
CAPSULE ORAL
Qty: 150 CAPSULE | Refills: 2 | Status: SHIPPED | OUTPATIENT
Start: 2019-02-12 | End: 2019-02-18

## 2019-02-12 NOTE — PROGRESS NOTES
HEMATOLOGIC MALIGNANCIES PROGRESS NOTE    IDENTIFYING STATEMENT   Wilton Guzmán (Wilton) is a 33 y.o. male with a  of 1985 from [place], [place] with the diagnosis of CML in blast phase.      ONCOLOGY HISTORY:    1. Chronic myeloid leukemia, initially presenting in blast phase   A. 2018: Began developing left-sided abdominal pain - eventually requiring evaluation - WBC ~150 at  base; transferred to [facility] in [place], [place]   B. BMBx showed 20% myelomonoblasts concerning for CML in blast phase; bcr-abl positive (returned for Day 8 of induction therapy).    C. 2018: Began 7+3 induction chemotherapy. Began dasatinib (dose-adjusted) on day 8.   D. 2018: Transferred to [facility]   E. 2018: BMBx showed variably cellular marrow (10-40%) with 9% CD34+ blasts, concerning for residual disease.   F. 2018: Reinduction with MEC (mitoxantrone, etoposide, cytarabine) chemotherapy.    G. 2018: BMBx shows hypocellular bone marrow with no definite morphologic or immunophenotypic evidence of residual leukemia; bcr-abl p210 transcript was detected at 0.5%   H. Delay in clinic follow-up due to challenges with  insurance   I. 2018: Follow-up in clinic; WBC 6.77 (normal diff); Hgb 11.2 g/dl; Plt 298; BMBx shows 30% cellular marrow with no morphologic evidence of residual leukemia; bcr-abl p210 detected at 0.05% on international scale (MR 3.3).    J. 2018: BMBx shows 40% cellular marrow with no morphologic evidence of leukemia. Chromosomes 46, XY. Bcr-abl p210 detected at 0.1% on international scale (MR 3.0).    K. 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 Engrafted on Day +12. Course complicated by C. Difficile colitis.     INTERVAL HISTORY:      Mr. Guzmán presents to clinic with his wife for f/u allo SCT. He is Day +119. Pt reports feeling well overall. No issues with appetite,  drinking 1-1.5L water daily. Continues daily dasatinib. Pt denies fevers, chills, aches, night sweat, SOB, rashes, mouth sores, palpitations, fatigue. Still having 1-2 episodes of diarrhea daily in the mornings, continue current budesonide dose, 9mg every other day. He is doing better on the lower dose of dasatinib.    Since his last visit, he started having some blood in his urine. He also had a single bloody stool this morning.     Past Medical History, Past Social History and Past Family History have been reviewed and are unchanged except as noted in the interval history.    MEDICATIONS:     Current Outpatient Medications on File Prior to Visit   Medication Sig Dispense Refill    acyclovir (ZOVIRAX) 800 MG Tab Take 1 tablet (800 mg total) by mouth 2 (two) times daily. 60 tablet 11    amLODIPine (NORVASC) 5 MG tablet Take 2 tablets (10 mg total) by mouth once daily. 60 tablet 11    atovaquone (MEPRON) 750 mg/5 mL Susp Take 10 mLs (1,500 mg total) by mouth once daily. Starting on 11/8/18 300 mL 5    dasatinib (SPRYCEL) 100 mg Take 1 tablet (100 mg total) by mouth once daily. 30 tablet 2    fluconazole (DIFLUCAN) 200 MG Tab Take 2 tablets (400 mg total) by mouth once daily. 60 tablet 5    potassium, sodium phosphates (PHOS-NAK) 280-160-250 mg PwPk Take 1 packet by mouth 2 (two) times daily. 60 packet 3    tacrolimus (PROGRAF) 0.5 MG Cap Take 1 mg (2 capsules) by mouth in the morning and 1 mg (2 capsules) by mouth in the evening. 150 capsule 2    triamcinolone acetonide 0.1% (KENALOG) 0.1 % ointment Apply topically 2 (two) times daily. 80 g 2    valGANciclovir (VALCYTE) 450 mg Tab Take 2 tablets (900 mg total) by mouth once daily. 120 tablet 3    fluticasone (FLONASE) 50 mcg/actuation nasal spray 1 spray (50 mcg total) by Each Nare route once daily. 16 g 2     No current facility-administered medications on file prior to visit.        ALLERGIES: Review of patient's allergies indicates:  No Known Allergies  "    ROS:     Review of Systems   Constitutional: Negative for fatigue, diaphoresis, fever and unexpected weight change.   HENT:   Negative for lump/mass and sore throat.    Eyes: Negative for icterus.   Respiratory: Negative for cough and shortness of breath.    Cardiovascular: Negative for chest pain and palpitations.   Gastrointestinal: Negative for abdominal distention, constipation, nausea and vomiting. Intermittent diarrhea in AMs. Blood in stool.   Genitourinary: Negative for dysuria and frequency.  Blood in urine.   Musculoskeletal: Negative for arthralgias, gait problem and myalgias.   Skin: Negative for rash or itching.  Neurological: Negative for dizziness, gait problem. Reporting headache  Hematological: Negative for adenopathy. Does not bruise/bleed easily.   Psychiatric/Behavioral: Negative for depression or anxiety.    PHYSICAL EXAM:  Vitals:    02/05/19 1108   BP: 122/73   Pulse: 84   Resp: 20   Temp: 98.7 °F (37.1 °C)   TempSrc: Oral   SpO2: 98%   Weight: 63.4 kg (139 lb 12.4 oz)   Height: 5' 11" (1.803 m)   PainSc: 0-No pain       KARNOFSKY PERFORMANCE STATUS 80%  ECOG 1    Physical Exam   Constitutional: He is oriented to person, place, and time. He appears well-developed and well-nourished. No distress.   HENT:   Head: Normocephalic and atraumatic.   Mouth/Throat: Mucous membranes are normal. No oral lesions.    Eyes: Conjunctivae are normal.   Neck: No thyromegaly present.   Cardiovascular: Normal rate, regular rhythm and normal heart sounds.   No murmur heard.  Pulmonary/Chest: Breath sounds normal. He has no wheezes. He has no rales.   Central venous catheter removed 1/11/19. No S/S infection at removal site.  Abdominal: Soft. He exhibits no distension and no mass. There is no splenomegaly or hepatomegaly. There is no tenderness.   Neurological: He is alert and oriented to person, place, and time. He has normal strength. Coordination normal.   Skin: No rash observed.     LAB:   Results for " orders placed or performed in visit on 02/05/19   Rapid BMT CBC with Diff   Result Value Ref Range    WBC 4.24 3.90 - 12.70 K/uL    RBC 2.26 (L) 4.60 - 6.20 M/uL    Hemoglobin 7.7 (L) 14.0 - 18.0 g/dL    Hematocrit 24.6 (L) 40.0 - 54.0 %     (H) 82 - 98 fL    MCH 34.1 (H) 27.0 - 31.0 pg    MCHC 31.3 (L) 32.0 - 36.0 g/dL    RDW 17.5 (H) 11.5 - 14.5 %    Platelets 195 150 - 350 K/uL    MPV 8.5 (L) 9.2 - 12.9 fL    Immature Granulocytes 2.1 (H) 0.0 - 0.5 %    Gran # (ANC) 3.0 1.8 - 7.7 K/uL    Immature Grans (Abs) 0.09 (H) 0.00 - 0.04 K/uL    Lymph # 0.6 (L) 1.0 - 4.8 K/uL    Mono # 0.4 0.3 - 1.0 K/uL    Eos # 0.2 0.0 - 0.5 K/uL    Baso # 0.01 0.00 - 0.20 K/uL    nRBC 0 0 /100 WBC    Gran% 69.8 38.0 - 73.0 %    Lymph% 14.2 (L) 18.0 - 48.0 %    Mono% 9.7 4.0 - 15.0 %    Eosinophil% 4.0 0.0 - 8.0 %    Basophil% 0.2 0.0 - 1.9 %    Platelet Estimate Appears normal     Aniso Slight     Poik Slight     Ovalocytes Occasional     Differential Method Automated    Magnesium   Result Value Ref Range    Magnesium 2.4 1.6 - 2.6 mg/dL   Phosphorus   Result Value Ref Range    Phosphorus 2.5 (L) 2.7 - 4.5 mg/dL   Comprehensive metabolic panel   Result Value Ref Range    Sodium 137 136 - 145 mmol/L    Potassium 4.3 3.5 - 5.1 mmol/L    Chloride 106 95 - 110 mmol/L    CO2 26 23 - 29 mmol/L    Glucose 98 70 - 110 mg/dL    BUN, Bld 16 6 - 20 mg/dL    Creatinine 1.4 0.5 - 1.4 mg/dL    Calcium 9.0 8.7 - 10.5 mg/dL    Total Protein 6.3 6.0 - 8.4 g/dL    Albumin 3.6 3.5 - 5.2 g/dL    Total Bilirubin 0.4 0.1 - 1.0 mg/dL    Alkaline Phosphatase 72 55 - 135 U/L    AST 34 10 - 40 U/L    ALT 33 10 - 44 U/L    Anion Gap 5 (L) 8 - 16 mmol/L    eGFR if African American >60.0 >60 mL/min/1.73 m^2    eGFR if non African American >60.0 >60 mL/min/1.73 m^2   CMV DNA, quantitative, PCR   Result Value Ref Range    Cytomegalovirus PCR, Quant <35 (A) Undetected IU/mL   Hallie-Kauffman virus DNA, quantitative   Result Value Ref Range    EBV DNA, PCR <100  (A) Undetected IU/mL   Tacrolimus level   Result Value Ref Range    Tacrolimus Lvl 7.4 5.0 - 15.0 ng/mL   Type & Screen   Result Value Ref Range    Group & Rh B POS     Indirect Wes NEG        PROBLEMS ASSESSED THIS VISIT:    1. S/P allogeneic bone marrow transplant    2. Hypomagnesemia    3. Hematochezia    4. Anemia, unspecified type    5. Chronic myeloid leukemia, BCR/ABL-positive, in remission    6. Cystitis        PLAN:        History allo transplant for CML  - See oncology history above  - s/p alloSCT on 10/9/2018  - Neutrophil engraftment on Day +12.   - Currently Day +119  - plan for +30 & +100 & +1 year restaging  - day +30 BMBX was performed in clinic 11/13/18, no evidence of CML, BCR/ABL 0.005% (MR 4.3). Chimerism studies show 60% donor in CD3+ fraction and 100% donor in CD33+ fraction.   - Repeat chimers from peripheral blood on 12/10/18 shows 80% donor in CD3+ cells and 100% donor in CD33+ cells.  - Restart dasatinib 140 mg daily on 12/10 (monitor headaches and possibility of decreasing dose)  - Day 100 Bmbx completed in clinic-- 1/15/18- No morphologic evidence of CML. BCR-ABL transcipt is 0.003%, consistent with MR 4.5. Chimerisms show 100% donor in CD33+ cells and 70% donor in CD3+ cells.     GVHD   - Continue oral budesonide. Continues with 1-2 episodes of diarrhea in the mornings. Will wean to 9mg every other day (1/4), can discontinue if symptoms have not worsened or returned (consider stopping at next appt)  - C. Diff negative. Diarrhea improved with imodium. Taking every other day. Pt understands that imodium can cause constipation.  - Continue tacrolimus prophylaxis  - tacro level 7.4 today; Continue 1 mg qAM and 1 mg qPM.   - gvhd charting with each clinic visit; begin cGVHD charting at day +100     ID  - Completed PO vancomycin for C. Diff  - Continue ppx with acyclovir, fluconazole  - Begin atovaquone ppx at Day +30  - Monitor CMV BIW; EBV once weekly  - EBV negative to date  - CMV level  <35 (2/5); treatment dosing of valcyte 900mg BID was changed to ppx dosing per ID on 12/18/18 to valcyte 900mg daily. Will continue this dose for 3 months (end date mid March 2019) unless recommended change by ID. We will continue to monitor viremia. If this increases, will discuss with ID  - CVC removed 1/11/19, removal site remains without s/s of infection  - EBV DNA detected by PCR as well - weaning immunosuppression  - Obtained urinalysis, urine culture, and urine for BK virus - negative  - Requested stool studies and GI consult for infectious workup. He will need flexible sigmoidoscopy with biopsy to rule out CMV colitis as well as GVHD.     Cytopenias- Pancytopenia secondary to chemotherapy  - multifactorial  - Today:  WBC 4.24, Hgb 7.7, Plt 195k . No transfusions indicated  - Donor has sickle cell trait. Some anemia is expected long-term   - Platelets stable at this time. May consider lowering dose of dasatinib if counts worsen     FEN/GI  - previous hypomagnesemia  - On Mg ox 1200 BID with only mild diarrhea as adverse effect  - Mag 2.4 today, decrease to 800 mg daily  - Phos today 2.5. Continue neutraphos 2 packets BID (1/4/19).    Hypertension  - Amlodipine 10 mg daily  - BP stable today    Follow-up:  - Weekly labs and appts with Dr. Pantoja or NP  with CBC, CMP, tacro, Mag, CMV, EBV. Has already been scheduled through February    Kishor Pantoja MD  Hematology and Stem Cell Transplant

## 2019-02-12 NOTE — Clinical Note
We can cancel his GI appt this week. His symptoms have improved, and I don't think he needs a colonoscpy right now.

## 2019-02-15 ENCOUNTER — PATIENT MESSAGE (OUTPATIENT)
Dept: HEMATOLOGY/ONCOLOGY | Facility: CLINIC | Age: 34
End: 2019-02-15

## 2019-02-17 ENCOUNTER — PATIENT MESSAGE (OUTPATIENT)
Dept: HEMATOLOGY/ONCOLOGY | Facility: CLINIC | Age: 34
End: 2019-02-17

## 2019-02-18 ENCOUNTER — LAB VISIT (OUTPATIENT)
Dept: LAB | Facility: HOSPITAL | Age: 34
End: 2019-02-18
Attending: INTERNAL MEDICINE
Payer: OTHER GOVERNMENT

## 2019-02-18 ENCOUNTER — OFFICE VISIT (OUTPATIENT)
Dept: HEMATOLOGY/ONCOLOGY | Facility: CLINIC | Age: 34
End: 2019-02-18
Payer: OTHER GOVERNMENT

## 2019-02-18 VITALS
TEMPERATURE: 99 F | SYSTOLIC BLOOD PRESSURE: 121 MMHG | OXYGEN SATURATION: 100 % | DIASTOLIC BLOOD PRESSURE: 70 MMHG | HEIGHT: 71 IN | BODY MASS INDEX: 20.31 KG/M2 | WEIGHT: 145.06 LBS | HEART RATE: 85 BPM

## 2019-02-18 DIAGNOSIS — C92.10 CML (CHRONIC MYELOCYTIC LEUKEMIA): ICD-10-CM

## 2019-02-18 DIAGNOSIS — Z94.81 S/P ALLOGENEIC BONE MARROW TRANSPLANT: Primary | ICD-10-CM

## 2019-02-18 DIAGNOSIS — C92.11 CHRONIC MYELOID LEUKEMIA, BCR/ABL-POSITIVE, IN REMISSION: Primary | ICD-10-CM

## 2019-02-18 DIAGNOSIS — L73.9 FOLLICULITIS: ICD-10-CM

## 2019-02-18 DIAGNOSIS — R31.0 GROSS HEMATURIA: ICD-10-CM

## 2019-02-18 DIAGNOSIS — D57.3 SICKLE CELL TRAIT: ICD-10-CM

## 2019-02-18 PROBLEM — D69.6 THROMBOCYTOPENIA: Status: RESOLVED | Noted: 2018-12-19 | Resolved: 2019-02-18

## 2019-02-18 LAB
ALBUMIN SERPL BCP-MCNC: 3.4 G/DL
ALP SERPL-CCNC: 66 U/L
ALT SERPL W/O P-5'-P-CCNC: 21 U/L
ANION GAP SERPL CALC-SCNC: 6 MMOL/L
AST SERPL-CCNC: 26 U/L
BACTERIA #/AREA URNS AUTO: ABNORMAL /HPF
BASOPHILS # BLD AUTO: 0.03 K/UL
BASOPHILS NFR BLD: 0.6 %
BILIRUB SERPL-MCNC: 0.3 MG/DL
BILIRUB UR QL STRIP: NEGATIVE
BUN SERPL-MCNC: 10 MG/DL
CALCIUM SERPL-MCNC: 9.3 MG/DL
CHLORIDE SERPL-SCNC: 107 MMOL/L
CLARITY UR REFRACT.AUTO: CLEAR
CO2 SERPL-SCNC: 28 MMOL/L
COLOR UR AUTO: YELLOW
CREAT SERPL-MCNC: 1 MG/DL
DIFFERENTIAL METHOD: ABNORMAL
EOSINOPHIL # BLD AUTO: 0.1 K/UL
EOSINOPHIL NFR BLD: 1.5 %
ERYTHROCYTE [DISTWIDTH] IN BLOOD BY AUTOMATED COUNT: 18.8 %
EST. GFR  (AFRICAN AMERICAN): >60 ML/MIN/1.73 M^2
EST. GFR  (NON AFRICAN AMERICAN): >60 ML/MIN/1.73 M^2
GLUCOSE SERPL-MCNC: 79 MG/DL
GLUCOSE UR QL STRIP: NEGATIVE
HCT VFR BLD AUTO: 27.8 %
HGB BLD-MCNC: 8.9 G/DL
HGB UR QL STRIP: ABNORMAL
HYALINE CASTS UR QL AUTO: 0 /LPF
IMM GRANULOCYTES # BLD AUTO: 0.12 K/UL
IMM GRANULOCYTES NFR BLD AUTO: 2.5 %
KETONES UR QL STRIP: NEGATIVE
LEUKOCYTE ESTERASE UR QL STRIP: NEGATIVE
LYMPHOCYTES # BLD AUTO: 0.8 K/UL
LYMPHOCYTES NFR BLD: 16.7 %
MAGNESIUM SERPL-MCNC: 1.7 MG/DL
MCH RBC QN AUTO: 34.8 PG
MCHC RBC AUTO-ENTMCNC: 32 G/DL
MCV RBC AUTO: 109 FL
MICROSCOPIC COMMENT: ABNORMAL
MONOCYTES # BLD AUTO: 0.3 K/UL
MONOCYTES NFR BLD: 6.5 %
NEUTROPHILS # BLD AUTO: 3.5 K/UL
NEUTROPHILS NFR BLD: 72.2 %
NITRITE UR QL STRIP: NEGATIVE
NRBC BLD-RTO: 0 /100 WBC
PH UR STRIP: 6 [PH] (ref 5–8)
PHOSPHATE SERPL-MCNC: 2.6 MG/DL
PLATELET # BLD AUTO: 163 K/UL
PMV BLD AUTO: 8.5 FL
POTASSIUM SERPL-SCNC: 3.9 MMOL/L
PROT SERPL-MCNC: 6.1 G/DL
PROT UR QL STRIP: ABNORMAL
RBC # BLD AUTO: 2.56 M/UL
RBC #/AREA URNS AUTO: 25 /HPF (ref 0–4)
SODIUM SERPL-SCNC: 141 MMOL/L
SP GR UR STRIP: 1.01 (ref 1–1.03)
TACROLIMUS BLD-MCNC: 4.4 NG/ML
URN SPEC COLLECT METH UR: ABNORMAL
WBC # BLD AUTO: 4.79 K/UL
WBC #/AREA URNS AUTO: 5 /HPF (ref 0–5)

## 2019-02-18 PROCEDURE — 99214 OFFICE O/P EST MOD 30 MIN: CPT | Mod: PBBFAC | Performed by: INTERNAL MEDICINE

## 2019-02-18 PROCEDURE — 81001 URINALYSIS AUTO W/SCOPE: CPT

## 2019-02-18 PROCEDURE — 87799 DETECT AGENT NOS DNA QUANT: CPT

## 2019-02-18 PROCEDURE — 83735 ASSAY OF MAGNESIUM: CPT

## 2019-02-18 PROCEDURE — 80197 ASSAY OF TACROLIMUS: CPT

## 2019-02-18 PROCEDURE — 99215 PR OFFICE/OUTPT VISIT, EST, LEVL V, 40-54 MIN: ICD-10-PCS | Mod: S$PBB,,, | Performed by: INTERNAL MEDICINE

## 2019-02-18 PROCEDURE — 99999 PR PBB SHADOW E&M-EST. PATIENT-LVL IV: ICD-10-PCS | Mod: PBBFAC,,, | Performed by: INTERNAL MEDICINE

## 2019-02-18 PROCEDURE — 85025 COMPLETE CBC W/AUTO DIFF WBC: CPT

## 2019-02-18 PROCEDURE — 80053 COMPREHEN METABOLIC PANEL: CPT

## 2019-02-18 PROCEDURE — 99999 PR PBB SHADOW E&M-EST. PATIENT-LVL IV: CPT | Mod: PBBFAC,,, | Performed by: INTERNAL MEDICINE

## 2019-02-18 PROCEDURE — 36415 COLL VENOUS BLD VENIPUNCTURE: CPT

## 2019-02-18 PROCEDURE — 99215 OFFICE O/P EST HI 40 MIN: CPT | Mod: S$PBB,,, | Performed by: INTERNAL MEDICINE

## 2019-02-18 PROCEDURE — 84100 ASSAY OF PHOSPHORUS: CPT

## 2019-02-18 RX ORDER — TACROLIMUS 0.5 MG/1
CAPSULE ORAL
Qty: 150 CAPSULE | Refills: 2 | Status: SHIPPED | OUTPATIENT
Start: 2019-02-18 | End: 2019-02-26

## 2019-02-18 RX ORDER — DOXYCYCLINE 100 MG/1
100 CAPSULE ORAL 2 TIMES DAILY
Qty: 10 CAPSULE | Refills: 0 | Status: SHIPPED | OUTPATIENT
Start: 2019-02-18 | End: 2019-02-23

## 2019-02-18 NOTE — PROGRESS NOTES
HEMATOLOGIC MALIGNANCIES PROGRESS NOTE    IDENTIFYING STATEMENT   Wilton Guzmán (Wilton) is a 33 y.o. 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      ONCOLOGY HISTORY:    1. Chronic myeloid leukemia, initially presenting in blast phase   A. 2018: Began developing left-sided abdominal pain - eventually requiring evaluation - WBC ~150 at  base; transferred to [facility] in [place], [place]   B. BMBx showed 20% myelomonoblasts concerning for CML in blast phase; bcr-abl positive (returned for Day 8 of induction therapy).    C. 2018: Began 7+3 induction chemotherapy. Began dasatinib (dose-adjusted) on day 8.   D. 2018: Transferred to [facility]   E. 2018: BMBx showed variably cellular marrow (10-40%) with 9% CD34+ blasts, concerning for residual disease.   F. 2018: Reinduction with MEC (mitoxantrone, etoposide, cytarabine) chemotherapy.    G. 2018: BMBx shows hypocellular bone marrow with no definite morphologic or immunophenotypic evidence of residual leukemia; bcr-abl p210 transcript was detected at 0.5%   H. Delay in clinic follow-up due to challenges with  insurance   I. 2018: Follow-up in clinic; WBC 6.77 (normal diff); Hgb 11.2 g/dl; Plt 298; BMBx shows 30% cellular marrow with no morphologic evidence of residual leukemia; bcr-abl p210 detected at 0.05% on international scale (MR 3.3).    J. 2018: BMBx shows 40% cellular marrow with no morphologic evidence of leukemia. Chromosomes 46, XY. Bcr-abl p210 detected at 0.1% on international scale (MR 3.0).    K. 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 Engrafted on Day +12. Course complicated by C. Difficile colitis.     INTERVAL HISTORY:      Mr. Guzmán presents to clinic with his wife for f/u allo SCT. He is Day +126. Pt reports feeling well overall. No issues with appetite,  drinking 1-1.5L water daily. Continues daily dasatinib and is tolerating 100 mg much better. Pt denies fevers, chills, aches, night sweat, SOB, rashes, mouth sores, palpitations, fatigue. Diarrhea has finally improved.     He is no longer reporting hematochezia or hematuria.      Past Medical History, Past Social History and Past Family History have been reviewed and are unchanged except as noted in the interval history.    MEDICATIONS:     Current Outpatient Medications on File Prior to Visit   Medication Sig Dispense Refill    acyclovir (ZOVIRAX) 800 MG Tab Take 1 tablet (800 mg total) by mouth 2 (two) times daily. 60 tablet 11    amLODIPine (NORVASC) 5 MG tablet Take 2 tablets (10 mg total) by mouth once daily. 60 tablet 11    atovaquone (MEPRON) 750 mg/5 mL Susp Take 10 mLs (1,500 mg total) by mouth once daily. Starting on 11/8/18 300 mL 5    budesonide (ENTOCORT EC) 3 mg capsule Take 3 capsules (9 mg total) by mouth every other day. 90 capsule 2    dasatinib (SPRYCEL) 100 mg Take 1 tablet (100 mg total) by mouth once daily. 30 tablet 2    fluconazole (DIFLUCAN) 200 MG Tab Take 2 tablets (400 mg total) by mouth once daily. 60 tablet 5    fluticasone (FLONASE) 50 mcg/actuation nasal spray 1 spray (50 mcg total) by Each Nare route once daily. 16 g 2    magnesium oxide (MAG-OX) 400 mg (241.3 mg magnesium) tablet Take 1 tablet (400 mg total) by mouth once daily. 120 tablet 11    potassium, sodium phosphates (PHOS-NAK) 280-160-250 mg PwPk Take 1 packet by mouth 2 (two) times daily. 60 packet 3    triamcinolone acetonide 0.1% (KENALOG) 0.1 % ointment Apply topically 2 (two) times daily. 80 g 2    valGANciclovir (VALCYTE) 450 mg Tab Take 2 tablets (900 mg total) by mouth once daily. 120 tablet 3     No current facility-administered medications on file prior to visit.        ALLERGIES: Review of patient's allergies indicates:  No Known Allergies     ROS:     Review of Systems   Constitutional: Negative for  "fatigue, diaphoresis, fever and unexpected weight change.   HENT:   Negative for lump/mass and sore throat.    Eyes: Negative for icterus.   Respiratory: Negative for cough and shortness of breath.    Cardiovascular: Negative for chest pain and palpitations.   Gastrointestinal: Negative for abdominal distention, constipation, nausea and vomiting. Negative for diarrhea.   Genitourinary: Negative for dysuria and frequency.    Musculoskeletal: Negative for arthralgias, gait problem and myalgias.   Skin: Negative for rash or itching.  Neurological: Negative for dizziness, gait problem.   Hematological: Negative for adenopathy. Does not bruise/bleed easily.   Psychiatric/Behavioral: Negative for depression or anxiety.    PHYSICAL EXAM:  Vitals:    02/12/19 1126   BP: 135/79   Pulse: 94   Resp: 16   Temp: 98.7 °F (37.1 °C)   SpO2: 100%   Weight: 64.3 kg (141 lb 12.1 oz)   Height: 5' 11" (1.803 m)   PainSc: 0-No pain       KARNOFSKY PERFORMANCE STATUS 80%  ECOG 1    Physical Exam   Constitutional: He is oriented to person, place, and time. He appears well-developed and well-nourished. No distress.   HENT:   Head: Normocephalic and atraumatic.   Mouth/Throat: Mucous membranes are normal. No oral lesions.    Eyes: Conjunctivae are normal.   Neck: No thyromegaly present.   Cardiovascular: Normal rate, regular rhythm and normal heart sounds.   No murmur heard.  Pulmonary/Chest: Breath sounds normal. He has no wheezes. He has no rales.   Abdominal: Soft. He exhibits no distension and no mass. There is no splenomegaly or hepatomegaly. There is no tenderness.   Neurological: He is alert and oriented to person, place, and time. He has normal strength. Coordination normal.   Skin: No rash observed.     LAB:   Results for orders placed or performed in visit on 02/12/19   Magnesium   Result Value Ref Range    Magnesium 1.8 1.6 - 2.6 mg/dL   Phosphorus   Result Value Ref Range    Phosphorus 3.0 2.7 - 4.5 mg/dL   TACROLIMUS LEVEL "   Result Value Ref Range    Tacrolimus Lvl 7.1 5.0 - 15.0 ng/mL   Rapid BMT CBC with Diff   Result Value Ref Range    WBC 3.96 3.90 - 12.70 K/uL    RBC 2.49 (L) 4.60 - 6.20 M/uL    Hemoglobin 8.6 (L) 14.0 - 18.0 g/dL    Hematocrit 26.7 (L) 40.0 - 54.0 %     (H) 82 - 98 fL    MCH 34.5 (H) 27.0 - 31.0 pg    MCHC 32.2 32.0 - 36.0 g/dL    RDW 19.3 (H) 11.5 - 14.5 %    Platelets 179 150 - 350 K/uL    MPV 8.2 (L) 9.2 - 12.9 fL    Immature Granulocytes 1.3 (H) 0.0 - 0.5 %    Gran # (ANC) 2.8 1.8 - 7.7 K/uL    Immature Grans (Abs) 0.05 (H) 0.00 - 0.04 K/uL    Lymph # 0.6 (L) 1.0 - 4.8 K/uL    Mono # 0.5 0.3 - 1.0 K/uL    Eos # 0.1 0.0 - 0.5 K/uL    Baso # 0.01 0.00 - 0.20 K/uL    nRBC 0 0 /100 WBC    Gran% 70.6 38.0 - 73.0 %    Lymph% 14.4 (L) 18.0 - 48.0 %    Mono% 11.4 4.0 - 15.0 %    Eosinophil% 2.0 0.0 - 8.0 %    Basophil% 0.3 0.0 - 1.9 %    Differential Method Automated    Comprehensive metabolic panel   Result Value Ref Range    Sodium 139 136 - 145 mmol/L    Potassium 4.2 3.5 - 5.1 mmol/L    Chloride 105 95 - 110 mmol/L    CO2 27 23 - 29 mmol/L    Glucose 96 70 - 110 mg/dL    BUN, Bld 16 6 - 20 mg/dL    Creatinine 1.4 0.5 - 1.4 mg/dL    Calcium 9.6 8.7 - 10.5 mg/dL    Total Protein 6.4 6.0 - 8.4 g/dL    Albumin 3.8 3.5 - 5.2 g/dL    Total Bilirubin 0.3 0.1 - 1.0 mg/dL    Alkaline Phosphatase 65 55 - 135 U/L    AST 29 10 - 40 U/L    ALT 25 10 - 44 U/L    Anion Gap 7 (L) 8 - 16 mmol/L    eGFR if African American >60.0 >60 mL/min/1.73 m^2    eGFR if non African American >60.0 >60 mL/min/1.73 m^2   CMV DNA, quantitative, PCR   Result Value Ref Range    Cytomegalovirus PCR, Quant Undetected Undetected IU/mL   Hallie-Kauffman virus DNA, quantitative   Result Value Ref Range    EBV DNA, PCR Undetected Undetected IU/mL   ANTI-ENT. HISTOLYTICA AB   Result Value Ref Range    Anti- E. Histolytica Antibody Negative Negative   Type & Screen   Result Value Ref Range    Group & Rh B POS     Indirect Wes NEG        PROBLEMS  ASSESSED THIS VISIT:    1. Chronic myeloid leukemia, BCR/ABL-positive, in remission    2. S/P allogeneic bone marrow transplant    3. Sickle cell trait    4. Hypomagnesemia    5. Cytomegalovirus (CMV) viremia        PLAN:        History allo transplant for CML  - See oncology history above  - s/p alloSCT on 10/9/2018  - Neutrophil engraftment on Day +12.   - Currently Day +126  - plan for +30 & +100 & +1 year restaging  - day +30 BMBX was performed in clinic 11/13/18, no evidence of CML, BCR/ABL 0.005% (MR 4.3). Chimerism studies show 60% donor in CD3+ fraction and 100% donor in CD33+ fraction.   - Repeat chimers from peripheral blood on 12/10/18 shows 80% donor in CD3+ cells and 100% donor in CD33+ cells.  - Restart dasatinib 140 mg daily on 12/10 (monitor headaches and possibility of decreasing dose)  - Day 100 Bmbx completed in clinic-- 1/15/18- No morphologic evidence of CML. BCR-ABL transcipt is 0.003%, consistent with MR 4.5. Chimerisms show 100% donor in CD33+ cells and 70% donor in CD3+ cells.     GVHD   - Continue oral budesonide. Continues with 1-2 episodes of diarrhea in the mornings. 9mg every other day (1/4), can discontinue if symptoms have not worsened or returned (consider stopping at next appt)  - C. Diff negative. Diarrhea improved with imodium. Taking every other day. Pt understands that imodium can cause constipation.  - Continue tacrolimus prophylaxis  - tacro level 7.1 today; Decrease to 1 mg qAM and 0.5 mg qPM.   - gvhd charting with each clinic visit; begin cGVHD charting at day +100     ID  - Completed PO vancomycin for C. Diff  - Continue ppx with acyclovir, fluconazole  - Begin atovaquone ppx at Day +30  - Monitor CMV BIW; EBV once weekly  - EBV negative to date  - CMV level undetectable (2/12); treatment dosing of valcyte 900mg BID was changed to ppx dosing per ID on 12/18/18 to valcyte 900mg daily. Will continue this dose for 3 months (end date mid March 2019) unless recommended change  by ID. We will continue to monitor viremia. If this increases, will discuss with ID  - CVC removed 1/11/19, removal site remains without s/s of infection  - EBV DNA undetectable at this visit  - Obtained urinalysis, urine culture, and urine for BK virus - negative  - Will hold on GI eval at this time given the resolution of hematochezia.     Cytopenias- Pancytopenia secondary to chemotherapy  - multifactorial  - Today:  WBC 3.96, Hgb 8.6, Plt 179k . No transfusions indicated  - Donor has sickle cell trait. Some anemia is expected long-term   - Platelets stable at this time. May consider lowering dose of dasatinib if counts worsen     FEN/GI  - previous hypomagnesemia  - On Mg ox 1200 BID with only mild diarrhea as adverse effect  - Mag 1.8 today, decrease to 400 mg daily  - Phos today 3. Decrease neutraphos to 1 packet BID (2/12/19).    Hypertension  - Amlodipine 10 mg daily  - BP stable today    Follow-up:  - Weekly labs and appts with Dr. Pantoja or NP  with CBC, CMP, tacro, Mag, CMV, EBV. Has already been scheduled through February    Kishor Pantoja MD  Hematology and Stem Cell Transplant

## 2019-02-19 LAB
CMV DNA SERPL NAA+PROBE-ACNC: NORMAL IU/ML
EBV DNA BY PCR: NORMAL IU/ML

## 2019-02-20 DIAGNOSIS — J30.9 ALLERGIC RHINITIS, UNSPECIFIED SEASONALITY, UNSPECIFIED TRIGGER: Primary | ICD-10-CM

## 2019-02-20 RX ORDER — CETIRIZINE HYDROCHLORIDE 10 MG/1
10 TABLET ORAL DAILY
Qty: 30 TABLET | Refills: 11 | Status: SHIPPED | OUTPATIENT
Start: 2019-02-20 | End: 2019-10-02 | Stop reason: SDUPTHER

## 2019-02-20 NOTE — PROGRESS NOTES
HEMATOLOGIC MALIGNANCIES PROGRESS NOTE    IDENTIFYING STATEMENT   Wilton Guzmán (Wilton) is a 33 y.o. male with a  of 1985 from [place], [place] with the diagnosis of CML in blast phase.      ONCOLOGY HISTORY:    1. Chronic myeloid leukemia, initially presenting in blast phase   A. 2018: Began developing left-sided abdominal pain - eventually requiring evaluation - WBC ~150 at  base; transferred to [facility] in [place], [place]   B. BMBx showed 20% myelomonoblasts concerning for CML in blast phase; bcr-abl positive (returned for Day 8 of induction therapy).    C. 2018: Began 7+3 induction chemotherapy. Began dasatinib (dose-adjusted) on day 8.   D. 2018: Transferred to [facility]   E. 2018: BMBx showed variably cellular marrow (10-40%) with 9% CD34+ blasts, concerning for residual disease.   F. 2018: Reinduction with MEC (mitoxantrone, etoposide, cytarabine) chemotherapy.    G. 2018: BMBx shows hypocellular bone marrow with no definite morphologic or immunophenotypic evidence of residual leukemia; bcr-abl p210 transcript was detected at 0.5%   H. Delay in clinic follow-up due to challenges with  insurance   I. 2018: Follow-up in clinic; WBC 6.77 (normal diff); Hgb 11.2 g/dl; Plt 298; BMBx shows 30% cellular marrow with no morphologic evidence of residual leukemia; bcr-abl p210 detected at 0.05% on international scale (MR 3.3).    J. 2018: BMBx shows 40% cellular marrow with no morphologic evidence of leukemia. Chromosomes 46, XY. Bcr-abl p210 detected at 0.1% on international scale (MR 3.0).    K. 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 Engrafted on Day +12. Course complicated by C. Difficile colitis.     INTERVAL HISTORY:      Mr. Guzmán presents to clinic with his wife for f/u allo SCT. He is Day +132. Mr. Guzmán moved his appointment date up because he has been  having tenderness in his bilateral axillae over the past week. He also had a recurrence of painless, transient hematuria. He did notice on one occasion that he passed some blood clots.     He otherwise has been doing well. No rashes. No nausea/vomiting. No diarrhea. He is eating okay.     Past Medical History, Past Social History and Past Family History have been reviewed and are unchanged except as noted in the interval history.    MEDICATIONS:     Current Outpatient Medications on File Prior to Visit   Medication Sig Dispense Refill    acyclovir (ZOVIRAX) 800 MG Tab Take 1 tablet (800 mg total) by mouth 2 (two) times daily. 60 tablet 11    amLODIPine (NORVASC) 5 MG tablet Take 2 tablets (10 mg total) by mouth once daily. 60 tablet 11    atovaquone (MEPRON) 750 mg/5 mL Susp Take 10 mLs (1,500 mg total) by mouth once daily. Starting on 11/8/18 300 mL 5    budesonide (ENTOCORT EC) 3 mg capsule Take 3 capsules (9 mg total) by mouth every other day. 90 capsule 2    dasatinib (SPRYCEL) 100 mg Take 1 tablet (100 mg total) by mouth once daily. 30 tablet 2    fluconazole (DIFLUCAN) 200 MG Tab Take 2 tablets (400 mg total) by mouth once daily. 60 tablet 5    fluticasone (FLONASE) 50 mcg/actuation nasal spray 1 spray (50 mcg total) by Each Nare route once daily. 16 g 2    magnesium oxide (MAG-OX) 400 mg (241.3 mg magnesium) tablet Take 1 tablet (400 mg total) by mouth once daily. 120 tablet 11    potassium, sodium phosphates (PHOS-NAK) 280-160-250 mg PwPk Take 1 packet by mouth 2 (two) times daily. 60 packet 3    triamcinolone acetonide 0.1% (KENALOG) 0.1 % ointment Apply topically 2 (two) times daily. 80 g 2    valGANciclovir (VALCYTE) 450 mg Tab Take 2 tablets (900 mg total) by mouth once daily. 120 tablet 3     No current facility-administered medications on file prior to visit.        ALLERGIES: Review of patient's allergies indicates:  No Known Allergies     ROS:     Review of Systems   Constitutional:  "Negative for fatigue, diaphoresis, fever and unexpected weight change.   HENT:   Negative for lump/mass and sore throat.    Eyes: Negative for icterus.   Respiratory: Negative for cough and shortness of breath.    Cardiovascular: Negative for chest pain and palpitations.   Gastrointestinal: Negative for abdominal distention, constipation, nausea and vomiting. Negative for diarrhea.   Genitourinary: Negative for dysuria and frequency.  Hematuria.   Musculoskeletal: Negative for arthralgias, gait problem and myalgias.   Skin: Negative for rash or itching. Tenderness under bilateral axillae with associated redness  Neurological: Negative for dizziness, gait problem.   Hematological: Negative for adenopathy. Does not bruise/bleed easily.   Psychiatric/Behavioral: Negative for depression or anxiety.    PHYSICAL EXAM:  Vitals:    02/18/19 0924   BP: 121/70   Pulse: 85   Temp: 98.5 °F (36.9 °C)   SpO2: 100%   Weight: 65.8 kg (145 lb 1 oz)   Height: 5' 11" (1.803 m)   PainSc:   8   PainLoc: Arm       KARNOFSKY PERFORMANCE STATUS 80%  ECOG 1    Physical Exam   Constitutional: He is oriented to person, place, and time. He appears well-developed and well-nourished. No distress.   HENT:   Head: Normocephalic and atraumatic.   Mouth/Throat: Mucous membranes are normal. No oral lesions.    Eyes: Conjunctivae are normal.   Neck: No thyromegaly present.   Cardiovascular: Normal rate, regular rhythm and normal heart sounds.   No murmur heard.  Pulmonary/Chest: Breath sounds normal. He has no wheezes. He has no rales.   Abdominal: Soft. He exhibits no distension and no mass. There is no splenomegaly or hepatomegaly. There is no tenderness.   Neurological: He is alert and oriented to person, place, and time. He has normal strength. Coordination normal.   Skin: No rash observed. There are areas of erythema under the bilateral axillae with induration around sites of hair follicles. In some areas, a pustular center is observed.     LAB: "   Results for orders placed or performed in visit on 02/18/19   Rapid BMT CBC with Diff   Result Value Ref Range    WBC 4.79 3.90 - 12.70 K/uL    RBC 2.56 (L) 4.60 - 6.20 M/uL    Hemoglobin 8.9 (L) 14.0 - 18.0 g/dL    Hematocrit 27.8 (L) 40.0 - 54.0 %     (H) 82 - 98 fL    MCH 34.8 (H) 27.0 - 31.0 pg    MCHC 32.0 32.0 - 36.0 g/dL    RDW 18.8 (H) 11.5 - 14.5 %    Platelets 163 150 - 350 K/uL    MPV 8.5 (L) 9.2 - 12.9 fL    Immature Granulocytes 2.5 (H) 0.0 - 0.5 %    Gran # (ANC) 3.5 1.8 - 7.7 K/uL    Immature Grans (Abs) 0.12 (H) 0.00 - 0.04 K/uL    Lymph # 0.8 (L) 1.0 - 4.8 K/uL    Mono # 0.3 0.3 - 1.0 K/uL    Eos # 0.1 0.0 - 0.5 K/uL    Baso # 0.03 0.00 - 0.20 K/uL    nRBC 0 0 /100 WBC    Gran% 72.2 38.0 - 73.0 %    Lymph% 16.7 (L) 18.0 - 48.0 %    Mono% 6.5 4.0 - 15.0 %    Eosinophil% 1.5 0.0 - 8.0 %    Basophil% 0.6 0.0 - 1.9 %    Differential Method Automated    Magnesium   Result Value Ref Range    Magnesium 1.7 1.6 - 2.6 mg/dL   Phosphorus   Result Value Ref Range    Phosphorus 2.6 (L) 2.7 - 4.5 mg/dL   Comprehensive metabolic panel   Result Value Ref Range    Sodium 141 136 - 145 mmol/L    Potassium 3.9 3.5 - 5.1 mmol/L    Chloride 107 95 - 110 mmol/L    CO2 28 23 - 29 mmol/L    Glucose 79 70 - 110 mg/dL    BUN, Bld 10 6 - 20 mg/dL    Creatinine 1.0 0.5 - 1.4 mg/dL    Calcium 9.3 8.7 - 10.5 mg/dL    Total Protein 6.1 6.0 - 8.4 g/dL    Albumin 3.4 (L) 3.5 - 5.2 g/dL    Total Bilirubin 0.3 0.1 - 1.0 mg/dL    Alkaline Phosphatase 66 55 - 135 U/L    AST 26 10 - 40 U/L    ALT 21 10 - 44 U/L    Anion Gap 6 (L) 8 - 16 mmol/L    eGFR if African American >60.0 >60 mL/min/1.73 m^2    eGFR if non African American >60.0 >60 mL/min/1.73 m^2   CMV DNA, quantitative, PCR   Result Value Ref Range    Cytomegalovirus PCR, Quant Undetected Undetected IU/mL   Hallie-Kauffman virus DNA, quantitative   Result Value Ref Range    EBV DNA, PCR Undetected Undetected IU/mL   Tacrolimus level   Result Value Ref Range    Tacrolimus  Lvl 4.4 (L) 5.0 - 15.0 ng/mL       PROBLEMS ASSESSED THIS VISIT:    1. S/P allogeneic bone marrow transplant    2. Folliculitis    3. Gross hematuria        PLAN:        History allo transplant for CML  - See oncology history above  - s/p alloSCT on 10/9/2018  - Neutrophil engraftment on Day +12.   - Currently Day +132  - plan for +30 & +100 & +1 year restaging  - day +30 BMBX was performed in clinic 11/13/18, no evidence of CML, BCR/ABL 0.005% (MR 4.3). Chimerism studies show 60% donor in CD3+ fraction and 100% donor in CD33+ fraction.   - Repeat chimers from peripheral blood on 12/10/18 shows 80% donor in CD3+ cells and 100% donor in CD33+ cells.  - Restart dasatinib 140 mg daily on 12/10 (monitor headaches and possibility of decreasing dose)  - Day 100 Bmbx completed in clinic-- 1/15/18- No morphologic evidence of CML. BCR-ABL transcipt is 0.003%, consistent with MR 4.5. Chimerisms show 100% donor in CD33+ cells and 70% donor in CD3+ cells.     GVHD   - Continue oral budesonide. Continues with 1-2 episodes of diarrhea in the mornings. 9mg every other day (1/4), can discontinue if symptoms have not worsened or returned (consider stopping at next appt)  - C. Diff negative. Diarrhea improved with imodium. Taking every other day. Pt understands that imodium can cause constipation.  - Continue tacrolimus prophylaxis  - tacro level 4.4 today; Decrease to 0.5 mg qAM and 0.5 mg qPM. We are in the process of tacrolimus wean.   - gvhd charting with each clinic visit; begin cGVHD charting at day +100     ID  - Completed PO vancomycin for C. Diff  - Continue ppx with acyclovir, fluconazole  - Begin atovaquone ppx at Day +30  - Monitor CMV BIW; EBV once weekly  - EBV negative to date  - CMV level undetectable (2/18); treatment dosing of valcyte 900mg BID was changed to ppx dosing per ID on 12/18/18 to valcyte 900mg daily. Will continue this dose for 3 months (end date mid March 2019) unless recommended change by ID. We will  continue to monitor viremia. If this increases, will discuss with ID  - CVC removed 1/11/19, removal site remains without s/s of infection  - EBV DNA undetectable at this visit  - Obtained urinalysis, urine culture, and urine for BK virus - negative  - Will hold on GI eval at this time given the resolution of hematochezia.     Cytopenias- Pancytopenia secondary to chemotherapy  - multifactorial  - Today:  WBC 4.79, Hgb 8.9, Plt 163k . No transfusions indicated  - Donor has sickle cell trait. Some anemia is expected long-term      FEN/GI  - previous hypomagnesemia  - On Mg ox 1200 BID with only mild diarrhea as adverse effect  - Mag 1.7 today, continue 400 mg daily  - Phos today 2.6. Continue neutraphos 1 packet BID (2/12/19).    Hematuria  - This is painless hematuria that has been transient.   - U/A today shows microscopic RBCs but no red blood cell casts (per report)  - Differential diagnosis includes intrinsic bladder pathology, ureteral stone, infectious etiology, or cGVHD  - Refer to urology for further evaluation.    Hypertension  - Amlodipine 10 mg daily  - BP stable today    Folliculitis  - Evident in bilateral axillae on physical exam today. Will prescribe doxycycline 100 mg PO BID x 5 days    Follow-up:  - Weekly labs and appts with Dr. Pantoja or NP  with CBC, CMP, tacro, Mag, CMV, EBV. Has already been scheduled through February    Kishor Pantoja MD  Hematology and Stem Cell Transplant

## 2019-02-25 ENCOUNTER — TELEPHONE (OUTPATIENT)
Dept: HEMATOLOGY/ONCOLOGY | Facility: CLINIC | Age: 34
End: 2019-02-25

## 2019-02-25 NOTE — TELEPHONE ENCOUNTER
Spoke to patient. Informed him of information from [facility].  States he will contact them      ----- Message from Lexi Gurrola sent at 2/25/2019  8:57 AM CST -----  Contact: Lexi ( pre-Service)  [facility] has Dr. Saldana [phone] at [facility] listed as the PCP, the referral to Urology has to come from this PCP or the patient have to call [facility] at [phone] to update the PCP information for this referral to be approved.    PCM  [facility]  [address] [place] [address]

## 2019-02-26 ENCOUNTER — OFFICE VISIT (OUTPATIENT)
Dept: UROLOGY | Facility: CLINIC | Age: 34
End: 2019-02-26
Payer: OTHER GOVERNMENT

## 2019-02-26 ENCOUNTER — LAB VISIT (OUTPATIENT)
Dept: LAB | Facility: HOSPITAL | Age: 34
End: 2019-02-26
Attending: INTERNAL MEDICINE
Payer: OTHER GOVERNMENT

## 2019-02-26 ENCOUNTER — OFFICE VISIT (OUTPATIENT)
Dept: HEMATOLOGY/ONCOLOGY | Facility: CLINIC | Age: 34
End: 2019-02-26
Payer: OTHER GOVERNMENT

## 2019-02-26 VITALS
TEMPERATURE: 99 F | HEIGHT: 71 IN | WEIGHT: 144.19 LBS | OXYGEN SATURATION: 100 % | SYSTOLIC BLOOD PRESSURE: 132 MMHG | DIASTOLIC BLOOD PRESSURE: 72 MMHG | BODY MASS INDEX: 20.19 KG/M2 | HEART RATE: 86 BPM

## 2019-02-26 DIAGNOSIS — Z94.81 S/P ALLOGENEIC BONE MARROW TRANSPLANT: ICD-10-CM

## 2019-02-26 DIAGNOSIS — D64.9 ANEMIA, UNSPECIFIED TYPE: Primary | ICD-10-CM

## 2019-02-26 DIAGNOSIS — C92.10 CML (CHRONIC MYELOCYTIC LEUKEMIA): ICD-10-CM

## 2019-02-26 DIAGNOSIS — R31.0 GROSS HEMATURIA: ICD-10-CM

## 2019-02-26 DIAGNOSIS — B25.9 CYTOMEGALOVIRUS (CMV) VIREMIA: ICD-10-CM

## 2019-02-26 LAB
ALBUMIN SERPL BCP-MCNC: 3.4 G/DL
ALP SERPL-CCNC: 68 U/L
ALT SERPL W/O P-5'-P-CCNC: 22 U/L
ANION GAP SERPL CALC-SCNC: 9 MMOL/L
AST SERPL-CCNC: 27 U/L
BASOPHILS # BLD AUTO: 0.02 K/UL
BASOPHILS NFR BLD: 0.5 %
BILIRUB SERPL-MCNC: 0.3 MG/DL
BUN SERPL-MCNC: 11 MG/DL
CALCIUM SERPL-MCNC: 9.4 MG/DL
CHLORIDE SERPL-SCNC: 108 MMOL/L
CO2 SERPL-SCNC: 24 MMOL/L
CREAT SERPL-MCNC: 0.9 MG/DL
DIFFERENTIAL METHOD: ABNORMAL
EOSINOPHIL # BLD AUTO: 0.5 K/UL
EOSINOPHIL NFR BLD: 10.5 %
ERYTHROCYTE [DISTWIDTH] IN BLOOD BY AUTOMATED COUNT: 18.4 %
EST. GFR  (AFRICAN AMERICAN): >60 ML/MIN/1.73 M^2
EST. GFR  (NON AFRICAN AMERICAN): >60 ML/MIN/1.73 M^2
GLUCOSE SERPL-MCNC: 95 MG/DL
HCT VFR BLD AUTO: 25 %
HGB BLD-MCNC: 8.1 G/DL
IMM GRANULOCYTES # BLD AUTO: 0.15 K/UL
IMM GRANULOCYTES NFR BLD AUTO: 3.5 %
LYMPHOCYTES # BLD AUTO: 0.7 K/UL
LYMPHOCYTES NFR BLD: 17 %
MAGNESIUM SERPL-MCNC: 1.8 MG/DL
MCH RBC QN AUTO: 34.6 PG
MCHC RBC AUTO-ENTMCNC: 32.4 G/DL
MCV RBC AUTO: 107 FL
MONOCYTES # BLD AUTO: 0.5 K/UL
MONOCYTES NFR BLD: 11.9 %
NEUTROPHILS # BLD AUTO: 2.4 K/UL
NEUTROPHILS NFR BLD: 56.6 %
NRBC BLD-RTO: 0 /100 WBC
PHOSPHATE SERPL-MCNC: 2.3 MG/DL
PLATELET # BLD AUTO: 172 K/UL
PMV BLD AUTO: 8.2 FL
POTASSIUM SERPL-SCNC: 3.6 MMOL/L
PROT SERPL-MCNC: 6.2 G/DL
RBC # BLD AUTO: 2.34 M/UL
SODIUM SERPL-SCNC: 141 MMOL/L
TACROLIMUS BLD-MCNC: 3.2 NG/ML
WBC # BLD AUTO: 4.3 K/UL

## 2019-02-26 PROCEDURE — 99204 PR OFFICE/OUTPT VISIT, NEW, LEVL IV, 45-59 MIN: ICD-10-PCS | Mod: S$PBB,,, | Performed by: UROLOGY

## 2019-02-26 PROCEDURE — 85025 COMPLETE CBC W/AUTO DIFF WBC: CPT

## 2019-02-26 PROCEDURE — 99213 OFFICE O/P EST LOW 20 MIN: CPT | Mod: PBBFAC | Performed by: UROLOGY

## 2019-02-26 PROCEDURE — 99999 PR PBB SHADOW E&M-EST. PATIENT-LVL III: CPT | Mod: PBBFAC,,, | Performed by: UROLOGY

## 2019-02-26 PROCEDURE — 87086 URINE CULTURE/COLONY COUNT: CPT

## 2019-02-26 PROCEDURE — 87147 CULTURE TYPE IMMUNOLOGIC: CPT

## 2019-02-26 PROCEDURE — 87799 DETECT AGENT NOS DNA QUANT: CPT

## 2019-02-26 PROCEDURE — 99215 OFFICE O/P EST HI 40 MIN: CPT | Mod: S$PBB,,, | Performed by: INTERNAL MEDICINE

## 2019-02-26 PROCEDURE — 99999 PR PBB SHADOW E&M-EST. PATIENT-LVL III: ICD-10-PCS | Mod: PBBFAC,,, | Performed by: UROLOGY

## 2019-02-26 PROCEDURE — 99215 PR OFFICE/OUTPT VISIT, EST, LEVL V, 40-54 MIN: ICD-10-PCS | Mod: S$PBB,,, | Performed by: INTERNAL MEDICINE

## 2019-02-26 PROCEDURE — 84100 ASSAY OF PHOSPHORUS: CPT

## 2019-02-26 PROCEDURE — 99204 OFFICE O/P NEW MOD 45 MIN: CPT | Mod: S$PBB,,, | Performed by: UROLOGY

## 2019-02-26 PROCEDURE — 88112 CYTOLOGY SPECIMEN-URINE: ICD-10-PCS | Mod: 26,,, | Performed by: PATHOLOGY

## 2019-02-26 PROCEDURE — 99213 OFFICE O/P EST LOW 20 MIN: CPT | Mod: PBBFAC,25,27 | Performed by: INTERNAL MEDICINE

## 2019-02-26 PROCEDURE — 88112 CYTOPATH CELL ENHANCE TECH: CPT | Mod: 26,,, | Performed by: PATHOLOGY

## 2019-02-26 PROCEDURE — 99999 PR PBB SHADOW E&M-EST. PATIENT-LVL III: ICD-10-PCS | Mod: PBBFAC,,, | Performed by: INTERNAL MEDICINE

## 2019-02-26 PROCEDURE — 80053 COMPREHEN METABOLIC PANEL: CPT

## 2019-02-26 PROCEDURE — 87088 URINE BACTERIA CULTURE: CPT

## 2019-02-26 PROCEDURE — 99999 PR PBB SHADOW E&M-EST. PATIENT-LVL III: CPT | Mod: PBBFAC,,, | Performed by: INTERNAL MEDICINE

## 2019-02-26 PROCEDURE — 88112 CYTOPATH CELL ENHANCE TECH: CPT | Performed by: PATHOLOGY

## 2019-02-26 PROCEDURE — 83735 ASSAY OF MAGNESIUM: CPT

## 2019-02-26 PROCEDURE — 80197 ASSAY OF TACROLIMUS: CPT

## 2019-02-26 RX ORDER — TACROLIMUS 0.5 MG/1
0.5 CAPSULE ORAL DAILY
Qty: 150 CAPSULE | Refills: 2 | Status: SHIPPED | OUTPATIENT
Start: 2019-02-26 | End: 2019-02-26

## 2019-02-26 RX ORDER — CIPROFLOXACIN 500 MG/1
500 TABLET ORAL 2 TIMES DAILY
Qty: 6 TABLET | Refills: 0 | Status: SHIPPED | OUTPATIENT
Start: 2019-02-26 | End: 2019-03-01

## 2019-02-26 RX ORDER — TACROLIMUS 0.5 MG/1
0.5 CAPSULE ORAL DAILY
Qty: 150 CAPSULE | Refills: 2 | Status: SHIPPED | OUTPATIENT
Start: 2019-02-26 | End: 2019-03-14

## 2019-02-26 NOTE — PROGRESS NOTES
Subjective:       Patient ID: Wilton Guzmán is a 33 y.o. male.    Chief Complaint: gross hematuria.      History of Present Illness  HPI  Patient is a 33 y.o. male who is new to our clinic and referred by their oncologist, Dr. Pantoja for evaluation of gross hematuria.   Reports 2 week h/o terminal gross hematuria.  Received chemotherapy previously. He is also s/p bone marrow transplant--10/9/18.  He is doing well from the treatment standpoint.   Denies any abdominal or flank pain.  No fevers or chills.     He is a former smoker---quit 10+ years ago.   No fh of malignancy.      Review of Systems  Review of Systems  All other systems reviewed and negative except pertinent positives noted in HPI.      Objective:     Physical Exam   Nursing note and vitals reviewed.  Constitutional: He is oriented to person, place, and time. Vital signs are normal. He appears well-developed and well-nourished. He is cooperative.   HENT:   Head: Normocephalic.   Neck: No tracheal deviation present.   Cardiovascular: Normal rate and intact distal pulses.    Pulmonary/Chest: Effort normal. No accessory muscle usage. No tachypnea. No respiratory distress.   Abdominal: Soft. Normal appearance. He exhibits no distension, no fluid wave, no ascites and no mass. There is no tenderness. There is no rebound and no CVA tenderness. No hernia. Hernia confirmed negative in the right inguinal area and confirmed negative in the left inguinal area.   Liver/spleen non-palpable.   Genitourinary: Testes normal and penis normal. Right testis shows no mass and no tenderness. Right testis is descended. Left testis shows no mass and no tenderness. Left testis is descended. Circumcised.   Genitourinary Comments: Scrotum showed no rashes or lesions.  Anus/perineum without lesion.  Epididymis showed no masses or tenderness. No meatal stenosis, meatus in normal location. No penile discharge/plaques. geena deferred       Musculoskeletal: Normal range of motion.    Lymphadenopathy: No inguinal adenopathy noted on the right or left side.        Right: No inguinal adenopathy present.        Left: No inguinal adenopathy present.   Neurological: He is alert and oriented to person, place, and time. He has normal strength.   Skin: No bruising and no rash noted.     Psychiatric: He has a normal mood and affect. His speech is normal and behavior is normal. Thought content normal.       Lab Review  Lab Results   Component Value Date    COLORU Yellow 02/18/2019    SPECGRAV 1.015 02/18/2019    PHUR 6.0 02/18/2019    NITRITE Negative 02/18/2019    KETONESU Negative 02/18/2019    UROBILINOGEN Negative 09/06/2018         Assessment:        1. Gross hematuria            Plan:     Gross hematuria  -     CT Urogram Abd Pelvis W WO; Future; Expected date: 02/26/2019  -     Cystoscopy; Future  -     Cytology, urine  -     Urine culture    Other orders  -     ciprofloxacin HCl (CIPRO) 500 MG tablet; Take 1 tablet (500 mg total) by mouth 2 (two) times daily. for 3 days  Dispense: 6 tablet; Refill: 0      -given his immunosuppression, former smoking history, and exposures, we will plan for a full hematuria workup which will include a CT urogram, urine culture and cytology, and a cystoscopy.  He was recently treated for urinalysis that was potentially suggestive of infection.  However, despite treatment he has had persistent gross hematuria.  -again, given his immunosuppression, we will plan for a short course of prophylactic antibiotics around the time of his cystoscopy.

## 2019-02-26 NOTE — LETTER
February 26, 2019      Kishor Pantoja MD  [address] [facility] [id number]           [facility]  [address]  [place] [address]  Phone: [phone]          Patient: Wilton Guzmán   MR Number: [id number]   YOB: 1985   Date of Visit: 2/26/2019       Dear Dr. Kishor Pantoja:    Thank you for referring Wilton Guzmán to me for evaluation. Attached you will find relevant portions of my assessment and plan of care.    If you have questions, please do not hesitate to call me. I look forward to following Wilton Guzmán along with you.    Sincerely,    Chano Saucedo MD    Enclosure  CC:  No Recipients    If you would like to receive this communication electronically, please contact [email or web address] or [phone] to request more information on [employer] access.    For providers and/or their staff who would like to refer a patient to [facility], please contact us through our one-stop-shop provider referral line, [facility], at [phone].    If you feel you have received this communication in error or would no longer like to receive these types of communications, please e-mail [email or web address]

## 2019-02-27 LAB
BACTERIA UR CULT: NORMAL
CMV DNA SERPL NAA+PROBE-ACNC: <35 IU/ML

## 2019-02-28 ENCOUNTER — TELEPHONE (OUTPATIENT)
Dept: UROLOGY | Facility: CLINIC | Age: 34
End: 2019-02-28

## 2019-02-28 LAB — EBV DNA BY PCR: NORMAL IU/ML

## 2019-02-28 RX ORDER — CEFDINIR 300 MG/1
300 CAPSULE ORAL 2 TIMES DAILY
Qty: 20 CAPSULE | Refills: 0 | Status: SHIPPED | OUTPATIENT
Start: 2019-02-28 | End: 2019-03-01

## 2019-02-28 NOTE — TELEPHONE ENCOUNTER
Pt notified of cutlure results and Rx.  Rx called [facility] in [place] [phone]  m kaycee lpn

## 2019-03-01 RX ORDER — CEFDINIR 300 MG/1
300 CAPSULE ORAL 2 TIMES DAILY
Qty: 20 CAPSULE | Refills: 0 | Status: SHIPPED | OUTPATIENT
Start: 2019-03-01 | End: 2019-03-11

## 2019-03-04 NOTE — PROGRESS NOTES
HEMATOLOGIC MALIGNANCIES PROGRESS NOTE    IDENTIFYING STATEMENT   Wilton Guzmán (Wilton) is a 33 y.o. 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      ONCOLOGY HISTORY:    1. Chronic myeloid leukemia, initially presenting in blast phase   A. 2018: Began developing left-sided abdominal pain - eventually requiring evaluation - WBC ~150 at  base; transferred to [facility] in [place], [place]   B. BMBx showed 20% myelomonoblasts concerning for CML in blast phase; bcr-abl positive (returned for Day 8 of induction therapy).    C. 2018: Began 7+3 induction chemotherapy. Began dasatinib (dose-adjusted) on day 8.   D. 2018: Transferred to [facility]   E. 2018: BMBx showed variably cellular marrow (10-40%) with 9% CD34+ blasts, concerning for residual disease.   F. 2018: Reinduction with MEC (mitoxantrone, etoposide, cytarabine) chemotherapy.    G. 2018: BMBx shows hypocellular bone marrow with no definite morphologic or immunophenotypic evidence of residual leukemia; bcr-abl p210 transcript was detected at 0.5%   H. Delay in clinic follow-up due to challenges with  insurance   I. 2018: Follow-up in clinic; WBC 6.77 (normal diff); Hgb 11.2 g/dl; Plt 298; BMBx shows 30% cellular marrow with no morphologic evidence of residual leukemia; bcr-abl p210 detected at 0.05% on international scale (MR 3.3).    J. 2018: BMBx shows 40% cellular marrow with no morphologic evidence of leukemia. Chromosomes 46, XY. Bcr-abl p210 detected at 0.1% on international scale (MR 3.0).    K. 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 Engrafted on Day +12. Course complicated by C. Difficile colitis.     INTERVAL HISTORY:      Mr. Guzmán presents to clinic with his wife for f/u allo SCT. He is Day +140. He is doing better. He has not noticed any recurrence of  hematuria. No hematochezia. He has good appetite.     He otherwise has been doing well. No rashes. No nausea/vomiting. No diarrhea. He is eating okay.     Past Medical History, Past Social History and Past Family History have been reviewed and are unchanged except as noted in the interval history.    MEDICATIONS:     Current Outpatient Medications on File Prior to Visit   Medication Sig Dispense Refill    acyclovir (ZOVIRAX) 800 MG Tab Take 1 tablet (800 mg total) by mouth 2 (two) times daily. 60 tablet 11    amLODIPine (NORVASC) 5 MG tablet Take 2 tablets (10 mg total) by mouth once daily. 60 tablet 11    atovaquone (MEPRON) 750 mg/5 mL Susp Take 10 mLs (1,500 mg total) by mouth once daily. Starting on 11/8/18 300 mL 5    budesonide (ENTOCORT EC) 3 mg capsule Take 3 capsules (9 mg total) by mouth every other day. 90 capsule 2    cetirizine (ZYRTEC) 10 MG tablet Take 1 tablet (10 mg total) by mouth once daily. 30 tablet 11    fluconazole (DIFLUCAN) 200 MG Tab Take 2 tablets (400 mg total) by mouth once daily. 60 tablet 5    fluticasone (FLONASE) 50 mcg/actuation nasal spray 1 spray (50 mcg total) by Each Nare route once daily. 16 g 2    magnesium oxide (MAG-OX) 400 mg (241.3 mg magnesium) tablet Take 1 tablet (400 mg total) by mouth once daily. 120 tablet 11    potassium, sodium phosphates (PHOS-NAK) 280-160-250 mg PwPk Take 1 packet by mouth 2 (two) times daily. 60 packet 3    triamcinolone acetonide 0.1% (KENALOG) 0.1 % ointment Apply topically 2 (two) times daily. 80 g 2    valGANciclovir (VALCYTE) 450 mg Tab Take 2 tablets (900 mg total) by mouth once daily. 120 tablet 3     No current facility-administered medications on file prior to visit.        ALLERGIES: Review of patient's allergies indicates:  No Known Allergies     ROS:     Review of Systems   Constitutional: Negative for fatigue, diaphoresis, fever and unexpected weight change.   HENT:   Negative for lump/mass and sore throat.    Eyes:  "Negative for icterus.   Respiratory: Negative for cough and shortness of breath.    Cardiovascular: Negative for chest pain and palpitations.   Gastrointestinal: Negative for abdominal distention, constipation, nausea and vomiting. Negative for diarrhea.   Genitourinary: Negative for dysuria and frequency.  Negative for hematuria.   Musculoskeletal: Negative for arthralgias, gait problem and myalgias.   Skin: Negative for rash or itching.   Neurological: Negative for dizziness, gait problem.   Hematological: Negative for adenopathy. Does not bruise/bleed easily.   Psychiatric/Behavioral: Negative for depression or anxiety.    PHYSICAL EXAM:  Vitals:    02/26/19 1102   BP: 132/72   Pulse: 86   Temp: 98.6 °F (37 °C)   SpO2: 100%   Weight: 65.4 kg (144 lb 2.9 oz)   Height: 5' 11" (1.803 m)   PainSc: 0-No pain       KARNOFSKY PERFORMANCE STATUS 80%  ECOG 1    Physical Exam   Constitutional: He is oriented to person, place, and time. He appears well-developed and well-nourished. No distress.   HENT:   Head: Normocephalic and atraumatic.   Mouth/Throat: Mucous membranes are normal. No oral lesions.    Eyes: Conjunctivae are normal.   Neck: No thyromegaly present.   Cardiovascular: Normal rate, regular rhythm and normal heart sounds.   No murmur heard.  Pulmonary/Chest: Breath sounds normal. He has no wheezes. He has no rales.   Abdominal: Soft. He exhibits no distension and no mass. There is no splenomegaly or hepatomegaly. There is no tenderness.   Neurological: He is alert and oriented to person, place, and time. He has normal strength. Coordination normal.   Skin: No rash observed. The previously seen folliculitis has now resolved.    LAB:   Results for orders placed or performed in visit on 02/26/19   Rapid BMT CBC with Diff   Result Value Ref Range    WBC 4.30 3.90 - 12.70 K/uL    RBC 2.34 (L) 4.60 - 6.20 M/uL    Hemoglobin 8.1 (L) 14.0 - 18.0 g/dL    Hematocrit 25.0 (L) 40.0 - 54.0 %     (H) 82 - 98 fL    MCH " 34.6 (H) 27.0 - 31.0 pg    MCHC 32.4 32.0 - 36.0 g/dL    RDW 18.4 (H) 11.5 - 14.5 %    Platelets 172 150 - 350 K/uL    MPV 8.2 (L) 9.2 - 12.9 fL    Immature Granulocytes 3.5 (H) 0.0 - 0.5 %    Gran # (ANC) 2.4 1.8 - 7.7 K/uL    Immature Grans (Abs) 0.15 (H) 0.00 - 0.04 K/uL    Lymph # 0.7 (L) 1.0 - 4.8 K/uL    Mono # 0.5 0.3 - 1.0 K/uL    Eos # 0.5 0.0 - 0.5 K/uL    Baso # 0.02 0.00 - 0.20 K/uL    nRBC 0 0 /100 WBC    Gran% 56.6 38.0 - 73.0 %    Lymph% 17.0 (L) 18.0 - 48.0 %    Mono% 11.9 4.0 - 15.0 %    Eosinophil% 10.5 (H) 0.0 - 8.0 %    Basophil% 0.5 0.0 - 1.9 %    Differential Method Automated    Magnesium   Result Value Ref Range    Magnesium 1.8 1.6 - 2.6 mg/dL   Phosphorus   Result Value Ref Range    Phosphorus 2.3 (L) 2.7 - 4.5 mg/dL   Comprehensive metabolic panel   Result Value Ref Range    Sodium 141 136 - 145 mmol/L    Potassium 3.6 3.5 - 5.1 mmol/L    Chloride 108 95 - 110 mmol/L    CO2 24 23 - 29 mmol/L    Glucose 95 70 - 110 mg/dL    BUN, Bld 11 6 - 20 mg/dL    Creatinine 0.9 0.5 - 1.4 mg/dL    Calcium 9.4 8.7 - 10.5 mg/dL    Total Protein 6.2 6.0 - 8.4 g/dL    Albumin 3.4 (L) 3.5 - 5.2 g/dL    Total Bilirubin 0.3 0.1 - 1.0 mg/dL    Alkaline Phosphatase 68 55 - 135 U/L    AST 27 10 - 40 U/L    ALT 22 10 - 44 U/L    Anion Gap 9 8 - 16 mmol/L    eGFR if African American >60.0 >60 mL/min/1.73 m^2    eGFR if non African American >60.0 >60 mL/min/1.73 m^2   CMV DNA, quantitative, PCR   Result Value Ref Range    Cytomegalovirus PCR, Quant <35 (A) Undetected IU/mL   Hallie-Kauffman virus DNA, quantitative   Result Value Ref Range    EBV DNA, PCR Undetected Undetected IU/mL   Tacrolimus level   Result Value Ref Range    Tacrolimus Lvl 3.2 (L) 5.0 - 15.0 ng/mL       PROBLEMS ASSESSED THIS VISIT:    1. CML (chronic myelocytic leukemia)    2. S/P allogeneic bone marrow transplant        PLAN:        History allo transplant for CML  - See oncology history above  - s/p alloSCT on 10/9/2018  - Neutrophil engraftment  on Day +12.   - Currently Day +140  - plan for +30 & +100 & +1 year restaging  - day +30 BMBX was performed in clinic 11/13/18, no evidence of CML, BCR/ABL 0.005% (MR 4.3). Chimerism studies show 60% donor in CD3+ fraction and 100% donor in CD33+ fraction.   - Repeat chimers from peripheral blood on 12/10/18 shows 80% donor in CD3+ cells and 100% donor in CD33+ cells.  - Restart dasatinib 140 mg daily on 12/10 (monitor headaches and possibility of decreasing dose)  - Day 100 Bmbx completed in clinic-- 1/15/18- No morphologic evidence of CML. BCR-ABL transcipt is 0.003%, consistent with MR 4.5. Chimerisms show 100% donor in CD33+ cells and 70% donor in CD3+ cells.   - Repeat chimers from peripheral blood around day +180    GVHD   - Continue oral budesonide. Continues with 1-2 episodes of diarrhea in the mornings. 9mg every other day (1/4), can discontinue if symptoms have not worsened or returned (consider stopping at next appt)  - C. Diff negative. Diarrhea improved with imodium. Taking every other day. Pt understands that imodium can cause constipation.  - Continue tacrolimus prophylaxis  - tacro level 3.2 today; Decrease to 0.5 mg daily. We are in the process of tacrolimus wean. Plan to stop next week if there is no evidence of GVHD.   - gvhd charting with each clinic visit; begin cGVHD charting at day +100     ID  - Completed PO vancomycin for C. Diff  - Continue ppx with acyclovir, fluconazole  - Begin atovaquone ppx at Day +30  - Monitor CMV BIW; EBV once weekly  - EBV negative to date  - CMV level detectable but below limit of quantitation (2/26)); treatment dosing of valcyte 900mg BID was changed to ppx dosing per ID on 12/18/18 to valcyte 900mg daily. Will continue this dose for 3 months (end date mid March 2019) unless recommended change by ID. We will continue to monitor viremia. If this increases, will discuss with ID. If CMV is not clear by this day, will discuss with ID before stopping.   - CVC removed  1/11/19, removal site remains without s/s of infection  - EBV DNA undetectable at this visit  - Obtained urinalysis, urine culture, and urine for BK virus - negative  - Will hold on GI eval at this time given the resolution of hematochezia.     Cytopenias- Pancytopenia secondary to chemotherapy  - multifactorial  - Today:  WBC 4.3, Hgb 8.1, Plt 172k . No transfusions indicated  - Donor has sickle cell trait. Some anemia is expected long-term      FEN/GI  - previous hypomagnesemia  - On Mg ox 1200 BID with only mild diarrhea as adverse effect  - Mag 1.8 today, continue 400 mg daily  - Phos today 2.3. Continue neutraphos 1 packet BID (2/12/19).    Hematuria  - This is painless hematuria that has been transient.   - U/A today shows microscopic RBCs but no red blood cell casts (per report)  - Differential diagnosis includes intrinsic bladder pathology, ureteral stone, infectious etiology, or cGVHD  - Being seen in urology today by Dr. Saucedo    Hypertension  - Amlodipine 10 mg daily  - BP stable today    Folliculitis  - Resolved after 5 days of doxycycline    Follow-up:  - Weekly labs and appts with Dr. Pantoja or NP  with CBC, CMP, tacro, Mag, CMV, EBV. Has already been scheduled through February    Kishor Pantoja MD  Hematology and Stem Cell Transplant

## 2019-03-07 ENCOUNTER — LAB VISIT (OUTPATIENT)
Dept: LAB | Facility: HOSPITAL | Age: 34
End: 2019-03-07
Attending: INTERNAL MEDICINE
Payer: OTHER GOVERNMENT

## 2019-03-07 ENCOUNTER — OFFICE VISIT (OUTPATIENT)
Dept: HEMATOLOGY/ONCOLOGY | Facility: CLINIC | Age: 34
End: 2019-03-07
Payer: OTHER GOVERNMENT

## 2019-03-07 VITALS
WEIGHT: 142 LBS | SYSTOLIC BLOOD PRESSURE: 120 MMHG | DIASTOLIC BLOOD PRESSURE: 76 MMHG | OXYGEN SATURATION: 100 % | BODY MASS INDEX: 19.88 KG/M2 | TEMPERATURE: 99 F | HEIGHT: 71 IN | HEART RATE: 87 BPM

## 2019-03-07 DIAGNOSIS — D64.9 ANEMIA, UNSPECIFIED TYPE: ICD-10-CM

## 2019-03-07 DIAGNOSIS — B25.9 CYTOMEGALOVIRUS (CMV) VIREMIA: ICD-10-CM

## 2019-03-07 DIAGNOSIS — I10 HYPERTENSION, UNSPECIFIED TYPE: ICD-10-CM

## 2019-03-07 DIAGNOSIS — D89.813 GVHD (GRAFT VERSUS HOST DISEASE): ICD-10-CM

## 2019-03-07 DIAGNOSIS — C92.10 CML (CHRONIC MYELOCYTIC LEUKEMIA): ICD-10-CM

## 2019-03-07 DIAGNOSIS — R31.9 URINARY TRACT INFECTION WITH HEMATURIA, SITE UNSPECIFIED: ICD-10-CM

## 2019-03-07 DIAGNOSIS — E83.42 HYPOMAGNESEMIA: ICD-10-CM

## 2019-03-07 DIAGNOSIS — N39.0 URINARY TRACT INFECTION WITH HEMATURIA, SITE UNSPECIFIED: ICD-10-CM

## 2019-03-07 DIAGNOSIS — E83.39 HYPOPHOSPHATEMIA: ICD-10-CM

## 2019-03-07 DIAGNOSIS — C92.11 CHRONIC MYELOID LEUKEMIA, BCR/ABL-POSITIVE, IN REMISSION: Primary | ICD-10-CM

## 2019-03-07 DIAGNOSIS — Z94.81 S/P ALLOGENEIC BONE MARROW TRANSPLANT: ICD-10-CM

## 2019-03-07 LAB
ABO + RH BLD: NORMAL
ALBUMIN SERPL BCP-MCNC: 3.5 G/DL
ALP SERPL-CCNC: 64 U/L
ALT SERPL W/O P-5'-P-CCNC: 24 U/L
ANION GAP SERPL CALC-SCNC: 7 MMOL/L
AST SERPL-CCNC: 25 U/L
BASOPHILS # BLD AUTO: 0.03 K/UL
BASOPHILS NFR BLD: 0.6 %
BILIRUB SERPL-MCNC: 0.3 MG/DL
BLD GP AB SCN CELLS X3 SERPL QL: NORMAL
BUN SERPL-MCNC: 11 MG/DL
CALCIUM SERPL-MCNC: 9.8 MG/DL
CHLORIDE SERPL-SCNC: 104 MMOL/L
CO2 SERPL-SCNC: 29 MMOL/L
CREAT SERPL-MCNC: 0.9 MG/DL
DIFFERENTIAL METHOD: ABNORMAL
EOSINOPHIL # BLD AUTO: 0.4 K/UL
EOSINOPHIL NFR BLD: 8.1 %
ERYTHROCYTE [DISTWIDTH] IN BLOOD BY AUTOMATED COUNT: 18.1 %
EST. GFR  (AFRICAN AMERICAN): >60 ML/MIN/1.73 M^2
EST. GFR  (NON AFRICAN AMERICAN): >60 ML/MIN/1.73 M^2
GLUCOSE SERPL-MCNC: 89 MG/DL
HCT VFR BLD AUTO: 26.6 %
HGB BLD-MCNC: 8.3 G/DL
IMM GRANULOCYTES # BLD AUTO: 0.09 K/UL
IMM GRANULOCYTES NFR BLD AUTO: 1.7 %
LYMPHOCYTES # BLD AUTO: 0.9 K/UL
LYMPHOCYTES NFR BLD: 17.3 %
MAGNESIUM SERPL-MCNC: 2 MG/DL
MCH RBC QN AUTO: 34.7 PG
MCHC RBC AUTO-ENTMCNC: 31.2 G/DL
MCV RBC AUTO: 111 FL
MONOCYTES # BLD AUTO: 0.5 K/UL
MONOCYTES NFR BLD: 9.2 %
NEUTROPHILS # BLD AUTO: 3.3 K/UL
NEUTROPHILS NFR BLD: 63.1 %
NRBC BLD-RTO: 0 /100 WBC
PLATELET # BLD AUTO: 208 K/UL
PMV BLD AUTO: 8.7 FL
POTASSIUM SERPL-SCNC: 3.6 MMOL/L
PROT SERPL-MCNC: 6.7 G/DL
RBC # BLD AUTO: 2.39 M/UL
SODIUM SERPL-SCNC: 140 MMOL/L
WBC # BLD AUTO: 5.2 K/UL

## 2019-03-07 PROCEDURE — 83735 ASSAY OF MAGNESIUM: CPT

## 2019-03-07 PROCEDURE — 85025 COMPLETE CBC W/AUTO DIFF WBC: CPT

## 2019-03-07 PROCEDURE — 99215 OFFICE O/P EST HI 40 MIN: CPT | Mod: S$PBB,,, | Performed by: NURSE PRACTITIONER

## 2019-03-07 PROCEDURE — 99999 PR PBB SHADOW E&M-EST. PATIENT-LVL III: CPT | Mod: PBBFAC,,, | Performed by: NURSE PRACTITIONER

## 2019-03-07 PROCEDURE — 99999 PR PBB SHADOW E&M-EST. PATIENT-LVL III: ICD-10-PCS | Mod: PBBFAC,,, | Performed by: NURSE PRACTITIONER

## 2019-03-07 PROCEDURE — 36415 COLL VENOUS BLD VENIPUNCTURE: CPT

## 2019-03-07 PROCEDURE — 99213 OFFICE O/P EST LOW 20 MIN: CPT | Mod: PBBFAC,25 | Performed by: NURSE PRACTITIONER

## 2019-03-07 PROCEDURE — 86901 BLOOD TYPING SEROLOGIC RH(D): CPT

## 2019-03-07 PROCEDURE — 80053 COMPREHEN METABOLIC PANEL: CPT

## 2019-03-07 PROCEDURE — 99215 PR OFFICE/OUTPT VISIT, EST, LEVL V, 40-54 MIN: ICD-10-PCS | Mod: S$PBB,,, | Performed by: NURSE PRACTITIONER

## 2019-03-07 PROCEDURE — 87799 DETECT AGENT NOS DNA QUANT: CPT

## 2019-03-07 NOTE — Clinical Note
- Weekly labs and appts with Dr. Pantoja or NP with CBC, CMP, tacro, Mag, Phos, CMV, EBV. Please schedule through March. Patient prefers Tuesday AM appointments

## 2019-03-07 NOTE — PROGRESS NOTES
HEMATOLOGIC MALIGNANCIES PROGRESS NOTE    IDENTIFYING STATEMENT   Wilton Guzmán (Wilton) is a 33 y.o. 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      ONCOLOGY HISTORY:    1. Chronic myeloid leukemia, initially presenting in blast phase   A. 2018: Began developing left-sided abdominal pain - eventually requiring evaluation - WBC ~150 at  base; transferred to [facility] in [place], [place]   B. BMBx showed 20% myelomonoblasts concerning for CML in blast phase; bcr-abl positive (returned for Day 8 of induction therapy).    C. 2018: Began 7+3 induction chemotherapy. Began dasatinib (dose-adjusted) on day 8.   D. 2018: Transferred to [facility]   E. 2018: BMBx showed variably cellular marrow (10-40%) with 9% CD34+ blasts, concerning for residual disease.   F. 2018: Reinduction with MEC (mitoxantrone, etoposide, cytarabine) chemotherapy.    G. 2018: BMBx shows hypocellular bone marrow with no definite morphologic or immunophenotypic evidence of residual leukemia; bcr-abl p210 transcript was detected at 0.5%   H. Delay in clinic follow-up due to challenges with  insurance   I. 2018: Follow-up in clinic; WBC 6.77 (normal diff); Hgb 11.2 g/dl; Plt 298; BMBx shows 30% cellular marrow with no morphologic evidence of residual leukemia; bcr-abl p210 detected at 0.05% on international scale (MR 3.3).    J. 2018: BMBx shows 40% cellular marrow with no morphologic evidence of leukemia. Chromosomes 46, XY. Bcr-abl p210 detected at 0.1% on international scale (MR 3.0).    K. 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 Engrafted on Day +12. Course complicated by C. Difficile colitis.     INTERVAL HISTORY:      Mr. Guzmán presents to clinic with his wife for f/u allo SCT. He is Day +149. He is doing well. He has not noticed any recurrence of  hematuria since 2/28/19; urine culture positive for group B streptococcus agalactiae from 2/26/19 visit with urology, pt given 10 day course of omnicef, to start today 3/7/19 (was having trouble getting prescription initially). No further hematochezia. He has good appetite. No fevers, chills, shortness of breath, chest pain, n/v. Very little diarrhea, only one episode after eating some pizza that did not agree with his stomach, stools otherwise normal. No neuropathy or skin rashes.    Past Medical History, Past Social History and Past Family History have been reviewed and are unchanged except as noted in the interval history.    MEDICATIONS:     Current Outpatient Medications on File Prior to Visit   Medication Sig Dispense Refill    acyclovir (ZOVIRAX) 800 MG Tab Take 1 tablet (800 mg total) by mouth 2 (two) times daily. 60 tablet 11    amLODIPine (NORVASC) 5 MG tablet Take 2 tablets (10 mg total) by mouth once daily. 60 tablet 11    atovaquone (MEPRON) 750 mg/5 mL Susp Take 10 mLs (1,500 mg total) by mouth once daily. Starting on 11/8/18 300 mL 5    cefdinir (OMNICEF) 300 MG capsule Take 1 capsule (300 mg total) by mouth 2 (two) times daily. for 10 days 20 capsule 0    cetirizine (ZYRTEC) 10 MG tablet Take 1 tablet (10 mg total) by mouth once daily. 30 tablet 11    dasatinib (SPRYCEL) 100 mg Take 1 tablet (100 mg total) by mouth once daily. 30 tablet 2    fluconazole (DIFLUCAN) 200 MG Tab Take 2 tablets (400 mg total) by mouth once daily. 60 tablet 5    fluticasone (FLONASE) 50 mcg/actuation nasal spray 1 spray (50 mcg total) by Each Nare route once daily. 16 g 2    magnesium oxide (MAG-OX) 400 mg (241.3 mg magnesium) tablet Take 1 tablet (400 mg total) by mouth once daily. 120 tablet 11    potassium, sodium phosphates (PHOS-NAK) 280-160-250 mg PwPk Take 1 packet by mouth 2 (two) times daily. 60 packet 3    tacrolimus (PROGRAF) 0.5 MG Cap Take 1 capsule (0.5 mg total) by mouth once daily. 150 capsule 2  "   triamcinolone acetonide 0.1% (KENALOG) 0.1 % ointment Apply topically 2 (two) times daily. 80 g 2    valGANciclovir (VALCYTE) 450 mg Tab Take 2 tablets (900 mg total) by mouth once daily. 120 tablet 3    [DISCONTINUED] budesonide (ENTOCORT EC) 3 mg capsule Take 3 capsules (9 mg total) by mouth every other day. 90 capsule 2     No current facility-administered medications on file prior to visit.        ALLERGIES: Review of patient's allergies indicates:  No Known Allergies     ROS:     Review of Systems   Constitutional: Negative for fatigue, diaphoresis, fever and unexpected weight change.   HENT:   Negative for lump/mass and sore throat.    Eyes: Negative for icterus.   Respiratory: Negative for cough and shortness of breath.    Cardiovascular: Negative for chest pain and palpitations.   Gastrointestinal: Negative for abdominal distention, constipation, nausea and vomiting. Negative for diarrhea.   Genitourinary: Negative for dysuria and frequency.  Negative for hematuria.   Musculoskeletal: Negative for arthralgias, gait problem and myalgias.   Skin: Negative for rash or itching.   Neurological: Negative for dizziness, gait problem.   Hematological: Negative for adenopathy. Does not bruise/bleed easily.   Psychiatric/Behavioral: Negative for depression or anxiety.    PHYSICAL EXAM:  Vitals:    03/07/19 1059   BP: 120/76   Pulse: 87   Temp: 98.7 °F (37.1 °C)   SpO2: 100%   Weight: 64.4 kg (141 lb 15.6 oz)   Height: 5' 11" (1.803 m)   PainSc: 0-No pain       KARNOFSKY PERFORMANCE STATUS 80%  ECOG 1    Physical Exam   Constitutional: He is oriented to person, place, and time. He appears well-developed and well-nourished. No distress.   HENT:   Head: Normocephalic and atraumatic.   Mouth/Throat: Mucous membranes are normal. No oral lesions.    Eyes: Conjunctivae are normal.   Neck: No thyromegaly present.   Cardiovascular: Normal rate, regular rhythm and normal heart sounds.   No murmur heard.  Pulmonary/Chest: " Breath sounds normal. He has no wheezes. He has no rales.   Abdominal: Soft. He exhibits no distension and no mass. There is no splenomegaly or hepatomegaly. There is no tenderness.   Neurological: He is alert and oriented to person, place, and time. He has normal strength. Coordination normal.   Skin: No rash observed. The previously seen folliculitis has now resolved.    LAB:   Results for orders placed or performed in visit on 03/07/19   Rapid BMT CBC with Diff   Result Value Ref Range    WBC 5.20 3.90 - 12.70 K/uL    RBC 2.39 (L) 4.60 - 6.20 M/uL    Hemoglobin 8.3 (L) 14.0 - 18.0 g/dL    Hematocrit 26.6 (L) 40.0 - 54.0 %     (H) 82 - 98 fL    MCH 34.7 (H) 27.0 - 31.0 pg    MCHC 31.2 (L) 32.0 - 36.0 g/dL    RDW 18.1 (H) 11.5 - 14.5 %    Platelets 208 150 - 350 K/uL    MPV 8.7 (L) 9.2 - 12.9 fL    Immature Granulocytes 1.7 (H) 0.0 - 0.5 %    Gran # (ANC) 3.3 1.8 - 7.7 K/uL    Immature Grans (Abs) 0.09 (H) 0.00 - 0.04 K/uL    Lymph # 0.9 (L) 1.0 - 4.8 K/uL    Mono # 0.5 0.3 - 1.0 K/uL    Eos # 0.4 0.0 - 0.5 K/uL    Baso # 0.03 0.00 - 0.20 K/uL    nRBC 0 0 /100 WBC    Gran% 63.1 38.0 - 73.0 %    Lymph% 17.3 (L) 18.0 - 48.0 %    Mono% 9.2 4.0 - 15.0 %    Eosinophil% 8.1 (H) 0.0 - 8.0 %    Basophil% 0.6 0.0 - 1.9 %    Differential Method Automated    Comprehensive metabolic panel   Result Value Ref Range    Sodium 140 136 - 145 mmol/L    Potassium 3.6 3.5 - 5.1 mmol/L    Chloride 104 95 - 110 mmol/L    CO2 29 23 - 29 mmol/L    Glucose 89 70 - 110 mg/dL    BUN, Bld 11 6 - 20 mg/dL    Creatinine 0.9 0.5 - 1.4 mg/dL    Calcium 9.8 8.7 - 10.5 mg/dL    Total Protein 6.7 6.0 - 8.4 g/dL    Albumin 3.5 3.5 - 5.2 g/dL    Total Bilirubin 0.3 0.1 - 1.0 mg/dL    Alkaline Phosphatase 64 55 - 135 U/L    AST 25 10 - 40 U/L    ALT 24 10 - 44 U/L    Anion Gap 7 (L) 8 - 16 mmol/L    eGFR if African American >60.0 >60 mL/min/1.73 m^2    eGFR if non African American >60.0 >60 mL/min/1.73 m^2   Magnesium   Result Value Ref Range     Magnesium 2.0 1.6 - 2.6 mg/dL   Type & Screen   Result Value Ref Range    Group & Rh B POS     Indirect Wes NEG        PROBLEMS ASSESSED THIS VISIT:    1. Chronic myeloid leukemia, BCR/ABL-positive, in remission    2. S/P allogeneic bone marrow transplant    3. GVHD (graft versus host disease)    4. Anemia, unspecified type    5. Cytomegalovirus (CMV) viremia    6. Hypomagnesemia    7. Hypophosphatemia    8. Hypertension, unspecified type    9. Urinary tract infection with hematuria, site unspecified        PLAN:        History allo transplant for CML  - See oncology history above  - s/p alloSCT on 10/9/2018  - Neutrophil engraftment on Day +12.   - Currently Day +149  - plan for +30 & +100 & +1 year restaging  - day +30 BMBX was performed in clinic 11/13/18, no evidence of CML, BCR/ABL 0.005% (MR 4.3). Chimerism studies show 60% donor in CD3+ fraction and 100% donor in CD33+ fraction.   - Repeat chimers from peripheral blood on 12/10/18 shows 80% donor in CD3+ cells and 100% donor in CD33+ cells.  - Restart dasatinib 140 mg daily on 12/10 (monitor headaches and possibility of decreasing dose)  - Day 100 Bmbx completed in clinic-- 1/15/18- No morphologic evidence of CML. BCR-ABL transcipt is 0.003%, consistent with MR 4.5. Chimerisms show 100% donor in CD33+ cells and 70% donor in CD3+ cells.   - Repeat chimers from peripheral blood around day +180    GVHD   - On oral budesonide. No diarrhea. 9mg every other day (1/4), will discontinue today (3/7/19)  - C. Diff negative. Diarrhea improved with imodium. Taking every other day. Pt understands that imodium can cause constipation.  - Continue tacrolimus prophylaxis  - tacro level not drawn at today's visit; Continues on 0.5 mg daily. We are in the process of tacrolimus wean. Plan to stop next week if there is no evidence of GVHD.   - gvhd charting with each clinic visit; begin cGVHD charting at day +100     ID  - Completed PO vancomycin for C. Diff  - Continue ppx  with acyclovir, fluconazole  - Begin atovaquone ppx at Day +30  - Monitor CMV BIW; EBV once weekly  - EBV negative to date  - CMV level detectable <35 but below limit of quantitation (2/26); treatment dosing of valcyte 900mg BID was changed to ppx dosing per ID on 12/18/18 to valcyte 900mg daily. Will continue this dose for 3 months (end date mid March 2019) unless recommended change by ID. We will continue to monitor viremia. If this increases, will discuss with ID. If CMV is not clear by this day, will discuss with ID before stopping.   - CVC removed 1/11/19, removal site remains without s/s of infection  - EBV DNA undetectable at this visit  - Obtained urinalysis, urine culture, and urine for BK virus - negative  - Will hold on GI eval at this time given resolution of hematochezia.     Cytopenias- Pancytopenia secondary to chemotherapy  - multifactorial  - Today:  WBC 5.2, Hgb 8.3, Plt 208k . No transfusions indicated  - Donor has sickle cell trait. Some anemia is expected long-term      FEN/GI  - hypomagnesemia & hypophosphatemia  - On Mg ox 1200 BID with only mild diarrhea as adverse effect  - Mag 2.0 today, continue 400 mg daily  - Phos today was not drawn, last visit was 2.3. Pt had not been taking phos packets. Will restart neutraphos 1 packet BID (3/7/19) and reassess level as next visit.    Hematuria  - This is painless hematuria that has been transient.   - U/A today shows microscopic RBCs but no red blood cell casts (per report)  - Differential diagnosis includes intrinsic bladder pathology, ureteral stone, infectious etiology, or cGVHD  - Seen by urology 2/26/19 by Dr. Saucedo; plan for CT urogram and cystoscopy; urine culture positive for group B streptococcus agalactiae, pt given 10 day course of omnicef, to start today 3/7/19 (initially had trouble obtaining prescription). Urine cytology negative     Hypertension  - Amlodipine 10 mg daily  - BP stable today    Folliculitis  - Axilla area bilaterally;  Resolved after 5 days of doxycycline    Follow-up:  - Weekly labs and appts with Dr. Pantoja or NP with CBC, CMP, tacro, Mag, Phos, CMV, EBV. Please schedule through March. Patient prefers Tuesday AM appointments      Nola Chance NP  Hematology and Stem Cell Transplant      Addendum: Discussed with ID after visit today about EOT for valcyte. Due to the fact last CMV was <35 (level from 3/7/19 still pending), Dr. Reardon is okay with stopping Valcyte at next BMT visit.

## 2019-03-08 ENCOUNTER — PATIENT MESSAGE (OUTPATIENT)
Dept: HEMATOLOGY/ONCOLOGY | Facility: CLINIC | Age: 34
End: 2019-03-08

## 2019-03-08 LAB — CMV DNA SERPL NAA+PROBE-ACNC: <35 IU/ML

## 2019-03-09 LAB — EBV DNA BY PCR: NORMAL IU/ML

## 2019-03-14 ENCOUNTER — OFFICE VISIT (OUTPATIENT)
Dept: HEMATOLOGY/ONCOLOGY | Facility: CLINIC | Age: 34
End: 2019-03-14
Payer: OTHER GOVERNMENT

## 2019-03-14 VITALS
TEMPERATURE: 99 F | WEIGHT: 135.81 LBS | BODY MASS INDEX: 19.01 KG/M2 | RESPIRATION RATE: 17 BRPM | OXYGEN SATURATION: 97 % | HEART RATE: 81 BPM | DIASTOLIC BLOOD PRESSURE: 75 MMHG | HEIGHT: 71 IN | SYSTOLIC BLOOD PRESSURE: 120 MMHG

## 2019-03-14 DIAGNOSIS — D57.3 SICKLE CELL TRAIT: ICD-10-CM

## 2019-03-14 DIAGNOSIS — Z94.81 S/P ALLOGENEIC BONE MARROW TRANSPLANT: Primary | ICD-10-CM

## 2019-03-14 DIAGNOSIS — E83.42 HYPOMAGNESEMIA: ICD-10-CM

## 2019-03-14 DIAGNOSIS — B25.9 CYTOMEGALOVIRUS (CMV) VIREMIA: ICD-10-CM

## 2019-03-14 DIAGNOSIS — C92.10 CML (CHRONIC MYELOCYTIC LEUKEMIA): ICD-10-CM

## 2019-03-14 PROCEDURE — 99213 OFFICE O/P EST LOW 20 MIN: CPT | Mod: PBBFAC,25 | Performed by: INTERNAL MEDICINE

## 2019-03-14 PROCEDURE — 99999 PR PBB SHADOW E&M-EST. PATIENT-LVL III: CPT | Mod: PBBFAC,,, | Performed by: INTERNAL MEDICINE

## 2019-03-14 PROCEDURE — 99999 PR PBB SHADOW E&M-EST. PATIENT-LVL III: ICD-10-PCS | Mod: PBBFAC,,, | Performed by: INTERNAL MEDICINE

## 2019-03-14 PROCEDURE — 99215 OFFICE O/P EST HI 40 MIN: CPT | Mod: S$PBB,,, | Performed by: INTERNAL MEDICINE

## 2019-03-14 PROCEDURE — 99215 PR OFFICE/OUTPT VISIT, EST, LEVL V, 40-54 MIN: ICD-10-PCS | Mod: S$PBB,,, | Performed by: INTERNAL MEDICINE

## 2019-03-14 NOTE — Clinical Note
Follow-up on 3/25 with Nola with CBC, CMP, Mag, Phos, type and screen, CMV, EBV. Patient stopping tacro today, so no tacro level needed.

## 2019-03-20 NOTE — PROGRESS NOTES
HEMATOLOGIC MALIGNANCIES PROGRESS NOTE    IDENTIFYING STATEMENT   Wilton Guzmán (Wilton) is a 33 y.o. male with a  of 1985 from [place], [place] with the diagnosis of CML in blast phase.      ONCOLOGY HISTORY:    1. Chronic myeloid leukemia, initially presenting in blast phase   A. 2018: Began developing left-sided abdominal pain - eventually requiring evaluation - WBC ~150 at  base; transferred to [facility] in [place], [place]   B. BMBx showed 20% myelomonoblasts concerning for CML in blast phase; bcr-abl positive (returned for Day 8 of induction therapy).    C. 2018: Began 7+3 induction chemotherapy. Began dasatinib (dose-adjusted) on day 8.   D. 2018: Transferred to [facility]   E. 2018: BMBx showed variably cellular marrow (10-40%) with 9% CD34+ blasts, concerning for residual disease.   F. 2018: Reinduction with MEC (mitoxantrone, etoposide, cytarabine) chemotherapy.    G. 2018: BMBx shows hypocellular bone marrow with no definite morphologic or immunophenotypic evidence of residual leukemia; bcr-abl p210 transcript was detected at 0.5%   H. Delay in clinic follow-up due to challenges with  insurance   I. 2018: Follow-up in clinic; WBC 6.77 (normal diff); Hgb 11.2 g/dl; Plt 298; BMBx shows 30% cellular marrow with no morphologic evidence of residual leukemia; bcr-abl p210 detected at 0.05% on international scale (MR 3.3).    J. 2018: BMBx shows 40% cellular marrow with no morphologic evidence of leukemia. Chromosomes 46, XY. Bcr-abl p210 detected at 0.1% on international scale (MR 3.0).    K. 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 Engrafted on Day +12. Course complicated by C. Difficile colitis.     INTERVAL HISTORY:      Mr. Guzmán presents to clinic with his wife for f/u allo SCT. He is Day +156. He is doing well.He has now taken antibiotics for his UTI  with no recurrence of hematuria. He has good appetite. No fevers, chills, shortness of breath, chest pain, n/v. Very little diarrhea, only one episode after eating some pizza that did not agree with his stomach, stools otherwise normal. No neuropathy or skin rashes.    Past Medical History, Past Social History and Past Family History have been reviewed and are unchanged except as noted in the interval history.    MEDICATIONS:     Current Outpatient Medications on File Prior to Visit   Medication Sig Dispense Refill    acyclovir (ZOVIRAX) 800 MG Tab Take 1 tablet (800 mg total) by mouth 2 (two) times daily. 60 tablet 11    amLODIPine (NORVASC) 5 MG tablet Take 2 tablets (10 mg total) by mouth once daily. 60 tablet 11    atovaquone (MEPRON) 750 mg/5 mL Susp Take 10 mLs (1,500 mg total) by mouth once daily. Starting on 11/8/18 300 mL 5    cetirizine (ZYRTEC) 10 MG tablet Take 1 tablet (10 mg total) by mouth once daily. 30 tablet 11    dasatinib (SPRYCEL) 100 mg Take 1 tablet (100 mg total) by mouth once daily. 30 tablet 2    fluconazole (DIFLUCAN) 200 MG Tab Take 2 tablets (400 mg total) by mouth once daily. 60 tablet 5    fluticasone (FLONASE) 50 mcg/actuation nasal spray 1 spray (50 mcg total) by Each Nare route once daily. 16 g 2    magnesium oxide (MAG-OX) 400 mg (241.3 mg magnesium) tablet Take 1 tablet (400 mg total) by mouth once daily. 120 tablet 11    potassium, sodium phosphates (PHOS-NAK) 280-160-250 mg PwPk Take 1 packet by mouth 2 (two) times daily. 60 packet 3    triamcinolone acetonide 0.1% (KENALOG) 0.1 % ointment Apply topically 2 (two) times daily. 80 g 2    valGANciclovir (VALCYTE) 450 mg Tab Take 2 tablets (900 mg total) by mouth once daily. 120 tablet 3     No current facility-administered medications on file prior to visit.        ALLERGIES: Review of patient's allergies indicates:  No Known Allergies     ROS:     Review of Systems   Constitutional: Negative for fatigue, diaphoresis,  "fever and unexpected weight change.   HENT:   Negative for lump/mass and sore throat.    Eyes: Negative for icterus.   Respiratory: Negative for cough and shortness of breath.    Cardiovascular: Negative for chest pain and palpitations.   Gastrointestinal: Negative for abdominal distention, constipation, nausea and vomiting. Negative for diarrhea.   Genitourinary: Negative for dysuria and frequency.  Negative for hematuria.   Musculoskeletal: Negative for arthralgias, gait problem and myalgias.   Skin: Negative for rash or itching.   Neurological: Negative for dizziness, gait problem.   Hematological: Negative for adenopathy. Does not bruise/bleed easily.   Psychiatric/Behavioral: Negative for depression or anxiety.    PHYSICAL EXAM:  Vitals:    03/14/19 0931   BP: 120/75   Pulse: 81   Resp: 17   Temp: 98.7 °F (37.1 °C)   SpO2: 97%   Weight: 61.6 kg (135 lb 12.9 oz)   Height: 5' 11" (1.803 m)   PainSc: 0-No pain       KARNOFSKY PERFORMANCE STATUS 80%  ECOG 1    Physical Exam   Constitutional: He is oriented to person, place, and time. He appears well-developed and well-nourished. No distress.   HENT:   Head: Normocephalic and atraumatic.   Mouth/Throat: Mucous membranes are normal. No oral lesions.    Eyes: Conjunctivae are normal.   Neck: No thyromegaly present.   Cardiovascular: Normal rate, regular rhythm and normal heart sounds.   No murmur heard.  Pulmonary/Chest: Breath sounds normal. He has no wheezes. He has no rales.   Abdominal: Soft. He exhibits no distension and no mass. There is no splenomegaly or hepatomegaly. There is no tenderness.   Neurological: He is alert and oriented to person, place, and time. He has normal strength. Coordination normal.   Skin: No rash observed. The previously seen folliculitis has now resolved.    LAB:   Results for orders placed or performed in visit on 03/14/19   Rapid BMT CBC with Diff   Result Value Ref Range    WBC 3.93 3.90 - 12.70 K/uL    RBC 2.24 (L) 4.60 - 6.20 M/uL "    Hemoglobin 7.7 (L) 14.0 - 18.0 g/dL    Hematocrit 24.1 (L) 40.0 - 54.0 %     (H) 82 - 98 fL    MCH 34.4 (H) 27.0 - 31.0 pg    MCHC 32.0 32.0 - 36.0 g/dL    RDW 17.5 (H) 11.5 - 14.5 %    Platelets 210 150 - 350 K/uL    MPV 9.0 (L) 9.2 - 12.9 fL    Immature Granulocytes 1.0 (H) 0.0 - 0.5 %    Gran # (ANC) 2.8 1.8 - 7.7 K/uL    Immature Grans (Abs) 0.04 0.00 - 0.04 K/uL    Lymph # 0.4 (L) 1.0 - 4.8 K/uL    Mono # 0.3 0.3 - 1.0 K/uL    Eos # 0.4 0.0 - 0.5 K/uL    Baso # 0.02 0.00 - 0.20 K/uL    nRBC 0 0 /100 WBC    Gran% 70.8 38.0 - 73.0 %    Lymph% 9.4 (L) 18.0 - 48.0 %    Mono% 7.1 4.0 - 15.0 %    Eosinophil% 11.2 (H) 0.0 - 8.0 %    Basophil% 0.5 0.0 - 1.9 %    Differential Method Automated    Magnesium   Result Value Ref Range    Magnesium 1.9 1.6 - 2.6 mg/dL   Phosphorus   Result Value Ref Range    Phosphorus 3.0 2.7 - 4.5 mg/dL   Comprehensive metabolic panel   Result Value Ref Range    Sodium 141 136 - 145 mmol/L    Potassium 4.1 3.5 - 5.1 mmol/L    Chloride 108 95 - 110 mmol/L    CO2 26 23 - 29 mmol/L    Glucose 93 70 - 110 mg/dL    BUN, Bld 10 6 - 20 mg/dL    Creatinine 0.8 0.5 - 1.4 mg/dL    Calcium 9.4 8.7 - 10.5 mg/dL    Total Protein 6.3 6.0 - 8.4 g/dL    Albumin 3.3 (L) 3.5 - 5.2 g/dL    Total Bilirubin 0.3 0.1 - 1.0 mg/dL    Alkaline Phosphatase 65 55 - 135 U/L    AST 23 10 - 40 U/L    ALT 19 10 - 44 U/L    Anion Gap 7 (L) 8 - 16 mmol/L    eGFR if African American >60.0 >60 mL/min/1.73 m^2    eGFR if non African American >60.0 >60 mL/min/1.73 m^2   CMV DNA, quantitative, PCR   Result Value Ref Range    Cytomegalovirus PCR, Quant Undetected Undetected IU/mL   Hallie-Kauffman virus DNA, quantitative   Result Value Ref Range    EBV DNA, PCR Undetected Undetected IU/mL   Tacrolimus level   Result Value Ref Range    Tacrolimus Lvl <1.5 (L) 5.0 - 15.0 ng/mL   Type & Screen   Result Value Ref Range    Group & Rh B POS     Indirect Wes NEG        PROBLEMS ASSESSED THIS VISIT:    1. S/P allogeneic bone  marrow transplant    2. CML (chronic myelocytic leukemia)    3. Sickle cell trait    4. Cytomegalovirus (CMV) viremia    5. Hypomagnesemia        PLAN:        History allo transplant for CML  - See oncology history above  - s/p alloSCT on 10/9/2018  - Neutrophil engraftment on Day +12.   - Currently Day +156  - plan for +30 & +100 & +1 year restaging  - day +30 BMBX was performed in clinic 11/13/18, no evidence of CML, BCR/ABL 0.005% (MR 4.3). Chimerism studies show 60% donor in CD3+ fraction and 100% donor in CD33+ fraction.   - Repeat chimers from peripheral blood on 12/10/18 shows 80% donor in CD3+ cells and 100% donor in CD33+ cells.  - Restart dasatinib 140 mg daily on 12/10 (monitor headaches and possibility of decreasing dose)  - Day 100 Bmbx completed in clinic-- 1/15/18- No morphologic evidence of CML. BCR-ABL transcipt is 0.003%, consistent with MR 4.5. Chimerisms show 100% donor in CD33+ cells and 70% donor in CD3+ cells.   - Repeat chimers from peripheral blood around day +180    GVHD   - Oral budesonide was discontinued on 3/7/19  - C. Diff negative. Diarrhea improved with imodium. Taking every other day. Pt understands that imodium can cause constipation.  - Continue tacrolimus prophylaxis  - Tacrolimus discontinued as of this visit.   - gvhd charting with each clinic visit; begin cGVHD charting at day +100     ID  - Completed PO vancomycin for C. Diff  - Continue ppx with acyclovir, fluconazole  - Begin atovaquone ppx at Day +30  - Monitor CMV BIW; EBV once weekly  - EBV negative to date  - CMV level undetectable. Mr. Guzmán may now stop valganciclovir.   - CVC removed 1/11/19, removal site remains without s/s of infection  - EBV DNA undetectable at this visit  - Obtained urinalysis, urine culture, and urine for BK virus - negative  - Will hold on GI eval at this time given resolution of hematochezia.     Cytopenias- Pancytopenia secondary to chemotherapy  - multifactorial  - Today:  WBC 3.93, Hgb 7.7,  Plt 210k . No transfusions indicated  - Donor has sickle cell trait. Some anemia is expected long-term      FEN/GI  - hypomagnesemia & hypophosphatemia  - On Mg ox 1200 BID with only mild diarrhea as adverse effect  - Mag 1.9 today, continue 400 mg daily  - Phos todaywas 3. Pt had not been taking phos packets. Continue neutraphos 1 packet BID and reassess level as next visit.    Hematuria  - This is painless hematuria that has been transient.   - U/A today shows microscopic RBCs but no red blood cell casts (per report)  - Differential diagnosis includes intrinsic bladder pathology, ureteral stone, infectious etiology, or cGVHD  - Seen by urology 2/26/19 by Dr. Saucedo; plan for CT urogram and cystoscopy; urine culture positive for group B streptococcus agalactiae, pt given 10 day course of omnicef, started 3/7/19 (initially had trouble obtaining prescription). Urine cytology negative     Hypertension  - Amlodipine 10 mg daily  - BP stable today    Folliculitis  - Axilla area bilaterally; Resolved after 5 days of doxycycline    Follow-up:  - Every other week labs and appts with Dr. Pantoja or NP with CBC, CMP, tacro, Mag, Phos, CMV, EBV. Please schedule through March. Patient prefers Tuesday AM appointments      Kishor Pantoja MD  Hematology and Stem Cell Transplant

## 2019-03-25 ENCOUNTER — OFFICE VISIT (OUTPATIENT)
Dept: HEMATOLOGY/ONCOLOGY | Facility: CLINIC | Age: 34
End: 2019-03-25
Payer: OTHER GOVERNMENT

## 2019-03-25 VITALS
DIASTOLIC BLOOD PRESSURE: 59 MMHG | WEIGHT: 141.31 LBS | RESPIRATION RATE: 20 BRPM | HEIGHT: 71 IN | SYSTOLIC BLOOD PRESSURE: 137 MMHG | HEART RATE: 94 BPM | TEMPERATURE: 98 F | OXYGEN SATURATION: 97 % | BODY MASS INDEX: 19.78 KG/M2

## 2019-03-25 DIAGNOSIS — E83.39 HYPOPHOSPHATEMIA: ICD-10-CM

## 2019-03-25 DIAGNOSIS — D89.813 GVHD (GRAFT VERSUS HOST DISEASE): ICD-10-CM

## 2019-03-25 DIAGNOSIS — C92.11 CHRONIC MYELOID LEUKEMIA, BCR/ABL-POSITIVE, IN REMISSION: Primary | ICD-10-CM

## 2019-03-25 DIAGNOSIS — I10 HYPERTENSION, UNSPECIFIED TYPE: ICD-10-CM

## 2019-03-25 DIAGNOSIS — D72.819 LEUKOPENIA, UNSPECIFIED TYPE: ICD-10-CM

## 2019-03-25 DIAGNOSIS — D64.9 ANEMIA, UNSPECIFIED TYPE: ICD-10-CM

## 2019-03-25 DIAGNOSIS — E83.42 HYPOMAGNESEMIA: ICD-10-CM

## 2019-03-25 DIAGNOSIS — L73.9 FOLLICULITIS: ICD-10-CM

## 2019-03-25 DIAGNOSIS — Z94.81 S/P ALLOGENEIC BONE MARROW TRANSPLANT: ICD-10-CM

## 2019-03-25 DIAGNOSIS — B25.9 CYTOMEGALOVIRUS (CMV) VIREMIA: ICD-10-CM

## 2019-03-25 DIAGNOSIS — C92.10 CHRONIC MYELOID LEUKEMIA, BCR/ABL-POSITIVE, NOT HAVING ACHIEVED REMISSION: ICD-10-CM

## 2019-03-25 DIAGNOSIS — C92.11 CHRONIC MYELOID LEUKEMIA, BCR/ABL-POSITIVE, IN REMISSION: ICD-10-CM

## 2019-03-25 DIAGNOSIS — D57.3 SICKLE CELL TRAIT: ICD-10-CM

## 2019-03-25 PROCEDURE — 99215 PR OFFICE/OUTPT VISIT, EST, LEVL V, 40-54 MIN: ICD-10-PCS | Mod: S$PBB,,, | Performed by: NURSE PRACTITIONER

## 2019-03-25 PROCEDURE — 99999 PR PBB SHADOW E&M-EST. PATIENT-LVL III: ICD-10-PCS | Mod: PBBFAC,,, | Performed by: NURSE PRACTITIONER

## 2019-03-25 PROCEDURE — 99215 OFFICE O/P EST HI 40 MIN: CPT | Mod: S$PBB,,, | Performed by: NURSE PRACTITIONER

## 2019-03-25 PROCEDURE — 99999 PR PBB SHADOW E&M-EST. PATIENT-LVL III: CPT | Mod: PBBFAC,,, | Performed by: NURSE PRACTITIONER

## 2019-03-25 PROCEDURE — 99213 OFFICE O/P EST LOW 20 MIN: CPT | Mod: PBBFAC | Performed by: NURSE PRACTITIONER

## 2019-03-25 RX ORDER — ATOVAQUONE 750 MG/5ML
1500 SUSPENSION ORAL DAILY
Qty: 300 ML | Refills: 5 | Status: SHIPPED | OUTPATIENT
Start: 2019-03-25 | End: 2019-05-02 | Stop reason: ALTCHOICE

## 2019-03-25 NOTE — Clinical Note
- Every other week labs and appts with Dr. Pantoja or NP with CBC, CMP, Mag, Phos, CMV, EBV, type and screen. Please schedule for 4/9 and 4/23. Please add blood chimerism lab to next appt on 4/9.

## 2019-03-25 NOTE — PROGRESS NOTES
HEMATOLOGIC MALIGNANCIES PROGRESS NOTE    IDENTIFYING STATEMENT   Wilton Guzmán (Wilton) is a 33 y.o. 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      ONCOLOGY HISTORY:    1. Chronic myeloid leukemia, initially presenting in blast phase   A. 2018: Began developing left-sided abdominal pain - eventually requiring evaluation - WBC ~150 at  base; transferred to [facility] in [place], [place]   B. BMBx showed 20% myelomonoblasts concerning for CML in blast phase; bcr-abl positive (returned for Day 8 of induction therapy).    C. 2018: Began 7+3 induction chemotherapy. Began dasatinib (dose-adjusted) on day 8.   D. 2018: Transferred to [facility]   E. 2018: BMBx showed variably cellular marrow (10-40%) with 9% CD34+ blasts, concerning for residual disease.   F. 2018: Reinduction with MEC (mitoxantrone, etoposide, cytarabine) chemotherapy.    G. 2018: BMBx shows hypocellular bone marrow with no definite morphologic or immunophenotypic evidence of residual leukemia; bcr-abl p210 transcript was detected at 0.5%   H. Delay in clinic follow-up due to challenges with  insurance   I. 2018: Follow-up in clinic; WBC 6.77 (normal diff); Hgb 11.2 g/dl; Plt 298; BMBx shows 30% cellular marrow with no morphologic evidence of residual leukemia; bcr-abl p210 detected at 0.05% on international scale (MR 3.3).    J. 2018: BMBx shows 40% cellular marrow with no morphologic evidence of leukemia. Chromosomes 46, XY. Bcr-abl p210 detected at 0.1% on international scale (MR 3.0).    K. 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 Engrafted on Day +12. Course complicated by C. Difficile colitis.     INTERVAL HISTORY:      Mr. Guzmán presents to clinic with his wife for f/u allo SCT. He is Day +167. He is doing well overall. Had hemorrhoid last week, is now  resolved; no bleeding noted. He has good appetite. Denies fevers, chills, shortness of breath, chest pain, n/v/d/c. No neuropathy or skin rashes.    Past Medical History, Past Social History and Past Family History have been reviewed and are unchanged except as noted in the interval history.    MEDICATIONS:     Current Outpatient Medications on File Prior to Visit   Medication Sig Dispense Refill    acyclovir (ZOVIRAX) 800 MG Tab Take 1 tablet (800 mg total) by mouth 2 (two) times daily. 60 tablet 11    cetirizine (ZYRTEC) 10 MG tablet Take 1 tablet (10 mg total) by mouth once daily. 30 tablet 11    dasatinib (SPRYCEL) 100 mg Take 1 tablet (100 mg total) by mouth once daily. 30 tablet 2    fluconazole (DIFLUCAN) 200 MG Tab Take 2 tablets (400 mg total) by mouth once daily. 60 tablet 5    fluticasone (FLONASE) 50 mcg/actuation nasal spray 1 spray (50 mcg total) by Each Nare route once daily. 16 g 2    magnesium oxide (MAG-OX) 400 mg (241.3 mg magnesium) tablet Take 1 tablet (400 mg total) by mouth once daily. 120 tablet 11    potassium, sodium phosphates (PHOS-NAK) 280-160-250 mg PwPk Take 1 packet by mouth 2 (two) times daily. 60 packet 3    triamcinolone acetonide 0.1% (KENALOG) 0.1 % ointment Apply topically 2 (two) times daily. 80 g 2    amLODIPine (NORVASC) 5 MG tablet Take 2 tablets (10 mg total) by mouth once daily. 60 tablet 11    atovaquone (MEPRON) 750 mg/5 mL Susp Take 10 mLs (1,500 mg total) by mouth once daily. Starting on 11/8/18 300 mL 5    [DISCONTINUED] valGANciclovir (VALCYTE) 450 mg Tab Take 2 tablets (900 mg total) by mouth once daily. 120 tablet 3     No current facility-administered medications on file prior to visit.        ALLERGIES: Review of patient's allergies indicates:  No Known Allergies     ROS:     Review of Systems   Constitutional: Negative for fatigue, diaphoresis, fever and unexpected weight change.   HENT:   Negative for lump/mass and sore throat.    Eyes: Negative for  "icterus.   Respiratory: Negative for cough and shortness of breath.    Cardiovascular: Negative for chest pain and palpitations.   Gastrointestinal: Negative for abdominal distention, constipation, nausea and vomiting. Negative for diarrhea.   Genitourinary: Negative for dysuria and frequency.  Negative for hematuria.   Musculoskeletal: Negative for arthralgias, gait problem and myalgias.   Skin: Negative for rash or itching.   Neurological: Negative for dizziness, gait problem.   Hematological: Negative for adenopathy. Does not bruise/bleed easily.   Psychiatric/Behavioral: Negative for depression or anxiety.    PHYSICAL EXAM:  Vitals:    03/25/19 1049   BP: (!) 137/59   Pulse: 94   Resp: 20   Temp: 98.4 °F (36.9 °C)   TempSrc: Oral   SpO2: 97%   Weight: 64.1 kg (141 lb 5 oz)   Height: 5' 11" (1.803 m)   PainSc: 0-No pain       KARNOFSKY PERFORMANCE STATUS 80%  ECOG 1    Physical Exam   Constitutional: He is oriented to person, place, and time. He appears well-developed and well-nourished. No distress.   HENT:   Head: Normocephalic and atraumatic.   Mouth/Throat: Mucous membranes are normal. No oral lesions.    Eyes: Conjunctivae are normal.   Neck: No thyromegaly present.   Cardiovascular: Normal rate, regular rhythm and normal heart sounds.   No murmur heard.  Pulmonary/Chest: Breath sounds normal. He has no wheezes. He has no rales.   Abdominal: Soft. He exhibits no distension and no mass. There is no splenomegaly or hepatomegaly. There is no tenderness.   Neurological: He is alert and oriented to person, place, and time. He has normal strength. Coordination normal.   Skin: No rash observed. The previously seen folliculitis has now resolved.    LAB:   Results for orders placed or performed in visit on 03/25/19   CBC auto differential   Result Value Ref Range    WBC 3.50 (L) 3.90 - 12.70 K/uL    RBC 2.11 (L) 4.60 - 6.20 M/uL    Hemoglobin 7.3 (L) 14.0 - 18.0 g/dL    Hematocrit 23.0 (L) 40.0 - 54.0 %     " (H) 82 - 98 fL    MCH 34.6 (H) 27.0 - 31.0 pg    MCHC 31.7 (L) 32.0 - 36.0 g/dL    RDW 16.9 (H) 11.5 - 14.5 %    Platelets 161 150 - 350 K/uL    MPV 8.6 (L) 9.2 - 12.9 fL   Comprehensive metabolic panel   Result Value Ref Range    Sodium 140 136 - 145 mmol/L    Potassium 3.9 3.5 - 5.1 mmol/L    Chloride 108 95 - 110 mmol/L    CO2 24 23 - 29 mmol/L    Glucose 96 70 - 110 mg/dL    BUN, Bld 10 6 - 20 mg/dL    Creatinine 0.9 0.5 - 1.4 mg/dL    Calcium 8.9 8.7 - 10.5 mg/dL    Total Protein 5.6 (L) 6.0 - 8.4 g/dL    Albumin 3.0 (L) 3.5 - 5.2 g/dL    Total Bilirubin 0.2 0.1 - 1.0 mg/dL    Alkaline Phosphatase 58 55 - 135 U/L    AST 25 10 - 40 U/L    ALT 19 10 - 44 U/L    Anion Gap 8 8 - 16 mmol/L    eGFR if African American >60.0 >60 mL/min/1.73 m^2    eGFR if non African American >60.0 >60 mL/min/1.73 m^2   Magnesium   Result Value Ref Range    Magnesium 1.8 1.6 - 2.6 mg/dL   PHOSPHORUS   Result Value Ref Range    Phosphorus 3.0 2.7 - 4.5 mg/dL       PROBLEMS ASSESSED THIS VISIT:    1. Chronic myeloid leukemia, BCR/ABL-positive, in remission    2. S/P allogeneic bone marrow transplant    3. Anemia, unspecified type    4. Leukopenia, unspecified type    5. Sickle cell trait    6. GVHD (graft versus host disease)    7. Cytomegalovirus (CMV) viremia    8. Hypomagnesemia    9. Hypophosphatemia    10. Hypertension, unspecified type    11. Folliculitis        PLAN:        History allo transplant for CML  - See oncology history above  - s/p alloSCT on 10/9/2018  - Neutrophil engraftment on Day +12.   - Currently Day +167  - plan for +30 & +100 & +1 year restaging  - day +30 BMBX was performed in clinic 11/13/18, no evidence of CML, BCR/ABL 0.005% (MR 4.3). Chimerism studies show 60% donor in CD3+ fraction and 100% donor in CD33+ fraction.   - Repeat chimers from peripheral blood on 12/10/18 shows 80% donor in CD3+ cells and 100% donor in CD33+ cells.  - Restart dasatinib 100 mg daily on 12/10 (no issues any longer with  headaches)  - Day 100 Bmbx completed in clinic-- 1/15/18- No morphologic evidence of CML. BCR-ABL transcipt is 0.003%, consistent with MR 4.5. Chimerisms show 100% donor in CD33+ cells and 70% donor in CD3+ cells.   - Repeat chimers from peripheral blood around day +180 (order placed to obtain prior to next visit)    GVHD   - Oral budesonide was discontinued on 3/7/19  - C. Diff negative. Diarrhea improved with imodium. Taking every other day. Pt understands that imodium can cause constipation.  - Continue tacrolimus prophylaxis  - Tacrolimus discontinued as of this visit.   - gvhd charting with each clinic visit; begin cGVHD charting at day +100     ID  - Completed PO vancomycin for C. Diff  - Continue ppx with acyclovir; can stop fluconazole (3/25/19)  - Begin atovaquone ppx at Day +30  - Monitor CMV BIW; EBV once weekly  - EBV negative to date  - CMV level undetectable last visit; today's level pending. Stopped valganciclovir (3/25/19).   - CVC removed 1/11/19, removal site remains without s/s of infection  - EBV DNA undetectable at last visit; today's pending  - Obtained urinalysis, urine culture, and urine for BK virus - negative  - Will hold on GI eval at this time given resolution of hematochezia.     Cytopenias- Leukopenia/Anemia  - multifactorial  - Today:  WBC 3.50, Hgb 7.3, Plt 161k . No transfusions indicated  - Donor has sickle cell trait. Some anemia is expected long-term      FEN/GI  - hypomagnesemia & hypophosphatemia  - Mag 1.8 today, continue 400 mg daily; closely monitor as now off tacrolimus  - Phos today was 3. Continue neutraphos 1 packet BID    Hematuria  - This is painless hematuria that has been transient.   - U/A today shows microscopic RBCs but no red blood cell casts (per report)  - Differential diagnosis includes intrinsic bladder pathology, ureteral stone, infectious etiology, or cGVHD  - Seen by urology 2/26/19 by Dr. Saucedo; plan for CT urogram and cystoscopy; urine culture positive  for group B streptococcus agalactiae, pt given 10 day course of omnicef, started 3/7/19; has now completed (initially had trouble obtaining prescription). Urine cytology negative     Hypertension  - Amlodipine 10 mg daily  - BP stable today    Folliculitis  - Axilla area bilaterally; Resolved after 5 days of doxycycline; has not returned    Follow-up:  - Every other week labs and appts with Dr. Pantoja or NP with CBC, CMP, Mag, Phos, CMV, EBV, type and screen. Please schedule for 4/9 and 4/23. Please add blood chimerism lab to next appt on 4/9.       Nola Chance NP  Hematology and Stem Cell Transplant

## 2019-04-07 ENCOUNTER — PATIENT MESSAGE (OUTPATIENT)
Dept: HEMATOLOGY/ONCOLOGY | Facility: CLINIC | Age: 34
End: 2019-04-07

## 2019-04-08 ENCOUNTER — TELEPHONE (OUTPATIENT)
Dept: GASTROENTEROLOGY | Facility: CLINIC | Age: 34
End: 2019-04-08

## 2019-04-08 ENCOUNTER — PATIENT MESSAGE (OUTPATIENT)
Dept: GASTROENTEROLOGY | Facility: CLINIC | Age: 34
End: 2019-04-08

## 2019-04-08 NOTE — TELEPHONE ENCOUNTER
Please schedule pt appt 4/18 at 9 am  when slot becomes available on 4/10   Confirm when done   Thanks

## 2019-04-08 NOTE — TELEPHONE ENCOUNTER
Ma attempted to contact pt no answer left detail message to return call. Offer  appt with Dr. Pedro on 4/16/2019 at 9:00 am     ----- Message from Olinda Agustin RN sent at 4/8/2019  2:32 PM CDT -----  Good afternoon,      Can you please reach out to patient to schedule an appt with Dr Pedro.  He is having a bleeding again and Dr Pantoja would like him evaluated.  I believe the patient cancelled a past appt.  Thank you    Olinda

## 2019-04-08 NOTE — TELEPHONE ENCOUNTER
----- Message from Olinda Agustin RN sent at 4/8/2019  2:32 PM CDT -----  Good afternoon,      Can you please reach out to patient to schedule an appt with Dr Pedro.  He is having a bleeding again and Dr Pantoja would like him evaluated.  I believe the patient cancelled a past appt.  Thank you    Olinda

## 2019-04-09 ENCOUNTER — LAB VISIT (OUTPATIENT)
Dept: LAB | Facility: HOSPITAL | Age: 34
End: 2019-04-09
Attending: INTERNAL MEDICINE
Payer: OTHER GOVERNMENT

## 2019-04-09 ENCOUNTER — OFFICE VISIT (OUTPATIENT)
Dept: HEMATOLOGY/ONCOLOGY | Facility: CLINIC | Age: 34
End: 2019-04-09
Payer: OTHER GOVERNMENT

## 2019-04-09 VITALS
TEMPERATURE: 99 F | WEIGHT: 142.19 LBS | BODY MASS INDEX: 19.91 KG/M2 | SYSTOLIC BLOOD PRESSURE: 117 MMHG | OXYGEN SATURATION: 99 % | HEART RATE: 98 BPM | HEIGHT: 71 IN | DIASTOLIC BLOOD PRESSURE: 64 MMHG

## 2019-04-09 DIAGNOSIS — Z94.81 S/P ALLOGENEIC BONE MARROW TRANSPLANT: ICD-10-CM

## 2019-04-09 DIAGNOSIS — C92.11 CHRONIC MYELOID LEUKEMIA, BCR/ABL-POSITIVE, IN REMISSION: Primary | ICD-10-CM

## 2019-04-09 DIAGNOSIS — C92.11 CHRONIC MYELOID LEUKEMIA, BCR/ABL-POSITIVE, IN REMISSION: ICD-10-CM

## 2019-04-09 DIAGNOSIS — C92.10 CML (CHRONIC MYELOCYTIC LEUKEMIA): ICD-10-CM

## 2019-04-09 DIAGNOSIS — Z09 CHEMOTHERAPY FOLLOW-UP EXAMINATION: ICD-10-CM

## 2019-04-09 DIAGNOSIS — K92.1 HEMATOCHEZIA: ICD-10-CM

## 2019-04-09 LAB
ABO + RH BLD: NORMAL
ALBUMIN SERPL BCP-MCNC: 3.1 G/DL (ref 3.5–5.2)
ALP SERPL-CCNC: 67 U/L (ref 55–135)
ALT SERPL W/O P-5'-P-CCNC: 28 U/L (ref 10–44)
ANION GAP SERPL CALC-SCNC: 5 MMOL/L (ref 8–16)
AST SERPL-CCNC: 27 U/L (ref 10–40)
BASOPHILS # BLD AUTO: 0.04 K/UL (ref 0–0.2)
BASOPHILS NFR BLD: 0.9 % (ref 0–1.9)
BILIRUB SERPL-MCNC: 0.2 MG/DL (ref 0.1–1)
BLD GP AB SCN CELLS X3 SERPL QL: NORMAL
BUN SERPL-MCNC: 7 MG/DL (ref 6–20)
CALCIUM SERPL-MCNC: 9 MG/DL (ref 8.7–10.5)
CHLORIDE SERPL-SCNC: 111 MMOL/L (ref 95–110)
CO2 SERPL-SCNC: 29 MMOL/L (ref 23–29)
CREAT SERPL-MCNC: 0.8 MG/DL (ref 0.5–1.4)
DIFFERENTIAL METHOD: ABNORMAL
EOSINOPHIL # BLD AUTO: 0.6 K/UL (ref 0–0.5)
EOSINOPHIL NFR BLD: 12.7 % (ref 0–8)
ERYTHROCYTE [DISTWIDTH] IN BLOOD BY AUTOMATED COUNT: 15.1 % (ref 11.5–14.5)
EST. GFR  (AFRICAN AMERICAN): >60 ML/MIN/1.73 M^2
EST. GFR  (NON AFRICAN AMERICAN): >60 ML/MIN/1.73 M^2
GLUCOSE SERPL-MCNC: 110 MG/DL (ref 70–110)
HCT VFR BLD AUTO: 26.9 % (ref 40–54)
HGB BLD-MCNC: 8.3 G/DL (ref 14–18)
IMM GRANULOCYTES # BLD AUTO: 0.02 K/UL (ref 0–0.04)
IMM GRANULOCYTES NFR BLD AUTO: 0.5 % (ref 0–0.5)
LYMPHOCYTES # BLD AUTO: 1.6 K/UL (ref 1–4.8)
LYMPHOCYTES NFR BLD: 37.3 % (ref 18–48)
MAGNESIUM SERPL-MCNC: 2 MG/DL (ref 1.6–2.6)
MCH RBC QN AUTO: 34 PG (ref 27–31)
MCHC RBC AUTO-ENTMCNC: 30.9 G/DL (ref 32–36)
MCV RBC AUTO: 110 FL (ref 82–98)
MONOCYTES # BLD AUTO: 0.4 K/UL (ref 0.3–1)
MONOCYTES NFR BLD: 9 % (ref 4–15)
NEUTROPHILS # BLD AUTO: 1.7 K/UL (ref 1.8–7.7)
NEUTROPHILS NFR BLD: 39.6 % (ref 38–73)
NRBC BLD-RTO: 0 /100 WBC
PHOSPHATE SERPL-MCNC: 3.6 MG/DL (ref 2.7–4.5)
PLATELET # BLD AUTO: 209 K/UL (ref 150–350)
PMV BLD AUTO: 8.6 FL (ref 9.2–12.9)
POTASSIUM SERPL-SCNC: 4.2 MMOL/L (ref 3.5–5.1)
PROT SERPL-MCNC: 5.7 G/DL (ref 6–8.4)
RBC # BLD AUTO: 2.44 M/UL (ref 4.6–6.2)
SODIUM SERPL-SCNC: 145 MMOL/L (ref 136–145)
WBC # BLD AUTO: 4.32 K/UL (ref 3.9–12.7)

## 2019-04-09 PROCEDURE — 86901 BLOOD TYPING SEROLOGIC RH(D): CPT

## 2019-04-09 PROCEDURE — 81268 CHIMERISM ANAL W/CELL SELECT: CPT | Mod: 91

## 2019-04-09 PROCEDURE — 99999 PR PBB SHADOW E&M-EST. PATIENT-LVL III: ICD-10-PCS | Mod: PBBFAC,,, | Performed by: INTERNAL MEDICINE

## 2019-04-09 PROCEDURE — 80053 COMPREHEN METABOLIC PANEL: CPT

## 2019-04-09 PROCEDURE — 99999 PR PBB SHADOW E&M-EST. PATIENT-LVL III: CPT | Mod: PBBFAC,,, | Performed by: INTERNAL MEDICINE

## 2019-04-09 PROCEDURE — 84100 ASSAY OF PHOSPHORUS: CPT

## 2019-04-09 PROCEDURE — 99213 OFFICE O/P EST LOW 20 MIN: CPT | Mod: PBBFAC,25 | Performed by: INTERNAL MEDICINE

## 2019-04-09 PROCEDURE — 99215 OFFICE O/P EST HI 40 MIN: CPT | Mod: S$PBB,,, | Performed by: INTERNAL MEDICINE

## 2019-04-09 PROCEDURE — 83735 ASSAY OF MAGNESIUM: CPT

## 2019-04-09 PROCEDURE — 99215 PR OFFICE/OUTPT VISIT, EST, LEVL V, 40-54 MIN: ICD-10-PCS | Mod: S$PBB,,, | Performed by: INTERNAL MEDICINE

## 2019-04-09 PROCEDURE — 85025 COMPLETE CBC W/AUTO DIFF WBC: CPT

## 2019-04-09 PROCEDURE — 36415 COLL VENOUS BLD VENIPUNCTURE: CPT

## 2019-04-09 PROCEDURE — 87799 DETECT AGENT NOS DNA QUANT: CPT | Mod: 59

## 2019-04-09 PROCEDURE — 81268 CHIMERISM ANAL W/CELL SELECT: CPT

## 2019-04-09 NOTE — Clinical Note
Please move 4/22 appointment to 4/18 with Yoanna in the afternoon. Please add bcr/abl p210 quant monitor to labs with that appointment.

## 2019-04-10 LAB
CMV DNA SERPL NAA+PROBE-ACNC: NORMAL IU/ML
EBV DNA BY PCR: NORMAL IU/ML

## 2019-04-12 NOTE — PROGRESS NOTES
HEMATOLOGIC MALIGNANCIES PROGRESS NOTE    IDENTIFYING STATEMENT   Wilton Guzmán (Wilton) is a 33 y.o. male with a  of 1985 from [place], [place] with the diagnosis of CML in blast phase.      ONCOLOGY HISTORY:    1. Chronic myeloid leukemia, initially presenting in blast phase   A. 2018: Began developing left-sided abdominal pain - eventually requiring evaluation - WBC ~150 at  base; transferred to [facility] in [place], [place]   B. BMBx showed 20% myelomonoblasts concerning for CML in blast phase; bcr-abl positive (returned for Day 8 of induction therapy).    C. 2018: Began 7+3 induction chemotherapy. Began dasatinib (dose-adjusted) on day 8.   D. 2018: Transferred to [facility]   E. 2018: BMBx showed variably cellular marrow (10-40%) with 9% CD34+ blasts, concerning for residual disease.   F. 2018: Reinduction with MEC (mitoxantrone, etoposide, cytarabine) chemotherapy.    G. 2018: BMBx shows hypocellular bone marrow with no definite morphologic or immunophenotypic evidence of residual leukemia; bcr-abl p210 transcript was detected at 0.5%   H. Delay in clinic follow-up due to challenges with  insurance   I. 2018: Follow-up in clinic; WBC 6.77 (normal diff); Hgb 11.2 g/dl; Plt 298; BMBx shows 30% cellular marrow with no morphologic evidence of residual leukemia; bcr-abl p210 detected at 0.05% on international scale (MR 3.3).    J. 2018: BMBx shows 40% cellular marrow with no morphologic evidence of leukemia. Chromosomes 46, XY. Bcr-abl p210 detected at 0.1% on international scale (MR 3.0).    K. 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 Engrafted on Day +12. Course complicated by C. Difficile colitis.     INTERVAL HISTORY:      Mr. Guzmán presents to clinic with his wife for f/u allo SCT. He is Day +182. He is doing well overall. He had recurrent hematochezia at  home. He notices this can sometimes be related to his dasatinib use. He has GI consultation scheduled.     He has good appetite. Denies fevers, chills, shortness of breath, chest pain, n/v/d/c. No neuropathy or skin rashes.    Past Medical History, Past Social History and Past Family History have been reviewed and are unchanged except as noted in the interval history.    MEDICATIONS:     Current Outpatient Medications on File Prior to Visit   Medication Sig Dispense Refill    acyclovir (ZOVIRAX) 800 MG Tab Take 1 tablet (800 mg total) by mouth 2 (two) times daily. 60 tablet 11    atovaquone (MEPRON) 750 mg/5 mL Susp Take 10 mLs (1,500 mg total) by mouth once daily. Starting on 11/8/18 300 mL 5    cetirizine (ZYRTEC) 10 MG tablet Take 1 tablet (10 mg total) by mouth once daily. 30 tablet 11    dasatinib (SPRYCEL) 100 mg Take 1 tablet (100 mg total) by mouth once daily. 30 tablet 2    fluticasone (FLONASE) 50 mcg/actuation nasal spray 1 spray (50 mcg total) by Each Nare route once daily. 16 g 2    magnesium oxide (MAG-OX) 400 mg (241.3 mg magnesium) tablet Take 1 tablet (400 mg total) by mouth once daily. 120 tablet 11    potassium, sodium phosphates (PHOS-NAK) 280-160-250 mg PwPk Take 1 packet by mouth 2 (two) times daily. 60 packet 3    amLODIPine (NORVASC) 5 MG tablet Take 2 tablets (10 mg total) by mouth once daily. 60 tablet 11     No current facility-administered medications on file prior to visit.        ALLERGIES: Review of patient's allergies indicates:  No Known Allergies     ROS:     Review of Systems   Constitutional: Negative for fatigue, diaphoresis, fever and unexpected weight change.   HENT:   Negative for lump/mass and sore throat.    Eyes: Negative for icterus.   Respiratory: Negative for cough and shortness of breath.    Cardiovascular: Negative for chest pain and palpitations.   Gastrointestinal: Negative for abdominal distention, constipation, nausea and vomiting. Negative for diarrhea.  "Positive for hematochezia.   Genitourinary: Negative for dysuria and frequency.  Negative for hematuria.   Musculoskeletal: Negative for arthralgias, gait problem and myalgias.   Skin: Negative for rash or itching.   Neurological: Negative for dizziness, gait problem.   Hematological: Negative for adenopathy. Does not bruise/bleed easily.   Psychiatric/Behavioral: Negative for depression or anxiety.    PHYSICAL EXAM:  Vitals:    04/09/19 1048   BP: 117/64   Pulse: 98   Temp: 98.9 °F (37.2 °C)   SpO2: 99%   Weight: 64.5 kg (142 lb 3.2 oz)   Height: 5' 11" (1.803 m)   PainSc: 0-No pain       KARNOFSKY PERFORMANCE STATUS 80%  ECOG 1    Physical Exam   Constitutional: He is oriented to person, place, and time. He appears well-developed and well-nourished. No distress.   HENT:   Head: Normocephalic and atraumatic.   Mouth/Throat: Mucous membranes are normal. No oral lesions.    Eyes: Conjunctivae are normal.   Neck: No thyromegaly present.   Cardiovascular: Normal rate, regular rhythm and normal heart sounds.   No murmur heard.  Pulmonary/Chest: Breath sounds normal. He has no wheezes. He has no rales.   Abdominal: Soft. He exhibits no distension and no mass. There is no splenomegaly or hepatomegaly. There is no tenderness.   Neurological: He is alert and oriented to person, place, and time. He has normal strength. Coordination normal.   Skin: No rash observed.     LAB:   Results for orders placed or performed in visit on 04/09/19   Chimerism Transplant Sorted Cells (Performed at [facility]) Blood   Result Value Ref Range    Specimen Type - Chimerism Sort Blood    Rapid BMT CBC with Diff   Result Value Ref Range    WBC 4.32 3.90 - 12.70 K/uL    RBC 2.44 (L) 4.60 - 6.20 M/uL    Hemoglobin 8.3 (L) 14.0 - 18.0 g/dL    Hematocrit 26.9 (L) 40.0 - 54.0 %     (H) 82 - 98 fL    MCH 34.0 (H) 27.0 - 31.0 pg    MCHC 30.9 (L) 32.0 - 36.0 g/dL    RDW 15.1 (H) 11.5 - 14.5 %    Platelets 209 150 - 350 K/uL    MPV 8.6 (L) " 9.2 - 12.9 fL    Immature Granulocytes 0.5 0.0 - 0.5 %    Gran # (ANC) 1.7 (L) 1.8 - 7.7 K/uL    Immature Grans (Abs) 0.02 0.00 - 0.04 K/uL    Lymph # 1.6 1.0 - 4.8 K/uL    Mono # 0.4 0.3 - 1.0 K/uL    Eos # 0.6 (H) 0.0 - 0.5 K/uL    Baso # 0.04 0.00 - 0.20 K/uL    nRBC 0 0 /100 WBC    Gran% 39.6 38.0 - 73.0 %    Lymph% 37.3 18.0 - 48.0 %    Mono% 9.0 4.0 - 15.0 %    Eosinophil% 12.7 (H) 0.0 - 8.0 %    Basophil% 0.9 0.0 - 1.9 %    Differential Method Automated    Magnesium   Result Value Ref Range    Magnesium 2.0 1.6 - 2.6 mg/dL   Phosphorus   Result Value Ref Range    Phosphorus 3.6 2.7 - 4.5 mg/dL   Comprehensive metabolic panel   Result Value Ref Range    Sodium 145 136 - 145 mmol/L    Potassium 4.2 3.5 - 5.1 mmol/L    Chloride 111 (H) 95 - 110 mmol/L    CO2 29 23 - 29 mmol/L    Glucose 110 70 - 110 mg/dL    BUN, Bld 7 6 - 20 mg/dL    Creatinine 0.8 0.5 - 1.4 mg/dL    Calcium 9.0 8.7 - 10.5 mg/dL    Total Protein 5.7 (L) 6.0 - 8.4 g/dL    Albumin 3.1 (L) 3.5 - 5.2 g/dL    Total Bilirubin 0.2 0.1 - 1.0 mg/dL    Alkaline Phosphatase 67 55 - 135 U/L    AST 27 10 - 40 U/L    ALT 28 10 - 44 U/L    Anion Gap 5 (L) 8 - 16 mmol/L    eGFR if African American >60.0 >60 mL/min/1.73 m^2    eGFR if non African American >60.0 >60 mL/min/1.73 m^2   CMV DNA, quantitative, PCR   Result Value Ref Range    Cytomegalovirus PCR, Quant Undetected Undetected IU/mL   Hallie-Kauffman virus DNA, quantitative   Result Value Ref Range    EBV DNA, PCR Undetected Undetected IU/mL   Type & Screen   Result Value Ref Range    Group & Rh B POS     Indirect Wes NEG        PROBLEMS ASSESSED THIS VISIT:    1. Chronic myeloid leukemia, BCR/ABL-positive, in remission    2. S/P allogeneic bone marrow transplant    3. Chemotherapy follow-up examination    4. Hematochezia        PLAN:        History allo transplant for CML  - See oncology history above  - s/p alloSCT on 10/9/2018  - Neutrophil engraftment on Day +12.   - Currently Day +182  - plan for  +30 & +100 & +1 year restaging  - day +30 BMBX was performed in clinic 11/13/18, no evidence of CML, BCR/ABL 0.005% (MR 4.3). Chimerism studies show 60% donor in CD3+ fraction and 100% donor in CD33+ fraction.   - Repeat chimers from peripheral blood on 12/10/18 shows 80% donor in CD3+ cells and 100% donor in CD33+ cells.  - Restart dasatinib 100 mg daily on 12/10 (no issues any longer with headaches)  - Day 100 Bmbx completed in clinic-- 1/15/18- No morphologic evidence of CML. BCR-ABL transcipt is 0.003%, consistent with MR 4.5. Chimerisms show 100% donor in CD33+ cells and 70% donor in CD3+ cells.   - Repeat chimers from peripheral blood drawn today and pending  - Repeat bcr-abl p210 at upcoming visit    GVHD   - Oral budesonide was discontinued on 3/7/19  - C. Diff negative. Diarrhea improved with imodium. Taking every other day. Pt understands that imodium can cause constipation.  - Continue tacrolimus prophylaxis  - Tacrolimus discontinued   - gvhd charting with each clinic visit; begin cGVHD charting at day +100  - Pending GI eval. If he has colonoscopy, please obtain random biopsies to assess for GVHD and/or CMV     ID  - Completed PO vancomycin for C. Diff  - Continue ppx with acyclovir; can stop fluconazole (3/25/19)  - Begin atovaquone ppx at Day +30  - Monitor CMV BIW; EBV once weekly  - EBV negative to date  - CMV level undetectable last visit; today's level pending. Stopped valganciclovir (3/25/19).   - CVC removed 1/11/19, removal site remains without s/s of infection  - EBV DNA undetectable   - Obtained urinalysis, urine culture, and urine for BK virus - negative    Cytopenias- Leukopenia/Anemia  - multifactorial  - Today:  WBC 4.32, Hgb 8.3, Plt 209k . No transfusions indicated  - Donor has sickle cell trait. Some anemia is expected long-term      FEN/GI  - hypomagnesemia & hypophosphatemia  - Mag 2 today, continue 400 mg daily; closely monitor as now off tacrolimus  - Phos today was 3.6. Continue  neutraphos 1 packet BID    Hematuria  - This is painless hematuria that has been transient.   - U/A today shows microscopic RBCs but no red blood cell casts (per report)  - Differential diagnosis includes intrinsic bladder pathology, ureteral stone, infectious etiology, or cGVHD  - Seen by urology 2/26/19 by Dr. Saucedo; plan for CT urogram and cystoscopy; urine culture positive for group B streptococcus agalactiae, pt given 10 day course of omnicef, started 3/7/19; has now completed (initially had trouble obtaining prescription). Urine cytology negative     Hypertension  - Amlodipine 10 mg daily  - BP stable today    Folliculitis  - Axilla area bilaterally; Resolved after 5 days of doxycycline; has not returned    Follow-up:  - Every other week labs and appts with Dr. Pantoja or NP with CBC, CMP, Mag, Phos, CMV, EBV, type and screen.    Kishor Pantoja MD  Hematology and Stem Cell Transplant

## 2019-04-17 NOTE — PROGRESS NOTES
[facility] Consultation Note    Reason for Consult:  The encounter diagnosis was Hematochezia.    PCP:   Primary Doctor No   [address] / [place] [address]    Referring MD:  General Surgery  [address] [place] [address]  REENA Doherty [id number]    Initial History of Present Illness (HPI):  This is a 33 y.o. male here for evaluation of CML with GVHD s/p allo SCT.    Was given budesonide initially after hospitalization but has been off it for a month.    Having hematochezia after sprycell only when not eating or drinking enough. Is associated with diarrhea. Has happened 2 or 3 times total No bleeding if stools are normal.  No pain    Reflux - no  Dysphagia - no     NSAID usage - none    Interval HPI 04/18/2019:  The patient's last visit with me was on Visit date not found.      ROS:  Constitutional: No fevers, chills, gaining weight  ENT: No allergies  CV: No chest pain  Pulm: No cough, No shortness of breath  Ophtho: No vision changes  GI: see HPI  Derm: No rash  Heme: No lymphadenopathy, No bruising  MSK: No arthritis  : No dysuria, No hematuria  Endo: No hot or cold intolerance  Neuro: No syncope, No seizure  Psych: No anxiety, No depression    Medical History:  has a past medical history of Chronic myelogenous leukemia (CML), BCR-ABL1-positive, Depression, Encounter for blood transfusion, and Secondary hypertension (11/29/2018).    Surgical History:  has a past surgical history that includes Insertion of catheter (N/A, 10/1/2018) and Bone marrow transplant.    Family History: family history is not on file..     Social History:  reports that he has quit smoking. He has quit using smokeless tobacco. He reports that he does not drink alcohol or use drugs.    Review of patient's allergies indicates:  No Known Allergies    Medication List with Changes/Refills   Current Medications    ACYCLOVIR (ZOVIRAX) 800 MG TAB    Take 1 tablet (800 mg total) by mouth 2 (two) times daily.    AMLODIPINE  "(NORVASC) 5 MG TABLET    Take 2 tablets (10 mg total) by mouth once daily.    ATOVAQUONE (MEPRON) 750 MG/5 ML SUSP    Take 10 mLs (1,500 mg total) by mouth once daily. Starting on 11/8/18    CETIRIZINE (ZYRTEC) 10 MG TABLET    Take 1 tablet (10 mg total) by mouth once daily.    DASATINIB (SPRYCEL) 100 MG    Take 1 tablet (100 mg total) by mouth once daily.    FLUTICASONE (FLONASE) 50 MCG/ACTUATION NASAL SPRAY    1 spray (50 mcg total) by Each Nare route once daily.    MAGNESIUM OXIDE (MAG-OX) 400 MG (241.3 MG MAGNESIUM) TABLET    Take 1 tablet (400 mg total) by mouth once daily.    POTASSIUM, SODIUM PHOSPHATES (PHOS-NAK) 280-160-250 MG PWPK    Take 1 packet by mouth 2 (two) times daily.         Objective Findings:    Vital Signs:  /67   Pulse 79   Ht 5' 11" (1.803 m)   Wt 66.5 kg (146 lb 9.7 oz)   BMI 20.45 kg/m²   Body mass index is 20.45 kg/m².    Physical Exam:  General Appearance: Well appearing in no acute distress  Head:   Normocephalic, without obvious abnormality  Eyes:    No scleral icterus, EOMI  ENT: Neck supple, Lips, mucosa, and tongue normal; teeth and gums normal  Lungs: CTA bilaterally in anterior and posterior fields, no wheezes, no crackles.  Heart:  Regular rate and rhythm, S1, S2 normal, no murmurs heard  Abdomen: Soft, non tender, non distended with positive bowel sounds in all four quadrants. No hepatosplenomegaly, ascites, or mass  Extremities: 2+ pulses, no clubbing, cyanosis or edema  Skin: No rash  Neurologic: CN II-XII intact      Labs:  Lab Results   Component Value Date    WBC 4.32 04/09/2019    HGB 8.3 (L) 04/09/2019    HCT 26.9 (L) 04/09/2019     04/09/2019    ALT 28 04/09/2019    AST 27 04/09/2019     04/09/2019    K 4.2 04/09/2019     (H) 04/09/2019    CREATININE 0.8 04/09/2019    BUN 7 04/09/2019    CO2 29 04/09/2019    TSH 0.419 06/27/2018    INR 1.0 01/11/2019           Imaging:    Endoscopy:    None      Assessment:  1. Hematochezia       It appears " the bleeding may be from the dasatinib but is resolving currently as long as he takes med with enough food and water. If it was from GVHD would have expected it to get worse off budesonide and it has actually gotten better.    Recommendations:  1. I think since Hb stable and bleeding not persistent ok to watch for now. If there is a drop in Hb or more persistent bleeding we are happy to proceed with colonoscopy.  2. Can followup with our PA in 4-6 weeks to followup on blood counts and clinical check.    Follow up in about 1 month (around 5/18/2019).      Order summary:         Thank you so much for allowing me to participate in the care of Wilton Pedor MD

## 2019-04-18 ENCOUNTER — OFFICE VISIT (OUTPATIENT)
Dept: GASTROENTEROLOGY | Facility: CLINIC | Age: 34
End: 2019-04-18
Payer: OTHER GOVERNMENT

## 2019-04-18 ENCOUNTER — OFFICE VISIT (OUTPATIENT)
Dept: HEMATOLOGY/ONCOLOGY | Facility: CLINIC | Age: 34
End: 2019-04-18
Payer: OTHER GOVERNMENT

## 2019-04-18 VITALS
WEIGHT: 146.63 LBS | DIASTOLIC BLOOD PRESSURE: 67 MMHG | HEART RATE: 79 BPM | BODY MASS INDEX: 20.53 KG/M2 | SYSTOLIC BLOOD PRESSURE: 112 MMHG | HEIGHT: 71 IN

## 2019-04-18 VITALS
SYSTOLIC BLOOD PRESSURE: 122 MMHG | WEIGHT: 147.5 LBS | DIASTOLIC BLOOD PRESSURE: 74 MMHG | BODY MASS INDEX: 20.65 KG/M2 | OXYGEN SATURATION: 100 % | HEART RATE: 75 BPM | TEMPERATURE: 99 F | HEIGHT: 71 IN

## 2019-04-18 DIAGNOSIS — C92.11 CHRONIC MYELOID LEUKEMIA, BCR/ABL-POSITIVE, IN REMISSION: ICD-10-CM

## 2019-04-18 DIAGNOSIS — D75.9 CYTOPENIA: ICD-10-CM

## 2019-04-18 DIAGNOSIS — E83.42 HYPOMAGNESEMIA: ICD-10-CM

## 2019-04-18 DIAGNOSIS — I15.9 SECONDARY HYPERTENSION: ICD-10-CM

## 2019-04-18 DIAGNOSIS — Z94.81 S/P ALLOGENEIC BONE MARROW TRANSPLANT: Primary | ICD-10-CM

## 2019-04-18 DIAGNOSIS — K92.1 HEMATOCHEZIA: Primary | ICD-10-CM

## 2019-04-18 PROCEDURE — 99215 OFFICE O/P EST HI 40 MIN: CPT | Mod: S$PBB,,, | Performed by: NURSE PRACTITIONER

## 2019-04-18 PROCEDURE — 99213 OFFICE O/P EST LOW 20 MIN: CPT | Mod: PBBFAC,27 | Performed by: NURSE PRACTITIONER

## 2019-04-18 PROCEDURE — 99999 PR PBB SHADOW E&M-EST. PATIENT-LVL III: CPT | Mod: PBBFAC,,, | Performed by: INTERNAL MEDICINE

## 2019-04-18 PROCEDURE — 99204 OFFICE O/P NEW MOD 45 MIN: CPT | Mod: S$PBB,,, | Performed by: INTERNAL MEDICINE

## 2019-04-18 PROCEDURE — 99213 OFFICE O/P EST LOW 20 MIN: CPT | Mod: PBBFAC,25 | Performed by: INTERNAL MEDICINE

## 2019-04-18 PROCEDURE — 99999 PR PBB SHADOW E&M-EST. PATIENT-LVL III: CPT | Mod: PBBFAC,,, | Performed by: NURSE PRACTITIONER

## 2019-04-18 PROCEDURE — 99999 PR PBB SHADOW E&M-EST. PATIENT-LVL III: ICD-10-PCS | Mod: PBBFAC,,, | Performed by: INTERNAL MEDICINE

## 2019-04-18 PROCEDURE — 99204 PR OFFICE/OUTPT VISIT, NEW, LEVL IV, 45-59 MIN: ICD-10-PCS | Mod: S$PBB,,, | Performed by: INTERNAL MEDICINE

## 2019-04-18 PROCEDURE — 99999 PR PBB SHADOW E&M-EST. PATIENT-LVL III: ICD-10-PCS | Mod: PBBFAC,,, | Performed by: NURSE PRACTITIONER

## 2019-04-18 PROCEDURE — 99215 PR OFFICE/OUTPT VISIT, EST, LEVL V, 40-54 MIN: ICD-10-PCS | Mod: S$PBB,,, | Performed by: NURSE PRACTITIONER

## 2019-04-18 NOTE — LETTER
April 18, 2019      General Surgery  [address]  [facility] [address]           [facility] - Gastroenterology  [address]  [facility] [address]  Phone: [phone]  Fax: [phone]          Patient: Wilton Guzmán   MR Number: [id number]   YOB: 1985   Date of Visit: 4/18/2019       Dear General Surgery:    Thank you for referring Wilton Guzmán to me for evaluation. Attached you will find relevant portions of my assessment and plan of care.    If you have questions, please do not hesitate to call me. I look forward to following Wilton Guzmán along with you.    Sincerely,    Clayton Pedro MD    Enclosure  CC:  No Recipients    If you would like to receive this communication electronically, please contact [email or web address] or [phone] to request more information on [employer] access.    For providers and/or their staff who would like to refer a patient to [facility], please contact us through our one-stop-shop provider referral line, Alexus Covarrubias, at [phone].    If you feel you have received this communication in error or would no longer like to receive these types of communications, please e-mail [email or web address]

## 2019-04-18 NOTE — PROGRESS NOTES
HEMATOLOGIC MALIGNANCIES PROGRESS NOTE    IDENTIFYING STATEMENT   Wilton Guzmán (Wilton) is a 33 y.o. male with a  of 1985 from [place], [place] with the diagnosis of CML in blast phase.      ONCOLOGY HISTORY:    1. Chronic myeloid leukemia, initially presenting in blast phase   A. 2018: Began developing left-sided abdominal pain - eventually requiring evaluation - WBC ~150 at  base; transferred to [facility] in [place], [place]   B. BMBx showed 20% myelomonoblasts concerning for CML in blast phase; bcr-abl positive (returned for Day 8 of induction therapy).    C. 2018: Began 7+3 induction chemotherapy. Began dasatinib (dose-adjusted) on day 8.   D. 2018: Transferred to [facility]   E. 2018: BMBx showed variably cellular marrow (10-40%) with 9% CD34+ blasts, concerning for residual disease.   F. 2018: Reinduction with MEC (mitoxantrone, etoposide, cytarabine) chemotherapy.    G. 2018: BMBx shows hypocellular bone marrow with no definite morphologic or immunophenotypic evidence of residual leukemia; bcr-abl p210 transcript was detected at 0.5%   H. Delay in clinic follow-up due to challenges with  insurance   I. 2018: Follow-up in clinic; WBC 6.77 (normal diff); Hgb 11.2 g/dl; Plt 298; BMBx shows 30% cellular marrow with no morphologic evidence of residual leukemia; bcr-abl p210 detected at 0.05% on international scale (MR 3.3).    J. 2018: BMBx shows 40% cellular marrow with no morphologic evidence of leukemia. Chromosomes 46, XY. Bcr-abl p210 detected at 0.1% on international scale (MR 3.0).    K. 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 Engrafted on Day +12. Course complicated by C. Difficile colitis.     INTERVAL HISTORY:      Mr. Guzmán presents to clinic with his wife for f/u allo SCT. He is Day +191. He is doing well overall. He continues to have hematochezia  intermittently. He notices this can sometimes be related to his dasatinib use. Pt will continue to eat well and hydrate with spyrcel, as when he does not, he notices the hematochezia. He has good appetite. He had GI consultation today and they want to continue to monitor, no colonoscopy indicated.  Denies fevers, chills, shortness of breath, chest pain, n/v/d/c. No neuropathy or skin rashes.    Past Medical History, Past Social History and Past Family History have been reviewed and are unchanged except as noted in the interval history.    MEDICATIONS:     Current Outpatient Medications on File Prior to Visit   Medication Sig Dispense Refill    acyclovir (ZOVIRAX) 800 MG Tab Take 1 tablet (800 mg total) by mouth 2 (two) times daily. 60 tablet 11    amLODIPine (NORVASC) 5 MG tablet Take 2 tablets (10 mg total) by mouth once daily. 60 tablet 11    atovaquone (MEPRON) 750 mg/5 mL Susp Take 10 mLs (1,500 mg total) by mouth once daily. Starting on 11/8/18 300 mL 5    cetirizine (ZYRTEC) 10 MG tablet Take 1 tablet (10 mg total) by mouth once daily. 30 tablet 11    dasatinib (SPRYCEL) 100 mg Take 1 tablet (100 mg total) by mouth once daily. 30 tablet 2    fluticasone (FLONASE) 50 mcg/actuation nasal spray 1 spray (50 mcg total) by Each Nare route once daily. 16 g 2    magnesium oxide (MAG-OX) 400 mg (241.3 mg magnesium) tablet Take 1 tablet (400 mg total) by mouth once daily. 120 tablet 11    potassium, sodium phosphates (PHOS-NAK) 280-160-250 mg PwPk Take 1 packet by mouth 2 (two) times daily. 60 packet 3     No current facility-administered medications on file prior to visit.        Review of patient's allergies indicates:  No Known Allergies    Review of Systems   Constitutional: Negative for fatigue, diaphoresis, fever and unexpected weight change.   HENT:   Negative for lump/mass and sore throat.    Eyes: Negative for icterus.   Respiratory: Negative for cough and shortness of breath.    Cardiovascular:  "Negative for chest pain and palpitations.   Gastrointestinal: Negative for abdominal distention, constipation, nausea and vomiting. Negative for diarrhea. Positive for hematochezia.   Genitourinary: Negative for dysuria and frequency.  Negative for hematuria.   Musculoskeletal: Negative for arthralgias, gait problem and myalgias.   Skin: Negative for rash or itching.   Neurological: Negative for dizziness, gait problem.   Hematological: Negative for adenopathy. Does not bruise/bleed easily.   Psychiatric/Behavioral: Negative for depression or anxiety.    Vitals:    04/18/19 1504   BP: 122/74   Pulse: 75   Temp: 98.8 °F (37.1 °C)   SpO2: 100%   Weight: 66.9 kg (147 lb 7.8 oz)   Height: 5' 11" (1.803 m)   PainSc: 0-No pain       KARNOFSKY PERFORMANCE STATUS 80%  ECOG 1    Physical Exam   Constitutional: He is oriented to person, place, and time. He appears well-developed and well-nourished. No distress.   HENT:   Head: Normocephalic and atraumatic.   Mouth/Throat: Mucous membranes are normal. No oral lesions.    Eyes: Conjunctivae are normal.   Neck: No thyromegaly present.   Cardiovascular: Normal rate, regular rhythm and normal heart sounds.   No murmur heard.  Pulmonary/Chest: Breath sounds normal. He has no wheezes. He has no rales.   Abdominal: Soft. He exhibits no distension and no mass. There is no splenomegaly or hepatomegaly. There is no tenderness.   Neurological: He is alert and oriented to person, place, and time. He has normal strength. Coordination normal.   Skin: No rash observed.     LAB:   Results for orders placed or performed in visit on 04/18/19   Rapid BMT CBC with Diff   Result Value Ref Range    WBC 3.35 (L) 3.90 - 12.70 K/uL    RBC 2.54 (L) 4.60 - 6.20 M/uL    Hemoglobin 8.6 (L) 14.0 - 18.0 g/dL    Hematocrit 29.0 (L) 40.0 - 54.0 %     (H) 82 - 98 fL    MCH 33.9 (H) 27.0 - 31.0 pg    MCHC 29.7 (L) 32.0 - 36.0 g/dL    RDW 15.3 (H) 11.5 - 14.5 %    Platelets 188 150 - 350 K/uL    MPV 9.2 " 9.2 - 12.9 fL    Immature Granulocytes 0.3 0.0 - 0.5 %    Gran # (ANC) 1.6 (L) 1.8 - 7.7 K/uL    Immature Grans (Abs) 0.01 0.00 - 0.04 K/uL    Lymph # 0.9 (L) 1.0 - 4.8 K/uL    Mono # 0.4 0.3 - 1.0 K/uL    Eos # 0.6 (H) 0.0 - 0.5 K/uL    Baso # 0.01 0.00 - 0.20 K/uL    nRBC 0 0 /100 WBC    Gran% 46.6 38.0 - 73.0 %    Lymph% 25.4 18.0 - 48.0 %    Mono% 10.4 4.0 - 15.0 %    Eosinophil% 17.0 (H) 0.0 - 8.0 %    Basophil% 0.3 0.0 - 1.9 %    Differential Method Automated    Magnesium   Result Value Ref Range    Magnesium 1.8 1.6 - 2.6 mg/dL   Phosphorus   Result Value Ref Range    Phosphorus 3.8 2.7 - 4.5 mg/dL   Comprehensive metabolic panel   Result Value Ref Range    Sodium 143 136 - 145 mmol/L    Potassium 4.2 3.5 - 5.1 mmol/L    Chloride 108 95 - 110 mmol/L    CO2 29 23 - 29 mmol/L    Glucose 89 70 - 110 mg/dL    BUN, Bld 9 6 - 20 mg/dL    Creatinine 0.8 0.5 - 1.4 mg/dL    Calcium 9.3 8.7 - 10.5 mg/dL    Total Protein 6.0 6.0 - 8.4 g/dL    Albumin 3.3 (L) 3.5 - 5.2 g/dL    Total Bilirubin 0.3 0.1 - 1.0 mg/dL    Alkaline Phosphatase 68 55 - 135 U/L    AST 32 10 - 40 U/L    ALT 35 10 - 44 U/L    Anion Gap 6 (L) 8 - 16 mmol/L    eGFR if African American >60.0 >60 mL/min/1.73 m^2    eGFR if non African American >60.0 >60 mL/min/1.73 m^2   BCR/ABL, p210, Quant, Monitor, blood   Result Value Ref Range    Specimen Type, p210, Quant, bld Blood        PROBLEMS ASSESSED THIS VISIT:    1. S/P allogeneic bone marrow transplant    2. Chronic myeloid leukemia, BCR/ABL-positive, in remission    3. Cytopenia    4. Hypomagnesemia    5. Secondary hypertension        PLAN:        History allo transplant for CML  - See oncology history above  - S/p alloSCT on 10/9/2018  - Neutrophil engraftment on Day +12.   - Currently Day +191  - Plan for +30 & +100 & +1 year restaging  - day +30 BMBX was performed in clinic 11/13/18, no evidence of CML, BCR/ABL 0.005% (MR 4.3). Chimerism studies show 60% donor in CD3+ fraction and 100% donor in CD33+  fraction.   - Repeat chimers from peripheral blood on 12/10/18 shows 80% donor in CD3+ cells and 100% donor in CD33+ cells.  - Restart dasatinib 100 mg daily on 12/10 (no issues any longer with headaches)  - Day 100 Bmbx completed in clinic-- 1/15/18- No morphologic evidence of CML. BCR-ABL transcipt is 0.003%, consistent with MR 4.5. Chimerisms show 100% donor in CD33+ cells and 70% donor in CD3+ cells.   - Repeat chimers from peripheral blood drawn 4/9/19, pending  - Repeat bcr-abl p210 drawn 4/18/19, pending    GVHD   - Oral budesonide was discontinued on 3/7/19  - C. Diff negative. Diarrhea improved with imodium. Taking every other day. Pt understands that imodium can cause constipation.  - Continue tacrolimus prophylaxis  - Tacrolimus discontinued   - GVHD charting with each clinic visit    Hematochezia  - GI eval done 4/18/19, pending note. Informed by pt that GI does not feel need to perform colonoscopy. Will continue to watch symptoms. Continue to hydrate and eat well while on sprycel to avoid symptoms  - If colonoscopy needed in future, will need to obtain random biopsies to assess for GVHD and/or CMV      ID  - Completed PO vancomycin for C. Diff  - Continue ppx with acyclovir; can stop fluconazole (3/25/19)  - Begin atovaquone ppx at Day +30  - Monitor CMV BIW; EBV once weekly  - EBV negative to date  - CMV level undetectable last visit; today's level pending. Stopped valganciclovir (3/25/19).   - CVC removed 1/11/19, removal site remains without s/s of infection  - EBV DNA undetectable   - Obtained urinalysis, urine culture, and urine for BK virus - negative    Cytopenias- Anemia and slight leukopenia  - multifactorial  - Today:  WBC 3.35, Hgb 8.6, Plt normal. No transfusions indicated  - Donor has sickle cell trait. Some anemia is expected long-term      FEN/GI  - hypomagnesemia & hypophosphatemia hx now both resolved  - Mag normal today, stopped replacement 4/18/19  - Phos normal today, pt has not been  taking neutraphos, will d/c 4/18/19    Hematuria (resolved)  - This is painless hematuria that has been transient.   - U/A today shows microscopic RBCs but no red blood cell casts (per report)  - Differential diagnosis includes intrinsic bladder pathology, ureteral stone, infectious etiology, or cGVHD  - Seen by urology 2/26/19 by Dr. Saucedo; plan for CT urogram and cystoscopy; urine culture positive for group B streptococcus agalactiae, pt given 10 day course of omnicef, started 3/7/19; has now completed (initially had trouble obtaining prescription). Urine cytology negative     Hypertension  - Amlodipine 10 mg daily  - BP stable today    Folliculitis (resolved)   - Axilla area bilaterally; Resolved after 5 days of doxycycline; has not returned    Follow-up:  - Every other week labs and appts with Dr. Pantoja or NP with CBC, CMP, Mag, Phos, CMV, EBV, type and screen.    Yoanna Andrade NP  Hematology and Stem Cell Transplant

## 2019-04-19 LAB
FINAL DIAGNOSIS - CHIMERISM SORT: NORMAL
SPECIMEN TYPE -CHIMERISM SORT: NORMAL

## 2019-05-02 ENCOUNTER — OFFICE VISIT (OUTPATIENT)
Dept: HEMATOLOGY/ONCOLOGY | Facility: CLINIC | Age: 34
End: 2019-05-02
Payer: OTHER GOVERNMENT

## 2019-05-02 ENCOUNTER — LAB VISIT (OUTPATIENT)
Dept: LAB | Facility: HOSPITAL | Age: 34
End: 2019-05-02
Attending: INTERNAL MEDICINE
Payer: OTHER GOVERNMENT

## 2019-05-02 VITALS
WEIGHT: 142.63 LBS | HEIGHT: 71 IN | DIASTOLIC BLOOD PRESSURE: 64 MMHG | TEMPERATURE: 99 F | OXYGEN SATURATION: 100 % | HEART RATE: 80 BPM | SYSTOLIC BLOOD PRESSURE: 114 MMHG | BODY MASS INDEX: 19.97 KG/M2

## 2019-05-02 DIAGNOSIS — B25.9 CYTOMEGALOVIRUS (CMV) VIREMIA: ICD-10-CM

## 2019-05-02 DIAGNOSIS — C92.11 CHRONIC MYELOID LEUKEMIA, BCR/ABL-POSITIVE, IN REMISSION: ICD-10-CM

## 2019-05-02 DIAGNOSIS — Z94.81 S/P ALLOGENEIC BONE MARROW TRANSPLANT: ICD-10-CM

## 2019-05-02 DIAGNOSIS — C92.10 CML (CHRONIC MYELOCYTIC LEUKEMIA): ICD-10-CM

## 2019-05-02 DIAGNOSIS — E83.42 HYPOMAGNESEMIA: ICD-10-CM

## 2019-05-02 DIAGNOSIS — C92.11 CHRONIC MYELOID LEUKEMIA, BCR/ABL-POSITIVE, IN REMISSION: Primary | ICD-10-CM

## 2019-05-02 DIAGNOSIS — D57.3 SICKLE CELL TRAIT: ICD-10-CM

## 2019-05-02 LAB
ABO + RH BLD: NORMAL
ALBUMIN SERPL BCP-MCNC: 3.1 G/DL (ref 3.5–5.2)
ALP SERPL-CCNC: 93 U/L (ref 55–135)
ALT SERPL W/O P-5'-P-CCNC: 42 U/L (ref 10–44)
ANION GAP SERPL CALC-SCNC: 6 MMOL/L (ref 8–16)
AST SERPL-CCNC: 36 U/L (ref 10–40)
BASOPHILS # BLD AUTO: 0.04 K/UL (ref 0–0.2)
BASOPHILS NFR BLD: 0.8 % (ref 0–1.9)
BILIRUB SERPL-MCNC: 0.3 MG/DL (ref 0.1–1)
BLD GP AB SCN CELLS X3 SERPL QL: NORMAL
BUN SERPL-MCNC: 10 MG/DL (ref 6–20)
CALCIUM SERPL-MCNC: 8.9 MG/DL (ref 8.7–10.5)
CHLORIDE SERPL-SCNC: 107 MMOL/L (ref 95–110)
CO2 SERPL-SCNC: 27 MMOL/L (ref 23–29)
CREAT SERPL-MCNC: 0.9 MG/DL (ref 0.5–1.4)
DIFFERENTIAL METHOD: ABNORMAL
EOSINOPHIL # BLD AUTO: 0.6 K/UL (ref 0–0.5)
EOSINOPHIL NFR BLD: 13.3 % (ref 0–8)
ERYTHROCYTE [DISTWIDTH] IN BLOOD BY AUTOMATED COUNT: 14.6 % (ref 11.5–14.5)
EST. GFR  (AFRICAN AMERICAN): >60 ML/MIN/1.73 M^2
EST. GFR  (NON AFRICAN AMERICAN): >60 ML/MIN/1.73 M^2
GLUCOSE SERPL-MCNC: 91 MG/DL (ref 70–110)
HCT VFR BLD AUTO: 30.8 % (ref 40–54)
HGB BLD-MCNC: 9.7 G/DL (ref 14–18)
IMM GRANULOCYTES # BLD AUTO: 0.01 K/UL (ref 0–0.04)
IMM GRANULOCYTES NFR BLD AUTO: 0.2 % (ref 0–0.5)
LYMPHOCYTES # BLD AUTO: 1.6 K/UL (ref 1–4.8)
LYMPHOCYTES NFR BLD: 33.5 % (ref 18–48)
MAGNESIUM SERPL-MCNC: 1.8 MG/DL (ref 1.6–2.6)
MCH RBC QN AUTO: 32.4 PG (ref 27–31)
MCHC RBC AUTO-ENTMCNC: 31.5 G/DL (ref 32–36)
MCV RBC AUTO: 103 FL (ref 82–98)
MONOCYTES # BLD AUTO: 0.5 K/UL (ref 0.3–1)
MONOCYTES NFR BLD: 9.6 % (ref 4–15)
NEUTROPHILS # BLD AUTO: 2.1 K/UL (ref 1.8–7.7)
NEUTROPHILS NFR BLD: 42.6 % (ref 38–73)
NRBC BLD-RTO: 0 /100 WBC
PHOSPHATE SERPL-MCNC: 3.2 MG/DL (ref 2.7–4.5)
PLATELET # BLD AUTO: 196 K/UL (ref 150–350)
PMV BLD AUTO: 8.9 FL (ref 9.2–12.9)
POTASSIUM SERPL-SCNC: 3.8 MMOL/L (ref 3.5–5.1)
PROT SERPL-MCNC: 5.5 G/DL (ref 6–8.4)
RBC # BLD AUTO: 2.99 M/UL (ref 4.6–6.2)
SODIUM SERPL-SCNC: 140 MMOL/L (ref 136–145)
WBC # BLD AUTO: 4.81 K/UL (ref 3.9–12.7)

## 2019-05-02 PROCEDURE — 84100 ASSAY OF PHOSPHORUS: CPT

## 2019-05-02 PROCEDURE — 87799 DETECT AGENT NOS DNA QUANT: CPT

## 2019-05-02 PROCEDURE — 80053 COMPREHEN METABOLIC PANEL: CPT

## 2019-05-02 PROCEDURE — 85025 COMPLETE CBC W/AUTO DIFF WBC: CPT

## 2019-05-02 PROCEDURE — 86850 RBC ANTIBODY SCREEN: CPT

## 2019-05-02 PROCEDURE — 83735 ASSAY OF MAGNESIUM: CPT

## 2019-05-02 PROCEDURE — 99215 OFFICE O/P EST HI 40 MIN: CPT | Mod: S$PBB,,, | Performed by: INTERNAL MEDICINE

## 2019-05-02 PROCEDURE — 99999 PR PBB SHADOW E&M-EST. PATIENT-LVL III: CPT | Mod: PBBFAC,,, | Performed by: INTERNAL MEDICINE

## 2019-05-02 PROCEDURE — 99213 OFFICE O/P EST LOW 20 MIN: CPT | Mod: PBBFAC,25 | Performed by: INTERNAL MEDICINE

## 2019-05-02 PROCEDURE — 99215 PR OFFICE/OUTPT VISIT, EST, LEVL V, 40-54 MIN: ICD-10-PCS | Mod: S$PBB,,, | Performed by: INTERNAL MEDICINE

## 2019-05-02 PROCEDURE — 99999 PR PBB SHADOW E&M-EST. PATIENT-LVL III: ICD-10-PCS | Mod: PBBFAC,,, | Performed by: INTERNAL MEDICINE

## 2019-05-04 LAB — CMV DNA SERPL NAA+PROBE-ACNC: 193 IU/ML

## 2019-05-07 LAB — EBV DNA BY PCR: NORMAL IU/ML

## 2019-05-08 NOTE — PROGRESS NOTES
HEMATOLOGIC MALIGNANCIES PROGRESS NOTE    IDENTIFYING STATEMENT   Wilton Guzmán (Wilton) is a 33 y.o. 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      ONCOLOGY HISTORY:    1. Chronic myeloid leukemia, initially presenting in blast phase   A. 2018: Began developing left-sided abdominal pain - eventually requiring evaluation - WBC ~150 at  base; transferred to [facility] in [place], [place]   B. BMBx showed 20% myelomonoblasts concerning for CML in blast phase; bcr-abl positive (returned for Day 8 of induction therapy).    C. 2018: Began 7+3 induction chemotherapy. Began dasatinib (dose-adjusted) on day 8.   D. 2018: Transferred to [facility]   E. 2018: BMBx showed variably cellular marrow (10-40%) with 9% CD34+ blasts, concerning for residual disease.   F. 2018: Reinduction with MEC (mitoxantrone, etoposide, cytarabine) chemotherapy.    G. 2018: BMBx shows hypocellular bone marrow with no definite morphologic or immunophenotypic evidence of residual leukemia; bcr-abl p210 transcript was detected at 0.5%   H. Delay in clinic follow-up due to challenges with  insurance   I. 2018: Follow-up in clinic; WBC 6.77 (normal diff); Hgb 11.2 g/dl; Plt 298; BMBx shows 30% cellular marrow with no morphologic evidence of residual leukemia; bcr-abl p210 detected at 0.05% on international scale (MR 3.3).    J. 2018: BMBx shows 40% cellular marrow with no morphologic evidence of leukemia. Chromosomes 46, XY. Bcr-abl p210 detected at 0.1% on international scale (MR 3.0).    K. 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 Engrafted on Day +12. Course complicated by C. Difficile colitis.     INTERVAL HISTORY:      Mr. Guzmán presents to clinic with his wife for f/u allo SCT. He is Day +205. He is doing well overall. He no longer has hematochezia.  He  has good appetite.  Denies fevers, chills, shortness of breath, chest pain, n/v/d/c. No neuropathy or skin rashes.    Past Medical History, Past Social History and Past Family History have been reviewed and are unchanged except as noted in the interval history.    MEDICATIONS:     Current Outpatient Medications on File Prior to Visit   Medication Sig Dispense Refill    acyclovir (ZOVIRAX) 800 MG Tab Take 1 tablet (800 mg total) by mouth 2 (two) times daily. 60 tablet 11    cetirizine (ZYRTEC) 10 MG tablet Take 1 tablet (10 mg total) by mouth once daily. 30 tablet 11    dasatinib (SPRYCEL) 100 mg Take 1 tablet (100 mg total) by mouth once daily. 30 tablet 2    fluticasone (FLONASE) 50 mcg/actuation nasal spray 1 spray (50 mcg total) by Each Nare route once daily. 16 g 2    amLODIPine (NORVASC) 5 MG tablet Take 2 tablets (10 mg total) by mouth once daily. 60 tablet 11     No current facility-administered medications on file prior to visit.        Review of patient's allergies indicates:  No Known Allergies    Review of Systems   Constitutional: Negative for fatigue, diaphoresis, fever and unexpected weight change.   HENT:   Negative for lump/mass and sore throat.    Eyes: Negative for icterus.   Respiratory: Negative for cough and shortness of breath.    Cardiovascular: Negative for chest pain and palpitations.   Gastrointestinal: Negative for abdominal distention, constipation, nausea and vomiting. Negative for diarrhea. Positive for hematochezia.   Genitourinary: Negative for dysuria and frequency.  Negative for hematuria.   Musculoskeletal: Negative for arthralgias, gait problem and myalgias.   Skin: Negative for rash or itching.   Neurological: Negative for dizziness, gait problem.   Hematological: Negative for adenopathy. Does not bruise/bleed easily.   Psychiatric/Behavioral: Negative for depression or anxiety.    Vitals:    05/02/19 0916   BP: 114/64   Pulse: 80   Temp: 98.8 °F (37.1 °C)   SpO2: 100%  "  Weight: 64.7 kg (142 lb 10.2 oz)   Height: 5' 11" (1.803 m)   PainSc: 0-No pain       KARNOFSKY PERFORMANCE STATUS 80%  ECOG 1    Physical Exam   Constitutional: He is oriented to person, place, and time. He appears well-developed and well-nourished. No distress.   HENT:   Head: Normocephalic and atraumatic.   Mouth/Throat: Mucous membranes are normal. No oral lesions.    Eyes: Conjunctivae are normal.   Neck: No thyromegaly present.   Cardiovascular: Normal rate, regular rhythm and normal heart sounds.   No murmur heard.  Pulmonary/Chest: Breath sounds normal. He has no wheezes. He has no rales.   Abdominal: Soft. He exhibits no distension and no mass. There is no splenomegaly or hepatomegaly. There is no tenderness.   Neurological: He is alert and oriented to person, place, and time. He has normal strength. Coordination normal.   Skin: No rash observed.     LAB:   Results for orders placed or performed in visit on 05/02/19   Rapid BMT CBC with Diff   Result Value Ref Range    WBC 4.81 3.90 - 12.70 K/uL    RBC 2.99 (L) 4.60 - 6.20 M/uL    Hemoglobin 9.7 (L) 14.0 - 18.0 g/dL    Hematocrit 30.8 (L) 40.0 - 54.0 %    Mean Corpuscular Volume 103 (H) 82 - 98 fL    Mean Corpuscular Hemoglobin 32.4 (H) 27.0 - 31.0 pg    Mean Corpuscular Hemoglobin Conc 31.5 (L) 32.0 - 36.0 g/dL    RDW 14.6 (H) 11.5 - 14.5 %    Platelets 196 150 - 350 K/uL    MPV 8.9 (L) 9.2 - 12.9 fL    Immature Granulocytes 0.2 0.0 - 0.5 %    Gran # (ANC) 2.1 1.8 - 7.7 K/uL    Immature Grans (Abs) 0.01 0.00 - 0.04 K/uL    Lymph # 1.6 1.0 - 4.8 K/uL    Mono # 0.5 0.3 - 1.0 K/uL    Eos # 0.6 (H) 0.0 - 0.5 K/uL    Baso # 0.04 0.00 - 0.20 K/uL    nRBC 0 0 /100 WBC    Gran% 42.6 38.0 - 73.0 %    Lymph% 33.5 18.0 - 48.0 %    Mono% 9.6 4.0 - 15.0 %    Eosinophil% 13.3 (H) 0.0 - 8.0 %    Basophil% 0.8 0.0 - 1.9 %    Differential Method Automated    Magnesium   Result Value Ref Range    Magnesium 1.8 1.6 - 2.6 mg/dL   Phosphorus   Result Value Ref Range    " Phosphorus 3.2 2.7 - 4.5 mg/dL   Comprehensive metabolic panel   Result Value Ref Range    Sodium 140 136 - 145 mmol/L    Potassium 3.8 3.5 - 5.1 mmol/L    Chloride 107 95 - 110 mmol/L    CO2 27 23 - 29 mmol/L    Glucose 91 70 - 110 mg/dL    BUN, Bld 10 6 - 20 mg/dL    Creatinine 0.9 0.5 - 1.4 mg/dL    Calcium 8.9 8.7 - 10.5 mg/dL    Total Protein 5.5 (L) 6.0 - 8.4 g/dL    Albumin 3.1 (L) 3.5 - 5.2 g/dL    Total Bilirubin 0.3 0.1 - 1.0 mg/dL    Alkaline Phosphatase 93 55 - 135 U/L    AST 36 10 - 40 U/L    ALT 42 10 - 44 U/L    Anion Gap 6 (L) 8 - 16 mmol/L    eGFR if African American >60.0 >60 mL/min/1.73 m^2    eGFR if non African American >60.0 >60 mL/min/1.73 m^2   CMV DNA, quantitative, PCR   Result Value Ref Range    Cytomegalovirus PCR, Quant 193 (A) Undetected IU/mL   Hallie-Kauffman virus DNA, quantitative   Result Value Ref Range    EBV DNA, PCR Undetected Undetected IU/mL   Type & Screen   Result Value Ref Range    Group & Rh B POS     Indirect Wes NEG        PROBLEMS ASSESSED THIS VISIT:    1. Chronic myeloid leukemia, BCR/ABL-positive, in remission    2. S/P allogeneic bone marrow transplant    3. Sickle cell trait    4. Hypomagnesemia    5. Cytomegalovirus (CMV) viremia        PLAN:        History allo transplant for CML  - See oncology history above  - S/p alloSCT on 10/9/2018  - Neutrophil engraftment on Day +12.   - Currently Day +205  - Plan for +30 & +100 & +1 year restaging  - day +30 BMBX was performed in clinic 11/13/18, no evidence of CML, BCR/ABL 0.005% (MR 4.3). Chimerism studies show 60% donor in CD3+ fraction and 100% donor in CD33+ fraction.   - Repeat chimers from peripheral blood on 12/10/18 shows 80% donor in CD3+ cells and 100% donor in CD33+ cells.  - Restart dasatinib 100 mg daily on 12/10 (no issues any longer with headaches)  - Day 100 Bmbx completed in clinic-- 1/15/18- No morphologic evidence of CML. BCR-ABL transcipt is 0.003%, consistent with MR 4.5. Chimerisms show 100% donor  in CD33+ cells and 70% donor in CD3+ cells.   - Repeat chimers from peripheral blood drawn 4/9/19, pending  - Repeat bcr-abl p210 drawn 4/18/19, negative, consistent with complete molecular remission (CMR)    GVHD   - Oral budesonide was discontinued on 3/7/19  - C. Diff negative. Diarrhea improved with imodium. Taking every other day. Pt understands that imodium can cause constipation.  - Continue tacrolimus prophylaxis  - Tacrolimus discontinued   - GVHD charting with each clinic visit    Hematochezia  - resolved; this appears to be related to how he takes dasatinib     ID  - Completed PO vancomycin for C. Diff  - Continue ppx with acyclovir; can stop fluconazole (3/25/19)  - Begin atovaquone ppx at Day +30  - Monitor CMV BIW; EBV once weekly  - EBV negative to date  - CMV level <35 last visit; today's level 193. Stopped valganciclovir (3/25/19).   - CVC removed 1/11/19, removal site remains without s/s of infection  - EBV DNA undetectable   - Obtained urinalysis, urine culture, and urine for BK virus - negative    Cytopenias- Anemia and slight leukopenia  - multifactorial  - Today:  WBC 4.81, Hgb 9.7, Plt normal. No transfusions indicated  - Donor has sickle cell trait. Some anemia is expected long-term      FEN/GI  - hypomagnesemia & hypophosphatemia hx now both resolved  - Mag normal today, stopped replacement 4/18/19  - Phos normal today, pt has not been taking neutraphos, will d/c 4/18/19    Hematuria (resolved)  - This is painless hematuria that has been transient.   - U/A today shows microscopic RBCs but no red blood cell casts (per report)  - Differential diagnosis includes intrinsic bladder pathology, ureteral stone, infectious etiology, or cGVHD  - Seen by urology 2/26/19 by Dr. Saucedo; plan for CT urogram and cystoscopy; urine culture positive for group B streptococcus agalactiae, pt given 10 day course of omnicef, started 3/7/19; has now completed (initially had trouble obtaining prescription). Urine  cytology negative     Hypertension  - Amlodipine 10 mg daily  - BP stable today    Folliculitis (resolved)   - Axilla area bilaterally; Resolved after 5 days of doxycycline; has not returned    Follow-up:  - Monthly  labs and appts with Dr. Pantoja or NP with CBC, CMP, Mag, Phos, CMV, EBV, type and screen.    Kishor Pantoja MD  Hematology and Stem Cell Transplant

## 2019-05-17 LAB
HLA DRB4 1: NORMAL
HLA SSO DNA TYPING CLASS I & II INTERPRETATION: NORMAL
HLA-A 1 SERO. EQUIV: 2
HLA-A 1: NORMAL
HLA-A 2 SERO. EQUIV: 30
HLA-A 2: NORMAL
HLA-B 1 SERO. EQUIV: 42
HLA-B 1: NORMAL
HLA-B 2 SERO. EQUIV: 44
HLA-B 2: NORMAL
HLA-BW 1 SERO. EQUIV: 6
HLA-BW 2 SERO. EQUIV: 4
HLA-C 1: NORMAL
HLA-C 2: NORMAL
HLA-CW 1 SERO. EQUIV: 5
HLA-CW 2 SERO. EQUIV: 17
HLA-DQ 1 SERO. EQUIV: 4
HLA-DQ 2 SERO. EQUIV: NORMAL
HLA-DQB1 1: NORMAL
HLA-DQB1 2: NORMAL
HLA-DRB1 1 SERO. EQUIV: 18
HLA-DRB1 1: NORMAL
HLA-DRB1 2 SERO. EQUIV: 8
HLA-DRB1 2: NORMAL
HLA-DRB3 1: NORMAL
HLA-DRB3 2: NORMAL
HLA-DRB345 1 SERO. EQUIV: 52
HLA-DRB345 2 SERO. EQUIV: NORMAL
HLA-DRB4 2: NORMAL
HLA-DRB5 1: NORMAL
HLA-DRB5 2: NORMAL
HLATY INTERPRETATION: NORMAL
SSDQB TESTING DATE: NORMAL
SSDRB TESTING DATE: NORMAL
SSOA TESTING DATE: NORMAL
SSOB TESTING DATE: NORMAL
SSOC TESTING DATE: NORMAL
SSODR TESTING DATE: NORMAL
TYSSO TESTING DATE: NORMAL

## 2019-05-30 ENCOUNTER — PATIENT MESSAGE (OUTPATIENT)
Dept: HEMATOLOGY/ONCOLOGY | Facility: CLINIC | Age: 34
End: 2019-05-30

## 2019-05-30 ENCOUNTER — TELEPHONE (OUTPATIENT)
Dept: HEMATOLOGY/ONCOLOGY | Facility: CLINIC | Age: 34
End: 2019-05-30

## 2019-05-30 DIAGNOSIS — C92.10 CML (CHRONIC MYELOCYTIC LEUKEMIA): ICD-10-CM

## 2019-05-30 DIAGNOSIS — Z94.81 S/P ALLOGENEIC BONE MARROW TRANSPLANT: ICD-10-CM

## 2019-05-30 RX ORDER — ACYCLOVIR 800 MG/1
800 TABLET ORAL 2 TIMES DAILY
Qty: 60 TABLET | Refills: 7 | Status: SHIPPED | OUTPATIENT
Start: 2019-05-30 | End: 2019-08-28 | Stop reason: SDUPTHER

## 2019-06-13 ENCOUNTER — LAB VISIT (OUTPATIENT)
Dept: LAB | Facility: HOSPITAL | Age: 34
End: 2019-06-13
Attending: INTERNAL MEDICINE
Payer: OTHER GOVERNMENT

## 2019-06-13 ENCOUNTER — OFFICE VISIT (OUTPATIENT)
Dept: HEMATOLOGY/ONCOLOGY | Facility: CLINIC | Age: 34
End: 2019-06-13
Payer: OTHER GOVERNMENT

## 2019-06-13 VITALS
BODY MASS INDEX: 20.59 KG/M2 | DIASTOLIC BLOOD PRESSURE: 75 MMHG | RESPIRATION RATE: 20 BRPM | HEART RATE: 90 BPM | SYSTOLIC BLOOD PRESSURE: 120 MMHG | WEIGHT: 147.06 LBS | HEIGHT: 71 IN | TEMPERATURE: 99 F | OXYGEN SATURATION: 99 %

## 2019-06-13 DIAGNOSIS — C92.10 CML (CHRONIC MYELOCYTIC LEUKEMIA): ICD-10-CM

## 2019-06-13 DIAGNOSIS — C92.11 CHRONIC MYELOID LEUKEMIA, BCR/ABL-POSITIVE, IN REMISSION: ICD-10-CM

## 2019-06-13 DIAGNOSIS — B25.9 CYTOMEGALOVIRUS (CMV) VIREMIA: ICD-10-CM

## 2019-06-13 DIAGNOSIS — Z94.81 S/P ALLOGENEIC BONE MARROW TRANSPLANT: Primary | ICD-10-CM

## 2019-06-13 DIAGNOSIS — Z94.81 S/P ALLOGENEIC BONE MARROW TRANSPLANT: ICD-10-CM

## 2019-06-13 LAB
ABO + RH BLD: NORMAL
ALBUMIN SERPL BCP-MCNC: 2.9 G/DL (ref 3.5–5.2)
ALP SERPL-CCNC: 239 U/L (ref 55–135)
ALT SERPL W/O P-5'-P-CCNC: 38 U/L (ref 10–44)
ANION GAP SERPL CALC-SCNC: 7 MMOL/L (ref 8–16)
AST SERPL-CCNC: 30 U/L (ref 10–40)
BASOPHILS # BLD AUTO: 0.06 K/UL (ref 0–0.2)
BASOPHILS NFR BLD: 0.7 % (ref 0–1.9)
BILIRUB SERPL-MCNC: 0.2 MG/DL (ref 0.1–1)
BLD GP AB SCN CELLS X3 SERPL QL: NORMAL
BUN SERPL-MCNC: 11 MG/DL (ref 6–20)
CALCIUM SERPL-MCNC: 8.7 MG/DL (ref 8.7–10.5)
CHLORIDE SERPL-SCNC: 106 MMOL/L (ref 95–110)
CO2 SERPL-SCNC: 28 MMOL/L (ref 23–29)
CREAT SERPL-MCNC: 1 MG/DL (ref 0.5–1.4)
DIFFERENTIAL METHOD: ABNORMAL
EOSINOPHIL # BLD AUTO: 1.2 K/UL (ref 0–0.5)
EOSINOPHIL NFR BLD: 14.1 % (ref 0–8)
ERYTHROCYTE [DISTWIDTH] IN BLOOD BY AUTOMATED COUNT: 15 % (ref 11.5–14.5)
EST. GFR  (AFRICAN AMERICAN): >60 ML/MIN/1.73 M^2
EST. GFR  (NON AFRICAN AMERICAN): >60 ML/MIN/1.73 M^2
GLUCOSE SERPL-MCNC: 81 MG/DL (ref 70–110)
HCT VFR BLD AUTO: 31.1 % (ref 40–54)
HGB BLD-MCNC: 9.6 G/DL (ref 14–18)
IMM GRANULOCYTES # BLD AUTO: 0.02 K/UL (ref 0–0.04)
IMM GRANULOCYTES NFR BLD AUTO: 0.2 % (ref 0–0.5)
LYMPHOCYTES # BLD AUTO: 4.6 K/UL (ref 1–4.8)
LYMPHOCYTES NFR BLD: 53.4 % (ref 18–48)
MAGNESIUM SERPL-MCNC: 2 MG/DL (ref 1.6–2.6)
MCH RBC QN AUTO: 30.9 PG (ref 27–31)
MCHC RBC AUTO-ENTMCNC: 30.9 G/DL (ref 32–36)
MCV RBC AUTO: 100 FL (ref 82–98)
MONOCYTES # BLD AUTO: 0.5 K/UL (ref 0.3–1)
MONOCYTES NFR BLD: 6.2 % (ref 4–15)
NEUTROPHILS # BLD AUTO: 2.2 K/UL (ref 1.8–7.7)
NEUTROPHILS NFR BLD: 25.4 % (ref 38–73)
NRBC BLD-RTO: 0 /100 WBC
PHOSPHATE SERPL-MCNC: 3.5 MG/DL (ref 2.7–4.5)
PLATELET # BLD AUTO: 306 K/UL (ref 150–350)
PMV BLD AUTO: 8.5 FL (ref 9.2–12.9)
POTASSIUM SERPL-SCNC: 4.3 MMOL/L (ref 3.5–5.1)
PROT SERPL-MCNC: 6.1 G/DL (ref 6–8.4)
RBC # BLD AUTO: 3.11 M/UL (ref 4.6–6.2)
SODIUM SERPL-SCNC: 141 MMOL/L (ref 136–145)
WBC # BLD AUTO: 8.52 K/UL (ref 3.9–12.7)

## 2019-06-13 PROCEDURE — 80053 COMPREHEN METABOLIC PANEL: CPT

## 2019-06-13 PROCEDURE — 87799 DETECT AGENT NOS DNA QUANT: CPT

## 2019-06-13 PROCEDURE — 83735 ASSAY OF MAGNESIUM: CPT

## 2019-06-13 PROCEDURE — 99999 PR PBB SHADOW E&M-EST. PATIENT-LVL III: ICD-10-PCS | Mod: PBBFAC,,, | Performed by: INTERNAL MEDICINE

## 2019-06-13 PROCEDURE — 84100 ASSAY OF PHOSPHORUS: CPT

## 2019-06-13 PROCEDURE — 85025 COMPLETE CBC W/AUTO DIFF WBC: CPT

## 2019-06-13 PROCEDURE — 99215 OFFICE O/P EST HI 40 MIN: CPT | Mod: S$PBB,,, | Performed by: INTERNAL MEDICINE

## 2019-06-13 PROCEDURE — 36415 COLL VENOUS BLD VENIPUNCTURE: CPT

## 2019-06-13 PROCEDURE — 99213 OFFICE O/P EST LOW 20 MIN: CPT | Mod: PBBFAC,25 | Performed by: INTERNAL MEDICINE

## 2019-06-13 PROCEDURE — 99999 PR PBB SHADOW E&M-EST. PATIENT-LVL III: CPT | Mod: PBBFAC,,, | Performed by: INTERNAL MEDICINE

## 2019-06-13 PROCEDURE — 99215 PR OFFICE/OUTPT VISIT, EST, LEVL V, 40-54 MIN: ICD-10-PCS | Mod: S$PBB,,, | Performed by: INTERNAL MEDICINE

## 2019-06-13 PROCEDURE — 86901 BLOOD TYPING SEROLOGIC RH(D): CPT

## 2019-06-15 LAB — CMV DNA SERPL NAA+PROBE-ACNC: 1020 IU/ML

## 2019-06-17 ENCOUNTER — TELEPHONE (OUTPATIENT)
Dept: INFECTIOUS DISEASES | Facility: HOSPITAL | Age: 34
End: 2019-06-17

## 2019-06-17 ENCOUNTER — PATIENT MESSAGE (OUTPATIENT)
Dept: HEMATOLOGY/ONCOLOGY | Facility: CLINIC | Age: 34
End: 2019-06-17

## 2019-06-17 DIAGNOSIS — C92.10 CML (CHRONIC MYELOCYTIC LEUKEMIA): ICD-10-CM

## 2019-06-17 LAB — EBV DNA BY PCR: NORMAL IU/ML

## 2019-06-17 RX ORDER — VALGANCICLOVIR 450 MG/1
900 TABLET, FILM COATED ORAL 2 TIMES DAILY
Qty: 120 TABLET | Refills: 0 | Status: SHIPPED | OUTPATIENT
Start: 2019-06-17 | End: 2019-12-02 | Stop reason: ALTCHOICE

## 2019-06-17 NOTE — PROGRESS NOTES
HEMATOLOGIC MALIGNANCIES PROGRESS NOTE    IDENTIFYING STATEMENT   Wilton Guzmán (Wilton) is a 33 y.o. male with a  of 1985 from [place], [place] with the diagnosis of CML in blast phase.      ONCOLOGY HISTORY:    1. Chronic myeloid leukemia, initially presenting in blast phase   A. 2018: Began developing left-sided abdominal pain - eventually requiring evaluation - WBC ~150 at  base; transferred to [facility] in [place], [place]   B. BMBx showed 20% myelomonoblasts concerning for CML in blast phase; bcr-abl positive (returned for Day 8 of induction therapy).    C. 2018: Began 7+3 induction chemotherapy. Began dasatinib (dose-adjusted) on day 8.   D. 2018: Transferred to [facility]   E. 2018: BMBx showed variably cellular marrow (10-40%) with 9% CD34+ blasts, concerning for residual disease.   F. 2018: Reinduction with MEC (mitoxantrone, etoposide, cytarabine) chemotherapy.    G. 2018: BMBx shows hypocellular bone marrow with no definite morphologic or immunophenotypic evidence of residual leukemia; bcr-abl p210 transcript was detected at 0.5%   H. Delay in clinic follow-up due to challenges with  insurance   I. 2018: Follow-up in clinic; WBC 6.77 (normal diff); Hgb 11.2 g/dl; Plt 298; BMBx shows 30% cellular marrow with no morphologic evidence of residual leukemia; bcr-abl p210 detected at 0.05% on international scale (MR 3.3).    J. 2018: BMBx shows 40% cellular marrow with no morphologic evidence of leukemia. Chromosomes 46, XY. Bcr-abl p210 detected at 0.1% on international scale (MR 3.0).    K. 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 Engrafted on Day +12. Course complicated by C. Difficile colitis.    L. 1/15/2019: Day +100 bone marrow biopsy shows 10% cellular marrow with trilineage hematopoietic activity. Cytogenetics 46,XY. BCR-ABL PCR is positive and shows  0.003% (MR 4.5). Chimerism studies show 100% donor in CD33+ cells and 70% donor/30% recipient in CD3+ cells. Findings consistent with major molecular response (MR 4.5).    M. 4/18/2019: BCR-ABL PCR is negative, consistent with complete molecular response (CMR).     INTERVAL HISTORY:      Mr. Guzmán presents to clinic with his wife for f/u allo SCT. He is Day +247. He is doing well overall. He no longer has hematochezia.  He has good appetite.  Denies fevers, chills, shortness of breath, chest pain, n/v/d/c. No neuropathy or skin rashes.    He reports a single episode of diarrhea after he did not eat before he took his dasatinib. He is otherwise well.    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     Past Medical History, Past Social History and Past Family History have been reviewed and are unchanged except as noted in the interval history.    MEDICATIONS:     Current Outpatient Medications on File Prior to Visit   Medication Sig Dispense Refill    acyclovir (ZOVIRAX) 800 MG Tab Take 1 tablet (800 mg total) by mouth 2 (two) times daily. 60 tablet 7    cetirizine (ZYRTEC) 10 MG tablet Take 1 tablet (10 mg total) by mouth once daily. 30 tablet 11    dasatinib (SPRYCEL) 100 mg Take 1 tablet (100 mg total) by mouth once daily. 30 tablet 2    fluticasone (FLONASE) 50 mcg/actuation nasal spray 1 spray (50 mcg total) by Each Nare route once daily. 16 g 2     No current facility-administered medications on file prior to visit.        Review of patient's allergies indicates:  No Known Allergies    Review of Systems   Constitutional: Negative for fatigue, diaphoresis, fever and unexpected weight change.   HENT:   Negative for lump/mass and sore throat.    Eyes: Negative for icterus.   Respiratory: Negative for cough and shortness of breath.    Cardiovascular: Negative for chest pain and palpitations.   Gastrointestinal: Negative for abdominal distention, constipation, nausea and vomiting.  "Negative for diarrhea. Negative for heamtochezia.   Genitourinary: Negative for dysuria and frequency.  Negative for hematuria.   Musculoskeletal: Negative for arthralgias, gait problem and myalgias.   Skin: Negative for rash or itching.   Neurological: Negative for dizziness, gait problem.   Hematological: Negative for adenopathy. Does not bruise/bleed easily.   Psychiatric/Behavioral: Negative for depression or anxiety.    Vitals:    06/13/19 1019   BP: 120/75   Pulse: 90   Resp: 20   Temp: 98.6 °F (37 °C)   TempSrc: Oral   SpO2: 99%   Weight: 66.7 kg (147 lb 0.8 oz)   Height: 5' 11" (1.803 m)   PainSc: 0-No pain       KARNOFSKY PERFORMANCE STATUS 80%  ECOG 1    Physical Exam   Constitutional: He is oriented to person, place, and time. He appears well-developed and well-nourished. No distress.   HENT:   Head: Normocephalic and atraumatic.   Mouth/Throat: Mucous membranes are normal. No oral lesions.    Eyes: Conjunctivae are normal.   Neck: No thyromegaly present.   Cardiovascular: Normal rate, regular rhythm and normal heart sounds.   No murmur heard.  Pulmonary/Chest: Breath sounds normal. He has no wheezes. He has no rales.   Abdominal: Soft. He exhibits no distension and no mass. There is no splenomegaly or hepatomegaly. There is no tenderness.   Neurological: He is alert and oriented to person, place, and time. He has normal strength. Coordination normal.   Skin: No rash observed.     LAB:   Results for orders placed or performed in visit on 06/13/19   Rapid BMT CBC with Diff   Result Value Ref Range    WBC 8.52 3.90 - 12.70 K/uL    RBC 3.11 (L) 4.60 - 6.20 M/uL    Hemoglobin 9.6 (L) 14.0 - 18.0 g/dL    Hematocrit 31.1 (L) 40.0 - 54.0 %    Mean Corpuscular Volume 100 (H) 82 - 98 fL    Mean Corpuscular Hemoglobin 30.9 27.0 - 31.0 pg    Mean Corpuscular Hemoglobin Conc 30.9 (L) 32.0 - 36.0 g/dL    RDW 15.0 (H) 11.5 - 14.5 %    Platelets 306 150 - 350 K/uL    MPV 8.5 (L) 9.2 - 12.9 fL    Immature Granulocytes " 0.2 0.0 - 0.5 %    Gran # (ANC) 2.2 1.8 - 7.7 K/uL    Immature Grans (Abs) 0.02 0.00 - 0.04 K/uL    Lymph # 4.6 1.0 - 4.8 K/uL    Mono # 0.5 0.3 - 1.0 K/uL    Eos # 1.2 (H) 0.0 - 0.5 K/uL    Baso # 0.06 0.00 - 0.20 K/uL    nRBC 0 0 /100 WBC    Gran% 25.4 (L) 38.0 - 73.0 %    Lymph% 53.4 (H) 18.0 - 48.0 %    Mono% 6.2 4.0 - 15.0 %    Eosinophil% 14.1 (H) 0.0 - 8.0 %    Basophil% 0.7 0.0 - 1.9 %    Differential Method Automated    Magnesium   Result Value Ref Range    Magnesium 2.0 1.6 - 2.6 mg/dL   Phosphorus   Result Value Ref Range    Phosphorus 3.5 2.7 - 4.5 mg/dL   Comprehensive metabolic panel   Result Value Ref Range    Sodium 141 136 - 145 mmol/L    Potassium 4.3 3.5 - 5.1 mmol/L    Chloride 106 95 - 110 mmol/L    CO2 28 23 - 29 mmol/L    Glucose 81 70 - 110 mg/dL    BUN, Bld 11 6 - 20 mg/dL    Creatinine 1.0 0.5 - 1.4 mg/dL    Calcium 8.7 8.7 - 10.5 mg/dL    Total Protein 6.1 6.0 - 8.4 g/dL    Albumin 2.9 (L) 3.5 - 5.2 g/dL    Total Bilirubin 0.2 0.1 - 1.0 mg/dL    Alkaline Phosphatase 239 (H) 55 - 135 U/L    AST 30 10 - 40 U/L    ALT 38 10 - 44 U/L    Anion Gap 7 (L) 8 - 16 mmol/L    eGFR if African American >60.0 >60 mL/min/1.73 m^2    eGFR if non African American >60.0 >60 mL/min/1.73 m^2   CMV DNA, quantitative, PCR   Result Value Ref Range    Cytomegalovirus PCR, Quant 1020 (A) Undetected IU/mL   Type & Screen   Result Value Ref Range    Group & Rh B POS     Indirect Wes NEG        PROBLEMS ASSESSED THIS VISIT:    1. S/P allogeneic bone marrow transplant    2. Chronic myeloid leukemia, BCR/ABL-positive, in remission    3. Cytomegalovirus (CMV) viremia        PLAN:        History allo transplant for CML  - See oncology history above  - S/p alloSCT on 10/9/2018  - Neutrophil engraftment on Day +12.   - Currently Day +205  - Plan for +30 & +100 & +1 year restaging  - day +30 BMBX was performed in clinic 11/13/18, no evidence of CML, BCR/ABL 0.005% (MR 4.3). Chimerism studies show 60% donor in CD3+  fraction and 100% donor in CD33+ fraction.   - Repeat chimers from peripheral blood on 12/10/18 shows 80% donor in CD3+ cells and 100% donor in CD33+ cells.  - Restart dasatinib 100 mg daily on 12/10 (no issues any longer with headaches)  - Day 100 Bmbx completed in clinic-- 1/15/18- No morphologic evidence of CML. BCR-ABL transcipt is 0.003%, consistent with MR 4.5. Chimerisms show 100% donor in CD33+ cells and 70% donor in CD3+ cells.   - Repeat chimers from peripheral blood drawn 4/9/19, pending  - Repeat bcr-abl p210 drawn 4/18/19, negative, consistent with complete molecular remission (CMR)    GVHD   - Oral budesonide was discontinued on 3/7/19  - C. Diff negative. Diarrhea improved with imodium. Taking every other day. Pt understands that imodium can cause constipation.  - Continue tacrolimus prophylaxis  - Tacrolimus discontinued   - GVHD charting with each clinic visit    Hematochezia  - resolved; this appears to be related to how he takes dasatinib     ID  - Completed PO vancomycin for C. Diff  - Continue ppx with acyclovir; can stop fluconazole (3/25/19)  - Begin atovaquone ppx at Day +30  - Monitor CMV BIW; EBV once weekly  - EBV negative to date  - CMV level 193 last visit; today's level 1020. Stopped valganciclovir (3/25/19). I have communicated this result to him and contacted ID regarding next steps.   - CVC removed 1/11/19, removal site remains without s/s of infection  - EBV DNA pending  - Obtained urinalysis, urine culture, and urine for BK virus - negative    Cytopenias- Anemia and slight leukopenia  - multifactorial  - Today:  WBC 8.52, Hgb 9.6, Plt normal. No transfusions indicated  - Donor has sickle cell trait. Some anemia is expected long-term      FEN/GI  - hypomagnesemia & hypophosphatemia hx now both resolved  - Mag normal today, stopped replacement 4/18/19  - Phos normal today, pt has not been taking neutraphos, will d/c 4/18/19    Hematuria (resolved)  - This is painless hematuria that  has been transient.   - U/A today shows microscopic RBCs but no red blood cell casts (per report)  - Differential diagnosis includes intrinsic bladder pathology, ureteral stone, infectious etiology, or cGVHD  - Seen by urology 2/26/19 by Dr. Saucedo; plan for CT urogram and cystoscopy; urine culture positive for group B streptococcus agalactiae, pt given 10 day course of omnicef, started 3/7/19; has now completed (initially had trouble obtaining prescription). Urine cytology negative     Hypertension  - Blood pressure normal today  - He states he stopped taking amlodipine. This was removed from his medication list.     Folliculitis (resolved)   - Axilla area bilaterally; Resolved after 5 days of doxycycline; has not returned    Follow-up:  - ID contacted regarding follow-up and response pending  - Monthly  labs and appts with Dr. Pantoja or NP with CBC, CMP, Mag, Phos, CMV, EBV, type and screen.    Kishor Pantoja MD  Hematology and Stem Cell Transplant

## 2019-06-17 NOTE — TELEPHONE ENCOUNTER
Patient informed of rising CMV VL to 1000  Patient reports intermittent diarrhea, intermittent fevers to 100.2, but not persistent    Will restart him on valganciclovir 900 mg PO BID  He will discontinue acyclovir.     He will start getting weekly labs - cbc with diff, CMP, CMV VL  Follow-up with ID 7/17/2019

## 2019-06-18 ENCOUNTER — PATIENT MESSAGE (OUTPATIENT)
Dept: HEMATOLOGY/ONCOLOGY | Facility: CLINIC | Age: 34
End: 2019-06-18

## 2019-06-18 DIAGNOSIS — B25.9 CYTOMEGALOVIRUS (CMV) VIREMIA: Primary | ICD-10-CM

## 2019-06-18 DIAGNOSIS — C92.10 CML (CHRONIC MYELOCYTIC LEUKEMIA): ICD-10-CM

## 2019-06-18 NOTE — TELEPHONE ENCOUNTER
----- Message from Jessica Temple sent at 6/18/2019  2:02 PM CDT -----  Contact: Patient  Requesting a refill on Rx  dasatinib (SPRYCEL) 100 mg       [id number] [place], [place] - [address] AT [facility] [address]  [address]  [place] [address]  Phone: [phone] Fax: [phone]

## 2019-06-19 ENCOUNTER — PATIENT MESSAGE (OUTPATIENT)
Dept: HEMATOLOGY/ONCOLOGY | Facility: CLINIC | Age: 34
End: 2019-06-19

## 2019-06-28 ENCOUNTER — LAB VISIT (OUTPATIENT)
Dept: LAB | Facility: HOSPITAL | Age: 34
End: 2019-06-28
Attending: INTERNAL MEDICINE
Payer: OTHER GOVERNMENT

## 2019-06-28 DIAGNOSIS — B25.9 CYTOMEGALOVIRUS (CMV) VIREMIA: ICD-10-CM

## 2019-06-28 LAB
ALBUMIN SERPL BCP-MCNC: 3 G/DL (ref 3.5–5.2)
ALP SERPL-CCNC: 194 U/L (ref 55–135)
ALT SERPL W/O P-5'-P-CCNC: 41 U/L (ref 10–44)
ANION GAP SERPL CALC-SCNC: 6 MMOL/L (ref 8–16)
AST SERPL-CCNC: 37 U/L (ref 10–40)
BASOPHILS # BLD AUTO: 0.03 K/UL (ref 0–0.2)
BASOPHILS NFR BLD: 0.5 % (ref 0–1.9)
BILIRUB SERPL-MCNC: 0.2 MG/DL (ref 0.1–1)
BUN SERPL-MCNC: 12 MG/DL (ref 6–20)
CALCIUM SERPL-MCNC: 9.1 MG/DL (ref 8.7–10.5)
CHLORIDE SERPL-SCNC: 106 MMOL/L (ref 95–110)
CO2 SERPL-SCNC: 28 MMOL/L (ref 23–29)
CREAT SERPL-MCNC: 0.9 MG/DL (ref 0.5–1.4)
DIFFERENTIAL METHOD: ABNORMAL
EOSINOPHIL # BLD AUTO: 0.9 K/UL (ref 0–0.5)
EOSINOPHIL NFR BLD: 14.3 % (ref 0–8)
ERYTHROCYTE [DISTWIDTH] IN BLOOD BY AUTOMATED COUNT: 15.2 % (ref 11.5–14.5)
EST. GFR  (AFRICAN AMERICAN): >60 ML/MIN/1.73 M^2
EST. GFR  (NON AFRICAN AMERICAN): >60 ML/MIN/1.73 M^2
GLUCOSE SERPL-MCNC: 60 MG/DL (ref 70–110)
HCT VFR BLD AUTO: 32.9 % (ref 40–54)
HGB BLD-MCNC: 9.9 G/DL (ref 14–18)
IMM GRANULOCYTES # BLD AUTO: 0.01 K/UL (ref 0–0.04)
IMM GRANULOCYTES NFR BLD AUTO: 0.2 % (ref 0–0.5)
LYMPHOCYTES # BLD AUTO: 3.9 K/UL (ref 1–4.8)
LYMPHOCYTES NFR BLD: 60.3 % (ref 18–48)
MCH RBC QN AUTO: 30.3 PG (ref 27–31)
MCHC RBC AUTO-ENTMCNC: 30.1 G/DL (ref 32–36)
MCV RBC AUTO: 101 FL (ref 82–98)
MONOCYTES # BLD AUTO: 0.4 K/UL (ref 0.3–1)
MONOCYTES NFR BLD: 5.5 % (ref 4–15)
NEUTROPHILS # BLD AUTO: 1.2 K/UL (ref 1.8–7.7)
NEUTROPHILS NFR BLD: 19.2 % (ref 38–73)
NRBC BLD-RTO: 0 /100 WBC
PLATELET # BLD AUTO: 243 K/UL (ref 150–350)
PMV BLD AUTO: 8.3 FL (ref 9.2–12.9)
POTASSIUM SERPL-SCNC: 3.8 MMOL/L (ref 3.5–5.1)
PROT SERPL-MCNC: 6.5 G/DL (ref 6–8.4)
RBC # BLD AUTO: 3.27 M/UL (ref 4.6–6.2)
SODIUM SERPL-SCNC: 140 MMOL/L (ref 136–145)
WBC # BLD AUTO: 6.38 K/UL (ref 3.9–12.7)

## 2019-06-28 PROCEDURE — 80053 COMPREHEN METABOLIC PANEL: CPT

## 2019-06-28 PROCEDURE — 36415 COLL VENOUS BLD VENIPUNCTURE: CPT | Mod: PO

## 2019-06-28 PROCEDURE — 85025 COMPLETE CBC W/AUTO DIFF WBC: CPT

## 2019-07-01 LAB — CMV DNA SERPL NAA+PROBE-ACNC: 978 IU/ML

## 2019-07-05 ENCOUNTER — LAB VISIT (OUTPATIENT)
Dept: LAB | Facility: HOSPITAL | Age: 34
End: 2019-07-05
Attending: INTERNAL MEDICINE
Payer: OTHER GOVERNMENT

## 2019-07-05 DIAGNOSIS — B25.9 CYTOMEGALOVIRUS (CMV) VIREMIA: ICD-10-CM

## 2019-07-05 LAB
ALBUMIN SERPL BCP-MCNC: 3 G/DL (ref 3.5–5.2)
ALP SERPL-CCNC: 203 U/L (ref 55–135)
ALT SERPL W/O P-5'-P-CCNC: 33 U/L (ref 10–44)
ANION GAP SERPL CALC-SCNC: 7 MMOL/L (ref 8–16)
AST SERPL-CCNC: 26 U/L (ref 10–40)
BASOPHILS # BLD AUTO: 0.04 K/UL (ref 0–0.2)
BASOPHILS NFR BLD: 0.8 % (ref 0–1.9)
BILIRUB SERPL-MCNC: 0.2 MG/DL (ref 0.1–1)
BUN SERPL-MCNC: 7 MG/DL (ref 6–20)
CALCIUM SERPL-MCNC: 9 MG/DL (ref 8.7–10.5)
CHLORIDE SERPL-SCNC: 108 MMOL/L (ref 95–110)
CO2 SERPL-SCNC: 27 MMOL/L (ref 23–29)
CREAT SERPL-MCNC: 0.8 MG/DL (ref 0.5–1.4)
DIFFERENTIAL METHOD: ABNORMAL
EOSINOPHIL # BLD AUTO: 0.5 K/UL (ref 0–0.5)
EOSINOPHIL NFR BLD: 10.4 % (ref 0–8)
ERYTHROCYTE [DISTWIDTH] IN BLOOD BY AUTOMATED COUNT: 15.2 % (ref 11.5–14.5)
EST. GFR  (AFRICAN AMERICAN): >60 ML/MIN/1.73 M^2
EST. GFR  (NON AFRICAN AMERICAN): >60 ML/MIN/1.73 M^2
GLUCOSE SERPL-MCNC: 62 MG/DL (ref 70–110)
HCT VFR BLD AUTO: 30.3 % (ref 40–54)
HGB BLD-MCNC: 9.2 G/DL (ref 14–18)
IMM GRANULOCYTES # BLD AUTO: 0.02 K/UL (ref 0–0.04)
IMM GRANULOCYTES NFR BLD AUTO: 0.4 % (ref 0–0.5)
LYMPHOCYTES # BLD AUTO: 2.6 K/UL (ref 1–4.8)
LYMPHOCYTES NFR BLD: 54.8 % (ref 18–48)
MCH RBC QN AUTO: 30.2 PG (ref 27–31)
MCHC RBC AUTO-ENTMCNC: 30.4 G/DL (ref 32–36)
MCV RBC AUTO: 99 FL (ref 82–98)
MONOCYTES # BLD AUTO: 0.3 K/UL (ref 0.3–1)
MONOCYTES NFR BLD: 5.8 % (ref 4–15)
NEUTROPHILS # BLD AUTO: 1.3 K/UL (ref 1.8–7.7)
NEUTROPHILS NFR BLD: 27.8 % (ref 38–73)
NRBC BLD-RTO: 0 /100 WBC
PLATELET # BLD AUTO: 284 K/UL (ref 150–350)
PMV BLD AUTO: 8.9 FL (ref 9.2–12.9)
POTASSIUM SERPL-SCNC: 4 MMOL/L (ref 3.5–5.1)
PROT SERPL-MCNC: 6.3 G/DL (ref 6–8.4)
RBC # BLD AUTO: 3.05 M/UL (ref 4.6–6.2)
SODIUM SERPL-SCNC: 142 MMOL/L (ref 136–145)
WBC # BLD AUTO: 4.82 K/UL (ref 3.9–12.7)

## 2019-07-05 PROCEDURE — 85025 COMPLETE CBC W/AUTO DIFF WBC: CPT

## 2019-07-05 PROCEDURE — 80053 COMPREHEN METABOLIC PANEL: CPT

## 2019-07-05 PROCEDURE — 36415 COLL VENOUS BLD VENIPUNCTURE: CPT | Mod: PO

## 2019-07-08 ENCOUNTER — PATIENT MESSAGE (OUTPATIENT)
Dept: HEMATOLOGY/ONCOLOGY | Facility: CLINIC | Age: 34
End: 2019-07-08

## 2019-07-09 LAB — CMV DNA SERPL NAA+PROBE-ACNC: <35 IU/ML

## 2019-07-12 ENCOUNTER — LAB VISIT (OUTPATIENT)
Dept: LAB | Facility: HOSPITAL | Age: 34
End: 2019-07-12
Attending: INTERNAL MEDICINE
Payer: OTHER GOVERNMENT

## 2019-07-12 DIAGNOSIS — B25.9 CYTOMEGALOVIRUS (CMV) VIREMIA: ICD-10-CM

## 2019-07-12 LAB
ALBUMIN SERPL BCP-MCNC: 3.3 G/DL (ref 3.5–5.2)
ALP SERPL-CCNC: 134 U/L (ref 55–135)
ALT SERPL W/O P-5'-P-CCNC: 39 U/L (ref 10–44)
ANION GAP SERPL CALC-SCNC: 7 MMOL/L (ref 8–16)
AST SERPL-CCNC: 31 U/L (ref 10–40)
BASOPHILS # BLD AUTO: 0.04 K/UL (ref 0–0.2)
BASOPHILS NFR BLD: 0.5 % (ref 0–1.9)
BILIRUB SERPL-MCNC: 0.2 MG/DL (ref 0.1–1)
BUN SERPL-MCNC: 15 MG/DL (ref 6–20)
CALCIUM SERPL-MCNC: 9.5 MG/DL (ref 8.7–10.5)
CHLORIDE SERPL-SCNC: 104 MMOL/L (ref 95–110)
CO2 SERPL-SCNC: 27 MMOL/L (ref 23–29)
CREAT SERPL-MCNC: 1 MG/DL (ref 0.5–1.4)
DIFFERENTIAL METHOD: ABNORMAL
EOSINOPHIL # BLD AUTO: 0.1 K/UL (ref 0–0.5)
EOSINOPHIL NFR BLD: 1.7 % (ref 0–8)
ERYTHROCYTE [DISTWIDTH] IN BLOOD BY AUTOMATED COUNT: 15.3 % (ref 11.5–14.5)
EST. GFR  (AFRICAN AMERICAN): >60 ML/MIN/1.73 M^2
EST. GFR  (NON AFRICAN AMERICAN): >60 ML/MIN/1.73 M^2
GLUCOSE SERPL-MCNC: 88 MG/DL (ref 70–110)
HCT VFR BLD AUTO: 32.2 % (ref 40–54)
HGB BLD-MCNC: 9.9 G/DL (ref 14–18)
IMM GRANULOCYTES # BLD AUTO: 0.02 K/UL (ref 0–0.04)
IMM GRANULOCYTES NFR BLD AUTO: 0.3 % (ref 0–0.5)
LYMPHOCYTES # BLD AUTO: 4.6 K/UL (ref 1–4.8)
LYMPHOCYTES NFR BLD: 59.7 % (ref 18–48)
MCH RBC QN AUTO: 31 PG (ref 27–31)
MCHC RBC AUTO-ENTMCNC: 30.7 G/DL (ref 32–36)
MCV RBC AUTO: 101 FL (ref 82–98)
MONOCYTES # BLD AUTO: 0.4 K/UL (ref 0.3–1)
MONOCYTES NFR BLD: 4.7 % (ref 4–15)
NEUTROPHILS # BLD AUTO: 2.5 K/UL (ref 1.8–7.7)
NEUTROPHILS NFR BLD: 33.1 % (ref 38–73)
NRBC BLD-RTO: 0 /100 WBC
PLATELET # BLD AUTO: 327 K/UL (ref 150–350)
PMV BLD AUTO: 8.5 FL (ref 9.2–12.9)
POTASSIUM SERPL-SCNC: 4 MMOL/L (ref 3.5–5.1)
PROT SERPL-MCNC: 6.7 G/DL (ref 6–8.4)
RBC # BLD AUTO: 3.19 M/UL (ref 4.6–6.2)
SODIUM SERPL-SCNC: 138 MMOL/L (ref 136–145)
WBC # BLD AUTO: 7.64 K/UL (ref 3.9–12.7)

## 2019-07-12 PROCEDURE — 85025 COMPLETE CBC W/AUTO DIFF WBC: CPT

## 2019-07-12 PROCEDURE — 36415 COLL VENOUS BLD VENIPUNCTURE: CPT | Mod: PO

## 2019-07-12 PROCEDURE — 80053 COMPREHEN METABOLIC PANEL: CPT

## 2019-07-16 LAB — CMV DNA SERPL NAA+PROBE-ACNC: <35 IU/ML

## 2019-07-17 ENCOUNTER — LAB VISIT (OUTPATIENT)
Dept: LAB | Facility: HOSPITAL | Age: 34
End: 2019-07-17
Attending: INTERNAL MEDICINE
Payer: OTHER GOVERNMENT

## 2019-07-17 ENCOUNTER — OFFICE VISIT (OUTPATIENT)
Dept: INFECTIOUS DISEASES | Facility: CLINIC | Age: 34
End: 2019-07-17
Payer: OTHER GOVERNMENT

## 2019-07-17 ENCOUNTER — OFFICE VISIT (OUTPATIENT)
Dept: HEMATOLOGY/ONCOLOGY | Facility: CLINIC | Age: 34
End: 2019-07-17
Payer: OTHER GOVERNMENT

## 2019-07-17 VITALS
DIASTOLIC BLOOD PRESSURE: 73 MMHG | SYSTOLIC BLOOD PRESSURE: 130 MMHG | TEMPERATURE: 99 F | BODY MASS INDEX: 21.32 KG/M2 | HEIGHT: 71 IN | WEIGHT: 152.31 LBS | HEART RATE: 83 BPM

## 2019-07-17 VITALS
SYSTOLIC BLOOD PRESSURE: 121 MMHG | WEIGHT: 151.88 LBS | HEART RATE: 75 BPM | TEMPERATURE: 98 F | HEIGHT: 71 IN | BODY MASS INDEX: 21.26 KG/M2 | OXYGEN SATURATION: 100 % | DIASTOLIC BLOOD PRESSURE: 76 MMHG

## 2019-07-17 DIAGNOSIS — D64.9 ANEMIA, UNSPECIFIED TYPE: ICD-10-CM

## 2019-07-17 DIAGNOSIS — B25.9 CYTOMEGALOVIRUS (CMV) VIREMIA: ICD-10-CM

## 2019-07-17 DIAGNOSIS — Z94.81 S/P ALLOGENEIC BONE MARROW TRANSPLANT: Primary | ICD-10-CM

## 2019-07-17 DIAGNOSIS — C92.11 CHRONIC MYELOID LEUKEMIA, BCR/ABL-POSITIVE, IN REMISSION: ICD-10-CM

## 2019-07-17 DIAGNOSIS — Z94.81 S/P ALLOGENEIC BONE MARROW TRANSPLANT: ICD-10-CM

## 2019-07-17 DIAGNOSIS — B25.9 CYTOMEGALOVIRUS (CMV) VIREMIA: Primary | ICD-10-CM

## 2019-07-17 LAB
ABO + RH BLD: NORMAL
ALBUMIN SERPL BCP-MCNC: 3.5 G/DL (ref 3.5–5.2)
ALP SERPL-CCNC: 120 U/L (ref 55–135)
ALT SERPL W/O P-5'-P-CCNC: 24 U/L (ref 10–44)
ANION GAP SERPL CALC-SCNC: 7 MMOL/L (ref 8–16)
AST SERPL-CCNC: 23 U/L (ref 10–40)
BASOPHILS # BLD AUTO: 0.03 K/UL (ref 0–0.2)
BASOPHILS NFR BLD: 0.4 % (ref 0–1.9)
BILIRUB SERPL-MCNC: 0.2 MG/DL (ref 0.1–1)
BLD GP AB SCN CELLS X3 SERPL QL: NORMAL
BUN SERPL-MCNC: 12 MG/DL (ref 6–20)
CALCIUM SERPL-MCNC: 9.4 MG/DL (ref 8.7–10.5)
CHLORIDE SERPL-SCNC: 104 MMOL/L (ref 95–110)
CO2 SERPL-SCNC: 28 MMOL/L (ref 23–29)
CREAT SERPL-MCNC: 1 MG/DL (ref 0.5–1.4)
DIFFERENTIAL METHOD: ABNORMAL
EOSINOPHIL # BLD AUTO: 0.1 K/UL (ref 0–0.5)
EOSINOPHIL NFR BLD: 2 % (ref 0–8)
ERYTHROCYTE [DISTWIDTH] IN BLOOD BY AUTOMATED COUNT: 14.7 % (ref 11.5–14.5)
EST. GFR  (AFRICAN AMERICAN): >60 ML/MIN/1.73 M^2
EST. GFR  (NON AFRICAN AMERICAN): >60 ML/MIN/1.73 M^2
GLUCOSE SERPL-MCNC: 76 MG/DL (ref 70–110)
HCT VFR BLD AUTO: 33.2 % (ref 40–54)
HGB BLD-MCNC: 10.3 G/DL (ref 14–18)
IMM GRANULOCYTES # BLD AUTO: 0.01 K/UL (ref 0–0.04)
IMM GRANULOCYTES NFR BLD AUTO: 0.1 % (ref 0–0.5)
LYMPHOCYTES # BLD AUTO: 4.9 K/UL (ref 1–4.8)
LYMPHOCYTES NFR BLD: 70.4 % (ref 18–48)
MAGNESIUM SERPL-MCNC: 1.8 MG/DL (ref 1.6–2.6)
MCH RBC QN AUTO: 31.2 PG (ref 27–31)
MCHC RBC AUTO-ENTMCNC: 31 G/DL (ref 32–36)
MCV RBC AUTO: 101 FL (ref 82–98)
MONOCYTES # BLD AUTO: 0.3 K/UL (ref 0.3–1)
MONOCYTES NFR BLD: 4.9 % (ref 4–15)
NEUTROPHILS # BLD AUTO: 1.5 K/UL (ref 1.8–7.7)
NEUTROPHILS NFR BLD: 22.2 % (ref 38–73)
NRBC BLD-RTO: 0 /100 WBC
PLATELET # BLD AUTO: 308 K/UL (ref 150–350)
PMV BLD AUTO: 8.5 FL (ref 9.2–12.9)
POTASSIUM SERPL-SCNC: 3.6 MMOL/L (ref 3.5–5.1)
PROT SERPL-MCNC: 7 G/DL (ref 6–8.4)
RBC # BLD AUTO: 3.3 M/UL (ref 4.6–6.2)
SODIUM SERPL-SCNC: 139 MMOL/L (ref 136–145)
WBC # BLD AUTO: 6.92 K/UL (ref 3.9–12.7)

## 2019-07-17 PROCEDURE — 99999 PR PBB SHADOW E&M-EST. PATIENT-LVL III: ICD-10-PCS | Mod: PBBFAC,,, | Performed by: INTERNAL MEDICINE

## 2019-07-17 PROCEDURE — 99999 PR PBB SHADOW E&M-EST. PATIENT-LVL III: CPT | Mod: PBBFAC,,, | Performed by: INTERNAL MEDICINE

## 2019-07-17 PROCEDURE — 83735 ASSAY OF MAGNESIUM: CPT

## 2019-07-17 PROCEDURE — 80053 COMPREHEN METABOLIC PANEL: CPT

## 2019-07-17 PROCEDURE — 99213 OFFICE O/P EST LOW 20 MIN: CPT | Mod: PBBFAC,25,27 | Performed by: INTERNAL MEDICINE

## 2019-07-17 PROCEDURE — 99215 OFFICE O/P EST HI 40 MIN: CPT | Mod: S$PBB,,, | Performed by: INTERNAL MEDICINE

## 2019-07-17 PROCEDURE — 81206 BCR/ABL1 GENE MAJOR BP: CPT

## 2019-07-17 PROCEDURE — 99215 PR OFFICE/OUTPT VISIT, EST, LEVL V, 40-54 MIN: ICD-10-PCS | Mod: S$PBB,,, | Performed by: INTERNAL MEDICINE

## 2019-07-17 PROCEDURE — 86850 RBC ANTIBODY SCREEN: CPT

## 2019-07-17 PROCEDURE — 99214 PR OFFICE/OUTPT VISIT, EST, LEVL IV, 30-39 MIN: ICD-10-PCS | Mod: S$PBB,,, | Performed by: INTERNAL MEDICINE

## 2019-07-17 PROCEDURE — 36415 COLL VENOUS BLD VENIPUNCTURE: CPT

## 2019-07-17 PROCEDURE — 85025 COMPLETE CBC W/AUTO DIFF WBC: CPT

## 2019-07-17 PROCEDURE — 99213 OFFICE O/P EST LOW 20 MIN: CPT | Mod: PBBFAC,25 | Performed by: INTERNAL MEDICINE

## 2019-07-17 PROCEDURE — 87799 DETECT AGENT NOS DNA QUANT: CPT | Mod: 59

## 2019-07-17 PROCEDURE — 99214 OFFICE O/P EST MOD 30 MIN: CPT | Mod: S$PBB,,, | Performed by: INTERNAL MEDICINE

## 2019-07-17 NOTE — PROGRESS NOTES
Subjective:      Patient ID: Wilton Guzmán is a 34 y.o. male.    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 Difficile colitis, CMV infection 1/2019, presents for follow-up of CMV infection     On routine monitoring, patient found to have low-grade CMV viremia.  Patient reported having chills for a week prior to CMV monitoring.  CMV found to be elevated, started on valganciclovir 900 mg PO BID.    Patient reports chills have resolved.  Denied having any fevers, chills, sweats, SOB, CP, n/v/d, abdominal pain.     Review of Systems   Constitution: Negative for chills, decreased appetite, fever, malaise/fatigue, night sweats, weight gain and weight loss.   HENT: Negative for congestion, ear pain, hearing loss, hoarse voice, sore throat and tinnitus.    Eyes: Negative for blurred vision, redness and visual disturbance.   Cardiovascular: Negative for chest pain, leg swelling and palpitations.   Respiratory: Negative for cough, hemoptysis, shortness of breath and sputum production.    Hematologic/Lymphatic: Negative for adenopathy. Does not bruise/bleed easily.   Skin: Negative for dry skin, itching, rash and suspicious lesions.   Musculoskeletal: Negative for back pain, joint pain, myalgias and neck pain.   Gastrointestinal: Negative for abdominal pain, constipation, diarrhea, heartburn, nausea and vomiting.   Genitourinary: Positive for frequency. Negative for dysuria, flank pain, hematuria, hesitancy and urgency.   Neurological: Negative for dizziness, headaches, numbness, paresthesias and weakness.   Psychiatric/Behavioral: Negative for depression and memory loss. The patient has insomnia and is nervous/anxious.      Objective:    Physical Exam   Constitutional: He is oriented to person, place, and time. He appears well-developed and well-nourished. No distress.   HENT:   Head: Normocephalic and atraumatic.   Eyes: Conjunctivae and EOM are normal.   Neck: Normal range of motion. Neck supple.   Pulmonary/Chest: Effort normal. No respiratory distress.   Abdominal: Soft. He exhibits no distension.   Musculoskeletal: Normal range of motion. He exhibits no edema.   Neurological: He is alert and oriented to person, place, and time.   Skin: Skin is warm and dry. No rash noted. He is not diaphoretic. No erythema.   Psychiatric: He has a normal mood and affect. His behavior is normal.   Vitals reviewed.    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 Difficile colitis  - CMV infection, now negative qweekly x2    Plan:   - Discontinue valganciclovir  - start q2 week lab monitoring CMV VL for next 3 months     Yuliya Reardon MD MPH  Infectious Diseases [facility]

## 2019-07-19 LAB
BCR/ABL,P210 RESULT: NORMAL
CMV DNA SERPL NAA+PROBE-ACNC: <35 IU/ML
EBV DNA BY PCR: NORMAL IU/ML
PATH REPORT.FINAL DX SPEC: NORMAL
SPECIMEN TYPE: NORMAL

## 2019-07-21 RX ORDER — VALGANCICLOVIR 450 MG/1
TABLET, FILM COATED ORAL
Qty: 120 TABLET | Refills: 0 | OUTPATIENT
Start: 2019-07-21

## 2019-07-22 NOTE — PROGRESS NOTES
HEMATOLOGIC MALIGNANCIES PROGRESS NOTE    IDENTIFYING STATEMENT   Wilton Guzmán (Wilton) is a 34 y.o. male with a  of 1985 from [place], [place] with the diagnosis of CML in blast phase.      ONCOLOGY HISTORY:    1. Chronic myeloid leukemia, initially presenting in blast phase   A. 2018: Began developing left-sided abdominal pain - eventually requiring evaluation - WBC ~150 at  base; transferred to [facility] in [place], [place]   B. BMBx showed 20% myelomonoblasts concerning for CML in blast phase; bcr-abl positive (returned for Day 8 of induction therapy).    C. 2018: Began 7+3 induction chemotherapy. Began dasatinib (dose-adjusted) on day 8.   D. 2018: Transferred to [facility]   E. 2018: BMBx showed variably cellular marrow (10-40%) with 9% CD34+ blasts, concerning for residual disease.   F. 2018: Reinduction with MEC (mitoxantrone, etoposide, cytarabine) chemotherapy.    G. 2018: BMBx shows hypocellular bone marrow with no definite morphologic or immunophenotypic evidence of residual leukemia; bcr-abl p210 transcript was detected at 0.5%   H. Delay in clinic follow-up due to challenges with  insurance   I. 2018: Follow-up in clinic; WBC 6.77 (normal diff); Hgb 11.2 g/dl; Plt 298; BMBx shows 30% cellular marrow with no morphologic evidence of residual leukemia; bcr-abl p210 detected at 0.05% on international scale (MR 3.3).    J. 2018: BMBx shows 40% cellular marrow with no morphologic evidence of leukemia. Chromosomes 46, XY. Bcr-abl p210 detected at 0.1% on international scale (MR 3.0).    K. 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 Engrafted on Day +12. Course complicated by C. Difficile colitis.    L. 1/15/2019: Day +100 bone marrow biopsy shows 10% cellular marrow with trilineage hematopoietic activity. Cytogenetics 46,XY. BCR-ABL PCR is positive and shows  0.003% (MR 4.5). Chimerism studies show 100% donor in CD33+ cells and 70% donor/30% recipient in CD3+ cells. Findings consistent with major molecular response (MR 4.5).    M. 4/18/2019: BCR-ABL PCR is negative, consistent with complete molecular response (CMR).     INTERVAL HISTORY:      Mr. Guzmán presents to clinic with his wife for f/u allo SCT. He is Day +281. He is doing well overall. He no longer has hematochezia.  He has good appetite.  Denies fevers, chills, shortness of breath, chest pain, n/v/d/c. No neuropathy or skin rashes.    He remains interested in going back to work.     Past Medical History, Past Social History and Past Family History have been reviewed and are unchanged except as noted in the interval history.    MEDICATIONS:     Current Outpatient Medications on File Prior to Visit   Medication Sig Dispense Refill    cetirizine (ZYRTEC) 10 MG tablet Take 1 tablet (10 mg total) by mouth once daily. 30 tablet 11    dasatinib (SPRYCEL) 100 mg Take 1 tablet (100 mg total) by mouth once daily. 30 tablet 2    fluticasone (FLONASE) 50 mcg/actuation nasal spray 1 spray (50 mcg total) by Each Nare route once daily. 16 g 2    valGANciclovir (VALCYTE) 450 mg Tab Take 2 tablets (900 mg total) by mouth 2 (two) times daily. 120 tablet 0    acyclovir (ZOVIRAX) 800 MG Tab Take 1 tablet (800 mg total) by mouth 2 (two) times daily. 60 tablet 7     No current facility-administered medications on file prior to visit.        Review of patient's allergies indicates:  No Known Allergies    Review of Systems   Constitutional: Negative for fatigue, diaphoresis, fever and unexpected weight change.   HENT:   Negative for lump/mass and sore throat.    Eyes: Negative for icterus.   Respiratory: Negative for cough and shortness of breath.    Cardiovascular: Negative for chest pain and palpitations.   Gastrointestinal: Negative for abdominal distention, constipation, nausea and vomiting. Negative for diarrhea. Negative for  "heamtochezia.   Genitourinary: Negative for dysuria and frequency.  Negative for hematuria.   Musculoskeletal: Negative for arthralgias, gait problem and myalgias.   Skin: Negative for rash or itching.   Neurological: Negative for dizziness, gait problem.   Hematological: Negative for adenopathy. Does not bruise/bleed easily.   Psychiatric/Behavioral: Negative for depression or anxiety.    Vitals:    07/17/19 1135   BP: 121/76   Pulse: 75   Temp: 98.3 °F (36.8 °C)   SpO2: 100%   Weight: 68.9 kg (151 lb 14.4 oz)   Height: 5' 11" (1.803 m)   PainSc: 0-No pain       KARNOFSKY PERFORMANCE STATUS 80%  ECOG 1    Physical Exam   Constitutional: He is oriented to person, place, and time. He appears well-developed and well-nourished. No distress.   HENT:   Head: Normocephalic and atraumatic.   Mouth/Throat: Mucous membranes are normal. No oral lesions.    Eyes: Conjunctivae are normal.   Neck: No thyromegaly present.   Cardiovascular: Normal rate, regular rhythm and normal heart sounds.   No murmur heard.  Pulmonary/Chest: Breath sounds normal. He has no wheezes. He has no rales.   Abdominal: Soft. He exhibits no distension and no mass. There is no splenomegaly or hepatomegaly. There is no tenderness.   Neurological: He is alert and oriented to person, place, and time. He has normal strength. Coordination normal.   Skin: No rash observed.     LAB:   Results for orders placed or performed in visit on 07/17/19   CBC auto differential   Result Value Ref Range    WBC 6.92 3.90 - 12.70 K/uL    RBC 3.30 (L) 4.60 - 6.20 M/uL    Hemoglobin 10.3 (L) 14.0 - 18.0 g/dL    Hematocrit 33.2 (L) 40.0 - 54.0 %    Mean Corpuscular Volume 101 (H) 82 - 98 fL    Mean Corpuscular Hemoglobin 31.2 (H) 27.0 - 31.0 pg    Mean Corpuscular Hemoglobin Conc 31.0 (L) 32.0 - 36.0 g/dL    RDW 14.7 (H) 11.5 - 14.5 %    Platelets 308 150 - 350 K/uL    MPV 8.5 (L) 9.2 - 12.9 fL    Immature Granulocytes 0.1 0.0 - 0.5 %    Gran # (ANC) 1.5 (L) 1.8 - 7.7 K/uL    " Immature Grans (Abs) 0.01 0.00 - 0.04 K/uL    Lymph # 4.9 (H) 1.0 - 4.8 K/uL    Mono # 0.3 0.3 - 1.0 K/uL    Eos # 0.1 0.0 - 0.5 K/uL    Baso # 0.03 0.00 - 0.20 K/uL    nRBC 0 0 /100 WBC    Gran% 22.2 (L) 38.0 - 73.0 %    Lymph% 70.4 (H) 18.0 - 48.0 %    Mono% 4.9 4.0 - 15.0 %    Eosinophil% 2.0 0.0 - 8.0 %    Basophil% 0.4 0.0 - 1.9 %    Differential Method Automated    Comprehensive metabolic panel   Result Value Ref Range    Sodium 139 136 - 145 mmol/L    Potassium 3.6 3.5 - 5.1 mmol/L    Chloride 104 95 - 110 mmol/L    CO2 28 23 - 29 mmol/L    Glucose 76 70 - 110 mg/dL    BUN, Bld 12 6 - 20 mg/dL    Creatinine 1.0 0.5 - 1.4 mg/dL    Calcium 9.4 8.7 - 10.5 mg/dL    Total Protein 7.0 6.0 - 8.4 g/dL    Albumin 3.5 3.5 - 5.2 g/dL    Total Bilirubin 0.2 0.1 - 1.0 mg/dL    Alkaline Phosphatase 120 55 - 135 U/L    AST 23 10 - 40 U/L    ALT 24 10 - 44 U/L    Anion Gap 7 (L) 8 - 16 mmol/L    eGFR if African American >60.0 >60 mL/min/1.73 m^2    eGFR if non African American >60.0 >60 mL/min/1.73 m^2   BCR/ABL, p210, Quant, Monitor, blood   Result Value Ref Range    Specimen Type, p210, Quant, bld BLOOD     Final Diagnosis, p210, Quant, bld SEE BELOW     BCR/ABL,p210 Result see interpretation    Magnesium   Result Value Ref Range    Magnesium 1.8 1.6 - 2.6 mg/dL   CMV DNA, quantitative, PCR   Result Value Ref Range    Cytomegalovirus PCR, Quant <35 (A) Undetected IU/mL   MANNIE-GARCIA VIRUS DNA, QUANTITATIVE (PCR)   Result Value Ref Range    EBV DNA, PCR Undetected Undetected IU/mL   Type & Screen   Result Value Ref Range    Group & Rh B POS     Indirect Wes NEG        PROBLEMS ASSESSED THIS VISIT:    1. S/P allogeneic bone marrow transplant    2. Chronic myeloid leukemia, BCR/ABL-positive, in remission    3. Anemia, unspecified type    4. Cytomegalovirus (CMV) viremia        PLAN:        History allo transplant for CML  - See oncology history above  - S/p alloSCT on 10/9/2018  - Neutrophil engraftment on Day +12.   -  Currently Day +281  - Plan for +30 & +100 & +1 year restaging  - day +30 BMBX was performed in clinic 11/13/18, no evidence of CML, BCR/ABL 0.005% (MR 4.3). Chimerism studies show 60% donor in CD3+ fraction and 100% donor in CD33+ fraction.   - Repeat chimers from peripheral blood on 12/10/18 shows 80% donor in CD3+ cells and 100% donor in CD33+ cells.  - Restart dasatinib 100 mg daily on 12/10 (no issues any longer with headaches)  - Day 100 Bmbx completed in clinic-- 1/15/18- No morphologic evidence of CML. BCR-ABL transcipt is 0.003%, consistent with MR 4.5. Chimerisms show 100% donor in CD33+ cells and 70% donor in CD3+ cells.   - Repeat chimers from peripheral blood drawn 4/9/19, pending  - Repeat bcr-abl p210 drawn 4/18/19, negative, consistent with complete molecular remission (CMR)  - 7/17/2019 - bcr-abl p210 shows MMR (MR 4.5).     GVHD   - Oral budesonide was discontinued on 3/7/19  - C. Diff negative. Diarrhea improved with imodium. Taking every other day. Pt understands that imodium can cause constipation.  - Continue tacrolimus prophylaxis  - Tacrolimus discontinued   - GVHD charting with each clinic visit    Hematochezia  - resolved; this appears to be related to how he takes dasatinib     ID  - Completed PO vancomycin for C. Diff  - Continue ppx with acyclovir; can stop fluconazole (3/25/19)  - Begin atovaquone ppx at Day +30  - Monitor CMV BIW; EBV once weekly  - EBV negative to date  - CMV level <35. Stopped valganciclovir today. Continue ID follow-up  - CVC removed 1/11/19, removal site remains without s/s of infection  - EBV DNA negative  - Obtained urinalysis, urine culture, and urine for BK virus - negative    Cytopenias- Anemia and slight leukopenia  - multifactorial  - Today:  WBC 6.92, Hgb 10.3, Plt normal. No transfusions indicated  - Donor has sickle cell trait. Some anemia is expected long-term      FEN/GI  - hypomagnesemia & hypophosphatemia hx now both resolved  - Mag normal today,  stopped replacement 4/18/19    Hematuria (resolved)  - This is painless hematuria that has been transient.   - U/A today shows microscopic RBCs but no red blood cell casts (per report)  - Differential diagnosis includes intrinsic bladder pathology, ureteral stone, infectious etiology, or cGVHD  - Seen by urology 2/26/19 by Dr. Saucedo; plan for CT urogram and cystoscopy; urine culture positive for group B streptococcus agalactiae, pt given 10 day course of omnicef, started 3/7/19; has now completed (initially had trouble obtaining prescription). Urine cytology negative     Hypertension  - Blood pressure normal today. Not requiring medication. He previously has been prescribed amlodipine.     Folliculitis (resolved)   - Axilla area bilaterally; Resolved after 5 days of doxycycline; has not returned    Follow-up:  - Monthly  labs and appts with Dr. Pantoja or NP with CBC, CMP, Mag, Phos, CMV, EBV, type and screen.    Kishor Pantoja MD  Hematology and Stem Cell Transplant

## 2019-07-22 NOTE — PROGRESS NOTES
"Bone Marrow Transplant  Post AlloTransplant    Patient: Wilton Guzmán  MRN: [id number]  Date: 7/22/2019    Chief Complaint: S/P allogeneic bone marrow transplant      Transplant Type:  Allo    · BMT Txp 00/00/1900 (286 Days)      · Recipient Blood type: O-positive  · Donor Blood Type: B-positive  · Donor Type:  MRD  · Recipient CMV Status:  Positive  · Donor CMV Status: ***  · Conditioning Regimen:  ***    Oncologic History:           Subjective:     Interval History: Mr. Guzmán is a 34 y.o. male who is being seen for a post-transplant follow up.    Review of Systems    Karnofsky Score:     Acute GVHD Grade:  {AcuteGVHDGrade:62535}    Chronic GVHD Grade:  Organ System:  {OrganSystem:18681}  Maximum Grade: {LimitedExtensive:53523}      Objective:     Vitals:    07/17/19 1135   BP: 121/76   Pulse: 75   Temp: 98.3 °F (36.8 °C)   SpO2: 100%   Weight: 68.9 kg (151 lb 14.4 oz)   Height: 5' 11" (1.803 m)       BMI: Body mass index is 21.19 kg/m².     Physical Exam    Laboratory Data:  CBC  Lab Results   Component Value Date    WBC 6.92 07/17/2019    RBC 3.30 (L) 07/17/2019    HGB 10.3 (L) 07/17/2019    HCT 33.2 (L) 07/17/2019     (H) 07/17/2019    MCH 31.2 (H) 07/17/2019    MCHC 31.0 (L) 07/17/2019    RDW 14.7 (H) 07/17/2019     07/17/2019    MPV 8.5 (L) 07/17/2019    GRAN 1.5 (L) 07/17/2019    GRAN 22.2 (L) 07/17/2019    LYMPH 4.9 (H) 07/17/2019    LYMPH 70.4 (H) 07/17/2019    MONO 0.3 07/17/2019    MONO 4.9 07/17/2019    EOS 0.1 07/17/2019    BASO 0.03 07/17/2019    EOSINOPHIL 2.0 07/17/2019    BASOPHIL 0.4 07/17/2019      CMP  Lab Results   Component Value Date     07/17/2019    K 3.6 07/17/2019     07/17/2019    CO2 28 07/17/2019    GLU 76 07/17/2019    BUN 12 07/17/2019    CREATININE 1.0 07/17/2019    CALCIUM 9.4 07/17/2019    PROT 7.0 07/17/2019    ALBUMIN 3.5 07/17/2019    BILITOT 0.2 07/17/2019    ALKPHOS 120 07/17/2019    AST 23 07/17/2019    ALT 24 07/17/2019    ANIONGAP 7 (L) 07/17/2019 "    ESTGFRAFRICA >60.0 07/17/2019    EGFRNONAA >60.0 07/17/2019     Lab Results   Component Value Date    PHOS 3.5 06/13/2019    TACROLIMUS <1.5 (L) 03/14/2019    TACROLIMUS 3.2 (L) 02/26/2019    TACROLIMUS 4.4 (L) 02/18/2019    CYTOMEGPCRQU <35 (A) 07/17/2019    EBVPCR Undetected 07/17/2019     Final Diagnosis - Chimerism Sort   Date Value Ref Range Status   04/09/2019 SEE BELOW  Final     Comment:     Peripheral blood, chimerism analysis:  CD3-positive T-cells:  The CD3-positive fraction contains   approximately 90% donor DNA and approximately 10% recipient   DNA.  CD33-positive myeloid cells:  The CD33-positive fraction   contains approximately 100% donor DNA and approximately 0%   recipient DNA.  3 informative loci were used in the analysis of this sample.  Signing Pathologist: Cesar Negrete M.D., Ph.D.  The pre-analytical cell sorting was performed on a research   basis only. The analytical sensitivity of this assay is   approximately 5% in a posttransplant specimen (donor and   recipient DNA mixed chimerism). T-cells and myeloid cells   were enriched to a purity of 95% or greater.  Markers analyzed: G8V2622, M4I6695, FGA, SE33, vWA, D21S11,   W91A6824, H57D0589, J70Q581, D18S51, P8G267, B7Y3574,   CSF1PO, E1V845, A16A804, I37O564, TPOX, W77M807, R4U674 and   C7Z8615  -------------------ADDITIONAL INFORMATION-------------------  Method summary - Chimerism:  Genomic DNA was extracted and   the specimen evaluated for the percentages of donor and   recipient DNA using a PCR-based method that amplifies   several highly polymorphic short tandem repeats (see [facility] Interpretive Handbook for method   details).  This test was developed and its performance characteristics   determined by [facility] in a manner consistent with CLIA   requirements. This test has not been cleared or approved by   the U.S. Food and Drug Administration.  Test Performed by:  [facility]  [address] [address]     01/15/2019 SEE BELOW  Final     Comment:     Bone marrow, chimerism analysis:  CD3-positive T-cells:  The CD3-positive fraction contains   approximately 70% donor DNA and approximately 30% recipient   DNA.  CD33-positive myeloid cells:  The CD33-positive fraction   contains approximately 100% donor DNA and approximately 0%   recipient DNA.  3 informative loci were used in the analysis of this sample.  Signing Pathologist: Gómez Caicedo M.D.  The pre-analytical cell sorting was performed on a research   basis only. The analytical sensitivity of this assay is   approximately 5% in a posttransplant specimen (donor and   recipient DNA mixed chimerism).  Markers analyzed: N6D7834, K0T3042, FGA, SE33, vWA, D21S11,   B40J1723, P84K0555, Z75V186, D18S51, Q6F573, G1T1803,   CSF1PO, N4J946, W45L807, X10I992, TPOX, H66O189, F6T404 and   V7N8580  -------------------ADDITIONAL INFORMATION-------------------  Method summary - Chimerism:  Genomic DNA was extracted and   the specimen evaluated for the percentages of donor and   recipient DNA using a PCR-based method that amplifies   several highly polymorphic short tandem repeats (see [facility] Interpretive Handbook for method   details).  This test was developed and its performance characteristics   determined by [facility] in a manner consistent with CLIA   requirements. This test has not been cleared or approved by   the U.S. Food and Drug Administration.  Test Performed by:  [address] [address]     12/10/2018 SEE BELOW  Final     Comment:     Peripheral blood, chimerism analysis:  CD3-positive T-cells:  The CD3-positive fraction contains   approximately 80% donor DNA and approximately 20% recipient   DNA.  CD33-positive myeloid cells:  The CD33-positive fraction   contains approximately 100% donor DNA and approximately 0%   recipient  DNA.  3 informative loci were used in the analysis of this sample.  Signing Pathologist: Neftaly Lundberg M.D., Ph.D.  The pre-analytical cell sorting was performed on a research   basis only. The analytical sensitivity of this assay is   approximately 5% in a posttransplant specimen (donor and   recipient DNA mixed chimerism).  Markers analyzed: W2G6717, S0E8338, FGA, SE33, vWA, D21S11,   A23U3301, E82A6318, S24C384, D18S51, O2P245, E1P6369,   CSF1PO, L2M547, I55L251, H28J152, TPOX, G26G092, R6A176 and   O5E1136  -------------------ADDITIONAL INFORMATION-------------------  Method summary - Chimerism:  Genomic DNA was extracted and   the specimen evaluated for the percentages of donor and   recipient DNA using a PCR-based method that amplifies   several highly polymorphic short tandem repeats (see [facility] Interpretive Handbook for method   details).  This test was developed and its performance characteristics   determined by [facility] in a manner consistent with CLIA   requirements. This test has not been cleared or approved by   the U.S. Food and Drug Administration.  Test Performed by:  [address] [address]         Imaging: ***    Pathology:  ***    Assessment:     1. S/P allogeneic bone marrow transplant    2. Chronic myeloid leukemia, BCR/ABL-positive, in remission          Plan:     1. ***

## 2019-08-02 ENCOUNTER — LAB VISIT (OUTPATIENT)
Dept: LAB | Facility: HOSPITAL | Age: 34
End: 2019-08-02
Attending: INTERNAL MEDICINE
Payer: OTHER GOVERNMENT

## 2019-08-02 DIAGNOSIS — B25.9 CYTOMEGALOVIRUS (CMV) VIREMIA: ICD-10-CM

## 2019-08-02 PROCEDURE — 36415 COLL VENOUS BLD VENIPUNCTURE: CPT

## 2019-08-05 ENCOUNTER — PATIENT MESSAGE (OUTPATIENT)
Dept: INFECTIOUS DISEASES | Facility: CLINIC | Age: 34
End: 2019-08-05

## 2019-08-05 LAB — CMV DNA SERPL NAA+PROBE-ACNC: <35 IU/ML

## 2019-08-16 ENCOUNTER — LAB VISIT (OUTPATIENT)
Dept: LAB | Facility: HOSPITAL | Age: 34
End: 2019-08-16
Attending: INTERNAL MEDICINE
Payer: OTHER GOVERNMENT

## 2019-08-16 DIAGNOSIS — B25.9 CYTOMEGALOVIRUS (CMV) VIREMIA: ICD-10-CM

## 2019-08-16 PROCEDURE — 36415 COLL VENOUS BLD VENIPUNCTURE: CPT

## 2019-08-17 LAB — CMV DNA SERPL NAA+PROBE-ACNC: 115 IU/ML

## 2019-08-26 ENCOUNTER — LAB VISIT (OUTPATIENT)
Dept: LAB | Facility: HOSPITAL | Age: 34
End: 2019-08-26
Attending: INTERNAL MEDICINE
Payer: OTHER GOVERNMENT

## 2019-08-26 DIAGNOSIS — Z94.81 S/P ALLOGENEIC BONE MARROW TRANSPLANT: ICD-10-CM

## 2019-08-26 LAB
ALBUMIN SERPL BCP-MCNC: 3.8 G/DL (ref 3.5–5.2)
ALP SERPL-CCNC: 71 U/L (ref 55–135)
ALT SERPL W/O P-5'-P-CCNC: 38 U/L (ref 10–44)
ANION GAP SERPL CALC-SCNC: 11 MMOL/L (ref 8–16)
AST SERPL-CCNC: 35 U/L (ref 10–40)
BASOPHILS # BLD AUTO: 0.03 K/UL (ref 0–0.2)
BASOPHILS NFR BLD: 0.5 % (ref 0–1.9)
BILIRUB SERPL-MCNC: 0.4 MG/DL (ref 0.1–1)
BUN SERPL-MCNC: 12 MG/DL (ref 6–20)
CALCIUM SERPL-MCNC: 8.9 MG/DL (ref 8.7–10.5)
CHLORIDE SERPL-SCNC: 101 MMOL/L (ref 95–110)
CO2 SERPL-SCNC: 23 MMOL/L (ref 23–29)
CREAT SERPL-MCNC: 1 MG/DL (ref 0.5–1.4)
DIFFERENTIAL METHOD: ABNORMAL
EOSINOPHIL # BLD AUTO: 0.9 K/UL (ref 0–0.5)
EOSINOPHIL NFR BLD: 14.4 % (ref 0–8)
ERYTHROCYTE [DISTWIDTH] IN BLOOD BY AUTOMATED COUNT: 13.2 % (ref 11.5–14.5)
EST. GFR  (AFRICAN AMERICAN): >60 ML/MIN/1.73 M^2
EST. GFR  (NON AFRICAN AMERICAN): >60 ML/MIN/1.73 M^2
GLUCOSE SERPL-MCNC: 107 MG/DL (ref 70–110)
HCT VFR BLD AUTO: 33.2 % (ref 40–54)
HGB BLD-MCNC: 10.5 G/DL (ref 14–18)
IMM GRANULOCYTES # BLD AUTO: 0.01 K/UL (ref 0–0.04)
IMM GRANULOCYTES NFR BLD AUTO: 0.2 % (ref 0–0.5)
LYMPHOCYTES # BLD AUTO: 3.3 K/UL (ref 1–4.8)
LYMPHOCYTES NFR BLD: 54.1 % (ref 18–48)
MAGNESIUM SERPL-MCNC: 1.9 MG/DL (ref 1.6–2.6)
MCH RBC QN AUTO: 30.8 PG (ref 27–31)
MCHC RBC AUTO-ENTMCNC: 31.6 G/DL (ref 32–36)
MCV RBC AUTO: 97 FL (ref 82–98)
MONOCYTES # BLD AUTO: 0.5 K/UL (ref 0.3–1)
MONOCYTES NFR BLD: 8.6 % (ref 4–15)
NEUTROPHILS # BLD AUTO: 1.3 K/UL (ref 1.8–7.7)
NEUTROPHILS NFR BLD: 22.2 % (ref 38–73)
NRBC BLD-RTO: 0 /100 WBC
PLATELET # BLD AUTO: 279 K/UL (ref 150–350)
PMV BLD AUTO: 8.1 FL (ref 9.2–12.9)
POTASSIUM SERPL-SCNC: 3.8 MMOL/L (ref 3.5–5.1)
PROT SERPL-MCNC: 7.2 G/DL (ref 6–8.4)
RBC # BLD AUTO: 3.41 M/UL (ref 4.6–6.2)
SODIUM SERPL-SCNC: 135 MMOL/L (ref 136–145)
WBC # BLD AUTO: 6.04 K/UL (ref 3.9–12.7)

## 2019-08-26 PROCEDURE — 87799 DETECT AGENT NOS DNA QUANT: CPT

## 2019-08-26 PROCEDURE — 85025 COMPLETE CBC W/AUTO DIFF WBC: CPT

## 2019-08-26 PROCEDURE — 80053 COMPREHEN METABOLIC PANEL: CPT

## 2019-08-26 PROCEDURE — 36415 COLL VENOUS BLD VENIPUNCTURE: CPT

## 2019-08-26 PROCEDURE — 83735 ASSAY OF MAGNESIUM: CPT

## 2019-08-28 ENCOUNTER — OFFICE VISIT (OUTPATIENT)
Dept: HEMATOLOGY/ONCOLOGY | Facility: CLINIC | Age: 34
End: 2019-08-28
Payer: OTHER GOVERNMENT

## 2019-08-28 VITALS
OXYGEN SATURATION: 100 % | BODY MASS INDEX: 21.91 KG/M2 | HEART RATE: 89 BPM | SYSTOLIC BLOOD PRESSURE: 117 MMHG | DIASTOLIC BLOOD PRESSURE: 65 MMHG | WEIGHT: 156.5 LBS | HEIGHT: 71 IN | TEMPERATURE: 99 F

## 2019-08-28 DIAGNOSIS — D57.3 SICKLE CELL TRAIT: ICD-10-CM

## 2019-08-28 DIAGNOSIS — B25.9 CYTOMEGALOVIRUS (CMV) VIREMIA: ICD-10-CM

## 2019-08-28 DIAGNOSIS — Z94.81 S/P ALLOGENEIC BONE MARROW TRANSPLANT: ICD-10-CM

## 2019-08-28 DIAGNOSIS — C92.10 CML (CHRONIC MYELOCYTIC LEUKEMIA): Primary | ICD-10-CM

## 2019-08-28 LAB — CMV DNA SERPL NAA+PROBE-ACNC: 73 IU/ML

## 2019-08-28 PROCEDURE — 99215 OFFICE O/P EST HI 40 MIN: CPT | Mod: S$PBB,BMT,, | Performed by: INTERNAL MEDICINE

## 2019-08-28 PROCEDURE — 99999 PR PBB SHADOW E&M-EST. PATIENT-LVL III: ICD-10-PCS | Mod: PBBFAC,BMT,, | Performed by: INTERNAL MEDICINE

## 2019-08-28 PROCEDURE — 99999 PR PBB SHADOW E&M-EST. PATIENT-LVL III: CPT | Mod: PBBFAC,BMT,, | Performed by: INTERNAL MEDICINE

## 2019-08-28 PROCEDURE — 99213 OFFICE O/P EST LOW 20 MIN: CPT | Mod: PBBFAC | Performed by: INTERNAL MEDICINE

## 2019-08-28 PROCEDURE — 99215 PR OFFICE/OUTPT VISIT, EST, LEVL V, 40-54 MIN: ICD-10-PCS | Mod: S$PBB,BMT,, | Performed by: INTERNAL MEDICINE

## 2019-08-28 RX ORDER — ACYCLOVIR 800 MG/1
800 TABLET ORAL 2 TIMES DAILY
Qty: 60 TABLET | Refills: 1 | Status: SHIPPED | OUTPATIENT
Start: 2019-08-28 | End: 2019-10-09 | Stop reason: ALTCHOICE

## 2019-08-28 NOTE — Clinical Note
Please schedule clinic return visit, labs (CBC, CMP, CMV, EBV, lipids, hemoglobin A1c), and bone marrow biopsy in clinic on 10/9/2019. This willbe patient's 1-year post transplant visit.Noreen, can you please help coordinate immunizations?We also need to try to schedule PFTs on this date as well.

## 2019-08-29 DIAGNOSIS — D84.822 IMMUNOCOMPROMISED STATE ASSOCIATED WITH STEM CELL TRANSPLANT: ICD-10-CM

## 2019-08-29 DIAGNOSIS — Z94.81 S/P ALLOGENEIC BONE MARROW TRANSPLANT: Primary | ICD-10-CM

## 2019-08-29 DIAGNOSIS — C92.10 CML (CHRONIC MYELOCYTIC LEUKEMIA): ICD-10-CM

## 2019-08-29 DIAGNOSIS — Z94.84 IMMUNOCOMPROMISED STATE ASSOCIATED WITH STEM CELL TRANSPLANT: ICD-10-CM

## 2019-08-29 DIAGNOSIS — Z76.82 STEM CELL TRANSPLANT CANDIDATE: ICD-10-CM

## 2019-08-29 LAB — EBV DNA BY PCR: NORMAL IU/ML

## 2019-08-30 ENCOUNTER — LAB VISIT (OUTPATIENT)
Dept: LAB | Facility: HOSPITAL | Age: 34
End: 2019-08-30
Attending: INTERNAL MEDICINE
Payer: OTHER GOVERNMENT

## 2019-08-30 DIAGNOSIS — B25.9 CYTOMEGALOVIRUS (CMV) VIREMIA: ICD-10-CM

## 2019-08-30 PROCEDURE — 36415 COLL VENOUS BLD VENIPUNCTURE: CPT

## 2019-09-03 ENCOUNTER — PATIENT MESSAGE (OUTPATIENT)
Dept: HEMATOLOGY/ONCOLOGY | Facility: CLINIC | Age: 34
End: 2019-09-03

## 2019-09-03 DIAGNOSIS — C92.10 CML (CHRONIC MYELOCYTIC LEUKEMIA): ICD-10-CM

## 2019-09-03 LAB — CMV DNA SERPL NAA+PROBE-ACNC: 105 IU/ML

## 2019-09-03 NOTE — PROGRESS NOTES
HEMATOLOGIC MALIGNANCIES PROGRESS NOTE    IDENTIFYING STATEMENT   Wilton Guzmán (Wilton) is a 34 y.o. male with a  of 1985 from [place], [place] with the diagnosis of CML in blast phase.      ONCOLOGY HISTORY:    1. Chronic myeloid leukemia, initially presenting in blast phase   A. 2018: Began developing left-sided abdominal pain - eventually requiring evaluation - WBC ~150 at  base; transferred to [facility] in [place], [place]   B. BMBx showed 20% myelomonoblasts concerning for CML in blast phase; bcr-abl positive (returned for Day 8 of induction therapy).    C. 2018: Began 7+3 induction chemotherapy. Began dasatinib (dose-adjusted) on day 8.   D. 2018: Transferred to [facility]   E. 2018: BMBx showed variably cellular marrow (10-40%) with 9% CD34+ blasts, concerning for residual disease.   F. 2018: Reinduction with MEC (mitoxantrone, etoposide, cytarabine) chemotherapy.    G. 2018: BMBx shows hypocellular bone marrow with no definite morphologic or immunophenotypic evidence of residual leukemia; bcr-abl p210 transcript was detected at 0.5%   H. Delay in clinic follow-up due to challenges with  insurance   I. 2018: Follow-up in clinic; WBC 6.77 (normal diff); Hgb 11.2 g/dl; Plt 298; BMBx shows 30% cellular marrow with no morphologic evidence of residual leukemia; bcr-abl p210 detected at 0.05% on international scale (MR 3.3).    J. 2018: BMBx shows 40% cellular marrow with no morphologic evidence of leukemia. Chromosomes 46, XY. Bcr-abl p210 detected at 0.1% on international scale (MR 3.0).    K. 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 Engrafted on Day +12. Course complicated by C. Difficile colitis.    L. 1/15/2019: Day +100 bone marrow biopsy shows 10% cellular marrow with trilineage hematopoietic activity. Cytogenetics 46,XY. BCR-ABL PCR is positive and shows  0.003% (MR 4.5). Chimerism studies show 100% donor in CD33+ cells and 70% donor/30% recipient in CD3+ cells. Findings consistent with major molecular response (MR 4.5).    M. 4/18/2019: BCR-ABL PCR is negative, consistent with complete molecular response (CMR).    N. 7/17/2019: BCR-ABL PCR is positive at <0.003%, consistent with major molecular response (MR 4.5).    INTERVAL HISTORY:      Mr. Guzmán presents to clinic with his wife for f/u allo SCT. He is Day +323. He is doing well overall. He no longer has hematochezia.  He has good appetite.  Denies fevers, chills, shortness of breath, chest pain, n/v/d/c. No neuropathy or skin rashes.    He has been exercising and gaining muscle mass. He reports that he will be medically retired from the [employer], but he will still be able to receive alf benefits, including health care, for life. He will begin looking for other work.     Past Medical History, Past Social History and Past Family History have been reviewed and are unchanged except as noted in the interval history.    MEDICATIONS:     Current Outpatient Medications on File Prior to Visit   Medication Sig Dispense Refill    cetirizine (ZYRTEC) 10 MG tablet Take 1 tablet (10 mg total) by mouth once daily. 30 tablet 11    dasatinib (SPRYCEL) 100 mg Take 1 tablet (100 mg total) by mouth once daily. 30 tablet 2    fluticasone (FLONASE) 50 mcg/actuation nasal spray 1 spray (50 mcg total) by Each Nare route once daily. 16 g 2    valGANciclovir (VALCYTE) 450 mg Tab Take 2 tablets (900 mg total) by mouth 2 (two) times daily. 120 tablet 0     No current facility-administered medications on file prior to visit.        Review of patient's allergies indicates:  No Known Allergies    Review of Systems   Constitutional: Negative for fatigue, diaphoresis, fever and unexpected weight change.   HENT:   Negative for lump/mass and sore throat.    Eyes: Negative for icterus.   Respiratory: Negative for cough and shortness  "of breath.    Cardiovascular: Negative for chest pain and palpitations.   Gastrointestinal: Negative for abdominal distention, constipation, nausea and vomiting. Negative for diarrhea. Negative for heamtochezia.   Genitourinary: Negative for dysuria and frequency.  Negative for hematuria.   Musculoskeletal: Negative for arthralgias, gait problem and myalgias.   Skin: Negative for rash or itching.   Neurological: Negative for dizziness, gait problem.   Hematological: Negative for adenopathy. Does not bruise/bleed easily.   Psychiatric/Behavioral: Negative for depression or anxiety.    Vitals:    08/28/19 1139   BP: 117/65   Pulse: 89   Temp: 98.5 °F (36.9 °C)   SpO2: 100%   Weight: 71 kg (156 lb 8.4 oz)   Height: 5' 11" (1.803 m)   PainSc: 0-No pain       KARNOFSKY PERFORMANCE STATUS 80%  ECOG 1    Physical Exam   Constitutional: He is oriented to person, place, and time. He appears well-developed and well-nourished. No distress.   HENT:   Head: Normocephalic and atraumatic.   Mouth/Throat: Mucous membranes are normal. No oral lesions.    Eyes: Conjunctivae are normal.   Neck: No thyromegaly present.   Cardiovascular: Normal rate, regular rhythm and normal heart sounds.   No murmur heard.  Pulmonary/Chest: Breath sounds normal. He has no wheezes. He has no rales.   Abdominal: Soft. He exhibits no distension and no mass. There is no splenomegaly or hepatomegaly. There is no tenderness.   Neurological: He is alert and oriented to person, place, and time. He has normal strength. Coordination normal.   Skin: No rash observed.     LAB:   Results for orders placed or performed in visit on 08/26/19   Comprehensive metabolic panel   Result Value Ref Range    Sodium 135 (L) 136 - 145 mmol/L    Potassium 3.8 3.5 - 5.1 mmol/L    Chloride 101 95 - 110 mmol/L    CO2 23 23 - 29 mmol/L    Glucose 107 70 - 110 mg/dL    BUN, Bld 12 6 - 20 mg/dL    Creatinine 1.0 0.5 - 1.4 mg/dL    Calcium 8.9 8.7 - 10.5 mg/dL    Total Protein 7.2 6.0 " - 8.4 g/dL    Albumin 3.8 3.5 - 5.2 g/dL    Total Bilirubin 0.4 0.1 - 1.0 mg/dL    Alkaline Phosphatase 71 55 - 135 U/L    AST 35 10 - 40 U/L    ALT 38 10 - 44 U/L    Anion Gap 11 8 - 16 mmol/L    eGFR if African American >60.0 >60 mL/min/1.73 m^2    eGFR if non African American >60.0 >60 mL/min/1.73 m^2   Magnesium   Result Value Ref Range    Magnesium 1.9 1.6 - 2.6 mg/dL   CMV DNA, quantitative, PCR   Result Value Ref Range    Cytomegalovirus PCR, Quant 73 (A) Undetected IU/mL   MANNIE-GARCIA VIRUS DNA, QUANTITATIVE (PCR)   Result Value Ref Range    EBV DNA, PCR Undetected Undetected IU/mL   CBC auto differential   Result Value Ref Range    WBC 6.04 3.90 - 12.70 K/uL    RBC 3.41 (L) 4.60 - 6.20 M/uL    Hemoglobin 10.5 (L) 14.0 - 18.0 g/dL    Hematocrit 33.2 (L) 40.0 - 54.0 %    Mean Corpuscular Volume 97 82 - 98 fL    Mean Corpuscular Hemoglobin 30.8 27.0 - 31.0 pg    Mean Corpuscular Hemoglobin Conc 31.6 (L) 32.0 - 36.0 g/dL    RDW 13.2 11.5 - 14.5 %    Platelets 279 150 - 350 K/uL    MPV 8.1 (L) 9.2 - 12.9 fL    Immature Granulocytes 0.2 0.0 - 0.5 %    Gran # (ANC) 1.3 (L) 1.8 - 7.7 K/uL    Immature Grans (Abs) 0.01 0.00 - 0.04 K/uL    Lymph # 3.3 1.0 - 4.8 K/uL    Mono # 0.5 0.3 - 1.0 K/uL    Eos # 0.9 (H) 0.0 - 0.5 K/uL    Baso # 0.03 0.00 - 0.20 K/uL    nRBC 0 0 /100 WBC    Gran% 22.2 (L) 38.0 - 73.0 %    Lymph% 54.1 (H) 18.0 - 48.0 %    Mono% 8.6 4.0 - 15.0 %    Eosinophil% 14.4 (H) 0.0 - 8.0 %    Basophil% 0.5 0.0 - 1.9 %    Differential Method Automated        PROBLEMS ASSESSED THIS VISIT:    1. CML (chronic myelocytic leukemia)    2. S/P allogeneic bone marrow transplant    3. Sickle cell trait    4. Cytomegalovirus (CMV) viremia        PLAN:        History allo transplant for CML  - See oncology history above  - S/p alloSCT on 10/9/2018  - Neutrophil engraftment on Day +12.   - Currently Day +323  - Plan for +30 & +100 & +1 year restaging  - day +30 BMBX was performed in clinic 11/13/18, no evidence of  CML, BCR/ABL 0.005% (MR 4.3). Chimerism studies show 60% donor in CD3+ fraction and 100% donor in CD33+ fraction.   - Repeat chimers from peripheral blood on 12/10/18 shows 80% donor in CD3+ cells and 100% donor in CD33+ cells.  - Restart dasatinib 100 mg daily on 12/10 (no issues any longer with headaches)  - Day 100 Bmbx completed in clinic-- 1/15/18- No morphologic evidence of CML. BCR-ABL transcipt is 0.003%, consistent with MR 4.5. Chimerisms show 100% donor in CD33+ cells and 70% donor in CD3+ cells.   - Repeat chimers from peripheral blood drawn 4/9/19, pending  - Repeat bcr-abl p210 drawn 4/18/19, negative, consistent with complete molecular remission (CMR)  - 7/17/2019 - bcr-abl p210 shows MMR (MR 4.5).     GVHD   - Oral budesonide was discontinued on 3/7/19  - C. Diff negative. Diarrhea improved with imodium. Taking every other day. Pt understands that imodium can cause constipation.  - Continue tacrolimus prophylaxis  - Tacrolimus discontinued   - GVHD charting with each clinic visit    Hematochezia  - resolved; this appears to be related to how he takes dasatinib     ID  - Completed PO vancomycin for C. Diff  - Continue ppx with acyclovir; can stop fluconazole (3/25/19)  - Begin atovaquone ppx at Day +30  - Monitor CMV BIW; EBV once weekly  - EBV negative to date  - CMV level 73. Currently off valganciclovir. Continue ID follow-up  - CVC removed 1/11/19, removal site remains without s/s of infection  - EBV DNA negative  - Obtained urinalysis, urine culture, and urine for BK virus - negative    Cytopenias- Anemia and slight leukopenia  - multifactorial  - Today:  WBC 6.04, Hgb 10.5, Plt normal. No transfusions indicated  - Donor has sickle cell trait. Some anemia is expected long-term      FEN/GI  - hypomagnesemia & hypophosphatemia hx now both resolved  - Mag normal today, stopped replacement 4/18/19    Hematuria (resolved)  - This is painless hematuria that has been transient.   - U/A today shows  microscopic RBCs but no red blood cell casts (per report)  - Differential diagnosis includes intrinsic bladder pathology, ureteral stone, infectious etiology, or cGVHD  - Seen by urology 2/26/19 by Dr. Saucedo; plan for CT urogram and cystoscopy; urine culture positive for group B streptococcus agalactiae, pt given 10 day course of omnicef, started 3/7/19; has now completed (initially had trouble obtaining prescription). Urine cytology negative     Hypertension  - Blood pressure normal today. Not requiring medication. He previously has been prescribed amlodipine.     Folliculitis (resolved)   - Axilla area bilaterally; Resolved after 5 days of doxycycline; has not returned    Follow-up:  - Monthly  labs and appts with Dr. Pantoja or NP with CBC, CMP, Mag, Phos, CMV, EBV, type and screen.  - He will have 1-year post allo BMBx, PFTs, and immunizations at next visit.     Kishor Pantoja MD  Hematology and Stem Cell Transplant

## 2019-09-06 ENCOUNTER — PATIENT MESSAGE (OUTPATIENT)
Dept: INFECTIOUS DISEASES | Facility: CLINIC | Age: 34
End: 2019-09-06

## 2019-09-27 ENCOUNTER — LAB VISIT (OUTPATIENT)
Dept: LAB | Facility: HOSPITAL | Age: 34
End: 2019-09-27
Attending: INTERNAL MEDICINE
Payer: OTHER GOVERNMENT

## 2019-09-27 DIAGNOSIS — B25.9 CYTOMEGALOVIRUS (CMV) VIREMIA: ICD-10-CM

## 2019-09-27 PROCEDURE — 36415 COLL VENOUS BLD VENIPUNCTURE: CPT

## 2019-09-30 LAB — CMV DNA SERPL NAA+PROBE-ACNC: 115 IU/ML

## 2019-10-02 DIAGNOSIS — J30.9 ALLERGIC RHINITIS, UNSPECIFIED SEASONALITY, UNSPECIFIED TRIGGER: ICD-10-CM

## 2019-10-03 ENCOUNTER — PATIENT MESSAGE (OUTPATIENT)
Dept: HEMATOLOGY/ONCOLOGY | Facility: CLINIC | Age: 34
End: 2019-10-03

## 2019-10-03 RX ORDER — CETIRIZINE HYDROCHLORIDE 10 MG/1
10 TABLET ORAL DAILY
Qty: 30 TABLET | Refills: 11 | Status: SHIPPED | OUTPATIENT
Start: 2019-10-03 | End: 2020-09-25 | Stop reason: SDUPTHER

## 2019-10-04 ENCOUNTER — PATIENT MESSAGE (OUTPATIENT)
Dept: HEMATOLOGY/ONCOLOGY | Facility: CLINIC | Age: 34
End: 2019-10-04

## 2019-10-07 NOTE — PROGRESS NOTES
34 y.o. male with CML here for 1 year post allo sct bm bx and aspiration. See procedure note.    Yoanna Andrade DNP, NP  Hematology/Oncology

## 2019-10-09 ENCOUNTER — CLINICAL SUPPORT (OUTPATIENT)
Dept: INFECTIOUS DISEASES | Facility: CLINIC | Age: 34
End: 2019-10-09
Payer: OTHER GOVERNMENT

## 2019-10-09 ENCOUNTER — OFFICE VISIT (OUTPATIENT)
Dept: HEMATOLOGY/ONCOLOGY | Facility: CLINIC | Age: 34
End: 2019-10-09
Payer: OTHER GOVERNMENT

## 2019-10-09 ENCOUNTER — LAB VISIT (OUTPATIENT)
Dept: LAB | Facility: HOSPITAL | Age: 34
End: 2019-10-09
Attending: INTERNAL MEDICINE
Payer: OTHER GOVERNMENT

## 2019-10-09 ENCOUNTER — TELEPHONE (OUTPATIENT)
Dept: HEMATOLOGY/ONCOLOGY | Facility: CLINIC | Age: 34
End: 2019-10-09

## 2019-10-09 VITALS
TEMPERATURE: 98 F | HEART RATE: 79 BPM | SYSTOLIC BLOOD PRESSURE: 132 MMHG | OXYGEN SATURATION: 100 % | RESPIRATION RATE: 16 BRPM | DIASTOLIC BLOOD PRESSURE: 80 MMHG

## 2019-10-09 DIAGNOSIS — C92.10 CML (CHRONIC MYELOCYTIC LEUKEMIA): ICD-10-CM

## 2019-10-09 DIAGNOSIS — D64.9 ANEMIA, UNSPECIFIED TYPE: ICD-10-CM

## 2019-10-09 DIAGNOSIS — C92.11 CHRONIC MYELOID LEUKEMIA, BCR/ABL-POSITIVE, IN REMISSION: Primary | ICD-10-CM

## 2019-10-09 DIAGNOSIS — B25.9 CYTOMEGALOVIRUS (CMV) VIREMIA: ICD-10-CM

## 2019-10-09 DIAGNOSIS — D75.9 CYTOPENIA: ICD-10-CM

## 2019-10-09 DIAGNOSIS — Z94.81 S/P ALLOGENEIC BONE MARROW TRANSPLANT: ICD-10-CM

## 2019-10-09 LAB
ALBUMIN SERPL BCP-MCNC: 3.9 G/DL (ref 3.5–5.2)
ALP SERPL-CCNC: 87 U/L (ref 55–135)
ALT SERPL W/O P-5'-P-CCNC: 34 U/L (ref 10–44)
ANION GAP SERPL CALC-SCNC: 6 MMOL/L (ref 8–16)
AST SERPL-CCNC: 33 U/L (ref 10–40)
BASOPHILS # BLD AUTO: 0.03 K/UL (ref 0–0.2)
BASOPHILS NFR BLD: 0.3 % (ref 0–1.9)
BILIRUB SERPL-MCNC: 0.3 MG/DL (ref 0.1–1)
BODY SITE - BONE MARROW: NORMAL
BONE MARROW WRIGHT STAIN COMMENT: NORMAL
BUN SERPL-MCNC: 10 MG/DL (ref 6–20)
CALCIUM SERPL-MCNC: 9.5 MG/DL (ref 8.7–10.5)
CHLORIDE SERPL-SCNC: 106 MMOL/L (ref 95–110)
CHOLEST SERPL-MCNC: 170 MG/DL (ref 120–199)
CHOLEST/HDLC SERPL: 3.9 {RATIO} (ref 2–5)
CLINICAL DIAGNOSIS - BONE MARROW: NORMAL
CO2 SERPL-SCNC: 29 MMOL/L (ref 23–29)
CREAT SERPL-MCNC: 1.1 MG/DL (ref 0.5–1.4)
DIFFERENTIAL METHOD: ABNORMAL
EOSINOPHIL # BLD AUTO: 0.7 K/UL (ref 0–0.5)
EOSINOPHIL NFR BLD: 7.6 % (ref 0–8)
ERYTHROCYTE [DISTWIDTH] IN BLOOD BY AUTOMATED COUNT: 13.3 % (ref 11.5–14.5)
EST. GFR  (AFRICAN AMERICAN): >60 ML/MIN/1.73 M^2
EST. GFR  (NON AFRICAN AMERICAN): >60 ML/MIN/1.73 M^2
ESTIMATED AVG GLUCOSE: 80 MG/DL (ref 68–131)
FLOW CYTOMETRY ANTIBODIES ANALYZED - BONE MARROW: NORMAL
FLOW CYTOMETRY COMMENT - BONE MARROW: NORMAL
FLOW CYTOMETRY INTERPRETATION - BONE MARROW: NORMAL
GLUCOSE SERPL-MCNC: 79 MG/DL (ref 70–110)
HBA1C MFR BLD HPLC: 4.4 % (ref 4–5.6)
HCT VFR BLD AUTO: 37.8 % (ref 40–54)
HDLC SERPL-MCNC: 44 MG/DL (ref 40–75)
HDLC SERPL: 25.9 % (ref 20–50)
HGB BLD-MCNC: 11.7 G/DL (ref 14–18)
IMM GRANULOCYTES # BLD AUTO: 0.01 K/UL (ref 0–0.04)
IMM GRANULOCYTES NFR BLD AUTO: 0.1 % (ref 0–0.5)
LDLC SERPL CALC-MCNC: 103.8 MG/DL (ref 63–159)
LYMPHOCYTES # BLD AUTO: 6.4 K/UL (ref 1–4.8)
LYMPHOCYTES NFR BLD: 71.2 % (ref 18–48)
MCH RBC QN AUTO: 30.5 PG (ref 27–31)
MCHC RBC AUTO-ENTMCNC: 31 G/DL (ref 32–36)
MCV RBC AUTO: 98 FL (ref 82–98)
MONOCYTES # BLD AUTO: 0.7 K/UL (ref 0.3–1)
MONOCYTES NFR BLD: 7.6 % (ref 4–15)
NEUTROPHILS # BLD AUTO: 1.2 K/UL (ref 1.8–7.7)
NEUTROPHILS NFR BLD: 13.2 % (ref 38–73)
NONHDLC SERPL-MCNC: 126 MG/DL
NRBC BLD-RTO: 0 /100 WBC
PLATELET # BLD AUTO: 278 K/UL (ref 150–350)
PMV BLD AUTO: 8.2 FL (ref 9.2–12.9)
POTASSIUM SERPL-SCNC: 4.2 MMOL/L (ref 3.5–5.1)
PROT SERPL-MCNC: 8 G/DL (ref 6–8.4)
RBC # BLD AUTO: 3.84 M/UL (ref 4.6–6.2)
SODIUM SERPL-SCNC: 141 MMOL/L (ref 136–145)
TRIGL SERPL-MCNC: 111 MG/DL (ref 30–150)
WBC # BLD AUTO: 8.97 K/UL (ref 3.9–12.7)

## 2019-10-09 PROCEDURE — 99999 PR PBB SHADOW E&M-EST. PATIENT-LVL III: CPT | Mod: PBBFAC,BMT,, | Performed by: NURSE PRACTITIONER

## 2019-10-09 PROCEDURE — 87799 DETECT AGENT NOS DNA QUANT: CPT

## 2019-10-09 PROCEDURE — 81268 CHIMERISM ANAL W/CELL SELECT: CPT

## 2019-10-09 PROCEDURE — 88311 DECALCIFY TISSUE: CPT | Mod: 26,BMT,, | Performed by: PATHOLOGY

## 2019-10-09 PROCEDURE — 88311 TISSUE SPECIMEN TO PATHOLOGY, BONE MARROW ASPIRATION/BIOPSY PROCEDURE: ICD-10-PCS | Mod: 26,BMT,, | Performed by: PATHOLOGY

## 2019-10-09 PROCEDURE — 90648 HIB PRP-T VACCINE 4 DOSE IM: CPT | Mod: PBBFAC

## 2019-10-09 PROCEDURE — 38222 DX BONE MARROW BX & ASPIR: CPT | Mod: PBBFAC | Performed by: NURSE PRACTITIONER

## 2019-10-09 PROCEDURE — 83036 HEMOGLOBIN GLYCOSYLATED A1C: CPT

## 2019-10-09 PROCEDURE — 90670 PCV13 VACCINE IM: CPT | Mod: PBBFAC

## 2019-10-09 PROCEDURE — 88313 SPECIAL STAINS GROUP 2: CPT | Mod: 26,BMT,, | Performed by: PATHOLOGY

## 2019-10-09 PROCEDURE — 99215 OFFICE O/P EST HI 40 MIN: CPT | Mod: S$PBB,BMT,25, | Performed by: NURSE PRACTITIONER

## 2019-10-09 PROCEDURE — 99999 PR PBB SHADOW E&M-EST. PATIENT-LVL III: ICD-10-PCS | Mod: PBBFAC,BMT,, | Performed by: NURSE PRACTITIONER

## 2019-10-09 PROCEDURE — 80053 COMPREHEN METABOLIC PANEL: CPT

## 2019-10-09 PROCEDURE — 88313 SPECIAL STAINS GROUP 2: CPT | Performed by: PATHOLOGY

## 2019-10-09 PROCEDURE — 81450 HL NEO GSAP 5-50DNA/DNA&RNA: CPT

## 2019-10-09 PROCEDURE — 90713 POLIOVIRUS IPV SC/IM: CPT | Mod: PBBFAC

## 2019-10-09 PROCEDURE — 81268 CHIMERISM ANAL W/CELL SELECT: CPT | Mod: 91

## 2019-10-09 PROCEDURE — 90700 DTAP VACCINE < 7 YRS IM: CPT | Mod: PBBFAC

## 2019-10-09 PROCEDURE — 88264 CHROMOSOME ANALYSIS 20-25: CPT

## 2019-10-09 PROCEDURE — 80061 LIPID PANEL: CPT

## 2019-10-09 PROCEDURE — 88341 IMHCHEM/IMCYTCHM EA ADD ANTB: CPT | Mod: 26,BMT,, | Performed by: PATHOLOGY

## 2019-10-09 PROCEDURE — 88313 TISSUE SPECIMEN TO PATHOLOGY, BONE MARROW ASPIRATION/BIOPSY PROCEDURE: ICD-10-PCS | Mod: 26,BMT,, | Performed by: PATHOLOGY

## 2019-10-09 PROCEDURE — 88342 TISSUE SPECIMEN TO PATHOLOGY, BONE MARROW ASPIRATION/BIOPSY PROCEDURE: ICD-10-PCS | Mod: 26,BMT,, | Performed by: PATHOLOGY

## 2019-10-09 PROCEDURE — 85097 BONE MARROW INTERPRETATION: CPT | Mod: 59,BMT,, | Performed by: PATHOLOGY

## 2019-10-09 PROCEDURE — 38220 DX BONE MARROW ASPIRATIONS: CPT | Mod: PBBFAC | Performed by: NURSE PRACTITIONER

## 2019-10-09 PROCEDURE — 85025 COMPLETE CBC W/AUTO DIFF WBC: CPT

## 2019-10-09 PROCEDURE — 88184 FLOWCYTOMETRY/ TC 1 MARKER: CPT | Performed by: PATHOLOGY

## 2019-10-09 PROCEDURE — 38222 PR BONE MARROW BIOPSY(IES) W/ASPIRATION(S); DIAGNOSTIC: ICD-10-PCS | Mod: S$PBB,BMT,RT, | Performed by: NURSE PRACTITIONER

## 2019-10-09 PROCEDURE — 99213 OFFICE O/P EST LOW 20 MIN: CPT | Mod: PBBFAC | Performed by: NURSE PRACTITIONER

## 2019-10-09 PROCEDURE — 36415 COLL VENOUS BLD VENIPUNCTURE: CPT

## 2019-10-09 PROCEDURE — 85097 TISSUE SPECIMEN TO PATHOLOGY, BONE MARROW ASPIRATION/BIOPSY PROCEDURE: ICD-10-PCS | Mod: 59,BMT,, | Performed by: PATHOLOGY

## 2019-10-09 PROCEDURE — 88305 TISSUE SPECIMEN TO PATHOLOGY, BONE MARROW ASPIRATION/BIOPSY PROCEDURE: ICD-10-PCS | Mod: 26,BMT,, | Performed by: PATHOLOGY

## 2019-10-09 PROCEDURE — 88237 TISSUE CULTURE BONE MARROW: CPT

## 2019-10-09 PROCEDURE — 88342 IMHCHEM/IMCYTCHM 1ST ANTB: CPT | Mod: 26,BMT,, | Performed by: PATHOLOGY

## 2019-10-09 PROCEDURE — 88189 PR  FLOWCYTOMETRY/READ, 16 & > MARKERS: ICD-10-PCS | Mod: BMT,,, | Performed by: PATHOLOGY

## 2019-10-09 PROCEDURE — 81206 BCR/ABL1 GENE MAJOR BP: CPT

## 2019-10-09 PROCEDURE — 88313 SPECIAL STAINS GROUP 2: CPT

## 2019-10-09 PROCEDURE — 90746 HEPB VACCINE 3 DOSE ADULT IM: CPT | Mod: PBBFAC

## 2019-10-09 PROCEDURE — 38222 DX BONE MARROW BX & ASPIR: CPT | Mod: S$PBB,BMT,RT, | Performed by: NURSE PRACTITIONER

## 2019-10-09 PROCEDURE — 88189 FLOWCYTOMETRY/READ 16 & >: CPT | Mod: BMT,,, | Performed by: PATHOLOGY

## 2019-10-09 PROCEDURE — 88341 TISSUE SPECIMEN TO PATHOLOGY, BONE MARROW ASPIRATION/BIOPSY PROCEDURE: ICD-10-PCS | Mod: 26,BMT,, | Performed by: PATHOLOGY

## 2019-10-09 PROCEDURE — 90472 IMMUNIZATION ADMIN EACH ADD: CPT | Mod: PBBFAC

## 2019-10-09 PROCEDURE — 88185 FLOWCYTOMETRY/TC ADD-ON: CPT | Mod: 59 | Performed by: PATHOLOGY

## 2019-10-09 PROCEDURE — 99215 PR OFFICE/OUTPT VISIT, EST, LEVL V, 40-54 MIN: ICD-10-PCS | Mod: S$PBB,BMT,25, | Performed by: NURSE PRACTITIONER

## 2019-10-09 PROCEDURE — 88305 TISSUE EXAM BY PATHOLOGIST: CPT | Mod: 26,BMT,, | Performed by: PATHOLOGY

## 2019-10-09 NOTE — PROGRESS NOTES
received HIB, Polio, Dtap, Prevnar 13, and Hepatitis B vaccines. Tolerated well. Left unit in NAD.

## 2019-10-09 NOTE — PROCEDURES
Bone marrow  Date/Time: 10/9/2019 10:00 AM  Performed by: Yoanna Andrade NP  Authorized by: Yoanna Andrade NP     Aspiration?: Yes    Biopsy?: Yes      PROCEDURE NOTE:  Date of Procedure: 10/09/2019  Bone Marrow Biopsy and Aspiration  Indication: CML 1 year post allo sct  Consent: Informed consent was obtained from patient.  Timeout: Done and documented.  Position: Prone  Site: Right posterior illiac crest.  Prep: Betadine.  Needle used: 11 gauge Jamshidi needle.  Anesthetic: 2% lidocaine 5 cc.  Biopsy: The biopsy needle was introduced into the marrow cavity and an aspirate was obtained without complications and sent for flow cytometry, BCR ABL, chimerisms, and cytogenetics. Core biopsy obtained without difficulty and sent for routine histologic examination.  Complications: None.  Disposition: The patient was left in room with RN and instructed to remain on back for 20 minutes prior to d/c home. Instructed to remove bandaid in 24 hours.  Blood loss: Minimal.     Yoanna Andrade DNP, NP  Hematology/Oncology

## 2019-10-09 NOTE — PROGRESS NOTES
HEMATOLOGIC MALIGNANCIES PROGRESS NOTE    IDENTIFYING STATEMENT   Wilton Guzmán (Wilton) is a 34 y.o. male with a  of 1985 from [place], [place] with the diagnosis of CML in blast phase.      ONCOLOGY HISTORY:    1. Chronic myeloid leukemia, initially presenting in blast phase   A. 2018: Began developing left-sided abdominal pain - eventually requiring evaluation - WBC ~150 at  base; transferred to [facility] in [place], [place]   B. BMBx showed 20% myelomonoblasts concerning for CML in blast phase; bcr-abl positive (returned for Day 8 of induction therapy).    C. 2018: Began 7+3 induction chemotherapy. Began dasatinib (dose-adjusted) on day 8.   D. 2018: Transferred to [facility]   E. 2018: BMBx showed variably cellular marrow (10-40%) with 9% CD34+ blasts, concerning for residual disease.   F. 2018: Reinduction with MEC (mitoxantrone, etoposide, cytarabine) chemotherapy.    G. 2018: BMBx shows hypocellular bone marrow with no definite morphologic or immunophenotypic evidence of residual leukemia; bcr-abl p210 transcript was detected at 0.5%   H. Delay in clinic follow-up due to challenges with  insurance   I. 2018: Follow-up in clinic; WBC 6.77 (normal diff); Hgb 11.2 g/dl; Plt 298; BMBx shows 30% cellular marrow with no morphologic evidence of residual leukemia; bcr-abl p210 detected at 0.05% on international scale (MR 3.3).    J. 2018: BMBx shows 40% cellular marrow with no morphologic evidence of leukemia. Chromosomes 46, XY. Bcr-abl p210 detected at 0.1% on international scale (MR 3.0).    K. 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 Engrafted on Day +12. Course complicated by C. Difficile colitis.    L. 1/15/2019: Day +100 bone marrow biopsy shows 10% cellular marrow with trilineage hematopoietic activity. Cytogenetics 46,XY. BCR-ABL PCR is positive and shows  0.003% (MR 4.5). Chimerism studies show 100% donor in CD33+ cells and 70% donor/30% recipient in CD3+ cells. Findings consistent with major molecular response (MR 4.5).    M. 4/18/2019: BCR-ABL PCR is negative, consistent with complete molecular response (CMR).    N. 7/17/2019: BCR-ABL PCR is positive at <0.003%, consistent with major molecular response (MR 4.5).    INTERVAL HISTORY:      Mr. Guzmán presents to clinic with his wife for f/u allo SCT. He is Day +365. He is doing well overall. He no longer has hematochezia.  He notes some diarrhea when his CMV levels rise. Not watery, it is soft and formed. He has good appetite.  Denies fevers, chills, shortness of breath, chest pain, n/v/c. No neuropathy or skin rashes.    He has been exercising and gaining muscle mass. He reports that he will be medically retired from the [employer] temporarily for x1 year with a reevaluation in 1 year. He will still be receiving [employer] benefits, including health care, for life. 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     He will have his 1 year bm bx performed today in clinic.     Past Medical History, Past Social History and Past Family History have been reviewed and are unchanged except as noted in the interval history.    MEDICATIONS:     Current Outpatient Medications on File Prior to Visit   Medication Sig Dispense Refill    cetirizine (ZYRTEC) 10 MG tablet Take 1 tablet (10 mg total) by mouth once daily. 30 tablet 11    dasatinib (SPRYCEL) 100 mg Take 1 tablet (100 mg total) by mouth once daily. 30 tablet 2    fluticasone (FLONASE) 50 mcg/actuation nasal spray 1 spray (50 mcg total) by Each Nare route once daily. 16 g 2    [DISCONTINUED] acyclovir (ZOVIRAX) 800 MG Tab Take 1 tablet (800 mg total) by mouth 2 (two) times daily. 60 tablet 1    valGANciclovir (VALCYTE) 450 mg Tab Take 2 tablets (900 mg total) by mouth 2 (two) times daily. (Patient not taking: Reported on  10/9/2019) 120 tablet 0     No current facility-administered medications on file prior to visit.        Review of patient's allergies indicates:  No Known Allergies    Review of Systems   Constitutional: Negative for fatigue, diaphoresis, fever and unexpected weight change.   HENT:   Negative for lump/mass and sore throat.    Eyes: Negative for icterus.   Respiratory: Negative for cough and shortness of breath.    Cardiovascular: Negative for chest pain and palpitations.   Gastrointestinal: Negative for abdominal distention, constipation, nausea and vomiting. Negative for diarrhea. Negative for heamtochezia.   Genitourinary: Negative for dysuria and frequency.  Negative for hematuria.   Musculoskeletal: Negative for arthralgias, gait problem and myalgias.   Skin: Negative for rash or itching.   Neurological: Negative for dizziness, gait problem.   Hematological: Negative for adenopathy. Does not bruise/bleed easily.   Psychiatric/Behavioral: Negative for depression or anxiety.    Vitals:    10/09/19 1015   BP: 132/80   Pulse: 79   Resp: 16   Temp: 98.2 °F (36.8 °C)   SpO2: 100%   PainSc: 0-No pain       KARNOFSKY PERFORMANCE STATUS 80%  ECOG 1    Physical Exam   Constitutional: He is oriented to person, place, and time. He appears well-developed and well-nourished. No distress.   HENT:   Head: Normocephalic and atraumatic.   Mouth/Throat: Mucous membranes are normal. No oral lesions.    Eyes: Conjunctivae are normal.   Neck: No thyromegaly present.   Cardiovascular: Normal rate, regular rhythm and normal heart sounds.   No murmur heard.  Pulmonary/Chest: Breath sounds normal. He has no wheezes. He has no rales.   Abdominal: Soft. He exhibits no distension and no mass. There is no splenomegaly or hepatomegaly. There is no tenderness.   Neurological: He is alert and oriented to person, place, and time. He has normal strength. Coordination normal.   Skin: No rash observed.     LAB:   Results for orders placed or  performed in visit on 10/09/19   Rapid BMT CBC with Diff   Result Value Ref Range    WBC 8.97 3.90 - 12.70 K/uL    RBC 3.84 (L) 4.60 - 6.20 M/uL    Hemoglobin 11.7 (L) 14.0 - 18.0 g/dL    Hematocrit 37.8 (L) 40.0 - 54.0 %    Mean Corpuscular Volume 98 82 - 98 fL    Mean Corpuscular Hemoglobin 30.5 27.0 - 31.0 pg    Mean Corpuscular Hemoglobin Conc 31.0 (L) 32.0 - 36.0 g/dL    RDW 13.3 11.5 - 14.5 %    Platelets 278 150 - 350 K/uL    MPV 8.2 (L) 9.2 - 12.9 fL    Immature Granulocytes 0.1 0.0 - 0.5 %    Gran # (ANC) 1.2 (L) 1.8 - 7.7 K/uL    Immature Grans (Abs) 0.01 0.00 - 0.04 K/uL    Lymph # 6.4 (H) 1.0 - 4.8 K/uL    Mono # 0.7 0.3 - 1.0 K/uL    Eos # 0.7 (H) 0.0 - 0.5 K/uL    Baso # 0.03 0.00 - 0.20 K/uL    nRBC 0 0 /100 WBC    Gran% 13.2 (L) 38.0 - 73.0 %    Lymph% 71.2 (H) 18.0 - 48.0 %    Mono% 7.6 4.0 - 15.0 %    Eosinophil% 7.6 0.0 - 8.0 %    Basophil% 0.3 0.0 - 1.9 %    Differential Method Automated    Comprehensive metabolic panel   Result Value Ref Range    Sodium 141 136 - 145 mmol/L    Potassium 4.2 3.5 - 5.1 mmol/L    Chloride 106 95 - 110 mmol/L    CO2 29 23 - 29 mmol/L    Glucose 79 70 - 110 mg/dL    BUN, Bld 10 6 - 20 mg/dL    Creatinine 1.1 0.5 - 1.4 mg/dL    Calcium 9.5 8.7 - 10.5 mg/dL    Total Protein 8.0 6.0 - 8.4 g/dL    Albumin 3.9 3.5 - 5.2 g/dL    Total Bilirubin 0.3 0.1 - 1.0 mg/dL    Alkaline Phosphatase 87 55 - 135 U/L    AST 33 10 - 40 U/L    ALT 34 10 - 44 U/L    Anion Gap 6 (L) 8 - 16 mmol/L    eGFR if African American >60.0 >60 mL/min/1.73 m^2    eGFR if non African American >60.0 >60 mL/min/1.73 m^2   Lipid panel   Result Value Ref Range    Cholesterol 170 120 - 199 mg/dL    Triglycerides 111 30 - 150 mg/dL    HDL 44 40 - 75 mg/dL    LDL Cholesterol 103.8 63.0 - 159.0 mg/dL    Hdl/Cholesterol Ratio 25.9 20.0 - 50.0 %    Total Cholesterol/HDL Ratio 3.9 2.0 - 5.0    Non-HDL Cholesterol 126 mg/dL       PROBLEMS ASSESSED THIS VISIT:    1. Chronic myeloid leukemia, BCR/ABL-positive, in  remission    2. S/P allogeneic bone marrow transplant    3. Cytomegalovirus (CMV) viremia    4. Cytopenia    5. Anemia, unspecified type        PLAN:        History allo transplant for CML  - See oncology history above  - S/p alloSCT on 10/9/2018  - Neutrophil engraftment on Day +12.   - Currently Day +365  - Plan for +30 & +100 & +1 year restaging  - day +30 BMBX was performed in clinic 11/13/18, no evidence of CML, BCR/ABL 0.005% (MR 4.3). Chimerism studies show 60% donor in CD3+ fraction and 100% donor in CD33+ fraction.   - Repeat chimers from peripheral blood on 12/10/18 shows 80% donor in CD3+ cells and 100% donor in CD33+ cells.  - Restart dasatinib 100 mg daily on 12/10 (no issues any longer with headaches)  - Day +100 Bmbx completed in clinic-- 1/15/18- No morphologic evidence of CML. BCR-ABL transcipt is 0.003%, consistent with MR 4.5. Chimerisms show 100% donor in CD33+ cells and 70% donor in CD3+ cells.   - Repeat chimers from peripheral blood drawn 4/9/19, CD3 90% donor, CD33 100% donor  - Repeat bcr-abl p210 drawn 4/18/19, negative, consistent with complete molecular remission (CMR)  - 7/17/2019 - bcr-abl p210 shows MMR (MR 4.5).   - 1 year BM bx to be done today, 10/9/19    GVHD   - Oral budesonide was discontinued on 3/7/19  - C. Diff negative. Diarrhea improved with imodium. Taking every other day. Pt understands that imodium can cause constipation.  - Continue tacrolimus prophylaxis  - Tacrolimus discontinued   - GVHD charting with each clinic visit           Grade: No GVH    Hematochezia  - resolved; this appears to be related to how he takes dasatinib     ID  - Completed PO vancomycin for C. Diff  - Stopped ppx acyclovir at day +365 (10/9/19); stopped fluconazole (3/25/19)  - Begin atovaquone ppx at Day +30  - Monitor CMV BIW; EBV once weekly  - EBV negative to date  - CMV level was last 115, today's pending. Currently off valganciclovir, will keep prescription on med list in case he needs this  again in future. Continue ID follow-up who currently recommends continue to monitor  - CVC removed 1/11/19, removal site remains without s/s of infection  - EBV DNA negative  - Obtained urinalysis, urine culture, and urine for BK virus - negative    Cytopenias- Anemia   - multifactorial  - Today:  WBC and plt normal, Hgb 11.7. No transfusions indicated  - Donor has sickle cell trait. Some anemia is expected long-term      Follow-up:  - PFTs not done today due to insurance issues. Pt will reschedule.   - BM bx now, Dr. Pantoja to relay results.  - Appt with Dr. Pantoja or NP with CBC, CMP, Mag, Phos, CMV, EBV, type and screen in 1 month.    Yoanna Andrade NP  Hematology and Stem Cell Transplant

## 2019-10-09 NOTE — LETTER
October 9, 2019      Kishor Pantoja MD  [address] [facility] [address]           [facility]  [address]  [place] [address]  Phone: [phone]          Patient: Wilton Guzmán   MR Number: [id number]   YOB: 1985   Date of Visit: 10/9/2019       Dear Dr. Kishor Pantoja:    Thank you for referring Wilton Guzmán to me for evaluation. Attached you will find relevant portions of my assessment and plan of care.    If you have questions, please do not hesitate to call me. I look forward to following Wilton Guzmán along with you.    Sincerely,    Yoanna Andrade, NP    Enclosure  CC:  No Recipients    If you would like to receive this communication electronically, please contact [email or web address] or [phone] to request more information on [employer] access.    For providers and/or their staff who would like to refer a patient to [facility], please contact us through our one-stop-shop provider referral line, [facility] , at [phone].    If you feel you have received this communication in error or would no longer like to receive these types of communications, please e-mail [email or web address]

## 2019-10-09 NOTE — TELEPHONE ENCOUNTER
Spoke to patient in clinic    ----- Message from Pam Rose sent at 10/9/2019  8:27 AM CDT -----  Contact: Pt   Pt called to speak with nurse ludwig about a test that was for today   Callback#[phone]  Thank You  PARVEZ Rose

## 2019-10-10 LAB
BONE MARROW IRON STAIN COMMENT: NORMAL
CMV DNA SERPL NAA+PROBE-ACNC: 173 IU/ML
EBV DNA BY PCR: NORMAL IU/ML

## 2019-10-11 LAB
BCR/ABL,P210 RESULT: NORMAL
PATH REPORT.FINAL DX SPEC: NORMAL
SPECIMEN TYPE: NORMAL

## 2019-10-15 LAB
FINAL DIAGNOSIS - CHIMERISM SORT: NORMAL
SPECIMEN TYPE -CHIMERISM SORT: NORMAL

## 2019-10-17 ENCOUNTER — PATIENT MESSAGE (OUTPATIENT)
Dept: HEMATOLOGY/ONCOLOGY | Facility: CLINIC | Age: 34
End: 2019-10-17

## 2019-10-23 ENCOUNTER — PATIENT MESSAGE (OUTPATIENT)
Dept: HEMATOLOGY/ONCOLOGY | Facility: CLINIC | Age: 34
End: 2019-10-23

## 2019-10-23 ENCOUNTER — TELEPHONE (OUTPATIENT)
Dept: HEMATOLOGY/ONCOLOGY | Facility: CLINIC | Age: 34
End: 2019-10-23

## 2019-10-23 DIAGNOSIS — J32.9 SINUSITIS, UNSPECIFIED CHRONICITY, UNSPECIFIED LOCATION: ICD-10-CM

## 2019-10-23 DIAGNOSIS — C92.11 CHRONIC MYELOID LEUKEMIA, BCR/ABL-POSITIVE, IN REMISSION: Primary | ICD-10-CM

## 2019-10-23 RX ORDER — DOXYCYCLINE HYCLATE 100 MG
100 TABLET ORAL 2 TIMES DAILY
Qty: 10 TABLET | Refills: 0 | Status: SHIPPED | OUTPATIENT
Start: 2019-10-23 | End: 2019-12-02 | Stop reason: ALTCHOICE

## 2019-10-23 NOTE — TELEPHONE ENCOUNTER
Spoke to patient.  Informed him per Dr Moiz choi for steroid injection. Informed him of appt on tomorrow .  States he will be here    ----- Message from Luz Saxena sent at 10/23/2019  2:52 PM CDT -----  Contact: [place]   [phone] cell  Pt. Is asking if he can have a steroid injection or not?     At the Urgent care now.     Spoke to a  Nurse recently about this at your office.   pls call asap.

## 2019-10-24 ENCOUNTER — OFFICE VISIT (OUTPATIENT)
Dept: HEMATOLOGY/ONCOLOGY | Facility: CLINIC | Age: 34
End: 2019-10-24
Payer: OTHER GOVERNMENT

## 2019-10-24 ENCOUNTER — LAB VISIT (OUTPATIENT)
Dept: LAB | Facility: HOSPITAL | Age: 34
End: 2019-10-24
Attending: INTERNAL MEDICINE
Payer: OTHER GOVERNMENT

## 2019-10-24 VITALS
RESPIRATION RATE: 17 BRPM | HEART RATE: 84 BPM | BODY MASS INDEX: 21.74 KG/M2 | SYSTOLIC BLOOD PRESSURE: 134 MMHG | DIASTOLIC BLOOD PRESSURE: 75 MMHG | WEIGHT: 155.88 LBS | TEMPERATURE: 99 F | OXYGEN SATURATION: 100 %

## 2019-10-24 DIAGNOSIS — B25.9 CYTOMEGALOVIRUS (CMV) VIREMIA: ICD-10-CM

## 2019-10-24 DIAGNOSIS — C92.11 CHRONIC MYELOID LEUKEMIA, BCR/ABL-POSITIVE, IN REMISSION: ICD-10-CM

## 2019-10-24 DIAGNOSIS — D57.3 SICKLE CELL TRAIT: ICD-10-CM

## 2019-10-24 DIAGNOSIS — C92.10 CML (CHRONIC MYELOCYTIC LEUKEMIA): ICD-10-CM

## 2019-10-24 DIAGNOSIS — Z94.81 S/P ALLOGENEIC BONE MARROW TRANSPLANT: ICD-10-CM

## 2019-10-24 DIAGNOSIS — D89.811 CHRONIC GRAFT-VERSUS-HOST DISEASE: Primary | ICD-10-CM

## 2019-10-24 LAB
ABO + RH BLD: NORMAL
ALBUMIN SERPL BCP-MCNC: 3.8 G/DL (ref 3.5–5.2)
ALP SERPL-CCNC: 120 U/L (ref 55–135)
ALT SERPL W/O P-5'-P-CCNC: 98 U/L (ref 10–44)
ANION GAP SERPL CALC-SCNC: 8 MMOL/L (ref 8–16)
ANISOCYTOSIS BLD QL SMEAR: SLIGHT
AST SERPL-CCNC: 77 U/L (ref 10–40)
BASO STIPL BLD QL SMEAR: ABNORMAL
BASOPHILS # BLD AUTO: 0.03 K/UL (ref 0–0.2)
BASOPHILS NFR BLD: 0.3 % (ref 0–1.9)
BILIRUB SERPL-MCNC: 0.3 MG/DL (ref 0.1–1)
BLD GP AB SCN CELLS X3 SERPL QL: NORMAL
BUN SERPL-MCNC: 12 MG/DL (ref 6–20)
CALCIUM SERPL-MCNC: 9.2 MG/DL (ref 8.7–10.5)
CHLORIDE SERPL-SCNC: 104 MMOL/L (ref 95–110)
CO2 SERPL-SCNC: 28 MMOL/L (ref 23–29)
CREAT SERPL-MCNC: 1 MG/DL (ref 0.5–1.4)
DIFFERENTIAL METHOD: ABNORMAL
EOSINOPHIL # BLD AUTO: 0 K/UL (ref 0–0.5)
EOSINOPHIL NFR BLD: 0.1 % (ref 0–8)
ERYTHROCYTE [DISTWIDTH] IN BLOOD BY AUTOMATED COUNT: 13.4 % (ref 11.5–14.5)
EST. GFR  (AFRICAN AMERICAN): >60 ML/MIN/1.73 M^2
EST. GFR  (NON AFRICAN AMERICAN): >60 ML/MIN/1.73 M^2
GLUCOSE SERPL-MCNC: 96 MG/DL (ref 70–110)
HCT VFR BLD AUTO: 35.2 % (ref 40–54)
HGB BLD-MCNC: 11.3 G/DL (ref 14–18)
IMM GRANULOCYTES # BLD AUTO: 0.03 K/UL (ref 0–0.04)
IMM GRANULOCYTES NFR BLD AUTO: 0.3 % (ref 0–0.5)
LYMPHOCYTES # BLD AUTO: 5 K/UL (ref 1–4.8)
LYMPHOCYTES NFR BLD: 54.2 % (ref 18–48)
MAGNESIUM SERPL-MCNC: 2 MG/DL (ref 1.6–2.6)
MCH RBC QN AUTO: 30.3 PG (ref 27–31)
MCHC RBC AUTO-ENTMCNC: 32.1 G/DL (ref 32–36)
MCV RBC AUTO: 94 FL (ref 82–98)
MONOCYTES # BLD AUTO: 0.8 K/UL (ref 0.3–1)
MONOCYTES NFR BLD: 8.9 % (ref 4–15)
NEUTROPHILS # BLD AUTO: 3.3 K/UL (ref 1.8–7.7)
NEUTROPHILS NFR BLD: 36.2 % (ref 38–73)
NRBC BLD-RTO: 0 /100 WBC
PHOSPHATE SERPL-MCNC: 3.1 MG/DL (ref 2.7–4.5)
PLATELET # BLD AUTO: 197 K/UL (ref 150–350)
PLATELET BLD QL SMEAR: ABNORMAL
PMV BLD AUTO: 9 FL (ref 9.2–12.9)
POIKILOCYTOSIS BLD QL SMEAR: SLIGHT
POLYCHROMASIA BLD QL SMEAR: ABNORMAL
POTASSIUM SERPL-SCNC: 3.7 MMOL/L (ref 3.5–5.1)
PROT SERPL-MCNC: 7.9 G/DL (ref 6–8.4)
RBC # BLD AUTO: 3.73 M/UL (ref 4.6–6.2)
SODIUM SERPL-SCNC: 140 MMOL/L (ref 136–145)
TARGETS BLD QL SMEAR: ABNORMAL
WBC # BLD AUTO: 9.14 K/UL (ref 3.9–12.7)

## 2019-10-24 PROCEDURE — 86850 RBC ANTIBODY SCREEN: CPT

## 2019-10-24 PROCEDURE — 85025 COMPLETE CBC W/AUTO DIFF WBC: CPT

## 2019-10-24 PROCEDURE — 83735 ASSAY OF MAGNESIUM: CPT

## 2019-10-24 PROCEDURE — 99215 PR OFFICE/OUTPT VISIT, EST, LEVL V, 40-54 MIN: ICD-10-PCS | Mod: S$PBB,BMT,, | Performed by: INTERNAL MEDICINE

## 2019-10-24 PROCEDURE — 99213 OFFICE O/P EST LOW 20 MIN: CPT | Mod: PBBFAC,25 | Performed by: INTERNAL MEDICINE

## 2019-10-24 PROCEDURE — 84100 ASSAY OF PHOSPHORUS: CPT

## 2019-10-24 PROCEDURE — 80053 COMPREHEN METABOLIC PANEL: CPT

## 2019-10-24 PROCEDURE — 99999 PR PBB SHADOW E&M-EST. PATIENT-LVL III: ICD-10-PCS | Mod: PBBFAC,BMT,, | Performed by: INTERNAL MEDICINE

## 2019-10-24 PROCEDURE — 99215 OFFICE O/P EST HI 40 MIN: CPT | Mod: S$PBB,BMT,, | Performed by: INTERNAL MEDICINE

## 2019-10-24 PROCEDURE — 87799 DETECT AGENT NOS DNA QUANT: CPT

## 2019-10-24 PROCEDURE — 99999 PR PBB SHADOW E&M-EST. PATIENT-LVL III: CPT | Mod: PBBFAC,BMT,, | Performed by: INTERNAL MEDICINE

## 2019-10-24 PROCEDURE — 80197 ASSAY OF TACROLIMUS: CPT

## 2019-10-24 RX ORDER — METHYLPREDNISOLONE 4 MG/1
TABLET ORAL
Qty: 1 PACKAGE | Refills: 0 | Status: SHIPPED | OUTPATIENT
Start: 2019-10-24 | End: 2019-12-02 | Stop reason: ALTCHOICE

## 2019-10-25 LAB — TACROLIMUS BLD-MCNC: <1.5 NG/ML (ref 5–15)

## 2019-10-25 NOTE — PROGRESS NOTES
HEMATOLOGIC MALIGNANCIES PROGRESS NOTE    IDENTIFYING STATEMENT   Wilton Guzmán (Wilton) is a 34 y.o. 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      ONCOLOGY HISTORY:    1. Chronic myeloid leukemia, initially presenting in blast phase   A. 2018: Began developing left-sided abdominal pain - eventually requiring evaluation - WBC ~150 at  base; transferred to [facility] in [place], [place]   B. BMBx showed 20% myelomonoblasts concerning for CML in blast phase; bcr-abl positive (returned for Day 8 of induction therapy).    C. 2018: Began 7+3 induction chemotherapy. Began dasatinib (dose-adjusted) on day 8.   D. 2018: Transferred to [facility]   E. 2018: BMBx showed variably cellular marrow (10-40%) with 9% CD34+ blasts, concerning for residual disease.   F. 2018: Reinduction with MEC (mitoxantrone, etoposide, cytarabine) chemotherapy.    G. 2018: BMBx shows hypocellular bone marrow with no definite morphologic or immunophenotypic evidence of residual leukemia; bcr-abl p210 transcript was detected at 0.5%   H. Delay in clinic follow-up due to challenges with  insurance   I. 2018: Follow-up in clinic; WBC 6.77 (normal diff); Hgb 11.2 g/dl; Plt 298; BMBx shows 30% cellular marrow with no morphologic evidence of residual leukemia; bcr-abl p210 detected at 0.05% on international scale (MR 3.3).    J. 2018: BMBx shows 40% cellular marrow with no morphologic evidence of leukemia. Chromosomes 46, XY. Bcr-abl p210 detected at 0.1% on international scale (MR 3.0).    K. 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 Engrafted on Day +12. Course complicated by C. Difficile colitis.    L. 1/15/2019: Day +100 bone marrow biopsy shows 10% cellular marrow with trilineage hematopoietic activity. Cytogenetics 46,XY. BCR-ABL PCR is positive and shows  0.003% (MR 4.5). Chimerism studies show 100% donor in CD33+ cells and 70% donor/30% recipient in CD3+ cells. Findings consistent with major molecular response (MR 4.5).    M. 4/18/2019: BCR-ABL PCR is negative, consistent with complete molecular response (CMR).    N. 7/17/2019: BCR-ABL PCR is positive at <0.003%, consistent with major molecular response (MR 4.5).   O. 10/9/2019: Bone marrow biopsy for 1-year post-transplant shows no morphologic evidence of CML. Cytogenetics 46,XY. BCR-ABL is negative, consistent with complete molecular response (CMR). Chimerisms show >95% donor in CD3+ cells and 100% donor in CD33+ cells.     INTERVAL HISTORY:      Mr. Guzmán presents to clinic with his wife for f/u allo SCT. He is now 1 year post transplant. He is being seen after experiencing some symptoms over the last few days. Two nights ago, he called me while I was on call to report fever to 102.9. He presented to urgent care the next day, and evaluation was concerning for sinusitis. Influenza was ruled out. No other etiology was identified. He was prescribed amoxicillin.     He developed a pruritic rash the day following starting amoxicillin. He also had diarrhea. He saw a local doctor and got a steroid injection. he states his symptoms improved substantially after the steroids.     Past Medical History, Past Social History and Past Family History have been reviewed and are unchanged except as noted in the interval history.    MEDICATIONS:     Current Outpatient Medications on File Prior to Visit   Medication Sig Dispense Refill    cetirizine (ZYRTEC) 10 MG tablet Take 1 tablet (10 mg total) by mouth once daily. 30 tablet 11    dasatinib (SPRYCEL) 100 mg Take 1 tablet (100 mg total) by mouth once daily. 30 tablet 2    doxycycline (VIBRA-TABS) 100 MG tablet Take 1 tablet (100 mg total) by mouth 2 (two) times daily. 10 tablet 0    fluticasone (FLONASE) 50 mcg/actuation nasal spray 1 spray (50 mcg total) by Each Nare route  once daily. 16 g 2    valGANciclovir (VALCYTE) 450 mg Tab Take 2 tablets (900 mg total) by mouth 2 (two) times daily. (Patient not taking: Reported on 10/9/2019) 120 tablet 0     No current facility-administered medications on file prior to visit.        Review of patient's allergies indicates:  No Known Allergies    Review of Systems   Constitutional: Negative for fatigue, diaphoresis, and unexpected weight change. Positive for fever.   HENT:   Negative for lump/mass and sore throat.    Eyes: Negative for icterus.   Respiratory: Negative for cough and shortness of breath.    Cardiovascular: Negative for chest pain and palpitations.   Gastrointestinal: Negative for abdominal distention, constipation, nausea and vomiting. Positive for diarrhea. Negative for heamtochezia.   Genitourinary: Negative for dysuria and frequency.  Negative for hematuria.   Musculoskeletal: Negative for arthralgias, gait problem and myalgias.   Skin: Negative for  itching. Positive for rash.  Neurological: Negative for dizziness, gait problem.   Hematological: Negative for adenopathy. Does not bruise/bleed easily.   Psychiatric/Behavioral: Negative for depression or anxiety.    Vitals:    10/24/19 1400   BP: 134/75   Pulse: 84   Resp: 17   Temp: 98.6 °F (37 °C)   SpO2: 100%   Weight: 70.7 kg (155 lb 13.8 oz)   PainSc: 0-No pain       KARNOFSKY PERFORMANCE STATUS 90%  ECOG 1    Physical Exam   Constitutional: He is oriented to person, place, and time. He appears well-developed and well-nourished. No distress.   HENT:   Head: Normocephalic and atraumatic.   Mouth/Throat: Mucous membranes are normal. There are lichenoid changes along the left buccal surface.   Eyes: Conjunctivae are normal.   Neck: No thyromegaly present.   Cardiovascular: Normal rate, regular rhythm and normal heart sounds.   No murmur heard.  Pulmonary/Chest: Breath sounds normal. He has no wheezes. He has no rales.   Abdominal: Soft. He exhibits no distension and no mass.  There is no splenomegaly or hepatomegaly. There is no tenderness.   Neurological: He is alert and oriented to person, place, and time. He has normal strength. Coordination normal.   Skin: Scattered follicular rash observed    LAB:   Results for orders placed or performed in visit on 10/24/19   Rapid BMT CBC with Diff   Result Value Ref Range    WBC 9.14 3.90 - 12.70 K/uL    RBC 3.73 (L) 4.60 - 6.20 M/uL    Hemoglobin 11.3 (L) 14.0 - 18.0 g/dL    Hematocrit 35.2 (L) 40.0 - 54.0 %    Mean Corpuscular Volume 94 82 - 98 fL    Mean Corpuscular Hemoglobin 30.3 27.0 - 31.0 pg    Mean Corpuscular Hemoglobin Conc 32.1 32.0 - 36.0 g/dL    RDW 13.4 11.5 - 14.5 %    Platelets 197 150 - 350 K/uL    MPV 9.0 (L) 9.2 - 12.9 fL    Immature Granulocytes 0.3 0.0 - 0.5 %    Gran # (ANC) 3.3 1.8 - 7.7 K/uL    Immature Grans (Abs) 0.03 0.00 - 0.04 K/uL    Lymph # 5.0 (H) 1.0 - 4.8 K/uL    Mono # 0.8 0.3 - 1.0 K/uL    Eos # 0.0 0.0 - 0.5 K/uL    Baso # 0.03 0.00 - 0.20 K/uL    nRBC 0 0 /100 WBC    Gran% 36.2 (L) 38.0 - 73.0 %    Lymph% 54.2 (H) 18.0 - 48.0 %    Mono% 8.9 4.0 - 15.0 %    Eosinophil% 0.1 0.0 - 8.0 %    Basophil% 0.3 0.0 - 1.9 %    Platelet Estimate Appears normal     Aniso Slight     Poik Slight     Poly Occasional     Target Cells Occasional     Basophilic Stippling Occasional     Differential Method Automated    Magnesium   Result Value Ref Range    Magnesium 2.0 1.6 - 2.6 mg/dL   Phosphorus   Result Value Ref Range    Phosphorus 3.1 2.7 - 4.5 mg/dL   Tacrolimus level   Result Value Ref Range    Tacrolimus Lvl <1.5 (L) 5.0 - 15.0 ng/mL   Comprehensive metabolic panel   Result Value Ref Range    Sodium 140 136 - 145 mmol/L    Potassium 3.7 3.5 - 5.1 mmol/L    Chloride 104 95 - 110 mmol/L    CO2 28 23 - 29 mmol/L    Glucose 96 70 - 110 mg/dL    BUN, Bld 12 6 - 20 mg/dL    Creatinine 1.0 0.5 - 1.4 mg/dL    Calcium 9.2 8.7 - 10.5 mg/dL    Total Protein 7.9 6.0 - 8.4 g/dL    Albumin 3.8 3.5 - 5.2 g/dL    Total Bilirubin 0.3  0.1 - 1.0 mg/dL    Alkaline Phosphatase 120 55 - 135 U/L    AST 77 (H) 10 - 40 U/L    ALT 98 (H) 10 - 44 U/L    Anion Gap 8 8 - 16 mmol/L    eGFR if African American >60.0 >60 mL/min/1.73 m^2    eGFR if non African American >60.0 >60 mL/min/1.73 m^2   Type & Screen   Result Value Ref Range    Group & Rh B POS     Indirect Wes NEG        PROBLEMS ASSESSED THIS VISIT:    1. Chronic qyckc-ybepjd-spsv disease    2. Cytomegalovirus (CMV) viremia    3. S/P allogeneic bone marrow transplant    4. Chronic myeloid leukemia, BCR/ABL-positive, in remission    5. Sickle cell trait        PLAN:        History allo transplant for CML  - See oncology history above  - S/p alloSCT on 10/9/2018  - Neutrophil engraftment on Day +12.   - Currently 1 year post transplant.   - Plan for +30 & +100 & +1 year restaging  - day +30 BMBX was performed in clinic 11/13/18, no evidence of CML, BCR/ABL 0.005% (MR 4.3). Chimerism studies show 60% donor in CD3+ fraction and 100% donor in CD33+ fraction.   - Repeat chimers from peripheral blood on 12/10/18 shows 80% donor in CD3+ cells and 100% donor in CD33+ cells.  - Restart dasatinib 100 mg daily on 12/10 (no issues any longer with headaches)  - Day +100 Bmbx completed in clinic-- 1/15/18- No morphologic evidence of CML. BCR-ABL transcipt is 0.003%, consistent with MR 4.5. Chimerisms show 100% donor in CD33+ cells and 70% donor in CD3+ cells.   - Repeat chimers from peripheral blood drawn 4/9/19, CD3 90% donor, CD33 100% donor  - Repeat bcr-abl p210 drawn 4/18/19, negative, consistent with complete molecular remission (CMR)  - 7/17/2019 - bcr-abl p210 shows MMR (MR 4.5).   - 1 year BM bx shows complete molecular remission. CD33+ cells are 100% donor. CD3+ cells are >95% donor.     GVHD   - Oral budesonide was discontinued on 3/7/19  - C. Diff negative. Diarrhea improved with imodium. Taking every other day. Pt understands that imodium can cause constipation.  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 Overall, it is mild in severity. We will attempt management with short-term steroid taper. If this fails, we will consider more escalating therapy.     Hematochezia  - resolved; this appears to be related to how he takes dasatinib     ID  - Completed PO vancomycin for C. Diff  - Stopped ppx acyclovir at day +365 (10/9/19); stopped fluconazole (3/25/19)  - Begin atovaquone ppx at Day +30  - Monitor CMV BIW; EBV once weekly  - EBV negative to date  - CMV level was last 179. Currently off valganciclovir, will keep prescription on med list in case he needs this again in future. Continue ID follow-up who currently recommends continue to monitor  - CVC removed 1/11/19, removal site remains without s/s of infection  - EBV DNA negative  - Obtained urinalysis, urine culture, and urine for BK virus - negative    Cytopenias- Anemia   - multifactorial  - Today:  WBC and plt normal, Hgb 11.3. No transfusions indicated  - Donor has sickle cell trait. Some anemia is expected long-term      Follow-up:  - PFTs scheduled for 11/6  - Return to clinic on 11/6 as scheduled for follow-up of cGVHD    Kishor Pantoja MD  Hematology and Stem Cell Transplant

## 2019-10-29 LAB — EBV DNA BY PCR: NORMAL IU/ML

## 2019-11-04 LAB
ANNOTATION COMMENT IMP: NORMAL
NGS CLINCIAL TRIALS: NORMAL
NGS INDICATION OF TEST: NORMAL
NGS INTERPRETATION: NORMAL
NGS ONCOHEME PANEL GENE LIST: NORMAL
NGS PATHOGENIC MUTATIONS DETECTED: NORMAL
NGS REVIEWED BY:: NORMAL
NGS VARIANTS OF UNKNOWN SIGNIFICANCE: NORMAL
NGSHM RESULT, BONE MARROW: NORMAL
REF LAB TEST METHOD: NORMAL
SPECIMEN SOURCE: NORMAL
TEST PERFORMANCE INFO SPEC: NORMAL

## 2019-11-06 ENCOUNTER — TELEPHONE (OUTPATIENT)
Dept: HEMATOLOGY/ONCOLOGY | Facility: CLINIC | Age: 34
End: 2019-11-06

## 2019-11-20 ENCOUNTER — PATIENT MESSAGE (OUTPATIENT)
Dept: HEMATOLOGY/ONCOLOGY | Facility: CLINIC | Age: 34
End: 2019-11-20

## 2019-11-22 ENCOUNTER — PATIENT MESSAGE (OUTPATIENT)
Dept: HEMATOLOGY/ONCOLOGY | Facility: CLINIC | Age: 34
End: 2019-11-22

## 2019-12-02 ENCOUNTER — CLINICAL SUPPORT (OUTPATIENT)
Dept: INFECTIOUS DISEASES | Facility: CLINIC | Age: 34
End: 2019-12-02
Payer: OTHER GOVERNMENT

## 2019-12-02 ENCOUNTER — OFFICE VISIT (OUTPATIENT)
Dept: HEMATOLOGY/ONCOLOGY | Facility: CLINIC | Age: 34
End: 2019-12-02
Payer: OTHER GOVERNMENT

## 2019-12-02 ENCOUNTER — LAB VISIT (OUTPATIENT)
Dept: LAB | Facility: HOSPITAL | Age: 34
End: 2019-12-02
Payer: OTHER GOVERNMENT

## 2019-12-02 ENCOUNTER — HOSPITAL ENCOUNTER (OUTPATIENT)
Dept: PULMONOLOGY | Facility: CLINIC | Age: 34
Discharge: HOME | End: 2019-12-02
Payer: OTHER GOVERNMENT

## 2019-12-02 VITALS
BODY MASS INDEX: 22.29 KG/M2 | HEIGHT: 71 IN | DIASTOLIC BLOOD PRESSURE: 72 MMHG | OXYGEN SATURATION: 100 % | WEIGHT: 159.19 LBS | HEART RATE: 83 BPM | TEMPERATURE: 99 F | SYSTOLIC BLOOD PRESSURE: 127 MMHG | RESPIRATION RATE: 18 BRPM

## 2019-12-02 DIAGNOSIS — C92.10 CML (CHRONIC MYELOCYTIC LEUKEMIA): ICD-10-CM

## 2019-12-02 DIAGNOSIS — B25.9 CYTOMEGALOVIRUS (CMV) VIREMIA: ICD-10-CM

## 2019-12-02 DIAGNOSIS — Z94.81 S/P ALLOGENEIC BONE MARROW TRANSPLANT: ICD-10-CM

## 2019-12-02 DIAGNOSIS — C92.11 CHRONIC MYELOID LEUKEMIA, BCR/ABL-POSITIVE, IN REMISSION: Primary | ICD-10-CM

## 2019-12-02 DIAGNOSIS — D57.3 SICKLE CELL TRAIT: ICD-10-CM

## 2019-12-02 LAB
ALBUMIN SERPL BCP-MCNC: 3.8 G/DL (ref 3.5–5.2)
ALP SERPL-CCNC: 73 U/L (ref 55–135)
ALT SERPL W/O P-5'-P-CCNC: 23 U/L (ref 10–44)
ANION GAP SERPL CALC-SCNC: 6 MMOL/L (ref 8–16)
AST SERPL-CCNC: 24 U/L (ref 10–40)
BASOPHILS # BLD AUTO: 0.02 K/UL (ref 0–0.2)
BASOPHILS NFR BLD: 0.5 % (ref 0–1.9)
BILIRUB SERPL-MCNC: 0.4 MG/DL (ref 0.1–1)
BUN SERPL-MCNC: 9 MG/DL (ref 6–20)
CALCIUM SERPL-MCNC: 9.2 MG/DL (ref 8.7–10.5)
CHLORIDE SERPL-SCNC: 105 MMOL/L (ref 95–110)
CO2 SERPL-SCNC: 28 MMOL/L (ref 23–29)
CREAT SERPL-MCNC: 1 MG/DL (ref 0.5–1.4)
DIFFERENTIAL METHOD: ABNORMAL
DLCO ADJ PRE: 24.4 ML/(MIN*MMHG) (ref 29.47–43.32)
DLCO SINGLE BREATH LLN: 29.47
DLCO SINGLE BREATH PRE REF: 67 %
DLCO SINGLE BREATH REF: 36.4
DLCOC SBVA LLN: 3.65
DLCOC SBVA PRE REF: 87.3 %
DLCOC SBVA REF: 4.83
DLCOC SINGLE BREATH LLN: 29.47
DLCOC SINGLE BREATH PRE REF: 67 %
DLCOC SINGLE BREATH REF: 36.4
DLCOCSBVAULN: 6.01
DLCOCSINGLEBREATHULN: 43.32
DLCOSINGLEBREATHULN: 43.32
DLCOVA LLN: 3.65
DLCOVA PRE REF: 87.3 %
DLCOVA PRE: 4.21 ML/(MIN*MMHG*L) (ref 3.65–6.01)
DLCOVA REF: 4.83
DLCOVAULN: 6.01
DLVAADJ PRE: 4.21 ML/(MIN*MMHG*L) (ref 3.65–6.01)
EOSINOPHIL # BLD AUTO: 0.3 K/UL (ref 0–0.5)
EOSINOPHIL NFR BLD: 6.2 % (ref 0–8)
ERYTHROCYTE [DISTWIDTH] IN BLOOD BY AUTOMATED COUNT: 13.5 % (ref 11.5–14.5)
EST. GFR  (AFRICAN AMERICAN): >60 ML/MIN/1.73 M^2
EST. GFR  (NON AFRICAN AMERICAN): >60 ML/MIN/1.73 M^2
FEF 25 75 LLN: 2.26
FEF 25 75 PRE REF: 113.3 %
FEF 25 75 REF: 4.07
FEV05 LLN: 2.35
FEV05 REF: 3.48
FEV1 FVC LLN: 72
FEV1 FVC PRE REF: 104.5 %
FEV1 FVC REF: 83
FEV1 LLN: 3.11
FEV1 PRE REF: 90.8 %
FEV1 REF: 4.03
FVC LLN: 3.85
FVC PRE REF: 86.6 %
FVC REF: 4.89
GIANT PLATELETS BLD QL SMEAR: PRESENT
GLUCOSE SERPL-MCNC: 94 MG/DL (ref 70–110)
HCT VFR BLD AUTO: 36.3 % (ref 40–54)
HGB BLD-MCNC: 11.2 G/DL (ref 14–18)
IMM GRANULOCYTES # BLD AUTO: 0.01 K/UL (ref 0–0.04)
IMM GRANULOCYTES NFR BLD AUTO: 0.2 % (ref 0–0.5)
IVC PRE: 4.12 L (ref 3.85–5.93)
IVC SINGLE BREATH LLN: 3.85
IVC SINGLE BREATH PRE REF: 84.2 %
IVC SINGLE BREATH REF: 4.89
IVCSINGLEBREATHULN: 5.93
LYMPHOCYTES # BLD AUTO: 2.5 K/UL (ref 1–4.8)
LYMPHOCYTES NFR BLD: 60.6 % (ref 18–48)
MAGNESIUM SERPL-MCNC: 1.8 MG/DL (ref 1.6–2.6)
MCH RBC QN AUTO: 30.3 PG (ref 27–31)
MCHC RBC AUTO-ENTMCNC: 30.9 G/DL (ref 32–36)
MCV RBC AUTO: 98 FL (ref 82–98)
MONOCYTES # BLD AUTO: 0.4 K/UL (ref 0.3–1)
MONOCYTES NFR BLD: 10.1 % (ref 4–15)
MVV LLN: 151
MVV REF: 178
NEUTROPHILS # BLD AUTO: 0.9 K/UL (ref 1.8–7.7)
NEUTROPHILS NFR BLD: 22.4 % (ref 38–73)
NRBC BLD-RTO: 0 /100 WBC
PEF LLN: 7.26
PEF PRE REF: 69.6 %
PEF REF: 10.07
PHOSPHATE SERPL-MCNC: 2.9 MG/DL (ref 2.7–4.5)
PHYSICIAN COMMENT: ABNORMAL
PLATELET # BLD AUTO: 299 K/UL (ref 150–350)
PLATELET BLD QL SMEAR: ABNORMAL
PMV BLD AUTO: 8.9 FL (ref 9.2–12.9)
POTASSIUM SERPL-SCNC: 4 MMOL/L (ref 3.5–5.1)
PRE DLCO: 24.4 ML/(MIN*MMHG) (ref 29.47–43.32)
PRE FEF 25 75: 4.61 L/S (ref 2.26–5.88)
PRE FET 100: 5.64 SEC
PRE FEV05 REF: 83.2 %
PRE FEV1 FVC: 86.26 % (ref 72.22–92.9)
PRE FEV1: 3.65 L (ref 3.11–4.94)
PRE FEV5: 2.9 L (ref 2.35–4.62)
PRE FVC: 4.24 L (ref 3.85–5.93)
PRE PEF: 7.01 L/S (ref 7.26–12.88)
PROT SERPL-MCNC: 7.4 G/DL (ref 6–8.4)
RBC # BLD AUTO: 3.7 M/UL (ref 4.6–6.2)
SODIUM SERPL-SCNC: 139 MMOL/L (ref 136–145)
VA PRE: 5.79 L (ref 7.39–7.39)
VA SINGLE BREATH LLN: 7.39
VA SINGLE BREATH PRE REF: 78.4 %
VA SINGLE BREATH REF: 7.39
VASINGLEBREATHULN: 7.39
WBC # BLD AUTO: 4.06 K/UL (ref 3.9–12.7)

## 2019-12-02 PROCEDURE — 94729 DIFFUSING CAPACITY: CPT | Mod: PBBFAC | Performed by: INTERNAL MEDICINE

## 2019-12-02 PROCEDURE — 85025 COMPLETE CBC W/AUTO DIFF WBC: CPT

## 2019-12-02 PROCEDURE — 94729 PR C02/MEMBANE DIFFUSE CAPACITY: ICD-10-PCS | Mod: 26,S$PBB,BMT, | Performed by: INTERNAL MEDICINE

## 2019-12-02 PROCEDURE — 99999 PR PBB SHADOW E&M-EST. PATIENT-LVL III: CPT | Mod: PBBFAC,BMT,, | Performed by: INTERNAL MEDICINE

## 2019-12-02 PROCEDURE — 99213 OFFICE O/P EST LOW 20 MIN: CPT | Mod: PBBFAC,25 | Performed by: INTERNAL MEDICINE

## 2019-12-02 PROCEDURE — 90746 HEPB VACCINE 3 DOSE ADULT IM: CPT | Mod: PBBFAC

## 2019-12-02 PROCEDURE — 94010 BREATHING CAPACITY TEST: CPT | Mod: PBBFAC | Performed by: INTERNAL MEDICINE

## 2019-12-02 PROCEDURE — 94010 BREATHING CAPACITY TEST: CPT | Mod: 26,S$PBB,BMT, | Performed by: INTERNAL MEDICINE

## 2019-12-02 PROCEDURE — 99215 OFFICE O/P EST HI 40 MIN: CPT | Mod: S$PBB,BMT,, | Performed by: INTERNAL MEDICINE

## 2019-12-02 PROCEDURE — 90700 DTAP VACCINE < 7 YRS IM: CPT | Mod: PBBFAC

## 2019-12-02 PROCEDURE — 80053 COMPREHEN METABOLIC PANEL: CPT

## 2019-12-02 PROCEDURE — 90648 HIB PRP-T VACCINE 4 DOSE IM: CPT | Mod: PBBFAC

## 2019-12-02 PROCEDURE — 90670 PCV13 VACCINE IM: CPT | Mod: PBBFAC

## 2019-12-02 PROCEDURE — 94010 BREATHING CAPACITY TEST: ICD-10-PCS | Mod: 26,S$PBB,BMT, | Performed by: INTERNAL MEDICINE

## 2019-12-02 PROCEDURE — 99215 PR OFFICE/OUTPT VISIT, EST, LEVL V, 40-54 MIN: ICD-10-PCS | Mod: S$PBB,BMT,, | Performed by: INTERNAL MEDICINE

## 2019-12-02 PROCEDURE — 90713 POLIOVIRUS IPV SC/IM: CPT | Mod: PBBFAC

## 2019-12-02 PROCEDURE — 87799 DETECT AGENT NOS DNA QUANT: CPT

## 2019-12-02 PROCEDURE — 90472 IMMUNIZATION ADMIN EACH ADD: CPT | Mod: PBBFAC

## 2019-12-02 PROCEDURE — 94729 DIFFUSING CAPACITY: CPT | Mod: 26,S$PBB,BMT, | Performed by: INTERNAL MEDICINE

## 2019-12-02 PROCEDURE — 83735 ASSAY OF MAGNESIUM: CPT

## 2019-12-02 PROCEDURE — 84100 ASSAY OF PHOSPHORUS: CPT

## 2019-12-02 PROCEDURE — 99999 PR PBB SHADOW E&M-EST. PATIENT-LVL III: ICD-10-PCS | Mod: PBBFAC,BMT,, | Performed by: INTERNAL MEDICINE

## 2019-12-02 NOTE — PROGRESS NOTES
Mr. Guzmán received Hepatitis B, Prevnar 13, Polio, Hib and Dtap vaccines   Tolerated well  Left unit in NAD

## 2019-12-03 LAB — CMV DNA SERPL NAA+PROBE-ACNC: 51 IU/ML

## 2019-12-03 NOTE — PROGRESS NOTES
HEMATOLOGIC MALIGNANCIES PROGRESS NOTE    IDENTIFYING STATEMENT   Wilton Guzmán (Wilton) is a 34 y.o. 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      ONCOLOGY HISTORY:    1. Chronic myeloid leukemia, initially presenting in blast phase   A. 2018: Began developing left-sided abdominal pain - eventually requiring evaluation - WBC ~150 at  base; transferred to [facility] in [place], [place]   B. BMBx showed 20% myelomonoblasts concerning for CML in blast phase; bcr-abl positive (returned for Day 8 of induction therapy).    C. 2018: Began 7+3 induction chemotherapy. Began dasatinib (dose-adjusted) on day 8.   D. 2018: Transferred to [facility]   E. 2018: BMBx showed variably cellular marrow (10-40%) with 9% CD34+ blasts, concerning for residual disease.   F. 2018: Reinduction with MEC (mitoxantrone, etoposide, cytarabine) chemotherapy.    G. 2018: BMBx shows hypocellular bone marrow with no definite morphologic or immunophenotypic evidence of residual leukemia; bcr-abl p210 transcript was detected at 0.5%   H. Delay in clinic follow-up due to challenges with  insurance   I. 2018: Follow-up in clinic; WBC 6.77 (normal diff); Hgb 11.2 g/dl; Plt 298; BMBx shows 30% cellular marrow with no morphologic evidence of residual leukemia; bcr-abl p210 detected at 0.05% on international scale (MR 3.3).    J. 2018: BMBx shows 40% cellular marrow with no morphologic evidence of leukemia. Chromosomes 46, XY. Bcr-abl p210 detected at 0.1% on international scale (MR 3.0).    K. 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 Engrafted on Day +12. Course complicated by C. Difficile colitis.    L. 1/15/2019: Day +100 bone marrow biopsy shows 10% cellular marrow with trilineage hematopoietic activity. Cytogenetics 46,XY. BCR-ABL PCR is positive and shows  0.003% (MR 4.5). Chimerism studies show 100% donor in CD33+ cells and 70% donor/30% recipient in CD3+ cells. Findings consistent with major molecular response (MR 4.5).    M. 4/18/2019: BCR-ABL PCR is negative, consistent with complete molecular response (CMR).    N. 7/17/2019: BCR-ABL PCR is positive at <0.003%, consistent with major molecular response (MR 4.5).   O. 10/9/2019: Bone marrow biopsy for 1-year post-transplant shows no morphologic evidence of CML. Cytogenetics 46,XY. BCR-ABL is negative, consistent with complete molecular response (CMR). Chimerisms show >95% donor in CD3+ cells and 100% donor in CD33+ cells.     INTERVAL HISTORY:      Mr. Guzmán presents to clinic with his wife for f/u allo SCT. He is now 1 year, 1 month post transplant. Since his last visit, he completed a steroid taper for flare of chronic graft versus host disease. His symptoms have completely resolved at this point. He denies any further mouth sores, rash, sinus symptoms or dry eyes. He is eating reasonably well. He is content with his weight at this time. He is continuing to take dasatinib and tolerating it well.     Past Medical History, Past Social History and Past Family History have been reviewed and are unchanged except as noted in the interval history.    MEDICATIONS:     Current Outpatient Medications on File Prior to Visit   Medication Sig Dispense Refill    cetirizine (ZYRTEC) 10 MG tablet Take 1 tablet (10 mg total) by mouth once daily. 30 tablet 11    dasatinib (SPRYCEL) 100 mg Take 1 tablet (100 mg total) by mouth once daily. 30 tablet 2    fluticasone (FLONASE) 50 mcg/actuation nasal spray 1 spray (50 mcg total) by Each Nare route once daily. 16 g 2    [DISCONTINUED] doxycycline (VIBRA-TABS) 100 MG tablet Take 1 tablet (100 mg total) by mouth 2 (two) times daily. 10 tablet 0    [DISCONTINUED] methylPREDNISolone (MEDROL DOSEPACK) 4 mg tablet use as directed 1 Package 0    [DISCONTINUED] valGANciclovir (VALCYTE) 450  "mg Tab Take 2 tablets (900 mg total) by mouth 2 (two) times daily. (Patient not taking: Reported on 10/9/2019) 120 tablet 0     No current facility-administered medications on file prior to visit.        Review of patient's allergies indicates:  No Known Allergies    Review of Systems   Constitutional: Negative for fever, fatigue, diaphoresis, and unexpected weight change.  HENT:   Negative for lump/mass and sore throat.    Eyes: Negative for icterus.   Respiratory: Negative for cough and shortness of breath.    Cardiovascular: Negative for chest pain and palpitations.   Gastrointestinal: Negative for abdominal distention, constipation, diarrhea, nausea and vomiting. Negative for heamtochezia.   Genitourinary: Negative for dysuria and frequency.  Negative for hematuria.   Musculoskeletal: Negative for arthralgias, gait problem and myalgias.   Skin: Negative for  Itching, rash.   Neurological: Negative for dizziness, gait problem.   Hematological: Negative for adenopathy. Does not bruise/bleed easily.   Psychiatric/Behavioral: Negative for depression or anxiety.    Vitals:    12/02/19 1315   BP: 127/72   Pulse: 83   Resp: 18   Temp: 98.5 °F (36.9 °C)   SpO2: 100%   Weight: 72.2 kg (159 lb 2.8 oz)   Height: 5' 11" (1.803 m)   PainSc: 0-No pain       KARNOFSKY PERFORMANCE STATUS 90%  ECOG 1    Physical Exam   Constitutional: He is oriented to person, place, and time. He appears well-developed and well-nourished. No distress.   HENT:   Head: Normocephalic and atraumatic.   Mouth/Throat: Mucous membranes are normal. There are lichenoid changes along the buccal surfaces.   Eyes: Conjunctivae are normal.   Neck: No thyromegaly present.   Cardiovascular: Normal rate, regular rhythm and normal heart sounds.   No murmur heard.  Pulmonary/Chest: Breath sounds normal. He has no wheezes. He has no rales.   Abdominal: Soft. He exhibits no distension and no mass. There is no splenomegaly or hepatomegaly. There is no tenderness. "   Neurological: He is alert and oriented to person, place, and time. He has normal strength. Coordination normal.   Skin: No rash.     LAB:   Results for orders placed or performed in visit on 12/02/19   Rapid BMT CBC with Diff   Result Value Ref Range    WBC 4.06 3.90 - 12.70 K/uL    RBC 3.70 (L) 4.60 - 6.20 M/uL    Hemoglobin 11.2 (L) 14.0 - 18.0 g/dL    Hematocrit 36.3 (L) 40.0 - 54.0 %    Mean Corpuscular Volume 98 82 - 98 fL    Mean Corpuscular Hemoglobin 30.3 27.0 - 31.0 pg    Mean Corpuscular Hemoglobin Conc 30.9 (L) 32.0 - 36.0 g/dL    RDW 13.5 11.5 - 14.5 %    Platelets 299 150 - 350 K/uL    MPV 8.9 (L) 9.2 - 12.9 fL    Immature Granulocytes 0.2 0.0 - 0.5 %    Gran # (ANC) 0.9 (L) 1.8 - 7.7 K/uL    Immature Grans (Abs) 0.01 0.00 - 0.04 K/uL    Lymph # 2.5 1.0 - 4.8 K/uL    Mono # 0.4 0.3 - 1.0 K/uL    Eos # 0.3 0.0 - 0.5 K/uL    Baso # 0.02 0.00 - 0.20 K/uL    nRBC 0 0 /100 WBC    Gran% 22.4 (L) 38.0 - 73.0 %    Lymph% 60.6 (H) 18.0 - 48.0 %    Mono% 10.1 4.0 - 15.0 %    Eosinophil% 6.2 0.0 - 8.0 %    Basophil% 0.5 0.0 - 1.9 %    Platelet Estimate Appears normal     Large/Giant Platelets Present     Differential Method Automated    Magnesium   Result Value Ref Range    Magnesium 1.8 1.6 - 2.6 mg/dL   Phosphorus   Result Value Ref Range    Phosphorus 2.9 2.7 - 4.5 mg/dL   Comprehensive metabolic panel   Result Value Ref Range    Sodium 139 136 - 145 mmol/L    Potassium 4.0 3.5 - 5.1 mmol/L    Chloride 105 95 - 110 mmol/L    CO2 28 23 - 29 mmol/L    Glucose 94 70 - 110 mg/dL    BUN, Bld 9 6 - 20 mg/dL    Creatinine 1.0 0.5 - 1.4 mg/dL    Calcium 9.2 8.7 - 10.5 mg/dL    Total Protein 7.4 6.0 - 8.4 g/dL    Albumin 3.8 3.5 - 5.2 g/dL    Total Bilirubin 0.4 0.1 - 1.0 mg/dL    Alkaline Phosphatase 73 55 - 135 U/L    AST 24 10 - 40 U/L    ALT 23 10 - 44 U/L    Anion Gap 6 (L) 8 - 16 mmol/L    eGFR if African American >60.0 >60 mL/min/1.73 m^2    eGFR if non African American >60.0 >60 mL/min/1.73 m^2       PROBLEMS  ASSESSED THIS VISIT:    1. Chronic myeloid leukemia, BCR/ABL-positive, in remission    2. Cytomegalovirus (CMV) viremia    3. S/P allogeneic bone marrow transplant    4. Sickle cell trait        PLAN:        History allo transplant for CML  - See oncology history above  - S/p alloSCT on 10/9/2018  - Neutrophil engraftment on Day +12.   - Currently 1 year, 1 month post transplant.   - Plan for +30 & +100 & +1 year restaging  - day +30 BMBX was performed in clinic 11/13/18, no evidence of CML, BCR/ABL 0.005% (MR 4.3). Chimerism studies show 60% donor in CD3+ fraction and 100% donor in CD33+ fraction.   - Repeat chimers from peripheral blood on 12/10/18 shows 80% donor in CD3+ cells and 100% donor in CD33+ cells.  - Restart dasatinib 100 mg daily on 12/10 (no issues any longer with headaches)  - Day +100 Bmbx completed in clinic-- 1/15/18- No morphologic evidence of CML. BCR-ABL transcipt is 0.003%, consistent with MR 4.5. Chimerisms show 100% donor in CD33+ cells and 70% donor in CD3+ cells.   - Repeat chimers from peripheral blood drawn 4/9/19, CD3 90% donor, CD33 100% donor  - Repeat bcr-abl p210 drawn 4/18/19, negative, consistent with complete molecular remission (CMR)  - 7/17/2019 - bcr-abl p210 shows MMR (MR 4.5).   - 1 year BM bx shows complete molecular remission. CD33+ cells are 100% donor. CD3+ cells are >95% donor.     GVHD   - Oral budesonide was discontinued on 3/7/19  - C. Diff negative. Diarrhea improved with imodium. Taking every other day. Pt understands that imodium can cause constipation.  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 It is overall mild today and much improved from his prior visit with me. No specific therapy is needed.     Hematochezia  - resolved; this  appears to be related to how he takes dasatinib     ID  - Completed PO vancomycin for C. Diff  - Stopped ppx acyclovir at day +365 (10/9/19); stopped fluconazole (3/25/19)  - Begin atovaquone ppx at Day +30  - Monitor CMV BIW; EBV once weekly  - EBV negative to date  - CMV level pending. Currently off valganciclovir, will keep prescription on med list in case he needs this again in future. Continue ID follow-up who currently recommends continue to monitor  - CVC removed 1/11/19, removal site remains without s/s of infection  - EBV DNA negative  - Obtained urinalysis, urine culture, and urine for BK virus - negative    Cytopenias- Anemia   - multifactorial  - Today:  WBC and plt normal, Hgb 11.2. No transfusions indicated  - Donor has sickle cell trait. Some anemia is expected long-term      Follow-up:  - 3 months    Kishor Pantoja MD  Hematology and Stem Cell Transplant

## 2019-12-04 LAB — EBV DNA BY PCR: NORMAL IU/ML

## 2019-12-27 DIAGNOSIS — C92.10 CML (CHRONIC MYELOCYTIC LEUKEMIA): ICD-10-CM

## 2020-03-11 ENCOUNTER — LAB VISIT (OUTPATIENT)
Dept: LAB | Facility: HOSPITAL | Age: 35
End: 2020-03-11
Payer: OTHER GOVERNMENT

## 2020-03-11 ENCOUNTER — OFFICE VISIT (OUTPATIENT)
Dept: HEMATOLOGY/ONCOLOGY | Facility: CLINIC | Age: 35
End: 2020-03-11
Payer: OTHER GOVERNMENT

## 2020-03-11 VITALS
DIASTOLIC BLOOD PRESSURE: 82 MMHG | RESPIRATION RATE: 18 BRPM | OXYGEN SATURATION: 97 % | WEIGHT: 159.19 LBS | SYSTOLIC BLOOD PRESSURE: 146 MMHG | BODY MASS INDEX: 22.29 KG/M2 | TEMPERATURE: 99 F | HEIGHT: 71 IN | HEART RATE: 87 BPM

## 2020-03-11 DIAGNOSIS — B25.9 CYTOMEGALOVIRUS (CMV) VIREMIA: ICD-10-CM

## 2020-03-11 DIAGNOSIS — C92.11 CHRONIC MYELOID LEUKEMIA, BCR/ABL-POSITIVE, IN REMISSION: ICD-10-CM

## 2020-03-11 DIAGNOSIS — D57.3 SICKLE CELL TRAIT: ICD-10-CM

## 2020-03-11 DIAGNOSIS — Z94.81 S/P ALLOGENEIC BONE MARROW TRANSPLANT: Primary | ICD-10-CM

## 2020-03-11 LAB
ALBUMIN SERPL BCP-MCNC: 3.9 G/DL (ref 3.5–5.2)
ALP SERPL-CCNC: 74 U/L (ref 55–135)
ALT SERPL W/O P-5'-P-CCNC: 22 U/L (ref 10–44)
ANION GAP SERPL CALC-SCNC: 5 MMOL/L (ref 8–16)
AST SERPL-CCNC: 25 U/L (ref 10–40)
BASOPHILS # BLD AUTO: 0.06 K/UL (ref 0–0.2)
BASOPHILS NFR BLD: 0.5 % (ref 0–1.9)
BILIRUB SERPL-MCNC: 0.4 MG/DL (ref 0.1–1)
BUN SERPL-MCNC: 8 MG/DL (ref 6–20)
CALCIUM SERPL-MCNC: 9.2 MG/DL (ref 8.7–10.5)
CHLORIDE SERPL-SCNC: 106 MMOL/L (ref 95–110)
CO2 SERPL-SCNC: 29 MMOL/L (ref 23–29)
CREAT SERPL-MCNC: 1.1 MG/DL (ref 0.5–1.4)
DIFFERENTIAL METHOD: ABNORMAL
EOSINOPHIL # BLD AUTO: 0.2 K/UL (ref 0–0.5)
EOSINOPHIL NFR BLD: 1.9 % (ref 0–8)
ERYTHROCYTE [DISTWIDTH] IN BLOOD BY AUTOMATED COUNT: 12.8 % (ref 11.5–14.5)
EST. GFR  (AFRICAN AMERICAN): >60 ML/MIN/1.73 M^2
EST. GFR  (NON AFRICAN AMERICAN): >60 ML/MIN/1.73 M^2
GLUCOSE SERPL-MCNC: 80 MG/DL (ref 70–110)
HCT VFR BLD AUTO: 41.1 % (ref 40–54)
HGB BLD-MCNC: 13 G/DL (ref 14–18)
IMM GRANULOCYTES # BLD AUTO: 0.04 K/UL (ref 0–0.04)
IMM GRANULOCYTES NFR BLD AUTO: 0.3 % (ref 0–0.5)
LYMPHOCYTES # BLD AUTO: 7.7 K/UL (ref 1–4.8)
LYMPHOCYTES NFR BLD: 59.5 % (ref 18–48)
MCH RBC QN AUTO: 30.2 PG (ref 27–31)
MCHC RBC AUTO-ENTMCNC: 31.6 G/DL (ref 32–36)
MCV RBC AUTO: 96 FL (ref 82–98)
MONOCYTES # BLD AUTO: 1.1 K/UL (ref 0.3–1)
MONOCYTES NFR BLD: 8.8 % (ref 4–15)
NEUTROPHILS # BLD AUTO: 3.8 K/UL (ref 1.8–7.7)
NEUTROPHILS NFR BLD: 29 % (ref 38–73)
NRBC BLD-RTO: 0 /100 WBC
PLATELET # BLD AUTO: 248 K/UL (ref 150–350)
PMV BLD AUTO: 8.9 FL (ref 9.2–12.9)
POTASSIUM SERPL-SCNC: 3.9 MMOL/L (ref 3.5–5.1)
PROT SERPL-MCNC: 7.4 G/DL (ref 6–8.4)
RBC # BLD AUTO: 4.3 M/UL (ref 4.6–6.2)
SODIUM SERPL-SCNC: 140 MMOL/L (ref 136–145)
WBC # BLD AUTO: 12.95 K/UL (ref 3.9–12.7)

## 2020-03-11 PROCEDURE — 81206 BCR/ABL1 GENE MAJOR BP: CPT

## 2020-03-11 PROCEDURE — 99213 OFFICE O/P EST LOW 20 MIN: CPT | Mod: PBBFAC | Performed by: INTERNAL MEDICINE

## 2020-03-11 PROCEDURE — 99999 PR PBB SHADOW E&M-EST. PATIENT-LVL III: CPT | Mod: PBBFAC,BMT,, | Performed by: INTERNAL MEDICINE

## 2020-03-11 PROCEDURE — 99215 OFFICE O/P EST HI 40 MIN: CPT | Mod: S$PBB,BMT,, | Performed by: INTERNAL MEDICINE

## 2020-03-11 PROCEDURE — 85025 COMPLETE CBC W/AUTO DIFF WBC: CPT

## 2020-03-11 PROCEDURE — 99999 PR PBB SHADOW E&M-EST. PATIENT-LVL III: ICD-10-PCS | Mod: PBBFAC,BMT,, | Performed by: INTERNAL MEDICINE

## 2020-03-11 PROCEDURE — 80053 COMPREHEN METABOLIC PANEL: CPT

## 2020-03-11 PROCEDURE — 99215 PR OFFICE/OUTPT VISIT, EST, LEVL V, 40-54 MIN: ICD-10-PCS | Mod: S$PBB,BMT,, | Performed by: INTERNAL MEDICINE

## 2020-03-11 NOTE — Clinical Note
Follow-up in 3 months with CBC, CMP, bcr-abl, CMV.Please schedule 3rd immunizations series in 1 month.

## 2020-03-12 LAB — CMV DNA SERPL NAA+PROBE-ACNC: NORMAL IU/ML

## 2020-03-13 LAB
BCR/ABL,P210 RESULT: NORMAL
PATH REPORT.FINAL DX SPEC: NORMAL
SPECIMEN TYPE: NORMAL

## 2020-03-13 NOTE — PROGRESS NOTES
HEMATOLOGIC MALIGNANCIES PROGRESS NOTE    IDENTIFYING STATEMENT   Wilton Guzmán (Wilton) is a 34 y.o. 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      ONCOLOGY HISTORY:    1. Chronic myeloid leukemia, initially presenting in blast phase   A. 2018: Began developing left-sided abdominal pain - eventually requiring evaluation - WBC ~150 at  base; transferred to [facility] in [place], [place]   B. BMBx showed 20% myelomonoblasts concerning for CML in blast phase; bcr-abl positive (returned for Day 8 of induction therapy).    C. 2018: Began 7+3 induction chemotherapy. Began dasatinib (dose-adjusted) on day 8.   D. 2018: Transferred to [facility]   E. 2018: BMBx showed variably cellular marrow (10-40%) with 9% CD34+ blasts, concerning for residual disease.   F. 2018: Reinduction with MEC (mitoxantrone, etoposide, cytarabine) chemotherapy.    G. 2018: BMBx shows hypocellular bone marrow with no definite morphologic or immunophenotypic evidence of residual leukemia; bcr-abl p210 transcript was detected at 0.5%   H. Delay in clinic follow-up due to challenges with  insurance   I. 2018: Follow-up in clinic; WBC 6.77 (normal diff); Hgb 11.2 g/dl; Plt 298; BMBx shows 30% cellular marrow with no morphologic evidence of residual leukemia; bcr-abl p210 detected at 0.05% on international scale (MR 3.3).    J. 2018: BMBx shows 40% cellular marrow with no morphologic evidence of leukemia. Chromosomes 46, XY. Bcr-abl p210 detected at 0.1% on international scale (MR 3.0).    K. 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 Engrafted on Day +12. Course complicated by C. Difficile colitis.    L. 1/15/2019: Day +100 bone marrow biopsy shows 10% cellular marrow with trilineage hematopoietic activity. Cytogenetics 46,XY. BCR-ABL PCR is positive and shows  0.003% (MR 4.5). Chimerism studies show 100% donor in CD33+ cells and 70% donor/30% recipient in CD3+ cells. Findings consistent with major molecular response (MR 4.5).    M. 4/18/2019: BCR-ABL PCR is negative, consistent with complete molecular response (CMR).    N. 7/17/2019: BCR-ABL PCR is positive at <0.003%, consistent with major molecular response (MR 4.5).   O. 10/9/2019: Bone marrow biopsy for 1-year post-transplant shows no morphologic evidence of CML. Cytogenetics 46,XY. BCR-ABL is negative, consistent with complete molecular response (CMR). Chimerisms show >95% donor in CD3+ cells and 100% donor in CD33+ cells.     INTERVAL HISTORY:      Mr. Guzmán presents to clinic with his wife for f/u allo SCT. He is now 1 year, 5 months post transplant. He is feeling well since his last visit. He presents with daughters in clinic today. 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 He continues to exercise regularly. He has some stomach upset with dasatinib from time to time, but he is able to manage this.     Past Medical History, Past Social History and Past Family History have been reviewed and are unchanged except as noted in the interval history.    MEDICATIONS:     Current Outpatient Medications on File Prior to Visit   Medication Sig Dispense Refill    cetirizine (ZYRTEC) 10 MG tablet Take 1 tablet (10 mg total) by mouth once daily. 30 tablet 11    dasatinib (SPRYCEL) 100 mg TAKE 1 TABLET(100 MG) BY MOUTH EVERY DAY 30 tablet 2    fluticasone (FLONASE) 50 mcg/actuation nasal spray 1 spray (50 mcg total) by Each Nare route once daily. 16 g 2     No current facility-administered medications on file prior to visit.        Review of patient's allergies indicates:  No Known Allergies    Review of Systems   Constitutional: Negative for fever, fatigue, diaphoresis, and unexpected weight change.  HENT:   Negative for lump/mass and sore throat.    Eyes: Negative for icterus.   Respiratory: Negative for cough and shortness of  "breath.    Cardiovascular: Negative for chest pain and palpitations.   Gastrointestinal: Negative for abdominal distention, constipation, diarrhea, nausea and vomiting. Negative for heamtochezia.   Genitourinary: Negative for dysuria and frequency.  Negative for hematuria.   Musculoskeletal: Negative for arthralgias, gait problem and myalgias.   Skin: Negative for  Itching, rash.   Neurological: Negative for dizziness, gait problem.   Hematological: Negative for adenopathy. Does not bruise/bleed easily.   Psychiatric/Behavioral: Negative for depression or anxiety.    Vitals:    03/11/20 1302   BP: (!) 146/82   Pulse: 87   Resp: 18   Temp: 98.5 °F (36.9 °C)   SpO2: 97%   Weight: 72.2 kg (159 lb 2.8 oz)   Height: 5' 11" (1.803 m)   PainSc: 0-No pain       KARNOFSKY PERFORMANCE STATUS 90%  ECOG 1    Physical Exam   Constitutional: He is oriented to person, place, and time. He appears well-developed and well-nourished. No distress.   HENT:   Head: Normocephalic and atraumatic.   Mouth/Throat: Mucous membranes are normal.   Eyes: Conjunctivae are normal.   Neck: No thyromegaly present.   Cardiovascular: Normal rate, regular rhythm and normal heart sounds.   No murmur heard.  Pulmonary/Chest: Breath sounds normal. He has no wheezes. He has no rales.   Abdominal: Soft. He exhibits no distension and no mass. There is no splenomegaly or hepatomegaly. There is no tenderness.   Neurological: He is alert and oriented to person, place, and time. He has normal strength. Coordination normal.   Skin: No rash.     LAB:   Results for orders placed or performed in visit on 03/11/20   Rapid BMT CBC with Diff   Result Value Ref Range    WBC 12.95 (H) 3.90 - 12.70 K/uL    RBC 4.30 (L) 4.60 - 6.20 M/uL    Hemoglobin 13.0 (L) 14.0 - 18.0 g/dL    Hematocrit 41.1 40.0 - 54.0 %    Mean Corpuscular Volume 96 82 - 98 fL    Mean Corpuscular Hemoglobin 30.2 27.0 - 31.0 pg    Mean Corpuscular Hemoglobin Conc 31.6 (L) 32.0 - 36.0 g/dL    RDW 12.8 " 11.5 - 14.5 %    Platelets 248 150 - 350 K/uL    MPV 8.9 (L) 9.2 - 12.9 fL    Immature Granulocytes 0.3 0.0 - 0.5 %    Gran # (ANC) 3.8 1.8 - 7.7 K/uL    Immature Grans (Abs) 0.04 0.00 - 0.04 K/uL    Lymph # 7.7 (H) 1.0 - 4.8 K/uL    Mono # 1.1 (H) 0.3 - 1.0 K/uL    Eos # 0.2 0.0 - 0.5 K/uL    Baso # 0.06 0.00 - 0.20 K/uL    nRBC 0 0 /100 WBC    Gran% 29.0 (L) 38.0 - 73.0 %    Lymph% 59.5 (H) 18.0 - 48.0 %    Mono% 8.8 4.0 - 15.0 %    Eosinophil% 1.9 0.0 - 8.0 %    Basophil% 0.5 0.0 - 1.9 %    Differential Method Automated    Comprehensive metabolic panel   Result Value Ref Range    Sodium 140 136 - 145 mmol/L    Potassium 3.9 3.5 - 5.1 mmol/L    Chloride 106 95 - 110 mmol/L    CO2 29 23 - 29 mmol/L    Glucose 80 70 - 110 mg/dL    BUN, Bld 8 6 - 20 mg/dL    Creatinine 1.1 0.5 - 1.4 mg/dL    Calcium 9.2 8.7 - 10.5 mg/dL    Total Protein 7.4 6.0 - 8.4 g/dL    Albumin 3.9 3.5 - 5.2 g/dL    Total Bilirubin 0.4 0.1 - 1.0 mg/dL    Alkaline Phosphatase 74 55 - 135 U/L    AST 25 10 - 40 U/L    ALT 22 10 - 44 U/L    Anion Gap 5 (L) 8 - 16 mmol/L    eGFR if African American >60.0 >60 mL/min/1.73 m^2    eGFR if non African American >60.0 >60 mL/min/1.73 m^2   BCR/ABL, p210, Quant, Monitor, blood   Result Value Ref Range    Specimen Type, p210, Quant, bld Blood     Final Diagnosis, p210, Quant, bld SEE BELOW     BCR/ABL,p210 Result see interpretation    CMV DNA, quantitative, PCR   Result Value Ref Range    Cytomegalovirus PCR, Quant Undetected Undetected IU/mL       PROBLEMS ASSESSED THIS VISIT:    1. S/P allogeneic bone marrow transplant    2. Chronic myeloid leukemia, BCR/ABL-positive, in remission    3. Sickle cell trait    4. Cytomegalovirus (CMV) viremia        PLAN:        History allo transplant for CML  - See oncology history above  - S/p alloSCT on 10/9/2018  - Neutrophil engraftment on Day +12.   - Currently 1 year, 5 months post transplant.   - Plan for +30 & +100 & +1 year restaging  - day +30 BMBX was performed in  clinic 11/13/18, no evidence of CML, BCR/ABL 0.005% (MR 4.3). Chimerism studies show 60% donor in CD3+ fraction and 100% donor in CD33+ fraction.   - Repeat chimers from peripheral blood on 12/10/18 shows 80% donor in CD3+ cells and 100% donor in CD33+ cells.  - Restart dasatinib 100 mg daily on 12/10 (no issues any longer with headaches)  - Day +100 Bmbx completed in clinic-- 1/15/18- No morphologic evidence of CML. BCR-ABL transcipt is 0.003%, consistent with MR 4.5. Chimerisms show 100% donor in CD33+ cells and 70% donor in CD3+ cells.   - Repeat chimers from peripheral blood drawn 4/9/19, CD3 90% donor, CD33 100% donor  - Repeat bcr-abl p210 drawn 4/18/19, negative, consistent with complete molecular remission (CMR)  - 7/17/2019 - bcr-abl p210 shows MMR (MR 4.5).   - 1 year BM bx shows complete molecular remission. CD33+ cells are 100% donor. CD3+ cells are >95% donor.     GVHD   - Oral budesonide was discontinued on 3/7/19  - C. Diff negative. Diarrhea improved with imodium. Taking every other day. Pt understands that imodium can cause constipation.  - Tacrolimus discontinued   - GVHD charting with each clinic visit  Skin: No Rash  Liver: Bilirubin less than 2.0 mg/dL, less than 34 umol/L  Intestinal Tract: Diarrhea less than or equal to 500 mL/day or less than 280 mL/m2/day  Grade: No GVH  - no chronic GVHD today    Hematochezia  - resolved; this appears to be related to how he takes dasatinib     ID  - Completed PO vancomycin for C. Diff  - Stopped ppx acyclovir at day +365 (10/9/19); stopped fluconazole (3/25/19)  - Begin atovaquone ppx at Day +30  - Monitor CMV BIW; EBV once weekly  - EBV negative to date  - CMV level undetected. Currently off valganciclovir, will keep prescription on med list in case he needs this again in future. Continue ID follow-up who currently recommends continue to monitor  - CVC removed 1/11/19, removal site remains without s/s of infection  - EBV DNA negative  - Obtained  urinalysis, urine culture, and urine for BK virus - negative    Cytopenias- Anemia   - multifactorial  - Today:  WBC and plt normal, Hgb 13. No transfusions indicated  - Donor has sickle cell trait. Some anemia is expected long-term      Follow-up:  - 3 months    Kishor Pantoja MD  Hematology and Stem Cell Transplant

## 2020-05-08 DIAGNOSIS — Z94.81 S/P ALLOGENEIC BONE MARROW TRANSPLANT: Primary | ICD-10-CM

## 2020-05-08 DIAGNOSIS — C92.11 CHRONIC MYELOID LEUKEMIA, BCR/ABL-POSITIVE, IN REMISSION: ICD-10-CM

## 2020-06-11 ENCOUNTER — CLINICAL SUPPORT (OUTPATIENT)
Dept: INFECTIOUS DISEASES | Facility: CLINIC | Age: 35
End: 2020-06-11
Payer: OTHER GOVERNMENT

## 2020-06-11 ENCOUNTER — LAB VISIT (OUTPATIENT)
Dept: LAB | Facility: HOSPITAL | Age: 35
End: 2020-06-11
Payer: OTHER GOVERNMENT

## 2020-06-11 ENCOUNTER — OFFICE VISIT (OUTPATIENT)
Dept: HEMATOLOGY/ONCOLOGY | Facility: CLINIC | Age: 35
End: 2020-06-11
Payer: OTHER GOVERNMENT

## 2020-06-11 VITALS
WEIGHT: 161.63 LBS | DIASTOLIC BLOOD PRESSURE: 93 MMHG | RESPIRATION RATE: 17 BRPM | SYSTOLIC BLOOD PRESSURE: 139 MMHG | HEIGHT: 71 IN | BODY MASS INDEX: 22.63 KG/M2 | TEMPERATURE: 98 F | OXYGEN SATURATION: 100 % | HEART RATE: 67 BPM

## 2020-06-11 DIAGNOSIS — Z94.81 S/P ALLOGENEIC BONE MARROW TRANSPLANT: ICD-10-CM

## 2020-06-11 DIAGNOSIS — R09.81 CONGESTION OF NASAL SINUS: ICD-10-CM

## 2020-06-11 DIAGNOSIS — Z94.81 S/P ALLOGENEIC BONE MARROW TRANSPLANT: Primary | ICD-10-CM

## 2020-06-11 DIAGNOSIS — C92.11 CHRONIC MYELOID LEUKEMIA, BCR/ABL-POSITIVE, IN REMISSION: ICD-10-CM

## 2020-06-11 LAB
ALBUMIN SERPL BCP-MCNC: 4.1 G/DL (ref 3.5–5.2)
ALP SERPL-CCNC: 85 U/L (ref 55–135)
ALT SERPL W/O P-5'-P-CCNC: 31 U/L (ref 10–44)
ANION GAP SERPL CALC-SCNC: 7 MMOL/L (ref 8–16)
AST SERPL-CCNC: 25 U/L (ref 10–40)
BASOPHILS # BLD AUTO: 0.02 K/UL (ref 0–0.2)
BASOPHILS NFR BLD: 0.3 % (ref 0–1.9)
BILIRUB SERPL-MCNC: 0.4 MG/DL (ref 0.1–1)
BUN SERPL-MCNC: 11 MG/DL (ref 6–20)
CALCIUM SERPL-MCNC: 9.6 MG/DL (ref 8.7–10.5)
CHLORIDE SERPL-SCNC: 106 MMOL/L (ref 95–110)
CO2 SERPL-SCNC: 29 MMOL/L (ref 23–29)
CREAT SERPL-MCNC: 1 MG/DL (ref 0.5–1.4)
DIFFERENTIAL METHOD: ABNORMAL
EOSINOPHIL # BLD AUTO: 0.1 K/UL (ref 0–0.5)
EOSINOPHIL NFR BLD: 2.2 % (ref 0–8)
ERYTHROCYTE [DISTWIDTH] IN BLOOD BY AUTOMATED COUNT: 12.2 % (ref 11.5–14.5)
EST. GFR  (AFRICAN AMERICAN): >60 ML/MIN/1.73 M^2
EST. GFR  (NON AFRICAN AMERICAN): >60 ML/MIN/1.73 M^2
GLUCOSE SERPL-MCNC: 92 MG/DL (ref 70–110)
HBV SURFACE AB SER-ACNC: POSITIVE M[IU]/ML
HCT VFR BLD AUTO: 42.7 % (ref 40–54)
HGB BLD-MCNC: 13.7 G/DL (ref 14–18)
IMM GRANULOCYTES # BLD AUTO: 0.03 K/UL (ref 0–0.04)
IMM GRANULOCYTES NFR BLD AUTO: 0.5 % (ref 0–0.5)
LYMPHOCYTES # BLD AUTO: 2 K/UL (ref 1–4.8)
LYMPHOCYTES NFR BLD: 34.1 % (ref 18–48)
MCH RBC QN AUTO: 30.1 PG (ref 27–31)
MCHC RBC AUTO-ENTMCNC: 32.1 G/DL (ref 32–36)
MCV RBC AUTO: 94 FL (ref 82–98)
MONOCYTES # BLD AUTO: 0.5 K/UL (ref 0.3–1)
MONOCYTES NFR BLD: 8.6 % (ref 4–15)
NEUTROPHILS # BLD AUTO: 3.1 K/UL (ref 1.8–7.7)
NEUTROPHILS NFR BLD: 54.3 % (ref 38–73)
NRBC BLD-RTO: 0 /100 WBC
PLATELET # BLD AUTO: 240 K/UL (ref 150–350)
PMV BLD AUTO: 9.2 FL (ref 9.2–12.9)
POTASSIUM SERPL-SCNC: 4.3 MMOL/L (ref 3.5–5.1)
PROT SERPL-MCNC: 7.8 G/DL (ref 6–8.4)
RBC # BLD AUTO: 4.55 M/UL (ref 4.6–6.2)
SODIUM SERPL-SCNC: 142 MMOL/L (ref 136–145)
WBC # BLD AUTO: 5.8 K/UL (ref 3.9–12.7)

## 2020-06-11 PROCEDURE — 99999 PR PBB SHADOW E&M-EST. PATIENT-LVL I: CPT | Mod: PBBFAC,BMT,,

## 2020-06-11 PROCEDURE — 99214 PR OFFICE/OUTPT VISIT, EST, LEVL IV, 30-39 MIN: ICD-10-PCS | Mod: S$PBB,BMT,, | Performed by: INTERNAL MEDICINE

## 2020-06-11 PROCEDURE — 99999 PR PBB SHADOW E&M-EST. PATIENT-LVL III: CPT | Mod: PBBFAC,BMT,, | Performed by: INTERNAL MEDICINE

## 2020-06-11 PROCEDURE — 90648 HIB PRP-T VACCINE 4 DOSE IM: CPT | Mod: PBBFAC

## 2020-06-11 PROCEDURE — 80053 COMPREHEN METABOLIC PANEL: CPT

## 2020-06-11 PROCEDURE — 90746 HEPB VACCINE 3 DOSE ADULT IM: CPT | Mod: PBBFAC

## 2020-06-11 PROCEDURE — 90472 IMMUNIZATION ADMIN EACH ADD: CPT | Mod: PBBFAC

## 2020-06-11 PROCEDURE — 99214 OFFICE O/P EST MOD 30 MIN: CPT | Mod: S$PBB,BMT,, | Performed by: INTERNAL MEDICINE

## 2020-06-11 PROCEDURE — 99999 PR PBB SHADOW E&M-EST. PATIENT-LVL III: ICD-10-PCS | Mod: PBBFAC,BMT,, | Performed by: INTERNAL MEDICINE

## 2020-06-11 PROCEDURE — 85025 COMPLETE CBC W/AUTO DIFF WBC: CPT

## 2020-06-11 PROCEDURE — G0010 ADMIN HEPATITIS B VACCINE: HCPCS | Mod: PBBFAC

## 2020-06-11 PROCEDURE — 99999 PR PBB SHADOW E&M-EST. PATIENT-LVL I: ICD-10-PCS | Mod: PBBFAC,BMT,,

## 2020-06-11 PROCEDURE — 36415 COLL VENOUS BLD VENIPUNCTURE: CPT

## 2020-06-11 PROCEDURE — 99213 OFFICE O/P EST LOW 20 MIN: CPT | Mod: PBBFAC,25,27 | Performed by: INTERNAL MEDICINE

## 2020-06-11 PROCEDURE — 86706 HEP B SURFACE ANTIBODY: CPT

## 2020-06-11 PROCEDURE — 90700 DTAP VACCINE < 7 YRS IM: CPT | Mod: PBBFAC

## 2020-06-11 PROCEDURE — 81206 BCR/ABL1 GENE MAJOR BP: CPT

## 2020-06-11 PROCEDURE — 86774 TETANUS ANTIBODY: CPT

## 2020-06-11 PROCEDURE — 90670 PCV13 VACCINE IM: CPT | Mod: PBBFAC

## 2020-06-11 PROCEDURE — 90713 POLIOVIRUS IPV SC/IM: CPT | Mod: PBBFAC

## 2020-06-11 PROCEDURE — 99211 OFF/OP EST MAY X REQ PHY/QHP: CPT | Mod: PBBFAC,25

## 2020-06-11 RX ORDER — FLUTICASONE PROPIONATE 50 MCG
1 SPRAY, SUSPENSION (ML) NASAL DAILY
Qty: 16 G | Refills: 2 | Status: SHIPPED | OUTPATIENT
Start: 2020-06-11

## 2020-06-11 NOTE — PROGRESS NOTES
received HIB, Polio, Dtap, Prevnar 13, and Hepatitis B vaccines. Tolerated well. Left unit in NAD.

## 2020-06-11 NOTE — Clinical Note
Follow-up in 3 months with CBC, CMP, bcr-abl p210 quant monitor, CMV. Please also schedule DXA for when he returns.

## 2020-06-14 LAB — CMV DNA SERPL NAA+PROBE-ACNC: NORMAL IU/ML

## 2020-06-16 LAB
BCR/ABL,P210 RESULT: NORMAL
PATH REPORT.FINAL DX SPEC: NORMAL
SPECIMEN TYPE: NORMAL

## 2020-06-16 NOTE — PROGRESS NOTES
HEMATOLOGIC MALIGNANCIES PROGRESS NOTE    IDENTIFYING STATEMENT   Wilton Guzmán (Wilton) is a 34 y.o. 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      ONCOLOGY HISTORY:    1. Chronic myeloid leukemia, initially presenting in blast phase   A. 2018: Began developing left-sided abdominal pain - eventually requiring evaluation - WBC ~150 at  base; transferred to [facility] in [place], [place]   B. BMBx showed 20% myelomonoblasts concerning for CML in blast phase; bcr-abl positive (returned for Day 8 of induction therapy).    C. 2018: Began 7+3 induction chemotherapy. Began dasatinib (dose-adjusted) on day 8.   D. 2018: Transferred to [facility]   E. 2018: BMBx showed variably cellular marrow (10-40%) with 9% CD34+ blasts, concerning for residual disease.   F. 2018: Reinduction with MEC (mitoxantrone, etoposide, cytarabine) chemotherapy.    G. 2018: BMBx shows hypocellular bone marrow with no definite morphologic or immunophenotypic evidence of residual leukemia; bcr-abl p210 transcript was detected at 0.5%   H. Delay in clinic follow-up due to challenges with  insurance   I. 2018: Follow-up in clinic; WBC 6.77 (normal diff); Hgb 11.2 g/dl; Plt 298; BMBx shows 30% cellular marrow with no morphologic evidence of residual leukemia; bcr-abl p210 detected at 0.05% on international scale (MR 3.3).    J. 2018: BMBx shows 40% cellular marrow with no morphologic evidence of leukemia. Chromosomes 46, XY. Bcr-abl p210 detected at 0.1% on international scale (MR 3.0).    K. 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 Engrafted on Day +12. Course complicated by C. Difficile colitis.    L. 1/15/2019: Day +100 bone marrow biopsy shows 10% cellular marrow with trilineage hematopoietic activity. Cytogenetics 46,XY. BCR-ABL PCR is positive and shows  0.003% (MR 4.5). Chimerism studies show 100% donor in CD33+ cells and 70% donor/30% recipient in CD3+ cells. Findings consistent with major molecular response (MR 4.5).    M. 4/18/2019: BCR-ABL PCR is negative, consistent with complete molecular response (CMR).    N. 7/17/2019: BCR-ABL PCR is positive at <0.003%, consistent with major molecular response (MR 4.5).   O. 10/9/2019: Bone marrow biopsy for 1-year post-transplant shows no morphologic evidence of CML. Cytogenetics 46,XY. BCR-ABL is negative, consistent with complete molecular response (CMR). Chimerisms show >95% donor in CD3+ cells and 100% donor in CD33+ cells.     INTERVAL HISTORY:      Mr. Guzmán presents to clinic with his wife for f/u allo SCT. He is now 1 year, 8 months post transplant. He is feeling well since his last visit.  He continues to exercise regularly. He has some stomach upset with dasatinib from time to time, but he is able to manage this. He otherwise feels well and has no complaints.     Past Medical History, Past Social History and Past Family History have been reviewed and are unchanged except as noted in the interval history.    MEDICATIONS:     Current Outpatient Medications on File Prior to Visit   Medication Sig Dispense Refill    cetirizine (ZYRTEC) 10 MG tablet Take 1 tablet (10 mg total) by mouth once daily. 30 tablet 11    dasatinib (SPRYCEL) 100 mg TAKE 1 TABLET(100 MG) BY MOUTH EVERY DAY 30 tablet 2     No current facility-administered medications on file prior to visit.        Review of patient's allergies indicates:  No Known Allergies    Review of Systems   Constitutional: Negative for fever, fatigue, diaphoresis, and unexpected weight change.  HENT:   Negative for lump/mass and sore throat.    Eyes: Negative for icterus.   Respiratory: Negative for cough and shortness of breath.    Cardiovascular: Negative for chest pain and palpitations.   Gastrointestinal: Negative for abdominal distention, constipation, diarrhea,  "nausea and vomiting. Negative for heamtochezia.   Genitourinary: Negative for dysuria and frequency.  Negative for hematuria.   Musculoskeletal: Negative for arthralgias, gait problem and myalgias.   Skin: Negative for  Itching, rash.   Neurological: Negative for dizziness, gait problem.   Hematological: Negative for adenopathy. Does not bruise/bleed easily.   Psychiatric/Behavioral: Negative for depression or anxiety.    Vitals:    06/11/20 0950   BP: (!) 139/93   Pulse: 67   Resp: 17   Temp: 98.3 °F (36.8 °C)   SpO2: 100%   Weight: 73.3 kg (161 lb 9.6 oz)   Height: 5' 11" (1.803 m)   PainSc: 0-No pain       KARNOFSKY PERFORMANCE STATUS 100%  ECOG 0    Physical Exam   Constitutional: He is oriented to person, place, and time. He appears well-developed and well-nourished. No distress.   HENT:   Head: Normocephalic and atraumatic.   Mouth/Throat: Mucous membranes are normal.   Eyes: Conjunctivae are normal.   Neck: No thyromegaly present.   Cardiovascular: Normal rate, regular rhythm and normal heart sounds.   No murmur heard.  Pulmonary/Chest: Breath sounds normal. He has no wheezes. He has no rales.   Abdominal: Soft. He exhibits no distension and no mass. There is no splenomegaly or hepatomegaly. There is no tenderness.   Neurological: He is alert and oriented to person, place, and time. He has normal strength. Coordination normal.   Skin: No rash.     LAB:   Results for orders placed or performed in visit on 06/11/20   CBC auto differential   Result Value Ref Range    WBC 5.80 3.90 - 12.70 K/uL    RBC 4.55 (L) 4.60 - 6.20 M/uL    Hemoglobin 13.7 (L) 14.0 - 18.0 g/dL    Hematocrit 42.7 40.0 - 54.0 %    Mean Corpuscular Volume 94 82 - 98 fL    Mean Corpuscular Hemoglobin 30.1 27.0 - 31.0 pg    Mean Corpuscular Hemoglobin Conc 32.1 32.0 - 36.0 g/dL    RDW 12.2 11.5 - 14.5 %    Platelets 240 150 - 350 K/uL    MPV 9.2 9.2 - 12.9 fL    Immature Granulocytes 0.5 0.0 - 0.5 %    Gran # (ANC) 3.1 1.8 - 7.7 K/uL    Immature " Grans (Abs) 0.03 0.00 - 0.04 K/uL    Lymph # 2.0 1.0 - 4.8 K/uL    Mono # 0.5 0.3 - 1.0 K/uL    Eos # 0.1 0.0 - 0.5 K/uL    Baso # 0.02 0.00 - 0.20 K/uL    nRBC 0 0 /100 WBC    Gran% 54.3 38.0 - 73.0 %    Lymph% 34.1 18.0 - 48.0 %    Mono% 8.6 4.0 - 15.0 %    Eosinophil% 2.2 0.0 - 8.0 %    Basophil% 0.3 0.0 - 1.9 %    Differential Method Automated    Comprehensive metabolic panel   Result Value Ref Range    Sodium 142 136 - 145 mmol/L    Potassium 4.3 3.5 - 5.1 mmol/L    Chloride 106 95 - 110 mmol/L    CO2 29 23 - 29 mmol/L    Glucose 92 70 - 110 mg/dL    BUN, Bld 11 6 - 20 mg/dL    Creatinine 1.0 0.5 - 1.4 mg/dL    Calcium 9.6 8.7 - 10.5 mg/dL    Total Protein 7.8 6.0 - 8.4 g/dL    Albumin 4.1 3.5 - 5.2 g/dL    Total Bilirubin 0.4 0.1 - 1.0 mg/dL    Alkaline Phosphatase 85 55 - 135 U/L    AST 25 10 - 40 U/L    ALT 31 10 - 44 U/L    Anion Gap 7 (L) 8 - 16 mmol/L    eGFR if African American >60.0 >60 mL/min/1.73 m^2    eGFR if non African American >60.0 >60 mL/min/1.73 m^2   BCR/ABL, p210, Quant, Monitor, blood   Result Value Ref Range    Specimen Type, p210, Quant, bld Blood    CMV DNA, quantitative, PCR   Result Value Ref Range    Cytomegalovirus PCR, Quant Undetected Undetected IU/mL   Hepatitis B surface antibody   Result Value Ref Range    Hep B S Ab Positive (A)        PROBLEMS ASSESSED THIS VISIT:    1. S/P allogeneic bone marrow transplant    2. Chronic myeloid leukemia, BCR/ABL-positive, in remission    3. Congestion of nasal sinus        PLAN:        History allo transplant for CML  - See oncology history above  - S/p alloSCT on 10/9/2018  - Neutrophil engraftment on Day +12.   - Currently 1 year, 8 months post transplant.   - Plan for +30 & +100 & +1 year restaging  - day +30 BMBX was performed in clinic 11/13/18, no evidence of CML, BCR/ABL 0.005% (MR 4.3). Chimerism studies show 60% donor in CD3+ fraction and 100% donor in CD33+ fraction.   - Repeat chimers from peripheral blood on 12/10/18 shows 80%  donor in CD3+ cells and 100% donor in CD33+ cells.  - Restart dasatinib 100 mg daily on 12/10 (no issues any longer with headaches)  - Day +100 Bmbx completed in clinic-- 1/15/18- No morphologic evidence of CML. BCR-ABL transcipt is 0.003%, consistent with MR 4.5. Chimerisms show 100% donor in CD33+ cells and 70% donor in CD3+ cells.   - Repeat chimers from peripheral blood drawn 4/9/19, CD3 90% donor, CD33 100% donor  - Repeat bcr-abl p210 drawn 4/18/19, negative, consistent with complete molecular remission (CMR)  - 7/17/2019 - bcr-abl p210 shows MMR (MR 4.5).   - 1 year BM bx shows complete molecular remission. CD33+ cells are 100% donor. CD3+ cells are >95% donor.     GVHD   - Oral budesonide was discontinued on 3/7/19  - C. Diff negative. Diarrhea improved with imodium. Taking every other day. Pt understands that imodium can cause constipation.  - Tacrolimus discontinued   - GVHD charting with each clinic visit              - no chronic GVHD today    Hematochezia  - resolved; this appears to be related to how he takes dasatinib     ID  - Completed PO vancomycin for C. Diff  - Stopped ppx acyclovir at day +365 (10/9/19); stopped fluconazole (3/25/19)  - Begin atovaquone ppx at Day +30  - Monitor CMV BIW; EBV once weekly  - EBV negative to date  - CMV level undetected. Currently off valganciclovir, will keep prescription on med list in case he needs this again in future. Continue ID follow-up who currently recommends continue to monitor  - CVC removed 1/11/19, removal site remains without s/s of infection  - EBV DNA negative  - Obtained urinalysis, urine culture, and urine for BK virus - negative    Cytopenias- Anemia   - multifactorial  - Today:  WBC and plt normal, Hgb 13.7. No transfusions indicated  - Donor has sickle cell trait. Some anemia is expected long-term     ENT  - Prescribed fluticasone for nasal congestion     Follow-up:  - 3 months    Kishor Pantoja MD  Hematology and Stem Cell Transplant

## 2020-06-17 LAB
C DIPHTHERIAE AB SER IA-ACNC: 0.39 IU/ML
C TETANI TOXOID AB SER-ACNC: 1.2 IU/ML
DEPRECATED S PNEUM23 IGG SER-MCNC: <0.3 UG/ML
DEPRECATED S PNEUM4 IGG SER-MCNC: 1.1 UG/ML
HAEM INFLU B IGG SER IA-MCNC: >9 MCG/ML
S PN DA SERO 19F IGG SER-MCNC: 3.3 UG/ML
S PNEUM DA 1 IGG SER-MCNC: 3.1 UG/ML
S PNEUM DA 14 IGG SER-MCNC: 5.6
S PNEUM DA 18C IGG SER-MCNC: 0.6
S PNEUM DA 19A IGG SER-MCNC: 1 UG/ML
S PNEUM DA 3 IGG SER-MCNC: 1.1 UG/ML
S PNEUM DA 5 IGG SER-MCNC: 2.2 UG/ML
S PNEUM DA 6A IGG SER-MCNC: 3.1 UG/ML
S PNEUM DA 6B IGG SER-MCNC: 1.1 UG/ML
S PNEUM DA 7F IGG SER-MCNC: 1.9 UG/ML
S PNEUM DA 9V IGG SER-MCNC: 3.3 UG/ML

## 2020-07-30 ENCOUNTER — PATIENT MESSAGE (OUTPATIENT)
Dept: HEMATOLOGY/ONCOLOGY | Facility: CLINIC | Age: 35
End: 2020-07-30

## 2020-07-30 NOTE — TELEPHONE ENCOUNTER
Spoke with patient and noted that bone scan is not needed until September and moved labs to local and next week

## 2020-08-03 ENCOUNTER — LAB VISIT (OUTPATIENT)
Dept: LAB | Facility: HOSPITAL | Age: 35
End: 2020-08-03
Attending: INTERNAL MEDICINE
Payer: OTHER GOVERNMENT

## 2020-08-03 DIAGNOSIS — Z76.82 STEM CELL TRANSPLANT CANDIDATE: ICD-10-CM

## 2020-08-03 DIAGNOSIS — C92.11 CHRONIC MYELOID LEUKEMIA, BCR/ABL-POSITIVE, IN REMISSION: ICD-10-CM

## 2020-08-03 DIAGNOSIS — Z94.84 IMMUNOCOMPROMISED STATE ASSOCIATED WITH STEM CELL TRANSPLANT: ICD-10-CM

## 2020-08-03 DIAGNOSIS — Z94.81 S/P ALLOGENEIC BONE MARROW TRANSPLANT: ICD-10-CM

## 2020-08-03 DIAGNOSIS — C92.10 CML (CHRONIC MYELOCYTIC LEUKEMIA): ICD-10-CM

## 2020-08-03 DIAGNOSIS — D84.822 IMMUNOCOMPROMISED STATE ASSOCIATED WITH STEM CELL TRANSPLANT: ICD-10-CM

## 2020-08-03 PROCEDURE — 86706 HEP B SURFACE ANTIBODY: CPT

## 2020-08-03 PROCEDURE — 86774 TETANUS ANTIBODY: CPT

## 2020-08-03 PROCEDURE — 36415 COLL VENOUS BLD VENIPUNCTURE: CPT

## 2020-08-04 LAB — HBV SURFACE AB SER-ACNC: POSITIVE M[IU]/ML

## 2020-08-08 LAB
C DIPHTHERIAE AB SER IA-ACNC: >2 IU/ML
C TETANI TOXOID AB SER-ACNC: 2.23 IU/ML
DEPRECATED S PNEUM23 IGG SER-MCNC: 3 UG/ML
DEPRECATED S PNEUM4 IGG SER-MCNC: 5.3 UG/ML
HAEM INFLU B IGG SER IA-MCNC: >9 MCG/ML
S PN DA SERO 19F IGG SER-MCNC: 7.3 UG/ML
S PNEUM DA 1 IGG SER-MCNC: 10.2 UG/ML
S PNEUM DA 14 IGG SER-MCNC: 7.3
S PNEUM DA 18C IGG SER-MCNC: 16.8
S PNEUM DA 19A IGG SER-MCNC: 2.9 UG/ML
S PNEUM DA 3 IGG SER-MCNC: 3.6 UG/ML
S PNEUM DA 5 IGG SER-MCNC: 6.6 UG/ML
S PNEUM DA 6A IGG SER-MCNC: 8.4 UG/ML
S PNEUM DA 6B IGG SER-MCNC: 7.4 UG/ML
S PNEUM DA 7F IGG SER-MCNC: 8.2 UG/ML
S PNEUM DA 9V IGG SER-MCNC: 6.6 UG/ML

## 2020-08-28 ENCOUNTER — TELEPHONE (OUTPATIENT)
Dept: HEMATOLOGY/ONCOLOGY | Facility: CLINIC | Age: 35
End: 2020-08-28

## 2020-08-28 NOTE — TELEPHONE ENCOUNTER
"----- Message from Lg Ferrari sent at 8/28/2020  3:21 PM CDT -----  Consult/Advisory:    Name Of Caller: Manuela selby/ [employer]  Contact Preference?: [phone] Ext [phone]    Provider Name: Dr Pantoja       What is the nature of the call?:  Law office request a copy of original pathology report and visit notes from Medical records in June but only received partial pathology report and have been having a hard time getting answers from Medical records to get a resolution.  Ms. Corona is asking for assistance and a callback to discuss    Additional Notes:  "Thank you for all that you do for our patients'"           "

## 2021-04-11 DIAGNOSIS — J30.9 ALLERGIC RHINITIS, UNSPECIFIED SEASONALITY, UNSPECIFIED TRIGGER: ICD-10-CM

## 2021-04-12 RX ORDER — CETIRIZINE HYDROCHLORIDE 10 MG/1
TABLET ORAL
Qty: 30 TABLET | Refills: 11 | Status: SHIPPED | OUTPATIENT
Start: 2021-04-12

## 2021-04-14 ENCOUNTER — TELEPHONE (OUTPATIENT)
Dept: HEMATOLOGY/ONCOLOGY | Facility: CLINIC | Age: 36
End: 2021-04-14

## 2021-05-18 ENCOUNTER — HOSPITAL ENCOUNTER (OUTPATIENT)
Dept: RADIOLOGY | Facility: CLINIC | Age: 36
Discharge: HOME | End: 2021-05-18
Attending: INTERNAL MEDICINE
Payer: OTHER GOVERNMENT

## 2021-05-18 ENCOUNTER — OFFICE VISIT (OUTPATIENT)
Dept: HEMATOLOGY/ONCOLOGY | Facility: CLINIC | Age: 36
End: 2021-05-18
Payer: OTHER GOVERNMENT

## 2021-05-18 VITALS
BODY MASS INDEX: 23.08 KG/M2 | SYSTOLIC BLOOD PRESSURE: 126 MMHG | DIASTOLIC BLOOD PRESSURE: 84 MMHG | HEART RATE: 89 BPM | OXYGEN SATURATION: 98 % | HEIGHT: 71 IN | TEMPERATURE: 99 F | WEIGHT: 164.88 LBS | RESPIRATION RATE: 18 BRPM

## 2021-05-18 DIAGNOSIS — Z94.81 S/P ALLOGENEIC BONE MARROW TRANSPLANT: Primary | ICD-10-CM

## 2021-05-18 DIAGNOSIS — Z94.81 S/P ALLOGENEIC BONE MARROW TRANSPLANT: ICD-10-CM

## 2021-05-18 DIAGNOSIS — C92.11 CHRONIC MYELOID LEUKEMIA, BCR/ABL-POSITIVE, IN REMISSION: ICD-10-CM

## 2021-05-18 DIAGNOSIS — D57.3 SICKLE CELL TRAIT: ICD-10-CM

## 2021-05-18 DIAGNOSIS — D64.9 ANEMIA, UNSPECIFIED TYPE: ICD-10-CM

## 2021-05-18 PROCEDURE — 77080 DEXA BONE DENSITY SPINE HIP: ICD-10-PCS | Mod: 26,BMT,, | Performed by: INTERNAL MEDICINE

## 2021-05-18 PROCEDURE — 99214 OFFICE O/P EST MOD 30 MIN: CPT | Mod: S$PBB,BMT,, | Performed by: INTERNAL MEDICINE

## 2021-05-18 PROCEDURE — 77080 DXA BONE DENSITY AXIAL: CPT | Mod: TC

## 2021-05-18 PROCEDURE — 99999 PR PBB SHADOW E&M-EST. PATIENT-LVL III: ICD-10-PCS | Mod: PBBFAC,BMT,, | Performed by: INTERNAL MEDICINE

## 2021-05-18 PROCEDURE — 99214 PR OFFICE/OUTPT VISIT, EST, LEVL IV, 30-39 MIN: ICD-10-PCS | Mod: S$PBB,BMT,, | Performed by: INTERNAL MEDICINE

## 2021-05-18 PROCEDURE — 99213 OFFICE O/P EST LOW 20 MIN: CPT | Mod: PBBFAC,25 | Performed by: INTERNAL MEDICINE

## 2021-05-18 PROCEDURE — 77080 DXA BONE DENSITY AXIAL: CPT | Mod: 26,BMT,, | Performed by: INTERNAL MEDICINE

## 2021-05-18 PROCEDURE — 99999 PR PBB SHADOW E&M-EST. PATIENT-LVL III: CPT | Mod: PBBFAC,BMT,, | Performed by: INTERNAL MEDICINE

## 2023-04-13 ENCOUNTER — TELEPHONE (OUTPATIENT)
Dept: HEMATOLOGY/ONCOLOGY | Facility: CLINIC | Age: 38
End: 2023-04-13
Payer: OTHER GOVERNMENT

## 2023-04-13 NOTE — TELEPHONE ENCOUNTER
"----- Message from Diane Bullock sent at 4/13/2023  3:35 PM CDT -----  Consult/Advisory:       Name Of Caller: Self       Contact Preference?: [phone]       Provider Name: Kit       Does patient feel the need to be seen today? No       What is the nature of the call?: Returning call to office.            Additional Notes:  "Thank you for all that you do for our patients                "

## 2023-04-13 NOTE — TELEPHONE ENCOUNTER
Pt states SWAPNA Spears had called. Advised pt will let SWAPNA Spears know. Pt verbalized understanding.

## 2023-09-19 NOTE — ASSESSMENT & PLAN NOTE
- 7/13/18 Blood cx x2 ngtd; UA negative; Urine cx no growth; Chest xray negative  - Started on  vancomycin ( received 4 days) + cefepime (received 6 days) on 7/13/2018.  - resolved; afebrile off of antibiotics   Continue Regimen: - DermOtic Oil 0.01 % ear drops : Apply 5-6 drops to itchy rash on both ears twice daily for up to 2 weeks\\n- Wynzora 0.005 %-0.064 % topical cream : Apply pea sized amounts to red irritated rash on face, scalp and body once daily for up to four weeks as needed for psoriasis\\n- Clobetasol 0.05 % topical ointment : Apply pea sized amounts to extra itchy scaly red rash on face, neck, arms and body twice daily for up to 2 weeks as needed.\\n- Vtama for groin and genitalia\\n\\n- Tremfya Render In Strict Bullet Format?: No Detail Level: Zone

## 2024-01-01 NOTE — PROGRESS NOTES
SW met with pt at bedside to provide support. SW will continue to follow.    Kimberly Briggs [facility]  Oncology Social Worker  Phone: [phone]     Routine  care and anticipatory guidance

## 2024-07-01 NOTE — Clinical Note
Due to covid pneumonia/bacterial pneumonia  CXR with increased hazziness in LLQ but laterally rotated  Waiting radiology read.  If unable to wean off oxygen will consider CTA chest and further work up.      6/28: No PE on CTA chest.  Persistent left lobe PNA.  Added vancomycin to IV zosyn for pneumonia treatment.  Pulmonology notified.  Hopefully we can start weaning shortly.    6/30 getting better.  Now on 3 L nasal cannula      7/1 Resolved    Follow-up:- Every other week labs and appts with Dr. Pantoja or NP with CBC, CMP, Mag, Phos, CMV, EBV, type and screen.

## 2024-10-31 NOTE — Clinical Note
- Appt with Dr. Pantoja or NP with CBC, CMP, Mag, Phos, CMV, EBV, type and screen in 1 month. guidewire removed.